# Patient Record
Sex: FEMALE | Race: WHITE | NOT HISPANIC OR LATINO | URBAN - METROPOLITAN AREA
[De-identification: names, ages, dates, MRNs, and addresses within clinical notes are randomized per-mention and may not be internally consistent; named-entity substitution may affect disease eponyms.]

---

## 2017-09-15 ENCOUNTER — ON CAMPUS - OUTPATIENT (OUTPATIENT)
Dept: URBAN - METROPOLITAN AREA HOSPITAL 77 | Facility: HOSPITAL | Age: 72
End: 2017-09-15

## 2017-09-15 DIAGNOSIS — K59.00 CONSTIPATION, UNSPECIFIED: ICD-10-CM

## 2017-09-15 DIAGNOSIS — R93.3 ABNORMAL FINDINGS ON DIAGNOSTIC IMAGING OF OTHER PARTS OF DI: ICD-10-CM

## 2017-09-15 DIAGNOSIS — R07.9 CHEST PAIN, UNSPECIFIED: ICD-10-CM

## 2017-09-15 DIAGNOSIS — R10.13 EPIGASTRIC PAIN: ICD-10-CM

## 2017-09-15 PROCEDURE — 99213 OFFICE O/P EST LOW 20 MIN: CPT | Performed by: INTERNAL MEDICINE

## 2017-09-16 PROCEDURE — 99214 OFFICE O/P EST MOD 30 MIN: CPT | Performed by: INTERNAL MEDICINE

## 2017-12-04 ENCOUNTER — HOSPITAL ENCOUNTER (OUTPATIENT)
Dept: OTHER | Facility: HOSPITAL | Age: 72
Setting detail: SPECIMEN
Discharge: HOME | End: 2017-12-04
Attending: INTERNAL MEDICINE | Admitting: INTERNAL MEDICINE

## 2018-09-07 ENCOUNTER — HOSPITAL ENCOUNTER (OUTPATIENT)
Dept: LAB | Facility: HOSPITAL | Age: 73
Discharge: HOME | End: 2018-09-07
Attending: FAMILY MEDICINE | Admitting: FAMILY MEDICINE

## 2018-09-07 LAB
ALBUMIN SERPL-MCNC: 4 G/DL (ref 3.5–4.8)
ALBUMIN/GLOB SERPL: 1.4 {RATIO} (ref 1–1.7)
ALP SERPL-CCNC: 69 IU/L (ref 32–91)
ALT SERPL-CCNC: 19 IU/L (ref 14–54)
ANION GAP SERPL CALC-SCNC: 14.6 MMOL/L (ref 10–20)
AST SERPL-CCNC: 26 IU/L (ref 15–41)
BILIRUB SERPL-MCNC: 0.9 MG/DL (ref 0.3–1.2)
BUN SERPL-MCNC: 13 MG/DL (ref 8–20)
BUN/CREAT SERPL: 16.3 (ref 5.4–26.2)
CALCIUM SERPL-MCNC: 9.2 MG/DL (ref 8.9–10.3)
CHLORIDE SERPL-SCNC: 102 MMOL/L (ref 101–111)
CHOLEST SERPL-MCNC: 156 MG/DL
CHOLEST/HDLC SERPL: 2.2 {RATIO}
CONV CO2: 27 MMOL/L (ref 22–32)
CONV LDL CHOLESTEROL DIRECT: 69 MG/DL (ref 0–100)
CONV TOTAL PROTEIN: 6.9 G/DL (ref 6.1–7.9)
CREAT UR-MCNC: 0.8 MG/DL (ref 0.4–1)
GLOBULIN UR ELPH-MCNC: 2.9 G/DL (ref 2.5–3.8)
GLUCOSE SERPL-MCNC: 93 MG/DL (ref 65–99)
HDLC SERPL-MCNC: 70 MG/DL
LDLC/HDLC SERPL: 1 {RATIO}
LIPID INTERPRETATION: NORMAL
POTASSIUM SERPL-SCNC: 4.6 MMOL/L (ref 3.6–5.1)
SODIUM SERPL-SCNC: 139 MMOL/L (ref 136–144)
TRIGL SERPL-MCNC: 34 MG/DL
VLDLC SERPL CALC-MCNC: 16.9 MG/DL

## 2018-10-04 ENCOUNTER — HOSPITAL ENCOUNTER (OUTPATIENT)
Dept: MAMMOGRAPHY | Facility: HOSPITAL | Age: 73
Discharge: HOME | End: 2018-10-04
Attending: FAMILY MEDICINE | Admitting: FAMILY MEDICINE

## 2019-04-26 ENCOUNTER — HOSPITAL ENCOUNTER (OUTPATIENT)
Dept: FAMILY MEDICINE CLINIC | Facility: CLINIC | Age: 74
Setting detail: SPECIMEN
Discharge: HOME | End: 2019-04-26
Attending: FAMILY MEDICINE | Admitting: FAMILY MEDICINE

## 2019-04-30 ENCOUNTER — HOSPITAL ENCOUNTER (OUTPATIENT)
Dept: OTHER | Facility: HOSPITAL | Age: 74
Discharge: HOME | End: 2019-04-30
Attending: FAMILY MEDICINE | Admitting: FAMILY MEDICINE

## 2019-04-30 LAB
ALBUMIN SERPL-MCNC: 3.9 G/DL (ref 3.5–4.8)
ALBUMIN/GLOB SERPL: 1.4 {RATIO} (ref 1–1.7)
ALP SERPL-CCNC: 65 IU/L (ref 32–91)
ALT SERPL-CCNC: 15 IU/L (ref 14–54)
ANION GAP SERPL CALC-SCNC: 12.9 MMOL/L (ref 10–20)
AST SERPL-CCNC: 24 IU/L (ref 15–41)
BASOPHILS # BLD AUTO: 0.1 10*3/UL (ref 0–0.2)
BASOPHILS NFR BLD AUTO: 1 % (ref 0–2)
BILIRUB SERPL-MCNC: 0.9 MG/DL (ref 0.3–1.2)
BUN SERPL-MCNC: 10 MG/DL (ref 8–20)
BUN/CREAT SERPL: 12.5 (ref 5.4–26.2)
CALCIUM SERPL-MCNC: 9.8 MG/DL (ref 8.9–10.3)
CHLORIDE SERPL-SCNC: 104 MMOL/L (ref 101–111)
CONV CO2: 27 MMOL/L (ref 22–32)
CONV TOTAL PROTEIN: 6.6 G/DL (ref 6.1–7.9)
CREAT UR-MCNC: 0.8 MG/DL (ref 0.4–1)
DIFFERENTIAL METHOD BLD: (no result)
EOSINOPHIL # BLD AUTO: 0.1 10*3/UL (ref 0–0.3)
EOSINOPHIL # BLD AUTO: 2 % (ref 0–3)
ERYTHROCYTE [DISTWIDTH] IN BLOOD BY AUTOMATED COUNT: 13.5 % (ref 11.5–14.5)
GLOBULIN UR ELPH-MCNC: 2.7 G/DL (ref 2.5–3.8)
GLUCOSE SERPL-MCNC: 87 MG/DL (ref 65–99)
HCT VFR BLD AUTO: 40.1 % (ref 35–49)
HGB BLD-MCNC: 13.2 G/DL (ref 12–15)
LYMPHOCYTES # BLD AUTO: 2 10*3/UL (ref 0.8–4.8)
LYMPHOCYTES NFR BLD AUTO: 38 % (ref 18–42)
MCH RBC QN AUTO: 28.5 PG (ref 26–32)
MCHC RBC AUTO-ENTMCNC: 33 G/DL (ref 32–36)
MCV RBC AUTO: 86.4 FL (ref 80–94)
MONOCYTES # BLD AUTO: 0.5 10*3/UL (ref 0.1–1.3)
MONOCYTES NFR BLD AUTO: 9 % (ref 2–11)
NEUTROPHILS # BLD AUTO: 2.6 10*3/UL (ref 2.3–8.6)
NEUTROPHILS NFR BLD AUTO: 50 % (ref 50–75)
NRBC BLD AUTO-RTO: 0 /100{WBCS}
NRBC/RBC NFR BLD MANUAL: 0 10*3/UL
PLATELET # BLD AUTO: 270 10*3/UL (ref 150–450)
PMV BLD AUTO: 6.9 FL (ref 7.4–10.4)
POTASSIUM SERPL-SCNC: 4.9 MMOL/L (ref 3.6–5.1)
RBC # BLD AUTO: 4.64 10*6/UL (ref 4–5.4)
SODIUM SERPL-SCNC: 139 MMOL/L (ref 136–144)
WBC # BLD AUTO: 5.3 10*3/UL (ref 4.5–11.5)

## 2019-07-05 RX ORDER — TEMAZEPAM 30 MG/1
1 CAPSULE ORAL NIGHTLY
COMMUNITY
Start: 2019-04-26 | End: 2019-09-03 | Stop reason: SDUPTHER

## 2019-07-05 RX ORDER — TEMAZEPAM 30 MG/1
CAPSULE ORAL
Qty: 30 CAPSULE | Refills: 0 | Status: SHIPPED | OUTPATIENT
Start: 2019-07-05 | End: 2019-07-15 | Stop reason: SDUPTHER

## 2019-07-05 NOTE — TELEPHONE ENCOUNTER
Last visit:  4/30/19  Next visit: none  Last labs: 4/30/19    Rx requested: Temazepam   [facility] [place]

## 2019-07-12 NOTE — TELEPHONE ENCOUNTER
zoloft    Last seen [facility] 4/30/19  Nothing scheduled  Last [facility] labs 4/30/19, last lipid 9/7/18

## 2019-08-06 ENCOUNTER — OFFICE VISIT (OUTPATIENT)
Dept: FAMILY MEDICINE CLINIC | Facility: CLINIC | Age: 74
End: 2019-08-06

## 2019-08-06 VITALS
TEMPERATURE: 97.8 F | HEIGHT: 60 IN | RESPIRATION RATE: 18 BRPM | WEIGHT: 128 LBS | HEART RATE: 69 BPM | OXYGEN SATURATION: 95 % | SYSTOLIC BLOOD PRESSURE: 147 MMHG | DIASTOLIC BLOOD PRESSURE: 82 MMHG | BODY MASS INDEX: 25.13 KG/M2
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
## 2019-08-06 DIAGNOSIS — M19.90 ARTHRITIS: Primary | ICD-10-CM

## 2019-08-06 DIAGNOSIS — F41.9 ANXIETY: ICD-10-CM

## 2019-08-06 DIAGNOSIS — E78.2 MIXED HYPERLIPIDEMIA: ICD-10-CM

## 2019-08-06 DIAGNOSIS — Z12.39 SCREENING FOR BREAST CANCER: ICD-10-CM

## 2019-08-06 PROBLEM — R29.890 LOSS OF HEIGHT: Status: ACTIVE | Noted: 2018-09-04

## 2019-08-06 PROBLEM — K21.9 GERD (GASTROESOPHAGEAL REFLUX DISEASE): Status: ACTIVE | Noted: 2018-09-04

## 2019-08-06 PROBLEM — R00.2 PALPITATIONS: Status: ACTIVE | Noted: 2019-04-26

## 2019-08-06 PROBLEM — H26.9 CATARACT OF BOTH EYES: Status: ACTIVE | Noted: 2018-09-04

## 2019-08-06 PROBLEM — E55.9 VITAMIN D DEFICIENCY: Status: ACTIVE | Noted: 2018-09-04

## 2019-08-06 PROBLEM — M85.80 OSTEOPENIA: Status: ACTIVE | Noted: 2018-10-09

## 2019-08-06 PROBLEM — E78.5 DYSLIPIDEMIA: Status: ACTIVE | Noted: 2018-09-04

## 2019-08-06 PROBLEM — G43.909 MIGRAINE HEADACHE: Status: ACTIVE | Noted: 2018-09-04

## 2019-08-06 PROBLEM — F41.1 GENERALIZED ANXIETY DISORDER: Status: ACTIVE | Noted: 2018-09-04

## 2019-08-06 PROCEDURE — 99213 OFFICE O/P EST LOW 20 MIN: CPT | Performed by: FAMILY MEDICINE

## 2019-08-06 RX ORDER — SERTRALINE HYDROCHLORIDE 100 MG/1
50 TABLET, FILM COATED ORAL DAILY
Qty: 90 TABLET | Refills: 0 | Status: SHIPPED | OUTPATIENT
Start: 2019-08-06 | End: 2019-10-17 | Stop reason: SDUPTHER

## 2019-08-06 RX ORDER — MULTIVIT-MIN/IRON/FOLIC ACID/K 18-600-40
2000 CAPSULE ORAL DAILY
Qty: 30 CAPSULE
Start: 2019-08-06 | End: 2020-10-22

## 2019-08-06 RX ORDER — DIAPER,BRIEF,ADULT, DISPOSABLE
EACH MISCELLANEOUS
Refills: 12 | COMMUNITY
Start: 2019-07-06 | End: 2019-08-06 | Stop reason: SDUPTHER

## 2019-08-06 RX ORDER — PHENOL 1.4 %
600 AEROSOL, SPRAY (ML) MUCOUS MEMBRANE 2 TIMES DAILY
COMMUNITY
Start: 2018-09-04 | End: 2020-10-22

## 2019-08-06 RX ORDER — DIAPER,BRIEF,ADULT, DISPOSABLE
1 EACH MISCELLANEOUS
Qty: 104 EACH | Refills: 12 | Status: SHIPPED | OUTPATIENT
Start: 2019-08-06 | End: 2020-09-25

## 2019-08-06 RX ORDER — INCONTINENCE PAD,LINER,DISP
EACH MISCELLANEOUS
COMMUNITY
Start: 2018-01-10 | End: 2019-08-06

## 2019-08-06 NOTE — PROGRESS NOTES
Subjective   Katie Reddy is a 74 y.o. female.     Chief Complaint   Patient presents with   • Shortness of Breath   • Numbness     head/lips & R leg   • Insect Bite     tick on face 1 mon ago         Current Outpatient Medications:   •  atorvastatin (LIPITOR) 40 MG tablet, ATORVASTATIN CALCIUM 40 MG TABS, Disp: , Rfl:   •  calcium carbonate (OS-SELENA) 600 MG tablet, 600 mg 2 (Two) Times a Day., Disp: , Rfl:   •  celecoxib (CeleBREX) 100 MG capsule, Take 1 capsule by mouth 2 (Two) Times a Day., Disp: , Rfl: 1  •  cyclobenzaprine (FLEXERIL) 10 MG tablet, Take 10 mg by mouth Daily As Needed., Disp: , Rfl: 1  •  meclizine (ANTIVERT) 12.5 MG tablet, TAKE 1 TABLET BY MOUTH EVERY 6 HOURS AS NEEDED FOR DIZZINESS, Disp: , Rfl: 0  •  ondansetron (ZOFRAN) 4 MG tablet, TAKE 1 TO 2 TABLETS BY MOUTH EVERY 6 HOURS AS NEEDED FOR NAUSEA, MAX OF 6 A DAY, Disp: , Rfl: 2  •  pantoprazole (PROTONIX) 40 MG EC tablet, Take 40 mg by mouth Daily., Disp: , Rfl: 1  •  raNITIdine (ZANTAC) 150 MG tablet, Take 1 tablet by mouth 2 (Two) Times a Day As Needed., Disp: , Rfl: 2  •  sertraline (ZOLOFT) 100 MG tablet, Take 0.5 tablets by mouth Daily., Disp: 90 tablet, Rfl: 0  •  temazepam (RESTORIL) 30 MG capsule, Take 1 capsule by mouth Every Night., Disp: , Rfl:   •  Cholecalciferol (VITAMIN D) 2000 units capsule, Take 1 capsule by mouth Daily., Disp: 30 capsule, Rfl:   •  Incontinence Supply Disposable (POISE PAD) pads, Apply 1 pad topically to the appropriate area as directed 5 (Five) Times a Day., Disp: 104 each, Rfl: 12    Past Medical History:   Diagnosis Date   • Anxiety    • Arthritis    • Cataracts, bilateral    • GERD (gastroesophageal reflux disease)    • Headache    • Hyperlipidemia    • Hypertension    • Osteopenia    • Vitamin D deficiency        Past Surgical History:   Procedure Laterality Date   • APPENDECTOMY     • BREAST LUMPECTOMY Left 1973    NEG   • CHOLECYSTECTOMY     • COLON SURGERY      hemorrhoid banding   • COLONOSCOPY   2017    Polyps   • HERNIA REPAIR      Umbilical removal   • HYSTERECTOMY  1970   • PARTIAL HYSTERECTOMY         Family History   Problem Relation Age of Onset   • Heart disease Mother    • Hypertension Mother    • Diabetes Father    • Heart disease Father    • Alcohol abuse Father    • Hypertension Father    • Diabetes Brother    • Heart disease Brother    • Cancer Brother 68        Prostate Cancer   • Hypertension Brother    • Diabetes Maternal Aunt    • Heart disease Maternal Grandmother    • Hypertension Maternal Grandmother    • Heart disease Maternal Grandfather    • Hypertension Maternal Grandfather    • Liver disease Paternal Grandmother    • Hypertension Paternal Grandmother    • Heart disease Paternal Grandfather    • Hypertension Paternal Grandfather    • Dementia Sister        Social History     Socioeconomic History   • Marital status:      Spouse name: Not on file   • Number of children: Not on file   • Years of education: Not on file   • Highest education level: Not on file   Tobacco Use   • Smoking status: Never Smoker   • Smokeless tobacco: Never Used   Substance and Sexual Activity   • Alcohol use: No     Frequency: Never   • Drug use: No   • Sexual activity: Defer       73 y/o C female here to disc. Poss Rt TKR @ a [facility] facility than [facility] (where her current  Does them)---having some probs w/ numbness in Rt LE and feels may be assoc w/ need for TKR    Pt had a tick bite on her face about a month ago and still itchy----no real rash or blister w this----no putting anything on it  Pt had some numbness around her lips but no swelling and now resolved----not sure what triggered it          The following portions of the patient's history were reviewed and updated as appropriate: allergies, current medications, past family history, past medical history, past social history, past surgical history and problem list.    Review of Systems   Respiratory: Negative for cough, chest tightness and wheezing.     Cardiovascular: Negative for chest pain and leg swelling.   Musculoskeletal: Positive for arthralgias and gait problem. Negative for joint swelling.       Vitals:    08/06/19 1035   BP: 147/82   Pulse: 69   Resp: 18   Temp: 97.8 °F (36.6 °C)   SpO2: 95%       Objective   Physical Exam   Constitutional: She is oriented to person, place, and time. She appears well-developed and well-nourished.   HENT:   Head: Normocephalic and atraumatic.   Cardiovascular: Normal rate, regular rhythm and normal heart sounds.   Pulmonary/Chest: Effort normal and breath sounds normal.   Musculoskeletal: She exhibits no edema.   Neurological: She is alert and oriented to person, place, and time. No cranial nerve deficit.   Skin: Skin is warm and dry. No ecchymosis and no rash noted. Rash is not nodular, not pustular, not vesicular and not urticarial. No erythema.   + dry 1/2 cm area w/ mild erythema on Rt side of face   Psychiatric: Her behavior is normal. Judgment and thought content normal. Her mood appears anxious. Her affect is not angry, not labile and not inappropriate.   Nursing note and vitals reviewed.        Assessment/Plan   Katie was seen today for shortness of breath, numbness and insect bite.    Diagnoses and all orders for this visit:    Arthritis    Anxiety    Insect bite, initial encounter    Mixed hyperlipidemia  -     Lipid Panel; Future  -     Comprehensive Metabolic Panel; Future    Screening for breast cancer  -     Mammo Screening Digital Tomosynthesis Bilateral With CAD; Future    Other orders  -     Incontinence Supply Disposable (POISE PAD) pads; Apply 1 pad topically to the appropriate area as directed 5 (Five) Times a Day.  -     Cholecalciferol (VITAMIN D) 2000 units capsule; Take 1 capsule by mouth Daily.  -     sertraline (ZOLOFT) 100 MG tablet; Take 0.5 tablets by mouth Daily.    DIsc'd w/ pt that she can see Ortho @ [facility] for poss TKR if she would like   Trial of otc cortisone cream to pruitic area of face  prn

## 2019-08-15 ENCOUNTER — HOSPITAL ENCOUNTER (OUTPATIENT)
Dept: MAMMOGRAPHY | Facility: HOSPITAL | Age: 74
Discharge: HOME | End: 2019-08-15
Admitting: FAMILY MEDICINE

## 2019-08-15 DIAGNOSIS — Z12.39 SCREENING FOR BREAST CANCER: ICD-10-CM

## 2019-08-15 PROCEDURE — 77067 SCR MAMMO BI INCL CAD: CPT

## 2019-08-15 PROCEDURE — 77063 BREAST TOMOSYNTHESIS BI: CPT

## 2019-08-16 ENCOUNTER — HOSPITAL ENCOUNTER (OUTPATIENT)
Dept: OTHER | Facility: HOSPITAL | Age: 74
Discharge: HOME | End: 2019-08-16

## 2019-08-16 DIAGNOSIS — Z00.6 EXAMINATION FOR NORMAL COMPARISON OR CONTROL IN CLINICAL RESEARCH: ICD-10-CM

## 2019-08-30 RX ORDER — PANTOPRAZOLE SODIUM 40 MG/1
TABLET, DELAYED RELEASE ORAL
Qty: 90 TABLET | Refills: 1 | Status: SHIPPED | OUTPATIENT
Start: 2019-08-30 | End: 2019-12-17 | Stop reason: SDUPTHER

## 2019-09-03 RX ORDER — MECLIZINE HCL 12.5 MG/1
12.5 TABLET ORAL 3 TIMES DAILY PRN
Qty: 60 TABLET | Refills: 0 | Status: SHIPPED | OUTPATIENT
Start: 2019-09-03 | End: 2020-03-09

## 2019-09-03 RX ORDER — MECLIZINE HCL 12.5 MG/1
TABLET ORAL
Qty: 60 TABLET | Refills: 0 | OUTPATIENT
Start: 2019-09-03

## 2019-09-03 RX ORDER — TEMAZEPAM 30 MG/1
CAPSULE ORAL
Qty: 30 CAPSULE | Refills: 0 | OUTPATIENT
Start: 2019-09-03

## 2019-09-03 RX ORDER — TEMAZEPAM 30 MG/1
30 CAPSULE ORAL NIGHTLY
Qty: 30 CAPSULE | Refills: 0 | Status: SHIPPED | OUTPATIENT
Start: 2019-09-03 | End: 2019-10-17 | Stop reason: SDUPTHER

## 2019-09-03 NOTE — TELEPHONE ENCOUNTER
Last visit:   Next visit: 2/7/2020  Last labs: 8/6/19    Rx requested: Meclizine & temazepam   [facility] [place]

## 2019-10-10 ENCOUNTER — LAB (OUTPATIENT)
Dept: LAB | Facility: HOSPITAL | Age: 74
End: 2019-10-10

## 2019-10-10 DIAGNOSIS — E78.2 MIXED HYPERLIPIDEMIA: ICD-10-CM

## 2019-10-10 LAB
ALBUMIN SERPL-MCNC: 4 G/DL (ref 3.5–4.8)
ALBUMIN/GLOB SERPL: 1.8 G/DL (ref 1–1.7)
ALP SERPL-CCNC: 60 U/L (ref 32–91)
ALT SERPL W P-5'-P-CCNC: 16 U/L (ref 14–54)
ANION GAP SERPL CALCULATED.3IONS-SCNC: 13.3 MMOL/L (ref 5–15)
ARTICHOKE IGE QN: 152 MG/DL (ref 0–100)
AST SERPL-CCNC: 22 U/L (ref 15–41)
BILIRUB SERPL-MCNC: 0.7 MG/DL (ref 0.3–1.2)
BUN BLD-MCNC: 11 MG/DL (ref 8–20)
BUN/CREAT SERPL: 13.8 (ref 5.4–26.2)
CALCIUM SPEC-SCNC: 9.4 MG/DL (ref 8.9–10.3)
CHLORIDE SERPL-SCNC: 101 MMOL/L (ref 101–111)
CHOLEST SERPL-MCNC: 229 MG/DL
CO2 SERPL-SCNC: 28 MMOL/L (ref 22–32)
CREAT BLD-MCNC: 0.8 MG/DL (ref 0.4–1)
GFR SERPL CREATININE-BSD FRML MDRD: 70 ML/MIN/1.73
GLOBULIN UR ELPH-MCNC: 2.2 GM/DL (ref 2.5–3.8)
GLUCOSE BLD-MCNC: 77 MG/DL (ref 65–99)
HDLC SERPL QL: 3.42
HDLC SERPL-MCNC: 67 MG/DL
LDLC/HDLC SERPL: 2.24 {RATIO}
POTASSIUM BLD-SCNC: 4.3 MMOL/L (ref 3.6–5.1)
PROT SERPL-MCNC: 6.2 G/DL (ref 6.1–7.9)
SODIUM BLD-SCNC: 138 MMOL/L (ref 136–144)
TRIGL SERPL-MCNC: 61 MG/DL
VLDLC SERPL-MCNC: 12.2 MG/DL

## 2019-10-10 PROCEDURE — 80061 LIPID PANEL: CPT

## 2019-10-10 PROCEDURE — 80053 COMPREHEN METABOLIC PANEL: CPT

## 2019-10-10 PROCEDURE — 36415 COLL VENOUS BLD VENIPUNCTURE: CPT

## 2019-10-17 DIAGNOSIS — E78.5 DYSLIPIDEMIA: Primary | ICD-10-CM

## 2019-10-17 RX ORDER — TEMAZEPAM 30 MG/1
CAPSULE ORAL
Qty: 30 CAPSULE | Refills: 0 | OUTPATIENT
Start: 2019-10-17

## 2019-10-17 RX ORDER — SERTRALINE HYDROCHLORIDE 100 MG/1
100 TABLET, FILM COATED ORAL DAILY
Qty: 90 TABLET | Refills: 1 | Status: SHIPPED | OUTPATIENT
Start: 2019-10-17 | End: 2020-01-07 | Stop reason: SDUPTHER

## 2019-10-17 RX ORDER — TEMAZEPAM 30 MG/1
30 CAPSULE ORAL NIGHTLY
Qty: 30 CAPSULE | Refills: 1 | Status: SHIPPED | OUTPATIENT
Start: 2019-10-17 | End: 2020-05-04

## 2019-10-17 RX ORDER — ATORVASTATIN CALCIUM 80 MG/1
80 TABLET, FILM COATED ORAL DAILY
Qty: 90 TABLET | Refills: 1 | Status: SHIPPED | OUTPATIENT
Start: 2019-10-17 | End: 2020-01-27

## 2019-10-17 NOTE — TELEPHONE ENCOUNTER
Pt called requesting refills of [facility].  Also, she needs refill of Zoloft, which you said you were going to increase to 100mg daily.

## 2019-11-04 RX ORDER — CYCLOBENZAPRINE HCL 10 MG
TABLET ORAL
Qty: 90 TABLET | Refills: 1 | Status: SHIPPED | OUTPATIENT
Start: 2019-11-04 | End: 2020-05-04

## 2019-11-12 PROBLEM — Z78.0 POSTMENOPAUSAL: Status: ACTIVE | Noted: 2018-09-04

## 2019-11-19 PROCEDURE — 88305 TISSUE EXAM BY PATHOLOGIST: CPT | Performed by: INTERNAL MEDICINE

## 2019-11-20 ENCOUNTER — LAB REQUISITION (OUTPATIENT)
Dept: LAB | Facility: HOSPITAL | Age: 74
End: 2019-11-20

## 2019-11-20 ENCOUNTER — HOSPITAL ENCOUNTER (EMERGENCY)
Facility: HOSPITAL | Age: 74
Discharge: HOME | End: 2019-11-20
Attending: EMERGENCY MEDICINE | Admitting: EMERGENCY MEDICINE

## 2019-11-20 ENCOUNTER — APPOINTMENT (OUTPATIENT)
Dept: CT IMAGING | Facility: HOSPITAL | Age: 74
End: 2019-11-20

## 2019-11-20 VITALS
DIASTOLIC BLOOD PRESSURE: 86 MMHG | RESPIRATION RATE: 16 BRPM | BODY MASS INDEX: 25.72 KG/M2 | SYSTOLIC BLOOD PRESSURE: 190 MMHG | OXYGEN SATURATION: 99 % | TEMPERATURE: 98.4 F | HEART RATE: 76 BPM | HEIGHT: 60 IN | WEIGHT: 131 LBS

## 2019-11-20 DIAGNOSIS — K92.2 GASTROINTESTINAL HEMORRHAGE, UNSPECIFIED: ICD-10-CM

## 2019-11-20 DIAGNOSIS — R10.9 ABDOMINAL PAIN, UNSPECIFIED ABDOMINAL LOCATION: Primary | ICD-10-CM

## 2019-11-20 DIAGNOSIS — R11.2 NAUSEA AND VOMITING, INTRACTABILITY OF VOMITING NOT SPECIFIED, UNSPECIFIED VOMITING TYPE: ICD-10-CM

## 2019-11-20 DIAGNOSIS — E86.0 DEHYDRATION: ICD-10-CM

## 2019-11-20 LAB
ALBUMIN SERPL-MCNC: 4.7 G/DL (ref 3.5–5.2)
ALBUMIN/GLOB SERPL: 1.4 G/DL
ALP SERPL-CCNC: 92 U/L (ref 39–117)
ALT SERPL W P-5'-P-CCNC: 13 U/L (ref 1–33)
ANION GAP SERPL CALCULATED.3IONS-SCNC: 14 MMOL/L (ref 5–15)
AST SERPL-CCNC: 24 U/L (ref 1–32)
BACTERIA UR QL AUTO: ABNORMAL /HPF
BASOPHILS # BLD AUTO: 0 10*3/MM3 (ref 0–0.2)
BASOPHILS NFR BLD AUTO: 0.5 % (ref 0–1.5)
BILIRUB SERPL-MCNC: 0.7 MG/DL (ref 0.2–1.2)
BILIRUB UR QL STRIP: NEGATIVE
BUN BLD-MCNC: 18 MG/DL (ref 8–23)
BUN/CREAT SERPL: 22.2 (ref 7–25)
CALCIUM SPEC-SCNC: 9.6 MG/DL (ref 8.6–10.5)
CHLORIDE SERPL-SCNC: 98 MMOL/L (ref 98–107)
CLARITY UR: CLEAR
CO2 SERPL-SCNC: 23 MMOL/L (ref 22–29)
COLOR UR: YELLOW
CREAT BLD-MCNC: 0.81 MG/DL (ref 0.57–1)
DEPRECATED RDW RBC AUTO: 38.9 FL (ref 37–54)
EOSINOPHIL # BLD AUTO: 0 10*3/MM3 (ref 0–0.4)
EOSINOPHIL NFR BLD AUTO: 0 % (ref 0.3–6.2)
ERYTHROCYTE [DISTWIDTH] IN BLOOD BY AUTOMATED COUNT: 13.2 % (ref 12.3–15.4)
GFR SERPL CREATININE-BSD FRML MDRD: 69 ML/MIN/1.73
GLOBULIN UR ELPH-MCNC: 3.3 GM/DL
GLUCOSE BLD-MCNC: 133 MG/DL (ref 65–99)
GLUCOSE UR STRIP-MCNC: NEGATIVE MG/DL
HCT VFR BLD AUTO: 39.4 % (ref 34–46.6)
HGB BLD-MCNC: 13.6 G/DL (ref 12–15.9)
HGB UR QL STRIP.AUTO: ABNORMAL
HYALINE CASTS UR QL AUTO: ABNORMAL /LPF
KETONES UR QL STRIP: ABNORMAL
LEUKOCYTE ESTERASE UR QL STRIP.AUTO: NEGATIVE
LIPASE SERPL-CCNC: 14 U/L (ref 13–60)
LYMPHOCYTES # BLD AUTO: 0.7 10*3/MM3 (ref 0.7–3.1)
LYMPHOCYTES NFR BLD AUTO: 7.3 % (ref 19.6–45.3)
MCH RBC QN AUTO: 28.7 PG (ref 26.6–33)
MCHC RBC AUTO-ENTMCNC: 34.5 G/DL (ref 31.5–35.7)
MCV RBC AUTO: 83.2 FL (ref 79–97)
MONOCYTES # BLD AUTO: 0.3 10*3/MM3 (ref 0.1–0.9)
MONOCYTES NFR BLD AUTO: 3 % (ref 5–12)
NEUTROPHILS # BLD AUTO: 9 10*3/MM3 (ref 1.7–7)
NEUTROPHILS NFR BLD AUTO: 89.2 % (ref 42.7–76)
NITRITE UR QL STRIP: NEGATIVE
NRBC BLD AUTO-RTO: 0 /100 WBC (ref 0–0.2)
PH UR STRIP.AUTO: 5.5 [PH] (ref 5–8)
PLATELET # BLD AUTO: 256 10*3/MM3 (ref 140–450)
PMV BLD AUTO: 7 FL (ref 6–12)
POTASSIUM BLD-SCNC: 4.2 MMOL/L (ref 3.5–5.2)
PROT SERPL-MCNC: 8 G/DL (ref 6–8.5)
PROT UR QL STRIP: ABNORMAL
RBC # BLD AUTO: 4.73 10*6/MM3 (ref 3.77–5.28)
RBC # UR: ABNORMAL /HPF
REF LAB TEST METHOD: ABNORMAL
SODIUM BLD-SCNC: 135 MMOL/L (ref 136–145)
SP GR UR STRIP: 1.02 (ref 1–1.03)
SQUAMOUS #/AREA URNS HPF: ABNORMAL /HPF
UROBILINOGEN UR QL STRIP: ABNORMAL
WBC NRBC COR # BLD: 10.1 10*3/MM3 (ref 3.4–10.8)
WBC UR QL AUTO: ABNORMAL /HPF

## 2019-11-20 PROCEDURE — 85025 COMPLETE CBC W/AUTO DIFF WBC: CPT | Performed by: EMERGENCY MEDICINE

## 2019-11-20 PROCEDURE — 80053 COMPREHEN METABOLIC PANEL: CPT | Performed by: EMERGENCY MEDICINE

## 2019-11-20 PROCEDURE — 25010000002 HYDROMORPHONE PER 4 MG: Performed by: EMERGENCY MEDICINE

## 2019-11-20 PROCEDURE — 96375 TX/PRO/DX INJ NEW DRUG ADDON: CPT

## 2019-11-20 PROCEDURE — 81001 URINALYSIS AUTO W/SCOPE: CPT | Performed by: EMERGENCY MEDICINE

## 2019-11-20 PROCEDURE — 74176 CT ABD & PELVIS W/O CONTRAST: CPT

## 2019-11-20 PROCEDURE — 99283 EMERGENCY DEPT VISIT LOW MDM: CPT

## 2019-11-20 PROCEDURE — 96374 THER/PROPH/DIAG INJ IV PUSH: CPT

## 2019-11-20 PROCEDURE — 83690 ASSAY OF LIPASE: CPT | Performed by: EMERGENCY MEDICINE

## 2019-11-20 PROCEDURE — 25010000002 ONDANSETRON PER 1 MG: Performed by: EMERGENCY MEDICINE

## 2019-11-20 RX ORDER — HYDROMORPHONE HCL 110MG/55ML
0.5 PATIENT CONTROLLED ANALGESIA SYRINGE INTRAVENOUS ONCE
Status: COMPLETED | OUTPATIENT
Start: 2019-11-20 | End: 2019-11-20

## 2019-11-20 RX ORDER — PROMETHAZINE HYDROCHLORIDE 25 MG/1
25 TABLET ORAL EVERY 6 HOURS PRN
Qty: 10 TABLET | Refills: 0 | Status: SHIPPED | OUTPATIENT
Start: 2019-11-20 | End: 2020-02-07

## 2019-11-20 RX ORDER — ONDANSETRON 2 MG/ML
4 INJECTION INTRAMUSCULAR; INTRAVENOUS ONCE
Status: COMPLETED | OUTPATIENT
Start: 2019-11-20 | End: 2019-11-20

## 2019-11-20 RX ORDER — SODIUM CHLORIDE 0.9 % (FLUSH) 0.9 %
10 SYRINGE (ML) INJECTION AS NEEDED
Status: DISCONTINUED | OUTPATIENT
Start: 2019-11-20 | End: 2019-11-20 | Stop reason: HOSPADM

## 2019-11-20 RX ORDER — PROMETHAZINE HYDROCHLORIDE 25 MG/ML
INJECTION, SOLUTION INTRAMUSCULAR; INTRAVENOUS
Status: DISCONTINUED
Start: 2019-11-20 | End: 2019-11-20 | Stop reason: HOSPADM

## 2019-11-20 RX ORDER — PROMETHAZINE HYDROCHLORIDE 25 MG/ML
25 INJECTION, SOLUTION INTRAMUSCULAR; INTRAVENOUS ONCE
Status: COMPLETED | OUTPATIENT
Start: 2019-11-20 | End: 2019-11-20

## 2019-11-20 RX ORDER — SODIUM CHLORIDE 9 MG/ML
INJECTION, SOLUTION INTRAVENOUS
Status: DISCONTINUED
Start: 2019-11-20 | End: 2019-11-20 | Stop reason: HOSPADM

## 2019-11-20 RX ORDER — PROMETHAZINE HYDROCHLORIDE 25 MG/ML
INJECTION, SOLUTION INTRAMUSCULAR; INTRAVENOUS
Status: COMPLETED
Start: 2019-11-20 | End: 2019-11-20

## 2019-11-20 RX ADMIN — ONDANSETRON 4 MG: 2 INJECTION INTRAMUSCULAR; INTRAVENOUS at 02:20

## 2019-11-20 RX ADMIN — HYDROMORPHONE HYDROCHLORIDE 0.5 MG: 2 INJECTION, SOLUTION INTRAMUSCULAR; INTRAVENOUS; SUBCUTANEOUS at 02:20

## 2019-11-20 RX ADMIN — SODIUM CHLORIDE 500 ML: 0.9 INJECTION, SOLUTION INTRAVENOUS at 03:25

## 2019-11-20 RX ADMIN — SODIUM CHLORIDE 500 ML: 0.9 INJECTION, SOLUTION INTRAVENOUS at 02:20

## 2019-11-20 NOTE — ED PROVIDER NOTES
"Subjective   History of Present Illness  Abdominal pain  74-year-old  female complains of moderate intensity abdominal pain for the last several hours.  She states she had a colonoscopy earlier in the day.  She states she has had some vomiting and dry heaves.  She reports no fevers chills or diarrhea.  She denies any unusual ingestions.  She is unsure of whether any biopsies were taken.  She reports no rectal bleeding.  Review of Systems   Constitutional: Negative.    HENT: Negative.    Eyes: Negative.    Respiratory: Negative.    Cardiovascular: Negative.    Gastrointestinal: Positive for abdominal pain, nausea and vomiting.   Genitourinary: Negative.    Musculoskeletal: Negative.    Skin: Negative.    Neurological: Negative.    Psychiatric/Behavioral: Negative.        Past Medical History:   Diagnosis Date   • Anxiety    • Arthritis    • Cataracts, bilateral    • GERD (gastroesophageal reflux disease)    • Headache    • Hyperlipidemia    • Hypertension    • Osteopenia    • Vitamin D deficiency        Allergies   Allergen Reactions   • Trazodone Irritability   • Nsaids Other (See Comments)     Other, abdominal pain, \"sick\"       Past Surgical History:   Procedure Laterality Date   • APPENDECTOMY     • BREAST LUMPECTOMY Left 1973    NEG   • CHOLECYSTECTOMY     • COLON SURGERY      hemorrhoid banding   • COLONOSCOPY  2017    Polyps   • HERNIA REPAIR      Umbilical removal   • HYSTERECTOMY  1970   • PARTIAL HYSTERECTOMY         Family History   Problem Relation Age of Onset   • Heart disease Mother    • Hypertension Mother    • Diabetes Father    • Heart disease Father    • Alcohol abuse Father    • Hypertension Father    • Diabetes Brother    • Heart disease Brother    • Cancer Brother 68        Prostate Cancer   • Hypertension Brother    • Diabetes Maternal Aunt    • Heart disease Maternal Grandmother    • Hypertension Maternal Grandmother    • Heart disease Maternal Grandfather    • Hypertension Maternal " "Grandfather    • Liver disease Paternal Grandmother    • Hypertension Paternal Grandmother    • Heart disease Paternal Grandfather    • Hypertension Paternal Grandfather    • Dementia Sister        Social History     Socioeconomic History   • Marital status:      Spouse name: Not on file   • Number of children: Not on file   • Years of education: Not on file   • Highest education level: Not on file   Tobacco Use   • Smoking status: Never Smoker   • Smokeless tobacco: Never Used   Substance and Sexual Activity   • Alcohol use: No     Frequency: Never   • Drug use: No   • Sexual activity: Defer           Objective   Physical Exam  BP (!) 190/86   Pulse 76   Temp 98.4 °F (36.9 °C) (Oral)   Resp 16   Ht 152.4 cm (60\")   Wt 59.4 kg (131 lb)   SpO2 99%   BMI 25.58 kg/m²   General: Well-developed well-appearing, no acute distress, alert and appropriate  Eyes: Pupils round and reactive, sclera nonicteric  HEENT: Mucous membranes moist, no mucosal swelling  Neck: Supple, no nuchal rigidity, no lymphadenopathy  Respirations: Respirations nonlabored, equal breath sounds bilaterally, clear lungs  Heart regular rate and rhythm, no murmurs rubs or gallops,   Abdomen soft, mildly tender palpation mid abdomen, no rebound or guarding, nondistended, no hepatosplenomegaly, no hernia, no mass, normal bowel sounds, no CVA tenderness  Extremities no clubbing cyanosis or edema, calves are symmetric and nontender  Neuro cranial nerves grossly intact, no focal limb deficits  Psych oriented, pleasant affect  Skin no rash, brisk cap refill  Procedures           ED Course           Results for orders placed or performed during the hospital encounter of 11/20/19   Comprehensive Metabolic Panel   Result Value Ref Range    Glucose 133 (H) 65 - 99 mg/dL    BUN 18 8 - 23 mg/dL    Creatinine 0.81 0.57 - 1.00 mg/dL    Sodium 135 (L) 136 - 145 mmol/L    Potassium 4.2 3.5 - 5.2 mmol/L    Chloride 98 98 - 107 mmol/L    CO2 23.0 22.0 - 29.0 " mmol/L    Calcium 9.6 8.6 - 10.5 mg/dL    Total Protein 8.0 6.0 - 8.5 g/dL    Albumin 4.70 3.50 - 5.20 g/dL    ALT (SGPT) 13 1 - 33 U/L    AST (SGOT) 24 1 - 32 U/L    Alkaline Phosphatase 92 39 - 117 U/L    Total Bilirubin 0.7 0.2 - 1.2 mg/dL    eGFR Non African Amer 69 >60 mL/min/1.73    Globulin 3.3 gm/dL    A/G Ratio 1.4 g/dL    BUN/Creatinine Ratio 22.2 7.0 - 25.0    Anion Gap 14.0 5.0 - 15.0 mmol/L   Lipase   Result Value Ref Range    Lipase 14 13 - 60 U/L   Urinalysis With Culture If Indicated - Urine, Clean Catch   Result Value Ref Range    Color, UA Yellow Yellow, Straw    Appearance, UA Clear Clear    pH, UA 5.5 5.0 - 8.0    Specific Gravity, UA 1.023 1.005 - 1.030    Glucose, UA Negative Negative    Ketones, UA 80 mg/dL (3+) (A) Negative    Bilirubin, UA Negative Negative    Blood, UA Large (3+) (A) Negative    Protein, UA 30 mg/dL (1+) (A) Negative    Leuk Esterase, UA Negative Negative    Nitrite, UA Negative Negative    Urobilinogen, UA 0.2 E.U./dL 0.2 - 1.0 E.U./dL   CBC Auto Differential   Result Value Ref Range    WBC 10.10 3.40 - 10.80 10*3/mm3    RBC 4.73 3.77 - 5.28 10*6/mm3    Hemoglobin 13.6 12.0 - 15.9 g/dL    Hematocrit 39.4 34.0 - 46.6 %    MCV 83.2 79.0 - 97.0 fL    MCH 28.7 26.6 - 33.0 pg    MCHC 34.5 31.5 - 35.7 g/dL    RDW 13.2 12.3 - 15.4 %    RDW-SD 38.9 37.0 - 54.0 fl    MPV 7.0 6.0 - 12.0 fL    Platelets 256 140 - 450 10*3/mm3    Neutrophil % 89.2 (H) 42.7 - 76.0 %    Lymphocyte % 7.3 (L) 19.6 - 45.3 %    Monocyte % 3.0 (L) 5.0 - 12.0 %    Eosinophil % 0.0 (L) 0.3 - 6.2 %    Basophil % 0.5 0.0 - 1.5 %    Neutrophils, Absolute 9.00 (H) 1.70 - 7.00 10*3/mm3    Lymphocytes, Absolute 0.70 0.70 - 3.10 10*3/mm3    Monocytes, Absolute 0.30 0.10 - 0.90 10*3/mm3    Eosinophils, Absolute 0.00 0.00 - 0.40 10*3/mm3    Basophils, Absolute 0.00 0.00 - 0.20 10*3/mm3    nRBC 0.0 0.0 - 0.2 /100 WBC   Urinalysis, Microscopic Only - Urine, Clean Catch   Result Value Ref Range    RBC, UA 6-12 (A) None  Seen /HPF    WBC, UA 3-5 (A) None Seen /HPF    Bacteria, UA None Seen None Seen /HPF    Squamous Epithelial Cells, UA 3-6 (A) None Seen, 0-2 /HPF    Hyaline Casts, UA 3-6 None Seen /LPF    Methodology Automated Microscopy      Ct Abdomen Pelvis Without Contrast    Result Date: 11/20/2019  1.  Mild gaseous distention of the colon, no free air. Thank you for the referral of this patient. This exam was interpreted by an American Board of Radiology certified radiologist with subspecialty fellowship training in body imaging. If there are any questions regarding this exam please feel free to contact a radiologist directly at [phone]. Electronically signed by:  Iqra Roque  11/20/2019 1:58 AM              MDM  Patient CT scan does reveal some gaseous distention of the colon likely related to yesterday's colonoscopy.  I suspect this is contributing to some of her symptoms.  There is no obstruction or inflammation noted.  She was given analgesia and antiemetics in the emergency room and was feeling much better.  Does have findings of some dehydration as well also likely related to the bowel prep and the vomiting today.  She was given IV fluids and feeling much better she was discharged in good condition was prescribed Phenergan.  She was given warning signs for return.  She has a benign abdominal examination with with no signs of peritonitis or acute abdomen.  Final diagnoses:   Abdominal pain, unspecified abdominal location   Nausea and vomiting, intractability of vomiting not specified, unspecified vomiting type   Dehydration              Cem Julien MD  11/20/19 8864

## 2019-11-21 LAB
LAB AP CASE REPORT: NORMAL
PATH REPORT.FINAL DX SPEC: NORMAL
PATH REPORT.GROSS SPEC: NORMAL

## 2019-12-17 RX ORDER — PANTOPRAZOLE SODIUM 40 MG/1
TABLET, DELAYED RELEASE ORAL
Qty: 90 TABLET | Refills: 0 | Status: SHIPPED | OUTPATIENT
Start: 2019-12-17 | End: 2020-02-07 | Stop reason: SDUPTHER

## 2020-01-05 ENCOUNTER — TELEPHONE (OUTPATIENT)
Dept: FAMILY MEDICINE CLINIC | Facility: CLINIC | Age: 75
End: 2020-01-05

## 2020-01-13 RX ORDER — SERTRALINE HYDROCHLORIDE 100 MG/1
100 TABLET, FILM COATED ORAL DAILY
Qty: 90 TABLET | Refills: 0 | Status: SHIPPED | OUTPATIENT
Start: 2020-01-13 | End: 2020-04-10

## 2020-01-22 ENCOUNTER — TELEPHONE (OUTPATIENT)
Dept: FAMILY MEDICINE CLINIC | Facility: CLINIC | Age: 75
End: 2020-01-22

## 2020-01-22 RX ORDER — MELOXICAM 15 MG/1
TABLET ORAL
Qty: 30 TABLET | Refills: 2 | Status: SHIPPED | OUTPATIENT
Start: 2020-01-22 | End: 2020-02-07

## 2020-01-22 NOTE — TELEPHONE ENCOUNTER
Pt states still too expensive with all her other meds. Willing to try whatever less expensive alternative you think is best [facility]

## 2020-01-22 NOTE — TELEPHONE ENCOUNTER
There are other OA meds but if celebrex worked best, she can use the [facility] and get 90 days for $32.18 @ [facility] [employer]  Otherwise I will pick something else and send it in

## 2020-01-22 NOTE — TELEPHONE ENCOUNTER
Patient said her prescription drug coverage is now through [employer] and they will not cover her Celebrex.  Patient is asking if there is any alternative medication that you can prescribe her instead.

## 2020-01-27 RX ORDER — ATORVASTATIN CALCIUM 80 MG/1
TABLET, FILM COATED ORAL
Qty: 90 TABLET | Refills: 0 | Status: SHIPPED | OUTPATIENT
Start: 2020-01-27 | End: 2020-07-08

## 2020-02-07 ENCOUNTER — OFFICE VISIT (OUTPATIENT)
Dept: FAMILY MEDICINE CLINIC | Facility: CLINIC | Age: 75
End: 2020-02-07

## 2020-02-07 VITALS
DIASTOLIC BLOOD PRESSURE: 82 MMHG | HEIGHT: 60 IN | RESPIRATION RATE: 18 BRPM | OXYGEN SATURATION: 98 % | TEMPERATURE: 97.6 F | WEIGHT: 130 LBS | SYSTOLIC BLOOD PRESSURE: 146 MMHG | HEART RATE: 74 BPM | BODY MASS INDEX: 25.52 KG/M2

## 2020-02-07 DIAGNOSIS — M21.612 BILATERAL BUNIONS: ICD-10-CM

## 2020-02-07 DIAGNOSIS — K21.00 GASTROESOPHAGEAL REFLUX DISEASE WITH ESOPHAGITIS: Primary | ICD-10-CM

## 2020-02-07 DIAGNOSIS — M25.561 CHRONIC PAIN OF RIGHT KNEE: ICD-10-CM

## 2020-02-07 DIAGNOSIS — J06.9 ACUTE URI: ICD-10-CM

## 2020-02-07 DIAGNOSIS — G89.29 CHRONIC PAIN OF RIGHT KNEE: ICD-10-CM

## 2020-02-07 DIAGNOSIS — F41.1 GENERALIZED ANXIETY DISORDER: ICD-10-CM

## 2020-02-07 DIAGNOSIS — E78.5 DYSLIPIDEMIA: ICD-10-CM

## 2020-02-07 DIAGNOSIS — M21.611 BILATERAL BUNIONS: ICD-10-CM

## 2020-02-07 PROCEDURE — 36415 COLL VENOUS BLD VENIPUNCTURE: CPT | Performed by: FAMILY MEDICINE

## 2020-02-07 PROCEDURE — 80061 LIPID PANEL: CPT | Performed by: FAMILY MEDICINE

## 2020-02-07 PROCEDURE — 90670 PCV13 VACCINE IM: CPT | Performed by: FAMILY MEDICINE

## 2020-02-07 PROCEDURE — G0009 ADMIN PNEUMOCOCCAL VACCINE: HCPCS | Performed by: FAMILY MEDICINE

## 2020-02-07 PROCEDURE — 80053 COMPREHEN METABOLIC PANEL: CPT | Performed by: FAMILY MEDICINE

## 2020-02-07 PROCEDURE — 99214 OFFICE O/P EST MOD 30 MIN: CPT | Performed by: FAMILY MEDICINE

## 2020-02-07 RX ORDER — MIRABEGRON 50 MG/1
50 TABLET, FILM COATED, EXTENDED RELEASE ORAL DAILY
COMMUNITY
Start: 2020-01-16 | End: 2021-04-16

## 2020-02-07 RX ORDER — RANITIDINE 150 MG/1
150 TABLET ORAL 2 TIMES DAILY PRN
Qty: 60 TABLET | Refills: 2 | Status: SHIPPED | OUTPATIENT
Start: 2020-02-07 | End: 2020-04-06

## 2020-02-07 RX ORDER — PANTOPRAZOLE SODIUM 40 MG/1
40 TABLET, DELAYED RELEASE ORAL DAILY
Qty: 90 TABLET | Refills: 1 | Status: SHIPPED | OUTPATIENT
Start: 2020-02-07 | End: 2020-08-03

## 2020-02-07 RX ORDER — CELECOXIB 200 MG/1
200 CAPSULE ORAL DAILY
Qty: 90 CAPSULE | Refills: 1 | Status: SHIPPED | OUTPATIENT
Start: 2020-02-07 | End: 2020-02-10 | Stop reason: SDUPTHER

## 2020-02-07 RX ORDER — CELECOXIB 100 MG/1
100 CAPSULE ORAL 2 TIMES DAILY PRN
COMMUNITY
End: 2020-02-07

## 2020-02-07 RX ORDER — CELECOXIB 200 MG/1
200 CAPSULE ORAL DAILY
Qty: 90 CAPSULE | Refills: 1 | Status: SHIPPED | OUTPATIENT
Start: 2020-02-07 | End: 2020-02-07

## 2020-02-07 NOTE — PROGRESS NOTES
Subjective   Katie Reddy is a 74 y.o. female.     Chief Complaint   Patient presents with   • Follow-up     6 mon   • Anxiety   • URI   • Med Refill     go back on Celebrex         Current Outpatient Medications:   •  atorvastatin (LIPITOR) 80 MG tablet, TAKE 1 TABLET BY MOUTH EVERY DAY, Disp: 90 tablet, Rfl: 0  •  calcium carbonate (OS-SELENA) 600 MG tablet, 600 mg 2 (Two) Times a Day., Disp: , Rfl:   •  Cholecalciferol (VITAMIN D) 2000 units capsule, Take 1 capsule by mouth Daily., Disp: 30 capsule, Rfl:   •  cyclobenzaprine (FLEXERIL) 10 MG tablet, TAKE 1 TABLET BY MOUTH EVERY DAY AS NEEDED, Disp: 90 tablet, Rfl: 1  •  Incontinence Supply Disposable (POISE PAD) pads, Apply 1 pad topically to the appropriate area as directed 5 (Five) Times a Day., Disp: 104 each, Rfl: 12  •  meclizine (ANTIVERT) 12.5 MG tablet, Take 1 tablet by mouth 3 (Three) Times a Day As Needed for dizziness., Disp: 60 tablet, Rfl: 0  •  MYRBETRIQ 50 MG tablet sustained-release 24 hour 24 hr tablet, Take 50 mg by mouth Daily., Disp: , Rfl:   •  ondansetron (ZOFRAN) 4 MG tablet, TAKE 1 TO 2 TABLETS BY MOUTH EVERY 6 HOURS AS NEEDED FOR NAUSEA, MAX OF 6 A DAY, Disp: , Rfl: 2  •  pantoprazole (PROTONIX) 40 MG EC tablet, Take 1 tablet by mouth Daily., Disp: 90 tablet, Rfl: 1  •  raNITIdine (ZANTAC) 150 MG tablet, Take 1 tablet by mouth 2 (Two) Times a Day As Needed for Heartburn or Indigestion., Disp: 60 tablet, Rfl: 2  •  sertraline (ZOLOFT) 100 MG tablet, Take 1 tablet by mouth Daily., Disp: 90 tablet, Rfl: 0  •  temazepam (RESTORIL) 30 MG capsule, Take 1 capsule by mouth Every Night., Disp: 30 capsule, Rfl: 1  •  celecoxib (CeleBREX) 200 MG capsule, Take 1 capsule by mouth Daily., Disp: 90 capsule, Rfl: 1    Past Medical History:   Diagnosis Date   • Arthritis    • Colon polyp    • GERD (gastroesophageal reflux disease)    • Headache    • Hyperlipidemia    • Hypertension    • Osteopenia    • Vitamin D deficiency        Past Surgical History:    Procedure Laterality Date   • APPENDECTOMY     • BREAST LUMPECTOMY Left 1973    NEG   • CHOLECYSTECTOMY     • COLON SURGERY      hemorrhoid banding   • COLONOSCOPY  2017 2017/ 2019 = LLOYD, jessica 2024   Dr. Mackey   • HERNIA REPAIR      Umbilical removal   • HYSTERECTOMY  1970   • SUBTOTAL HYSTERECTOMY         Family History   Problem Relation Age of Onset   • Heart disease Mother    • Hypertension Mother    • Diabetes Father    • Heart disease Father    • Alcohol abuse Father    • Hypertension Father    • Diabetes Brother    • Heart disease Brother    • Cancer Brother 68        Prostate Cancer   • Hypertension Brother    • Diabetes Maternal Aunt    • Heart disease Maternal Grandmother    • Hypertension Maternal Grandmother    • Heart disease Maternal Grandfather    • Hypertension Maternal Grandfather    • Liver disease Paternal Grandmother    • Hypertension Paternal Grandmother    • Heart disease Paternal Grandfather    • Hypertension Paternal Grandfather    • Dementia Sister        Social History     Socioeconomic History   • Marital status:      Spouse name: Not on file   • Number of children: Not on file   • Years of education: Not on file   • Highest education level: Not on file   Tobacco Use   • Smoking status: Never Smoker   • Smokeless tobacco: Never Used   Substance and Sexual Activity   • Alcohol use: No     Frequency: Never   • Drug use: No   • Sexual activity: Defer       75y/o C female here for 6mos f/u on GERD/ ANX/ OA and now has URI symptoms    Pt had a colonoscopy w/ Dr. Mackey and he found some polyps but f/u is 5yrs    Pt wanting to go back to celebrex since Mobic causing stomach aches/ HA's  Pt doing well on meds for GERD and Anxiety    Pt also states Urology put her back on the Myrbetriq  Pt also states she is needing Rt TKR but her Ortho will only do sx at [facility] and she will not have sx there----she is needing new Ortho Dr. Valdez    Pt also needing to see someone about her bunions which  have been hurting her a lot, lilian at nite       The following portions of the patient's history were reviewed and updated as appropriate: allergies, current medications, past family history, past medical history, past social history, past surgical history and problem list.    Review of Systems   Constitutional: Positive for appetite change. Negative for chills, fatigue and fever.   HENT: Positive for rhinorrhea and sore throat. Negative for congestion, ear discharge, ear pain, postnasal drip, sinus pressure, sneezing and swollen glands.    Eyes: Negative for pain, discharge, redness, itching and visual disturbance.   Respiratory: Negative for cough, shortness of breath, wheezing and stridor.    Gastrointestinal: Negative for abdominal pain, GERD and indigestion.   Genitourinary: Positive for urinary incontinence.   Musculoskeletal: Positive for arthralgias and joint swelling.   Skin: Negative for rash.   Allergic/Immunologic: Negative for environmental allergies.   Psychiatric/Behavioral: Negative for agitation, decreased concentration and sleep disturbance. The patient is not nervous/anxious.        Vitals:    02/07/20 0920   BP: 146/82   Pulse: 74   Resp: 18   Temp: 97.6 °F (36.4 °C)   SpO2: 98%       Objective   Physical Exam   Constitutional: She is oriented to person, place, and time. She appears well-developed and well-nourished.   HENT:   Head: Normocephalic and atraumatic.   Neck: Normal range of motion. Neck supple.   Cardiovascular: Normal rate, regular rhythm, normal heart sounds and intact distal pulses.   No murmur heard.  Pulmonary/Chest: Effort normal and breath sounds normal. No respiratory distress.   Musculoskeletal: She exhibits no edema.   Neurological: She is alert and oriented to person, place, and time. No cranial nerve deficit.   Skin: Skin is warm and dry. No rash noted.   Psychiatric: She has a normal mood and affect. Her behavior is normal. Judgment and thought content normal.   Nursing  note and vitals reviewed.        Assessment/Plan   Katie was seen today for follow-up, anxiety, uri and med refill.    Diagnoses and all orders for this visit:    Gastroesophageal reflux disease with esophagitis    Acute URI    Generalized anxiety disorder    Chronic pain of right knee  -     Ambulatory Referral to Orthopedic Surgery    Bilateral bunions  -     Ambulatory Referral to Podiatry    Dyslipidemia  -     Cancel: Lipid Panel  -     Cancel: Comprehensive Metabolic Panel  -     Lipid Panel  -     Comprehensive Metabolic Panel    Other orders  -     Discontinue: celecoxib (CeleBREX) 200 MG capsule; Take 1 capsule by mouth Daily.  -     celecoxib (CeleBREX) 200 MG capsule; Take 1 capsule by mouth Daily.  -     raNITIdine (ZANTAC) 150 MG tablet; Take 1 tablet by mouth 2 (Two) Times a Day As Needed for Heartburn or Indigestion.  -     pantoprazole (PROTONIX) 40 MG EC tablet; Take 1 tablet by mouth Daily.  -     Pneumococcal Conjugate Vaccine 13-Valent All

## 2020-02-08 LAB
ALBUMIN SERPL-MCNC: 4.3 G/DL (ref 3.5–5.2)
ALBUMIN/GLOB SERPL: 1.7 G/DL
ALP SERPL-CCNC: 83 U/L (ref 39–117)
ALT SERPL W P-5'-P-CCNC: 23 U/L (ref 1–33)
ANION GAP SERPL CALCULATED.3IONS-SCNC: 13.4 MMOL/L (ref 5–15)
AST SERPL-CCNC: 30 U/L (ref 1–32)
BILIRUB SERPL-MCNC: 0.5 MG/DL (ref 0.2–1.2)
BUN BLD-MCNC: 14 MG/DL (ref 8–23)
BUN/CREAT SERPL: 16.5 (ref 7–25)
CALCIUM SPEC-SCNC: 9.7 MG/DL (ref 8.6–10.5)
CHLORIDE SERPL-SCNC: 100 MMOL/L (ref 98–107)
CHOLEST SERPL-MCNC: 130 MG/DL (ref 0–200)
CO2 SERPL-SCNC: 28.6 MMOL/L (ref 22–29)
CREAT BLD-MCNC: 0.85 MG/DL (ref 0.57–1)
GFR SERPL CREATININE-BSD FRML MDRD: 66 ML/MIN/1.73
GLOBULIN UR ELPH-MCNC: 2.5 GM/DL
GLUCOSE BLD-MCNC: 94 MG/DL (ref 65–99)
HDLC SERPL-MCNC: 58 MG/DL (ref 40–60)
LDLC SERPL CALC-MCNC: 62 MG/DL (ref 0–100)
LDLC/HDLC SERPL: 1.06 {RATIO}
POTASSIUM BLD-SCNC: 3.9 MMOL/L (ref 3.5–5.2)
PROT SERPL-MCNC: 6.8 G/DL (ref 6–8.5)
SODIUM BLD-SCNC: 142 MMOL/L (ref 136–145)
TRIGL SERPL-MCNC: 52 MG/DL (ref 0–150)
VLDLC SERPL-MCNC: 10.4 MG/DL (ref 5–40)

## 2020-02-10 ENCOUNTER — TELEPHONE (OUTPATIENT)
Dept: FAMILY MEDICINE CLINIC | Facility: CLINIC | Age: 75
End: 2020-02-10

## 2020-02-10 RX ORDER — CELECOXIB 200 MG/1
200 CAPSULE ORAL DAILY
Qty: 90 CAPSULE | Refills: 1 | Status: SHIPPED | OUTPATIENT
Start: 2020-02-10 | End: 2020-02-10 | Stop reason: SDUPTHER

## 2020-02-10 RX ORDER — CELECOXIB 200 MG/1
200 CAPSULE ORAL DAILY
Qty: 90 CAPSULE | Refills: 1 | Status: SHIPPED | OUTPATIENT
Start: 2020-02-10 | End: 2020-04-16

## 2020-02-25 ENCOUNTER — OFFICE VISIT (OUTPATIENT)
Dept: ORTHOPEDIC SURGERY | Facility: CLINIC | Age: 75
End: 2020-02-25

## 2020-02-25 VITALS
SYSTOLIC BLOOD PRESSURE: 130 MMHG | DIASTOLIC BLOOD PRESSURE: 79 MMHG | HEIGHT: 60 IN | BODY MASS INDEX: 25.32 KG/M2 | WEIGHT: 129 LBS | HEART RATE: 73 BPM

## 2020-02-25 DIAGNOSIS — M25.561 ACUTE PAIN OF RIGHT KNEE: Primary | ICD-10-CM

## 2020-02-25 DIAGNOSIS — M17.12 PRIMARY OSTEOARTHRITIS OF LEFT KNEE: ICD-10-CM

## 2020-02-25 DIAGNOSIS — M17.11 PRIMARY OSTEOARTHRITIS OF RIGHT KNEE: ICD-10-CM

## 2020-02-25 PROCEDURE — 99203 OFFICE O/P NEW LOW 30 MIN: CPT | Performed by: ORTHOPAEDIC SURGERY

## 2020-02-25 NOTE — PROGRESS NOTES
NEW VISIT    Patient: Katie Reddy  ?  YOB: 1945    MRN: [id number]  ?  Chief Complaint   Patient presents with   • Right Knee - Consult      ?  HPI: Patient is here for second opinion.  She has chronic low back pain with radiation to both lower extremities.  The right side is more symptomatic than the left.  The patient has been seen by Dr. Richy oviedo at [facility].  The patient states that she has been having pain and discomfort in the knee for 5 years.  She has had intra-articular injections which really did help her symptoms to quite a degree.  Her recent injections have not been that helpful in managing the symptoms.  The patient states that she uses a brace and gets along just fine.  She feels that she was being coerced into a knee replacement surgery and she was not that symptomatic and therefore wanted a second opinion from us today in the office.  Occasionally the knee will buckle and give out from underneath her but overall her knee function is fairly stable and well preserved.    Pain Location: right knee(s)  Radiation: none  Quality: dull, aching  Intensity/Severity: moderate  Duration: 5 years  Onset quality: gradual   Timing: intermittent  Aggravating Factors: rising after sitting and squatting  Alleviating Factors: OTC analgesics, position changes and heat  Previous Episodes: yes  Associated Symptoms: pain, swelling, decreased ROM  ADL Affected: ambulating  Previous Treatment: Intra-articular steroid injections to the knee.    This patient is a new patient.  This problem is new to this examiner.      Allergies:   Allergies   Allergen Reactions   • Trazodone Irritability       Medications:   Home Medications:  Current Outpatient Medications on File Prior to Visit   Medication Sig   • atorvastatin (LIPITOR) 80 MG tablet TAKE 1 TABLET BY MOUTH EVERY DAY   • calcium carbonate (OS-SELENA) 600 MG tablet 600 mg 2 (Two) Times a Day.   • celecoxib (CeleBREX) 200 MG capsule Take 1  capsule by mouth Daily.   • Cholecalciferol (VITAMIN D) 2000 units capsule Take 1 capsule by mouth Daily.   • cyclobenzaprine (FLEXERIL) 10 MG tablet TAKE 1 TABLET BY MOUTH EVERY DAY AS NEEDED   • Incontinence Supply Disposable (POISE PAD) pads Apply 1 pad topically to the appropriate area as directed 5 (Five) Times a Day.   • meclizine (ANTIVERT) 12.5 MG tablet Take 1 tablet by mouth 3 (Three) Times a Day As Needed for dizziness.   • MYRBETRIQ 50 MG tablet sustained-release 24 hour 24 hr tablet Take 50 mg by mouth Daily.   • ondansetron (ZOFRAN) 4 MG tablet TAKE 1 TO 2 TABLETS BY MOUTH EVERY 6 HOURS AS NEEDED FOR NAUSEA, MAX OF 6 A DAY   • pantoprazole (PROTONIX) 40 MG EC tablet Take 1 tablet by mouth Daily.   • raNITIdine (ZANTAC) 150 MG tablet Take 1 tablet by mouth 2 (Two) Times a Day As Needed for Heartburn or Indigestion.   • sertraline (ZOLOFT) 100 MG tablet Take 1 tablet by mouth Daily.   • temazepam (RESTORIL) 30 MG capsule Take 1 capsule by mouth Every Night.     No current facility-administered medications on file prior to visit.      Current Medications:  Scheduled Meds:  PRN Meds:.    I have reviewed the patient's medical history in detail and updated the computerized patient record.  Review and summarization of old records include:    Past Medical History:   Diagnosis Date   • Arthritis    • Colon polyp    • GERD (gastroesophageal reflux disease)    • Headache    • Hyperlipidemia    • Hypertension    • Osteopenia    • Vitamin D deficiency      Past Surgical History:   Procedure Laterality Date   • APPENDECTOMY     • BREAST LUMPECTOMY Left 1973    NEG   • CHOLECYSTECTOMY     • COLON SURGERY      hemorrhoid banding   • COLONOSCOPY  2017 2017/ 2019 = TA, jessica 2024   Dr. Mackey   • HERNIA REPAIR      Umbilical removal   • HYSTERECTOMY  1970   • SUBTOTAL HYSTERECTOMY       Social History     Occupational History   • Not on file   Tobacco Use   • Smoking status: Never Smoker   • Smokeless tobacco: Never  "Used   Substance and Sexual Activity   • Alcohol use: No     Frequency: Never   • Drug use: No   • Sexual activity: Defer      Social History     Social History Narrative   • Not on file     Family History   Problem Relation Age of Onset   • Heart disease Mother    • Hypertension Mother    • Diabetes Father    • Heart disease Father    • Alcohol abuse Father    • Hypertension Father    • Diabetes Brother    • Heart disease Brother    • Cancer Brother 68        Prostate Cancer   • Hypertension Brother    • Diabetes Maternal Aunt    • Heart disease Maternal Grandmother    • Hypertension Maternal Grandmother    • Heart disease Maternal Grandfather    • Hypertension Maternal Grandfather    • Liver disease Paternal Grandmother    • Hypertension Paternal Grandmother    • Heart disease Paternal Grandfather    • Hypertension Paternal Grandfather    • Dementia Sister          Review of Systems  Constitutional: Negative for appetite change.   HENT: Negative.    Eyes: Negative.    Respiratory: Negative.    Cardiovascular: Negative.    Gastrointestinal: Negative.    Endocrine: Negative.    Genitourinary: Negative.    Musculoskeletal: See details of exam below.  Skin: Negative.    Allergic/Immunologic: Negative.    Hematological: Negative.    Psychiatric/Behavioral: Negative.         Wt Readings from Last 3 Encounters:   02/25/20 58.5 kg (129 lb)   02/07/20 59 kg (130 lb)   11/20/19 59.4 kg (131 lb)     Ht Readings from Last 3 Encounters:   02/25/20 152.4 cm (60\")   02/07/20 152.4 cm (60\")   11/20/19 152.4 cm (60\")     Body mass index is 25.19 kg/m².  Facility age limit for growth percentiles is 20 years.  Vitals:    02/25/20 1254   BP: 130/79   Pulse: 73         Physical Exam  Constitutional: Patient is oriented to person, place, and time. Appears well-developed and well-nourished.   HENT:   Head: Normocephalic and atraumatic.   Eyes: Conjunctivae and EOM are normal. Pupils are equal, round, and reactive to light. "   Cardiovascular: Normal rate, regular rhythm, normal heart sounds and intact distal pulses.   Pulmonary/Chest: Effort normal and breath sounds normal.   Musculoskeletal:   See detailed exam below   Neurological: Alert and oriented to person, place, and time. No sensory deficit. Coordination normal.   Skin: Skin is warm and dry. Capillary refill takes less than 2 seconds. No rash noted. No erythema.   Psychiatric: Patient has a normal mood and affect. Her behavior is normal. Judgment and thought content normal.   Nursing note and vitals reviewed.      Ortho Exam:   Right knee (varus). Patient has crepitus throughout range of motion. Positive patellar grind test. Mild effusion. Lachman is negative. Pivot shift is negative. Anterior and posterior drawer signs are negative. Significant joint line tenderness is noted on the medial aspect of the knee. Patient has a varus orientation of the knee. There is fullness and tenderness in the Popliteal fossa. Mild distention of a Popliteal cyst is noted in this location. Range of motion in flexion is from 0-120 degrees. Neurovascular status is intact.  Dorsalis pedis and posterior tibial artery pulses are palpable. Common peroneal nerve function is well preserved. Patient's gait is cautious and antalgic. Skin and soft tissues are mildly swollen, consistent with synovitis and effusion. The patient has a significant limp with the first few steps after starting the gait cycle. Getting out of a chair takes a lot of effort due to pain on knee flexion.         Diagnostics:  xrays obtained today   bilateral Knee X-Ray  Indication: Evaluation of pain and discomfort on the medial aspect of both knees.  AP, Lateral views  Findings: Slight narrowing of the medial joint space with osteopenic bone.  no bony lesion  Soft tissues within normal limits  decreased joint spaces  Hardware appropriately positioned not applicable      no prior studies available for comparison.    This patient's x-ray  report was graded according to the Kellgren and Brooks classification.  This took into account the joint space narrowing, osteophyte formation, sclerosis of the distal femur/proximal tibia along with deformity of those bones.  The findings were indicative of K L grade 2.    X-RAY was ordered and reviewed by Shawn Elaine MD    Assessment:  Katie was seen today for consult.    Diagnoses and all orders for this visit:    Acute pain of right knee  -     XR Knee 3 View Right          Procedures  ?    Plan    · Compression/brace to prevent the knee from buckling and giving out.  · At this point her knee symptoms are fairly tolerable and therefore she is not really a candidate for knee surgery either in the form of arthroscopy or in the form of total knee replacement.  · [employer] back school exercise program and stretching and strengthening exercises of the flexors and extensors of the spine.  · At this point I would defer any form of knee replacement surgery till her symptoms become more magnified and cause her to have more pain and discomfort.  · Calcium and vitamin D for bone health.  · Glucosamine, chondroitin and turmeric for cartilage health.  · Rest, ice, compression, and elevation (RICE) therapy  · Stretching and strengthening exercises of the quads and hamstrings.  · OTC Tylenol 500-1000mg by mouth every 6 hours as needed for pain   · Follow up in 6 month(s) for reevaluation and possible injections to help manage her symptoms.  · The patient is very relieved that she does not have to undergo knee replacement surgery because she was getting very stressed out about the recommendations of this fairly serious operation from the orthopedic surgeon in the [facility] system.    Date of encounter: 02/25/2020   Shawn Elaine MD

## 2020-02-26 ENCOUNTER — OFFICE VISIT (OUTPATIENT)
Dept: PODIATRY | Facility: CLINIC | Age: 75
End: 2020-02-26

## 2020-02-26 VITALS
WEIGHT: 130 LBS | DIASTOLIC BLOOD PRESSURE: 77 MMHG | HEART RATE: 70 BPM | SYSTOLIC BLOOD PRESSURE: 156 MMHG | HEIGHT: 60 IN | BODY MASS INDEX: 25.52 KG/M2

## 2020-02-26 DIAGNOSIS — M20.12 HALLUX VALGUS, ACQUIRED, BILATERAL: ICD-10-CM

## 2020-02-26 DIAGNOSIS — S92.314A CLOSED NONDISPLACED FRACTURE OF FIRST METATARSAL BONE OF RIGHT FOOT, INITIAL ENCOUNTER: Primary | ICD-10-CM

## 2020-02-26 DIAGNOSIS — M20.11 HALLUX VALGUS, ACQUIRED, BILATERAL: ICD-10-CM

## 2020-02-26 PROCEDURE — 97760 ORTHOTIC MGMT&TRAING 1ST ENC: CPT | Performed by: PODIATRIST

## 2020-02-26 PROCEDURE — 99203 OFFICE O/P NEW LOW 30 MIN: CPT | Performed by: PODIATRIST

## 2020-02-26 RX ORDER — METHYLPREDNISOLONE 4 MG/1
TABLET ORAL
Qty: 21 TABLET | Refills: 0 | Status: SHIPPED | OUTPATIENT
Start: 2020-02-26 | End: 2020-03-25

## 2020-02-27 NOTE — PROGRESS NOTES
02/26/2020  Foot and Ankle Surgery - New Patient   Provider: Dr. Austin Em DPM  Location: [facility]    Subjective:  Katie Reddy is a 74 y.o. female.     Chief Complaint   Patient presents with   • Left Foot - Pain   • Right Foot - Pain       HPI: Patient is a 74-year-old female that presents with bilateral foot pain, right greater than left.  She states that the pain in her right foot has been progressive over the last month.  Symptoms are worse with weightbearing activity and improved with rest.  She is the discomfort is an 8 out of 10.  She is unaware of any injury and denies any progressive deformity.  She states that she is always had bunion deformities with relatively mild discomfort.  She is concerned as the pain is causing limitation with daily activities.  No other issues today.    Allergies   Allergen Reactions   • Trazodone Irritability       Past Medical History:   Diagnosis Date   • Arthritis    • Colon polyp    • GERD (gastroesophageal reflux disease)    • Headache    • Hyperlipidemia    • Hypertension    • Osteopenia    • Vitamin D deficiency        Past Surgical History:   Procedure Laterality Date   • APPENDECTOMY     • BREAST LUMPECTOMY Left 1973    NEG   • CHOLECYSTECTOMY     • COLON SURGERY      hemorrhoid banding   • COLONOSCOPY  2017 2017/ 2019 = TA, rech 2024   Dr. Mackey   • HERNIA REPAIR      Umbilical removal   • HYSTERECTOMY  1970   • SUBTOTAL HYSTERECTOMY         Family History   Problem Relation Age of Onset   • Heart disease Mother    • Hypertension Mother    • Diabetes Father    • Heart disease Father    • Alcohol abuse Father    • Hypertension Father    • Diabetes Brother    • Heart disease Brother    • Cancer Brother 68        Prostate Cancer   • Hypertension Brother    • Diabetes Maternal Aunt    • Heart disease Maternal Grandmother    • Hypertension Maternal Grandmother    • Heart disease Maternal Grandfather    • Hypertension Maternal Grandfather    • Liver  disease Paternal Grandmother    • Hypertension Paternal Grandmother    • Heart disease Paternal Grandfather    • Hypertension Paternal Grandfather    • Dementia Sister        Social History     Socioeconomic History   • Marital status:      Spouse name: Not on file   • Number of children: Not on file   • Years of education: Not on file   • Highest education level: Not on file   Tobacco Use   • Smoking status: Never Smoker   • Smokeless tobacco: Never Used   Substance and Sexual Activity   • Alcohol use: No     Frequency: Never   • Drug use: No   • Sexual activity: Defer        Current Outpatient Medications on File Prior to Visit   Medication Sig Dispense Refill   • atorvastatin (LIPITOR) 80 MG tablet TAKE 1 TABLET BY MOUTH EVERY DAY 90 tablet 0   • calcium carbonate (OS-SELENA) 600 MG tablet 600 mg 2 (Two) Times a Day.     • celecoxib (CeleBREX) 200 MG capsule Take 1 capsule by mouth Daily. 90 capsule 1   • Cholecalciferol (VITAMIN D) 2000 units capsule Take 1 capsule by mouth Daily. 30 capsule    • cyclobenzaprine (FLEXERIL) 10 MG tablet TAKE 1 TABLET BY MOUTH EVERY DAY AS NEEDED 90 tablet 1   • Incontinence Supply Disposable (POISE PAD) pads Apply 1 pad topically to the appropriate area as directed 5 (Five) Times a Day. 104 each 12   • meclizine (ANTIVERT) 12.5 MG tablet Take 1 tablet by mouth 3 (Three) Times a Day As Needed for dizziness. 60 tablet 0   • MYRBETRIQ 50 MG tablet sustained-release 24 hour 24 hr tablet Take 50 mg by mouth Daily.     • ondansetron (ZOFRAN) 4 MG tablet TAKE 1 TO 2 TABLETS BY MOUTH EVERY 6 HOURS AS NEEDED FOR NAUSEA, MAX OF 6 A DAY  2   • pantoprazole (PROTONIX) 40 MG EC tablet Take 1 tablet by mouth Daily. 90 tablet 1   • raNITIdine (ZANTAC) 150 MG tablet Take 1 tablet by mouth 2 (Two) Times a Day As Needed for Heartburn or Indigestion. 60 tablet 2   • sertraline (ZOLOFT) 100 MG tablet Take 1 tablet by mouth Daily. 90 tablet 0   • temazepam (RESTORIL) 30 MG capsule Take 1 capsule  "by mouth Every Night. 30 capsule 1     No current facility-administered medications on file prior to visit.        Review of Systems:  General: Denies fever, chills, fatigue, and weakness.  Eyes: Denies vision loss, blurry vision, and excessive redness.  ENT: Denies hearing issues and difficulty swallowing.  Cardiovascular: Denies palpitations, chest pain, or syncopal episodes.  Respiratory: Denies shortness of breath, wheezing, and coughing.  GI: Denies abdominal pain, nausea, and vomiting.   : Denies frequency, hematuria, and urgency.  Musculoskeletal: + Bilateral foot pain  Derm: Denies rash, open wounds, or suspicious lesions.  Neuro: Denies headaches, numbness, loss of coordination, and tremors.  Psych: Denies anxiety and depression.  Endocrine: Denies temperature intolerance and changes in appetite.  Heme: Denies bleeding disorders or abnormal bruising.     Objective   /77   Pulse 70   Ht 152.4 cm (60\")   Wt 59 kg (130 lb)   BMI 25.39 kg/m²     Foot/Ankle Exam:       General:   Appearance: appears stated age and healthy    Orientation: AAOx3    Affect: appropriate    Gait: antalgic      VASCULAR      Right Foot Vascularity   Normal vascular exam    Dorsalis pedis:  2+  Posterior tibial:  2+  Skin Temperature: warm    Edema Grading:  None  CFT:  < 3 seconds  Pedal Hair Growth:  Present     Left Foot Vascularity   Normal vascular exam    Dorsalis pedis:  2+  Posterior tibial:  2+  Skin Temperature: warm    Edema Grading:  None  CFT:  < 3 seconds  Pedal Hair Growth:  Present      NEUROLOGIC     Right Foot Neurologic   Light touch sensation:  Normal  Hot/Cold sensation: normal    Achilles reflex:  2+     Left Foot Neurologic   Light touch sensation:  Normal  Hot/cold sensation: normal    Achilles reflex:  2+     MUSCULOSKELETAL      Right Foot Musculoskeletal   Ecchymosis:  None  Tenderness: great toe metatarsophalangeal joint    Arch:  Normal  Hallux valgus: Yes       Left Foot Musculoskeletal "   Ecchymosis:  None  Tenderness: none    Arch:  Normal  Hallux valgus: Yes       MUSCLE STRENGTH     Right Foot Muscle Strength   Normal strength    Foot dorsiflexion:  5  Foot plantar flexion:  5  Foot inversion:  5  Foot eversion:  5     Left Foot Muscle Strength   Normal strength    Foot dorsiflexion:  5  Foot plantar flexion:  5  Foot inversion:  5  Foot eversion:  5     RANGE OF MOTION      Right Foot Range of Motion   Foot and ankle ROM within normal limits       Left Foot Range of Motion   Foot and ankle ROM within normal limits       DERMATOLOGIC     Right Foot Dermatologic   Skin: skin intact       Left Foot Dermatologic   Skin: skin intact        Right Foot Additional Comments Moderate discomfort with palpation overlying the first metatarsal head region.  No obvious instability.  Moderate hallux valgus.      Left Foot Additional Comments: Moderate hallux valgus with minimal discomfort with palpation.      Assessment/Plan   Katie was seen today for pain and pain.    Diagnoses and all orders for this visit:    Closed nondisplaced fracture of first metatarsal bone of right foot, initial encounter    Hallux valgus, acquired, bilateral  -     Cancel: XR Foot 3+ View Bilateral  -     XR Foot 3+ View Bilateral  -     methylPREDNISolone (MEDROL, DELANO,) 4 MG tablet; Take as directed      Patient presents with bilateral foot pain, right greater than left.  She states that the right foot has been bothering her over the last few weeks.  Symptoms are worse with activity.  She states that she has always had a fairly decent sized bunion deformity with intermittent discomfort.  Pain is fairly persistent at this time.  Imaging was reviewed and there is a suspicious lucency to the first metatarsal head of the right foot with medial bone formation concerning for fracture.  Given her symptoms, I do feel that this is an appropriate diagnosis.  No dislocation or further concern is identified.  She does have findings consistent of  both feet with moderate hallux valgus deformity.  We did review the diagnosis and treatment options.  Given that her symptoms are mostly noted to the right foot, I have suggested that we proceed with Cam boot immobilization.  I did dispense the cam boot and greater than 15 minutes was spent reviewing the proper use and effects.  I have asked that she decrease her overall activity level.  I have also prescribed a Medrol Dosepak for symptom management.  We did discuss rice therapy.  I would like to see her in 4 weeks for reevaluation and imaging.    Orders Placed This Encounter   Procedures   • XR Foot 3+ View Bilateral     Scheduling Instructions:      rm 10      Bilateral foot xrays      Bilateral foot pain x 1 year, no images.  No real injury      10:57     Order Specific Question:   Reason for Exam:     Answer:   foot pain     Order Specific Question:   Does this patient have a diabetic monitoring/medication delivering device on?     Answer:   No        Note is dictated utilizing voice recognition software. Unfortunately this leads to occasional typographical errors. I apologize in advance if the situation occurs. If questions occur please do not hesitate to call our office.

## 2020-02-27 NOTE — PATIENT INSTRUCTIONS
Metatarsal Fracture  A metatarsal fracture is a break in one of the five bones that connect the toes to the rest of the foot. This may also be called a forefoot fracture. A metatarsal fracture may be:  · A crack in the surface of the bone (stress fracture). This often occurs in athletes.  · A break all the way through the bone (complete fracture).  The bone that connects to the little toe (fifth metatarsal) is most commonly fractured. Ballet dancers often fracture this bone.  What are the causes?  A metatarsal fracture may be caused by:  · Sudden twisting of the foot.  · Falling onto the foot.  · Something heavy falling onto the foot.  · Overuse or repetitive exercise.  What increases the risk?  This condition is more likely to develop in people who:  · Play contact sports.  · Do ballet.  · Have a condition that causes the bones to become thin and brittle (osteoporosis).  · Have a low calcium level.  What are the signs or symptoms?  Symptoms of this condition include:  · Pain that gets worse when walking or standing.  · Pain when pressing on the foot or moving the toes.  · Swelling.  · Bruising on the top or bottom of the foot.  How is this diagnosed?  This condition may be diagnosed based on:  · Your symptoms.  · Any recent foot injuries you have had.  · A physical exam.  · An X-ray of your foot. If you have a stress fracture, it may not show up on an X-ray, and you may need other imaging tests, such as:  ? A bone scan.  ? CT scan.  ? MRI.  How is this treated?  Treatment depends on how severe your fracture is and how the pieces of the broken bone line up with each other (alignment). Treatment may involve:  · Wearing a cast, splint, or supportive boot on your foot.  · Using crutches, and not putting any weight on your foot.  · Having surgery to align broken bones (open reduction and internal fixation, ORIF).  · Physical therapy.  · Follow-up visits and X-rays to make sure you are healing.  Follow these instructions  at home:  If you have a splint or a supportive boot:  · Wear the splint or boot as told by your health care provider. Remove it only as told by your health care provider.  · Loosen the splint or boot if your toes tingle, become numb, or turn cold and blue.  · Keep the splint or boot clean.  · If your splint or boot is not waterproof:  ? Do not let it get wet.  ? Cover it with a watertight covering when you take a bath or a shower.  If you have a cast:  · Do not stick anything inside the cast to scratch your skin. Doing that increases your risk for infection.  · Check the skin around the cast every day. Tell your health care provider about any concerns.  · You may put lotion on dry skin around the edges of the cast. Do not put lotion on the skin underneath the cast.  · Keep the cast clean.  · If the cast is not waterproof:  ? Do not let it get wet.  ? Cover it with a watertight covering when you take a bath or a shower.  Activity  · Do not use your affected leg to support your body weight until your health care provider says that you can. Use crutches as directed.  · Ask your health care provider what activities are safe for you during recovery, and ask what activities you need to avoid.  · Do physical therapy exercises as directed.  Driving  · Do not drive or use heavy machinery while taking pain medicine.  · Do not drive while wearing a cast, splint, or boot on a foot that you use for driving.  Managing pain, stiffness, and swelling    · If directed, put ice on painful areas:  ? Put ice in a plastic bag.  ? Place a towel between your skin and the bag.  § If you have a removable splint or boot, remove it as told by your health care provider.  § If you have a cast, place a towel between your cast and the bag.  ? Leave the ice on for 20 minutes, 2-3 times a day.  · Move your toes often to avoid stiffness and to lessen swelling.  · Raise (elevate) your lower leg above the level of your heart while you are sitting or  lying down.  General instructions  · Do not put pressure on any part of the cast or splint until it is fully hardened. This may take several hours.  · Take over-the-counter and prescription medicines only as told by your health care provider.  · Do not use any products that contain nicotine or tobacco, such as cigarettes and e-cigarettes. These can delay bone healing. If you need help quitting, ask your health care provider.  · Do not take baths, swim, or use a hot tub until your health care provider approves. Ask your health care provider if you may take showers.  · Keep all follow-up visits as told by your health care provider. This is important.  Contact a health care provider if you have:  · Pain that gets worse or does not get better with medicine.  · A fever.  · A bad smell coming from your cast or splint.  Get help right away if you have:  · Any of the following in your toes or your foot, even after loosening your splint (if applicable):  ? Numbness.  ? Tingling.  ? Coldness.  ? Blue skin.  · Redness or swelling that gets worse.  · Pain that suddenly becomes severe.  Summary  · A metatarsal fracture is a break in one of the five bones that connect the toes to the rest of the foot.  · Treatment depends on how severe your fracture is and how the pieces of the broken bone line up with each other (alignment). This may include wearing a cast, splint, or supportive boot, or using crutches. Sometimes surgery is needed to align the bones.  · Ice and elevate your foot to help lessen the pain and swelling.  · Make sure you know what symptoms should cause you to get help right away.  This information is not intended to replace advice given to you by your health care provider. Make sure you discuss any questions you have with your health care provider.  Document Released: 09/09/2003 Document Revised: 01/14/2019 Document Reviewed: 01/14/2019  Elsevier Interactive Patient Education © 2020 [employer]

## 2020-03-09 RX ORDER — MECLIZINE HCL 12.5 MG/1
12.5 TABLET ORAL 3 TIMES DAILY PRN
Qty: 60 TABLET | Refills: 0 | Status: SHIPPED | OUTPATIENT
Start: 2020-03-09 | End: 2020-05-04

## 2020-03-09 NOTE — TELEPHONE ENCOUNTER
Last visit:  2/7/20  Next visit:none  Last labs:2/7/20     Rx requested: : meclizine   Pharmacy:[facility] in [place]

## 2020-03-25 ENCOUNTER — OFFICE VISIT (OUTPATIENT)
Dept: PODIATRY | Facility: CLINIC | Age: 75
End: 2020-03-25

## 2020-03-25 VITALS
DIASTOLIC BLOOD PRESSURE: 82 MMHG | SYSTOLIC BLOOD PRESSURE: 147 MMHG | HEART RATE: 77 BPM | TEMPERATURE: 97.6 F | WEIGHT: 132 LBS | HEIGHT: 60 IN | BODY MASS INDEX: 25.91 KG/M2

## 2020-03-25 DIAGNOSIS — M20.12 HALLUX VALGUS, ACQUIRED, BILATERAL: ICD-10-CM

## 2020-03-25 DIAGNOSIS — M20.11 HALLUX VALGUS, ACQUIRED, BILATERAL: ICD-10-CM

## 2020-03-25 DIAGNOSIS — S92.314A CLOSED NONDISPLACED FRACTURE OF FIRST METATARSAL BONE OF RIGHT FOOT, INITIAL ENCOUNTER: Primary | ICD-10-CM

## 2020-03-25 PROCEDURE — 99213 OFFICE O/P EST LOW 20 MIN: CPT | Performed by: PODIATRIST

## 2020-03-25 RX ORDER — SULFAMETHOXAZOLE AND TRIMETHOPRIM 800; 160 MG/1; MG/1
1 TABLET ORAL EVERY 12 HOURS
COMMUNITY
Start: 2020-03-13 | End: 2020-04-16

## 2020-03-25 NOTE — PROGRESS NOTES
03/25/2020  Foot and Ankle Surgery - Established Patient/Follow-up  Provider: Dr. Austin Em DPM  Location: [facility]    Subjective:  Katie Reddy is a 74 y.o. female.     Chief Complaint   Patient presents with   • Left Foot - Pain, Follow-up   • Right Foot - Pain, Follow-up       HPI: Patient returns for follow-up regarding right foot pain.  She continues to have mild discomfort involving the left but states that the right pain continues.  She has been wearing the cam boot and noticed mild improvement but she continues to have pain involving the first MTPJ region.  She denies any progressive deformity or instability.    Allergies   Allergen Reactions   • Trazodone Irritability       Current Outpatient Medications on File Prior to Visit   Medication Sig Dispense Refill   • atorvastatin (LIPITOR) 80 MG tablet TAKE 1 TABLET BY MOUTH EVERY DAY 90 tablet 0   • calcium carbonate (OS-SELENA) 600 MG tablet 600 mg 2 (Two) Times a Day.     • celecoxib (CeleBREX) 200 MG capsule Take 1 capsule by mouth Daily. 90 capsule 1   • Cholecalciferol (VITAMIN D) 2000 units capsule Take 1 capsule by mouth Daily. 30 capsule    • cyclobenzaprine (FLEXERIL) 10 MG tablet TAKE 1 TABLET BY MOUTH EVERY DAY AS NEEDED 90 tablet 1   • Incontinence Supply Disposable (POISE PAD) pads Apply 1 pad topically to the appropriate area as directed 5 (Five) Times a Day. 104 each 12   • meclizine (ANTIVERT) 12.5 MG tablet TAKE 1 TABLET BY MOUTH 3 (THREE) TIMES A DAY AS NEEDED FOR DIZZINESS. 60 tablet 0   • MYRBETRIQ 50 MG tablet sustained-release 24 hour 24 hr tablet Take 50 mg by mouth Daily.     • ondansetron (ZOFRAN) 4 MG tablet TAKE 1 TO 2 TABLETS BY MOUTH EVERY 6 HOURS AS NEEDED FOR NAUSEA, MAX OF 6 A DAY  2   • pantoprazole (PROTONIX) 40 MG EC tablet Take 1 tablet by mouth Daily. 90 tablet 1   • raNITIdine (ZANTAC) 150 MG tablet Take 1 tablet by mouth 2 (Two) Times a Day As Needed for Heartburn or Indigestion. 60 tablet 2   •  "sertraline (ZOLOFT) 100 MG tablet Take 1 tablet by mouth Daily. 90 tablet 0   • sulfamethoxazole-trimethoprim (BACTRIM DS,SEPTRA DS) 800-160 MG per tablet Take 1 tablet by mouth Every 12 (Twelve) Hours.     • temazepam (RESTORIL) 30 MG capsule Take 1 capsule by mouth Every Night. 30 capsule 1   • [DISCONTINUED] methylPREDNISolone (MEDROL, DELANO,) 4 MG tablet Take as directed 21 tablet 0     No current facility-administered medications on file prior to visit.        Objective   /82   Pulse 77   Temp 97.6 °F (36.4 °C) (Oral)   Ht 152.4 cm (60\")   Wt 59.9 kg (132 lb)   BMI 25.78 kg/m²     General:   Appearance: appears stated age and healthy    Orientation: AAOx3    Affect: appropriate    Gait: antalgic       VASCULAR       Right Foot Vascularity   Normal vascular exam    Dorsalis pedis:  2+  Posterior tibial:  2+  Skin Temperature: warm    Edema Grading:  None  CFT:  < 3 seconds  Pedal Hair Growth:  Present      Left Foot Vascularity   Normal vascular exam    Dorsalis pedis:  2+  Posterior tibial:  2+  Skin Temperature: warm    Edema Grading:  None  CFT:  < 3 seconds  Pedal Hair Growth:  Present      NEUROLOGIC      Right Foot Neurologic   Light touch sensation:  Normal  Hot/Cold sensation: normal    Achilles reflex:  2+      Left Foot Neurologic   Light touch sensation:  Normal  Hot/cold sensation: normal    Achilles reflex:  2+      MUSCULOSKELETAL       Right Foot Musculoskeletal   Ecchymosis:  None  Tenderness: great toe metatarsophalangeal joint    Arch:  Normal  Hallux valgus: Yes        Left Foot Musculoskeletal   Ecchymosis:  None  Tenderness: none    Arch:  Normal  Hallux valgus: Yes        MUSCLE STRENGTH      Right Foot Muscle Strength   Normal strength    Foot dorsiflexion:  5  Foot plantar flexion:  5  Foot inversion:  5  Foot eversion:  5      Left Foot Muscle Strength   Normal strength    Foot dorsiflexion:  5  Foot plantar flexion:  5  Foot inversion:  5  Foot eversion:  5      RANGE OF MOTION "       Right Foot Range of Motion   Foot and ankle ROM within normal limits        Left Foot Range of Motion   Foot and ankle ROM within normal limits        DERMATOLOGIC      Right Foot Dermatologic   Skin: skin intact        Left Foot Dermatologic   Skin: skin intact        Right Foot Additional Comments Moderate discomfort with palpation overlying the first metatarsal head region.  No obvious instability.  Moderate hallux valgus.      Left Foot Additional Comments: Moderate hallux valgus with minimal discomfort with palpation.    Assessment/Plan   Katie was seen today for pain, follow-up, pain and follow-up.    Diagnoses and all orders for this visit:    Closed nondisplaced fracture of first metatarsal bone of right foot, initial encounter  -     XR Foot 3+ View Right    Hallux valgus, acquired, bilateral      Patient returns with physical exam relatively unchanged.  She continues to have discomfort involving the right first metatarsal phalangeal joint.  She has noticed some improvement with a cam boot.  Imaging was performed today showing relatively unchanged anatomy involving the right foot.  She continues to have suspicious lucency involving the first metatarsal head.  Given her level discomfort, I do continue to feel that this is consistent with a fracture.  I have recommended that we proceed with a CT for further evaluation.  I would like to see the patient in 3 weeks for reevaluation and discussion.  If imaging shows no signs of fracture, may need to consider operative intervention for bunion deformity to improve discomfort.  I have suggested that she remain in the cam boot with decreased activity.  We did discuss proper range of motion and manual therapy exercises.    Orders Placed This Encounter   Procedures   • XR Foot 3+ View Right     Scheduling Instructions:      rm 10      Rt foot xray WB      Ongoing right foot pain      10:19     Order Specific Question:   Reason for Exam:     Answer:   right foot  pain     Order Specific Question:   Does this patient have a diabetic monitoring/medication delivering device on?     Answer:   No          Note is dictated utilizing voice recognition software. Unfortunately this leads to occasional typographical errors. I apologize in advance if the situation occurs. If questions occur please do not hesitate to call our office.

## 2020-04-06 ENCOUNTER — TELEPHONE (OUTPATIENT)
Dept: FAMILY MEDICINE CLINIC | Facility: CLINIC | Age: 75
End: 2020-04-06

## 2020-04-06 RX ORDER — FAMOTIDINE 10 MG
20 TABLET ORAL 2 TIMES DAILY PRN
Qty: 60 TABLET | Refills: 0 | Status: SHIPPED | OUTPATIENT
Start: 2020-04-06 | End: 2020-04-29 | Stop reason: SDUPTHER

## 2020-04-06 NOTE — TELEPHONE ENCOUNTER
Received fax from pharmacy requesting a new RX for alternative to Ranitidine.  It has been pulled per FDA so no longer available.

## 2020-04-10 RX ORDER — SERTRALINE HYDROCHLORIDE 100 MG/1
TABLET, FILM COATED ORAL
Qty: 90 TABLET | Refills: 0 | Status: SHIPPED | OUTPATIENT
Start: 2020-04-10 | End: 2020-05-04

## 2020-04-16 ENCOUNTER — OFFICE VISIT (OUTPATIENT)
Dept: PODIATRY | Facility: CLINIC | Age: 75
End: 2020-04-16

## 2020-04-16 VITALS
WEIGHT: 132 LBS | DIASTOLIC BLOOD PRESSURE: 77 MMHG | SYSTOLIC BLOOD PRESSURE: 160 MMHG | HEIGHT: 60 IN | HEART RATE: 75 BPM | BODY MASS INDEX: 25.91 KG/M2

## 2020-04-16 DIAGNOSIS — M20.11 HALLUX VALGUS, ACQUIRED, BILATERAL: Primary | ICD-10-CM

## 2020-04-16 DIAGNOSIS — M20.12 HALLUX VALGUS, ACQUIRED, BILATERAL: Primary | ICD-10-CM

## 2020-04-16 PROCEDURE — 99213 OFFICE O/P EST LOW 20 MIN: CPT | Performed by: PODIATRIST

## 2020-04-16 RX ORDER — CELECOXIB 100 MG/1
100 CAPSULE ORAL DAILY
COMMUNITY
Start: 2020-04-08 | End: 2020-08-20 | Stop reason: SDUPTHER

## 2020-04-17 NOTE — PROGRESS NOTES
04/16/2020  Foot and Ankle Surgery - Established Patient/Follow-up  Provider: Dr. Austin Em DPM  Location: [facility]    Subjective:  Katie Reddy is a 75 y.o. female.     Chief Complaint   Patient presents with   • Right Foot - Follow-up     CT       HPI: Patient returns for follow-up regarding her right foot.  She did have the CT.  She states that the pain persists.  Symptoms are mostly noted with increased activity and typically improve with rest.  She denies any new issues.    Allergies   Allergen Reactions   • Trazodone Irritability       Current Outpatient Medications on File Prior to Visit   Medication Sig Dispense Refill   • atorvastatin (LIPITOR) 80 MG tablet TAKE 1 TABLET BY MOUTH EVERY DAY 90 tablet 0   • calcium carbonate (OS-SELENA) 600 MG tablet 600 mg 2 (Two) Times a Day.     • celecoxib (CeleBREX) 100 MG capsule Take 100 mg by mouth Daily.     • Cholecalciferol (VITAMIN D) 2000 units capsule Take 1 capsule by mouth Daily. 30 capsule    • cyclobenzaprine (FLEXERIL) 10 MG tablet TAKE 1 TABLET BY MOUTH EVERY DAY AS NEEDED 90 tablet 1   • famotidine (PEPCID) 10 MG tablet Take 2 tablets by mouth 2 (Two) Times a Day As Needed for Indigestion or Heartburn. 60 tablet 0   • Incontinence Supply Disposable (POISE PAD) pads Apply 1 pad topically to the appropriate area as directed 5 (Five) Times a Day. 104 each 12   • meclizine (ANTIVERT) 12.5 MG tablet TAKE 1 TABLET BY MOUTH 3 (THREE) TIMES A DAY AS NEEDED FOR DIZZINESS. 60 tablet 0   • MYRBETRIQ 50 MG tablet sustained-release 24 hour 24 hr tablet Take 50 mg by mouth Daily.     • ondansetron (ZOFRAN) 4 MG tablet TAKE 1 TO 2 TABLETS BY MOUTH EVERY 6 HOURS AS NEEDED FOR NAUSEA, MAX OF 6 A DAY  2   • pantoprazole (PROTONIX) 40 MG EC tablet Take 1 tablet by mouth Daily. 90 tablet 1   • sertraline (ZOLOFT) 100 MG tablet TAKE 1 TABLET BY MOUTH EVERY DAY 90 tablet 0   • temazepam (RESTORIL) 30 MG capsule Take 1 capsule by mouth Every Night. 30  "capsule 1     No current facility-administered medications on file prior to visit.        Objective   /77 (BP Location: Right arm, Patient Position: Sitting, Cuff Size: Large Adult)   Pulse 75   Ht 152.4 cm (60\")   Wt 59.9 kg (132 lb)   BMI 25.78 kg/m²     General:   Appearance: appears stated age and healthy    Orientation: AAOx3    Affect: appropriate    Gait: antalgic       VASCULAR       Right Foot Vascularity   Normal vascular exam    Dorsalis pedis:  2+  Posterior tibial:  2+  Skin Temperature: warm    Edema Grading:  None  CFT:  < 3 seconds  Pedal Hair Growth:  Present      Left Foot Vascularity   Normal vascular exam    Dorsalis pedis:  2+  Posterior tibial:  2+  Skin Temperature: warm    Edema Grading:  None  CFT:  < 3 seconds  Pedal Hair Growth:  Present      NEUROLOGIC      Right Foot Neurologic   Light touch sensation:  Normal  Hot/Cold sensation: normal    Achilles reflex:  2+      Left Foot Neurologic   Light touch sensation:  Normal  Hot/cold sensation: normal    Achilles reflex:  2+      MUSCULOSKELETAL       Right Foot Musculoskeletal   Ecchymosis:  None  Tenderness: great toe metatarsophalangeal joint    Arch:  Normal  Hallux valgus: Yes        Left Foot Musculoskeletal   Ecchymosis:  None  Tenderness: none    Arch:  Normal  Hallux valgus: Yes        MUSCLE STRENGTH      Right Foot Muscle Strength   Normal strength    Foot dorsiflexion:  5  Foot plantar flexion:  5  Foot inversion:  5  Foot eversion:  5      Left Foot Muscle Strength   Normal strength    Foot dorsiflexion:  5  Foot plantar flexion:  5  Foot inversion:  5  Foot eversion:  5      RANGE OF MOTION       Right Foot Range of Motion   Foot and ankle ROM within normal limits        Left Foot Range of Motion   Foot and ankle ROM within normal limits        DERMATOLOGIC      Right Foot Dermatologic   Skin: skin intact        Left Foot Dermatologic   Skin: skin intact        Right Foot Additional Comments Moderate discomfort with " palpation overlying the first metatarsal head region.  No obvious instability.  Moderate hallux valgus.      Left Foot Additional Comments: Moderate hallux valgus with minimal discomfort with palpation.    Assessment/Plan   Katie was seen today for follow-up.    Diagnoses and all orders for this visit:    Hallux valgus, acquired, bilateral  -     CBC (No Diff); Future  -     Basic Metabolic Panel; Future  -     ECG 12 Lead; Future  -     XR Chest 2 View; Future  -     Case Request; Standing  -     Case Request    Other orders  -     Follow Anesthesia Guidelines / Standing Orders; Future  -     Obtain Informed Consent; Future  -     Provide NPO Instructions to Patient; Future  -     Chlorhexidine Skin Prep; Future      Patient presents with physical exam relatively unchanged and stable.  She continues to have pain involving the right first metatarsophalangeal joint.  The CT was reviewed and discussed with her at length.  Findings do not show any obvious cortical defect involving the distal aspect of the first metatarsal head.  I explained that her symptoms must be consistent with chronic inflammation secondary to hallux valgus.  Given that she has failed multiple conservative treatments, I do feel the most appropriate option is to proceed with bunionectomy with distal metatarsal osteotomy.  We did review the procedure, risk, goals, and recovery at length.  She does understand that we will proceed with a minimally invasive approach with conversion to full open procedure if needed.  We will plan for the surgery in the near future.  She is to call with any additional issues or concerns.    Orders Placed This Encounter   Procedures   • XR Chest 2 View     Standing Status:   Future     Standing Expiration Date:   4/17/2021     Order Specific Question:   Reason for Exam:     Answer:   Preop   • CBC (No Diff)     Standing Status:   Future     Standing Expiration Date:   4/17/2021   • Basic Metabolic Panel     Standing  Status:   Future     Standing Expiration Date:   4/17/2021   • Follow Anesthesia Guidelines / Standing Orders     Standing Status:   Future   • Obtain Informed Consent     Standing Status:   Future     Order Specific Question:   Informed Consent Given For     Answer:   Bunionectomy with distal metatarsal osteotomy and internal fixation with possible Eric osteotomy to the right foot   • Provide NPO Instructions to Patient     Standing Status:   Future   • Chlorhexidine Skin Prep     Chlorhexidine Skin Prep and Instructions For All Patients Having A Procedure Requiring an Outward Incision if Not Allergic. If Allergic, Give Antibacterial Skin Wipes and Instructions. Do Not Use For Facial Cases or on Any Mucus Membranes.     Standing Status:   Future   • ECG 12 Lead     Standing Status:   Future     Standing Expiration Date:   4/17/2021     Order Specific Question:   Reason for Exam:     Answer:   Preop          Note is dictated utilizing voice recognition software. Unfortunately this leads to occasional typographical errors. I apologize in advance if the situation occurs. If questions occur please do not hesitate to call our office.

## 2020-04-29 RX ORDER — FAMOTIDINE 10 MG
20 TABLET ORAL 2 TIMES DAILY PRN
Qty: 180 TABLET | Refills: 0 | Status: SHIPPED | OUTPATIENT
Start: 2020-04-29 | End: 2020-05-13 | Stop reason: SDUPTHER

## 2020-05-02 DIAGNOSIS — F41.9 ANXIETY: Primary | ICD-10-CM

## 2020-05-04 RX ORDER — CYCLOBENZAPRINE HCL 10 MG
TABLET ORAL
Qty: 90 TABLET | Refills: 0 | Status: SHIPPED | OUTPATIENT
Start: 2020-05-04 | End: 2020-06-03

## 2020-05-04 RX ORDER — TEMAZEPAM 30 MG/1
CAPSULE ORAL
Qty: 30 CAPSULE | Refills: 0 | Status: SHIPPED | OUTPATIENT
Start: 2020-05-04 | End: 2020-06-03

## 2020-05-04 RX ORDER — SERTRALINE HYDROCHLORIDE 100 MG/1
TABLET, FILM COATED ORAL
Qty: 90 TABLET | Refills: 0 | Status: SHIPPED | OUTPATIENT
Start: 2020-05-04 | End: 2020-08-20 | Stop reason: SDUPTHER

## 2020-05-04 RX ORDER — MECLIZINE HCL 12.5 MG/1
12.5 TABLET ORAL 3 TIMES DAILY PRN
Qty: 60 TABLET | Refills: 0 | Status: SHIPPED | OUTPATIENT
Start: 2020-05-04 | End: 2020-06-08

## 2020-05-04 NOTE — TELEPHONE ENCOUNTER
Last visit:  2/7/20  Next visit:none  Last labs: 2/7/20    Rx requested:Sertraline  Pharmacy: [facility] [place]

## 2020-05-04 NOTE — TELEPHONE ENCOUNTER
Last visit:  2/7/20  Next visit:none  Last labs: 2/7/20    Rx requested: Meclizine,Temazepam,Cyclobenzaprine  Pharmacy: [facility] in [place]

## 2020-05-07 ENCOUNTER — TELEPHONE (OUTPATIENT)
Dept: FAMILY MEDICINE CLINIC | Facility: CLINIC | Age: 75
End: 2020-05-07

## 2020-05-07 RX ORDER — ONDANSETRON 4 MG/1
4 TABLET, FILM COATED ORAL EVERY 8 HOURS PRN
Qty: 30 TABLET | Refills: 2 | Status: SHIPPED | OUTPATIENT
Start: 2020-05-07 | End: 2021-01-05 | Stop reason: SDUPTHER

## 2020-05-07 NOTE — TELEPHONE ENCOUNTER
Last visit:  2/7/2020  Next visit:None  Last labs: 4/17/20    Rx requested: Ondansetron  Pharmacy: [facility] in [place]

## 2020-05-13 ENCOUNTER — TELEPHONE (OUTPATIENT)
Dept: FAMILY MEDICINE CLINIC | Facility: CLINIC | Age: 75
End: 2020-05-13

## 2020-05-13 RX ORDER — FAMOTIDINE 20 MG/1
20 TABLET, FILM COATED ORAL 2 TIMES DAILY PRN
Qty: 180 TABLET | Refills: 0 | Status: SHIPPED | OUTPATIENT
Start: 2020-05-13 | End: 2020-07-27

## 2020-05-13 NOTE — TELEPHONE ENCOUNTER
I dont know this pharmacy          Last visit:  2/7/20  Next visit:none  Last labs: 2/7/20    Rx requested:Heartburn relief 10mg 2 po bid  Pharmacy: [facility]

## 2020-05-14 ENCOUNTER — TELEPHONE (OUTPATIENT)
Dept: FAMILY MEDICINE CLINIC | Facility: CLINIC | Age: 75
End: 2020-05-14

## 2020-05-14 NOTE — TELEPHONE ENCOUNTER
W/ the [facility], one month of the pepcid bid is $10 at [facility] or [facility] and if it works, she can get 3mos for $18.24      She want to do this?

## 2020-05-14 NOTE — TELEPHONE ENCOUNTER
Patient had to come off the Ranitidine and was changed to Famotidine and it was $30.00 a month for it and she cannot afford it. Her insurance also wouldn't cover Dexilant.   Patient hasn't ever been on Nexium before.

## 2020-05-15 NOTE — TELEPHONE ENCOUNTER
Pt called back stating that [facility] got [facility] rx fixed and its only going to be $1.80. Can cancel rx to [facility], please.

## 2020-05-19 PROBLEM — M20.12 HALLUX VALGUS, ACQUIRED, BILATERAL: Status: ACTIVE | Noted: 2020-05-19

## 2020-05-19 PROBLEM — M20.11 HALLUX VALGUS, ACQUIRED, BILATERAL: Status: ACTIVE | Noted: 2020-05-19

## 2020-05-26 ENCOUNTER — OFFICE VISIT (OUTPATIENT)
Dept: ORTHOPEDIC SURGERY | Facility: CLINIC | Age: 75
End: 2020-05-26

## 2020-05-26 VITALS
HEART RATE: 66 BPM | SYSTOLIC BLOOD PRESSURE: 164 MMHG | HEIGHT: 60 IN | WEIGHT: 133.4 LBS | DIASTOLIC BLOOD PRESSURE: 83 MMHG | TEMPERATURE: 98.1 F | BODY MASS INDEX: 26.19 KG/M2

## 2020-05-26 DIAGNOSIS — M25.561 CHRONIC PAIN OF BOTH KNEES: Primary | ICD-10-CM

## 2020-05-26 DIAGNOSIS — G89.29 CHRONIC PAIN OF BOTH KNEES: Primary | ICD-10-CM

## 2020-05-26 DIAGNOSIS — M25.562 CHRONIC PAIN OF BOTH KNEES: Primary | ICD-10-CM

## 2020-05-26 DIAGNOSIS — M17.12 PRIMARY OSTEOARTHRITIS OF LEFT KNEE: ICD-10-CM

## 2020-05-26 DIAGNOSIS — M17.11 PRIMARY OSTEOARTHRITIS OF RIGHT KNEE: ICD-10-CM

## 2020-05-26 PROCEDURE — 20610 DRAIN/INJ JOINT/BURSA W/O US: CPT | Performed by: ORTHOPAEDIC SURGERY

## 2020-05-26 PROCEDURE — 99213 OFFICE O/P EST LOW 20 MIN: CPT | Performed by: ORTHOPAEDIC SURGERY

## 2020-05-26 RX ORDER — METHYLPREDNISOLONE ACETATE 80 MG/ML
160 INJECTION, SUSPENSION INTRA-ARTICULAR; INTRALESIONAL; INTRAMUSCULAR; SOFT TISSUE ONCE
Status: COMPLETED | OUTPATIENT
Start: 2020-05-26 | End: 2020-05-26

## 2020-05-26 RX ORDER — METHYLPREDNISOLONE ACETATE 80 MG/ML
160 INJECTION, SUSPENSION INTRA-ARTICULAR; INTRALESIONAL; INTRAMUSCULAR; SOFT TISSUE
Status: COMPLETED | OUTPATIENT
Start: 2020-05-26 | End: 2020-05-26

## 2020-05-26 RX ADMIN — METHYLPREDNISOLONE ACETATE 160 MG: 80 INJECTION, SUSPENSION INTRA-ARTICULAR; INTRALESIONAL; INTRAMUSCULAR; SOFT TISSUE at 10:44

## 2020-05-26 RX ADMIN — METHYLPREDNISOLONE ACETATE 160 MG: 80 INJECTION, SUSPENSION INTRA-ARTICULAR; INTRALESIONAL; INTRAMUSCULAR; SOFT TISSUE at 10:42

## 2020-05-26 NOTE — PROGRESS NOTES
FOLLOW UP VISIT    Patient: Katie Reddy  ?  YOB: 1945    MRN: [id number]  ?  Chief Complaint   Patient presents with   • Right Knee - Pain   • Left Knee - Pain      ?  HPI: The patient is continuing to complain of pain and discomfort in both knees.  The right is more symptomatic than the left.  She has difficulty going up and down the steps and difficulty with squatting on the ground.  She has been seen by Dr. Richy Tamayo and apparently was told that she needed a knee replacement surgery.  She states that she was very afraid of surgical intervention and does not want to consider any form of surgery at this point.  She did not think that her knees were bad enough to consider surgical intervention.  Pain Location: Bilateral knee pain medially.  Radiation: none  Quality: dull, aching  Intensity/Severity: moderate  Duration: Several months.  Onset quality: gradual   Timing: intermittent  Aggravating Factors: Deep flexion of the knee and going up and down the steps.  Alleviating Factors: Anti-inflammatory medication.  Previous Episodes: yes  Associated Symptoms: pain, swelling  Previous Treatment: Intra-articular injections and the use of her brace.    This patient is an established patient.  This problem is not new to this examiner.      Allergies:   Allergies   Allergen Reactions   • Trazodone Irritability       Medications:   Home Medications:  Current Outpatient Medications on File Prior to Visit   Medication Sig   • atorvastatin (LIPITOR) 80 MG tablet TAKE 1 TABLET BY MOUTH EVERY DAY (Patient taking differently: Take 80 mg by mouth Daily.)   • calcium carbonate (OS-SELENA) 600 MG tablet 600 mg 2 (Two) Times a Day. Do not take after 5/22   • celecoxib (CeleBREX) 100 MG capsule Take 100 mg by mouth Daily. Do not take after 5/22   • Cholecalciferol (VITAMIN D) 2000 units capsule Take 1 capsule by mouth Daily. (Patient taking differently: Take 2,000 Units by mouth Daily. Do not take after 5/22)   •  cyclobenzaprine (FLEXERIL) 10 MG tablet TAKE 1 TABLET BY MOUTH EVERY DAY AS NEEDED (Patient taking differently: Take 10 mg by mouth 2 (Two) Times a Day As Needed.)   • famotidine (PEPCID) 20 MG tablet Take 1 tablet by mouth 2 (Two) Times a Day As Needed for Indigestion or Heartburn. (Patient taking differently: Take 20 mg by mouth 2 (Two) Times a Day.)   • Incontinence Supply Disposable (POISE PAD) pads Apply 1 pad topically to the appropriate area as directed 5 (Five) Times a Day.   • meclizine (ANTIVERT) 12.5 MG tablet TAKE 1 TABLET BY MOUTH 3 (THREE) TIMES A DAY AS NEEDED FOR DIZZINESS.   • MYRBETRIQ 50 MG tablet sustained-release 24 hour 24 hr tablet Take 50 mg by mouth Daily.   • ondansetron (ZOFRAN) 4 MG tablet Take 1 tablet by mouth Every 8 (Eight) Hours As Needed for Nausea or Vomiting.   • pantoprazole (PROTONIX) 40 MG EC tablet Take 1 tablet by mouth Daily.   • sertraline (ZOLOFT) 100 MG tablet TAKE 1 TABLET BY MOUTH EVERY DAY (Patient taking differently: Take 100 mg by mouth Daily.)   • temazepam (RESTORIL) 30 MG capsule TAKE 1 CAPSULE BY MOUTH EVERY DAY AT NIGHT (Patient taking differently: Take 30 mg by mouth At Night As Needed.)     No current facility-administered medications on file prior to visit.      Current Medications:  Scheduled Meds:  PRN Meds:.    I have reviewed the patient's medical history in detail and updated the computerized patient record.  Review and summarization of old records include:    Past Medical History:   Diagnosis Date   • Arthritis    • Bladder incontinence    • Bowel incontinence    • Colon polyp    • GERD (gastroesophageal reflux disease)    • Headache    • Hyperlipidemia    • Hypertension    • Osteopenia    • PONV (postoperative nausea and vomiting)    • Vitamin D deficiency      Past Surgical History:   Procedure Laterality Date   • APPENDECTOMY     • BREAST LUMPECTOMY Left 1973    NEG   • CHOLECYSTECTOMY     • COLON SURGERY      hemorrhoid banding   • COLONOSCOPY  2017  "   2017/ 2019 = TA, jessica 2024   Dr. Mackey   • EYE SURGERY     • HERNIA REPAIR      Umbilical removal   • HYSTERECTOMY  1970   • OTHER SURGICAL HISTORY      bladder stimulator placement     pt cant have mri   • SUBTOTAL HYSTERECTOMY       Social History     Occupational History   • Not on file   Tobacco Use   • Smoking status: Never Smoker   • Smokeless tobacco: Never Used   Substance and Sexual Activity   • Alcohol use: No     Frequency: Never   • Drug use: No   • Sexual activity: Defer      Social History     Social History Narrative   • Not on file     Family History   Problem Relation Age of Onset   • Heart disease Mother    • Hypertension Mother    • Diabetes Father    • Heart disease Father    • Alcohol abuse Father    • Hypertension Father    • Diabetes Brother    • Heart disease Brother    • Cancer Brother 68        Prostate Cancer   • Hypertension Brother    • Diabetes Maternal Aunt    • Heart disease Maternal Grandmother    • Hypertension Maternal Grandmother    • Heart disease Maternal Grandfather    • Hypertension Maternal Grandfather    • Liver disease Paternal Grandmother    • Hypertension Paternal Grandmother    • Heart disease Paternal Grandfather    • Hypertension Paternal Grandfather    • Dementia Sister          Review of Systems   Constitutional: Negative.  Negative for fever.   HENT: Negative.    Eyes: Negative.    Respiratory: Negative.    Cardiovascular: Negative.    Endocrine: Negative.    Genitourinary: Negative.    Musculoskeletal: Positive for arthralgias, gait problem and joint swelling.   Skin: Negative.  Negative for rash and wound.   Allergic/Immunologic: Negative.    Neurological: Negative for numbness.   Hematological: Negative.    Psychiatric/Behavioral: Negative.           Wt Readings from Last 3 Encounters:   05/26/20 60.5 kg (133 lb 6.4 oz)   04/16/20 59.9 kg (132 lb)   03/25/20 59.9 kg (132 lb)     Ht Readings from Last 3 Encounters:   05/26/20 152.4 cm (60\")   04/16/20 152.4 cm " "(60\")   03/25/20 152.4 cm (60\")     Body mass index is 26.05 kg/m².  Facility age limit for growth percentiles is 20 years.  Vitals:    05/26/20 1010   BP: 164/83   Pulse: 66   Temp: 98.1 °F (36.7 °C)         Physical Exam   Constitutional: Patient is oriented to person, place, and time. Appears well-developed and well-nourished.   HENT:   Head: Normocephalic and atraumatic.   Eyes: Conjunctivae and EOM are normal. Pupils are equal, round, and reactive to light.   Cardiovascular: Normal rate, regular rhythm, normal heart sounds and intact distal pulses.   Pulmonary/Chest: Effort normal and breath sounds normal.   Musculoskeletal:   See detailed exam below   Neurological: Alert and oriented to person, place, and time. No sensory deficit. Coordination normal.   Skin: Skin is warm and dry. Capillary refill takes less than 2 seconds. No rash noted. No erythema.   Psychiatric: Patient has a normal mood and affect. Her behavior is normal. Judgment and thought content normal.   Nursing note and vitals reviewed.    Ortho Exam:   Bilateral knee (varus). Patient has crepitus throughout range of motion. Positive patellar grind test. Mild effusion. Lachman is negative. Pivot shift is negative. Anterior and posterior drawer signs are negative. Significant joint line tenderness is noted on the medial aspect of the knee. Patient has a varus orientation of the knee. There is fullness and tenderness in the Popliteal fossa. Mild distention of a Popliteal cyst is noted in this location. Range of motion in flexion is from 0-110 degrees. Neurovascular status is intact.  Dorsalis pedis and posterior tibial artery pulses are palpable. Common peroneal nerve function is well preserved. Patient's gait is cautious and antalgic. Skin and soft tissues are mildly swollen, consistent with synovitis and effusion. The patient has a significant limp with the first few steps after starting the gait cycle. Getting out of a chair takes a lot of effort " due to pain on knee flexion.         Diagnostics:       Assessment:  Katie was seen today for pain and pain.    Diagnoses and all orders for this visit:    Chronic pain of both knees  -     Large Joint Arthrocentesis: R knee  -     Large Joint Arthrocentesis: L knee  -     methylPREDNISolone acetate (DEPO-medrol) injection 160 mg  -     methylPREDNISolone acetate (DEPO-medrol) injection 160 mg    Primary osteoarthritis of left knee    Primary osteoarthritis of right knee          Large Joint Arthrocentesis: R knee  Date/Time: 5/26/2020 10:42 AM  Consent given by: patient  Site marked: site marked  Timeout: Immediately prior to procedure a time out was called to verify the correct patient, procedure, equipment, support staff and site/side marked as required   Supporting Documentation  Indications: pain   Procedure Details  Location: knee - R knee  Preparation: Patient was prepped and draped in the usual sterile fashion  Needle size: 25 G  Approach: anteromedial  Medications administered: 160 mg methylPREDNISolone acetate 80 MG/ML  Patient tolerance: patient tolerated the procedure well with no immediate complications    Large Joint Arthrocentesis: L knee  Date/Time: 5/26/2020 10:42 AM  Consent given by: patient  Site marked: site marked  Timeout: Immediately prior to procedure a time out was called to verify the correct patient, procedure, equipment, support staff and site/side marked as required   Supporting Documentation  Indications: pain   Procedure Details  Location: knee - L knee  Preparation: Patient was prepped and draped in the usual sterile fashion  Needle size: 25 G  Approach: anteromedial  Medications administered: 160 mg methylPREDNISolone acetate 80 MG/ML  Patient tolerance: patient tolerated the procedure well with no immediate complications        ?    Plan    · Compression/brace to the knee to prevent her from buckling giving her.  · Injected patient's right knee with a steroid from an anteromedial  approach.  · Injected patient's left knee with a steroid from an anteromedial approach.  · Calcium and vitamin D for bone health.  · Visco supplementation injections discussed with the patient.  · She states that she is not interested in any form of surgical replacement of the knee at this point and I certainly agree that we can wait up until the point that her symptoms have progressed to a point that surgical intervention is necessary.  · Rest, ice, compression, and elevation (RICE) therapy  · Stretching and strengthening exercises  · OTC Tylenol 500-1000mg by mouth every 6 hours as needed for pain   · Follow up in 3 month(s)    Shawn Elaine MD  05/26/2020

## 2020-05-27 ENCOUNTER — LAB (OUTPATIENT)
Dept: LAB | Facility: HOSPITAL | Age: 75
End: 2020-05-27

## 2020-05-27 ENCOUNTER — HOSPITAL ENCOUNTER (OUTPATIENT)
Dept: GENERAL RADIOLOGY | Facility: HOSPITAL | Age: 75
Discharge: HOME | End: 2020-05-27
Admitting: PODIATRIST

## 2020-05-27 ENCOUNTER — HOSPITAL ENCOUNTER (OUTPATIENT)
Dept: CARDIOLOGY | Facility: HOSPITAL | Age: 75
Discharge: HOME | End: 2020-05-27

## 2020-05-27 DIAGNOSIS — M20.12 HALLUX VALGUS, ACQUIRED, BILATERAL: ICD-10-CM

## 2020-05-27 DIAGNOSIS — M20.11 HALLUX VALGUS, ACQUIRED, BILATERAL: ICD-10-CM

## 2020-05-27 LAB
ANION GAP SERPL CALCULATED.3IONS-SCNC: 10 MMOL/L (ref 5–15)
BUN BLD-MCNC: 21 MG/DL (ref 8–23)
BUN/CREAT SERPL: 24.4 (ref 7–25)
CALCIUM SPEC-SCNC: 9.7 MG/DL (ref 8.6–10.5)
CHLORIDE SERPL-SCNC: 103 MMOL/L (ref 98–107)
CO2 SERPL-SCNC: 22 MMOL/L (ref 22–29)
CREAT BLD-MCNC: 0.86 MG/DL (ref 0.57–1)
DEPRECATED RDW RBC AUTO: 39.5 FL (ref 37–54)
ERYTHROCYTE [DISTWIDTH] IN BLOOD BY AUTOMATED COUNT: 12.8 % (ref 12.3–15.4)
GFR SERPL CREATININE-BSD FRML MDRD: 65 ML/MIN/1.73
GLUCOSE BLD-MCNC: 119 MG/DL (ref 65–99)
HCT VFR BLD AUTO: 35.9 % (ref 34–46.6)
HGB BLD-MCNC: 12 G/DL (ref 12–15.9)
MCH RBC QN AUTO: 28.4 PG (ref 26.6–33)
MCHC RBC AUTO-ENTMCNC: 33.4 G/DL (ref 31.5–35.7)
MCV RBC AUTO: 85.1 FL (ref 79–97)
MRSA DNA SPEC QL NAA+PROBE: NORMAL
PLATELET # BLD AUTO: 260 10*3/MM3 (ref 140–450)
PMV BLD AUTO: 9.4 FL (ref 6–12)
POTASSIUM BLD-SCNC: 4.1 MMOL/L (ref 3.5–5.2)
RBC # BLD AUTO: 4.22 10*6/MM3 (ref 3.77–5.28)
SODIUM BLD-SCNC: 135 MMOL/L (ref 136–145)
WBC NRBC COR # BLD: 9.91 10*3/MM3 (ref 3.4–10.8)

## 2020-05-27 PROCEDURE — 93010 ELECTROCARDIOGRAM REPORT: CPT | Performed by: INTERNAL MEDICINE

## 2020-05-27 PROCEDURE — C9803 HOPD COVID-19 SPEC COLLECT: HCPCS

## 2020-05-27 PROCEDURE — 87641 MR-STAPH DNA AMP PROBE: CPT

## 2020-05-27 PROCEDURE — 36415 COLL VENOUS BLD VENIPUNCTURE: CPT

## 2020-05-27 PROCEDURE — U0004 COV-19 TEST NON-CDC HGH THRU: HCPCS

## 2020-05-27 PROCEDURE — 93005 ELECTROCARDIOGRAM TRACING: CPT | Performed by: PODIATRIST

## 2020-05-27 PROCEDURE — 85027 COMPLETE CBC AUTOMATED: CPT

## 2020-05-27 PROCEDURE — 71046 X-RAY EXAM CHEST 2 VIEWS: CPT

## 2020-05-27 PROCEDURE — 80048 BASIC METABOLIC PNL TOTAL CA: CPT

## 2020-05-28 ENCOUNTER — ANESTHESIA EVENT (OUTPATIENT)
Dept: PERIOP | Facility: HOSPITAL | Age: 75
End: 2020-05-28

## 2020-05-28 LAB
REF LAB TEST METHOD: NORMAL
SARS-COV-2 RNA RESP QL NAA+PROBE: NOT DETECTED

## 2020-05-29 ENCOUNTER — HOSPITAL ENCOUNTER (OUTPATIENT)
Facility: HOSPITAL | Age: 75
Setting detail: HOSPITAL OUTPATIENT SURGERY
Discharge: HOME | End: 2020-05-29
Attending: PODIATRIST | Admitting: PODIATRIST

## 2020-05-29 ENCOUNTER — APPOINTMENT (OUTPATIENT)
Dept: GENERAL RADIOLOGY | Facility: HOSPITAL | Age: 75
End: 2020-05-29

## 2020-05-29 ENCOUNTER — ANESTHESIA (OUTPATIENT)
Dept: PERIOP | Facility: HOSPITAL | Age: 75
End: 2020-05-29

## 2020-05-29 VITALS
OXYGEN SATURATION: 96 % | DIASTOLIC BLOOD PRESSURE: 92 MMHG | HEART RATE: 72 BPM | HEIGHT: 60 IN | RESPIRATION RATE: 16 BRPM | SYSTOLIC BLOOD PRESSURE: 190 MMHG | BODY MASS INDEX: 25.93 KG/M2 | WEIGHT: 132.06 LBS | TEMPERATURE: 97 F

## 2020-05-29 DIAGNOSIS — M20.12 HALLUX VALGUS, ACQUIRED, BILATERAL: ICD-10-CM

## 2020-05-29 DIAGNOSIS — M20.11 HALLUX VALGUS, ACQUIRED, BILATERAL: ICD-10-CM

## 2020-05-29 PROCEDURE — C1713 ANCHOR/SCREW BN/BN,TIS/BN: HCPCS | Performed by: PODIATRIST

## 2020-05-29 PROCEDURE — 25010000003 LIDOCAINE 1 % SOLUTION: Performed by: PODIATRIST

## 2020-05-29 PROCEDURE — 25010000002 MORPHINE PER 10 MG: Performed by: NURSE ANESTHETIST, CERTIFIED REGISTERED

## 2020-05-29 PROCEDURE — 97161 PT EVAL LOW COMPLEX 20 MIN: CPT

## 2020-05-29 PROCEDURE — 25010000002 PROPOFOL 500 MG/50ML EMULSION: Performed by: NURSE ANESTHETIST, CERTIFIED REGISTERED

## 2020-05-29 PROCEDURE — 73620 X-RAY EXAM OF FOOT: CPT

## 2020-05-29 PROCEDURE — S0260 H&P FOR SURGERY: HCPCS | Performed by: PODIATRIST

## 2020-05-29 PROCEDURE — 25010000002 FENTANYL CITRATE (PF) 100 MCG/2ML SOLUTION: Performed by: NURSE ANESTHETIST, CERTIFIED REGISTERED

## 2020-05-29 PROCEDURE — 28296 COR HLX VLGS DSTL MTAR OSTEO: CPT | Performed by: PODIATRIST

## 2020-05-29 PROCEDURE — 25010000002 ONDANSETRON PER 1 MG: Performed by: NURSE ANESTHETIST, CERTIFIED REGISTERED

## 2020-05-29 PROCEDURE — 76000 FLUOROSCOPY <1 HR PHYS/QHP: CPT

## 2020-05-29 DEVICE — IMPLANTABLE DEVICE
Type: IMPLANTABLE DEVICE | Site: FOOT | Status: FUNCTIONAL
Brand: DART-FIRE

## 2020-05-29 RX ORDER — MEPERIDINE HYDROCHLORIDE 25 MG/ML
12.5 INJECTION INTRAMUSCULAR; INTRAVENOUS; SUBCUTANEOUS
Status: DISCONTINUED | OUTPATIENT
Start: 2020-05-29 | End: 2020-05-29 | Stop reason: HOSPADM

## 2020-05-29 RX ORDER — LIDOCAINE HYDROCHLORIDE 10 MG/ML
INJECTION, SOLUTION INFILTRATION; PERINEURAL AS NEEDED
Status: DISCONTINUED | OUTPATIENT
Start: 2020-05-29 | End: 2020-05-29 | Stop reason: HOSPADM

## 2020-05-29 RX ORDER — PROMETHAZINE HYDROCHLORIDE 25 MG/ML
6.25 INJECTION, SOLUTION INTRAMUSCULAR; INTRAVENOUS ONCE AS NEEDED
Status: DISCONTINUED | OUTPATIENT
Start: 2020-05-29 | End: 2020-05-29 | Stop reason: HOSPADM

## 2020-05-29 RX ORDER — LABETALOL HYDROCHLORIDE 5 MG/ML
5 INJECTION, SOLUTION INTRAVENOUS
Status: DISCONTINUED | OUTPATIENT
Start: 2020-05-29 | End: 2020-05-29 | Stop reason: HOSPADM

## 2020-05-29 RX ORDER — HYDROCODONE BITARTRATE AND ACETAMINOPHEN 7.5; 325 MG/1; MG/1
1 TABLET ORAL EVERY 6 HOURS PRN
Qty: 28 TABLET | Refills: 0 | Status: SHIPPED | OUTPATIENT
Start: 2020-05-29 | End: 2020-06-15

## 2020-05-29 RX ORDER — PROMETHAZINE HYDROCHLORIDE 25 MG/1
25 SUPPOSITORY RECTAL ONCE AS NEEDED
Status: DISCONTINUED | OUTPATIENT
Start: 2020-05-29 | End: 2020-05-29 | Stop reason: HOSPADM

## 2020-05-29 RX ORDER — IPRATROPIUM BROMIDE AND ALBUTEROL SULFATE 2.5; .5 MG/3ML; MG/3ML
3 SOLUTION RESPIRATORY (INHALATION) ONCE AS NEEDED
Status: DISCONTINUED | OUTPATIENT
Start: 2020-05-29 | End: 2020-05-29 | Stop reason: HOSPADM

## 2020-05-29 RX ORDER — EPHEDRINE SULFATE 50 MG/ML
5 INJECTION, SOLUTION INTRAVENOUS ONCE AS NEEDED
Status: DISCONTINUED | OUTPATIENT
Start: 2020-05-29 | End: 2020-05-29 | Stop reason: HOSPADM

## 2020-05-29 RX ORDER — ACETAMINOPHEN 325 MG/1
650 TABLET ORAL ONCE AS NEEDED
Status: DISCONTINUED | OUTPATIENT
Start: 2020-05-29 | End: 2020-05-29 | Stop reason: HOSPADM

## 2020-05-29 RX ORDER — ONDANSETRON 2 MG/ML
4 INJECTION INTRAMUSCULAR; INTRAVENOUS ONCE AS NEEDED
Status: COMPLETED | OUTPATIENT
Start: 2020-05-29 | End: 2020-05-29

## 2020-05-29 RX ORDER — SODIUM CHLORIDE, SODIUM LACTATE, POTASSIUM CHLORIDE, CALCIUM CHLORIDE 600; 310; 30; 20 MG/100ML; MG/100ML; MG/100ML; MG/100ML
9 INJECTION, SOLUTION INTRAVENOUS CONTINUOUS PRN
Status: DISCONTINUED | OUTPATIENT
Start: 2020-05-29 | End: 2020-05-29 | Stop reason: HOSPADM

## 2020-05-29 RX ORDER — ACETAMINOPHEN 650 MG/1
650 SUPPOSITORY RECTAL ONCE AS NEEDED
Status: DISCONTINUED | OUTPATIENT
Start: 2020-05-29 | End: 2020-05-29 | Stop reason: HOSPADM

## 2020-05-29 RX ORDER — FENTANYL CITRATE 50 UG/ML
INJECTION, SOLUTION INTRAMUSCULAR; INTRAVENOUS AS NEEDED
Status: DISCONTINUED | OUTPATIENT
Start: 2020-05-29 | End: 2020-05-29 | Stop reason: SURG

## 2020-05-29 RX ORDER — PHENYLEPHRINE HCL IN 0.9% NACL 0.5 MG/5ML
.5-3 SYRINGE (ML) INTRAVENOUS
Status: DISCONTINUED | OUTPATIENT
Start: 2020-05-29 | End: 2020-05-29 | Stop reason: HOSPADM

## 2020-05-29 RX ORDER — PROPOFOL 10 MG/ML
INJECTION, EMULSION INTRAVENOUS CONTINUOUS PRN
Status: DISCONTINUED | OUTPATIENT
Start: 2020-05-29 | End: 2020-05-29 | Stop reason: SURG

## 2020-05-29 RX ORDER — MORPHINE SULFATE 4 MG/ML
2 INJECTION, SOLUTION INTRAMUSCULAR; INTRAVENOUS
Status: DISCONTINUED | OUTPATIENT
Start: 2020-05-29 | End: 2020-05-29 | Stop reason: HOSPADM

## 2020-05-29 RX ORDER — SODIUM CHLORIDE 0.9 % (FLUSH) 0.9 %
10 SYRINGE (ML) INJECTION EVERY 12 HOURS SCHEDULED
Status: DISCONTINUED | OUTPATIENT
Start: 2020-05-29 | End: 2020-05-29 | Stop reason: HOSPADM

## 2020-05-29 RX ORDER — SODIUM CHLORIDE 0.9 % (FLUSH) 0.9 %
10 SYRINGE (ML) INJECTION AS NEEDED
Status: DISCONTINUED | OUTPATIENT
Start: 2020-05-29 | End: 2020-05-29 | Stop reason: HOSPADM

## 2020-05-29 RX ORDER — PROMETHAZINE HYDROCHLORIDE 25 MG/1
25 TABLET ORAL ONCE AS NEEDED
Status: DISCONTINUED | OUTPATIENT
Start: 2020-05-29 | End: 2020-05-29 | Stop reason: HOSPADM

## 2020-05-29 RX ORDER — HYDRALAZINE HYDROCHLORIDE 20 MG/ML
5 INJECTION INTRAMUSCULAR; INTRAVENOUS
Status: DISCONTINUED | OUTPATIENT
Start: 2020-05-29 | End: 2020-05-29 | Stop reason: HOSPADM

## 2020-05-29 RX ADMIN — ONDANSETRON 4 MG: 2 INJECTION INTRAMUSCULAR; INTRAVENOUS at 13:14

## 2020-05-29 RX ADMIN — PROPOFOL 50 MCG/KG/MIN: 10 INJECTION, EMULSION INTRAVENOUS at 10:26

## 2020-05-29 RX ADMIN — PROPOFOL: 10 INJECTION, EMULSION INTRAVENOUS at 11:29

## 2020-05-29 RX ADMIN — SODIUM CHLORIDE, SODIUM LACTATE, POTASSIUM CHLORIDE, AND CALCIUM CHLORIDE 9 ML/HR: 600; 310; 30; 20 INJECTION, SOLUTION INTRAVENOUS at 09:29

## 2020-05-29 RX ADMIN — MORPHINE SULFATE 2 MG: 4 INJECTION INTRAVENOUS at 12:27

## 2020-05-29 RX ADMIN — FENTANYL CITRATE 50 MCG: 50 INJECTION, SOLUTION INTRAMUSCULAR; INTRAVENOUS at 10:26

## 2020-05-29 RX ADMIN — CEFAZOLIN SODIUM 2 G: 1 INJECTION, POWDER, FOR SOLUTION INTRAMUSCULAR; INTRAVENOUS at 10:39

## 2020-05-29 NOTE — ANESTHESIA POSTPROCEDURE EVALUATION
Patient: Katie Reddy    Procedure Summary     Date:  05/29/20 Room / Location:  [facility] OR 08 / [facility] MAIN OR    Anesthesia Start:  1025 Anesthesia Stop:  1149    Procedure:  BUNIONECTOMY DEVONTE (Right Foot) Diagnosis:       Hallux valgus, acquired, bilateral      (Hallux valgus, acquired, bilateral [M20.11, M20.12])    Surgeon:  MARISSA Em DPM Provider:  Lars Campbell MD    Anesthesia Type:  MAC ASA Status:  2          Anesthesia Type: MAC    Vitals  Vitals Value Taken Time   /75 5/29/2020 12:41 PM   Temp 97.4 °F (36.3 °C) 5/29/2020 11:49 AM   Pulse 63 5/29/2020 12:42 PM   Resp 18 5/29/2020 12:34 PM   SpO2 97 % 5/29/2020 12:42 PM   Vitals shown include unvalidated device data.        Post Anesthesia Care and Evaluation    Pain management: adequate  Airway patency: patent  Anesthetic complications: No anesthetic complications  PONV Status: none  Cardiovascular status: acceptable  Respiratory status: acceptable  Hydration status: acceptable

## 2020-05-29 NOTE — ANESTHESIA PREPROCEDURE EVALUATION
Anesthesia Evaluation     Patient summary reviewed and Nursing notes reviewed   history of anesthetic complications: PONV  NPO Solid Status: > 8 hours  NPO Liquid Status: > 8 hours           Airway   Mallampati: II  TM distance: <3 FB  Neck ROM: full  No difficulty expected  Dental      Pulmonary    Cardiovascular     (+) hypertension, hyperlipidemia,       Neuro/Psych  (+) headaches, psychiatric history Anxiety,     GI/Hepatic/Renal/Endo    (+)  GERD,      Musculoskeletal     Abdominal    Substance History      OB/GYN          Other   arthritis,                      Anesthesia Plan    ASA 2     MAC     intravenous induction     Anesthetic plan, all risks, benefits, and alternatives have been provided, discussed and informed consent has been obtained with: patient.

## 2020-06-03 DIAGNOSIS — F41.9 ANXIETY: ICD-10-CM

## 2020-06-03 RX ORDER — TEMAZEPAM 30 MG/1
30 CAPSULE ORAL NIGHTLY PRN
Qty: 30 CAPSULE | Refills: 0 | Status: SHIPPED | OUTPATIENT
Start: 2020-06-03 | End: 2020-08-20 | Stop reason: SDUPTHER

## 2020-06-03 RX ORDER — CYCLOBENZAPRINE HCL 10 MG
TABLET ORAL
Qty: 30 TABLET | Refills: 0 | Status: SHIPPED | OUTPATIENT
Start: 2020-06-03 | End: 2020-08-20 | Stop reason: SDUPTHER

## 2020-06-08 RX ORDER — MECLIZINE HCL 12.5 MG/1
12.5 TABLET ORAL 3 TIMES DAILY PRN
Qty: 60 TABLET | Refills: 0 | Status: SHIPPED | OUTPATIENT
Start: 2020-06-08 | End: 2020-07-27

## 2020-06-09 ENCOUNTER — TELEPHONE (OUTPATIENT)
Dept: FAMILY MEDICINE CLINIC | Facility: CLINIC | Age: 75
End: 2020-06-09

## 2020-06-09 NOTE — TELEPHONE ENCOUNTER
PA for Temazepam was approved. PA approval was faxed to the pharmacy.   Temazepam was approved from 5/10/20-6/9/21

## 2020-06-10 DIAGNOSIS — F41.9 ANXIETY: ICD-10-CM

## 2020-06-11 ENCOUNTER — TELEPHONE (OUTPATIENT)
Dept: PODIATRY | Facility: CLINIC | Age: 75
End: 2020-06-11

## 2020-06-11 RX ORDER — TEMAZEPAM 30 MG/1
30 CAPSULE ORAL NIGHTLY PRN
Qty: 30 CAPSULE | Refills: 0 | OUTPATIENT
Start: 2020-06-11

## 2020-06-11 NOTE — TELEPHONE ENCOUNTER
Last visit: 2/7/20  Next visit: none  Last labs:5/27/20    Rx requested: Temazepam   Pharmacy: *[facility] in [place]

## 2020-06-11 NOTE — TELEPHONE ENCOUNTER
Pt has called and stated that she felt like the bandage was rubbing I advised her to take her ace bandage down and re apply that maybe her swelling was down and it might need to be tighter. She said she will try that and see us Monday.

## 2020-06-15 ENCOUNTER — OFFICE VISIT (OUTPATIENT)
Dept: PODIATRY | Facility: CLINIC | Age: 75
End: 2020-06-15

## 2020-06-15 VITALS
WEIGHT: 132 LBS | HEIGHT: 60 IN | HEART RATE: 69 BPM | BODY MASS INDEX: 25.91 KG/M2 | DIASTOLIC BLOOD PRESSURE: 90 MMHG | SYSTOLIC BLOOD PRESSURE: 160 MMHG

## 2020-06-15 DIAGNOSIS — M20.11 HALLUX VALGUS, ACQUIRED, BILATERAL: Primary | ICD-10-CM

## 2020-06-15 DIAGNOSIS — M20.12 HALLUX VALGUS, ACQUIRED, BILATERAL: Primary | ICD-10-CM

## 2020-06-15 PROCEDURE — 99024 POSTOP FOLLOW-UP VISIT: CPT | Performed by: PODIATRIST

## 2020-06-16 NOTE — PROGRESS NOTES
06/15/2020  Foot and Ankle Surgery - Established Patient/Follow-up  Provider: Dr. Austin Em DPM  Location: [facility]    Subjective:  Katie Reddy is a 75 y.o. female.     Chief Complaint   Patient presents with   • Right Foot - Follow-up, Post-op       HPI: Patient returns 2 weeks after minimally invasive bunionectomy to the right foot.  She states that she has remained off weightbearing and doing quite well.  She denies any pain since surgery.  No other issues today.    Allergies   Allergen Reactions   • Trazodone Irritability       Current Outpatient Medications on File Prior to Visit   Medication Sig Dispense Refill   • atorvastatin (LIPITOR) 80 MG tablet TAKE 1 TABLET BY MOUTH EVERY DAY (Patient taking differently: Take 80 mg by mouth Daily.) 90 tablet 0   • calcium carbonate (OS-SELENA) 600 MG tablet 600 mg 2 (Two) Times a Day. Do not take after 5/22     • celecoxib (CeleBREX) 100 MG capsule Take 100 mg by mouth Daily. Do not take after 5/22     • Cholecalciferol (VITAMIN D) 2000 units capsule Take 1 capsule by mouth Daily. (Patient taking differently: Take 2,000 Units by mouth Daily. Do not take after 5/22) 30 capsule    • cyclobenzaprine (FLEXERIL) 10 MG tablet TAKE 1 TABLET BY MOUTH EVERY DAY AS NEEDED 30 tablet 0   • famotidine (PEPCID) 20 MG tablet Take 1 tablet by mouth 2 (Two) Times a Day As Needed for Indigestion or Heartburn. (Patient taking differently: Take 20 mg by mouth 2 (Two) Times a Day.) 180 tablet 0   • Incontinence Supply Disposable (POISE PAD) pads Apply 1 pad topically to the appropriate area as directed 5 (Five) Times a Day. 104 each 12   • meclizine (ANTIVERT) 12.5 MG tablet TAKE 1 TABLET BY MOUTH 3 (THREE) TIMES A DAY AS NEEDED FOR DIZZINESS. 60 tablet 0   • MYRBETRIQ 50 MG tablet sustained-release 24 hour 24 hr tablet Take 50 mg by mouth Daily.     • ondansetron (ZOFRAN) 4 MG tablet Take 1 tablet by mouth Every 8 (Eight) Hours As Needed for Nausea or Vomiting. 30  "tablet 2   • pantoprazole (PROTONIX) 40 MG EC tablet Take 1 tablet by mouth Daily. 90 tablet 1   • sertraline (ZOLOFT) 100 MG tablet TAKE 1 TABLET BY MOUTH EVERY DAY (Patient taking differently: Take 100 mg by mouth Daily.) 90 tablet 0   • temazepam (RESTORIL) 30 MG capsule Take 1 capsule by mouth At Night As Needed for Sleep or Anxiety. 30 capsule 0     No current facility-administered medications on file prior to visit.        Objective   /90   Pulse 69   Ht 152.4 cm (60\")   Wt 59.9 kg (132 lb)   BMI 25.78 kg/m²     General:   Appearance: appears stated age and healthy    Orientation: AAOx3    Affect: appropriate    Gait: antalgic       VASCULAR       Right Foot Vascularity   Normal vascular exam    Dorsalis pedis:  2+  Posterior tibial:  2+  Skin Temperature: warm    Edema Grading:  None  CFT:  < 3 seconds  Pedal Hair Growth:  Present      Left Foot Vascularity   Normal vascular exam    Dorsalis pedis:  2+  Posterior tibial:  2+  Skin Temperature: warm    Edema Grading:  None  CFT:  < 3 seconds  Pedal Hair Growth:  Present      NEUROLOGIC      Right Foot Neurologic   Light touch sensation:  Normal  Hot/Cold sensation: normal    Achilles reflex:  2+      Left Foot Neurologic   Light touch sensation:  Normal  Hot/cold sensation: normal    Achilles reflex:  2+      MUSCULOSKELETAL       Right Foot Musculoskeletal   Ecchymosis:  None  Tenderness: Mild discomfort  Arch:  Normal       Left Foot Musculoskeletal   Ecchymosis:  None  Tenderness: none    Arch:  Normal  Hallux valgus: Yes        MUSCLE STRENGTH      Right Foot Muscle Strength   Normal strength    Foot dorsiflexion:  5  Foot plantar flexion:  5  Foot inversion:  5  Foot eversion:  5      Left Foot Muscle Strength   Normal strength    Foot dorsiflexion:  5  Foot plantar flexion:  5  Foot inversion:  5  Foot eversion:  5      RANGE OF MOTION       Right Foot Range of Motion   Foot and ankle ROM within normal limits        Left Foot Range of " Motion   Foot and ankle ROM within normal limits        DERMATOLOGIC      Right Foot Dermatologic   Skin: Incision sites are dry and stable with intact suture.  No evidence of dehiscence or infection      Left Foot Dermatologic   Skin: skin intact        Right Foot Additional Comments rectus alignment of the digit.      Left Foot Additional Comments: Moderate hallux valgus with minimal discomfort with palpation.    Assessment/Plan   Katie was seen today for follow-up and post-op.    Diagnoses and all orders for this visit:    Hallux valgus, acquired, bilateral      Patient returns for follow-up after bunionectomy.  She does appear to be doing quite well.  Sutures were removed today without complication.  I have asked that she start gentle range of motion exercises to the foot and first metatarsal phalangeal joint specifically.  She is to remain mostly off weightbearing with the knee scooter.  She is to monitor the foot and call with any issues or concerns.  I will see her in 2 weeks for reevaluation and imaging.    No orders of the defined types were placed in this encounter.         Note is dictated utilizing voice recognition software. Unfortunately this leads to occasional typographical errors. I apologize in advance if the situation occurs. If questions occur please do not hesitate to call our office.

## 2020-07-01 ENCOUNTER — OFFICE VISIT (OUTPATIENT)
Dept: PODIATRY | Facility: CLINIC | Age: 75
End: 2020-07-01

## 2020-07-01 VITALS
HEIGHT: 60 IN | WEIGHT: 132 LBS | SYSTOLIC BLOOD PRESSURE: 158 MMHG | HEART RATE: 64 BPM | DIASTOLIC BLOOD PRESSURE: 81 MMHG | BODY MASS INDEX: 25.91 KG/M2

## 2020-07-01 DIAGNOSIS — M20.11 HALLUX VALGUS, ACQUIRED, BILATERAL: Primary | ICD-10-CM

## 2020-07-01 DIAGNOSIS — M20.12 HALLUX VALGUS, ACQUIRED, BILATERAL: Primary | ICD-10-CM

## 2020-07-01 PROCEDURE — 99024 POSTOP FOLLOW-UP VISIT: CPT | Performed by: PODIATRIST

## 2020-07-01 NOTE — PROGRESS NOTES
07/01/2020  Foot and Ankle Surgery - Established Patient/Follow-up  Provider: Dr. Austin Em DPM  Location: [facility]    Subjective:  Katie Reddy is a 75 y.o. female.     Chief Complaint   Patient presents with   • Right Foot - Follow-up, Post-op       HPI: Returns 4 weeks after bunionectomy.  She continues to do quite well.  She has been ambulating in the cam boot.  She denies any pain and limitation and she is eager to return to baseline activity.    Allergies   Allergen Reactions   • Trazodone Irritability       Current Outpatient Medications on File Prior to Visit   Medication Sig Dispense Refill   • atorvastatin (LIPITOR) 80 MG tablet TAKE 1 TABLET BY MOUTH EVERY DAY (Patient taking differently: Take 80 mg by mouth Daily.) 90 tablet 0   • calcium carbonate (OS-SELENA) 600 MG tablet 600 mg 2 (Two) Times a Day. Do not take after 5/22     • celecoxib (CeleBREX) 100 MG capsule Take 100 mg by mouth Daily. Do not take after 5/22     • Cholecalciferol (VITAMIN D) 2000 units capsule Take 1 capsule by mouth Daily. (Patient taking differently: Take 2,000 Units by mouth Daily. Do not take after 5/22) 30 capsule    • cyclobenzaprine (FLEXERIL) 10 MG tablet TAKE 1 TABLET BY MOUTH EVERY DAY AS NEEDED 30 tablet 0   • famotidine (PEPCID) 20 MG tablet Take 1 tablet by mouth 2 (Two) Times a Day As Needed for Indigestion or Heartburn. (Patient taking differently: Take 20 mg by mouth 2 (Two) Times a Day.) 180 tablet 0   • hydrocortisone 2.5 % cream APPLY TO AFFECTED AREA BEHIND RIGHT EAR ONCE TO TWICE DAILY     • Incontinence Supply Disposable (POISE PAD) pads Apply 1 pad topically to the appropriate area as directed 5 (Five) Times a Day. 104 each 12   • meclizine (ANTIVERT) 12.5 MG tablet TAKE 1 TABLET BY MOUTH 3 (THREE) TIMES A DAY AS NEEDED FOR DIZZINESS. 60 tablet 0   • MYRBETRIQ 50 MG tablet sustained-release 24 hour 24 hr tablet Take 50 mg by mouth Daily.     • ondansetron (ZOFRAN) 4 MG tablet Take 1  "tablet by mouth Every 8 (Eight) Hours As Needed for Nausea or Vomiting. 30 tablet 2   • pantoprazole (PROTONIX) 40 MG EC tablet Take 1 tablet by mouth Daily. 90 tablet 1   • sertraline (ZOLOFT) 100 MG tablet TAKE 1 TABLET BY MOUTH EVERY DAY (Patient taking differently: Take 100 mg by mouth Daily.) 90 tablet 0   • temazepam (RESTORIL) 30 MG capsule Take 1 capsule by mouth At Night As Needed for Sleep or Anxiety. 30 capsule 0     No current facility-administered medications on file prior to visit.        Objective   /81   Pulse 64   Ht 152.4 cm (60\")   Wt 59.9 kg (132 lb)   BMI 25.78 kg/m²        General:   Appearance: appears stated age and healthy    Orientation: AAOx3    Affect: appropriate    Gait: antalgic       VASCULAR       Right Foot Vascularity   Normal vascular exam    Dorsalis pedis:  2+  Posterior tibial:  2+  Skin Temperature: warm    Edema Grading:  None  CFT:  < 3 seconds  Pedal Hair Growth:  Present      Left Foot Vascularity   Normal vascular exam    Dorsalis pedis:  2+  Posterior tibial:  2+  Skin Temperature: warm    Edema Grading:  None  CFT:  < 3 seconds  Pedal Hair Growth:  Present      NEUROLOGIC      Right Foot Neurologic   Light touch sensation:  Normal  Hot/Cold sensation: normal    Achilles reflex:  2+      Left Foot Neurologic   Light touch sensation:  Normal  Hot/cold sensation: normal    Achilles reflex:  2+      MUSCULOSKELETAL       Right Foot Musculoskeletal   Ecchymosis:  None  Tenderness: Mild discomfort  Arch:  Normal       Left Foot Musculoskeletal   Ecchymosis:  None  Tenderness: none    Arch:  Normal  Hallux valgus: Yes        MUSCLE STRENGTH      Right Foot Muscle Strength   Normal strength    Foot dorsiflexion:  5  Foot plantar flexion:  5  Foot inversion:  5  Foot eversion:  5      Left Foot Muscle Strength   Normal strength    Foot dorsiflexion:  5  Foot plantar flexion:  5  Foot inversion:  5  Foot eversion:  5      RANGE OF MOTION       Right Foot Range of " Motion   Foot and ankle ROM within normal limits        Left Foot Range of Motion   Foot and ankle ROM within normal limits        DERMATOLOGIC      Right Foot Dermatologic   Skin: Incision sites are well-healed      Left Foot Dermatologic   Skin: skin intact        Right Foot Additional Comments rectus alignment of the digit.      Left Foot Additional Comments: Moderate hallux valgus with minimal discomfort with palpation.    Assessment/Plan   Katie was seen today for follow-up and post-op.    Diagnoses and all orders for this visit:    Hallux valgus, acquired, bilateral  -     XR Foot 3+ View Right      Patient continues to do well.  Imaging was obtained today showing stable internal fixation and rectus alignment of the first ray.  Minimal interval signs of healing are present.  I have asked that she continue to use the cam boot over the next 2 weeks with weightbearing activity.  She may transition to a regular shoe after that time if tolerated.  She is to avoid high-impact and excessive activity.  I will see her in 4 weeks for reevaluation and imaging.    Orders Placed This Encounter   Procedures   • XR Foot 3+ View Right     Weight Bearing     Order Specific Question:   Reason for Exam:     Answer:   right foot post op F/U from about 4 weeks ago RM 13 WB          Note is dictated utilizing voice recognition software. Unfortunately this leads to occasional typographical errors. I apologize in advance if the situation occurs. If questions occur please do not hesitate to call our office.

## 2020-07-08 RX ORDER — ATORVASTATIN CALCIUM 80 MG/1
TABLET, FILM COATED ORAL
Qty: 90 TABLET | Refills: 1 | Status: SHIPPED | OUTPATIENT
Start: 2020-07-08 | End: 2021-01-04

## 2020-07-08 RX ORDER — ATORVASTATIN CALCIUM 80 MG/1
80 TABLET, FILM COATED ORAL DAILY
Qty: 90 TABLET | Refills: 0 | OUTPATIENT
Start: 2020-07-08

## 2020-07-27 RX ORDER — MECLIZINE HCL 12.5 MG/1
12.5 TABLET ORAL 3 TIMES DAILY PRN
Qty: 60 TABLET | Refills: 0 | Status: SHIPPED | OUTPATIENT
Start: 2020-07-27 | End: 2020-08-20 | Stop reason: SDUPTHER

## 2020-07-27 RX ORDER — FAMOTIDINE 20 MG/1
20 TABLET, FILM COATED ORAL 2 TIMES DAILY PRN
Qty: 180 TABLET | Refills: 0 | Status: SHIPPED | OUTPATIENT
Start: 2020-07-27 | End: 2020-08-20

## 2020-07-27 NOTE — TELEPHONE ENCOUNTER
Last visit:  2/7/20  Next visit: none  Last labs: 5/27/20    Rx requested:Famotidine,Meclizine   Pharmacy: [facility] in [place]

## 2020-07-28 DIAGNOSIS — Z12.31 ENCOUNTER FOR SCREENING MAMMOGRAM FOR MALIGNANT NEOPLASM OF BREAST: ICD-10-CM

## 2020-07-28 DIAGNOSIS — Z12.39 SCREENING FOR BREAST CANCER: Primary | ICD-10-CM

## 2020-07-29 ENCOUNTER — OFFICE VISIT (OUTPATIENT)
Dept: PODIATRY | Facility: CLINIC | Age: 75
End: 2020-07-29

## 2020-07-29 VITALS
SYSTOLIC BLOOD PRESSURE: 138 MMHG | HEART RATE: 63 BPM | HEIGHT: 60 IN | BODY MASS INDEX: 24.74 KG/M2 | WEIGHT: 126 LBS | DIASTOLIC BLOOD PRESSURE: 81 MMHG

## 2020-07-29 DIAGNOSIS — M20.11 ACQUIRED HALLUX VALGUS, RIGHT: Primary | ICD-10-CM

## 2020-07-29 PROCEDURE — 99024 POSTOP FOLLOW-UP VISIT: CPT | Performed by: PODIATRIST

## 2020-07-29 NOTE — PROGRESS NOTES
07/29/2020  Foot and Ankle Surgery - Established Patient/Follow-up  Provider: Dr. Austin Em DPM  Location: [facility]    Subjective:  Katie Reddy is a 75 y.o. female.     Chief Complaint   Patient presents with   • Right Foot - Follow-up, Post-op       HPI: Patient returns for follow-up regarding her right foot.  She is approximately 8 weeks after bunionectomy.  She states that she is doing very well.  She has been ambulating in the cam boot and is eager to return to her regular shoe and normal activity.  No other issues today.    Allergies   Allergen Reactions   • Trazodone Irritability       Current Outpatient Medications on File Prior to Visit   Medication Sig Dispense Refill   • atorvastatin (LIPITOR) 80 MG tablet TAKE 1 TABLET BY MOUTH EVERY DAY 90 tablet 1   • calcium carbonate (OS-SELENA) 600 MG tablet 600 mg 2 (Two) Times a Day. Do not take after 5/22     • celecoxib (CeleBREX) 100 MG capsule Take 100 mg by mouth Daily. Do not take after 5/22     • Cholecalciferol (VITAMIN D) 2000 units capsule Take 1 capsule by mouth Daily. (Patient taking differently: Take 2,000 Units by mouth Daily. Do not take after 5/22) 30 capsule    • cyclobenzaprine (FLEXERIL) 10 MG tablet TAKE 1 TABLET BY MOUTH EVERY DAY AS NEEDED 30 tablet 0   • famotidine (PEPCID) 20 MG tablet TAKE 1 TABLET BY MOUTH 2 (TWO) TIMES A DAY AS NEEDED FOR INDIGESTION OR HEARTBURN. 180 tablet 0   • hydrocortisone 2.5 % cream APPLY TO AFFECTED AREA BEHIND RIGHT EAR ONCE TO TWICE DAILY     • Incontinence Supply Disposable (POISE PAD) pads Apply 1 pad topically to the appropriate area as directed 5 (Five) Times a Day. 104 each 12   • meclizine (ANTIVERT) 12.5 MG tablet TAKE 1 TABLET BY MOUTH 3 (THREE) TIMES A DAY AS NEEDED FOR DIZZINESS. 60 tablet 0   • MYRBETRIQ 50 MG tablet sustained-release 24 hour 24 hr tablet Take 50 mg by mouth Daily.     • ondansetron (ZOFRAN) 4 MG tablet Take 1 tablet by mouth Every 8 (Eight) Hours As Needed for  "Nausea or Vomiting. 30 tablet 2   • pantoprazole (PROTONIX) 40 MG EC tablet Take 1 tablet by mouth Daily. 90 tablet 1   • sertraline (ZOLOFT) 100 MG tablet TAKE 1 TABLET BY MOUTH EVERY DAY (Patient taking differently: Take 100 mg by mouth Daily.) 90 tablet 0   • temazepam (RESTORIL) 30 MG capsule Take 1 capsule by mouth At Night As Needed for Sleep or Anxiety. 30 capsule 0     No current facility-administered medications on file prior to visit.        Objective   /81   Pulse 63   Ht 152.4 cm (60\")   Wt 57.2 kg (126 lb)   BMI 24.61 kg/m²     General:   Appearance: appears stated age and healthy    Orientation: AAOx3    Affect: appropriate    Gait: antalgic       VASCULAR       Right Foot Vascularity   Normal vascular exam    Dorsalis pedis:  2+  Posterior tibial:  2+  Skin Temperature: warm    Edema Grading:  None  CFT:  < 3 seconds  Pedal Hair Growth:  Present      Left Foot Vascularity   Normal vascular exam    Dorsalis pedis:  2+  Posterior tibial:  2+  Skin Temperature: warm    Edema Grading:  None  CFT:  < 3 seconds  Pedal Hair Growth:  Present      NEUROLOGIC      Right Foot Neurologic   Light touch sensation:  Normal  Hot/Cold sensation: normal    Achilles reflex:  2+      Left Foot Neurologic   Light touch sensation:  Normal  Hot/cold sensation: normal    Achilles reflex:  2+      MUSCULOSKELETAL       Right Foot Musculoskeletal   Ecchymosis:  None  Tenderness: Mild discomfort  Arch:  Normal       Left Foot Musculoskeletal   Ecchymosis:  None  Tenderness: none    Arch:  Normal  Hallux valgus: Yes        MUSCLE STRENGTH      Right Foot Muscle Strength   Normal strength    Foot dorsiflexion:  5  Foot plantar flexion:  5  Foot inversion:  5  Foot eversion:  5      Left Foot Muscle Strength   Normal strength    Foot dorsiflexion:  5  Foot plantar flexion:  5  Foot inversion:  5  Foot eversion:  5      RANGE OF MOTION       Right Foot Range of Motion   Foot and ankle ROM within normal limits        Left " Foot Range of Motion   Foot and ankle ROM within normal limits        DERMATOLOGIC      Right Foot Dermatologic   Skin: Incision sites are well-healed      Left Foot Dermatologic   Skin: skin intact        Right Foot Additional Comments rectus alignment of the digit.      Left Foot Additional Comments: Moderate hallux valgus with minimal discomfort with palpation.    Assessment/Plan   Katie was seen today for follow-up and post-op.    Diagnoses and all orders for this visit:    Acquired hallux valgus, right  -     XR Foot 3+ View Right      Patient is doing substantially well.  Range of motion to the first metatarsal phalangeal joint is full and nontender.  She has no discomfort with palpation or weightbearing.  Gym was reviewed showing interval healing across the osteotomy site with stable internal fixation.  I have recommended that she gradually discontinue the cam boot and return to baseline activity.  She does complain of intermittent discomfort involving her left foot but bunion but would like to wait for any type of surgery.  She has opted to see me in 6 months for reevaluation.  I would like her to call with any additional issues or concerns.    Orders Placed This Encounter   Procedures   • XR Foot 3+ View Right     Weight Bearing     Order Specific Question:   Reason for Exam:     Answer:   Right foot post op F/U about 8 weeks out RM 13 WB          Note is dictated utilizing voice recognition software. Unfortunately this leads to occasional typographical errors. I apologize in advance if the situation occurs. If questions occur please do not hesitate to call our office.

## 2020-08-03 RX ORDER — PANTOPRAZOLE SODIUM 40 MG/1
TABLET, DELAYED RELEASE ORAL
Qty: 90 TABLET | Refills: 0 | Status: SHIPPED | OUTPATIENT
Start: 2020-08-03 | End: 2020-08-20 | Stop reason: SDUPTHER

## 2020-08-03 NOTE — TELEPHONE ENCOUNTER
Last visit:  2/7/20  Next visit: 8/20/20  Last labs: 5/27/20    Rx requested: Pantoprazole   Ph[facility] [place]

## 2020-08-18 ENCOUNTER — HOSPITAL ENCOUNTER (OUTPATIENT)
Dept: MAMMOGRAPHY | Facility: HOSPITAL | Age: 75
Discharge: HOME | End: 2020-08-18
Admitting: FAMILY MEDICINE

## 2020-08-18 DIAGNOSIS — Z12.31 ENCOUNTER FOR SCREENING MAMMOGRAM FOR MALIGNANT NEOPLASM OF BREAST: ICD-10-CM

## 2020-08-18 DIAGNOSIS — Z12.39 SCREENING FOR BREAST CANCER: ICD-10-CM

## 2020-08-18 PROCEDURE — 77067 SCR MAMMO BI INCL CAD: CPT

## 2020-08-18 PROCEDURE — 77063 BREAST TOMOSYNTHESIS BI: CPT

## 2020-08-20 ENCOUNTER — OFFICE VISIT (OUTPATIENT)
Dept: FAMILY MEDICINE CLINIC | Facility: CLINIC | Age: 75
End: 2020-08-20

## 2020-08-20 VITALS
SYSTOLIC BLOOD PRESSURE: 148 MMHG | HEIGHT: 60 IN | BODY MASS INDEX: 24.74 KG/M2 | RESPIRATION RATE: 14 BRPM | OXYGEN SATURATION: 98 % | HEART RATE: 68 BPM | TEMPERATURE: 99.1 F | DIASTOLIC BLOOD PRESSURE: 85 MMHG | WEIGHT: 126 LBS

## 2020-08-20 DIAGNOSIS — M85.89 OSTEOPENIA OF MULTIPLE SITES: ICD-10-CM

## 2020-08-20 DIAGNOSIS — F41.1 GENERALIZED ANXIETY DISORDER: ICD-10-CM

## 2020-08-20 DIAGNOSIS — F51.01 PRIMARY INSOMNIA: ICD-10-CM

## 2020-08-20 DIAGNOSIS — E78.5 DYSLIPIDEMIA: ICD-10-CM

## 2020-08-20 DIAGNOSIS — K21.00 GASTROESOPHAGEAL REFLUX DISEASE WITH ESOPHAGITIS: ICD-10-CM

## 2020-08-20 DIAGNOSIS — R25.9 MIXED ACTION AND RESTING TREMOR: Primary | ICD-10-CM

## 2020-08-20 PROCEDURE — 99214 OFFICE O/P EST MOD 30 MIN: CPT | Performed by: FAMILY MEDICINE

## 2020-08-20 RX ORDER — SERTRALINE HYDROCHLORIDE 100 MG/1
100 TABLET, FILM COATED ORAL DAILY
Qty: 90 TABLET | Refills: 1 | Status: SHIPPED | OUTPATIENT
Start: 2020-08-20 | End: 2021-01-26 | Stop reason: SDUPTHER

## 2020-08-20 RX ORDER — MECLIZINE HCL 12.5 MG/1
12.5 TABLET ORAL 3 TIMES DAILY PRN
Qty: 60 TABLET | Refills: 0 | Status: SHIPPED | OUTPATIENT
Start: 2020-08-20 | End: 2020-09-23

## 2020-08-20 RX ORDER — CELECOXIB 100 MG/1
100 CAPSULE ORAL DAILY
Qty: 90 CAPSULE | Refills: 1 | Status: SHIPPED | OUTPATIENT
Start: 2020-08-20 | End: 2020-08-25 | Stop reason: SDUPTHER

## 2020-08-20 RX ORDER — CYCLOBENZAPRINE HCL 10 MG
10 TABLET ORAL DAILY PRN
Qty: 30 TABLET | Refills: 1 | Status: SHIPPED | OUTPATIENT
Start: 2020-08-20 | End: 2020-10-26 | Stop reason: HOSPADM

## 2020-08-20 RX ORDER — TEMAZEPAM 30 MG/1
30 CAPSULE ORAL NIGHTLY PRN
Qty: 30 CAPSULE | Refills: 0 | Status: SHIPPED | OUTPATIENT
Start: 2020-08-20 | End: 2020-09-23

## 2020-08-20 RX ORDER — PANTOPRAZOLE SODIUM 40 MG/1
40 TABLET, DELAYED RELEASE ORAL DAILY
Qty: 90 TABLET | Refills: 1 | Status: SHIPPED | OUTPATIENT
Start: 2020-08-20 | End: 2021-01-26 | Stop reason: SDUPTHER

## 2020-08-20 RX ORDER — FAMOTIDINE 20 MG/1
20 TABLET, FILM COATED ORAL 2 TIMES DAILY PRN
Qty: 180 TABLET | Refills: 1 | Status: SHIPPED | OUTPATIENT
Start: 2020-08-20 | End: 2021-01-26 | Stop reason: SDUPTHER

## 2020-08-20 NOTE — PROGRESS NOTES
Subjective   Katie Reddy is a 75 y.o. female.     Chief Complaint   Patient presents with   • Heartburn     Atorvastatin,Celebrex,Cyclobenzaprine,Famotidine,meclizine,pantoprazole,sertraline,temazepam to [facility] [place]    • Hyperlipidemia   • Depression   • Insomnia         Current Outpatient Medications:   •  atorvastatin (LIPITOR) 80 MG tablet, TAKE 1 TABLET BY MOUTH EVERY DAY, Disp: 90 tablet, Rfl: 1  •  calcium carbonate (OS-SELENA) 600 MG tablet, 600 mg 2 (Two) Times a Day. Do not take after 5/22, Disp: , Rfl:   •  celecoxib (CeleBREX) 100 MG capsule, Take 1 capsule by mouth Daily. Do not take after 5/22, Disp: 90 capsule, Rfl: 1  •  Cholecalciferol (VITAMIN D) 2000 units capsule, Take 1 capsule by mouth Daily. (Patient taking differently: Take 2,000 Units by mouth Daily. Do not take after 5/22), Disp: 30 capsule, Rfl:   •  cyclobenzaprine (FLEXERIL) 10 MG tablet, Take 1 tablet by mouth Daily As Needed for Muscle Spasms., Disp: 30 tablet, Rfl: 1  •  famotidine (PEPCID) 20 MG tablet, Take 1 tablet by mouth 2 (Two) Times a Day As Needed for Indigestion or Heartburn., Disp: 180 tablet, Rfl: 1  •  hydrocortisone 2.5 % cream, APPLY TO AFFECTED AREA BEHIND RIGHT EAR ONCE TO TWICE DAILY, Disp: , Rfl:   •  Incontinence Supply Disposable (POISE PAD) pads, Apply 1 pad topically to the appropriate area as directed 5 (Five) Times a Day., Disp: 104 each, Rfl: 12  •  meclizine (ANTIVERT) 12.5 MG tablet, Take 1 tablet by mouth 3 (Three) Times a Day As Needed for Dizziness., Disp: 60 tablet, Rfl: 0  •  MYRBETRIQ 50 MG tablet sustained-release 24 hour 24 hr tablet, Take 50 mg by mouth Daily., Disp: , Rfl:   •  ondansetron (ZOFRAN) 4 MG tablet, Take 1 tablet by mouth Every 8 (Eight) Hours As Needed for Nausea or Vomiting., Disp: 30 tablet, Rfl: 2  •  pantoprazole (PROTONIX) 40 MG EC tablet, Take 1 tablet by mouth Daily., Disp: 90 tablet, Rfl: 1  •  sertraline (ZOLOFT) 100 MG tablet, Take 1 tablet by mouth Daily.,  Disp: 90 tablet, Rfl: 1  •  temazepam (RESTORIL) 30 MG capsule, Take 1 capsule by mouth At Night As Needed for Sleep or Anxiety., Disp: 30 capsule, Rfl: 0    Past Medical History:   Diagnosis Date   • Arthritis    • Bladder incontinence    • Bowel incontinence    • Breast cyst    • Colon polyp    • GERD (gastroesophageal reflux disease)    • Headache    • Hyperlipidemia    • Hypertension    • Osteopenia    • Vitamin D deficiency        Past Surgical History:   Procedure Laterality Date   • APPENDECTOMY     • BREAST CYST EXCISION     • BREAST LUMPECTOMY Left 1973    NEG   • BUNIONECTOMY Right 5/29/2020    Procedure: BUNIONECTOMY DEVONTE;  Surgeon: MARISSA Em DPM;  Location: [facility];  Service: Podiatry;  Laterality: Right;   • CARPAL TUNNEL RELEASE Right 1980   • CHOLECYSTECTOMY     • COLON SURGERY      hemorrhoid banding   • COLONOSCOPY  2017 2017/ 2019 = jessica DESAI 2024   Dr. Mackey   • EYE SURGERY     • HERNIA REPAIR      Umbilical removal   • HYSTERECTOMY  1970   • OTHER SURGICAL HISTORY      bladder stimulator placement     pt cant have mri   • SUBTOTAL HYSTERECTOMY         Family History   Problem Relation Age of Onset   • Heart disease Mother    • Hypertension Mother    • Diabetes Father    • Heart disease Father    • Alcohol abuse Father    • Hypertension Father    • Diabetes Brother    • Heart disease Brother    • Cancer Brother 68        Prostate Cancer   • Hypertension Brother    • Diabetes Maternal Aunt    • Heart disease Maternal Grandmother    • Hypertension Maternal Grandmother    • Heart disease Maternal Grandfather    • Hypertension Maternal Grandfather    • Liver disease Paternal Grandmother    • Hypertension Paternal Grandmother    • Heart disease Paternal Grandfather    • Hypertension Paternal Grandfather    • Dementia Sister        Social History     Socioeconomic History   • Marital status:      Spouse name: Not on file   • Number of children: Not on file   • Years of  education: Not on file   • Highest education level: Not on file   Tobacco Use   • Smoking status: Never Smoker   • Smokeless tobacco: Never Used   Substance and Sexual Activity   • Alcohol use: No     Frequency: Never   • Drug use: No   • Sexual activity: Defer       74y/o C female here for 6mos f/u on HTN/ CHOL/ GERD/ Insomnia    Pt doing well on current meds/ doses    Pt is concerned about tremors she is having-------can be at rest or when trying to sign her name; no family hx/o tremor......       The following portions of the patient's history were reviewed and updated as appropriate: allergies, current medications, past family history, past medical history, past social history, past surgical history and problem list.    Review of Systems   Constitutional: Negative for activity change, appetite change, fatigue, unexpected weight gain and unexpected weight loss.   Respiratory: Negative for cough, chest tightness and shortness of breath.    Cardiovascular: Negative for chest pain, palpitations and leg swelling.   Gastrointestinal: Negative for GERD and indigestion.   Genitourinary: Negative for frequency, urgency and urinary incontinence.   Musculoskeletal: Negative for arthralgias and myalgias.   Neurological: Positive for tremors. Negative for dizziness, facial asymmetry, speech difficulty, weakness, light-headedness, headache, memory problem and confusion.   Psychiatric/Behavioral: Negative for sleep disturbance.       Vitals:    08/20/20 1320   BP: 148/85   Pulse: 68   Resp: 14   Temp: 99.1 °F (37.3 °C)   SpO2: 98%       Objective   Physical Exam   Constitutional: She is oriented to person, place, and time. She appears well-developed and well-nourished. No distress.   HENT:   Head: Normocephalic and atraumatic.   Neck: Normal range of motion. Neck supple.   Cardiovascular: Normal rate, regular rhythm, normal heart sounds and intact distal pulses.   No murmur heard.  Pulmonary/Chest: Effort normal and breath  sounds normal. No respiratory distress.   Musculoskeletal: She exhibits no edema.   Neurological: She is alert and oriented to person, place, and time. No cranial nerve deficit.   Neg cogwheeling b/l UE  Min intention tremor Rt hand    Skin: Skin is warm and dry. No rash noted.   Psychiatric: She has a normal mood and affect. Her behavior is normal. Judgment and thought content normal.   Nursing note and vitals reviewed.        Assessment/Plan   Katie was seen today for heartburn, hyperlipidemia, depression and insomnia.    Diagnoses and all orders for this visit:    Anxiety  -     temazepam (RESTORIL) 30 MG capsule; Take 1 capsule by mouth At Night As Needed for Sleep or Anxiety.    Gastroesophageal reflux disease with esophagitis    Dyslipidemia    Osteopenia of multiple sites  -     DEXA Bone Density Axial; Future    Mixed action and resting tremor  -     Ambulatory Referral to Neurology    Other orders  -     celecoxib (CeleBREX) 100 MG capsule; Take 1 capsule by mouth Daily. Do not take after 5/22  -     cyclobenzaprine (FLEXERIL) 10 MG tablet; Take 1 tablet by mouth Daily As Needed for Muscle Spasms.  -     famotidine (PEPCID) 20 MG tablet; Take 1 tablet by mouth 2 (Two) Times a Day As Needed for Indigestion or Heartburn.  -     pantoprazole (PROTONIX) 40 MG EC tablet; Take 1 tablet by mouth Daily.  -     meclizine (ANTIVERT) 12.5 MG tablet; Take 1 tablet by mouth 3 (Three) Times a Day As Needed for Dizziness.  -     sertraline (ZOLOFT) 100 MG tablet; Take 1 tablet by mouth Daily.    CONSULT NEURO

## 2020-08-25 RX ORDER — CELECOXIB 100 MG/1
100 CAPSULE ORAL DAILY
Qty: 90 CAPSULE | Refills: 1 | Status: SHIPPED | OUTPATIENT
Start: 2020-08-25 | End: 2021-01-26 | Stop reason: SDUPTHER

## 2020-09-01 ENCOUNTER — OFFICE VISIT (OUTPATIENT)
Dept: ORTHOPEDIC SURGERY | Facility: CLINIC | Age: 75
End: 2020-09-01

## 2020-09-01 VITALS — BODY MASS INDEX: 25.13 KG/M2 | HEIGHT: 60 IN | WEIGHT: 128 LBS

## 2020-09-01 DIAGNOSIS — M17.11 PRIMARY OSTEOARTHRITIS OF RIGHT KNEE: ICD-10-CM

## 2020-09-01 DIAGNOSIS — M17.12 PRIMARY OSTEOARTHRITIS OF LEFT KNEE: Primary | ICD-10-CM

## 2020-09-01 PROCEDURE — 20610 DRAIN/INJ JOINT/BURSA W/O US: CPT | Performed by: ORTHOPAEDIC SURGERY

## 2020-09-01 RX ORDER — METHYLPREDNISOLONE ACETATE 80 MG/ML
160 INJECTION, SUSPENSION INTRA-ARTICULAR; INTRALESIONAL; INTRAMUSCULAR; SOFT TISSUE
Status: COMPLETED | OUTPATIENT
Start: 2020-09-01 | End: 2020-09-01

## 2020-09-01 RX ORDER — LIDOCAINE HYDROCHLORIDE 10 MG/ML
2 INJECTION, SOLUTION INFILTRATION; PERINEURAL
Status: COMPLETED | OUTPATIENT
Start: 2020-09-01 | End: 2020-09-01

## 2020-09-01 RX ADMIN — LIDOCAINE HYDROCHLORIDE 2 ML: 10 INJECTION, SOLUTION INFILTRATION; PERINEURAL at 10:19

## 2020-09-01 RX ADMIN — METHYLPREDNISOLONE ACETATE 160 MG: 80 INJECTION, SUSPENSION INTRA-ARTICULAR; INTRALESIONAL; INTRAMUSCULAR; SOFT TISSUE at 10:19

## 2020-09-01 RX ADMIN — LIDOCAINE HYDROCHLORIDE 2 ML: 10 INJECTION, SOLUTION INFILTRATION; PERINEURAL at 10:20

## 2020-09-01 RX ADMIN — METHYLPREDNISOLONE ACETATE 160 MG: 80 INJECTION, SUSPENSION INTRA-ARTICULAR; INTRALESIONAL; INTRAMUSCULAR; SOFT TISSUE at 10:20

## 2020-09-01 NOTE — PROGRESS NOTES
INJECTION    Patient: Katie Reddy    YOB: 1945    MRN: [id number]    Chief Complaint   Patient presents with   • Left Knee - Follow-up   • Right Knee - Follow-up       History of Present Illness: Patient returns today for bilateral knee pain.  Her pain is located over the medial aspect of the joint.  The pain has been progressive in nature and remains intermittent .  Her pain is worsened by going up and down stairs, kneeling, rising after sitting. There has been improvement in the past with ice and injections.     This problem is not new to this examiner.     Allergies:   Allergies   Allergen Reactions   • Trazodone Irritability       Medications:   Home Medications:  Current Outpatient Medications on File Prior to Visit   Medication Sig   • atorvastatin (LIPITOR) 80 MG tablet TAKE 1 TABLET BY MOUTH EVERY DAY   • calcium carbonate (OS-SELENA) 600 MG tablet 600 mg 2 (Two) Times a Day. Do not take after 5/22   • celecoxib (CeleBREX) 100 MG capsule Take 1 capsule by mouth Daily.   • Cholecalciferol (VITAMIN D) 2000 units capsule Take 1 capsule by mouth Daily. (Patient taking differently: Take 2,000 Units by mouth Daily. Do not take after 5/22)   • cyclobenzaprine (FLEXERIL) 10 MG tablet Take 1 tablet by mouth Daily As Needed for Muscle Spasms.   • famotidine (PEPCID) 20 MG tablet Take 1 tablet by mouth 2 (Two) Times a Day As Needed for Indigestion or Heartburn.   • hydrocortisone 2.5 % cream APPLY TO AFFECTED AREA BEHIND RIGHT EAR ONCE TO TWICE DAILY   • Incontinence Supply Disposable (POISE PAD) pads Apply 1 pad topically to the appropriate area as directed 5 (Five) Times a Day.   • meclizine (ANTIVERT) 12.5 MG tablet Take 1 tablet by mouth 3 (Three) Times a Day As Needed for Dizziness.   • MYRBETRIQ 50 MG tablet sustained-release 24 hour 24 hr tablet Take 50 mg by mouth Daily.   • ondansetron (ZOFRAN) 4 MG tablet Take 1 tablet by mouth Every 8 (Eight) Hours As Needed for Nausea or Vomiting.   •  pantoprazole (PROTONIX) 40 MG EC tablet Take 1 tablet by mouth Daily.   • sertraline (ZOLOFT) 100 MG tablet Take 1 tablet by mouth Daily.   • temazepam (RESTORIL) 30 MG capsule Take 1 capsule by mouth At Night As Needed for Sleep or Anxiety.     No current facility-administered medications on file prior to visit.      Current Medications:  Scheduled Meds:  PRN Meds:.    I have reviewed the patient's medical history in detail and updated the computerized patient record.  Review and summarization of old records include:    Past Medical History:   Diagnosis Date   • Arthritis    • Bladder incontinence    • Bowel incontinence    • Breast cyst    • Colon polyp    • GERD (gastroesophageal reflux disease)    • Headache    • Hyperlipidemia    • Hypertension    • Osteopenia    • Vitamin D deficiency      Past Surgical History:   Procedure Laterality Date   • APPENDECTOMY     • BREAST CYST EXCISION     • BREAST LUMPECTOMY Left 1973    NEG   • BUNIONECTOMY Right 5/29/2020    Procedure: BUNIONECTOMY DEVONTE;  Surgeon: MARISSA Em DPM;  Location: [facility];  Service: Podiatry;  Laterality: Right;   • CARPAL TUNNEL RELEASE Right 1980   • CHOLECYSTECTOMY     • COLON SURGERY      hemorrhoid banding   • COLONOSCOPY  2017 2017/ 2019 = jessica DESAI 2024   Dr. Mackey   • EYE SURGERY     • HERNIA REPAIR      Umbilical removal   • HYSTERECTOMY  1970   • OTHER SURGICAL HISTORY      bladder stimulator placement     pt cant have mri   • SUBTOTAL HYSTERECTOMY       Social History     Occupational History   • Not on file   Tobacco Use   • Smoking status: Never Smoker   • Smokeless tobacco: Never Used   Substance and Sexual Activity   • Alcohol use: No     Frequency: Never   • Drug use: No   • Sexual activity: Defer      Social History     Social History Narrative   • Not on file     Family History   Problem Relation Age of Onset   • Heart disease Mother    • Hypertension Mother    • Diabetes Father    • Heart disease Father    •  "Alcohol abuse Father    • Hypertension Father    • Diabetes Brother    • Heart disease Brother    • Cancer Brother 68        Prostate Cancer   • Hypertension Brother    • Diabetes Maternal Aunt    • Heart disease Maternal Grandmother    • Hypertension Maternal Grandmother    • Heart disease Maternal Grandfather    • Hypertension Maternal Grandfather    • Liver disease Paternal Grandmother    • Hypertension Paternal Grandmother    • Heart disease Paternal Grandfather    • Hypertension Paternal Grandfather    • Dementia Sister        Review of Systems        Wt Readings from Last 3 Encounters:   09/01/20 58.1 kg (128 lb)   08/20/20 57.2 kg (126 lb)   07/29/20 57.2 kg (126 lb)     Ht Readings from Last 3 Encounters:   09/01/20 152.4 cm (60\")   08/20/20 152.4 cm (60\")   07/29/20 152.4 cm (60\")     Body mass index is 25 kg/m².  Facility age limit for growth percentiles is 20 years.  There were no vitals filed for this visit.    Physical Exam    Musculoskeletal:    Bilateral knee (varus). Patient has crepitus throughout range of motion. Positive patellar grind test. Mild effusion. Lachman is negative. Pivot shift is negative. Anterior and posterior drawer signs are negative. Significant joint line tenderness is noted on the medial aspect of the knee. Patient has a varus orientation of the knee. There is fullness and tenderness in the Popliteal fossa. Mild distention of a Popliteal cyst is noted in this location. Range of motion in flexion is from 0-120 degrees. Neurovascular status is intact.  Dorsalis pedis and posterior tibial artery pulses are palpable. Common peroneal nerve function is well preserved. Patient's gait is cautious and antalgic. Skin and soft tissues are mildly swollen, consistent with synovitis and effusion. The patient has a significant limp with the first few steps after starting the gait cycle. Getting out of a chair takes a lot of effort due to pain on knee flexion. "       Diagnostics:      Procedure:  Large Joint Arthrocentesis: R knee  Date/Time: 9/1/2020 10:19 AM  Consent given by: patient  Site marked: site marked  Timeout: Immediately prior to procedure a time out was called to verify the correct patient, procedure, equipment, support staff and site/side marked as required   Supporting Documentation  Indications: pain   Procedure Details  Location: knee - R knee  Preparation: Patient was prepped and draped in the usual sterile fashion  Needle size: 25 G  Approach: anteromedial  Medications administered: 2 mL lidocaine 1 %; 160 mg methylPREDNISolone acetate 80 MG/ML  Patient tolerance: patient tolerated the procedure well with no immediate complications    Large Joint Arthrocentesis: L knee  Date/Time: 9/1/2020 10:20 AM  Consent given by: patient  Site marked: site marked  Timeout: Immediately prior to procedure a time out was called to verify the correct patient, procedure, equipment, support staff and site/side marked as required   Supporting Documentation  Indications: pain   Procedure Details  Location: knee - L knee  Preparation: Patient was prepped and draped in the usual sterile fashion  Needle size: 25 G  Approach: anteromedial  Medications administered: 2 mL lidocaine 1 %; 160 mg methylPREDNISolone acetate 80 MG/ML  Patient tolerance: patient tolerated the procedure well with no immediate complications            Assessment:   Katie was seen today for follow-up and follow-up.    Diagnoses and all orders for this visit:    Primary osteoarthritis of left knee  -     Large Joint Arthrocentesis: R knee  -     Large Joint Arthrocentesis: L knee    Primary osteoarthritis of right knee  -     Large Joint Arthrocentesis: R knee  -     Large Joint Arthrocentesis: L knee          Plan:   · Injected patient's bilateral knee joint(s)with steroid from an anteromedial approach   · Compression/brace   · Rest, ice, compression, and elevation (RICE) therapy  · OTC Alternate Ibuprofen  and Tylenol as needed  · Follow up in 3 month(s)    Date of encounter: 09/01/2020   Shawn Elaine MD

## 2020-09-23 DIAGNOSIS — F51.01 PRIMARY INSOMNIA: ICD-10-CM

## 2020-09-23 RX ORDER — TEMAZEPAM 30 MG/1
30 CAPSULE ORAL NIGHTLY PRN
Qty: 30 CAPSULE | Refills: 0 | Status: SHIPPED | OUTPATIENT
Start: 2020-09-23 | End: 2020-10-26 | Stop reason: HOSPADM

## 2020-09-23 RX ORDER — MECLIZINE HCL 12.5 MG/1
12.5 TABLET ORAL 3 TIMES DAILY PRN
Qty: 60 TABLET | Refills: 0 | Status: SHIPPED | OUTPATIENT
Start: 2020-09-23 | End: 2020-10-26 | Stop reason: HOSPADM

## 2020-09-25 RX ORDER — INCONTINENCE PAD,LINER,DISP
EACH MISCELLANEOUS
Qty: 172 EACH | Refills: 9 | Status: SHIPPED | OUTPATIENT
Start: 2020-09-25 | End: 2020-10-22

## 2020-09-25 NOTE — TELEPHONE ENCOUNTER
Last visit:  8/20/20  Next visit: 2/22/21  Last labs: 5/27/20    Rx requested: Incontinence supplies   Ph[facility] in [place]

## 2020-10-06 ENCOUNTER — HOSPITAL ENCOUNTER (OUTPATIENT)
Dept: BONE DENSITY | Facility: HOSPITAL | Age: 75
Discharge: HOME | End: 2020-10-06
Admitting: FAMILY MEDICINE

## 2020-10-06 DIAGNOSIS — M85.89 OSTEOPENIA OF MULTIPLE SITES: ICD-10-CM

## 2020-10-06 PROCEDURE — 77080 DXA BONE DENSITY AXIAL: CPT

## 2020-10-07 ENCOUNTER — TELEPHONE (OUTPATIENT)
Dept: FAMILY MEDICINE CLINIC | Facility: CLINIC | Age: 75
End: 2020-10-07

## 2020-10-07 NOTE — TELEPHONE ENCOUNTER
----- Message from Brooklyn Contreras DO sent at 10/6/2020  8:47 PM EDT -----  DEXA shows the Spine is a little thinner than last time but hips are the same-----Osteopenia; work on lt wts for upper body and cont walking for hip strength

## 2020-10-07 NOTE — TELEPHONE ENCOUNTER
Left message to return call     DEXA shows the Spine is a little thinner than last time but hips are the same-----Osteopenia; work on lt wts for upper body and cont walking for hip strength

## 2020-10-19 ENCOUNTER — HOSPITAL ENCOUNTER (EMERGENCY)
Facility: HOSPITAL | Age: 75
Discharge: HOME | End: 2020-10-19
Attending: EMERGENCY MEDICINE | Admitting: EMERGENCY MEDICINE

## 2020-10-19 ENCOUNTER — APPOINTMENT (OUTPATIENT)
Dept: GENERAL RADIOLOGY | Facility: HOSPITAL | Age: 75
End: 2020-10-19

## 2020-10-19 VITALS
BODY MASS INDEX: 22.66 KG/M2 | OXYGEN SATURATION: 99 % | DIASTOLIC BLOOD PRESSURE: 72 MMHG | HEART RATE: 70 BPM | WEIGHT: 127.87 LBS | RESPIRATION RATE: 19 BRPM | SYSTOLIC BLOOD PRESSURE: 170 MMHG | HEIGHT: 63 IN | TEMPERATURE: 97.7 F

## 2020-10-19 DIAGNOSIS — R53.1 WEAK: ICD-10-CM

## 2020-10-19 DIAGNOSIS — M79.10 MYALGIA: ICD-10-CM

## 2020-10-19 DIAGNOSIS — I10 ESSENTIAL HYPERTENSION: ICD-10-CM

## 2020-10-19 DIAGNOSIS — J40 BRONCHITIS: Primary | ICD-10-CM

## 2020-10-19 LAB
ALBUMIN SERPL-MCNC: 4.1 G/DL (ref 3.5–5.2)
ALBUMIN/GLOB SERPL: 1.6 G/DL
ALP SERPL-CCNC: 85 U/L (ref 39–117)
ALT SERPL W P-5'-P-CCNC: 14 U/L (ref 1–33)
ANION GAP SERPL CALCULATED.3IONS-SCNC: 11 MMOL/L (ref 5–15)
AST SERPL-CCNC: 26 U/L (ref 1–32)
B PARAPERT DNA SPEC QL NAA+PROBE: NOT DETECTED
B PERT DNA SPEC QL NAA+PROBE: NOT DETECTED
BASOPHILS # BLD AUTO: 0.1 10*3/MM3 (ref 0–0.2)
BASOPHILS NFR BLD AUTO: 1.2 % (ref 0–1.5)
BILIRUB SERPL-MCNC: 0.5 MG/DL (ref 0–1.2)
BUN SERPL-MCNC: 13 MG/DL (ref 8–23)
BUN/CREAT SERPL: 15.7 (ref 7–25)
C PNEUM DNA NPH QL NAA+NON-PROBE: NOT DETECTED
CALCIUM SPEC-SCNC: 9.4 MG/DL (ref 8.6–10.5)
CHLORIDE SERPL-SCNC: 99 MMOL/L (ref 98–107)
CO2 SERPL-SCNC: 26 MMOL/L (ref 22–29)
CREAT SERPL-MCNC: 0.83 MG/DL (ref 0.57–1)
DEPRECATED RDW RBC AUTO: 42.4 FL (ref 37–54)
EOSINOPHIL # BLD AUTO: 0.1 10*3/MM3 (ref 0–0.4)
EOSINOPHIL NFR BLD AUTO: 0.9 % (ref 0.3–6.2)
ERYTHROCYTE [DISTWIDTH] IN BLOOD BY AUTOMATED COUNT: 14.8 % (ref 12.3–15.4)
FLUAV H1 2009 PAND RNA NPH QL NAA+PROBE: NOT DETECTED
FLUAV H1 HA GENE NPH QL NAA+PROBE: NOT DETECTED
FLUAV H3 RNA NPH QL NAA+PROBE: NOT DETECTED
FLUAV SUBTYP SPEC NAA+PROBE: NOT DETECTED
FLUBV RNA ISLT QL NAA+PROBE: NOT DETECTED
GFR SERPL CREATININE-BSD FRML MDRD: 67 ML/MIN/1.73
GLOBULIN UR ELPH-MCNC: 2.6 GM/DL
GLUCOSE SERPL-MCNC: 84 MG/DL (ref 65–99)
HADV DNA SPEC NAA+PROBE: NOT DETECTED
HCOV 229E RNA SPEC QL NAA+PROBE: NOT DETECTED
HCOV HKU1 RNA SPEC QL NAA+PROBE: NOT DETECTED
HCOV NL63 RNA SPEC QL NAA+PROBE: NOT DETECTED
HCOV OC43 RNA SPEC QL NAA+PROBE: NOT DETECTED
HCT VFR BLD AUTO: 40.6 % (ref 34–46.6)
HGB BLD-MCNC: 13.5 G/DL (ref 12–15.9)
HMPV RNA NPH QL NAA+NON-PROBE: NOT DETECTED
HPIV1 RNA SPEC QL NAA+PROBE: NOT DETECTED
HPIV2 RNA SPEC QL NAA+PROBE: NOT DETECTED
HPIV3 RNA NPH QL NAA+PROBE: NOT DETECTED
HPIV4 P GENE NPH QL NAA+PROBE: NOT DETECTED
LYMPHOCYTES # BLD AUTO: 1.9 10*3/MM3 (ref 0.7–3.1)
LYMPHOCYTES NFR BLD AUTO: 27.6 % (ref 19.6–45.3)
M PNEUMO IGG SER IA-ACNC: NOT DETECTED
MCH RBC QN AUTO: 27.8 PG (ref 26.6–33)
MCHC RBC AUTO-ENTMCNC: 33.4 G/DL (ref 31.5–35.7)
MCV RBC AUTO: 83.4 FL (ref 79–97)
MONOCYTES # BLD AUTO: 0.7 10*3/MM3 (ref 0.1–0.9)
MONOCYTES NFR BLD AUTO: 9.5 % (ref 5–12)
NEUTROPHILS NFR BLD AUTO: 4.3 10*3/MM3 (ref 1.7–7)
NEUTROPHILS NFR BLD AUTO: 60.8 % (ref 42.7–76)
NRBC BLD AUTO-RTO: 0 /100 WBC (ref 0–0.2)
NT-PROBNP SERPL-MCNC: 68.9 PG/ML (ref 0–900)
PLATELET # BLD AUTO: 233 10*3/MM3 (ref 140–450)
PMV BLD AUTO: 6.2 FL (ref 6–12)
POTASSIUM SERPL-SCNC: 6 MMOL/L (ref 3.5–5.2)
PROT SERPL-MCNC: 6.7 G/DL (ref 6–8.5)
RBC # BLD AUTO: 4.87 10*6/MM3 (ref 3.77–5.28)
RHINOVIRUS RNA SPEC NAA+PROBE: NOT DETECTED
RSV RNA NPH QL NAA+NON-PROBE: NOT DETECTED
SARS-COV-2 RNA NPH QL NAA+NON-PROBE: NOT DETECTED
SODIUM SERPL-SCNC: 136 MMOL/L (ref 136–145)
WBC # BLD AUTO: 7 10*3/MM3 (ref 3.4–10.8)
WHOLE BLOOD HOLD SPECIMEN: NORMAL

## 2020-10-19 PROCEDURE — 93005 ELECTROCARDIOGRAM TRACING: CPT | Performed by: EMERGENCY MEDICINE

## 2020-10-19 PROCEDURE — 99283 EMERGENCY DEPT VISIT LOW MDM: CPT

## 2020-10-19 PROCEDURE — 71045 X-RAY EXAM CHEST 1 VIEW: CPT

## 2020-10-19 PROCEDURE — 80053 COMPREHEN METABOLIC PANEL: CPT | Performed by: EMERGENCY MEDICINE

## 2020-10-19 PROCEDURE — 85025 COMPLETE CBC W/AUTO DIFF WBC: CPT | Performed by: EMERGENCY MEDICINE

## 2020-10-19 PROCEDURE — 0202U NFCT DS 22 TRGT SARS-COV-2: CPT | Performed by: EMERGENCY MEDICINE

## 2020-10-19 PROCEDURE — 83880 ASSAY OF NATRIURETIC PEPTIDE: CPT | Performed by: EMERGENCY MEDICINE

## 2020-10-19 RX ORDER — AMLODIPINE BESYLATE 2.5 MG/1
2.5 TABLET ORAL DAILY
Qty: 30 TABLET | Refills: 0 | Status: ON HOLD | OUTPATIENT
Start: 2020-10-19 | End: 2020-11-13 | Stop reason: SDUPTHER

## 2020-10-19 RX ORDER — ALBUTEROL SULFATE 1.25 MG/3ML
1 SOLUTION RESPIRATORY (INHALATION) EVERY 6 HOURS PRN
Qty: 30 VIAL | Refills: 0 | Status: SHIPPED | OUTPATIENT
Start: 2020-10-19 | End: 2021-01-26 | Stop reason: SDUPTHER

## 2020-10-19 RX ORDER — SODIUM CHLORIDE 0.9 % (FLUSH) 0.9 %
10 SYRINGE (ML) INJECTION AS NEEDED
Status: DISCONTINUED | OUTPATIENT
Start: 2020-10-19 | End: 2020-10-19 | Stop reason: HOSPADM

## 2020-10-19 RX ORDER — METHYLPREDNISOLONE 4 MG/1
TABLET ORAL
Qty: 21 TABLET | Refills: 0 | Status: SHIPPED | OUTPATIENT
Start: 2020-10-19 | End: 2020-10-26 | Stop reason: HOSPADM

## 2020-10-20 ENCOUNTER — EPISODE CHANGES (OUTPATIENT)
Dept: CASE MANAGEMENT | Facility: OTHER | Age: 75
End: 2020-10-20

## 2020-10-21 ENCOUNTER — PATIENT OUTREACH (OUTPATIENT)
Dept: CASE MANAGEMENT | Facility: OTHER | Age: 75
End: 2020-10-21

## 2020-10-21 PROCEDURE — 87186 SC STD MICRODIL/AGAR DIL: CPT | Performed by: FAMILY MEDICINE

## 2020-10-21 PROCEDURE — 87088 URINE BACTERIA CULTURE: CPT | Performed by: FAMILY MEDICINE

## 2020-10-21 PROCEDURE — 87086 URINE CULTURE/COLONY COUNT: CPT | Performed by: FAMILY MEDICINE

## 2020-10-21 RX ORDER — SULFAMETHOXAZOLE AND TRIMETHOPRIM 800; 160 MG/1; MG/1
1 TABLET ORAL 2 TIMES DAILY
Qty: 14 TABLET | Refills: 0 | Status: SHIPPED | OUTPATIENT
Start: 2020-10-21 | End: 2020-10-26 | Stop reason: HOSPADM

## 2020-10-21 NOTE — OUTREACH NOTE
"Care Coordination Assessment    Documented/Reviewed By: Nicole Li RN Date/time: 10/21/2020 10:51 AM   Assessment completed with: patient  Living arrangement: alone  Support system: neighbors  Type of residence: apartment  Equipment used at home: oxygen/respiratory treatment  Bed or wheelchair confined: No  Inadequate nutrition: No  Medication adherence problem: No  Experiencing side effects from current medications: No  History of fall(s) in last 6 months: Yes (Comment: two falls in the last six months, last was one week ago.  D/T incontinence)  Difficulty keeping appointments: No  Chronic pain: Yes  Location of chronic pain: cervical, radiates to right jaw & eye; sees pain management  Chronic pain timing: constant  Chronic pain severity: 8  Limitation of routine activities due to chronic pain: severe       Care Plan Note      Responses   Annual Wellness Visit:   Patient Will Schedule [AWV scheduled for Feb 2021]   Care Gaps Addressed  Flu Shot   Flu Shot Status  Up to Date   Flu Shot Completion at [facility] or Other  Other   Other Location  pt reports had flu shot in Sept at [facility] in [place]   Specific Disease Process Teaching  COPD, Hypertension   Other Patient Education/Resources   24/7 [facility] Nurse Call Line, [place] [pt declined covid hotline #]   24/7 Nurse Call Line Education Method  Verbal   MyChart Education Method  Verbal [declined]   Does patient have depression diagnosis?  No [HX anxiety]   Advanced Directives:  Send Materials   Ed Visits past 12 months:  2 or 3   Hospitalizations past 12 months  1   Goal Progress  Making Progress Toward Goal(s)        The main concerns and/or symptoms the patient would like to address are:  Dysuria: c/o burning w/ urination; Started ED bronchitis RXs this AM, \"feel like crap\", weak. Chronic dizziness conts & incontinence causes her to hurry, increasing fall risk.  Fell last week on her knee, no other injury, considering asking PCP for rolling walker with " seat during ED f/u appt.  She declined scheduling assistance, but states she will call PCP today to schedule f/u.    RN-ACM follow-up contact completed - see assessment & care plan flow sheets for additional details:    Talked with patient.  Discussed 10/19/20 ED visit regarding DX bronchitis.  Patient states to be compliant with ED recommendations & states symptoms stable - only just started methylpred pack, amlodipine & albuterol nebulizer today. Pt c/o subjective fever this AM, nausea no vomiting. Chronic HA/ear ringing/dizziness conts. States cough & congestion have improved a little. Denies diarrhea today, but does c/o burning urination since Monday.  Denies chest pain, palps, worsening SOA, bowel complaints. Feels a little dehydrated but is eating/drinking fair - consciously pushing good hydration.  Has zofran prn RX.  C/O chronic constant cervical/R jaw/R ear pain not well controlled with meds - pain scale 8/10.  Has pain management appt 11/3/20.  She hopes amlodipine may help ease HAs & ear ringing.    Pt states she lives alone in [place] [employer], largely a senior community, and has a close neighbor that helps when needed/drives her to appts. Denies insecurities re affording/obtaining food, medication, transportation.  Goes to grocery ~ 2x/mo - otherwise is sheltering in place and practicing precautions during pandemic.    Denies other immediate issues/concerns. Addressed questions re DC instructions/medications.  Pt states will schedule PCP ED f/up appt. Other f/up appts confirmed: 11/3/20 pain management (cervical injections), 12/1/20 ortho (knee injections), 2/22/21 AWV,    Education/instruction provided by Care Coordinator: Reviewed with patient (see assessment & care plan flow sheets for additional details): RN-ACM role & contact info given; F/up appts & discharge AVS; importance of PCP/specialty appts & Medicare AWV; ACP status, health maintenance gaps,  24-hr nurse triage #, [place].  Education given re medication use and precautions, safety/fall precautions - pt verb understanding.   Patient working well towards Care Plan goals and exhibits sense of health self-management and adequate support system.      Follow Up Outreach Due: as needed.    Nicole Li RN  Ambulatory     10/21/2020, 11:04 EDT

## 2020-10-22 ENCOUNTER — APPOINTMENT (OUTPATIENT)
Dept: CT IMAGING | Facility: HOSPITAL | Age: 75
End: 2020-10-22

## 2020-10-22 ENCOUNTER — HOSPITAL ENCOUNTER (INPATIENT)
Facility: HOSPITAL | Age: 75
LOS: 4 days | Discharge: NURSING, REHAB OR LONG-TERM CARE | End: 2020-10-26
Attending: INTERNAL MEDICINE | Admitting: INTERNAL MEDICINE

## 2020-10-22 DIAGNOSIS — I62.9 INTRACRANIAL HEMORRHAGE (HCC): Primary | ICD-10-CM

## 2020-10-22 DIAGNOSIS — N30.90 CYSTITIS: ICD-10-CM

## 2020-10-22 DIAGNOSIS — G44.319 ACUTE POST-TRAUMATIC HEADACHE, NOT INTRACTABLE: ICD-10-CM

## 2020-10-22 DIAGNOSIS — R51.9 ACUTE INTRACTABLE HEADACHE, UNSPECIFIED HEADACHE TYPE: ICD-10-CM

## 2020-10-22 DIAGNOSIS — R11.2 INTRACTABLE VOMITING WITH NAUSEA, UNSPECIFIED VOMITING TYPE: ICD-10-CM

## 2020-10-22 PROBLEM — N39.0 UTI (URINARY TRACT INFECTION): Status: ACTIVE | Noted: 2020-10-22
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
## 2020-10-22 PROBLEM — I10 HTN (HYPERTENSION): Status: ACTIVE | Noted: 2020-10-22

## 2020-10-22 LAB
ALBUMIN SERPL-MCNC: 4.4 G/DL (ref 3.5–5.2)
ALBUMIN/GLOB SERPL: 1.7 G/DL
ALP SERPL-CCNC: 90 U/L (ref 39–117)
ALT SERPL W P-5'-P-CCNC: 13 U/L (ref 1–33)
ANION GAP SERPL CALCULATED.3IONS-SCNC: 17 MMOL/L (ref 5–15)
AST SERPL-CCNC: 20 U/L (ref 1–32)
BACTERIA UR QL AUTO: ABNORMAL /HPF
BASOPHILS # BLD AUTO: 0.1 10*3/MM3 (ref 0–0.2)
BASOPHILS NFR BLD AUTO: 0.5 % (ref 0–1.5)
BILIRUB SERPL-MCNC: 0.7 MG/DL (ref 0–1.2)
BILIRUB UR QL STRIP: NEGATIVE
BUN SERPL-MCNC: 17 MG/DL (ref 8–23)
BUN/CREAT SERPL: 30.4 (ref 7–25)
CALCIUM SPEC-SCNC: 9.9 MG/DL (ref 8.6–10.5)
CHLORIDE SERPL-SCNC: 92 MMOL/L (ref 98–107)
CLARITY UR: ABNORMAL
CO2 SERPL-SCNC: 21 MMOL/L (ref 22–29)
COLOR UR: YELLOW
CREAT SERPL-MCNC: 0.56 MG/DL (ref 0.57–1)
DEPRECATED RDW RBC AUTO: 40.7 FL (ref 37–54)
EOSINOPHIL # BLD AUTO: 0 10*3/MM3 (ref 0–0.4)
EOSINOPHIL NFR BLD AUTO: 0 % (ref 0.3–6.2)
ERYTHROCYTE [DISTWIDTH] IN BLOOD BY AUTOMATED COUNT: 14 % (ref 12.3–15.4)
GFR SERPL CREATININE-BSD FRML MDRD: 106 ML/MIN/1.73
GLOBULIN UR ELPH-MCNC: 2.6 GM/DL
GLUCOSE SERPL-MCNC: 136 MG/DL (ref 65–99)
GLUCOSE UR STRIP-MCNC: NEGATIVE MG/DL
HCT VFR BLD AUTO: 38.9 % (ref 34–46.6)
HGB BLD-MCNC: 12.9 G/DL (ref 12–15.9)
HGB UR QL STRIP.AUTO: ABNORMAL
HOLD SPECIMEN: NORMAL
HOLD SPECIMEN: NORMAL
HYALINE CASTS UR QL AUTO: ABNORMAL /LPF
KETONES UR QL STRIP: ABNORMAL
LEUKOCYTE ESTERASE UR QL STRIP.AUTO: ABNORMAL
LIPASE SERPL-CCNC: 14 U/L (ref 13–60)
LYMPHOCYTES # BLD AUTO: 1 10*3/MM3 (ref 0.7–3.1)
LYMPHOCYTES NFR BLD AUTO: 8.8 % (ref 19.6–45.3)
MCH RBC QN AUTO: 27.5 PG (ref 26.6–33)
MCHC RBC AUTO-ENTMCNC: 33.2 G/DL (ref 31.5–35.7)
MCV RBC AUTO: 82.9 FL (ref 79–97)
MONOCYTES # BLD AUTO: 0.8 10*3/MM3 (ref 0.1–0.9)
MONOCYTES NFR BLD AUTO: 6.7 % (ref 5–12)
NEUTROPHILS NFR BLD AUTO: 84 % (ref 42.7–76)
NEUTROPHILS NFR BLD AUTO: 9.4 10*3/MM3 (ref 1.7–7)
NITRITE UR QL STRIP: NEGATIVE
NRBC BLD AUTO-RTO: 0.1 /100 WBC (ref 0–0.2)
PH UR STRIP.AUTO: 5.5 [PH] (ref 5–8)
PLATELET # BLD AUTO: 263 10*3/MM3 (ref 140–450)
PMV BLD AUTO: 6.4 FL (ref 6–12)
POTASSIUM SERPL-SCNC: 3.7 MMOL/L (ref 3.5–5.2)
PROT SERPL-MCNC: 7 G/DL (ref 6–8.5)
PROT UR QL STRIP: ABNORMAL
RBC # BLD AUTO: 4.69 10*6/MM3 (ref 3.77–5.28)
RBC # UR: ABNORMAL /HPF
REF LAB TEST METHOD: ABNORMAL
SARS-COV-2 RNA PNL SPEC NAA+PROBE: NOT DETECTED
SODIUM SERPL-SCNC: 130 MMOL/L (ref 136–145)
SP GR UR STRIP: 1.02 (ref 1–1.03)
SQUAMOUS #/AREA URNS HPF: ABNORMAL /HPF
UROBILINOGEN UR QL STRIP: ABNORMAL
WBC # BLD AUTO: 11.2 10*3/MM3 (ref 3.4–10.8)
WBC UR QL AUTO: ABNORMAL /HPF
WHOLE BLOOD HOLD SPECIMEN: NORMAL
WHOLE BLOOD HOLD SPECIMEN: NORMAL

## 2020-10-22 PROCEDURE — 25010000002 MORPHINE PER 10 MG: Performed by: NURSE PRACTITIONER

## 2020-10-22 PROCEDURE — 87086 URINE CULTURE/COLONY COUNT: CPT | Performed by: NURSE PRACTITIONER

## 2020-10-22 PROCEDURE — 25010000002 CEFTRIAXONE PER 250 MG: Performed by: NURSE PRACTITIONER

## 2020-10-22 PROCEDURE — 85025 COMPLETE CBC W/AUTO DIFF WBC: CPT | Performed by: NURSE PRACTITIONER

## 2020-10-22 PROCEDURE — 80053 COMPREHEN METABOLIC PANEL: CPT | Performed by: NURSE PRACTITIONER

## 2020-10-22 PROCEDURE — 93005 ELECTROCARDIOGRAM TRACING: CPT | Performed by: NURSE PRACTITIONER

## 2020-10-22 PROCEDURE — 25010000002 ONDANSETRON PER 1 MG: Performed by: NURSE PRACTITIONER

## 2020-10-22 PROCEDURE — 83690 ASSAY OF LIPASE: CPT | Performed by: NURSE PRACTITIONER

## 2020-10-22 PROCEDURE — 87077 CULTURE AEROBIC IDENTIFY: CPT | Performed by: NURSE PRACTITIONER

## 2020-10-22 PROCEDURE — 70450 CT HEAD/BRAIN W/O DYE: CPT

## 2020-10-22 PROCEDURE — 99284 EMERGENCY DEPT VISIT MOD MDM: CPT

## 2020-10-22 PROCEDURE — 81001 URINALYSIS AUTO W/SCOPE: CPT | Performed by: NURSE PRACTITIONER

## 2020-10-22 PROCEDURE — P9612 CATHETERIZE FOR URINE SPEC: HCPCS

## 2020-10-22 PROCEDURE — 87635 SARS-COV-2 COVID-19 AMP PRB: CPT | Performed by: NURSE PRACTITIONER

## 2020-10-22 PROCEDURE — 87186 SC STD MICRODIL/AGAR DIL: CPT | Performed by: NURSE PRACTITIONER

## 2020-10-22 PROCEDURE — 63710000001 ONDANSETRON ODT 4 MG TABLET DISPERSIBLE: Performed by: NURSE PRACTITIONER

## 2020-10-22 RX ORDER — ONDANSETRON 2 MG/ML
4 INJECTION INTRAMUSCULAR; INTRAVENOUS EVERY 6 HOURS PRN
Status: DISCONTINUED | OUTPATIENT
Start: 2020-10-22 | End: 2020-10-26 | Stop reason: HOSPADM

## 2020-10-22 RX ORDER — ACETAMINOPHEN 325 MG/1
650 TABLET ORAL EVERY 4 HOURS PRN
Status: DISCONTINUED | OUTPATIENT
Start: 2020-10-22 | End: 2020-10-26 | Stop reason: HOSPADM

## 2020-10-22 RX ORDER — ONDANSETRON 4 MG/1
4 TABLET, FILM COATED ORAL EVERY 6 HOURS PRN
Status: DISCONTINUED | OUTPATIENT
Start: 2020-10-22 | End: 2020-10-26 | Stop reason: HOSPADM

## 2020-10-22 RX ORDER — SODIUM CHLORIDE 0.9 % (FLUSH) 0.9 %
10 SYRINGE (ML) INJECTION AS NEEDED
Status: DISCONTINUED | OUTPATIENT
Start: 2020-10-22 | End: 2020-10-26 | Stop reason: HOSPADM

## 2020-10-22 RX ORDER — ACETAMINOPHEN 650 MG/1
650 SUPPOSITORY RECTAL EVERY 4 HOURS PRN
Status: DISCONTINUED | OUTPATIENT
Start: 2020-10-22 | End: 2020-10-26 | Stop reason: HOSPADM

## 2020-10-22 RX ORDER — SODIUM CHLORIDE 0.9 % (FLUSH) 0.9 %
10 SYRINGE (ML) INJECTION EVERY 12 HOURS SCHEDULED
Status: DISCONTINUED | OUTPATIENT
Start: 2020-10-22 | End: 2020-10-26 | Stop reason: HOSPADM

## 2020-10-22 RX ORDER — MORPHINE SULFATE 4 MG/ML
1 INJECTION, SOLUTION INTRAMUSCULAR; INTRAVENOUS ONCE
Status: COMPLETED | OUTPATIENT
Start: 2020-10-22 | End: 2020-10-22

## 2020-10-22 RX ORDER — MORPHINE SULFATE 4 MG/ML
4 INJECTION, SOLUTION INTRAMUSCULAR; INTRAVENOUS ONCE
Status: COMPLETED | OUTPATIENT
Start: 2020-10-22 | End: 2020-10-22

## 2020-10-22 RX ORDER — ONDANSETRON 2 MG/ML
4 INJECTION INTRAMUSCULAR; INTRAVENOUS ONCE
Status: COMPLETED | OUTPATIENT
Start: 2020-10-22 | End: 2020-10-22

## 2020-10-22 RX ORDER — PANTOPRAZOLE SODIUM 40 MG/10ML
40 INJECTION, POWDER, LYOPHILIZED, FOR SOLUTION INTRAVENOUS
Status: DISCONTINUED | OUTPATIENT
Start: 2020-10-23 | End: 2020-10-24

## 2020-10-22 RX ORDER — ONDANSETRON 2 MG/ML
4 INJECTION INTRAMUSCULAR; INTRAVENOUS EVERY 6 HOURS PRN
Status: DISCONTINUED | OUTPATIENT
Start: 2020-10-22 | End: 2020-10-22

## 2020-10-22 RX ORDER — ACETAMINOPHEN 160 MG/5ML
650 SOLUTION ORAL EVERY 4 HOURS PRN
Status: DISCONTINUED | OUTPATIENT
Start: 2020-10-22 | End: 2020-10-26 | Stop reason: HOSPADM

## 2020-10-22 RX ORDER — ONDANSETRON 4 MG/1
4 TABLET, ORALLY DISINTEGRATING ORAL ONCE
Status: COMPLETED | OUTPATIENT
Start: 2020-10-22 | End: 2020-10-22

## 2020-10-22 RX ADMIN — SODIUM CHLORIDE 5 MG/HR: 9 INJECTION, SOLUTION INTRAVENOUS at 20:41

## 2020-10-22 RX ADMIN — SODIUM CHLORIDE 1000 ML: 0.9 INJECTION, SOLUTION INTRAVENOUS at 15:14

## 2020-10-22 RX ADMIN — CEFTRIAXONE SODIUM 1 G: 10 INJECTION, POWDER, FOR SOLUTION INTRAVENOUS at 15:13

## 2020-10-22 RX ADMIN — MORPHINE SULFATE 4 MG: 4 INJECTION INTRAVENOUS at 15:13

## 2020-10-22 RX ADMIN — ONDANSETRON 4 MG: 4 TABLET, ORALLY DISINTEGRATING ORAL at 14:22

## 2020-10-22 RX ADMIN — ONDANSETRON HYDROCHLORIDE 4 MG: 2 SOLUTION INTRAMUSCULAR; INTRAVENOUS at 15:04

## 2020-10-22 RX ADMIN — MORPHINE SULFATE 1 MG: 4 INJECTION INTRAVENOUS at 17:56

## 2020-10-22 RX ADMIN — Medication 10 ML: at 20:41

## 2020-10-22 RX ADMIN — ACETAMINOPHEN 650 MG: 325 TABLET, FILM COATED ORAL at 21:44

## 2020-10-22 RX ADMIN — ONDANSETRON 4 MG: 2 INJECTION INTRAMUSCULAR; INTRAVENOUS at 20:50

## 2020-10-22 RX ADMIN — SODIUM CHLORIDE 5 MG/HR: 9 INJECTION, SOLUTION INTRAVENOUS at 15:12

## 2020-10-23 ENCOUNTER — APPOINTMENT (OUTPATIENT)
Dept: MRI IMAGING | Facility: HOSPITAL | Age: 75
End: 2020-10-23

## 2020-10-23 ENCOUNTER — APPOINTMENT (OUTPATIENT)
Dept: CT IMAGING | Facility: HOSPITAL | Age: 75
End: 2020-10-23

## 2020-10-23 LAB
ANION GAP SERPL CALCULATED.3IONS-SCNC: 14 MMOL/L (ref 5–15)
BASOPHILS # BLD AUTO: 0 10*3/MM3 (ref 0–0.2)
BASOPHILS NFR BLD AUTO: 0.3 % (ref 0–1.5)
BUN SERPL-MCNC: 21 MG/DL (ref 8–23)
BUN/CREAT SERPL: 30 (ref 7–25)
CALCIUM SPEC-SCNC: 9.2 MG/DL (ref 8.6–10.5)
CHLORIDE SERPL-SCNC: 98 MMOL/L (ref 98–107)
CO2 SERPL-SCNC: 24 MMOL/L (ref 22–29)
CREAT SERPL-MCNC: 0.7 MG/DL (ref 0.57–1)
DEPRECATED RDW RBC AUTO: 40.7 FL (ref 37–54)
EOSINOPHIL # BLD AUTO: 0 10*3/MM3 (ref 0–0.4)
EOSINOPHIL NFR BLD AUTO: 0.1 % (ref 0.3–6.2)
ERYTHROCYTE [DISTWIDTH] IN BLOOD BY AUTOMATED COUNT: 14.3 % (ref 12.3–15.4)
GFR SERPL CREATININE-BSD FRML MDRD: 82 ML/MIN/1.73
GLUCOSE BLDC GLUCOMTR-MCNC: 109 MG/DL (ref 70–105)
GLUCOSE BLDC GLUCOMTR-MCNC: 124 MG/DL (ref 70–105)
GLUCOSE SERPL-MCNC: 106 MG/DL (ref 65–99)
HCT VFR BLD AUTO: 36.4 % (ref 34–46.6)
HGB BLD-MCNC: 12.3 G/DL (ref 12–15.9)
LYMPHOCYTES # BLD AUTO: 1.2 10*3/MM3 (ref 0.7–3.1)
LYMPHOCYTES NFR BLD AUTO: 11.8 % (ref 19.6–45.3)
MAGNESIUM SERPL-MCNC: 1.9 MG/DL (ref 1.6–2.4)
MCH RBC QN AUTO: 27.8 PG (ref 26.6–33)
MCHC RBC AUTO-ENTMCNC: 33.7 G/DL (ref 31.5–35.7)
MCV RBC AUTO: 82.3 FL (ref 79–97)
MONOCYTES # BLD AUTO: 1.1 10*3/MM3 (ref 0.1–0.9)
MONOCYTES NFR BLD AUTO: 11.1 % (ref 5–12)
NEUTROPHILS NFR BLD AUTO: 7.8 10*3/MM3 (ref 1.7–7)
NEUTROPHILS NFR BLD AUTO: 76.7 % (ref 42.7–76)
NRBC BLD AUTO-RTO: 0 /100 WBC (ref 0–0.2)
PHOSPHATE SERPL-MCNC: 2.8 MG/DL (ref 2.5–4.5)
PLATELET # BLD AUTO: 215 10*3/MM3 (ref 140–450)
PMV BLD AUTO: 6.2 FL (ref 6–12)
POTASSIUM SERPL-SCNC: 3.6 MMOL/L (ref 3.5–5.2)
RBC # BLD AUTO: 4.42 10*6/MM3 (ref 3.77–5.28)
SODIUM SERPL-SCNC: 136 MMOL/L (ref 136–145)
WBC # BLD AUTO: 10.2 10*3/MM3 (ref 3.4–10.8)

## 2020-10-23 PROCEDURE — 97535 SELF CARE MNGMENT TRAINING: CPT

## 2020-10-23 PROCEDURE — 97166 OT EVAL MOD COMPLEX 45 MIN: CPT

## 2020-10-23 PROCEDURE — 80048 BASIC METABOLIC PNL TOTAL CA: CPT | Performed by: NURSE PRACTITIONER

## 2020-10-23 PROCEDURE — 25010000002 MORPHINE PER 10 MG: Performed by: NURSE PRACTITIONER

## 2020-10-23 PROCEDURE — 82962 GLUCOSE BLOOD TEST: CPT

## 2020-10-23 PROCEDURE — 0 IOPAMIDOL PER 1 ML: Performed by: INTERNAL MEDICINE

## 2020-10-23 PROCEDURE — 25010000002 CEFTRIAXONE PER 250 MG: Performed by: NURSE PRACTITIONER

## 2020-10-23 PROCEDURE — 85025 COMPLETE CBC W/AUTO DIFF WBC: CPT | Performed by: NURSE PRACTITIONER

## 2020-10-23 PROCEDURE — 25010000002 ONDANSETRON PER 1 MG: Performed by: NURSE PRACTITIONER

## 2020-10-23 PROCEDURE — 97163 PT EVAL HIGH COMPLEX 45 MIN: CPT

## 2020-10-23 PROCEDURE — 97116 GAIT TRAINING THERAPY: CPT

## 2020-10-23 PROCEDURE — 84100 ASSAY OF PHOSPHORUS: CPT | Performed by: NURSE PRACTITIONER

## 2020-10-23 PROCEDURE — 70470 CT HEAD/BRAIN W/O & W/DYE: CPT

## 2020-10-23 PROCEDURE — 83735 ASSAY OF MAGNESIUM: CPT | Performed by: NURSE PRACTITIONER

## 2020-10-23 RX ORDER — AMITRIPTYLINE HYDROCHLORIDE 25 MG/1
25 TABLET, FILM COATED ORAL NIGHTLY
Status: DISCONTINUED | OUTPATIENT
Start: 2020-10-23 | End: 2020-10-26 | Stop reason: HOSPADM

## 2020-10-23 RX ORDER — PREGABALIN 100 MG/1
100 CAPSULE ORAL EVERY 12 HOURS SCHEDULED
Status: DISCONTINUED | OUTPATIENT
Start: 2020-10-23 | End: 2020-10-26 | Stop reason: HOSPADM

## 2020-10-23 RX ORDER — MORPHINE SULFATE 4 MG/ML
1 INJECTION, SOLUTION INTRAMUSCULAR; INTRAVENOUS EVERY 4 HOURS PRN
Status: DISCONTINUED | OUTPATIENT
Start: 2020-10-23 | End: 2020-10-23

## 2020-10-23 RX ORDER — MORPHINE SULFATE 4 MG/ML
1 INJECTION, SOLUTION INTRAMUSCULAR; INTRAVENOUS
Status: DISCONTINUED | OUTPATIENT
Start: 2020-10-23 | End: 2020-10-26 | Stop reason: HOSPADM

## 2020-10-23 RX ADMIN — CEFTRIAXONE SODIUM 1 G: 1 INJECTION, POWDER, FOR SOLUTION INTRAMUSCULAR; INTRAVENOUS at 15:23

## 2020-10-23 RX ADMIN — Medication 10 ML: at 08:46

## 2020-10-23 RX ADMIN — MORPHINE SULFATE 1 MG: 4 INJECTION INTRAVENOUS at 22:26

## 2020-10-23 RX ADMIN — ONDANSETRON 4 MG: 2 INJECTION INTRAMUSCULAR; INTRAVENOUS at 05:17

## 2020-10-23 RX ADMIN — IOPAMIDOL 50 ML: 755 INJECTION, SOLUTION INTRAVENOUS at 11:45

## 2020-10-23 RX ADMIN — AMITRIPTYLINE HYDROCHLORIDE 25 MG: 50 TABLET, FILM COATED ORAL at 20:15

## 2020-10-23 RX ADMIN — PREGABALIN 100 MG: 100 CAPSULE ORAL at 08:45

## 2020-10-23 RX ADMIN — MORPHINE SULFATE 1 MG: 4 INJECTION INTRAVENOUS at 15:22

## 2020-10-23 RX ADMIN — Medication 10 ML: at 20:15

## 2020-10-23 RX ADMIN — MORPHINE SULFATE 1 MG: 4 INJECTION INTRAVENOUS at 03:41

## 2020-10-23 RX ADMIN — PREGABALIN 100 MG: 100 CAPSULE ORAL at 20:15

## 2020-10-23 RX ADMIN — PANTOPRAZOLE SODIUM 40 MG: 40 INJECTION, POWDER, FOR SOLUTION INTRAVENOUS at 05:10

## 2020-10-23 RX ADMIN — SODIUM CHLORIDE 5 MG/HR: 9 INJECTION, SOLUTION INTRAVENOUS at 08:44

## 2020-10-23 RX ADMIN — SODIUM CHLORIDE 7 MG/HR: 9 INJECTION, SOLUTION INTRAVENOUS at 03:21

## 2020-10-23 RX ADMIN — MORPHINE SULFATE 1 MG: 4 INJECTION INTRAVENOUS at 06:54

## 2020-10-23 RX ADMIN — MORPHINE SULFATE 1 MG: 4 INJECTION INTRAVENOUS at 00:57

## 2020-10-23 RX ADMIN — SODIUM CHLORIDE 7 MG/HR: 9 INJECTION, SOLUTION INTRAVENOUS at 00:24

## 2020-10-24 LAB
ANION GAP SERPL CALCULATED.3IONS-SCNC: 14 MMOL/L (ref 5–15)
BACTERIA SPEC AEROBE CULT: ABNORMAL
BASOPHILS # BLD AUTO: 0 10*3/MM3 (ref 0–0.2)
BASOPHILS NFR BLD AUTO: 0.6 % (ref 0–1.5)
BUN SERPL-MCNC: 22 MG/DL (ref 8–23)
BUN/CREAT SERPL: 34.4 (ref 7–25)
CALCIUM SPEC-SCNC: 9.3 MG/DL (ref 8.6–10.5)
CHLORIDE SERPL-SCNC: 100 MMOL/L (ref 98–107)
CO2 SERPL-SCNC: 19 MMOL/L (ref 22–29)
CREAT SERPL-MCNC: 0.64 MG/DL (ref 0.57–1)
DEPRECATED RDW RBC AUTO: 44.6 FL (ref 37–54)
EOSINOPHIL # BLD AUTO: 0 10*3/MM3 (ref 0–0.4)
EOSINOPHIL NFR BLD AUTO: 0.3 % (ref 0.3–6.2)
ERYTHROCYTE [DISTWIDTH] IN BLOOD BY AUTOMATED COUNT: 14.7 % (ref 12.3–15.4)
GFR SERPL CREATININE-BSD FRML MDRD: 91 ML/MIN/1.73
GLUCOSE BLDC GLUCOMTR-MCNC: 75 MG/DL (ref 70–105)
GLUCOSE SERPL-MCNC: 76 MG/DL (ref 65–99)
HCT VFR BLD AUTO: 40.1 % (ref 34–46.6)
HGB BLD-MCNC: 13 G/DL (ref 12–15.9)
LYMPHOCYTES # BLD AUTO: 1.9 10*3/MM3 (ref 0.7–3.1)
LYMPHOCYTES NFR BLD AUTO: 24.9 % (ref 19.6–45.3)
MAGNESIUM SERPL-MCNC: 2 MG/DL (ref 1.6–2.4)
MCH RBC QN AUTO: 27.9 PG (ref 26.6–33)
MCHC RBC AUTO-ENTMCNC: 32.3 G/DL (ref 31.5–35.7)
MCV RBC AUTO: 86.4 FL (ref 79–97)
MONOCYTES # BLD AUTO: 0.9 10*3/MM3 (ref 0.1–0.9)
MONOCYTES NFR BLD AUTO: 11.3 % (ref 5–12)
NEUTROPHILS NFR BLD AUTO: 4.9 10*3/MM3 (ref 1.7–7)
NEUTROPHILS NFR BLD AUTO: 62.9 % (ref 42.7–76)
NRBC BLD AUTO-RTO: 0.1 /100 WBC (ref 0–0.2)
PHOSPHATE SERPL-MCNC: 2.4 MG/DL (ref 2.5–4.5)
PLATELET # BLD AUTO: 210 10*3/MM3 (ref 140–450)
PMV BLD AUTO: 6.2 FL (ref 6–12)
POTASSIUM SERPL-SCNC: 3.7 MMOL/L (ref 3.5–5.2)
RBC # BLD AUTO: 4.65 10*6/MM3 (ref 3.77–5.28)
SODIUM SERPL-SCNC: 133 MMOL/L (ref 136–145)
WBC # BLD AUTO: 7.8 10*3/MM3 (ref 3.4–10.8)

## 2020-10-24 PROCEDURE — 80048 BASIC METABOLIC PNL TOTAL CA: CPT | Performed by: NURSE PRACTITIONER

## 2020-10-24 PROCEDURE — 97116 GAIT TRAINING THERAPY: CPT

## 2020-10-24 PROCEDURE — 97112 NEUROMUSCULAR REEDUCATION: CPT

## 2020-10-24 PROCEDURE — 25010000002 CEFTRIAXONE PER 250 MG: Performed by: NURSE PRACTITIONER

## 2020-10-24 PROCEDURE — 25010000002 MORPHINE PER 10 MG: Performed by: NURSE PRACTITIONER

## 2020-10-24 PROCEDURE — 84100 ASSAY OF PHOSPHORUS: CPT | Performed by: NURSE PRACTITIONER

## 2020-10-24 PROCEDURE — 99231 SBSQ HOSP IP/OBS SF/LOW 25: CPT | Performed by: NEUROLOGICAL SURGERY

## 2020-10-24 PROCEDURE — 83735 ASSAY OF MAGNESIUM: CPT | Performed by: NURSE PRACTITIONER

## 2020-10-24 PROCEDURE — 85025 COMPLETE CBC W/AUTO DIFF WBC: CPT | Performed by: NURSE PRACTITIONER

## 2020-10-24 PROCEDURE — 82962 GLUCOSE BLOOD TEST: CPT

## 2020-10-24 RX ADMIN — PREGABALIN 100 MG: 100 CAPSULE ORAL at 21:23

## 2020-10-24 RX ADMIN — Medication 10 ML: at 09:16

## 2020-10-24 RX ADMIN — PANTOPRAZOLE SODIUM 40 MG: 40 INJECTION, POWDER, FOR SOLUTION INTRAVENOUS at 05:04

## 2020-10-24 RX ADMIN — PREGABALIN 100 MG: 100 CAPSULE ORAL at 09:16

## 2020-10-24 RX ADMIN — MORPHINE SULFATE 1 MG: 4 INJECTION INTRAVENOUS at 12:57

## 2020-10-24 RX ADMIN — MORPHINE SULFATE 1 MG: 4 INJECTION INTRAVENOUS at 22:15

## 2020-10-24 RX ADMIN — CEFTRIAXONE SODIUM 1 G: 1 INJECTION, POWDER, FOR SOLUTION INTRAMUSCULAR; INTRAVENOUS at 16:14

## 2020-10-24 RX ADMIN — Medication 10 ML: at 21:20

## 2020-10-24 RX ADMIN — MORPHINE SULFATE 1 MG: 4 INJECTION INTRAVENOUS at 09:16

## 2020-10-24 RX ADMIN — AMITRIPTYLINE HYDROCHLORIDE 25 MG: 50 TABLET, FILM COATED ORAL at 21:22

## 2020-10-24 RX ADMIN — ACETAMINOPHEN 650 MG: 325 TABLET, FILM COATED ORAL at 09:16

## 2020-10-25 ENCOUNTER — APPOINTMENT (OUTPATIENT)
Dept: GENERAL RADIOLOGY | Facility: HOSPITAL | Age: 75
End: 2020-10-25

## 2020-10-25 LAB
ANION GAP SERPL CALCULATED.3IONS-SCNC: 9 MMOL/L (ref 5–15)
BASOPHILS # BLD AUTO: 0.1 10*3/MM3 (ref 0–0.2)
BASOPHILS NFR BLD AUTO: 1.2 % (ref 0–1.5)
BUN SERPL-MCNC: 20 MG/DL (ref 8–23)
BUN/CREAT SERPL: 23 (ref 7–25)
CALCIUM SPEC-SCNC: 9.1 MG/DL (ref 8.6–10.5)
CHLORIDE SERPL-SCNC: 102 MMOL/L (ref 98–107)
CO2 SERPL-SCNC: 27 MMOL/L (ref 22–29)
CREAT SERPL-MCNC: 0.87 MG/DL (ref 0.57–1)
DEPRECATED RDW RBC AUTO: 42.4 FL (ref 37–54)
EOSINOPHIL # BLD AUTO: 0.1 10*3/MM3 (ref 0–0.4)
EOSINOPHIL NFR BLD AUTO: 0.7 % (ref 0.3–6.2)
ERYTHROCYTE [DISTWIDTH] IN BLOOD BY AUTOMATED COUNT: 14.4 % (ref 12.3–15.4)
GFR SERPL CREATININE-BSD FRML MDRD: 64 ML/MIN/1.73
GLUCOSE SERPL-MCNC: 103 MG/DL (ref 65–99)
HCT VFR BLD AUTO: 34.4 % (ref 34–46.6)
HGB BLD-MCNC: 11.5 G/DL (ref 12–15.9)
LYMPHOCYTES # BLD AUTO: 2.3 10*3/MM3 (ref 0.7–3.1)
LYMPHOCYTES NFR BLD AUTO: 32.8 % (ref 19.6–45.3)
MAGNESIUM SERPL-MCNC: 1.9 MG/DL (ref 1.6–2.4)
MCH RBC QN AUTO: 27.8 PG (ref 26.6–33)
MCHC RBC AUTO-ENTMCNC: 33.4 G/DL (ref 31.5–35.7)
MCV RBC AUTO: 83.1 FL (ref 79–97)
MONOCYTES # BLD AUTO: 0.9 10*3/MM3 (ref 0.1–0.9)
MONOCYTES NFR BLD AUTO: 12.9 % (ref 5–12)
NEUTROPHILS NFR BLD AUTO: 3.7 10*3/MM3 (ref 1.7–7)
NEUTROPHILS NFR BLD AUTO: 52.4 % (ref 42.7–76)
NRBC BLD AUTO-RTO: 0.1 /100 WBC (ref 0–0.2)
PHOSPHATE SERPL-MCNC: 2.4 MG/DL (ref 2.5–4.5)
PLATELET # BLD AUTO: 216 10*3/MM3 (ref 140–450)
PMV BLD AUTO: 6.3 FL (ref 6–12)
POTASSIUM SERPL-SCNC: 4 MMOL/L (ref 3.5–5.2)
RBC # BLD AUTO: 4.13 10*6/MM3 (ref 3.77–5.28)
SODIUM SERPL-SCNC: 138 MMOL/L (ref 136–145)
WBC # BLD AUTO: 7 10*3/MM3 (ref 3.4–10.8)

## 2020-10-25 PROCEDURE — 25010000002 CEFTRIAXONE PER 250 MG: Performed by: INTERNAL MEDICINE

## 2020-10-25 PROCEDURE — 85025 COMPLETE CBC W/AUTO DIFF WBC: CPT | Performed by: NURSE PRACTITIONER

## 2020-10-25 PROCEDURE — 63710000001 ONDANSETRON PER 8 MG: Performed by: INTERNAL MEDICINE

## 2020-10-25 PROCEDURE — 84100 ASSAY OF PHOSPHORUS: CPT | Performed by: INTERNAL MEDICINE

## 2020-10-25 PROCEDURE — 25010000002 MORPHINE PER 10 MG: Performed by: INTERNAL MEDICINE

## 2020-10-25 PROCEDURE — 74018 RADEX ABDOMEN 1 VIEW: CPT

## 2020-10-25 PROCEDURE — 99222 1ST HOSP IP/OBS MODERATE 55: CPT | Performed by: INTERNAL MEDICINE

## 2020-10-25 PROCEDURE — 80048 BASIC METABOLIC PNL TOTAL CA: CPT | Performed by: INTERNAL MEDICINE

## 2020-10-25 PROCEDURE — 83735 ASSAY OF MAGNESIUM: CPT | Performed by: INTERNAL MEDICINE

## 2020-10-25 RX ORDER — ALBUTEROL SULFATE 1.25 MG/3ML
1 SOLUTION RESPIRATORY (INHALATION) EVERY 6 HOURS PRN
Status: DISCONTINUED | OUTPATIENT
Start: 2020-10-25 | End: 2020-10-26 | Stop reason: HOSPADM

## 2020-10-25 RX ORDER — ATORVASTATIN CALCIUM 40 MG/1
80 TABLET, FILM COATED ORAL DAILY
Status: DISCONTINUED | OUTPATIENT
Start: 2020-10-25 | End: 2020-10-26 | Stop reason: HOSPADM

## 2020-10-25 RX ORDER — AMLODIPINE BESYLATE 5 MG/1
2.5 TABLET ORAL DAILY
Status: DISCONTINUED | OUTPATIENT
Start: 2020-10-25 | End: 2020-10-26 | Stop reason: HOSPADM

## 2020-10-25 RX ORDER — OXYBUTYNIN CHLORIDE 5 MG/1
5 TABLET, EXTENDED RELEASE ORAL DAILY
Status: DISCONTINUED | OUTPATIENT
Start: 2020-10-25 | End: 2020-10-26 | Stop reason: HOSPADM

## 2020-10-25 RX ORDER — SERTRALINE HYDROCHLORIDE 100 MG/1
100 TABLET, FILM COATED ORAL DAILY
Status: DISCONTINUED | OUTPATIENT
Start: 2020-10-25 | End: 2020-10-26 | Stop reason: HOSPADM

## 2020-10-25 RX ORDER — PANTOPRAZOLE SODIUM 40 MG/1
40 TABLET, DELAYED RELEASE ORAL
Status: DISCONTINUED | OUTPATIENT
Start: 2020-10-25 | End: 2020-10-26 | Stop reason: HOSPADM

## 2020-10-25 RX ADMIN — Medication 10 ML: at 09:08

## 2020-10-25 RX ADMIN — AMITRIPTYLINE HYDROCHLORIDE 25 MG: 50 TABLET, FILM COATED ORAL at 20:51

## 2020-10-25 RX ADMIN — AMLODIPINE BESYLATE 2.5 MG: 5 TABLET ORAL at 09:06

## 2020-10-25 RX ADMIN — ONDANSETRON HYDROCHLORIDE 4 MG: 4 TABLET, FILM COATED ORAL at 20:51

## 2020-10-25 RX ADMIN — PREGABALIN 100 MG: 100 CAPSULE ORAL at 09:08

## 2020-10-25 RX ADMIN — PANTOPRAZOLE SODIUM 40 MG: 40 TABLET, DELAYED RELEASE ORAL at 09:08

## 2020-10-25 RX ADMIN — ATORVASTATIN CALCIUM 80 MG: 40 TABLET, FILM COATED ORAL at 09:08

## 2020-10-25 RX ADMIN — MORPHINE SULFATE 1 MG: 4 INJECTION INTRAVENOUS at 20:51

## 2020-10-25 RX ADMIN — Medication 10 ML: at 20:56

## 2020-10-25 RX ADMIN — OXYBUTYNIN CHLORIDE 5 MG: 5 TABLET, EXTENDED RELEASE ORAL at 09:06

## 2020-10-25 RX ADMIN — ACETAMINOPHEN 650 MG: 325 TABLET, FILM COATED ORAL at 09:21

## 2020-10-25 RX ADMIN — PREGABALIN 100 MG: 100 CAPSULE ORAL at 20:51

## 2020-10-25 RX ADMIN — SERTRALINE HYDROCHLORIDE 100 MG: 100 TABLET ORAL at 09:06

## 2020-10-25 RX ADMIN — CEFTRIAXONE SODIUM 1 G: 1 INJECTION, POWDER, FOR SOLUTION INTRAMUSCULAR; INTRAVENOUS at 16:45

## 2020-10-26 ENCOUNTER — READMISSION MANAGEMENT (OUTPATIENT)
Dept: CALL CENTER | Facility: HOSPITAL | Age: 75
End: 2020-10-26

## 2020-10-26 VITALS
HEIGHT: 65 IN | OXYGEN SATURATION: 97 % | WEIGHT: 129.19 LBS | BODY MASS INDEX: 21.52 KG/M2 | SYSTOLIC BLOOD PRESSURE: 136 MMHG | DIASTOLIC BLOOD PRESSURE: 77 MMHG | HEART RATE: 85 BPM | RESPIRATION RATE: 16 BRPM | TEMPERATURE: 98.7 F

## 2020-10-26 PROBLEM — I10 ESSENTIAL HYPERTENSION: Chronic | Status: ACTIVE | Noted: 2020-10-22

## 2020-10-26 PROBLEM — G44.309 POST-TRAUMATIC HEADACHE: Status: ACTIVE | Noted: 2020-10-22

## 2020-10-26 PROBLEM — R11.2 NAUSEA & VOMITING: Status: RESOLVED | Noted: 2020-10-22 | Resolved: 2020-10-26

## 2020-10-26 PROBLEM — Z79.899 POLYPHARMACY: Chronic | Status: ACTIVE | Noted: 2020-10-26

## 2020-10-26 LAB
ANION GAP SERPL CALCULATED.3IONS-SCNC: 8 MMOL/L (ref 5–15)
BASOPHILS # BLD AUTO: 0.1 10*3/MM3 (ref 0–0.2)
BASOPHILS NFR BLD AUTO: 0.9 % (ref 0–1.5)
BUN SERPL-MCNC: 22 MG/DL (ref 8–23)
BUN/CREAT SERPL: 34.9 (ref 7–25)
CALCIUM SPEC-SCNC: 9.4 MG/DL (ref 8.6–10.5)
CHLORIDE SERPL-SCNC: 104 MMOL/L (ref 98–107)
CO2 SERPL-SCNC: 26 MMOL/L (ref 22–29)
CREAT SERPL-MCNC: 0.63 MG/DL (ref 0.57–1)
DEPRECATED RDW RBC AUTO: 42.4 FL (ref 37–54)
EOSINOPHIL # BLD AUTO: 0.1 10*3/MM3 (ref 0–0.4)
EOSINOPHIL NFR BLD AUTO: 1.7 % (ref 0.3–6.2)
ERYTHROCYTE [DISTWIDTH] IN BLOOD BY AUTOMATED COUNT: 14.4 % (ref 12.3–15.4)
GFR SERPL CREATININE-BSD FRML MDRD: 92 ML/MIN/1.73
GLUCOSE SERPL-MCNC: 102 MG/DL (ref 65–99)
HCT VFR BLD AUTO: 33.1 % (ref 34–46.6)
HGB BLD-MCNC: 11 G/DL (ref 12–15.9)
LYMPHOCYTES # BLD AUTO: 2 10*3/MM3 (ref 0.7–3.1)
LYMPHOCYTES NFR BLD AUTO: 31 % (ref 19.6–45.3)
MAGNESIUM SERPL-MCNC: 1.9 MG/DL (ref 1.6–2.4)
MCH RBC QN AUTO: 27.8 PG (ref 26.6–33)
MCHC RBC AUTO-ENTMCNC: 33.2 G/DL (ref 31.5–35.7)
MCV RBC AUTO: 83.6 FL (ref 79–97)
MONOCYTES # BLD AUTO: 0.7 10*3/MM3 (ref 0.1–0.9)
MONOCYTES NFR BLD AUTO: 11.1 % (ref 5–12)
NEUTROPHILS NFR BLD AUTO: 3.5 10*3/MM3 (ref 1.7–7)
NEUTROPHILS NFR BLD AUTO: 55.3 % (ref 42.7–76)
NRBC BLD AUTO-RTO: 0 /100 WBC (ref 0–0.2)
PHOSPHATE SERPL-MCNC: 3.1 MG/DL (ref 2.5–4.5)
PLATELET # BLD AUTO: 210 10*3/MM3 (ref 140–450)
PMV BLD AUTO: 6.3 FL (ref 6–12)
POTASSIUM SERPL-SCNC: 4 MMOL/L (ref 3.5–5.2)
RBC # BLD AUTO: 3.96 10*6/MM3 (ref 3.77–5.28)
SODIUM SERPL-SCNC: 138 MMOL/L (ref 136–145)
WBC # BLD AUTO: 6.3 10*3/MM3 (ref 3.4–10.8)

## 2020-10-26 PROCEDURE — 84100 ASSAY OF PHOSPHORUS: CPT | Performed by: INTERNAL MEDICINE

## 2020-10-26 PROCEDURE — 92523 SPEECH SOUND LANG COMPREHEN: CPT

## 2020-10-26 PROCEDURE — 85025 COMPLETE CBC W/AUTO DIFF WBC: CPT | Performed by: INTERNAL MEDICINE

## 2020-10-26 PROCEDURE — 99239 HOSP IP/OBS DSCHRG MGMT >30: CPT | Performed by: INTERNAL MEDICINE

## 2020-10-26 PROCEDURE — 97116 GAIT TRAINING THERAPY: CPT

## 2020-10-26 PROCEDURE — 83735 ASSAY OF MAGNESIUM: CPT | Performed by: INTERNAL MEDICINE

## 2020-10-26 PROCEDURE — 97112 NEUROMUSCULAR REEDUCATION: CPT

## 2020-10-26 PROCEDURE — 80048 BASIC METABOLIC PNL TOTAL CA: CPT | Performed by: INTERNAL MEDICINE

## 2020-10-26 RX ORDER — PREGABALIN 100 MG/1
100 CAPSULE ORAL EVERY 12 HOURS SCHEDULED
Qty: 60 CAPSULE | Refills: 0 | Status: SHIPPED | OUTPATIENT
Start: 2020-10-26 | End: 2021-01-26

## 2020-10-26 RX ORDER — AMITRIPTYLINE HYDROCHLORIDE 25 MG/1
25 TABLET, FILM COATED ORAL NIGHTLY
Qty: 30 TABLET | Refills: 0 | Status: SHIPPED | OUTPATIENT
Start: 2020-10-26 | End: 2020-11-25

## 2020-10-26 RX ADMIN — ACETAMINOPHEN 650 MG: 325 TABLET, FILM COATED ORAL at 17:51

## 2020-10-26 RX ADMIN — OXYBUTYNIN CHLORIDE 5 MG: 5 TABLET, EXTENDED RELEASE ORAL at 08:26

## 2020-10-26 RX ADMIN — PANTOPRAZOLE SODIUM 40 MG: 40 TABLET, DELAYED RELEASE ORAL at 08:26

## 2020-10-26 RX ADMIN — SERTRALINE HYDROCHLORIDE 100 MG: 100 TABLET ORAL at 08:26

## 2020-10-26 RX ADMIN — Medication 10 ML: at 08:27

## 2020-10-26 RX ADMIN — AMLODIPINE BESYLATE 2.5 MG: 5 TABLET ORAL at 08:26

## 2020-10-26 RX ADMIN — ACETAMINOPHEN 650 MG: 325 TABLET, FILM COATED ORAL at 00:51

## 2020-10-26 RX ADMIN — PREGABALIN 100 MG: 100 CAPSULE ORAL at 08:26

## 2020-10-26 RX ADMIN — ATORVASTATIN CALCIUM 80 MG: 40 TABLET, FILM COATED ORAL at 08:26

## 2020-10-27 ENCOUNTER — TELEPHONE (OUTPATIENT)
Dept: NEUROSURGERY | Facility: CLINIC | Age: 75
End: 2020-10-27

## 2020-10-27 ENCOUNTER — EPISODE CHANGES (OUTPATIENT)
Dept: CASE MANAGEMENT | Facility: OTHER | Age: 75
End: 2020-10-27

## 2020-10-27 DIAGNOSIS — I62.9 INTRACRANIAL HEMORRHAGE (HCC): Primary | ICD-10-CM

## 2020-10-27 NOTE — OUTREACH NOTE
Prep Survey      Responses   [facility] facility patient discharged from?  [facility]   Is LACE score < 7 ?  No   Eligibility  Not Eligible   What are the reasons patient is not eligible?  [facility]   Does the patient have one of the following disease processes/diagnoses(primary or secondary)?  Other   Prep survey completed?  Yes          Vanessa Garza RN

## 2020-10-27 NOTE — TELEPHONE ENCOUNTER
----- Message from Renetta Farley sent at 10/27/2020  3:25 PM EDT -----    ----- Message -----  From: Paco Terrell MD  Sent: 10/24/2020   8:54 AM EDT  To: Renetta Farley    This patient is supposed to call the office at the time of discharge from [facility].  She will need to be seen in the office in [place] in about a month with a repeat CT scan of her head with and without contrast.  This can be done at [facility].

## 2020-10-29 ENCOUNTER — PATIENT OUTREACH (OUTPATIENT)
Dept: CASE MANAGEMENT | Facility: OTHER | Age: 75
End: 2020-10-29

## 2020-10-29 NOTE — OUTREACH NOTE
SNF Follow-up Note      Responses   Acute Facility Discharged From  [facility]   Acute Discharge Date  10/26/20   Name of the Skilled Nursing Facility?  [facility]   Purpose of SNF Admission  PT, OT, SN        RN-ACM outreach call made to [facility]. Tia reports pt is still admitted & transferred call to nursing unit, but nursing unavailable (N/A) for additional information. RN-ACM will continue to monitor for discharge.    Nicole Li RN  Ambulatory     10/29/2020, 09:46 EDT

## 2020-11-05 ENCOUNTER — PATIENT OUTREACH (OUTPATIENT)
Dept: CASE MANAGEMENT | Facility: OTHER | Age: 75
End: 2020-11-05

## 2020-11-05 NOTE — OUTREACH NOTE
SNF Follow-up Note      Responses   Acute Facility Discharged From  [facility]   Acute Discharge Date  10/26/20   Name of the Skilled Nursing Facility?  [facility]   Progression of Patient?  Discharge planned today   Skilled Nursing Discharge Date?  11/05/20   Where was the patient discharged to?  Home   Home Health Agency at discharge?  Yes   [facility] of [facility] Agency?  [facility]      Called [facility] spoke w/ nurse Dobson who confirms patient will DC to home w/ [facility] later today.   Nicole Li RN  Ambulatory     11/5/2020, 12:34 EST

## 2020-11-06 ENCOUNTER — EPISODE CHANGES (OUTPATIENT)
Dept: CASE MANAGEMENT | Facility: OTHER | Age: 75
End: 2020-11-06

## 2020-11-06 ENCOUNTER — TELEPHONE (OUTPATIENT)
Dept: NEUROSURGERY | Facility: CLINIC | Age: 75
End: 2020-11-06

## 2020-11-06 NOTE — TELEPHONE ENCOUNTER
PT CALLED - SHE WAS ORIGINALLY REFERRED TO DR CASTANEDA AFTER BEING SEEN IN [place] ED, BUT HER FOLLOW UP IS IN [place]. SHE HAS ISSUES WITH TRANSPORTATION TO [place] (SHE DOESN'T DRIVE RIGHT NOW AND FAMILY CAN'T DRIVE IN [place]). PT IS REQUESTING A NEUROSURGERY PROVIDER RECOMMENDATION THAT SHE CAN FOLLOW UP WITH IN [place]. PT PREFERS [facility].    EXPLAINED TO PT THAT WITH DR CASTANEDA LEAVING, THERE ARE LIKELY NO OPTIONS FOR FOLLOW UP WITHIN [facility] IN [place] & ADVISED PT FIND TRANSPORTATION TO [place] APPT IF SHE WISHES TO CONTINUE CARE AT [facility].    PLEASE CALL PT TO ADVISE IF ANOTHER [place] NEUROSURGERY FOLLOW UP OPTION MAY BE AVAILABLE TO HER. PT CAN BE REACHED [phone].    THANK YOU.

## 2020-11-08 ENCOUNTER — HOSPITAL ENCOUNTER (INPATIENT)
Facility: HOSPITAL | Age: 75
LOS: 2 days | Discharge: HOME | End: 2020-11-13
Attending: INTERNAL MEDICINE | Admitting: HOSPITALIST

## 2020-11-08 ENCOUNTER — APPOINTMENT (OUTPATIENT)
Dept: CT IMAGING | Facility: HOSPITAL | Age: 75
End: 2020-11-08

## 2020-11-08 ENCOUNTER — APPOINTMENT (OUTPATIENT)
Dept: GENERAL RADIOLOGY | Facility: HOSPITAL | Age: 75
End: 2020-11-08

## 2020-11-08 DIAGNOSIS — R53.1 WEAKNESS: Primary | ICD-10-CM

## 2020-11-08 DIAGNOSIS — N39.0 ACUTE UTI: ICD-10-CM

## 2020-11-08 DIAGNOSIS — R51.9 NONINTRACTABLE HEADACHE, UNSPECIFIED CHRONICITY PATTERN, UNSPECIFIED HEADACHE TYPE: ICD-10-CM

## 2020-11-08 LAB
ALBUMIN SERPL-MCNC: 3.8 G/DL (ref 3.5–5.2)
ALBUMIN/GLOB SERPL: 1.5 G/DL
ALP SERPL-CCNC: 93 U/L (ref 39–117)
ALT SERPL W P-5'-P-CCNC: 31 U/L (ref 1–33)
ANION GAP SERPL CALCULATED.3IONS-SCNC: 12 MMOL/L (ref 5–15)
AST SERPL-CCNC: 29 U/L (ref 1–32)
BACTERIA UR QL AUTO: ABNORMAL /HPF
BASOPHILS # BLD AUTO: 0.1 10*3/MM3 (ref 0–0.2)
BASOPHILS NFR BLD AUTO: 1 % (ref 0–1.5)
BILIRUB SERPL-MCNC: 0.3 MG/DL (ref 0–1.2)
BILIRUB UR QL STRIP: NEGATIVE
BUN SERPL-MCNC: 8 MG/DL (ref 8–23)
BUN/CREAT SERPL: 10.5 (ref 7–25)
CALCIUM SPEC-SCNC: 9.7 MG/DL (ref 8.6–10.5)
CHLORIDE SERPL-SCNC: 104 MMOL/L (ref 98–107)
CLARITY UR: ABNORMAL
CO2 SERPL-SCNC: 23 MMOL/L (ref 22–29)
COLOR UR: YELLOW
CREAT SERPL-MCNC: 0.76 MG/DL (ref 0.57–1)
DEPRECATED RDW RBC AUTO: 44.2 FL (ref 37–54)
EOSINOPHIL # BLD AUTO: 0.1 10*3/MM3 (ref 0–0.4)
EOSINOPHIL NFR BLD AUTO: 0.9 % (ref 0.3–6.2)
ERYTHROCYTE [DISTWIDTH] IN BLOOD BY AUTOMATED COUNT: 14.6 % (ref 12.3–15.4)
GFR SERPL CREATININE-BSD FRML MDRD: 74 ML/MIN/1.73
GLOBULIN UR ELPH-MCNC: 2.5 GM/DL
GLUCOSE BLDC GLUCOMTR-MCNC: 116 MG/DL (ref 70–105)
GLUCOSE SERPL-MCNC: 110 MG/DL (ref 65–99)
GLUCOSE UR STRIP-MCNC: NEGATIVE MG/DL
HCT VFR BLD AUTO: 38.7 % (ref 34–46.6)
HGB BLD-MCNC: 12.6 G/DL (ref 12–15.9)
HGB UR QL STRIP.AUTO: ABNORMAL
HYALINE CASTS UR QL AUTO: ABNORMAL /LPF
KETONES UR QL STRIP: NEGATIVE
LEUKOCYTE ESTERASE UR QL STRIP.AUTO: ABNORMAL
LYMPHOCYTES # BLD AUTO: 1.9 10*3/MM3 (ref 0.7–3.1)
LYMPHOCYTES NFR BLD AUTO: 26.1 % (ref 19.6–45.3)
MCH RBC QN AUTO: 28.2 PG (ref 26.6–33)
MCHC RBC AUTO-ENTMCNC: 32.6 G/DL (ref 31.5–35.7)
MCV RBC AUTO: 86.5 FL (ref 79–97)
MONOCYTES # BLD AUTO: 0.6 10*3/MM3 (ref 0.1–0.9)
MONOCYTES NFR BLD AUTO: 8.5 % (ref 5–12)
NEUTROPHILS NFR BLD AUTO: 4.7 10*3/MM3 (ref 1.7–7)
NEUTROPHILS NFR BLD AUTO: 63.5 % (ref 42.7–76)
NITRITE UR QL STRIP: NEGATIVE
NRBC BLD AUTO-RTO: 0.2 /100 WBC (ref 0–0.2)
PH UR STRIP.AUTO: 7.5 [PH] (ref 5–8)
PLATELET # BLD AUTO: 290 10*3/MM3 (ref 140–450)
PMV BLD AUTO: 6 FL (ref 6–12)
POTASSIUM SERPL-SCNC: 4.9 MMOL/L (ref 3.5–5.2)
PROT SERPL-MCNC: 6.3 G/DL (ref 6–8.5)
PROT UR QL STRIP: NEGATIVE
RBC # BLD AUTO: 4.47 10*6/MM3 (ref 3.77–5.28)
RBC # UR: ABNORMAL /HPF
REF LAB TEST METHOD: ABNORMAL
SARS-COV-2 RNA PNL SPEC NAA+PROBE: NOT DETECTED
SODIUM SERPL-SCNC: 139 MMOL/L (ref 136–145)
SP GR UR STRIP: 1.01 (ref 1–1.03)
SQUAMOUS #/AREA URNS HPF: ABNORMAL /HPF
UROBILINOGEN UR QL STRIP: ABNORMAL
WBC # BLD AUTO: 7.4 10*3/MM3 (ref 3.4–10.8)
WBC UR QL AUTO: ABNORMAL /HPF

## 2020-11-08 PROCEDURE — 87186 SC STD MICRODIL/AGAR DIL: CPT | Performed by: NURSE PRACTITIONER

## 2020-11-08 PROCEDURE — 87635 SARS-COV-2 COVID-19 AMP PRB: CPT | Performed by: NURSE PRACTITIONER

## 2020-11-08 PROCEDURE — 81001 URINALYSIS AUTO W/SCOPE: CPT | Performed by: NURSE PRACTITIONER

## 2020-11-08 PROCEDURE — 25010000002 ONDANSETRON PER 1 MG: Performed by: NURSE PRACTITIONER

## 2020-11-08 PROCEDURE — 87086 URINE CULTURE/COLONY COUNT: CPT | Performed by: NURSE PRACTITIONER

## 2020-11-08 PROCEDURE — 63710000001 ONDANSETRON ODT 4 MG TABLET DISPERSIBLE: Performed by: NURSE PRACTITIONER

## 2020-11-08 PROCEDURE — 80053 COMPREHEN METABOLIC PANEL: CPT | Performed by: NURSE PRACTITIONER

## 2020-11-08 PROCEDURE — 99285 EMERGENCY DEPT VISIT HI MDM: CPT

## 2020-11-08 PROCEDURE — P9612 CATHETERIZE FOR URINE SPEC: HCPCS

## 2020-11-08 PROCEDURE — G0378 HOSPITAL OBSERVATION PER HR: HCPCS

## 2020-11-08 PROCEDURE — 71045 X-RAY EXAM CHEST 1 VIEW: CPT

## 2020-11-08 PROCEDURE — 87077 CULTURE AEROBIC IDENTIFY: CPT | Performed by: NURSE PRACTITIONER

## 2020-11-08 PROCEDURE — 99222 1ST HOSP IP/OBS MODERATE 55: CPT | Performed by: NURSE PRACTITIONER

## 2020-11-08 PROCEDURE — 82962 GLUCOSE BLOOD TEST: CPT

## 2020-11-08 PROCEDURE — 85025 COMPLETE CBC W/AUTO DIFF WBC: CPT | Performed by: NURSE PRACTITIONER

## 2020-11-08 PROCEDURE — 70450 CT HEAD/BRAIN W/O DYE: CPT

## 2020-11-08 PROCEDURE — 25010000002 CEFTRIAXONE IN SWFI 1 GRAM/10ML IV PUSH SYRINGE (SIMPLE): Performed by: NURSE PRACTITIONER

## 2020-11-08 RX ORDER — PANTOPRAZOLE SODIUM 40 MG/1
40 TABLET, DELAYED RELEASE ORAL
Status: DISCONTINUED | OUTPATIENT
Start: 2020-11-09 | End: 2020-11-14 | Stop reason: HOSPADM

## 2020-11-08 RX ORDER — SERTRALINE HYDROCHLORIDE 100 MG/1
100 TABLET, FILM COATED ORAL DAILY
Status: DISCONTINUED | OUTPATIENT
Start: 2020-11-09 | End: 2020-11-14 | Stop reason: HOSPADM

## 2020-11-08 RX ORDER — AMITRIPTYLINE HYDROCHLORIDE 25 MG/1
25 TABLET, FILM COATED ORAL NIGHTLY
Status: DISCONTINUED | OUTPATIENT
Start: 2020-11-08 | End: 2020-11-14 | Stop reason: HOSPADM

## 2020-11-08 RX ORDER — ACETAMINOPHEN 325 MG/1
650 TABLET ORAL EVERY 4 HOURS PRN
Status: DISCONTINUED | OUTPATIENT
Start: 2020-11-08 | End: 2020-11-14 | Stop reason: HOSPADM

## 2020-11-08 RX ORDER — BUTALBITAL, ACETAMINOPHEN AND CAFFEINE 50; 325; 40 MG/1; MG/1; MG/1
1 TABLET ORAL ONCE
Status: COMPLETED | OUTPATIENT
Start: 2020-11-08 | End: 2020-11-08

## 2020-11-08 RX ORDER — SODIUM CHLORIDE 0.9 % (FLUSH) 0.9 %
10 SYRINGE (ML) INJECTION EVERY 12 HOURS SCHEDULED
Status: DISCONTINUED | OUTPATIENT
Start: 2020-11-08 | End: 2020-11-14 | Stop reason: HOSPADM

## 2020-11-08 RX ORDER — CELECOXIB 100 MG/1
100 CAPSULE ORAL DAILY
Status: DISCONTINUED | OUTPATIENT
Start: 2020-11-09 | End: 2020-11-14 | Stop reason: HOSPADM

## 2020-11-08 RX ORDER — ALBUTEROL SULFATE 1.25 MG/3ML
1 SOLUTION RESPIRATORY (INHALATION) EVERY 6 HOURS PRN
Status: DISCONTINUED | OUTPATIENT
Start: 2020-11-08 | End: 2020-11-14 | Stop reason: HOSPADM

## 2020-11-08 RX ORDER — ATORVASTATIN CALCIUM 40 MG/1
80 TABLET, FILM COATED ORAL DAILY
Status: DISCONTINUED | OUTPATIENT
Start: 2020-11-09 | End: 2020-11-14 | Stop reason: HOSPADM

## 2020-11-08 RX ORDER — ONDANSETRON 4 MG/1
4 TABLET, FILM COATED ORAL EVERY 6 HOURS PRN
Status: DISCONTINUED | OUTPATIENT
Start: 2020-11-08 | End: 2020-11-14 | Stop reason: HOSPADM

## 2020-11-08 RX ORDER — PREGABALIN 100 MG/1
100 CAPSULE ORAL EVERY 12 HOURS SCHEDULED
Status: DISCONTINUED | OUTPATIENT
Start: 2020-11-08 | End: 2020-11-14 | Stop reason: HOSPADM

## 2020-11-08 RX ORDER — ONDANSETRON 2 MG/ML
4 INJECTION INTRAMUSCULAR; INTRAVENOUS ONCE
Status: COMPLETED | OUTPATIENT
Start: 2020-11-08 | End: 2020-11-08

## 2020-11-08 RX ORDER — AMLODIPINE BESYLATE 5 MG/1
2.5 TABLET ORAL DAILY
Status: DISCONTINUED | OUTPATIENT
Start: 2020-11-09 | End: 2020-11-11

## 2020-11-08 RX ORDER — SODIUM CHLORIDE 0.9 % (FLUSH) 0.9 %
10 SYRINGE (ML) INJECTION AS NEEDED
Status: DISCONTINUED | OUTPATIENT
Start: 2020-11-08 | End: 2020-11-14 | Stop reason: HOSPADM

## 2020-11-08 RX ORDER — ONDANSETRON 4 MG/1
4 TABLET, ORALLY DISINTEGRATING ORAL ONCE
Status: COMPLETED | OUTPATIENT
Start: 2020-11-08 | End: 2020-11-08

## 2020-11-08 RX ORDER — ONDANSETRON 2 MG/ML
4 INJECTION INTRAMUSCULAR; INTRAVENOUS EVERY 6 HOURS PRN
Status: DISCONTINUED | OUTPATIENT
Start: 2020-11-08 | End: 2020-11-14 | Stop reason: HOSPADM

## 2020-11-08 RX ORDER — ACETAMINOPHEN 650 MG/1
650 SUPPOSITORY RECTAL EVERY 4 HOURS PRN
Status: DISCONTINUED | OUTPATIENT
Start: 2020-11-08 | End: 2020-11-14 | Stop reason: HOSPADM

## 2020-11-08 RX ORDER — OXYBUTYNIN CHLORIDE 10 MG/1
10 TABLET, EXTENDED RELEASE ORAL DAILY
Status: DISCONTINUED | OUTPATIENT
Start: 2020-11-09 | End: 2020-11-14 | Stop reason: HOSPADM

## 2020-11-08 RX ORDER — ACETAMINOPHEN 160 MG/5ML
650 SOLUTION ORAL EVERY 4 HOURS PRN
Status: DISCONTINUED | OUTPATIENT
Start: 2020-11-08 | End: 2020-11-14 | Stop reason: HOSPADM

## 2020-11-08 RX ORDER — HYDROCODONE BITARTRATE AND ACETAMINOPHEN 5; 325 MG/1; MG/1
1 TABLET ORAL ONCE AS NEEDED
Status: DISCONTINUED | OUTPATIENT
Start: 2020-11-08 | End: 2020-11-10

## 2020-11-08 RX ADMIN — ONDANSETRON HYDROCHLORIDE 4 MG: 2 SOLUTION INTRAMUSCULAR; INTRAVENOUS at 15:17

## 2020-11-08 RX ADMIN — HYDROCODONE BITARTRATE AND ACETAMINOPHEN 1 TABLET: 5; 325 TABLET ORAL at 17:28

## 2020-11-08 RX ADMIN — CEFTRIAXONE SODIUM 1 G: 1 INJECTION, POWDER, FOR SOLUTION INTRAMUSCULAR; INTRAVENOUS at 18:43

## 2020-11-08 RX ADMIN — ONDANSETRON 4 MG: 4 TABLET, ORALLY DISINTEGRATING ORAL at 17:27

## 2020-11-08 RX ADMIN — Medication 10 ML: at 20:37

## 2020-11-08 RX ADMIN — HYDROCODONE BITARTRATE AND ACETAMINOPHEN 1 TABLET: 5; 325 TABLET ORAL at 22:08

## 2020-11-08 RX ADMIN — BUTALBITAL, ACETAMINOPHEN AND CAFFEINE 1 TABLET: 50; 325; 40 TABLET ORAL at 21:09

## 2020-11-08 RX ADMIN — SODIUM CHLORIDE 500 ML: 9 INJECTION, SOLUTION INTRAVENOUS at 15:16

## 2020-11-08 NOTE — ED NOTES
"Attempted to call report to bed 246. Matty  states \"all the nurses are in a room.\" Will attempt to call again in ten mins.      Viridiana Farooq RN  11/08/20 5371    "

## 2020-11-08 NOTE — ED NOTES
Attempted to call report a second time, on hold for three mins. Charge nurse notified. Pt sent upstairs per hospital protocol.        Viridiana Farooq RN  11/08/20 3892

## 2020-11-08 NOTE — ED NOTES
Pt reports she had a stroke at the end of October and had a high blood pressure this am after [facility]. Pt also reports a headache and nausea. Pt denies any weakness or physical/sensation changes since her last stroke.         Viridiana Farooq RN  11/08/20 1526

## 2020-11-08 NOTE — ED PROVIDER NOTES
Subjective   Patient is a 75-year-old female that states on October 21 she had a brain bleed and was seen by Dr. Sánchez here in the emergency room.  She states that she was admitted and then sent to rehabilitation and on Thursday was discharged from rehabilitation.  She states that since Friday she has had nausea vomiting and unable to keep down food or water.  She states she has had weakness and lives at home alone has had difficulty taking care of herself at home.  He also reports a mild headache.  She reports the pain is a 2/10.  She states that it is a dull aching pain is nonradiating waxes and wanes in intensity.          Review of Systems   Constitutional: Negative for chills, fatigue and fever.   HENT: Negative for congestion, tinnitus and trouble swallowing.    Eyes: Negative for photophobia, discharge and redness.   Respiratory: Negative for cough and shortness of breath.    Cardiovascular: Negative for chest pain and palpitations.   Gastrointestinal: Positive for nausea and vomiting. Negative for abdominal pain and diarrhea.   Genitourinary: Negative for dysuria, frequency and urgency.   Musculoskeletal: Negative for back pain, joint swelling and myalgias.   Skin: Negative for rash.   Neurological: Positive for weakness and headaches. Negative for dizziness.   Psychiatric/Behavioral: Negative for confusion.   All other systems reviewed and are negative.      Past Medical History:   Diagnosis Date   • Arthritis    • Bladder incontinence    • Bowel incontinence    • Breast cyst    • Colon polyp    • DEXA     OSTEOPENIA = 2018 (-1.3/ -1.7); 2020 (-1.7/ -1.7)   • GERD (gastroesophageal reflux disease)    • Headache    • Hyperlipidemia    • Hypertension    • Osteopenia    • Vitamin D deficiency        Allergies   Allergen Reactions   • Trazodone Irritability       Past Surgical History:   Procedure Laterality Date   • APPENDECTOMY     • BREAST CYST EXCISION     • BREAST LUMPECTOMY Left 1973    NEG   •  BUNIONECTOMY Right 5/29/2020    Procedure: BUNIONECTOMY DEVONTE;  Surgeon: MARISSA Em DPM;  Location: [facility] MAIN OR;  Service: Podiatry;  Laterality: Right;   • CARPAL TUNNEL RELEASE Right 1980   • CHOLECYSTECTOMY     • COLON SURGERY      hemorrhoid banding   • COLONOSCOPY  2017 2017/ 2019 = [facility] 2024   Dr. Mackey   • EYE SURGERY     • HERNIA REPAIR      Umbilical removal   • HYSTERECTOMY  1970   • OTHER SURGICAL HISTORY      bladder stimulator placement     pt cant have mri   • SUBTOTAL HYSTERECTOMY         Family History   Problem Relation Age of Onset   • Heart disease Mother    • Hypertension Mother    • Diabetes Father    • Heart disease Father    • Alcohol abuse Father    • Hypertension Father    • Diabetes Brother    • Heart disease Brother    • Cancer Brother 68        Prostate Cancer   • Hypertension Brother    • Diabetes Maternal Aunt    • Heart disease Maternal Grandmother    • Hypertension Maternal Grandmother    • Heart disease Maternal Grandfather    • Hypertension Maternal Grandfather    • Liver disease Paternal Grandmother    • Hypertension Paternal Grandmother    • Heart disease Paternal Grandfather    • Hypertension Paternal Grandfather    • Dementia Sister        Social History     Socioeconomic History   • Marital status:      Spouse name: Not on file   • Number of children: Not on file   • Years of education: Not on file   • Highest education level: Not on file   Tobacco Use   • Smoking status: Never Smoker   • Smokeless tobacco: Never Used   Substance and Sexual Activity   • Alcohol use: No     Frequency: Never   • Drug use: No   • Sexual activity: Defer   Social History Narrative    Pt states she lives alone in [facility] apartments, largely a senior community, and has a close neighbor that helps when needed/drives her to [employer]. Denies insecurities re affording/obtaining food, medication, transportation.           Objective   Physical Exam  Vitals signs reviewed.    Constitutional:       Appearance: Normal appearance. She is well-developed. She is ill-appearing.   HENT:      Head: Normocephalic and atraumatic.      Right Ear: Tympanic membrane normal.      Left Ear: Tympanic membrane normal.      Nose: Nose normal.      Mouth/Throat:      Mouth: Mucous membranes are moist.      Pharynx: Oropharynx is clear.   Eyes:      Conjunctiva/sclera: Conjunctivae normal.      Pupils: Pupils are equal, round, and reactive to light.   Neck:      Musculoskeletal: Normal range of motion and neck supple.   Cardiovascular:      Rate and Rhythm: Normal rate and regular rhythm.      Heart sounds: Normal heart sounds.   Pulmonary:      Effort: Pulmonary effort is normal. No respiratory distress.      Breath sounds: Normal breath sounds. No wheezing.   Abdominal:      General: Abdomen is flat. Bowel sounds are normal. There is no distension.      Palpations: Abdomen is soft. There is no mass.      Tenderness: There is no abdominal tenderness. There is no guarding or rebound.   Musculoskeletal: Normal range of motion.         General: No deformity.   Skin:     General: Skin is warm and dry.      Capillary Refill: Capillary refill takes less than 2 seconds.   Neurological:      General: No focal deficit present.      Mental Status: She is alert and oriented to person, place, and time.      GCS: GCS eye subscore is 4. GCS verbal subscore is 5. GCS motor subscore is 6.      Cranial Nerves: No cranial nerve deficit.      Sensory: No sensory deficit.      Deep Tendon Reflexes: Reflexes are normal and symmetric. Reflexes normal.   Psychiatric:         Attention and Perception: Attention and perception normal.         Mood and Affect: Mood is anxious.         Speech: Speech normal.         Behavior: Behavior normal. Behavior is cooperative.         Thought Content: Thought content normal.         Cognition and Memory: Cognition and memory normal.         Judgment: Judgment normal.         Procedures      "      ED Course      /74   Pulse 80   Temp 98.1 °F (36.7 °C) (Oral)   Resp 16   Ht 151.1 cm (59.5\")   Wt 57 kg (125 lb 10.6 oz)   SpO2 100%   BMI 24.96 kg/m²   Labs Reviewed   COMPREHENSIVE METABOLIC PANEL - Abnormal; Notable for the following components:       Result Value    Glucose 110 (*)     All other components within normal limits    Narrative:     GFR Normal >60  Chronic Kidney Disease <60  Kidney Failure <15     URINALYSIS W/ CULTURE IF INDICATED - Abnormal; Notable for the following components:    Appearance, UA Cloudy (*)     Blood, UA Small (1+) (*)     Leuk Esterase, UA Trace (*)     All other components within normal limits   URINALYSIS, MICROSCOPIC ONLY - Abnormal; Notable for the following components:    RBC, UA 13-20 (*)     WBC, UA 13-20 (*)     All other components within normal limits   POCT GLUCOSE FINGERSTICK - Abnormal; Notable for the following components:    Glucose 116 (*)     All other components within normal limits   COVID-19,ABBOTT IN-HOUSE,NP SWAB (NO TRANSPORT MEDIA) 2 HR TAT - Normal    Narrative:     Fact sheet for providers: https://www.fda.gov/media/804415/download     Fact sheet for patients: https://www.fda.gov/media/816363/download   CBC WITH AUTO DIFFERENTIAL - Normal   URINE CULTURE   CBC AND DIFFERENTIAL    Narrative:     The following orders were created for panel order CBC & Differential.  Procedure                               Abnormality         Status                     ---------                               -----------         ------                     CBC Auto Differential[[id number]]        Normal              Final result                 Please view results for these tests on the individual orders.     Medications   sodium chloride 0.9 % flush 10 mL (has no administration in time range)   HYDROcodone-acetaminophen (NORCO) 5-325 MG per tablet 1 tablet (1 tablet Oral Given 11/8/20 1728)   cefTRIAXone (ROCEPHIN) in SWFI 1 gram/10ml IV PUSH syringe (has " no administration in time range)   sodium chloride 0.9 % bolus 500 mL (0 mL Intravenous Stopped 11/8/20 1615)   ondansetron (ZOFRAN) injection 4 mg (4 mg Intravenous Given 11/8/20 1517)   ondansetron ODT (ZOFRAN-ODT) disintegrating tablet 4 mg (4 mg Oral Given 11/8/20 1727)     Ct Head Without Contrast    Result Date: 11/8/2020  1.Parenchymal hematoma within left occipital lobe has decreased in size and bulk. Previously described hemorrhage within interventricular system and along left cerebellar tentorium has resolved. 2.No new acute intracranial process identified. 3.Minimal mucosal disease within right sphenoid sinus.  Electronically Signed By-DR. Todd Nunez MD On:11/8/2020 4:30 PM This report was finalized on [id number] by DR. Todd Nunez MD.    Xr Chest 1 View    Result Date: 11/8/2020  No acute cardiopulmonary process identified.  Electronically Signed By-DR. Todd Nunez MD On:11/8/2020 4:13 PM This report was finalized on [id number] by DR. Todd Nunez MD.                                         MDM  Number of Diagnoses or Management Options  Acute UTI:   Weakness:   Diagnosis management comments: Patient had IV established and blood work was obtained-patient found to have UTI and rocephin was given. Patient remained stable and will be admitted for IV antibiotics and possible nursing home placement for further rehabilitations. -Patient was given norco for headache in the ER and CT was found to have lessening ofhemmorage from Oct 21 and no acute findings.        Amount and/or Complexity of Data Reviewed  Clinical lab tests: reviewed  Tests in the radiology section of CPT®: reviewed    Patient Progress  Patient progress: stable      Final diagnoses:   Weakness   Acute UTI   Nonintractable headache, unspecified chronicity pattern, unspecified headache type            Estephanie Lundberg, APRN  11/09/20 1443       Estephanie Lundberg, APRN  11/18/20 1739       Estephanie Lundberg,  APRN  11/22/20 0244

## 2020-11-09 PROCEDURE — 25010000002 ONDANSETRON PER 1 MG: Performed by: NURSE PRACTITIONER

## 2020-11-09 PROCEDURE — 93005 ELECTROCARDIOGRAM TRACING: CPT | Performed by: HOSPITALIST

## 2020-11-09 PROCEDURE — 93010 ELECTROCARDIOGRAM REPORT: CPT | Performed by: INTERNAL MEDICINE

## 2020-11-09 PROCEDURE — 25010000002 VANCOMYCIN 10 G RECONSTITUTED SOLUTION: Performed by: HOSPITALIST

## 2020-11-09 PROCEDURE — 99233 SBSQ HOSP IP/OBS HIGH 50: CPT | Performed by: HOSPITALIST

## 2020-11-09 PROCEDURE — G0378 HOSPITAL OBSERVATION PER HR: HCPCS

## 2020-11-09 PROCEDURE — 63710000001 PROMETHAZINE PER 25 MG: Performed by: NURSE PRACTITIONER

## 2020-11-09 RX ORDER — DILTIAZEM HYDROCHLORIDE 5 MG/ML
10 INJECTION INTRAVENOUS ONCE
Status: COMPLETED | OUTPATIENT
Start: 2020-11-09 | End: 2020-11-09

## 2020-11-09 RX ORDER — METOPROLOL TARTRATE 5 MG/5ML
5 INJECTION INTRAVENOUS EVERY 6 HOURS PRN
Status: DISCONTINUED | OUTPATIENT
Start: 2020-11-09 | End: 2020-11-11

## 2020-11-09 RX ORDER — ONDANSETRON 2 MG/ML
4 INJECTION INTRAMUSCULAR; INTRAVENOUS EVERY 6 HOURS PRN
Status: DISCONTINUED | OUTPATIENT
Start: 2020-11-09 | End: 2020-11-09 | Stop reason: SDUPTHER

## 2020-11-09 RX ORDER — PROMETHAZINE HYDROCHLORIDE 25 MG/1
25 SUPPOSITORY RECTAL EVERY 6 HOURS PRN
Status: DISCONTINUED | OUTPATIENT
Start: 2020-11-09 | End: 2020-11-14 | Stop reason: HOSPADM

## 2020-11-09 RX ORDER — PROMETHAZINE HYDROCHLORIDE 6.25 MG/5ML
12.5 SYRUP ORAL EVERY 6 HOURS PRN
Status: DISCONTINUED | OUTPATIENT
Start: 2020-11-09 | End: 2020-11-14 | Stop reason: HOSPADM

## 2020-11-09 RX ORDER — PROMETHAZINE HYDROCHLORIDE 25 MG/1
25 TABLET ORAL EVERY 6 HOURS PRN
Status: DISCONTINUED | OUTPATIENT
Start: 2020-11-09 | End: 2020-11-14 | Stop reason: HOSPADM

## 2020-11-09 RX ORDER — SODIUM CHLORIDE 9 MG/ML
75 INJECTION, SOLUTION INTRAVENOUS CONTINUOUS
Status: DISCONTINUED | OUTPATIENT
Start: 2020-11-09 | End: 2020-11-14 | Stop reason: HOSPADM

## 2020-11-09 RX ADMIN — ONDANSETRON HYDROCHLORIDE 4 MG: 2 SOLUTION INTRAMUSCULAR; INTRAVENOUS at 00:25

## 2020-11-09 RX ADMIN — SODIUM CHLORIDE 1250 MG: 9 INJECTION, SOLUTION INTRAVENOUS at 17:41

## 2020-11-09 RX ADMIN — ONDANSETRON HYDROCHLORIDE 4 MG: 2 SOLUTION INTRAMUSCULAR; INTRAVENOUS at 17:36

## 2020-11-09 RX ADMIN — ACETAMINOPHEN 650 MG: 325 TABLET, FILM COATED ORAL at 21:55

## 2020-11-09 RX ADMIN — SODIUM CHLORIDE 75 ML/HR: 9 INJECTION, SOLUTION INTRAVENOUS at 15:36

## 2020-11-09 RX ADMIN — ONDANSETRON HYDROCHLORIDE 4 MG: 2 SOLUTION INTRAMUSCULAR; INTRAVENOUS at 10:40

## 2020-11-09 RX ADMIN — DILTIAZEM HYDROCHLORIDE 10 MG: 5 INJECTION INTRAVENOUS at 15:16

## 2020-11-09 RX ADMIN — PROMETHAZINE HYDROCHLORIDE 25 MG: 25 TABLET ORAL at 14:15

## 2020-11-09 RX ADMIN — Medication 10 ML: at 07:58

## 2020-11-09 RX ADMIN — AMLODIPINE BESYLATE 2.5 MG: 5 TABLET ORAL at 11:26

## 2020-11-09 RX ADMIN — PROMETHAZINE HYDROCHLORIDE 25 MG: 25 SUPPOSITORY RECTAL at 07:57

## 2020-11-09 NOTE — PROGRESS NOTES
"Pharmacy Antimicrobial Dosing Service    Subjective:  Katie Reddy is a 75 y.o.female admitted with headache, s/p recent SDH/SAH. Pharmacy has been consulted to dose Vancomycin for possible Enterococcus UTI.      Assessment/Plan    1. Day #1 Vancomycin: Goal -600 mcg*h/mL.   Will give load dose of 1250mg x1 (~21 mg/kg) followed by maintenance dosing of 1000mg Q24h (~17 mg/kg). Obtain trough 11/12 13:00, at steady state prior to 4th total dose (or sooner if clinically warranted). Obtain random level 11/11 18:00, for pharmacist to calculate patient-specific pharmacokinetic parameters and AUC.    Will continue to monitor drug levels, renal function, culture and sensitivities, and patient clinical status.       Objective:  Relevant clinical data and objective history reviewed:  151.1 cm (59.5\")   60.1 kg (132 lb 7.9 oz)   Ideal body weight: 49.6 kg (109 lb 5.6 oz)  Adjusted ideal body weight: 53.8 kg (118 lb 9.8 oz)  Body mass index is 26.31 kg/m².        Results from last 7 days   Lab Units 11/08/20  1518   CREATININE mg/dL 0.76     Estimated Creatinine Clearance: 52.4 mL/min (by C-G formula based on SCr of 0.76 mg/dL).  No intake/output data recorded.    Results from last 7 days   Lab Units 11/08/20  1518   WBC 10*3/mm3 7.40     Temperature    11/08/20 1945 11/09/20 0345 11/09/20 1123   Temp: 97.9 °F (36.6 °C) 98.3 °F (36.8 °C) 98 °F (36.7 °C)     Baseline culture/source/susceptibility:  Microbiology Results (last 10 days)       Procedure Component Value - Date/Time    Urine Culture - Urine, Urine, Catheter [[id number]]  (Abnormal) Collected: 11/08/20 1725    Lab Status: Preliminary result Specimen: Urine, Catheter Updated: 11/09/20 1053     Urine Culture >100,000 CFU/mL Enterococcus species    COVID-19, ABBOTT IN-HOUSE,NP Swab (NO TRANSPORT MEDIA) 2 HR TAT - Swab, Nasopharynx [[id number]]  (Normal) Collected: 11/08/20 1518    Lab Status: Final result Specimen: Swab from Nasopharynx Updated: 11/08/20 1558    "  COVID19 Not Detected    Narrative:      Fact sheet for providers: https://www.fda.gov/media/442264/download     Fact sheet for patients: https://www.fda.gov/media/591363/download             Anti-Infectives (From admission, onward)      Ordered     Dose/Rate Route Frequency Start Stop    11/09/20 1456  Pharmacy to dose vancomycin     Ordering Provider: Gemma Avila MD     Does not apply Continuous PRN 11/09/20 1456 11/16/20 1455    11/08/20 1751  cefTRIAXone (ROCEPHIN) in SWFI 1 gram/10ml IV PUSH syringe     Ordering Provider: Estephanie Lundberg, PRACHI    1 g  over 5 Minutes Intravenous Once 11/08/20 1753 11/08/20 1848            Karen Pedro PharmD, BCPS  11/09/20 14:59 EST

## 2020-11-09 NOTE — PROGRESS NOTES
Continued Stay Note  GINNY Martins     Patient Name: Katie Reddy  MRN: [id number]  Today's Date: 11/9/2020    Admit Date: 11/8/2020    Discharge Plan     Row Name 11/09/20 1117       Plan    Plan  Anticipate routine home.    Plan Comments  I spoke with pt ,in rm -wearing appropriate PPE and maintaining 6 ft distance, for less than 10 min. ,due to COVID restrictions- she states she lives alone and is I/ADLs , no DMEs other than occ. use of [employer] . She is currently unable to drive due to eye problems - she has a neighbor/Shanda that takes her where she needs to go.                  Ginger Springer RN

## 2020-11-09 NOTE — NURSING NOTE
IV leaked/painful when diltiazem being administered. Nursing staff unable to reinsert IV. PICC team consulted & also unsuccessful. Did return with ultrasound to insert cath. IVF & antibiotics initiated at this time

## 2020-11-09 NOTE — PLAN OF CARE
Goal Outcome Evaluation:  Plan of Care Reviewed With: patient  Progress: no change  Outcome Summary: Pt. with nausea/ vomiting most of shift. Meds given . MD made aware. Remains risk for falls & skin injury. No c/o pain

## 2020-11-09 NOTE — PLAN OF CARE
Problem: Adult Inpatient Plan of Care  Goal: Plan of Care Review  Outcome: Ongoing, Not Progressing  Flowsheets (Taken 11/9/2020 0419)  Progress: no change  Plan of Care Reviewed With: patient  Outcome Summary: Did well throughout the night. Complaint of headache but was fixed medication. Had some nausea in the early morning and was given antimemetic which seemed to help along with applying a cold compress to the back of her neck. Will continue to monitor while under my care.  Goal: Patient-Specific Goal (Individualized)  Outcome: Ongoing, Not Progressing  Goal: Absence of Hospital-Acquired Illness or Injury  Outcome: Ongoing, Not Progressing  Intervention: Identify and Manage Fall Risk  Recent Flowsheet Documentation  Taken 11/9/2020 0412 by Valerie Cordero RN  Safety Promotion/Fall Prevention: activity supervised  Taken 11/8/2020 2347 by Valerie Cordero RN  Safety Promotion/Fall Prevention: safety round/check completed  Taken 11/8/2020 1907 by Valerie Cordero RN  Safety Promotion/Fall Prevention:   safety round/check completed   fall prevention program maintained  Intervention: Prevent Skin Injury  Recent Flowsheet Documentation  Taken 11/9/2020 0412 by Valerie Cordero RN  Body Position: position changed independently  Taken 11/8/2020 2347 by Valerie Cordero RN  Body Position: position changed independently  Taken 11/8/2020 1907 by Valerie Cordero RN  Body Position: position changed independently  Intervention: Prevent Infection  Recent Flowsheet Documentation  Taken 11/8/2020 2347 by Valerie Cordero RN  Infection Prevention: single patient room provided  Taken 11/8/2020 1907 by Valerie Cordero RN  Infection Prevention: single patient room provided  Goal: Optimal Comfort and Wellbeing  Outcome: Ongoing, Not Progressing  Intervention: Provide Person-Centered Care  Recent Flowsheet Documentation  Taken 11/8/2020 1907 by Valerie Cordero RN  Trust Relationship/Rapport: care  explained  Goal: Readiness for Transition of Care  Outcome: Ongoing, Not Progressing  Intervention: Mutually Develop Transition Plan  Recent Flowsheet Documentation  Taken 11/8/2020 1915 by Valerie Cordero RN  Equipment Currently Used at Home: none  Transportation Anticipated: family or friend will provide  Patient/Family Anticipated Services at Transition: none  Patient/Family Anticipates Transition to: home with help/services     Problem: Fall Injury Risk  Goal: Absence of Fall and Fall-Related Injury  Outcome: Ongoing, Not Progressing  Intervention: Identify and Manage Contributors to Fall Injury Risk  Recent Flowsheet Documentation  Taken 11/8/2020 1907 by Valerie Cordero RN  Medication Review/Management: medications reviewed  Self-Care Promotion: independence encouraged  Intervention: Promote Injury-Free Environment  Recent Flowsheet Documentation  Taken 11/9/2020 0412 by Valerie Cordero RN  Safety Promotion/Fall Prevention: activity supervised  Taken 11/8/2020 2347 by Vaelrie Cordero RN  Safety Promotion/Fall Prevention: safety round/check completed  Taken 11/8/2020 1907 by Valerie Cordero RN  Safety Promotion/Fall Prevention:   safety round/check completed   fall prevention program maintained     Problem: Pain Acute  Goal: Optimal Pain Control  Outcome: Ongoing, Not Progressing  Intervention: Develop Pain Management Plan  Recent Flowsheet Documentation  Taken 11/8/2020 1907 by Valerie Cordero RN  Pain Management Interventions:   relaxation techniques promoted   position adjusted   pillow support provided  Intervention: Prevent or Manage Pain  Recent Flowsheet Documentation  Taken 11/8/2020 1907 by Valerie Cordero RN  Sleep/Rest Enhancement: regular sleep/rest pattern promoted  Intervention: Optimize Psychosocial Wellbeing  Recent Flowsheet Documentation  Taken 11/8/2020 1907 by Valerie Cordero RN  Supportive Measures: active listening utilized  Diversional Activities:   television    smartphone     Problem: Skin Injury Risk Increased  Goal: Skin Health and Integrity  Outcome: Ongoing, Not Progressing  Intervention: Optimize Skin Protection  Recent Flowsheet Documentation  Taken 11/8/2020 1907 by Valerie Cordero, RN  Pressure Reduction Techniques: frequent weight shift encouraged  Pressure Reduction Devices: pressure-redistributing mattress utilized  Skin Protection:   incontinence pads utilized   transparent dressing maintained   tubing/devices free from skin contact   Goal Outcome Evaluation:  Plan of Care Reviewed With: patient  Progress: no change  Outcome Summary: Did well throughout the night. Complaint of headache but was fixed medication. Had some nausea in the early morning and was given antimemetic which seemed to help along with applying a cold compress to the back of her neck. Will continue to monitor while under my care.

## 2020-11-09 NOTE — H&P
[facility]      Patient Name: Katie Reddy  : 1945  MRN: [id number]  Primary Care Physician: Brooklyn Contreras DO  Date of admission: 2020    Patient Care Team:  Brooklyn Contreras DO as PCP - General  Brooklyn Contreras DO as PCP - Family Medicine  Nicole Li RN as Ambulatory  ([facility])          Subjective   History Present Illness     Chief Complaint:   Chief Complaint   Patient presents with   • Headache       Ms. Reddy is a 75 y.o.  presents to [facility] complaining of headache and left eye lateral visual loss.         75-year-old female presents the ER with a chief complaint of headache which started after having recent subdural and subarachnoid hemorrhage with summary below.  The headaches were improving until Friday when the patient was awakened in the middle of the night with a headache worsening.  The patient also describes increasing nausea and vomiting and loss of lateral visual field on the right.  The patient reports that she is unable to perform her activities of daily living over the last 2 days secondary to the headache.  The patient was discharged from physical rehab on Thursday and normally lives independently in a duplex apartment.  She has not been able to drive since being discharged from the rehab hospital secondary to her visual disturbance and headache.  She has a neighbor who is able to help her at times but does not have family support.  She has been independent up until this recent health event of head bleed and it was unusual for her to have a headache.  She does not want to go back to physical rehab and wants to continue to live independently.     Review of records with summary: The patient was admitted on 10/22/2020 through 10/26/2020 with intracranial hemorrhage after prior presenting to the ER with headache, dizziness, nausea and vomiting x2 days.  She was admitted to the ICU after CT  showed a 4.2 x 2.9 cm left occipital/temporal lobe hematoma with surrounding edema along with subarachnoid hemorrhage seen in the posterior left lateral ventricle and subdural hemorrhage over the left tentorium cerebelli.  She was hypertensive and on a Cardene drip.  Patient had UA evidence of UTI with culture showing E. coli which was treated with Rocephin.  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  There was also documentation of probable trigeminal cephalgia possibly type II trigeminal neuralgia for which pregabapentin was recommended.  MRI was not performed as the patient has presence of a bladder stimulator in situ.  Recommendation was for discharge to physical rehab with repeat scans in about a month.  Noted residual right-sided lower extremity weakness.  Mention was made of the patient's polypharmacy including meclizine Flexeril Restoril and other medications which might contribute to the patient falling given her age.  The patient was discharged to [place] physical rehab.      Review of Systems   Constitution: Negative for chills and fever.   Gastrointestinal: Positive for nausea and vomiting. Negative for abdominal pain.   Genitourinary: Positive for urgency. Negative for dysuria and frequency.   Neurological: Positive for dizziness and headaches.   All other systems reviewed and are negative.          Personal History     Past Medical History:   Past Medical History:   Diagnosis Date   • Arthritis    • Bladder incontinence    • Bowel incontinence    • Breast cyst    • Colon polyp    • DEXA     OSTEOPENIA = 2018 (-1.3/ -1.7); 2020 (-1.7/ -1.7)   • GERD (gastroesophageal reflux disease)    • Headache    • Hyperlipidemia    • Hypertension    • Osteopenia    • Vitamin D deficiency        Surgical History:      Past Surgical History:   Procedure Laterality Date   • APPENDECTOMY     • BREAST CYST EXCISION     • BREAST LUMPECTOMY Left  1973    NEG   • BUNIONECTOMY Right 5/29/2020    Procedure: BUNIONECTOMY DEVONTE;  Surgeon: MARISSA Em DPM;  Location: [facility] MAIN OR;  Service: Podiatry;  Laterality: Right;   • CARPAL TUNNEL RELEASE Right 1980   • CHOLECYSTECTOMY     • COLON SURGERY      hemorrhoid banding   • COLONOSCOPY  2017 2017/ 2019 = jessica DESAI 2024   Dr. Mackey   • EYE SURGERY     • HERNIA REPAIR      Umbilical removal   • HYSTERECTOMY  1970   • OTHER SURGICAL HISTORY      bladder stimulator placement     pt cant have mri   • SUBTOTAL HYSTERECTOMY             Family History: family history includes Alcohol abuse in her father; Cancer (age of onset: 68) in her brother; Dementia in her sister; Diabetes in her brother, father, and maternal aunt; Heart disease in her brother, father, maternal grandfather, maternal grandmother, mother, and paternal grandfather; Hypertension in her brother, father, maternal grandfather, maternal grandmother, mother, paternal grandfather, and paternal grandmother; Liver disease in her paternal grandmother. Otherwise pertinent FHx was reviewed and unremarkable.     Social History:  reports that she has never smoked. She has never used smokeless tobacco. She reports that she does not drink alcohol or use drugs.      Medications:  Prior to Admission medications    Medication Sig Start Date End Date Taking? Authorizing Provider   amitriptyline (ELAVIL) 25 MG tablet Take 1 tablet by mouth Every Night for 30 days. 10/26/20 11/25/20 Yes Gordon Ocampo MD   amLODIPine (NORVASC) 2.5 MG tablet Take 1 tablet by mouth Daily for 30 doses. 10/19/20 11/18/20 Yes Bryn Moore MD   atorvastatin (LIPITOR) 80 MG tablet TAKE 1 TABLET BY MOUTH EVERY DAY 7/8/20  Yes Brooklyn Contreras,    celecoxib (CeleBREX) 100 MG capsule Take 1 capsule by mouth Daily. 8/25/20  Yes Brooklyn Contreras,    hydrocortisone 2.5 % cream Apply 1 application topically to the appropriate area as directed 2 (Two) Times a Day. 6/15/20  Yes Provider,  MD Maria Dolores   MYRBETRIQ 50 MG tablet sustained-release 24 hour 24 hr tablet Take 50 mg by mouth Daily. 1/16/20  Yes ProviderMaria Dolores MD   ondansetron (ZOFRAN) 4 MG tablet Take 1 tablet by mouth Every 8 (Eight) Hours As Needed for Nausea or Vomiting. 5/7/20  Yes Brooklyn Contreras DO   pantoprazole (PROTONIX) 40 MG EC tablet Take 1 tablet by mouth Daily. 8/20/20  Yes Brooklyn Contreras DO   pregabalin (LYRICA) 100 MG capsule Take 1 capsule by mouth Every 12 (Twelve) Hours for 30 days. 10/26/20 11/25/20 Yes Gordon Ocampo MD   sertraline (ZOLOFT) 100 MG tablet Take 1 tablet by mouth Daily. 8/20/20  Yes Brooklyn Contreras DO   albuterol (ACCUNEB) 1.25 MG/3ML nebulizer solution Take 3 mL by nebulization Every 6 (Six) Hours As Needed for Wheezing for up to 30 doses. 10/19/20   Bryn Moore MD   famotidine (PEPCID) 20 MG tablet Take 1 tablet by mouth 2 (Two) Times a Day As Needed for Indigestion or Heartburn. 8/20/20   Brooklyn Contreras DO       Allergies:    Allergies   Allergen Reactions   • Trazodone Irritability       Objective   Objective     Vital Signs  Temp:  [98.1 °F (36.7 °C)] 98.1 °F (36.7 °C)  Heart Rate:  [80-90] 80  Resp:  [16] 16  BP: (139-168)/(74-93) 139/74  SpO2:  [96 %-100 %] 100 %  on   ;   Device (Oxygen Therapy): room air  Body mass index is 24.96 kg/m².    Physical Exam  Vitals signs reviewed.   Constitutional:       General: She is not in acute distress.     Appearance: Normal appearance. She is normal weight. She is not ill-appearing, toxic-appearing or diaphoretic.   HENT:      Right Ear: External ear normal.      Left Ear: External ear normal.      Nose: Nose normal. No congestion.      Mouth/Throat:      Mouth: Mucous membranes are dry.   Eyes:      General: No scleral icterus.        Right eye: No discharge.         Left eye: No discharge.      Extraocular Movements: Extraocular movements intact.      Conjunctiva/sclera: Conjunctivae normal.      Pupils: Pupils are equal, round, and  reactive to light.   Neck:      Musculoskeletal: Normal range of motion and neck supple. No neck rigidity or muscular tenderness.   Cardiovascular:      Rate and Rhythm: Normal rate and regular rhythm.      Pulses: Normal pulses.      Heart sounds: Normal heart sounds. No murmur.   Pulmonary:      Effort: Pulmonary effort is normal. No respiratory distress.      Breath sounds: Normal breath sounds.   Abdominal:      General: Abdomen is flat. Bowel sounds are normal. There is no distension.      Palpations: Abdomen is soft.      Tenderness: There is no abdominal tenderness.   Musculoskeletal: Normal range of motion.         General: No swelling.   Skin:     General: Skin is warm and dry.   Neurological:      Mental Status: She is alert and oriented to person, place, and time.      Comments: Far Lateral loss of peripheral vision, upper quadrant    Psychiatric:         Mood and Affect: Mood normal.         Behavior: Behavior normal.         Thought Content: Thought content normal.         Judgment: Judgment normal.         Results Review:  I have personally reviewed most recent lab results and radiology images and interpretations and agree with findings.    Results from last 7 days   Lab Units 11/08/20  1518   WBC 10*3/mm3 7.40   HEMOGLOBIN g/dL 12.6   HEMATOCRIT % 38.7   PLATELETS 10*3/mm3 290     Results from last 7 days   Lab Units 11/08/20  1518   SODIUM mmol/L 139   POTASSIUM mmol/L 4.9   CHLORIDE mmol/L 104   CO2 mmol/L 23.0   BUN mg/dL 8   CREATININE mg/dL 0.76   GLUCOSE mg/dL 110*   CALCIUM mg/dL 9.7   ALT (SGPT) U/L 31   AST (SGOT) U/L 29     Estimated Creatinine Clearance: 55.5 mL/min (by C-G formula based on SCr of 0.76 mg/dL).  Brief Urine Lab Results  (Last result in the past 365 days)      Color   Clarity   Blood   Leuk Est   Nitrite   Protein   CREAT   Urine HCG        11/08/20 1725 Yellow Cloudy  Comment:  Result checked  Small (1+) Trace Negative Negative               Microbiology Results (last 10  days)     Procedure Component Value - Date/Time    COVID-19, ABBOTT IN-HOUSE,NP Swab (NO TRANSPORT MEDIA) 2 HR TAT - Swab, Nasopharynx [[id number]]  (Normal) Collected: 11/08/20 1518    Lab Status: Final result Specimen: Swab from Nasopharynx Updated: 11/08/20 1558     COVID19 Not Detected    Narrative:      Fact sheet for providers: https://www.fda.gov/media/452027/download     Fact sheet for patients: https://www.fda.gov/media/666277/download          ECG/EMG Results (most recent)     None                    Ct Head Without Contrast    Result Date: 11/8/2020  1.Parenchymal hematoma within left occipital lobe has decreased in size and bulk. Previously described hemorrhage within interventricular system and along left cerebellar tentorium has resolved. 2.No new acute intracranial process identified. 3.Minimal mucosal disease within right sphenoid sinus.  Electronically Signed By-DR. Todd Nunez MD On:11/8/2020 4:30 PM This report was finalized on [id number] by DR. Todd Nunez MD.    Xr Chest 1 View    Result Date: 11/8/2020  No acute cardiopulmonary process identified.  Electronically Signed By-DR. Todd Nunez MD On:11/8/2020 4:13 PM This report was finalized on [id number] by DR. Todd Nunez MD.    Chest x-ray reviewed with clear lung fields and normal cardiac silhouette.  Written report of CT of head without contrast reviewed.    Estimated Creatinine Clearance: 55.5 mL/min (by C-G formula based on SCr of 0.76 mg/dL).    Assessment/Plan   Assessment/Plan       Active Hospital Problems    Diagnosis  POA   • Weakness [R53.1]  Yes      Resolved Hospital Problems   No resolved problems to display.     Headache, acute on recently persistent, previously thought to possibly be trigeminal neuralgia: Continue amitriptyline; continue Lyrica; add Fioricet x1    --She was given hydrocodone in the ER without improvement of headache    Asthma, mild intermittent: Continue albuterol as needed    Leukourea  with recent diagnosis and treatment bacteria with culture showing E.  Coli, treated with Rocephin: Culture pending    --Patient received IV Rocephin x1 in ER    --Patient denies dysuria or increased frequency; she has chronic urgency with overactive bladder    Hypertension, chronic: Continue amlodipine    HLD, chronic: Continue atorvastatin    Chronic pain: Continue Celebrex    GERD, chronic: Continue Pepcid with formulary substitution; hold Protonix as formulary substitution for Pepcid is Protonix    Anxiety, chronic: Continue Zoloft; continue amitriptyline    Overactive bladder, chronic: Continue Myrbetriq      VTE Prophylaxis -   Mechanical Order History:      Ordered        11/08/20 2016  Place Sequential Compression Device  Once         11/08/20 2016  Maintain Sequential Compression Device  Continuous                 Pharmalogical Order History:     None          CODE STATUS:    Code Status and Medical Interventions:   Ordered at: 11/08/20 2016     Code Status:    CPR     Medical Interventions (Level of Support Prior to Arrest):    Full       This patient has been examined wearing appropriate Personal Protective Equipment and does not have current symptoms consistent with COVID-19 except headache which has been present since having subdural hematoma. 11/08/20      I discussed the patient's findings and my recommendations with patient.       Signature: Electronically signed by IBRAHIMA Gordon, 11/08/20, 8:59 PM EST.      [facility] Hospitalist Team

## 2020-11-10 ENCOUNTER — EPISODE CHANGES (OUTPATIENT)
Dept: CASE MANAGEMENT | Facility: OTHER | Age: 75
End: 2020-11-10

## 2020-11-10 ENCOUNTER — APPOINTMENT (OUTPATIENT)
Dept: CT IMAGING | Facility: HOSPITAL | Age: 75
End: 2020-11-10

## 2020-11-10 LAB — BACTERIA SPEC AEROBE CULT: ABNORMAL

## 2020-11-10 PROCEDURE — 93010 ELECTROCARDIOGRAM REPORT: CPT | Performed by: INTERNAL MEDICINE

## 2020-11-10 PROCEDURE — 25010000002 ONDANSETRON PER 1 MG: Performed by: NURSE PRACTITIONER

## 2020-11-10 PROCEDURE — 99232 SBSQ HOSP IP/OBS MODERATE 35: CPT | Performed by: HOSPITALIST

## 2020-11-10 PROCEDURE — 74176 CT ABD & PELVIS W/O CONTRAST: CPT

## 2020-11-10 PROCEDURE — 63710000001 PROMETHAZINE PER 25 MG: Performed by: NURSE PRACTITIONER

## 2020-11-10 PROCEDURE — 93005 ELECTROCARDIOGRAM TRACING: CPT | Performed by: HOSPITALIST

## 2020-11-10 PROCEDURE — G0378 HOSPITAL OBSERVATION PER HR: HCPCS

## 2020-11-10 RX ORDER — AMOXICILLIN 250 MG/1
500 CAPSULE ORAL EVERY 8 HOURS SCHEDULED
Status: DISCONTINUED | OUTPATIENT
Start: 2020-11-10 | End: 2020-11-14 | Stop reason: HOSPADM

## 2020-11-10 RX ADMIN — SERTRALINE HYDROCHLORIDE 100 MG: 100 TABLET ORAL at 08:38

## 2020-11-10 RX ADMIN — PREGABALIN 100 MG: 100 CAPSULE ORAL at 20:38

## 2020-11-10 RX ADMIN — ATORVASTATIN CALCIUM 80 MG: 40 TABLET, FILM COATED ORAL at 08:39

## 2020-11-10 RX ADMIN — AMOXICILLIN 500 MG: 250 CAPSULE ORAL at 20:38

## 2020-11-10 RX ADMIN — ONDANSETRON HYDROCHLORIDE 4 MG: 2 SOLUTION INTRAMUSCULAR; INTRAVENOUS at 15:12

## 2020-11-10 RX ADMIN — PREGABALIN 100 MG: 100 CAPSULE ORAL at 08:38

## 2020-11-10 RX ADMIN — AMLODIPINE BESYLATE 2.5 MG: 5 TABLET ORAL at 08:38

## 2020-11-10 RX ADMIN — AMOXICILLIN 500 MG: 250 CAPSULE ORAL at 14:16

## 2020-11-10 RX ADMIN — CELECOXIB 100 MG: 100 CAPSULE ORAL at 08:39

## 2020-11-10 RX ADMIN — AMITRIPTYLINE HYDROCHLORIDE 25 MG: 25 TABLET, FILM COATED ORAL at 20:38

## 2020-11-10 RX ADMIN — Medication 10 ML: at 08:36

## 2020-11-10 RX ADMIN — Medication 10 ML: at 20:39

## 2020-11-10 RX ADMIN — PROMETHAZINE HYDROCHLORIDE 25 MG: 25 TABLET ORAL at 18:03

## 2020-11-10 RX ADMIN — OXYBUTYNIN CHLORIDE 10 MG: 10 TABLET, EXTENDED RELEASE ORAL at 08:38

## 2020-11-10 RX ADMIN — ONDANSETRON HYDROCHLORIDE 4 MG: 2 SOLUTION INTRAMUSCULAR; INTRAVENOUS at 23:39

## 2020-11-10 RX ADMIN — HYDROCORTISONE 1 APPLICATION: 25 CREAM TOPICAL at 08:36

## 2020-11-10 NOTE — CONSULTS
Infectious Diseases Consult Note    Referring Provider: Gemma Avila MD    Reason for Consultation: UTI    Patient Care Team:  Brooklyn Contreras DO as PCP - General  Brooklyn Contreras DO as PCP - Family Medicine  Nicole Li RN as Ambulatory  ([facility] [place] [facility]    Chief complaint headache    Subjective     History of present illness:     This is 75-year-old white female who had a recent intracranial bleed.  Patient was admitted on November 8, 2020 with headache and weakness.  She was found to have UTI and urine culture grew Enterococcus faecalis.  The patient had history of recurrent UTI in the past.  This time she denied having any dysuria frequency or CVA tenderness.  The patient remained afebrile during his hospital stay.  The patient is currently on IV vancomycin.    Review of Systems   Review of Systems   Constitutional: Negative.    HENT: Negative.    Eyes: Negative.    Respiratory: Negative.    Cardiovascular: Negative.    Gastrointestinal: Negative.    Genitourinary: Negative.    Musculoskeletal: Negative.    Skin: Negative.    Neurological: Negative.    Hematological: Negative.    Psychiatric/Behavioral: Negative.        Medications  Medications Prior to Admission   Medication Sig Dispense Refill Last Dose   • amitriptyline (ELAVIL) 25 MG tablet Take 1 tablet by mouth Every Night for 30 days. 30 tablet 0 11/7/2020 at Unknown time   • amLODIPine (NORVASC) 2.5 MG tablet Take 1 tablet by mouth Daily for 30 doses. 30 tablet 0 11/8/2020 at Unknown time   • atorvastatin (LIPITOR) 80 MG tablet TAKE 1 TABLET BY MOUTH EVERY DAY 90 tablet 1 11/8/2020 at Unknown time   • celecoxib (CeleBREX) 100 MG capsule Take 1 capsule by mouth Daily. 90 capsule 1 11/8/2020 at Unknown time   • hydrocortisone 2.5 % cream Apply 1 application topically to the appropriate area as directed 2 (Two) Times a Day.   11/8/2020 at Unknown time   • MYRBETRIQ 50 MG tablet sustained-release 24 hour 24 hr tablet Take 50 mg by  mouth Daily.   11/8/2020 at Unknown time   • ondansetron (ZOFRAN) 4 MG tablet Take 1 tablet by mouth Every 8 (Eight) Hours As Needed for Nausea or Vomiting. 30 tablet 2 11/8/2020 at Unknown time   • pantoprazole (PROTONIX) 40 MG EC tablet Take 1 tablet by mouth Daily. 90 tablet 1 11/8/2020 at Unknown time   • pregabalin (LYRICA) 100 MG capsule Take 1 capsule by mouth Every 12 (Twelve) Hours for 30 days. 60 capsule 0 11/8/2020 at Unknown time   • sertraline (ZOLOFT) 100 MG tablet Take 1 tablet by mouth Daily. 90 tablet 1 11/8/2020 at Unknown time   • albuterol (ACCUNEB) 1.25 MG/3ML nebulizer solution Take 3 mL by nebulization Every 6 (Six) Hours As Needed for Wheezing for up to 30 doses. 30 vial 0    • famotidine (PEPCID) 20 MG tablet Take 1 tablet by mouth 2 (Two) Times a Day As Needed for Indigestion or Heartburn. 180 tablet 1        History  Past Medical History:   Diagnosis Date   • Arthritis    • Bladder incontinence    • Bowel incontinence    • Breast cyst    • Colon polyp    • DEXA     OSTEOPENIA = 2018 (-1.3/ -1.7); 2020 (-1.7/ -1.7)   • GERD (gastroesophageal reflux disease)    • Headache    • Hyperlipidemia    • Hypertension    • Osteopenia    • Vitamin D deficiency      Past Surgical History:   Procedure Laterality Date   • APPENDECTOMY     • BREAST CYST EXCISION     • BREAST LUMPECTOMY Left 1973    NEG   • BUNIONECTOMY Right 5/29/2020    Procedure: BUNIONECTOMY DEVONTE;  Surgeon: MARISSA Em DPM;  Location: [facility] OR;  Service: Podiatry;  Laterality: Right;   • CARPAL TUNNEL RELEASE Right 1980   • CHOLECYSTECTOMY     • COLON SURGERY      hemorrhoid banding   • COLONOSCOPY  2017    2017/ 2019 = [facility] 2024   Dr. Mackey   • EYE SURGERY     • HERNIA REPAIR      Umbilical removal   • HYSTERECTOMY  1970   • OTHER SURGICAL HISTORY      bladder stimulator placement     pt cant have mri   • SUBTOTAL HYSTERECTOMY         Family History  Family History   Problem Relation Age of Onset   • Heart disease  Mother    • Hypertension Mother    • Diabetes Father    • Heart disease Father    • Alcohol abuse Father    • Hypertension Father    • Diabetes Brother    • Heart disease Brother    • Cancer Brother 68        Prostate Cancer   • Hypertension Brother    • Diabetes Maternal Aunt    • Heart disease Maternal Grandmother    • Hypertension Maternal Grandmother    • Heart disease Maternal Grandfather    • Hypertension Maternal Grandfather    • Liver disease Paternal Grandmother    • Hypertension Paternal Grandmother    • Heart disease Paternal Grandfather    • Hypertension Paternal Grandfather    • Dementia Sister        Social History   reports that she has never smoked. She has never used smokeless tobacco. She reports that she does not drink alcohol or use drugs.    Allergies  Trazodone    Objective     Vital Signs   Vital Signs (last 24 hours)       11/09 0700  -  11/10 0659 11/10 0700  -  11/10 1344   Most Recent    Temp (°F) 97.5 -  98.2      97.4     97.4 (36.3)    Heart Rate 73 -  85    72 -  77     77    Resp 16 -  18      16     16    /83 -  184/89    126/80 -  144/78     126/80    SpO2 (%) 97 -  99      99     99          Physical Exam:  Physical Exam  Vitals signs and nursing note reviewed.   Constitutional:       Appearance: She is well-developed.   HENT:      Head: Normocephalic and atraumatic.   Eyes:      Pupils: Pupils are equal, round, and reactive to light.   Neck:      Musculoskeletal: Normal range of motion and neck supple.   Cardiovascular:      Rate and Rhythm: Normal rate and regular rhythm.      Heart sounds: Normal heart sounds.   Pulmonary:      Effort: Pulmonary effort is normal. No respiratory distress.      Breath sounds: Normal breath sounds. No wheezing or rales.   Abdominal:      General: Bowel sounds are normal. There is no distension.      Palpations: Abdomen is soft. There is no mass.      Tenderness: There is no abdominal tenderness. There is no guarding or rebound.    Musculoskeletal: Normal range of motion.         General: No deformity.   Skin:     General: Skin is warm.      Findings: No erythema or rash.   Neurological:      Mental Status: She is alert and oriented to person, place, and time.      Cranial Nerves: No cranial nerve deficit.         Microbiology  Microbiology Results (last 10 days)     Procedure Component Value - Date/Time    Urine Culture - Urine, Urine, Catheter [[id number]]  (Abnormal)  (Susceptibility) Collected: 11/08/20 1725    Lab Status: Final result Specimen: Urine, Catheter Updated: 11/10/20 0958     Urine Culture >100,000 CFU/mL Enterococcus faecalis    Susceptibility      Enterococcus faecalis     CABRERA     Ampicillin Susceptible     Levofloxacin Susceptible     Nitrofurantoin Susceptible     Tetracycline Susceptible     Vancomycin Susceptible                    COVID-19, [facility] IN-HOUSE,NP Swab (NO TRANSPORT MEDIA) 2 HR TAT - Swab, Nasopharynx [[id number]]  (Normal) Collected: 11/08/20 1518    Lab Status: Final result Specimen: Swab from Nasopharynx Updated: 11/08/20 1558     COVID19 Not Detected    Narrative:      Fact sheet for providers: https://www.fda.gov/media/683413/download     Fact sheet for patients: https://www.fda.gov/media/910290/download          Laboratory  Results from last 7 days   Lab Units 11/08/20  1518   WBC 10*3/mm3 7.40   HEMOGLOBIN g/dL 12.6   HEMATOCRIT % 38.7   PLATELETS 10*3/mm3 290     Results from last 7 days   Lab Units 11/08/20  1518   SODIUM mmol/L 139   POTASSIUM mmol/L 4.9   CHLORIDE mmol/L 104   CO2 mmol/L 23.0   BUN mg/dL 8   CREATININE mg/dL 0.76   GLUCOSE mg/dL 110*   CALCIUM mg/dL 9.7     Results from last 7 days   Lab Units 11/08/20  1518   SODIUM mmol/L 139   POTASSIUM mmol/L 4.9   CHLORIDE mmol/L 104   CO2 mmol/L 23.0   BUN mg/dL 8   CREATININE mg/dL 0.76   GLUCOSE mg/dL 110*   CALCIUM mg/dL 9.7                   Radiology  Imaging Results (Last 72 Hours)     Procedure Component Value Units Date/Time    CT  Abdomen Pelvis Without Contrast [[id number]] Collected: 11/10/20 1258     Updated: 11/10/20 1309    Narrative:      CT ABDOMEN PELVIS WO CONTRAST-     Date of Exam: 11/10/2020 12:38 PM     Indication: Lower abdominal pain. HISTORY of urinary tract infection.  Fever.     Comparison: [facility] 10/25/2020. CT abdomen and pelvis 11/20/2019     Technique: Contiguous axial CT images were obtained from the lung bases  to the pubic symphysis without contrast. Sagittal and coronal  reconstructions were performed.  Automated exposure control and  iterative reconstruction methods were used.     FINDINGS:     Large colonic stool burden is present, greatest within the transverse  and descending segments. No evidence of active bowel inflammation or  bowel obstruction. The appendix is not discretely visualized. No  pericecal inflammation is seen.     Heart size is normal. Minimal linear subsegmental atelectasis in left  lower lobe. No acute basilar consolidations. Surgical changes are  present at the esophagogastric junction.     Low-density lesion in the right hepatic lobe favored to represent a  cyst, measuring 1.4 cm, unchanged. Cholecystectomy. No abnormal biliary  dilation. The spleen, pancreas, adrenals, and right kidney are normal.  10 mm low-density lesion at the left lower renal pole compatible with a  cyst. No urinary tract stone or hydronephrosis.     Urinary bladder and rectum are normal. Presumed hysterectomy. Sacral  stimulator device is seen with leads projecting to the left of midline  in the posterior pelvis.     No acute osseous abnormalities. Sclerotic focus in the L1 vertebral body  is unchanged, favored to represent a benign bone island.          Impression:         1. No acute findings in the abdomen or pelvis.  2. Large colonic stool burden. Correlate for constipation.           Electronically Signed By-Maddi Bhagat MD On:11/10/2020 1:07 PM  This report was finalized on [id number] by  Maddi Bhagat MD.    CT  Head Without Contrast [[id number]] Collected: 11/08/20 1619     Updated: 11/08/20 1632    Narrative:      CT HEAD WO CONTRAST-     Date of Exam: 11/8/2020 4:02 PM     Indication: headache / recent brain bleed 10/21/20.     Comparison: 10/23/2020     Technique: CT scan of the head without IV contrast.  Automated exposure  control and iterative reconstruction methods were used.     FINDINGS  An evolving parenchymal hematoma within the left occipital lobe has  decreased in size and bulk measuring 3.2 x 1.8 cm, previously 4.1 x 2.7  cm. There remains some surrounding vasogenic edema. The previously  described hemorrhage within the intraventricular system and along the  left cerebellar tentorium has resolved. No new intracranial hemorrhage  is identified.      The ventricles are stable in caliber, with no midline shift. The basal  cisterns appear patent. The gray-white differentiation appears  preserved.      The calvarium appears intact. There is minimal mucosal disease within  the right sphenoid sinus. The mastoid air cells are well-aerated.       Impression:      1.Parenchymal hematoma within left occipital lobe has decreased in size  and bulk. Previously described hemorrhage within interventricular system  and along left cerebellar tentorium has resolved.  2.No new acute intracranial process identified.  3.Minimal mucosal disease within right sphenoid sinus.     Electronically Signed By-DR. Todd Nunez MD On:11/8/2020 4:30 PM  This report was finalized on [id number] by DR. Todd Nunez MD.    XR Chest 1 View [[id number]] Collected: 11/08/20 1612     Updated: 11/08/20 1615    Narrative:      XR CHEST 1 VW-     Date of Exam: 11/8/2020 3:35 PM     Indication: nv/ d weakness.     Comparison Exams: 10/19/2020     Technique: Single AP chest radiograph     FINDINGS:  The lungs are clear. The heart and mediastinal contours appear normal.  The pulmonary vasculature appears normal. The osseous structures  appear  intact. There are surgical clips in the epigastric region.       Impression:      No acute cardiopulmonary process identified.     Electronically Signed By-DR. Todd Nunez MD On:11/8/2020 4:13 PM  This report was finalized on [id number] by DR. Todd Nunez MD.          Cardiology      Results Review:  I have reviewed all clinical data, test, lab, and imaging results.       Schedule Meds  [START ON 11/11/2020] !Vancomycin Level Draw Needed, , Does not apply, Once  [START ON 11/12/2020] !Vancomycin Level Draw Needed, , Does not apply, Once  amitriptyline, 25 mg, Oral, Nightly  amLODIPine, 2.5 mg, Oral, Daily  atorvastatin, 80 mg, Oral, Daily  celecoxib, 100 mg, Oral, Daily  hydrocortisone, 1 application, Topical, BID  oxybutynin XL, 10 mg, Oral, Daily  pantoprazole, 40 mg, Oral, Q AM  pregabalin, 100 mg, Oral, Q12H  sertraline, 100 mg, Oral, Daily  sodium chloride, 10 mL, Intravenous, Q12H  vancomycin, 1,000 mg, Intravenous, Q24H        Infusion Meds  Pharmacy to dose vancomycin,   sodium chloride, 75 mL/hr, Last Rate: Stopped (11/09/20 3039)        PRN Meds  acetaminophen **OR** acetaminophen **OR** acetaminophen  •  albuterol  •  metoprolol tartrate  •  ondansetron **OR** ondansetron  •  Pharmacy to dose vancomycin  •  promethazine **OR** promethazine **OR** promethazine  •  [COMPLETED] Insert peripheral IV **AND** sodium chloride  •  sodium chloride      Assessment/Plan       Assessment    UTI with Enterococcus faecalis.  The organism is susceptible to ampicillin  CT scan of the abdomen pelvis was negative    Patient had history of recurrent UTI    Recent history of intracranial bleed    Plan    Discontinue IV vancomycin  Amoxicillin 500 mg p.o. 3 times daily for 7 days  Okay to discharge from infectious disease point    Martha Tan MD  11/10/20  13:44 EST      Note is dictated utilizing voice recognition software/Dragon

## 2020-11-10 NOTE — PLAN OF CARE
Goal Outcome Evaluation:  Plan of Care Reviewed With: patient  Progress: improving  Outcome Summary: no further N&V. remains risk for skin injury. Not at risk for falls now. ABT changed to PO. May d/c home soon.

## 2020-11-10 NOTE — PROGRESS NOTES
[facility] Daily Progress Note      Hospitalist Team  LOS 0 days      Patient Care Team:  Brooklyn Contreras DO as PCP - General  Brooklyn Contreras DO as PCP - Family Medicine  Nicole Li RN as Ambulatory  ([facility])    Patient Location: 246/1      Subjective   Subjective     Chief Complaint / Subjective  Chief Complaint   Patient presents with   • Headache       Present on Admission:  • Weakness      Brief Synopsis of Hospital Course/HPI  75-year-old female presents the ER with a chief complaint of headache which started after having recent subdural and subarachnoid hemorrhage with summary below.  The headaches were improving until Friday when the patient was awakened in the middle of the night with a headache worsening.  The patient also describes increasing nausea and vomiting and loss of lateral visual field on the right.  The patient reports that she is unable to perform her activities of daily living over the last 2 days secondary to the headache.  The patient was discharged from physical rehab on Thursday and normally lives independently in a duplex apartment.  She has not been able to drive since being discharged from the rehab hospital secondary to her visual disturbance and headache.  She has a neighbor who is able to help her at times but does not have family support.  She has been independent up until this recent health event of head bleed and it was unusual for her to have a headache.  She does not want to go back to physical rehab and wants to continue to live independently.      Review of records with summary: The patient was admitted on 10/22/2020 through 10/26/2020 with intracranial hemorrhage after prior presenting to the ER with headache, dizziness, nausea and vomiting x2 days.  She was admitted to the ICU after CT showed a 4.2 x 2.9 cm left occipital/temporal lobe hematoma with surrounding edema along with subarachnoid hemorrhage  seen in the posterior left lateral ventricle and subdural hemorrhage over the left tentorium cerebelli.  She was hypertensive and on a Cardene drip.  Patient had UA evidence of UTI with culture showing E. coli which was treated with Rocephin.  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  There was also documentation of probable trigeminal cephalgia possibly type II trigeminal neuralgia for which pregabapentin was recommended.  MRI was not performed as the patient has presence of a bladder stimulator in situ.  Recommendation was for discharge to physical rehab with repeat scans in about a month.  Noted residual right-sided lower extremity weakness.  Mention was made of the patient's polypharmacy including meclizine Flexeril Restoril and other medications which might contribute to the patient falling given her age.  The patient was discharged to [facility] rehab.    Date::    11/9/2020  Tachycardia, noted to be in afib on monitor, resolved w/ IV push diltiazem, continues to be significantly nauseated, blood pressure on soft side     11/10/2020  Reports n/v significantly improved, tachycardia improved. Does report having issues with constipation and back pain since having inter-stim device placed, states to me that she wants to have the device taken out.     ROS  Constitution: Negative for chills and fever.   Gastrointestinal: Positive for nausea and vomiting. Negative for abdominal pain.   Genitourinary: Positive for urgency. Negative for dysuria and frequency.   Neurological: Positive for dizziness and headaches.   All other systems reviewed and are negative.    Objective   Objective      Vital Signs  Temp:  [97.4 °F (36.3 °C)-98.2 °F (36.8 °C)] 97.4 °F (36.3 °C)  Heart Rate:  [72-82] 77  Resp:  [16-17] 16  BP: (126-175)/(74-83) 126/80  Oxygen Therapy  SpO2: 99 %  Pulse Oximetry Type: Intermittent  Device (Oxygen Therapy): room  "air  Flowsheet Rows      First Filed Value   Admission Height  151.1 cm (59.5\") Documented at 11/08/2020 1444   Admission Weight  57 kg (125 lb 10.6 oz) Documented at 11/08/2020 1444        Intake & Output (last 3 days)       11/07 0701 - 11/08 0700 11/08 0701 - 11/09 0700 11/09 0701 - 11/10 0700 11/10 0701 - 11/11 0700    P.O.   120 720    Total Intake(mL/kg)   120 (2) 720 (11.9)    Net   +120 +720            Urine Unmeasured Occurrence  1 x      Stool Unmeasured Occurrence    1 x        Lines, Drains & Airways    Active LDAs     Name:   Placement date:   Placement time:   Site:   Days:    Peripheral IV 11/09/20 1735 Left Antecubital   11/09/20 1735    Antecubital   less than 1                  Physical Exam:    Physical Exam  Physical Exam  Vitals signs reviewed.   Constitutional:       General: She is not in acute distress.     Appearance: Normal appearance. She is normal weight. She is not ill-appearing, toxic-appearing or diaphoretic.   HENT:      Right Ear: External ear normal.      Left Ear: External ear normal.      Nose: Nose normal. No congestion.      Mouth/Throat:      Mouth: Mucous membranes are dry.   Eyes:      General: No scleral icterus.        Right eye: No discharge.         Left eye: No discharge.      Extraocular Movements: Extraocular movements intact.      Conjunctiva/sclera: Conjunctivae normal.      Pupils: Pupils are equal, round, and reactive to light.   Neck:      Musculoskeletal: Normal range of motion and neck supple. No neck rigidity or muscular tenderness.   Cardiovascular:      Rate and Rhythm: Normal rate and regular rhythm.      Pulses: Normal pulses.      Heart sounds: Normal heart sounds. No murmur.   Pulmonary:      Effort: Pulmonary effort is normal. No respiratory distress.      Breath sounds: Normal breath sounds.   Abdominal:      General: Abdomen is flat. Bowel sounds are normal. There is no distension.      Palpations: Abdomen is soft.      Tenderness: There is no " abdominal tenderness.   Musculoskeletal: Normal range of motion.         General: No swelling.   Skin:     General: Skin is warm and dry.   Neurological:      Mental Status: She is alert and oriented to person, place, and time.      Comments: Far Lateral loss of peripheral vision, upper quadrant    Psychiatric:         Mood and Affect: Mood normal.         Behavior: Behavior normal.         Thought Content: Thought content normal.         Judgment: Judgment normal.     Procedures:              Results Review:     I reviewed the patient's new clinical results.      Lab Results (last 24 hours)     Procedure Component Value Units Date/Time    Urine Culture - Urine, Urine, Catheter [[id number]]  (Abnormal)  (Susceptibility) Collected: 11/08/20 1725    Specimen: Urine, Catheter Updated: 11/10/20 0958     Urine Culture >100,000 CFU/mL Enterococcus faecalis    Susceptibility      Enterococcus faecalis     CABRERA     Ampicillin Susceptible     Levofloxacin Susceptible     Nitrofurantoin Susceptible     Tetracycline Susceptible     Vancomycin Susceptible                        No results found for: HGBA1C                Microbiology Results (last 10 days)     Procedure Component Value - Date/Time    Urine Culture - Urine, Urine, Catheter [[id number]]  (Abnormal)  (Susceptibility) Collected: 11/08/20 1725    Lab Status: Final result Specimen: Urine, Catheter Updated: 11/10/20 0958     Urine Culture >100,000 CFU/mL Enterococcus faecalis    Susceptibility      Enterococcus faecalis     CABRERA     Ampicillin Susceptible     Levofloxacin Susceptible     Nitrofurantoin Susceptible     Tetracycline Susceptible     Vancomycin Susceptible                    COVID-19, [facility] IN-HOUSE,NP Swab (NO TRANSPORT MEDIA) 2 HR TAT - Swab, Nasopharynx [[id number]]  (Normal) Collected: 11/08/20 1518    Lab Status: Final result Specimen: Swab from Nasopharynx Updated: 11/08/20 1558     COVID19 Not Detected    Narrative:      Fact sheet for providers:  https://www.fda.gov/media/311886/download     Fact sheet for patients: https://www.fda.gov/media/853451/download          ECG/EMG Results (most recent)     Procedure Component Value Units Date/Time    ECG 12 Lead [[id number]] Collected: 11/09/20 1507     Updated: 11/09/20 1510     QT Interval 334 ms     Narrative:      HEART RATE= 153  bpm  RR Interval= 395  ms  MO Interval=   ms  P Horizontal Axis=   deg  P Front Axis=   deg  QRSD Interval= 134  ms  QT Interval= 334  ms  QRS Axis= 69  deg  T Wave Axis= -84  deg  - ABNORMAL ECG -  Wide-QRS tachycardia  When compared with ECG of 22-Oct-2020 15:50:48,  Significant rate increase  Electronically Signed By:   Date and Time of Study: 2020-11-09 15:07:34    ECG 12 Lead [[id number]] Collected: 11/10/20 1546     Updated: 11/10/20 1548     QT Interval 369 ms     Narrative:      HEART RATE= 76  bpm  RR Interval= 756  ms  MO Interval= 168  ms  P Horizontal Axis= -5  deg  P Front Axis= 69  deg  QRSD Interval= 88  ms  QT Interval= 369  ms  QRS Axis= 66  deg  T Wave Axis= 36  deg  - ABNORMAL ECG -  Sinus rhythm  Multiple ventricular premature complexes  ST elevation, consider inferior injury  Electronically Signed By:   Date and Time of Study: 2020-11-10 15:46:15                    Ct Abdomen Pelvis Without Contrast    Result Date: 11/10/2020   1. No acute findings in the abdomen or pelvis. 2. Large colonic stool burden. Correlate for constipation.    Electronically Signed By-Maddi Bhagat MD On:11/10/2020 1:07 PM This report was finalized on [id number] by  Maddi Bhagat MD.    Ct Head Without Contrast    Result Date: 11/8/2020  1.Parenchymal hematoma within left occipital lobe has decreased in size and bulk. Previously described hemorrhage within interventricular system and along left cerebellar tentorium has resolved. 2.No new acute intracranial process identified. 3.Minimal mucosal disease within right sphenoid sinus.  Electronically Signed By-DR. Todd Nunez MD  On:11/8/2020 4:30 PM This report was finalized on [id number] by DR. Todd Nunez MD.    Xr Chest 1 View    Result Date: 11/8/2020  No acute cardiopulmonary process identified.  Electronically Signed By-DR. Todd Nunez MD On:11/8/2020 4:13 PM This report was finalized on [id number] by DR. Todd Nunez MD.      Xrays, labs reviewed personally by physician.    Medication Review:   I have reviewed the patient's current medication list      Scheduled Meds  amitriptyline, 25 mg, Oral, Nightly  amLODIPine, 2.5 mg, Oral, Daily  amoxicillin, 500 mg, Oral, Q8H  atorvastatin, 80 mg, Oral, Daily  celecoxib, 100 mg, Oral, Daily  hydrocortisone, 1 application, Topical, BID  oxybutynin XL, 10 mg, Oral, Daily  pantoprazole, 40 mg, Oral, Q AM  pregabalin, 100 mg, Oral, Q12H  sertraline, 100 mg, Oral, Daily  sodium chloride, 10 mL, Intravenous, Q12H        Meds Infusions  sodium chloride, 75 mL/hr, Last Rate: Stopped (11/09/20 2155)        Meds PRN  •  acetaminophen **OR** acetaminophen **OR** acetaminophen  •  albuterol  •  metoprolol tartrate  •  ondansetron **OR** ondansetron  •  promethazine **OR** promethazine **OR** promethazine  •  [COMPLETED] Insert peripheral IV **AND** sodium chloride  •  sodium chloride    Assessment/Plan   Assessment/Plan     Active Hospital Problems    Diagnosis  POA   • Weakness [R53.1]  Yes      Resolved Hospital Problems   No resolved problems to display.     Sepsis 2/2 to UTI   -Enterococcus growing in urine   -D/w pharmacy, patient started on vanc   -Nausea/vomiting likely secondary to above   -Consider abdominal imaging   -ID consulted, appreciate recs     Headache, acute on recently persistent, previously thought to possibly be trigeminal neuralgia: Continue amitriptyline; continue Lyrica; add Fioricet x1  --CT head without acute changes, showing improvement from previous   --Avoid opioids, caused significant nausea/vomiting and short-term memory loss     A. Fib   -Stat EKG  unclear for dx of a.fib, will repeat EKG today   -Diltiazem IV push as needed   Likely secondary to underlying sepsis   -Repeat EKG pending      Asthma, mild intermittent: Continue albuterol as needed     Leukourea with recent diagnosis and treatment bacteria with culture showing E.  Coli, treated with Rocephin: Culture pending    --Patient received IV Rocephin x1 in ER    --Patient denies dysuria or increased frequency; she has chronic urgency with overactive bladder     Hypertension, chronic: Continue amlodipine   HLD, chronic: Continue atorvastatin     Chronic pain: Continue Celebrex     GERD, chronic: Continue Pepcid with formulary substitution; hold Protonix as formulary substitution for Pepcid is Protonix     Anxiety, chronic: Continue Zoloft; continue amitriptyline     Overactive bladder, chronic: Continue Myrbetriq    VTE Prophylaxis - SCDs.      Code Status -   Code Status and Medical Interventions:   Ordered at: 11/08/20 2016     Code Status:    CPR     Medical Interventions (Level of Support Prior to Arrest):    Full       Discharge Planning    Continued Care and Services - Admitted Since 11/8/2020    Coordination has not been started for this encounter.           Electronically signed by Gemma Avila MD, 11/10/20, 17:24 EST.  [facility] Hospitalist Team

## 2020-11-10 NOTE — PROGRESS NOTES
[facility] Medicine Services Daily Progress Note      Hospitalist Team  LOS 0 days      Patient Care Team:  Brooklyn Contreras DO as PCP - General  Brooklyn Contreras DO as PCP - Family Medicine  Nicole Li RN as Ambulatory  ([facility])    Patient Location: 246/1      Subjective   Subjective     Chief Complaint / Subjective  Chief Complaint   Patient presents with   • Headache       Present on Admission:  • Weakness      Brief Synopsis of Hospital Course/HPI  75-year-old female presents the ER with a chief complaint of headache which started after having recent subdural and subarachnoid hemorrhage with summary below.  The headaches were improving until Friday when the patient was awakened in the middle of the night with a headache worsening.  The patient also describes increasing nausea and vomiting and loss of lateral visual field on the right.  The patient reports that she is unable to perform her activities of daily living over the last 2 days secondary to the headache.  The patient was discharged from physical rehab on Thursday and normally lives independently in a duplex apartment.  She has not been able to drive since being discharged from the rehab hospital secondary to her visual disturbance and headache.  She has a neighbor who is able to help her at times but does not have family support.  She has been independent up until this recent health event of head bleed and it was unusual for her to have a headache.  She does not want to go back to physical rehab and wants to continue to live independently.      Review of records with summary: The patient was admitted on 10/22/2020 through 10/26/2020 with intracranial hemorrhage after prior presenting to the ER with headache, dizziness, nausea and vomiting x2 days.  She was admitted to the ICU after CT showed a 4.2 x 2.9 cm left occipital/temporal lobe hematoma with surrounding edema along with subarachnoid hemorrhage  "seen in the posterior left lateral ventricle and subdural hemorrhage over the left tentorium cerebelli.  She was hypertensive and on a Cardene drip.  Patient had UA evidence of UTI with culture showing E. coli which was treated with Rocephin.  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  There was also documentation of probable trigeminal cephalgia possibly type II trigeminal neuralgia for which pregabapentin was recommended.  MRI was not performed as the patient has presence of a bladder stimulator in situ.  Recommendation was for discharge to physical rehab with repeat scans in about a month.  Noted residual right-sided lower extremity weakness.  Mention was made of the patient's polypharmacy including meclizine Flexeril Restoril and other medications which might contribute to the patient falling given her age.  The patient was discharged to [facility].    Date::    11/9/2020  Tachycardia, noted to be in afib on monitor, resolved w/ IV push diltiazem, continues to be significantly nauseated, blood pressure on soft side       ROS  Constitution: Negative for chills and fever.   Gastrointestinal: Positive for nausea and vomiting. Negative for abdominal pain.   Genitourinary: Positive for urgency. Negative for dysuria and frequency.   Neurological: Positive for dizziness and headaches.   All other systems reviewed and are negative.    Objective   Objective      Vital Signs  Temp:  [97.5 °F (36.4 °C)-98.2 °F (36.8 °C)] 97.5 °F (36.4 °C)  Heart Rate:  [73-85] 73  Resp:  [16-18] 16  BP: (161-184)/(74-89) 161/83  Oxygen Therapy  SpO2: 99 %  Pulse Oximetry Type: Intermittent  Device (Oxygen Therapy): room air  Flowsheet Rows      First Filed Value   Admission Height  151.1 cm (59.5\") Documented at 11/08/2020 1444   Admission Weight  57 kg (125 lb 10.6 oz) Documented at 11/08/2020 1444        Intake & Output (last 3 days)       11/07 0701 " - 11/08 0700 11/08 0701 - 11/09 0700 11/09 0701 - 11/10 0700 11/10 0701 - 11/11 0700    P.O.   120     Total Intake(mL/kg)   120 (2)     Net   +120             Urine Unmeasured Occurrence  1 x          Lines, Drains & Airways    Active LDAs     Name:   Placement date:   Placement time:   Site:   Days:    Peripheral IV 11/09/20 1735 Left Antecubital   11/09/20 1735    Antecubital   less than 1                  Physical Exam:    Physical Exam  Physical Exam  Vitals signs reviewed.   Constitutional:       General: She is not in acute distress.     Appearance: Normal appearance. She is normal weight. She is not ill-appearing, toxic-appearing or diaphoretic.   HENT:      Right Ear: External ear normal.      Left Ear: External ear normal.      Nose: Nose normal. No congestion.      Mouth/Throat:      Mouth: Mucous membranes are dry.   Eyes:      General: No scleral icterus.        Right eye: No discharge.         Left eye: No discharge.      Extraocular Movements: Extraocular movements intact.      Conjunctiva/sclera: Conjunctivae normal.      Pupils: Pupils are equal, round, and reactive to light.   Neck:      Musculoskeletal: Normal range of motion and neck supple. No neck rigidity or muscular tenderness.   Cardiovascular:      Rate and Rhythm: Normal rate and regular rhythm.      Pulses: Normal pulses.      Heart sounds: Normal heart sounds. No murmur.   Pulmonary:      Effort: Pulmonary effort is normal. No respiratory distress.      Breath sounds: Normal breath sounds.   Abdominal:      General: Abdomen is flat. Bowel sounds are normal. There is no distension.      Palpations: Abdomen is soft.      Tenderness: There is no abdominal tenderness.   Musculoskeletal: Normal range of motion.         General: No swelling.   Skin:     General: Skin is warm and dry.   Neurological:      Mental Status: She is alert and oriented to person, place, and time.      Comments: Far Lateral loss of peripheral vision, upper quadrant     Psychiatric:         Mood and Affect: Mood normal.         Behavior: Behavior normal.         Thought Content: Thought content normal.         Judgment: Judgment normal.     Procedures:              Results Review:     I reviewed the patient's new clinical results.      Lab Results (last 24 hours)     Procedure Component Value Units Date/Time    Urine Culture - Urine, Urine, Catheter [[id number]]  (Abnormal) Collected: 11/08/20 1725    Specimen: Urine, Catheter Updated: 11/09/20 1053     Urine Culture >100,000 CFU/mL Enterococcus species        No results found for: HGBA1C                Microbiology Results (last 10 days)     Procedure Component Value - Date/Time    Urine Culture - Urine, Urine, Catheter [[id number]]  (Abnormal) Collected: 11/08/20 1725    Lab Status: Preliminary result Specimen: Urine, Catheter Updated: 11/09/20 1053     Urine Culture >100,000 CFU/mL Enterococcus species    COVID-19, ABBOTT IN-HOUSE,NP Swab (NO TRANSPORT MEDIA) 2 HR TAT - Swab, Nasopharynx [[id number]]  (Normal) Collected: 11/08/20 1518    Lab Status: Final result Specimen: Swab from Nasopharynx Updated: 11/08/20 1558     COVID19 Not Detected    Narrative:      Fact sheet for providers: https://www.fda.gov/media/298301/download     Fact sheet for patients: https://www.fda.gov/media/962188/download          ECG/EMG Results (most recent)     Procedure Component Value Units Date/Time    ECG 12 Lead [[id number]] Collected: 11/09/20 1507     Updated: 11/09/20 1510     QT Interval 334 ms     Narrative:      HEART RATE= 153  bpm  RR Interval= 395  ms  GA Interval=   ms  P Horizontal Axis=   deg  P Front Axis=   deg  QRSD Interval= 134  ms  QT Interval= 334  ms  QRS Axis= 69  deg  T Wave Axis= -84  deg  - ABNORMAL ECG -  Wide-QRS tachycardia  When compared with ECG of 22-Oct-2020 15:50:48,  Significant rate increase  Electronically Signed By:   Date and Time of Study: 2020-11-09 15:07:34                    Ct Head Without Contrast    Result  Date: 11/8/2020  1.Parenchymal hematoma within left occipital lobe has decreased in size and bulk. Previously described hemorrhage within interventricular system and along left cerebellar tentorium has resolved. 2.No new acute intracranial process identified. 3.Minimal mucosal disease within right sphenoid sinus.  Electronically Signed By-DR. Todd Nunez MD On:11/8/2020 4:30 PM This report was finalized on [id number] by DR. Todd Nunez MD.    Xr Chest 1 View    Result Date: 11/8/2020  No acute cardiopulmonary process identified.  Electronically Signed By-DR. Todd Nunez MD On:11/8/2020 4:13 PM This report was finalized on [id number] by DR. Todd Nunez MD.      Xrays, labs reviewed personally by physician.    Medication Review:   I have reviewed the patient's current medication list      Scheduled Meds  [START ON 11/11/2020] !Vancomycin Level Draw Needed, , Does not apply, Once  [START ON 11/12/2020] !Vancomycin Level Draw Needed, , Does not apply, Once  amitriptyline, 25 mg, Oral, Nightly  amLODIPine, 2.5 mg, Oral, Daily  atorvastatin, 80 mg, Oral, Daily  celecoxib, 100 mg, Oral, Daily  hydrocortisone, 1 application, Topical, BID  oxybutynin XL, 10 mg, Oral, Daily  pantoprazole, 40 mg, Oral, Q AM  pregabalin, 100 mg, Oral, Q12H  sertraline, 100 mg, Oral, Daily  sodium chloride, 10 mL, Intravenous, Q12H  vancomycin, 1,000 mg, Intravenous, Q24H        Meds Infusions  Pharmacy to dose vancomycin,   sodium chloride, 75 mL/hr, Last Rate: Stopped (11/09/20 8921)        Meds PRN  acetaminophen **OR** acetaminophen **OR** acetaminophen  •  albuterol  •  metoprolol tartrate  •  ondansetron **OR** ondansetron  •  Pharmacy to dose vancomycin  •  promethazine **OR** promethazine **OR** promethazine  •  [COMPLETED] Insert peripheral IV **AND** sodium chloride  •  sodium chloride    Assessment/Plan   Assessment/Plan     Active Hospital Problems    Diagnosis  POA   • Weakness [R53.1]  Yes      Resolved  Hospital Problems   No resolved problems to display.     Headache, acute on recently persistent, previously thought to possibly be trigeminal neuralgia: Continue amitriptyline; continue Lyrica; add Fioricet x1    --She was given hydrocodone in the ER without improvement of headache  --CT head without acute changes, showing improvement from previous     Sepsis 2/2 to UTI   -Enterococcus growing in urine   -D/w pharmacy, patient started on vanc   -Nausea/vomiting likely secondary to above   -Consider abdominal imaging     A. Fib   -New onset?  -Stat EKG  -Diltiazem IV push   Likely secondary to underlying sepsis      Asthma, mild intermittent: Continue albuterol as needed     Leukourea with recent diagnosis and treatment bacteria with culture showing E.  Coli, treated with Rocephin: Culture pending    --Patient received IV Rocephin x1 in ER    --Patient denies dysuria or increased frequency; she has chronic urgency with overactive bladder     Hypertension, chronic: Continue amlodipine     HLD, chronic: Continue atorvastatin     Chronic pain: Continue Celebrex     GERD, chronic: Continue Pepcid with formulary substitution; hold Protonix as formulary substitution for Pepcid is Protonix     Anxiety, chronic: Continue Zoloft; continue amitriptyline     Overactive bladder, chronic: Continue Myrbetriq    VTE Prophylaxis - SCDs.      Code Status -   Code Status and Medical Interventions:   Ordered at: 11/08/20 2016     Code Status:    CPR     Medical Interventions (Level of Support Prior to Arrest):    Full       Discharge Planning    Continued Care and Services - Admitted Since 11/8/2020    Coordination has not been started for this encounter.           Electronically signed by Gemma Avila MD, 11/10/20, 08:36 EST.  [facility] Hospitalist Team

## 2020-11-10 NOTE — PLAN OF CARE
Problem: Adult Inpatient Plan of Care  Goal: Absence of Hospital-Acquired Illness or Injury  Intervention: Identify and Manage Fall Risk  Recent Flowsheet Documentation  Taken 11/10/2020 0024 by Valerie Cordero RN  Safety Promotion/Fall Prevention: safety round/check completed  Taken 11/9/2020 2105 by Valerie Cordero RN  Safety Promotion/Fall Prevention: safety round/check completed  Intervention: Prevent Skin Injury  Recent Flowsheet Documentation  Taken 11/10/2020 0024 by Valerie Cordero RN  Body Position: position changed independently  Taken 11/9/2020 2105 by Valerie Cordero RN  Body Position: position changed independently  Intervention: Prevent Infection  Recent Flowsheet Documentation  Taken 11/10/2020 0024 by Valerie Cordero RN  Infection Prevention: personal protective equipment utilized  Taken 11/9/2020 2105 by Valerie Cordero RN  Infection Prevention: personal protective equipment utilized  Goal: Optimal Comfort and Wellbeing  Intervention: Provide Person-Centered Care  Recent Flowsheet Documentation  Taken 11/9/2020 2105 by Valerie Cordero RN  Trust Relationship/Rapport: care explained     Problem: Fall Injury Risk  Goal: Absence of Fall and Fall-Related Injury  Intervention: Identify and Manage Contributors to Fall Injury Risk  Recent Flowsheet Documentation  Taken 11/9/2020 2105 by Valerie Cordero RN  Medication Review/Management: medications reviewed  Self-Care Promotion: independence encouraged  Intervention: Promote Injury-Free Environment  Recent Flowsheet Documentation  Taken 11/10/2020 0024 by Valerie Cordero RN  Safety Promotion/Fall Prevention: safety round/check completed  Taken 11/9/2020 2105 by Valerie Cordero RN  Safety Promotion/Fall Prevention: safety round/check completed     Problem: Pain Acute  Goal: Optimal Pain Control  Intervention: Develop Pain Management Plan  Recent Flowsheet Documentation  Taken 11/9/2020 2105 by Valerie Cordero RN  Pain  Management Interventions:   relaxation techniques promoted   heat applied   see MAR  Intervention: Optimize Psychosocial Wellbeing  Recent Flowsheet Documentation  Taken 11/9/2020 2105 by Valerie Cordero, RN  Supportive Measures: active listening utilized  Diversional Activities:   smartphone   television     Problem: Skin Injury Risk Increased  Goal: Skin Health and Integrity  Intervention: Optimize Skin Protection  Recent Flowsheet Documentation  Taken 11/10/2020 0024 by Valerie Cordero, RN  Head of Bed (HOB): HOB elevated  Taken 11/9/2020 2105 by Valerie Cordero, RN  Pressure Reduction Techniques: frequent weight shift encouraged  Head of Bed (HOB): HOB elevated  Pressure Reduction Devices:   pressure-redistributing mattress utilized   positioning supports utilized  Skin Protection:   incontinence pads utilized   transparent dressing maintained   Goal Outcome Evaluation:  Plan of Care Reviewed With: patient  Progress: no change  Outcome Summary: Did well throughout the night. Complaint of headache but was fixed medication. Had some nausea in the early morning and was given antimemetic which seemed to help along with applying a cold compress to the back of her neck. Will continue to monitor while under my care.

## 2020-11-10 NOTE — PROGRESS NOTES
Discharge Planning Assessment   [facility]     Patient Name: Katie Reddy  MRN: [id number]  Today's Date: 11/10/2020    Admit Date: 11/8/2020    Discharge Needs Assessment     Row Name 11/10/20 1316       Living Environment    Lives With  alone    Current Living Arrangements  home/apartment/condo    Primary Care Provided by  self    Provides Primary Care For  no one, unable/limited ability to care for self    Family Caregiver if Needed  friend(s)    Family Caregiver Names  neighbor    Able to Return to Prior Arrangements  yes       Resource/Environmental Concerns    Resource/Environmental Concerns  none    Transportation Concerns  car, none       Transition Planning    Patient/Family Anticipates Transition to  home with help/services    Patient/Family Anticipated Services at Transition  home health care    Transportation Anticipated  family or friend will provide       Discharge Needs Assessment    Readmission Within the Last 30 Days  other (see comments) will be a readmit if changes to inpt status    Current Outpatient/Agency/Support Group  homecare agency    Concerns to be Addressed  discharge planning    Anticipated Changes Related to Illness  none    Equipment Needed After Discharge  none    Provided Post Acute Provider List?  N/A    N/A Provider List Comment  current with [facility]    Current Discharge Risk  lives alone        Discharge Plan     Row Name 11/10/20 8347       Plan    Plan  Anticipate routine home with [facility], EDYTA order placed.    Plan Comments  Per Carmen liaision at [facility], patient recently discharged from their facility with [facility]. EDYTA order placed and notified Omar LYNN barriers: IVF, IV abx, ID consult        Continued Care and Services - Admitted Since 11/8/2020     Home Medical Care     Service Provider Request Status Selected Services Address Phone Fax    [facility]  [facility] - Request Sent N/A [address] [address] [phone]               Expected Discharge Date and Time     Expected Discharge Date Expected Discharge Time    Nov 12, 2020         Demographic Summary     Row Name 11/10/20 1316       General Information    Admission Type  observation    Arrived From  emergency department    Required Notices Provided  Observation Status Notice    Referral Source  admission list    Reason for Consult  discharge planning    Preferred Language  English        Functional Status     Row Name 11/10/20 1316       Functional Status    Usual Activity Tolerance  moderate    Current Activity Tolerance  moderate          Patient Forms     Row Name 11/10/20 1318       Patient Forms    Important Message from Medicare ([facility])  -- Shah 11/8            Madonna Navarro RN

## 2020-11-10 NOTE — PROGRESS NOTES
Continued Stay Note  [facility]     Patient Name: Katie Reddy  MRN: [id number]  Today's Date: 11/10/2020    Admit Date: 11/8/2020    Discharge Plan     Row Name 11/10/20 5459       Plan    Plan Comments  SW spoke with pt by phone to discuss [facility] services.Referral made for services.    Row Name 11/10/20 0392     Expected Discharge Date and Time     Expected Discharge Date Expected Discharge Time    Nov 12, 2020         Geovanna RODRÍGUEZ   Cell: [phone]  Office: [phone]  Fax: [phone]      Geovanna Mccoy

## 2020-11-10 NOTE — DISCHARGE PLACEMENT REQUEST
"Felton Reddy (75 y.o. Female)     Date of Birth Social Security Number Address Home Phone MRN    1945  [id number]  [address]  [place] [address] [phone] [id number]    Sabianism Marital Status          Sabianist        Admission Date Admission Type Admitting Provider Attending Provider Department, Room/Bed    11/8/20 Emergency Gemma Avila MD Nallapuram, Divya, MD [facility] 2C MEDICAL INPATIENT, 246/1    Discharge Date Discharge Disposition Discharge Destination                       Attending Provider: Gemma Avila MD    Allergies: Trazodone    Isolation: None   Infection: None   Code Status: CPR    Ht: 151.1 cm (59.5\")   Wt: 60.5 kg (133 lb 6.1 oz)    Admission Cmt: None   Principal Problem: None                Active Insurance as of 11/8/2020     Primary Coverage     Payor Plan Insurance Group Employer/Plan Group    MEDICARE MEDICARE A & B      Payor Plan Address Payor Plan Phone Number Payor Plan Fax Number Effective Dates    [address] [phone]  3/1/2011 - None Entered    [facility] [id number]       Subscriber Name Subscriber Birth Date Member ID       FELTON REDDY 1945 [id number]           Secondary Coverage     Payor Plan Insurance Group Employer/Plan Group    [place]      Payor Plan Address Payor Plan Phone Number Payor Plan Fax Number Effective Dates    [address]   7/25/2019 - None Entered    [place] [id number]       Subscriber Name Subscriber Birth Date Member ID       FELTON REDDY P 1945 [id number]                 Emergency Contacts      (Rel.) Home Phone Work Phone Mobile Phone    ELIAS MCGILL (Daughter) [phone] -- --    [employer] (Friend) -- -- --            "

## 2020-11-11 PROBLEM — R11.2 INTRACTABLE NAUSEA AND VOMITING: Status: ACTIVE | Noted: 2020-11-11

## 2020-11-11 LAB — QT INTERVAL: 334 MS

## 2020-11-11 PROCEDURE — 25010000002 PROCHLORPERAZINE 10 MG/2ML SOLUTION: Performed by: HOSPITALIST

## 2020-11-11 PROCEDURE — 25010000002 ONDANSETRON PER 1 MG: Performed by: NURSE PRACTITIONER

## 2020-11-11 PROCEDURE — 99232 SBSQ HOSP IP/OBS MODERATE 35: CPT | Performed by: HOSPITALIST

## 2020-11-11 RX ORDER — AMOXICILLIN 250 MG
2 CAPSULE ORAL NIGHTLY
Status: DISCONTINUED | OUTPATIENT
Start: 2020-11-11 | End: 2020-11-14 | Stop reason: HOSPADM

## 2020-11-11 RX ORDER — LABETALOL HYDROCHLORIDE 5 MG/ML
10 INJECTION, SOLUTION INTRAVENOUS EVERY 6 HOURS PRN
Status: DISCONTINUED | OUTPATIENT
Start: 2020-11-11 | End: 2020-11-14 | Stop reason: HOSPADM

## 2020-11-11 RX ORDER — LACTULOSE 10 G/15ML
10 SOLUTION ORAL DAILY PRN
Status: DISCONTINUED | OUTPATIENT
Start: 2020-11-11 | End: 2020-11-14 | Stop reason: HOSPADM

## 2020-11-11 RX ORDER — AMLODIPINE BESYLATE 5 MG/1
10 TABLET ORAL DAILY
Status: DISCONTINUED | OUTPATIENT
Start: 2020-11-12 | End: 2020-11-14 | Stop reason: HOSPADM

## 2020-11-11 RX ORDER — POLYETHYLENE GLYCOL 3350 17 G/17G
17 POWDER, FOR SOLUTION ORAL DAILY
Status: DISCONTINUED | OUTPATIENT
Start: 2020-11-11 | End: 2020-11-14 | Stop reason: HOSPADM

## 2020-11-11 RX ORDER — PROCHLORPERAZINE EDISYLATE 5 MG/ML
5 INJECTION INTRAMUSCULAR; INTRAVENOUS EVERY 6 HOURS PRN
Status: DISCONTINUED | OUTPATIENT
Start: 2020-11-11 | End: 2020-11-14 | Stop reason: HOSPADM

## 2020-11-11 RX ADMIN — ACETAMINOPHEN 650 MG: 325 TABLET, FILM COATED ORAL at 16:04

## 2020-11-11 RX ADMIN — LABETALOL 20 MG/4 ML (5 MG/ML) INTRAVENOUS SYRINGE 10 MG: at 11:22

## 2020-11-11 RX ADMIN — SERTRALINE HYDROCHLORIDE 100 MG: 100 TABLET ORAL at 16:05

## 2020-11-11 RX ADMIN — PREGABALIN 100 MG: 100 CAPSULE ORAL at 16:05

## 2020-11-11 RX ADMIN — PROCHLORPERAZINE EDISYLATE 5 MG: 5 INJECTION INTRAMUSCULAR; INTRAVENOUS at 11:57

## 2020-11-11 RX ADMIN — PREGABALIN 100 MG: 100 CAPSULE ORAL at 21:20

## 2020-11-11 RX ADMIN — Medication 10 ML: at 21:21

## 2020-11-11 RX ADMIN — AMITRIPTYLINE HYDROCHLORIDE 25 MG: 25 TABLET, FILM COATED ORAL at 21:20

## 2020-11-11 RX ADMIN — OXYBUTYNIN CHLORIDE 10 MG: 10 TABLET, EXTENDED RELEASE ORAL at 16:06

## 2020-11-11 RX ADMIN — CELECOXIB 100 MG: 100 CAPSULE ORAL at 16:06

## 2020-11-11 RX ADMIN — HYDROCORTISONE 1 APPLICATION: 25 CREAM TOPICAL at 16:07

## 2020-11-11 RX ADMIN — AMOXICILLIN 500 MG: 250 CAPSULE ORAL at 16:05

## 2020-11-11 RX ADMIN — POLYETHYLENE GLYCOL 3350 17 G: 17 POWDER, FOR SOLUTION ORAL at 16:04

## 2020-11-11 RX ADMIN — Medication 10 ML: at 16:06

## 2020-11-11 RX ADMIN — AMOXICILLIN 500 MG: 250 CAPSULE ORAL at 21:20

## 2020-11-11 RX ADMIN — HYDROCORTISONE 1 APPLICATION: 25 CREAM TOPICAL at 21:21

## 2020-11-11 RX ADMIN — ATORVASTATIN CALCIUM 80 MG: 40 TABLET, FILM COATED ORAL at 16:05

## 2020-11-11 RX ADMIN — AMLODIPINE BESYLATE 2.5 MG: 5 TABLET ORAL at 09:57

## 2020-11-11 RX ADMIN — DOCUSATE SODIUM 50 MG AND SENNOSIDES 8.6 MG 2 TABLET: 8.6; 5 TABLET, FILM COATED ORAL at 21:20

## 2020-11-11 RX ADMIN — METOPROLOL TARTRATE 5 MG: 5 INJECTION INTRAVENOUS at 05:10

## 2020-11-11 RX ADMIN — ONDANSETRON HYDROCHLORIDE 4 MG: 2 SOLUTION INTRAMUSCULAR; INTRAVENOUS at 09:37

## 2020-11-11 RX ADMIN — PROCHLORPERAZINE EDISYLATE 5 MG: 5 INJECTION INTRAMUSCULAR; INTRAVENOUS at 22:50

## 2020-11-11 NOTE — PROGRESS NOTES
Infectious Diseases Progress Note      LOS: 0 days   Patient Care Team:  Brooklyn Contreras DO as PCP - General  Brooklyn Contreras DO as PCP - Family Medicine  Nicole Li, RN as Ambulatory  ([facility])    Chief Complaint: Headache, nausea    Subjective       The patient has been afebrile for the last 24 hours.  The patient is on room air, hemodynamically stable, and is tolerating antimicrobial therapy.      Review of Systems:   Review of Systems   Constitutional: Negative.    HENT: Negative.    Eyes: Negative.    Respiratory: Negative.    Cardiovascular: Negative.    Gastrointestinal: Positive for nausea.   Endocrine: Negative.    Genitourinary: Negative.    Musculoskeletal: Negative.    Skin: Negative.    Neurological: Negative.    Psychiatric/Behavioral: Negative.    All other systems reviewed and are negative.       Objective     Vital Signs  Temp:  [97.8 °F (36.6 °C)-99 °F (37.2 °C)] 97.8 °F (36.6 °C)  Heart Rate:  [74-97] 74  Resp:  [16-18] 18  BP: (152-209)/(80-99) 152/90    Physical Exam:  Physical Exam  Vitals signs and nursing note reviewed.   Constitutional:       General: She is not in acute distress.     Appearance: Normal appearance. She is well-developed and normal weight. She is not diaphoretic.   HENT:      Head: Normocephalic and atraumatic.   Eyes:      General: No scleral icterus.     Extraocular Movements: Extraocular movements intact.      Conjunctiva/sclera: Conjunctivae normal.      Pupils: Pupils are equal, round, and reactive to light.   Neck:      Musculoskeletal: Neck supple.   Cardiovascular:      Rate and Rhythm: Normal rate and regular rhythm.      Heart sounds: Normal heart sounds, S1 normal and S2 normal. No murmur.   Pulmonary:      Effort: Pulmonary effort is normal. No respiratory distress.      Breath sounds: Normal breath sounds. No stridor. No wheezing or rales.   Chest:      Chest wall: No tenderness.   Abdominal:      General: Bowel sounds are normal. There  is no distension.      Palpations: Abdomen is soft. There is no mass.      Tenderness: There is no abdominal tenderness. There is no guarding.   Musculoskeletal: Normal range of motion.         General: No swelling, tenderness or deformity.   Skin:     General: Skin is warm and dry.      Coloration: Skin is not pale.      Findings: No bruising, erythema or rash.   Neurological:      General: No focal deficit present.      Mental Status: She is alert and oriented to person, place, and time. Mental status is at baseline.      Cranial Nerves: No cranial nerve deficit.   Psychiatric:         Mood and Affect: Mood normal.          Results Review:    I have reviewed all clinical data, test, lab, and imaging results.     Radiology  No Radiology Exams Resulted Within Past 24 Hours    Cardiology    Laboratory    Results from last 7 days   Lab Units 11/08/20  1518   WBC 10*3/mm3 7.40   HEMOGLOBIN g/dL 12.6   HEMATOCRIT % 38.7   PLATELETS 10*3/mm3 290     Results from last 7 days   Lab Units 11/08/20  1518   SODIUM mmol/L 139   POTASSIUM mmol/L 4.9   CHLORIDE mmol/L 104   CO2 mmol/L 23.0   BUN mg/dL 8   CREATININE mg/dL 0.76   GLUCOSE mg/dL 110*   ALBUMIN g/dL 3.80   BILIRUBIN mg/dL 0.3   ALK PHOS U/L 93   AST (SGOT) U/L 29   ALT (SGPT) U/L 31   CALCIUM mg/dL 9.7                 Microbiology   Microbiology Results (last 10 days)     Procedure Component Value - Date/Time    Urine Culture - Urine, Urine, Catheter [[id number]]  (Abnormal)  (Susceptibility) Collected: 11/08/20 1725    Lab Status: Final result Specimen: Urine, Catheter Updated: 11/10/20 0958     Urine Culture >100,000 CFU/mL Enterococcus faecalis    Susceptibility      Enterococcus faecalis     CABRERA     Ampicillin Susceptible     Levofloxacin Susceptible     Nitrofurantoin Susceptible     Tetracycline Susceptible     Vancomycin Susceptible                    COVID-19, [facility] IN-HOUSE,NP Swab (NO TRANSPORT MEDIA) 2 HR TAT - Swab, Nasopharynx [[id number]]  (Normal)  Collected: 11/08/20 1518    Lab Status: Final result Specimen: Swab from Nasopharynx Updated: 11/08/20 1558     COVID19 Not Detected    Narrative:      Fact sheet for providers: https://www.fda.gov/media/788815/download     Fact sheet for patients: https://www.fda.gov/media/136174/download          Medication Review:       Schedule Meds  amitriptyline, 25 mg, Oral, Nightly  amLODIPine, 2.5 mg, Oral, Daily  amoxicillin, 500 mg, Oral, Q8H  atorvastatin, 80 mg, Oral, Daily  celecoxib, 100 mg, Oral, Daily  hydrocortisone, 1 application, Topical, BID  oxybutynin XL, 10 mg, Oral, Daily  pantoprazole, 40 mg, Oral, Q AM  pregabalin, 100 mg, Oral, Q12H  sertraline, 100 mg, Oral, Daily  sodium chloride, 10 mL, Intravenous, Q12H        Infusion Meds  sodium chloride, 75 mL/hr, Last Rate: 75 mL/hr (11/11/20 0937)        PRN Meds  •  acetaminophen **OR** acetaminophen **OR** acetaminophen  •  albuterol  •  labetalol  •  ondansetron **OR** ondansetron  •  prochlorperazine  •  promethazine **OR** promethazine **OR** promethazine  •  [COMPLETED] Insert peripheral IV **AND** sodium chloride  •  sodium chloride        Assessment/Plan       Antimicrobial Therapy   1.  P.o. amoxicillin      day  2.      Day  3.      Day  4.      Day  5.      Day      Assessment     UTI with Enterococcus faecalis.  The organism is susceptible to ampicillin  CT scan of the abdomen pelvis was negative     Patient had history of recurrent UTI     Recent history of intracranial bleed     Plan     Continue amoxicillin 500 mg p.o. 3 times daily for 7 days  Continue supportive care  Okay to discharge from infectious disease point      Elena Winslow, PRACHI  11/11/20  13:40 EST

## 2020-11-11 NOTE — CONSULTS
Urology Consult Note    Patient:Katie Reddy :1945  Room:[facility]/1  Admit Date2020  Age:75 y.o.     SEX:female     DOS:2020     MR:[id number]     Visit:[id number]       Attending: Gemma Avila MD  Referring Provider: Dr Hopkins  Reason for Consultation: Problems with InterStim    Patient Care Team:  Brooklyn Contreras DO as PCP - General  Brooklyn Contreras DO as PCP - Family Medicine  Nicole Li RN as Ambulatory  ([facility])    Chief complaint problems with InterStim    Subjective .     History of present illness: Patient is a 75-year-old white female who had an InterStim placed with Dr. Gill.  She has Had continuous problems with device.  She wants it removed.  She has an appointment to see Dr. Gill here in the near future.  She has a lot of urge incontinence which has not been resolved by the InterStim device and she feels the InterStim device has caused her constipation    Review of Systems  12 point review of systems were reviewed and are negative except for what is in HPI.    History  Past Medical History:   Diagnosis Date   • Arthritis    • Bladder incontinence    • Bowel incontinence    • Breast cyst    • Colon polyp    • DEXA     OSTEOPENIA =  (-1.3/ -1.7);  (-1.7/ -1.7)   • GERD (gastroesophageal reflux disease)    • Headache    • Hyperlipidemia    • Hypertension    • Osteopenia    • Vitamin D deficiency      Past Surgical History:   Procedure Laterality Date   • APPENDECTOMY     • BREAST CYST EXCISION     • BREAST LUMPECTOMY Left     NEG   • BUNIONECTOMY Right 2020    Procedure: BUNIONECTOMY DEVONTE;  Surgeon: MARISSA Em DPM;  Location: [facility] MAIN OR;  Service: Podiatry;  Laterality: Right;   • CARPAL TUNNEL RELEASE Right    • CHOLECYSTECTOMY     • COLON SURGERY      hemorrhoid banding   • COLONOSCOPY  2017 = jessica DESAI    Dr. Mackey   • EYE SURGERY     • HERNIA REPAIR      Umbilical removal   • HYSTERECTOMY   1970   • OTHER SURGICAL HISTORY      bladder stimulator placement     pt cant have mri   • SUBTOTAL HYSTERECTOMY       Social History     Socioeconomic History   • Marital status:      Spouse name: Not on file   • Number of children: Not on file   • Years of education: Not on file   • Highest education level: Not on file   Tobacco Use   • Smoking status: Never Smoker   • Smokeless tobacco: Never Used   Substance and Sexual Activity   • Alcohol use: No     Frequency: Never   • Drug use: No   • Sexual activity: Defer   Social History Narrative    Pt states she lives alone in [facility] apartments, largely a senior community, and has a close neighbor that helps when needed/drives her to [employer]. Denies insecurities re affording/obtaining food, medication, transportation.     Family History   Problem Relation Age of Onset   • Heart disease Mother    • Hypertension Mother    • Diabetes Father    • Heart disease Father    • Alcohol abuse Father    • Hypertension Father    • Diabetes Brother    • Heart disease Brother    • Cancer Brother 68        Prostate Cancer   • Hypertension Brother    • Diabetes Maternal Aunt    • Heart disease Maternal Grandmother    • Hypertension Maternal Grandmother    • Heart disease Maternal Grandfather    • Hypertension Maternal Grandfather    • Liver disease Paternal Grandmother    • Hypertension Paternal Grandmother    • Heart disease Paternal Grandfather    • Hypertension Paternal Grandfather    • Dementia Sister      Allergies   Allergen Reactions   • Trazodone Irritability     Prior to Admission medications    Medication Sig Start Date End Date Taking? Authorizing Provider   amitriptyline (ELAVIL) 25 MG tablet Take 1 tablet by mouth Every Night for 30 days. 10/26/20 11/25/20 Yes Gordon Ocampo MD   amLODIPine (NORVASC) 2.5 MG tablet Take 1 tablet by mouth Daily for 30 doses. 10/19/20 11/18/20 Yes Bryn Moore MD   atorvastatin (LIPITOR) 80 MG tablet TAKE 1 TABLET BY MOUTH  EVERY DAY 20  Yes Brooklyn Contreras DO   celecoxib (CeleBREX) 100 MG capsule Take 1 capsule by mouth Daily. 20  Yes Brooklyn Contreras DO   hydrocortisone 2.5 % cream Apply 1 application topically to the appropriate area as directed 2 (Two) Times a Day. 6/15/20  Yes Maria Dolores De Guzman MD   MYRBETRIQ 50 MG tablet sustained-release 24 hour 24 hr tablet Take 50 mg by mouth Daily. 20  Yes Maria Dolores De Guzman MD   ondansetron (ZOFRAN) 4 MG tablet Take 1 tablet by mouth Every 8 (Eight) Hours As Needed for Nausea or Vomiting. 20  Yes Brooklyn Contreras DO   pantoprazole (PROTONIX) 40 MG EC tablet Take 1 tablet by mouth Daily. 20  Yes Brooklyn Contreras DO   pregabalin (LYRICA) 100 MG capsule Take 1 capsule by mouth Every 12 (Twelve) Hours for 30 days. 10/26/20 11/25/20 Yes Gordon Ocampo MD   sertraline (ZOLOFT) 100 MG tablet Take 1 tablet by mouth Daily. 20  Yes Brooklyn Contreras DO   albuterol (ACCUNEB) 1.25 MG/3ML nebulizer solution Take 3 mL by nebulization Every 6 (Six) Hours As Needed for Wheezing for up to 30 doses. 10/19/20   Bryn Moore MD   famotidine (PEPCID) 20 MG tablet Take 1 tablet by mouth 2 (Two) Times a Day As Needed for Indigestion or Heartburn. 20   Brooklyn Contreras DO         Objective     tMax 24 hours:  Temp (24hrs), Av.4 °F (36.9 °C), Min:97.4 °F (36.3 °C), Max:99 °F (37.2 °C)    Vital Sign Ranges:  Temp:  [97.4 °F (36.3 °C)-99 °F (37.2 °C)] 98.8 °F (37.1 °C)  Heart Rate:  [72-97] 90  Resp:  [16-17] 16  BP: (126-209)/(78-89) 209/89  Intake and Output Last 3 Shifts:  I/O last 3 completed shifts:  In: 1200 [P.O.:1200]  Out: -       Physical Exam:     General Appearance:    Alert, cooperative, in no acute distress   Head:    Normocephalic, without obvious abnormality, atraumatic   Eyes:            Lids and lashes normal, conjunctivae and sclerae normal, no   icterus, no pallor, corneas clear, PERRLA   Ears:    Ears appear intact with no abnormalities noted    Throat:   No oral lesions, no thrush, oral mucosa moist   Neck:   No adenopathy, supple, trachea midline, no thyromegaly, no     carotid bruit, no JVD   Back:     No kyphosis present, no scoliosis present, no skin lesions,       erythema or scars, no tenderness to percussion or                   palpation,   range of motion normal   Lungs:     Clear to auscultation,respirations regular, even and                   unlabored    Heart:    Regular rhythm and normal rate, normal S1 and S2, no            murmur, no gallop, no rub, no click   Breast Exam:    Deferred   Abdomen:     Normal bowel sounds, no masses, no organomegaly, soft        non-tender, non-distended, no guarding, no rebound                 tenderness   Genitalia:    Deferred   Extremities:   Moves all extremities well, no edema, no cyanosis, no              redness   Pulses:   Pulses palpable and equal bilaterally   Skin:   No bleeding, bruising or rash   Lymph nodes:   No palpable adenopathy   Neurologic:   Cranial nerves 2 - 12 grossly intact, sensation intact, DTR        present and equal bilaterally       Results Review:     Lab Results (last 24 hours)     Procedure Component Value Units Date/Time    Urine Culture - Urine, Urine, Catheter [[id number]]  (Abnormal)  (Susceptibility) Collected: 11/08/20 1725    Specimen: Urine, Catheter Updated: 11/10/20 0958     Urine Culture >100,000 CFU/mL Enterococcus faecalis    Susceptibility      Enterococcus faecalis     CABRERA     Ampicillin Susceptible     Levofloxacin Susceptible     Nitrofurantoin Susceptible     Tetracycline Susceptible     Vancomycin Susceptible                           Urine Culture   Date Value Ref Range Status   11/08/2020 >100,000 CFU/mL Enterococcus faecalis (A)  Final        Imaging Results (Last 7 Days)     Procedure Component Value Units Date/Time    CT Abdomen Pelvis Without Contrast [[id number]] Collected: 11/10/20 1258     Updated: 11/10/20 1309    Narrative:      CT ABDOMEN PELVIS  WO CONTRAST-     Date of Exam: 11/10/2020 12:38 PM     Indication: Lower abdominal pain. HISTORY of urinary tract infection.  Fever.     Comparison: KUB 10/25/2020. CT abdomen and pelvis 11/20/2019     Technique: Contiguous axial CT images were obtained from the lung bases  to the pubic symphysis without contrast. Sagittal and coronal  reconstructions were performed.  Automated exposure control and  iterative reconstruction methods were used.     FINDINGS:     Large colonic stool burden is present, greatest within the transverse  and descending segments. No evidence of active bowel inflammation or  bowel obstruction. The appendix is not discretely visualized. No  pericecal inflammation is seen.     Heart size is normal. Minimal linear subsegmental atelectasis in left  lower lobe. No acute basilar consolidations. Surgical changes are  present at the esophagogastric junction.     Low-density lesion in the right hepatic lobe favored to represent a  cyst, measuring 1.4 cm, unchanged. Cholecystectomy. No abnormal biliary  dilation. The spleen, pancreas, adrenals, and right kidney are normal.  10 mm low-density lesion at the left lower renal pole compatible with a  cyst. No urinary tract stone or hydronephrosis.     Urinary bladder and rectum are normal. Presumed hysterectomy. Sacral  stimulator device is seen with leads projecting to the left of midline  in the posterior pelvis.     No acute osseous abnormalities. Sclerotic focus in the L1 vertebral body  is unchanged, favored to represent a benign bone island.          Impression:         1. No acute findings in the abdomen or pelvis.  2. Large colonic stool burden. Correlate for constipation.           Electronically Signed By-Maddi Bhagat MD On:11/10/2020 1:07 PM  This report was finalized on [id number] by  Maddi Bhagat MD.    CT Head Without Contrast [[id number]] Collected: 11/08/20 1619     Updated: 11/08/20 1632    Narrative:      CT HEAD WO CONTRAST-      Date of Exam: 11/8/2020 4:02 PM     Indication: headache / recent brain bleed 10/21/20.     Comparison: 10/23/2020     Technique: CT scan of the head without IV contrast.  Automated exposure  control and iterative reconstruction methods were used.     FINDINGS  An evolving parenchymal hematoma within the left occipital lobe has  decreased in size and bulk measuring 3.2 x 1.8 cm, previously 4.1 x 2.7  cm. There remains some surrounding vasogenic edema. The previously  described hemorrhage within the intraventricular system and along the  left cerebellar tentorium has resolved. No new intracranial hemorrhage  is identified.      The ventricles are stable in caliber, with no midline shift. The basal  cisterns appear patent. The gray-white differentiation appears  preserved.      The calvarium appears intact. There is minimal mucosal disease within  the right sphenoid sinus. The mastoid air cells are well-aerated.       Impression:      1.Parenchymal hematoma within left occipital lobe has decreased in size  and bulk. Previously described hemorrhage within interventricular system  and along left cerebellar tentorium has resolved.  2.No new acute intracranial process identified.  3.Minimal mucosal disease within right sphenoid sinus.     Electronically Signed By-DR. Todd Nunez MD On:11/8/2020 4:30 PM  This report was finalized on [id number] by DR. Todd Nunez MD.    XR Chest 1 View [[id number]] Collected: 11/08/20 1612     Updated: 11/08/20 1615    Narrative:      XR CHEST 1 VW-     Date of Exam: 11/8/2020 3:35 PM     Indication: nv/ d weakness.     Comparison Exams: 10/19/2020     Technique: Single AP chest radiograph     FINDINGS:  The lungs are clear. The heart and mediastinal contours appear normal.  The pulmonary vasculature appears normal. The osseous structures appear  intact. There are surgical clips in the epigastric region.       Impression:      No acute cardiopulmonary process identified.      Electronically Signed By-DR. Todd Nunez MD On:11/8/2020 4:13 PM  This report was finalized on [id number] by DR. Todd Nunez MD.          Inpatient Meds:   Scheduled Meds:amitriptyline, 25 mg, Oral, Nightly  amLODIPine, 2.5 mg, Oral, Daily  amoxicillin, 500 mg, Oral, Q8H  atorvastatin, 80 mg, Oral, Daily  celecoxib, 100 mg, Oral, Daily  hydrocortisone, 1 application, Topical, BID  oxybutynin XL, 10 mg, Oral, Daily  pantoprazole, 40 mg, Oral, Q AM  pregabalin, 100 mg, Oral, Q12H  sertraline, 100 mg, Oral, Daily  sodium chloride, 10 mL, Intravenous, Q12H       Continuous Infusions:sodium chloride, 75 mL/hr, Last Rate: Stopped (11/09/20 2155)       PRN Meds:.•  acetaminophen **OR** acetaminophen **OR** acetaminophen  •  albuterol  •  metoprolol tartrate  •  ondansetron **OR** ondansetron  •  promethazine **OR** promethazine **OR** promethazine  •  [COMPLETED] Insert peripheral IV **AND** sodium chloride  •  sodium chloride      Assessment/Plan     Urinary urgency    Constipation    Status post InterStim placement with Dr. Gill    Patient wants InterStim device removed once she sees Dr. Gill in the office.  Not much to add from a urology standpoint    I discussed the patients findings and my recommendations with patient.    Aamir Hooper MD  11/11/20  07:44 EST

## 2020-11-11 NOTE — PLAN OF CARE
Goal Outcome Evaluation:  Plan of Care Reviewed With: patient  Progress: improving  Patient alert and oriented. On room air. Patient complained of vomiting and dry heaving throughout the night and morning. IV medication given. On IVF. No other complaints or concerns noted. Will continue to monitor.

## 2020-11-11 NOTE — PLAN OF CARE
Problem: Adult Inpatient Plan of Care  Goal: Plan of Care Review  Outcome: Ongoing, Progressing  Flowsheets  Taken 11/11/2020 0221  Outcome Summary: No longer falls risk. Some NV during the evening. Educated about watching what she eats tomorrow. Taking PO medication without issue. Will continue to monitor.  Taken 11/9/2020 0419  Plan of Care Reviewed With: patient  Goal: Patient-Specific Goal (Individualized)  Outcome: Ongoing, Progressing  Goal: Absence of Hospital-Acquired Illness or Injury  Outcome: Ongoing, Progressing  Intervention: Identify and Manage Fall Risk  Recent Flowsheet Documentation  Taken 11/11/2020 0009 by Valerie Cordero RN  Safety Promotion/Fall Prevention: safety round/check completed  Taken 11/10/2020 2037 by Valerie Cordero RN  Safety Promotion/Fall Prevention: safety round/check completed  Intervention: Prevent Skin Injury  Recent Flowsheet Documentation  Taken 11/11/2020 0009 by Valerie Cordero RN  Body Position: position changed independently  Taken 11/10/2020 2037 by Valerie Cordero RN  Body Position: position changed independently  Intervention: Prevent Infection  Recent Flowsheet Documentation  Taken 11/10/2020 2037 by Valerie Cordero RN  Infection Prevention: personal protective equipment utilized  Goal: Optimal Comfort and Wellbeing  Outcome: Ongoing, Progressing  Intervention: Provide Person-Centered Care  Recent Flowsheet Documentation  Taken 11/10/2020 2037 by Valerie Cordero RN  Trust Relationship/Rapport: care explained  Goal: Readiness for Transition of Care  Outcome: Ongoing, Progressing     Problem: Pain Acute  Goal: Optimal Pain Control  Outcome: Ongoing, Progressing  Intervention: Optimize Psychosocial Wellbeing  Recent Flowsheet Documentation  Taken 11/10/2020 2037 by Valerie Cordero RN  Supportive Measures: active listening utilized  Diversional Activities:   smartphone   television     Problem: Skin Injury Risk Increased  Goal: Skin Health and  Integrity  Outcome: Ongoing, Progressing  Intervention: Optimize Skin Protection  Recent Flowsheet Documentation  Taken 11/11/2020 0009 by Valerie Cordero, RN  Head of Bed (HOB): HOB elevated  Taken 11/10/2020 2037 by Valerie Cordero, RN  Pressure Reduction Techniques: frequent weight shift encouraged  Head of Bed (HOB): [facility] elevated  Pressure Reduction Devices: pressure-redistributing mattress utilized  Skin Protection: incontinence pads utilized   Goal Outcome Evaluation:  Plan of Care Reviewed With: patient  Progress: improving  Outcome Summary: No longer falls risk. Some NV during the evening. Educated about watching what she eats tomorrow. Taking PO medication without issue. Will continue to monitor.

## 2020-11-11 NOTE — PROGRESS NOTES
Continued Stay Note  GINNY Martins     Patient Name: Katie Reddy  MRN: [id number]  Today's Date: 11/11/2020    Admit Date: 11/8/2020    Discharge Plan     Row Name 11/11/20 1311       Plan    Plan  PT eval ordered. From home alone with [facility], EDYTA order placed. SW made [facility] referral.    Plan Comments  Did not enter patients room. Per RN report patient has not been up out of bed today and felt patient would benefit from PT eval. DC barriers: vomiting, IVF        Expected Discharge Date and Time     Expected Discharge Date Expected Discharge Time    Nov 12, 2020             Madonna Navarro RN

## 2020-11-11 NOTE — PROGRESS NOTES
Case Management Readmission Assessment Note       Case Management Readmission Assessment (all recorded)      Readmission Interview     [facility] Name 11/11/20 1613             Readmission Indications    Is this hospitalization related to the prior hospital diagnosis?  No      What was the reason you were admitted?  weakness, nausea/vomiting      [facility] Name 11/11/20 1613             Recommendation for rehospitalization    Did you speak with your physician prior to coming to the hospital  No      Who recommended you return to the hospital?  Other (comment) self      Did you seek care elsewhere prior to coming to the hospital?  No      [facility] Name 11/11/20 1613             Follow up appointment    Do you have a PCP?  Yes      Did you have an appointment with PCP/specialist after hospitalization within 7 days?  No      [facility] Name 11/11/20 1613             Medications    Did you have newly prescribed medications at discharge?  Yes      Did you understand the reasons for your medications at discharge and how to take them?  Yes      Are you taking all of you prescribed medications?  Yes      [facility] Name 11/11/20 1613             Discharge Instructions    Did you understand your discharge instructions?  Yes      Did your family/caregiver hear your instructions?  No      Were you told to eat a special diet?  No      Were you given a number of someone to call if you had questions or concerns?  Yes      [facility] Name 11/11/20 1613             Index discharge location/services    Where did you go upon discharge?  Acute Rehabilitation      Do you have supportive family or friends in the home?  No      [facility] 11/11/20 1613             Discharge Readiness    On a scale of 1-5 (5 being well prepared), how ready were you for discharge  4      Recommendation based on interview  Education on diagnosis/self management

## 2020-11-12 LAB — QT INTERVAL: 369 MS

## 2020-11-12 PROCEDURE — 97530 THERAPEUTIC ACTIVITIES: CPT

## 2020-11-12 PROCEDURE — 97162 PT EVAL MOD COMPLEX 30 MIN: CPT

## 2020-11-12 PROCEDURE — 99231 SBSQ HOSP IP/OBS SF/LOW 25: CPT | Performed by: HOSPITALIST

## 2020-11-12 RX ADMIN — AMOXICILLIN 500 MG: 250 CAPSULE ORAL at 05:04

## 2020-11-12 RX ADMIN — CELECOXIB 100 MG: 100 CAPSULE ORAL at 08:46

## 2020-11-12 RX ADMIN — AMITRIPTYLINE HYDROCHLORIDE 25 MG: 25 TABLET, FILM COATED ORAL at 20:30

## 2020-11-12 RX ADMIN — PREGABALIN 100 MG: 100 CAPSULE ORAL at 08:46

## 2020-11-12 RX ADMIN — SERTRALINE HYDROCHLORIDE 100 MG: 100 TABLET ORAL at 08:46

## 2020-11-12 RX ADMIN — AMOXICILLIN 500 MG: 250 CAPSULE ORAL at 14:30

## 2020-11-12 RX ADMIN — ATORVASTATIN CALCIUM 80 MG: 40 TABLET, FILM COATED ORAL at 08:46

## 2020-11-12 RX ADMIN — Medication 10 ML: at 20:30

## 2020-11-12 RX ADMIN — PANTOPRAZOLE SODIUM 40 MG: 40 TABLET, DELAYED RELEASE ORAL at 05:04

## 2020-11-12 RX ADMIN — Medication 10 ML: at 08:46

## 2020-11-12 RX ADMIN — HYDROCORTISONE 1 APPLICATION: 25 CREAM TOPICAL at 20:31

## 2020-11-12 RX ADMIN — AMLODIPINE BESYLATE 10 MG: 5 TABLET ORAL at 08:46

## 2020-11-12 RX ADMIN — HYDROCORTISONE 1 APPLICATION: 25 CREAM TOPICAL at 08:48

## 2020-11-12 RX ADMIN — AMOXICILLIN 500 MG: 250 CAPSULE ORAL at 21:17

## 2020-11-12 RX ADMIN — DOCUSATE SODIUM 50 MG AND SENNOSIDES 8.6 MG 2 TABLET: 8.6; 5 TABLET, FILM COATED ORAL at 20:29

## 2020-11-12 RX ADMIN — OXYBUTYNIN CHLORIDE 10 MG: 10 TABLET, EXTENDED RELEASE ORAL at 08:46

## 2020-11-12 RX ADMIN — PREGABALIN 100 MG: 100 CAPSULE ORAL at 20:30

## 2020-11-12 NOTE — PLAN OF CARE
Goal Outcome Evaluation:  Plan of Care Reviewed With: patient  Progress: improving  Outcome Summary: pt been up in chair most of day. pt been up to bathroom a couple times. pt has had no complaints today. pending d/c orders later this afternoon. will continue to monitor

## 2020-11-12 NOTE — PLAN OF CARE
Goal Outcome Evaluation:  Plan of Care Reviewed With: patient  Progress: improving    Patient is alert and oriented X 4 with bathroom privileges. She has no complaints of pain just intermittent nausea noted. She was able to eat some apple sauce and keep it down well. Will continue to monitor.

## 2020-11-12 NOTE — PROGRESS NOTES
[facility] Medicine Services Daily Progress Note      Hospitalist Team  LOS 1 days      Patient Care Team:  Brooklyn Contreras DO as PCP - General  Brooklyn Contreras DO as PCP - Family Medicine  Nicole Li RN as Ambulatory  ([facility] [place][note 2])    Patient Location: 246/1      Subjective   Subjective     Chief Complaint / Subjective  Chief Complaint   Patient presents with   • Headache       Present on Admission:  • Weakness  • Intractable nausea and vomiting      Brief Synopsis of Hospital Course/HPI  75-year-old female presents the ER with a chief complaint of headache which started after having recent subdural and subarachnoid hemorrhage with summary below.  The headaches were improving until Friday when the patient was awakened in the middle of the night with a headache worsening.  The patient also describes increasing nausea and vomiting and loss of lateral visual field on the right.  The patient reports that she is unable to perform her activities of daily living over the last 2 days secondary to the headache.  The patient was discharged from physical rehab on Thursday and normally lives independently in a duplex apartment.  She has not been able to drive since being discharged from the rehab hospital secondary to her visual disturbance and headache.  She has a neighbor who is able to help her at times but does not have family support.  She has been independent up until this recent health event of head bleed and it was unusual for her to have a headache.  She does not want to go back to physical rehab and wants to continue to live independently.      Review of records with summary: The patient was admitted on 10/22/2020 through 10/26/2020 with intracranial hemorrhage after prior presenting to the ER with headache, dizziness, nausea and vomiting x2 days.  She was admitted to the ICU after CT showed a 4.2 x 2.9 cm left occipital/temporal lobe hematoma with surrounding edema  along with subarachnoid hemorrhage seen in the posterior left lateral ventricle and subdural hemorrhage over the left tentorium cerebelli.  She was hypertensive and on a Cardene drip.  Patient had UA evidence of UTI with culture showing E. coli which was treated with Rocephin.  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  There was also documentation of probable trigeminal cephalgia possibly type II trigeminal neuralgia for which pregabapentin was recommended.  MRI was not performed as the patient has presence of a bladder stimulator in situ.  Recommendation was for discharge to physical rehab with repeat scans in about a month.  Noted residual right-sided lower extremity weakness.  Mention was made of the patient's polypharmacy including meclizine Flexeril Restoril and other medications which might contribute to the patient falling given her age.  The patient was discharged to [place] physical rehab.    Date::    11/9/2020  Tachycardia, noted to be in afib on monitor, resolved w/ IV push diltiazem, continues to be significantly nauseated, blood pressure on soft side     11/10/2020  Reports n/v significantly improved, tachycardia improved. Does report having issues with constipation and back pain since having inter-stim device placed, states to me that she wants to have the device taken out.     11/11/2020  Reports significant nausea/vomiting overnight, blood pressure elevated today    ROS  Constitution: Negative for chills and fever.   Gastrointestinal: Positive for nausea and vomiting. Negative for abdominal pain.   Genitourinary: Positive for urgency. Negative for dysuria and frequency.   Neurological: Positive for dizziness and headaches.   All other systems reviewed and are negative.    Objective   Objective      Vital Signs  Temp:  [98 °F (36.7 °C)-99 °F (37.2 °C)] 98 °F (36.7 °C)  Heart Rate:  [64-80] 64  Resp:  [17-18] 17  BP:  "()/(50-78) 106/62  Oxygen Therapy  SpO2: 95 %  Pulse Oximetry Type: Intermittent  Device (Oxygen Therapy): room air  Flowsheet Rows      First Filed Value   Admission Height  151.1 cm (59.5\") Documented at 11/08/2020 1444   Admission Weight  57 kg (125 lb 10.6 oz) Documented at 11/08/2020 1444        Intake & Output (last 3 days)       11/09 0701 - 11/10 0700 11/10 0701 - 11/11 0700 11/11 0701 - 11/12 0700 11/12 0701 - 11/13 0700    P.O. 120 1200 150     Total Intake(mL/kg) 120 (2) 1200 (19.9) 150 (2.5)     Net +120 +1200 +150             Urine Unmeasured Occurrence  3 x      Stool Unmeasured Occurrence  1 x          Lines, Drains & Airways    Active LDAs     Name:   Placement date:   Placement time:   Site:   Days:    Peripheral IV 11/09/20 1735 Left Antecubital   11/09/20 1735    Antecubital   less than 1                  Physical Exam:    Physical Exam  Physical Exam  Vitals signs reviewed.   Constitutional:       General: She is not in acute distress.     Appearance: Normal appearance. She is normal weight. She is not ill-appearing, toxic-appearing or diaphoretic.   HENT:      Right Ear: External ear normal.      Left Ear: External ear normal.      Nose: Nose normal. No congestion.      Mouth/Throat:      Mouth: Mucous membranes are dry.   Eyes:      General: No scleral icterus.        Right eye: No discharge.         Left eye: No discharge.      Extraocular Movements: Extraocular movements intact.      Conjunctiva/sclera: Conjunctivae normal.      Pupils: Pupils are equal, round, and reactive to light.   Neck:      Musculoskeletal: Normal range of motion and neck supple. No neck rigidity or muscular tenderness.   Cardiovascular:      Rate and Rhythm: Normal rate and regular rhythm.      Pulses: Normal pulses.      Heart sounds: Normal heart sounds. No murmur.   Pulmonary:      Effort: Pulmonary effort is normal. No respiratory distress.      Breath sounds: Normal breath sounds.   Abdominal:      " General: Abdomen is flat. Bowel sounds are normal. There is no distension.      Palpations: Abdomen is soft.      Tenderness: There is no abdominal tenderness.   Musculoskeletal: Normal range of motion.         General: No swelling.   Skin:     General: Skin is warm and dry.   Neurological:      Mental Status: She is alert and oriented to person, place, and time.      Comments: Far Lateral loss of peripheral vision, upper quadrant    Psychiatric:         Mood and Affect: Mood normal.         Behavior: Behavior normal.         Thought Content: Thought content normal.         Judgment: Judgment normal.     Procedures:              Results Review:     I reviewed the patient's new clinical results.      Lab Results (last 24 hours)     ** No results found for the last 24 hours. **        No results found for: HGBA1C                Microbiology Results (last 10 days)     Procedure Component Value - Date/Time    Urine Culture - Urine, Urine, Catheter [[id number]]  (Abnormal)  (Susceptibility) Collected: 11/08/20 1725    Lab Status: Final result Specimen: Urine, Catheter Updated: 11/10/20 0958     Urine Culture >100,000 CFU/mL Enterococcus faecalis    Susceptibility      Enterococcus faecalis     CABRERA     Ampicillin Susceptible     Levofloxacin Susceptible     Nitrofurantoin Susceptible     Tetracycline Susceptible     Vancomycin Susceptible                    COVID-19, [facility] IN-HOUSE,NP Swab (NO TRANSPORT MEDIA) 2 HR TAT - Swab, Nasopharynx [[id number]]  (Normal) Collected: 11/08/20 1518    Lab Status: Final result Specimen: Swab from Nasopharynx Updated: 11/08/20 1558     COVID19 Not Detected    Narrative:      Fact sheet for providers: https://www.fda.gov/media/516106/download     Fact sheet for patients: https://www.fda.gov/media/681644/download          ECG/EMG Results (most recent)     Procedure Component Value Units Date/Time    ECG 12 Lead [[id number]] Collected: 11/09/20 1507     Updated: 11/11/20 1752     QT Interval  334 ms     Narrative:      HEART RATE= 153  bpm  RR Interval= 395  ms  LA Interval=   ms  P Horizontal Axis=   deg  P Front Axis=   deg  QRSD Interval= 134  ms  QT Interval= 334  ms  QRS Axis= 69  deg  T Wave Axis= -84  deg  - ABNORMAL ECG -  Wide-QRS tachycardia  When compared with ECG of 22-Oct-2020 15:50:48,  Significant rate increase  Wide-complex tachycardia is new compared to prior EKG that was done on October 22, 2020  Ventricular tachycardia versus SVT with aberrancy  Electronically Signed By: Dorothy Carmona ([facility]) 11-Nov-2020 17:46:06  Date and Time of Study: 2020-11-09 15:07:34    ECG 12 Lead [[id number]] Collected: 11/10/20 1546     Updated: 11/12/20 0822     QT Interval 369 ms     Narrative:      HEART RATE= 76  bpm  RR Interval= 756  ms  LA Interval= 168  ms  P Horizontal Axis= -5  deg  P Front Axis= 69  deg  QRSD Interval= 88  ms  QT Interval= 369  ms  QRS Axis= 66  deg  T Wave Axis= 36  deg  - ABNORMAL ECG -  Sinus rhythm  Multiple ventricular premature complexes  ST elevation, consider inferior injury  When compared with ECG of 09-Nov-2020 15:07:34,  Significant rate decrease  Significant repolarization change  Electronically Signed By: Evangelist Patel ([facility]) 12-Nov-2020 08:21:18  Date and Time of Study: 2020-11-10 15:46:15                    Ct Abdomen Pelvis Without Contrast    Result Date: 11/10/2020   1. No acute findings in the abdomen or pelvis. 2. Large colonic stool burden. Correlate for constipation.    Electronically Signed By-Maddi Bhagat MD On:11/10/2020 1:07 PM This report was finalized on [id number] by  Maddi Bhagat MD.    Ct Head Without Contrast    Result Date: 11/8/2020  1.Parenchymal hematoma within left occipital lobe has decreased in size and bulk. Previously described hemorrhage within interventricular system and along left cerebellar tentorium has resolved. 2.No new acute intracranial process identified. 3.Minimal mucosal disease within right sphenoid sinus.   Electronically Signed By-DR. Todd Nunez MD On:11/8/2020 4:30 PM This report was finalized on [id number] by DR. Todd Nunez MD.    Xr Chest 1 View    Result Date: 11/8/2020  No acute cardiopulmonary process identified.  Electronically Signed By-DR. Todd Nunez MD On:11/8/2020 4:13 PM This report was finalized on [id number] by DR. Todd Nunez MD.      Xrays, labs reviewed personally by physician.    Medication Review:   I have reviewed the patient's current medication list      Scheduled Meds  amitriptyline, 25 mg, Oral, Nightly  amLODIPine, 10 mg, Oral, Daily  amoxicillin, 500 mg, Oral, Q8H  atorvastatin, 80 mg, Oral, Daily  celecoxib, 100 mg, Oral, Daily  hydrocortisone, 1 application, Topical, BID  oxybutynin XL, 10 mg, Oral, Daily  pantoprazole, 40 mg, Oral, Q AM  polyethylene glycol, 17 g, Oral, Daily  pregabalin, 100 mg, Oral, Q12H  senna-docusate sodium, 2 tablet, Oral, Nightly  sertraline, 100 mg, Oral, Daily  sodium chloride, 10 mL, Intravenous, Q12H        Meds Infusions  sodium chloride, 75 mL/hr, Last Rate: 75 mL/hr (11/11/20 0937)        Meds PRN  •  acetaminophen **OR** acetaminophen **OR** acetaminophen  •  albuterol  •  labetalol  •  lactulose  •  ondansetron **OR** ondansetron  •  prochlorperazine  •  promethazine **OR** promethazine **OR** promethazine  •  [COMPLETED] Insert peripheral IV **AND** sodium chloride  •  sodium chloride    Assessment/Plan   Assessment/Plan     Active Hospital Problems    Diagnosis  POA   • Intractable nausea and vomiting [R11.2]  Yes   • Weakness [R53.1]  Yes      Resolved Hospital Problems   No resolved problems to display.     Sepsis 2/2 to UTI   -Enterococcus growing in urine   -D/w pharmacy, patient started on vanc   -Nausea/vomiting likely secondary to above   -Consider abdominal imaging   -ID consulted, appreciate recs     Headache, acute on recently persistent, previously thought to possibly be trigeminal neuralgia: Continue  amitriptyline; continue Lyrica; add Fioricet x1  --CT head without acute changes, showing improvement from previous   --Avoid opioids, caused significant nausea/vomiting and short-term memory loss     A. Fib   -Stat EKG unclear for dx of a.fib, will repeat EKG today   -Diltiazem IV push as needed   Likely secondary to underlying sepsis   -Repeat EKG pending      Asthma, mild intermittent: Continue albuterol as needed     Leukourea with recent diagnosis and treatment bacteria with culture showing E.  Coli, treated with Rocephin: Culture pending    --Patient received IV Rocephin x1 in ER    --Patient denies dysuria or increased frequency; she has chronic urgency with overactive bladder     Hypertension, chronic: Continue amlodipine   HLD, chronic: Continue atorvastatin     Chronic pain: Continue Celebrex     GERD, chronic: Continue Pepcid with formulary substitution; hold Protonix as formulary substitution for Pepcid is Protonix     Anxiety, chronic: Continue Zoloft; continue amitriptyline     Overactive bladder, chronic: Continue Myrbetriq    VTE Prophylaxis - SCDs.      Code Status -   Code Status and Medical Interventions:   Ordered at: 11/08/20 2016     Code Status:    CPR     Medical Interventions (Level of Support Prior to Arrest):    Full       Discharge Planning  Having nausea/vomiting, pending clinical improvement    Continued Care and Services - Admitted Since 11/8/2020    Coordination has not been started for this encounter.           Electronically signed by Gemma Avila MD, 11/12/20, 12:00 EST.  [facility] Hospitalist Team

## 2020-11-12 NOTE — PLAN OF CARE
Problem: Adult Inpatient Plan of Care  Goal: Plan of Care Review  Recent Flowsheet Documentation  Taken 11/12/2020 1212 by Penny Funes, EVANS  Plan of Care Reviewed With: patient   Pt is a 76 yo female ADM with weakness, acute UTI, headache hx fall 10/22 and recent intracranial bleed. Prior pt was independent living alone in apartment. Pt was able to walk without AD, dress self and bathe self. Pt reports slight right sided weakness. Today pt was alert and oriented x 4. Pt had no c/o pain. Pt requested assist for a shower. Pt completed shower with set-up. Pt was successful at standing on one leg left and right without LOB. Pt AMB 40 feet without AD with supervision. Pt is at baseline. No PT indicated.  Recommendation is D/C home. PPE: Mask, eyeshield, gloves.

## 2020-11-12 NOTE — PROGRESS NOTES
Infectious Diseases Progress Note      LOS: 1 day   Patient Care Team:  Brooklyn Contreras DO as PCP - General  Brooklyn Contreras DO as PCP - Family Medicine  Nicole Li, RN as Ambulatory  ([facility])    Chief Complaint: Headache, nausea    Subjective       The patient has been afebrile for the last 24 hours.  The patient is on room air, hemodynamically stable, and is tolerating antimicrobial therapy.      Review of Systems:   Review of Systems   Constitutional: Negative.    HENT: Negative.    Eyes: Negative.    Respiratory: Negative.    Cardiovascular: Negative.    Gastrointestinal: Positive for nausea.   Endocrine: Negative.    Genitourinary: Negative.    Musculoskeletal: Negative.    Skin: Negative.    Neurological: Negative.    Psychiatric/Behavioral: Negative.    All other systems reviewed and are negative.       Objective     Vital Signs  Temp:  [98 °F (36.7 °C)-99 °F (37.2 °C)] 98.1 °F (36.7 °C)  Heart Rate:  [64-80] 80  Resp:  [17-18] 18  BP: ()/(50-78) 94/60    Physical Exam:  Physical Exam  Vitals signs and nursing note reviewed.   Constitutional:       General: She is not in acute distress.     Appearance: Normal appearance. She is well-developed and normal weight. She is not diaphoretic.   HENT:      Head: Normocephalic and atraumatic.   Eyes:      General: No scleral icterus.     Extraocular Movements: Extraocular movements intact.      Conjunctiva/sclera: Conjunctivae normal.      Pupils: Pupils are equal, round, and reactive to light.   Neck:      Musculoskeletal: Neck supple.   Cardiovascular:      Rate and Rhythm: Normal rate and regular rhythm.      Heart sounds: Normal heart sounds, S1 normal and S2 normal. No murmur.   Pulmonary:      Effort: Pulmonary effort is normal. No respiratory distress.      Breath sounds: Normal breath sounds. No stridor. No wheezing or rales.   Chest:      Chest wall: No tenderness.   Abdominal:      General: Bowel sounds are normal. There is no  distension.      Palpations: Abdomen is soft. There is no mass.      Tenderness: There is no abdominal tenderness. There is no guarding.   Musculoskeletal: Normal range of motion.         General: No swelling, tenderness or deformity.   Skin:     General: Skin is warm and dry.      Coloration: Skin is not pale.      Findings: No bruising, erythema or rash.   Neurological:      General: No focal deficit present.      Mental Status: She is alert and oriented to person, place, and time. Mental status is at baseline.      Cranial Nerves: No cranial nerve deficit.   Psychiatric:         Mood and Affect: Mood normal.          Results Review:    I have reviewed all clinical data, test, lab, and imaging results.     Radiology  No Radiology Exams Resulted Within Past 24 Hours    Cardiology    Laboratory    Results from last 7 days   Lab Units 11/08/20  1518   WBC 10*3/mm3 7.40   HEMOGLOBIN g/dL 12.6   HEMATOCRIT % 38.7   PLATELETS 10*3/mm3 290     Results from last 7 days   Lab Units 11/08/20  1518   SODIUM mmol/L 139   POTASSIUM mmol/L 4.9   CHLORIDE mmol/L 104   CO2 mmol/L 23.0   BUN mg/dL 8   CREATININE mg/dL 0.76   GLUCOSE mg/dL 110*   ALBUMIN g/dL 3.80   BILIRUBIN mg/dL 0.3   ALK PHOS U/L 93   AST (SGOT) U/L 29   ALT (SGPT) U/L 31   CALCIUM mg/dL 9.7                 Microbiology   Microbiology Results (last 10 days)     Procedure Component Value - Date/Time    Urine Culture - Urine, Urine, Catheter [[id number]]  (Abnormal)  (Susceptibility) Collected: 11/08/20 1725    Lab Status: Final result Specimen: Urine, Catheter Updated: 11/10/20 0958     Urine Culture >100,000 CFU/mL Enterococcus faecalis    Susceptibility      Enterococcus faecalis     CABRERA     Ampicillin Susceptible     Levofloxacin Susceptible     Nitrofurantoin Susceptible     Tetracycline Susceptible     Vancomycin Susceptible                    COVID-19, [facility] IN-HOUSE,NP Swab (NO TRANSPORT MEDIA) 2 HR TAT - Swab, Nasopharynx [[id number]]  (Normal) Collected:  11/08/20 1518    Lab Status: Final result Specimen: Swab from Nasopharynx Updated: 11/08/20 1558     COVID19 Not Detected    Narrative:      Fact sheet for providers: https://www.fda.gov/media/025634/download     Fact sheet for patients: https://www.fda.gov/media/094888/download          Medication Review:       Schedule Meds  amitriptyline, 25 mg, Oral, Nightly  amLODIPine, 10 mg, Oral, Daily  amoxicillin, 500 mg, Oral, Q8H  atorvastatin, 80 mg, Oral, Daily  celecoxib, 100 mg, Oral, Daily  hydrocortisone, 1 application, Topical, BID  oxybutynin XL, 10 mg, Oral, Daily  pantoprazole, 40 mg, Oral, Q AM  polyethylene glycol, 17 g, Oral, Daily  pregabalin, 100 mg, Oral, Q12H  senna-docusate sodium, 2 tablet, Oral, Nightly  sertraline, 100 mg, Oral, Daily  sodium chloride, 10 mL, Intravenous, Q12H        Infusion Meds  sodium chloride, 75 mL/hr, Last Rate: 75 mL/hr (11/11/20 0937)        PRN Meds  •  acetaminophen **OR** acetaminophen **OR** acetaminophen  •  albuterol  •  labetalol  •  lactulose  •  ondansetron **OR** ondansetron  •  prochlorperazine  •  promethazine **OR** promethazine **OR** promethazine  •  [COMPLETED] Insert peripheral IV **AND** sodium chloride  •  sodium chloride        Assessment/Plan       Antimicrobial Therapy   1.  P.o. amoxicillin      day  2.      Day  3.      Day  4.      Day  5.      Day      Assessment     UTI with Enterococcus faecalis.  The organism is susceptible to ampicillin  CT scan of the abdomen pelvis was negative     Patient had history of recurrent UTI     Recent history of intracranial bleed     Plan     Continue amoxicillin 500 mg p.o. 3 times daily for 7 days  Continue supportive care  Okay to discharge from infectious disease point      Martha Tan MD  11/12/20  14:19 EST     Note is dictated utilizing voice recognition software/Dragon

## 2020-11-12 NOTE — THERAPY EVALUATION
Patient Name: Katie Reddy  : 1945    MRN: [id number]                              Today's Date: 2020       Admit Date: 2020    Visit Dx:     ICD-10-CM ICD-9-CM   1. Weakness  R53.1 780.79   2. Acute UTI  N39.0 599.0   3. Nonintractable headache, unspecified chronicity pattern, unspecified headache type  R51.9 784.0     Patient Active Problem List   Diagnosis   • Arthritis   • Cataract of both eyes   • Dyslipidemia   • Generalized anxiety disorder   • GERD (gastroesophageal reflux disease)   • Loss of height   • Migraine headache   • Osteopenia   • Palpitations   • Vitamin D deficiency   • Postmenopausal   • Primary osteoarthritis of right knee   • Acute pain of right knee   • Primary osteoarthritis of left knee   • Hallux valgus, acquired, bilateral   • Intracranial hemorrhage (CMS/HCC)   • Essential hypertension   • Fall   • UTI (urinary tract infection)   • Post-traumatic headache   • Polypharmacy   • Weakness   • Intractable nausea and vomiting     Past Medical History:   Diagnosis Date   • Arthritis    • Bladder incontinence    • Bowel incontinence    • Breast cyst    • Colon polyp    • DEXA     OSTEOPENIA =  (-1.3/ -1.7);  (-1.7/ -1.7)   • GERD (gastroesophageal reflux disease)    • Headache    • Hyperlipidemia    • Hypertension    • Osteopenia    • Vitamin D deficiency      Past Surgical History:   Procedure Laterality Date   • APPENDECTOMY     • BREAST CYST EXCISION     • BREAST LUMPECTOMY Left     NEG   • BUNIONECTOMY Right 2020    Procedure: BUNIONECTOMY DEOVNTE;  Surgeon: MARISSA Em DPM;  Location: [facility];  Service: Podiatry;  Laterality: Right;   • CARPAL TUNNEL RELEASE Right    • CHOLECYSTECTOMY     • COLON SURGERY      hemorrhoid banding   • COLONOSCOPY  2017 = jessica DESAI    Dr. Mackey   • EYE SURGERY     • HERNIA REPAIR      Umbilical removal   • HYSTERECTOMY     • OTHER SURGICAL HISTORY      bladder stimulator placement      pt cant have mri   • SUBTOTAL HYSTERECTOMY       General Information     Row Name 11/12/20 1210          Physical Therapy Time and Intention    Document Type  evaluation  -     Mode of Treatment  physical therapy  -     Row Name 11/12/20 1210          General Information    Patient Profile Reviewed  yes  -     Prior Level of Function  independent:;grooming;dressing;bathing;transfer;bed mobility  -     Row Name 11/12/20 1210          Living Environment    Lives With  alone  -     Row Name 11/12/20 1210          Home Main Entrance    Number of Stairs, Main Entrance  none  -     Row Name 11/12/20 1210          Stairs Within Home, Primary    Number of Stairs, Within Home, Primary  none  -     Row Name 11/12/20 1210          Cognition    Orientation Status (Cognition)  oriented x 4  -       User Key  (r) = Recorded By, (t) = Taken By, (c) = Cosigned By    Initials Name Provider Type    Penny Garcia PT Physical Therapist        Mobility     Row Name 11/12/20 1211          Bed Mobility    Bed Mobility  rolling left;rolling right;supine-sit  -WC     Rolling Left Robertson (Bed Mobility)  modified independence  -WC     Rolling Right Robertson (Bed Mobility)  modified independence  -     Supine-Sit Robertson (Bed Mobility)  modified independence  -     Assistive Device (Bed Mobility)  bed rails  -     Row Name 11/12/20 1211          Sit-Stand Transfer    Sit-Stand Robertson (Transfers)  independent  -     Row Name 11/12/20 1211          Gait/Stairs (Locomotion)    Robertson Level (Gait)  supervision  -     Distance in Feet (Gait)  45  -       User Key  (r) = Recorded By, (t) = Taken By, (c) = Cosigned By    Initials Name Provider Type    Penny Garcia PT Physical Therapist        Obj/Interventions     Row Name 11/12/20 1212          Range of Motion Comprehensive    General Range of Motion  bilateral lower extremity ROM WFL  -     Row Name 11/12/20 1212          Strength  Comprehensive (MMT)    Comment, General Manual Muscle Testing (MMT) Assessment  4+/5 grossly left, 4/5 right  -[facility] Name 11/12/20 1212          Balance    Balance Assessment  sitting static balance;sitting dynamic balance;standing static balance;standing dynamic balance  -     Static Sitting Balance  WFL  -     Dynamic Sitting Balance  WFL  -     Static Standing Balance  WFL  -     Dynamic Standing Balance  mild impairment  -     Balance Interventions  sitting;standing;sit to stand;supported;static;dynamic;minimal challenge;moderate challenge  -       User Key  (r) = Recorded By, (t) = Taken By, (c) = Cosigned By    Initials Name Provider Type    Penny Garcia PT Physical Therapist        Goals/Plan    No documentation.       Clinical Impression     [facility] Name 11/12/20 1212          Pain    Additional Documentation  Pain Scale: FACES Pre/Post-Treatment (Group)  -WC     Row Name 11/12/20 1212          Pain Scale: FACES Pre/Post-Treatment    Pain: FACES Scale, Pretreatment  0-->no hurt  -     Posttreatment Pain Rating  0-->no hurt  -WC     Row Name 11/12/20 1212          Plan of Care Review    Plan of Care Reviewed With  patient  -WC     Row Name 11/12/20 1212          Therapy Assessment/Plan (PT)    Criteria for Skilled Interventions Met (PT)  no;no problems identified which require skilled intervention  -[facility] Name 11/12/20 1212          Vital Signs    Pre Patient Position  Supine  -     Intra Patient Position  Standing  -     Post Patient Position  Sitting  -WC     Row Name 11/12/20 1212          Positioning and Restraints    Pre-Treatment Position  in bed  -     Post Treatment Position  chair  -       User Key  (r) = Recorded By, (t) = Taken By, (c) = Cosigned By    Initials Name Provider Type    Penny Garcia PT Physical Therapist        Outcome Measures     Row Name 11/12/20 1213          How much help from another person do you currently need...    Turning from your back  to your side while in flat bed without using bedrails?  4  -WC     Moving from lying on back to sitting on the side of a flat bed without bedrails?  4  -WC     Moving to and from a bed to a chair (including a wheelchair)?  4  -WC     Standing up from a chair using your arms (e.g., wheelchair, bedside chair)?  4  -WC     Climbing 3-5 steps with a railing?  4  -WC     To walk in hospital room?  4  -WC     AM-PAC 6 Clicks Score (PT)  24  -     Row Name 11/12/20 1213          Modified Samantha Scale    Modified [place] Scale  2 - Slight disability.  Unable to carry out all previous activities but able to look after own affairs without assistance.  -     Row Name 11/12/20 1213          Functional Assessment    Outcome Measure Options  AM-PAC 6 Clicks Basic Mobility (PT);Modified [place]  -       User Key  (r) = Recorded By, (t) = Taken By, (c) = Cosigned By    Initials Name Provider Type     Penny Funes PT Physical Therapist        Physical Therapy Education                 Title: PT OT SLP Therapies (In Progress)     Topic: Physical Therapy (In Progress)     Point: Mobility training (Done)     Learning Progress Summary           Patient Acceptance, E,TB, VU by  at 11/12/2020 1214                   Point: Home exercise program (Not Started)     Learner Progress:  Not documented in this visit.          Point: Body mechanics (Done)     Learning Progress Summary           Patient Acceptance, E,TB, VU by  at 11/12/2020 1214                   Point: Precautions (Done)     Learning Progress Summary           Patient Acceptance, E,TB, VU by  at 11/12/2020 1214                               User Key     Initials Effective Dates Name Provider Type Discipline     01/07/20 -  Penny Funes PT Physical Therapist PT              PT Recommendation and Plan  Pt is a 74 yo female ADM with weakness, acute UTI, headache hx fall 10/22 and recent intracranial bleed. Prior pt was independent living alone in apartment. Pt was  able to walk without AD, dress self and bathe self. Pt reports slight right sided weakness. Today pt was alert and oriented x 4. Pt had no c/o pain. Pt requested assist for a shower. Pt completed shower with set-up. Pt was successful at standing on one leg left and right without LOB. Pt AMB 40 feet without AD with supervision. Pt is at baseline. No PT indicated.     Recommendation is D/C home. PPE: Mask, eyeshield, gloves.      Plan of Care Reviewed With: patient     Time Calculation:   PT Charges     Row Name 11/12/20 1228             Time Calculation    Start Time  1043  -WC      Stop Time  1118  -WC      Time Calculation (min)  35 min  -WC      PT Received On  11/12/20  -WC         Time Calculation- PT    TCU Minutes- PT  15 min  -WC        User Key  (r) = Recorded By, (t) = Taken By, (c) = Cosigned By    Initials Name Provider Type     Penny Funes, PT Physical Therapist        Therapy Charges for Today     Code Description Service Date Service Provider Modifiers Qty    [id number]  PT EVAL MOD COMPLEXITY 3 11/12/2020 Penny Funes, PT GP 1    [id number]  PT THERAPEUTIC ACT EA 15 MIN 11/12/2020 Penny Funes, PT GP 1          PT G-Codes  Outcome Measure Options: AM-PAC 6 Clicks Basic Mobility (PT), Modified Samantha  AM-PAC 6 Clicks Score (PT): 24  Modified Samantha Scale: 2 - Slight disability.  Unable to carry out all previous activities but able to look after own affairs without assistance.    Penny Funes PT  11/12/2020

## 2020-11-12 NOTE — PROGRESS NOTES
Continued Stay Note  GINNY Martins     Patient Name: Katie Reddy  MRN: [id number]  Today's Date: 11/12/2020    Admit Date: 11/8/2020    Discharge Plan     Row Name 11/12/20 1221       Plan    Plan  Anticipate routine home with [facility], [facility] order placed. SW made [facility] referral.    Plan Comments  Discharge anticipated today, awaiting MD rounds.            Madonna Navarro RN

## 2020-11-13 ENCOUNTER — READMISSION MANAGEMENT (OUTPATIENT)
Dept: CALL CENTER | Facility: HOSPITAL | Age: 75
End: 2020-11-13

## 2020-11-13 VITALS
WEIGHT: 130.29 LBS | TEMPERATURE: 98 F | OXYGEN SATURATION: 99 % | BODY MASS INDEX: 25.58 KG/M2 | SYSTOLIC BLOOD PRESSURE: 136 MMHG | RESPIRATION RATE: 19 BRPM | HEART RATE: 76 BPM | HEIGHT: 60 IN | DIASTOLIC BLOOD PRESSURE: 70 MMHG

## 2020-11-13 PROCEDURE — 99238 HOSP IP/OBS DSCHRG MGMT 30/<: CPT | Performed by: HOSPITALIST

## 2020-11-13 RX ORDER — AMOXICILLIN 500 MG/1
500 CAPSULE ORAL EVERY 8 HOURS SCHEDULED
Qty: 12 CAPSULE | Refills: 0 | Status: SHIPPED | OUTPATIENT
Start: 2020-11-13 | End: 2020-11-17

## 2020-11-13 RX ORDER — AMOXICILLIN 250 MG
2 CAPSULE ORAL NIGHTLY
Qty: 60 TABLET | Refills: 2 | Status: SHIPPED | OUTPATIENT
Start: 2020-11-13 | End: 2021-04-16

## 2020-11-13 RX ORDER — AMLODIPINE BESYLATE 10 MG/1
10 TABLET ORAL DAILY
Qty: 30 TABLET | Refills: 2 | Status: SHIPPED | OUTPATIENT
Start: 2020-11-13 | End: 2021-02-17 | Stop reason: SDUPTHER

## 2020-11-13 RX ORDER — POLYETHYLENE GLYCOL 3350 17 G/17G
17 POWDER, FOR SOLUTION ORAL DAILY
Qty: 30 EACH | Refills: 2 | Status: SHIPPED | OUTPATIENT
Start: 2020-11-14 | End: 2021-01-26

## 2020-11-13 RX ORDER — MAGNESIUM CARB/ALUMINUM HYDROX 105-160MG
296 TABLET,CHEWABLE ORAL ONCE
Status: COMPLETED | OUTPATIENT
Start: 2020-11-13 | End: 2020-11-13

## 2020-11-13 RX ORDER — AMLODIPINE BESYLATE 10 MG/1
10 TABLET ORAL DAILY
Qty: 30 TABLET | Refills: 2 | Status: SHIPPED | OUTPATIENT
Start: 2020-11-14 | End: 2021-02-12

## 2020-11-13 RX ADMIN — PANTOPRAZOLE SODIUM 40 MG: 40 TABLET, DELAYED RELEASE ORAL at 05:04

## 2020-11-13 RX ADMIN — Medication 10 ML: at 08:05

## 2020-11-13 RX ADMIN — AMOXICILLIN 500 MG: 250 CAPSULE ORAL at 14:30

## 2020-11-13 RX ADMIN — PREGABALIN 100 MG: 100 CAPSULE ORAL at 08:05

## 2020-11-13 RX ADMIN — HYDROCORTISONE 1 APPLICATION: 25 CREAM TOPICAL at 08:07

## 2020-11-13 RX ADMIN — AMLODIPINE BESYLATE 10 MG: 5 TABLET ORAL at 08:05

## 2020-11-13 RX ADMIN — OXYBUTYNIN CHLORIDE 10 MG: 10 TABLET, EXTENDED RELEASE ORAL at 08:05

## 2020-11-13 RX ADMIN — CELECOXIB 100 MG: 100 CAPSULE ORAL at 08:05

## 2020-11-13 RX ADMIN — Medication 296 ML: at 17:01

## 2020-11-13 RX ADMIN — SERTRALINE HYDROCHLORIDE 100 MG: 100 TABLET ORAL at 08:05

## 2020-11-13 RX ADMIN — AMOXICILLIN 500 MG: 250 CAPSULE ORAL at 05:04

## 2020-11-13 RX ADMIN — ATORVASTATIN CALCIUM 80 MG: 40 TABLET, FILM COATED ORAL at 08:05

## 2020-11-13 NOTE — PROGRESS NOTES
Case Management Readmission Assessment Note       Case Management Readmission Assessment (all recorded)      Readmission Interview     Row Name 11/13/20 1309 11/11/20 1613          Readmission Indications    Is this hospitalization related to the prior hospital diagnosis?  Yes  No     What was the reason you were admitted?  On 10/19 patient came to ER with weakness, vomiting and bronchitis. She was dc home with steroids and a nebulizer. . On 10/22 patient is admitted with intercranial bleeding. Per notes patient repsots falling two days befor she came to the hospital with headshce, weaknes and vomiting. Patient dc to [place] [facility] at that admission. On 11/8 patient admitted with weakness, vomiting, uti and headache.  weakness, nausea/vomiting     Row Name 11/13/20 1309 11/11/20 1613          Recommendation for rehospitalization    Did you speak with your physician prior to coming to the hospital  No  No     Who recommended you return to the hospital?  Other (comment) returned per self  Other (comment) self     Did you seek care elsewhere prior to coming to the hospital?  --  No     Row Name 11/13/20 1309 11/11/20 1613          Follow up appointment    Do you have a PCP?  Yes  Yes     Did you have an appointment with PCP/specialist after hospitalization within 7 days?  No Patient was at [place] and seen by MD there  No     Row Name 11/13/20 1309 11/11/20 1613          Medications    Did you have newly prescribed medications at discharge?  Yes  Yes     Did you understand the reasons for your medications at discharge and how to take them?  Yes  Yes     Did you understand the side effects of your medications?  Yes  --     Are you taking all of you prescribed medications?  Yes  Yes     Row Name 11/13/20 1309 11/11/20 1613          Discharge Instructions    Did you understand your discharge instructions?  Yes  Yes     Did your family/caregiver hear your instructions?  No  No     Were you told to eat a special diet?  No  No      Were you given a number of someone to call if you had questions or concerns?  Yes  Yes     Row Name 11/13/20 1309 11/11/20 1613          Index discharge location/services    Where did you go upon discharge?  [facility] [place]  [facility]     Do you have supportive family or friends in the home?  No  No     Row Name 11/13/20 1309 11/11/20 1613          Discharge Readiness    On a scale of 1-5 (5 being well prepared), how ready were you for discharge  4  4     Recommendation based on interview  Education on diagnosis/self management  Education on diagnosis/self management           Mera Hutton RN  Complex Case Manager  [facility] Care Coordination  [phone]-cell  [phone]-office  [phone]-fax  [email or web address]    Phone communication only - no physical contact with patient or family.

## 2020-11-13 NOTE — PROGRESS NOTES
Infectious Diseases Progress Note      LOS: 2 days   Patient Care Team:  Brooklyn Contreras DO as PCP - General  Brooklyn Contreras DO as PCP - Family Medicine  Nicole Li RN as Ambulatory  ([facility])    Chief Complaint: Headache, nausea    Subjective       The patient has been afebrile for the last 24 hours.  The patient is on room air, hemodynamically stable, and is tolerating antimicrobial therapy.  She is currently in the chair and has no new complaints other than some constipation      Review of Systems:   Review of Systems   Constitutional: Negative.    HENT: Negative.    Eyes: Negative.    Respiratory: Negative.    Cardiovascular: Negative.    Gastrointestinal: Positive for constipation.   Endocrine: Negative.    Genitourinary: Negative.    Musculoskeletal: Negative.    Skin: Negative.    Neurological: Negative.    Psychiatric/Behavioral: Negative.    All other systems reviewed and are negative.       Objective     Vital Signs  Temp:  [97.8 °F (36.6 °C)-98 °F (36.7 °C)] 98 °F (36.7 °C)  Heart Rate:  [70-76] 76  Resp:  [19-22] 19  BP: (105-138)/(44-77) 136/70    Physical Exam:  Physical Exam  Vitals signs and nursing note reviewed.   Constitutional:       General: She is not in acute distress.     Appearance: Normal appearance. She is well-developed and normal weight. She is not diaphoretic.   HENT:      Head: Normocephalic and atraumatic.   Eyes:      General: No scleral icterus.     Extraocular Movements: Extraocular movements intact.      Conjunctiva/sclera: Conjunctivae normal.      Pupils: Pupils are equal, round, and reactive to light.   Neck:      Musculoskeletal: Neck supple.   Cardiovascular:      Rate and Rhythm: Normal rate and regular rhythm.      Heart sounds: Normal heart sounds, S1 normal and S2 normal. No murmur.   Pulmonary:      Effort: Pulmonary effort is normal. No respiratory distress.      Breath sounds: Normal breath sounds. No stridor. No wheezing or rales.   Chest:       Chest wall: No tenderness.   Abdominal:      General: Bowel sounds are normal. There is no distension.      Palpations: Abdomen is soft. There is no mass.      Tenderness: There is no abdominal tenderness. There is no guarding.   Musculoskeletal: Normal range of motion.         General: No swelling, tenderness or deformity.   Skin:     General: Skin is warm and dry.      Coloration: Skin is not pale.      Findings: No bruising, erythema or rash.   Neurological:      General: No focal deficit present.      Mental Status: She is alert and oriented to person, place, and time. Mental status is at baseline.      Cranial Nerves: No cranial nerve deficit.   Psychiatric:         Mood and Affect: Mood normal.          Results Review:    I have reviewed all clinical data, test, lab, and imaging results.     Radiology  No Radiology Exams Resulted Within Past 24 Hours    Cardiology    Laboratory    Results from last 7 days   Lab Units 11/08/20  1518   WBC 10*3/mm3 7.40   HEMOGLOBIN g/dL 12.6   HEMATOCRIT % 38.7   PLATELETS 10*3/mm3 290     Results from last 7 days   Lab Units 11/08/20  1518   SODIUM mmol/L 139   POTASSIUM mmol/L 4.9   CHLORIDE mmol/L 104   CO2 mmol/L 23.0   BUN mg/dL 8   CREATININE mg/dL 0.76   GLUCOSE mg/dL 110*   ALBUMIN g/dL 3.80   BILIRUBIN mg/dL 0.3   ALK PHOS U/L 93   AST (SGOT) U/L 29   ALT (SGPT) U/L 31   CALCIUM mg/dL 9.7                 Microbiology   Microbiology Results (last 10 days)     Procedure Component Value - Date/Time    Urine Culture - Urine, Urine, Catheter [[id number]]  (Abnormal)  (Susceptibility) Collected: 11/08/20 1725    Lab Status: Final result Specimen: Urine, Catheter Updated: 11/10/20 0958     Urine Culture >100,000 CFU/mL Enterococcus faecalis    Susceptibility      Enterococcus faecalis     CABRERA     Ampicillin Susceptible     Levofloxacin Susceptible     Nitrofurantoin Susceptible     Tetracycline Susceptible     Vancomycin Susceptible                    COVID-19, [facility]  IN-HOUSE,NP Swab (NO TRANSPORT MEDIA) 2 HR TAT - Swab, Nasopharynx [[id number]]  (Normal) Collected: 11/08/20 1518    Lab Status: Final result Specimen: Swab from Nasopharynx Updated: 11/08/20 1558     COVID19 Not Detected    Narrative:      Fact sheet for providers: https://www.fda.gov/media/300059/download     Fact sheet for patients: https://www.fda.gov/media/661570/download          Medication Review:       Schedule Meds  amitriptyline, 25 mg, Oral, Nightly  amLODIPine, 10 mg, Oral, Daily  amoxicillin, 500 mg, Oral, Q8H  atorvastatin, 80 mg, Oral, Daily  celecoxib, 100 mg, Oral, Daily  hydrocortisone, 1 application, Topical, BID  oxybutynin XL, 10 mg, Oral, Daily  pantoprazole, 40 mg, Oral, Q AM  polyethylene glycol, 17 g, Oral, Daily  pregabalin, 100 mg, Oral, Q12H  senna-docusate sodium, 2 tablet, Oral, Nightly  sertraline, 100 mg, Oral, Daily  sodium chloride, 10 mL, Intravenous, Q12H        Infusion Meds  sodium chloride, 75 mL/hr, Last Rate: 75 mL/hr (11/11/20 0937)        PRN Meds  •  acetaminophen **OR** acetaminophen **OR** acetaminophen  •  albuterol  •  labetalol  •  lactulose  •  ondansetron **OR** ondansetron  •  prochlorperazine  •  promethazine **OR** promethazine **OR** promethazine  •  [COMPLETED] Insert peripheral IV **AND** sodium chloride  •  sodium chloride        Assessment/Plan       Antimicrobial Therapy   1.  P.o. amoxicillin      day  2.      Day  3.      Day  4.      Day  5.      Day      Assessment     UTI with Enterococcus faecalis.  The organism is susceptible to ampicillin  CT scan of the abdomen pelvis was negative     Patient had history of recurrent UTI     Recent history of intracranial bleed     Plan     Continue amoxicillin 500 mg p.o. 3 times daily for 7 days  Continue supportive care  Not much more to add from infectious disease standpoint-we will sign off at this time-please call with any questions.      Elena Winslow, APRN  11/13/20  13:24 EST     Note is dictated  utilizing voice recognition software/Dragon

## 2020-11-13 NOTE — PLAN OF CARE
Goal Outcome Evaluation:  Plan of Care Reviewed With: patient  Progress: improving      Patient is alert and oriented ambulates to the bathroom with a stand by assist. Has been able to tolerate and keep down food and liquid throughout the night. Blood pressure has been stable this far. She is anxious and worried about a ride coming to get her from the hospital tomorrow if she is able to discharge. Reassured patient, will continue to monitor.

## 2020-11-13 NOTE — PROGRESS NOTES
Continued Stay Note  GINNY Martins     Patient Name: Katie Reddy  MRN: [id number]  Today's Date: 11/13/2020    Admit Date: 11/8/2020    Discharge Plan     Row Name 11/13/20 1204       Plan    Plan  Anticipate routine home with [facility], ED[facility] order placed.    Plan Comments  Per RN report during rounds, patient should d/c home today, awaiting MD rounds. Stated did not d/c yesterday due to blood pressure. Notified Evelyn BRITT liaison patient likely should d/c today.        Expected Discharge Date and Time     Expected Discharge Date Expected Discharge Time    Nov 13, 2020             Madonna Navarro RN

## 2020-11-14 ENCOUNTER — TRANSITIONAL CARE MANAGEMENT TELEPHONE ENCOUNTER (OUTPATIENT)
Dept: CALL CENTER | Facility: HOSPITAL | Age: 75
End: 2020-11-14

## 2020-11-14 NOTE — OUTREACH NOTE
Call Center TCM Note      Responses   [facility] patient discharged from?  [facility]   Does the patient have one of the following disease processes/diagnoses(primary or secondary)?  Sepsis   TCM attempt successful?  No   Unsuccessful attempts  Attempt 2          Zander Avelar RN    11/14/2020, 14:41 EST

## 2020-11-14 NOTE — OUTREACH NOTE
Prep Survey      Responses   [facility] patient discharged from?  [facility]   Is LACE score < 7 ?  No   Eligibility  [facility]   Hospital  [facility]   Date of Admission  11/08/20   Date of Discharge  11/13/20   Discharge Disposition  Home or Self Care   Discharge diagnosis  sepsis d/t UTI, acute headache   Does the patient have one of the following disease processes/diagnoses(primary or secondary)?  Sepsis   Does the patient have Home health ordered?  Yes   What is the Home health agency?   [facility]   Is there a DME ordered?  No   Prep survey completed?  Yes          Gabriela Cunha RN

## 2020-11-14 NOTE — OUTREACH NOTE
Call Center TCM Note      Responses   [facility] patient discharged from?  [facility]   Does the patient have one of the following disease processes/diagnoses(primary or secondary)?  Sepsis   TCM attempt successful?  No   Unsuccessful attempts  Attempt 1          Zander Avelar RN    11/14/2020, 14:39 EST

## 2020-11-15 ENCOUNTER — TRANSITIONAL CARE MANAGEMENT TELEPHONE ENCOUNTER (OUTPATIENT)
Dept: CALL CENTER | Facility: HOSPITAL | Age: 75
End: 2020-11-15

## 2020-11-15 NOTE — OUTREACH NOTE
Call Center TCM Note      Responses   [facility] patient discharged from?  [facility]   Does the patient have one of the following disease processes/diagnoses(primary or secondary)?  Sepsis   TCM attempt successful?  Yes   Call start time  1120   Call end time  1123   Discharge diagnosis  sepsis d/t UTI, acute headache   Meds reviewed with patient/caregiver?  Yes   Is the patient having any side effects they believe may be caused by any medication additions or changes?  No   Does the patient have all medications related to this admission filled (includes all antibiotics, inhalers, nebulizers,steroids,etc.)  Yes   Is the patient taking all medications as directed (includes completed medication regime)?  Yes   Does the patient have a primary care provider?   Yes   Does the patient have an appointment with their PCP within 7 days of discharge?  No   What is preventing the patient from scheduling follow up appointments within 7 days of discharge?  -- [Pt doesn't drive. RN offered a video visit however patient declined. She will call and schedule hospital d/c f/u appt at a later time. ]   Nursing Interventions  Advised patient to make appointment [Routed to PCP office]   Has the patient kept scheduled appointments due by today?  N/A   What is the Home health agency?   [facility] referral   Psychosocial issues?  No   Did the patient receive a copy of their discharge instructions?  Yes   Nursing interventions  Reviewed instructions with patient   What is the patient's perception of their health status since discharge?  Same   Nursing interventions  Nurse provided patient education   Is the patient/caregiver able to teach back Sepsis?  S - Shivering,fever or very cold, S - Sleepy, difficult to arouse,confused, S - Short of breath   Nursing interventions  Nurse provided patient education   Is patient/caregiver able to teach back steps to recovery at home?  Set small, achievable goals for return to baseline health,  Rest and regain strength   Is the patient/caregiver able to teach back signs and symptoms of worsening condition:  Fever, Hyperthermia, Altered mental status(confusion/coma), Shortness of breath/rapid respiratory rate   If the patient is a current smoker, are they able to teach back resources for cessation?  Not a smoker   Is the patient/caregiver able to teach back the hierarchy of who to call/visit for symptoms/problems? PCP, Specialist, Home health nurse, Urgent Care, ED, 911  Yes   TCM call completed?  Yes          Rosemarie Diallo RN    11/15/2020, 11:25 EST

## 2020-11-16 ENCOUNTER — EPISODE CHANGES (OUTPATIENT)
Dept: CASE MANAGEMENT | Facility: OTHER | Age: 75
End: 2020-11-16

## 2020-11-16 NOTE — PROGRESS NOTES
Case Management Discharge Note      Final Note: [facility]    Provided Post Acute Provider List?: N/A  N/A Provider List Comment: current with [facility]         Home Medical Care Coordination complete    Service Provider Selected Services Address Phone Fax    [facility]  Home Health Services [address] [place], [place] [id number] [phone]                       Final Discharge Disposition Code: 06 - home with home health care

## 2020-11-17 ENCOUNTER — EPISODE CHANGES (OUTPATIENT)
Dept: CASE MANAGEMENT | Facility: OTHER | Age: 75
End: 2020-11-17

## 2020-11-18 NOTE — PROGRESS NOTES
[facility] Medicine Services Daily Progress Note      Hospitalist Team  LOS 2 days      Patient Care Team:  Brooklyn Contreras DO as PCP - General  Brooklyn Contreras DO as PCP - Family Medicine  Nicole Li RN as Ambulatory  ([facility])    Patient Location: 246/1      Subjective   Subjective     Chief Complaint / Subjective  Chief Complaint   Patient presents with   • Headache       Present on Admission:  • Weakness  • Intractable nausea and vomiting      Brief Synopsis of Hospital Course/HPI  75-year-old female presents the ER with a chief complaint of headache which started after having recent subdural and subarachnoid hemorrhage with summary below.  The headaches were improving until Friday when the patient was awakened in the middle of the night with a headache worsening.  The patient also describes increasing nausea and vomiting and loss of lateral visual field on the right.  The patient reports that she is unable to perform her activities of daily living over the last 2 days secondary to the headache.  The patient was discharged from physical rehab on Thursday and normally lives independently in a duplex apartment.  She has not been able to drive since being discharged from the rehab hospital secondary to her visual disturbance and headache.  She has a neighbor who is able to help her at times but does not have family support.  She has been independent up until this recent health event of head bleed and it was unusual for her to have a headache.  She does not want to go back to physical rehab and wants to continue to live independently.      Review of records with summary: The patient was admitted on 10/22/2020 through 10/26/2020 with intracranial hemorrhage after prior presenting to the ER with headache, dizziness, nausea and vomiting x2 days.  She was admitted to the ICU after CT showed a 4.2 x 2.9 cm left occipital/temporal lobe hematoma with surrounding edema  along with subarachnoid hemorrhage seen in the posterior left lateral ventricle and subdural hemorrhage over the left tentorium cerebelli.  She was hypertensive and on a Cardene drip.  Patient had UA evidence of UTI with culture showing E. coli which was treated with Rocephin.  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  There was also documentation of probable trigeminal cephalgia possibly type II trigeminal neuralgia for which pregabapentin was recommended.  MRI was not performed as the patient has presence of a bladder stimulator in situ.  Recommendation was for discharge to physical rehab with repeat scans in about a month.  Noted residual right-sided lower extremity weakness.  Mention was made of the patient's polypharmacy including meclizine Flexeril Restoril and other medications which might contribute to the patient falling given her age.  The patient was discharged to [facility].    Date::    11/9/2020  Tachycardia, noted to be in afib on monitor, resolved w/ IV push diltiazem, continues to be significantly nauseated, blood pressure on soft side     11/10/2020  Reports n/v significantly improved, tachycardia improved. Does report having issues with constipation and back pain since having inter-stim device placed, states to me that she wants to have the device taken out.     11/11/2020  Reports significant nausea/vomiting overnight, blood pressure elevated today    11/12/2020  Continuing to have some nausea/vomiting although improving     ROS  Constitution: Negative for chills and fever.   Gastrointestinal: Positive for nausea and vomiting. Negative for abdominal pain.   Genitourinary: Positive for urgency. Negative for dysuria and frequency.   Neurological: Positive for dizziness and headaches.   All other systems reviewed and are negative.    Objective   Objective      Vital Signs     Oxygen Therapy  SpO2: 99 %  Pulse  "Oximetry Type: Intermittent  Device (Oxygen Therapy): room air  Flowsheet Rows      First Filed Value   Admission Height  151.1 cm (59.5\") Documented at 11/08/2020 1444   Admission Weight  57 kg (125 lb 10.6 oz) Documented at 11/08/2020 1444        Intake & Output (last 3 days)       11/15 0701 - 11/16 0700 11/16 0701 - 11/17 0700 11/17 0701 - 11/18 0700 11/18 0701 - 11/19 0700    P.O.        Total Intake(mL/kg)        Net                    Lines, Drains & Airways    Active LDAs     Name:   Placement date:   Placement time:   Site:   Days:    Peripheral IV 11/09/20 1735 Left Antecubital   11/09/20 1735    Antecubital   less than 1                  Physical Exam:    Physical Exam  Physical Exam  Vitals signs reviewed.   Constitutional:       General: She is not in acute distress.     Appearance: Normal appearance. She is normal weight. She is not ill-appearing, toxic-appearing or diaphoretic.   HENT:      Right Ear: External ear normal.      Left Ear: External ear normal.      Nose: Nose normal. No congestion.      Mouth/Throat:      Mouth: Mucous membranes are dry.   Eyes:      General: No scleral icterus.        Right eye: No discharge.         Left eye: No discharge.      Extraocular Movements: Extraocular movements intact.      Conjunctiva/sclera: Conjunctivae normal.      Pupils: Pupils are equal, round, and reactive to light.   Neck:      Musculoskeletal: Normal range of motion and neck supple. No neck rigidity or muscular tenderness.   Cardiovascular:      Rate and Rhythm: Normal rate and regular rhythm.      Pulses: Normal pulses.      Heart sounds: Normal heart sounds. No murmur.   Pulmonary:      Effort: Pulmonary effort is normal. No respiratory distress.      Breath sounds: Normal breath sounds.   Abdominal:      General: Abdomen is flat. Bowel sounds are normal. There is no distension.      Palpations: Abdomen is soft.      Tenderness: There is no abdominal tenderness.   Musculoskeletal: Normal range " of motion.         General: No swelling.   Skin:     General: Skin is warm and dry.   Neurological:      Mental Status: She is alert and oriented to person, place, and time.      Comments: Far Lateral loss of peripheral vision, upper quadrant    Psychiatric:         Mood and Affect: Mood normal.         Behavior: Behavior normal.         Thought Content: Thought content normal.         Judgment: Judgment normal.     Procedures:              Results Review:     I reviewed the patient's new clinical results.      Lab Results (last 24 hours)     ** No results found for the last 24 hours. **        No results found for: HGBA1C                Microbiology Results (last 10 days)     Procedure Component Value - Date/Time    Urine Culture - Urine, Urine, Catheter [[id number]]  (Abnormal)  (Susceptibility) Collected: 11/08/20 1725    Lab Status: Final result Specimen: Urine, Catheter Updated: 11/10/20 0958     Urine Culture >100,000 CFU/mL Enterococcus faecalis    Susceptibility      Enterococcus faecalis     CABRERA     Ampicillin Susceptible     Levofloxacin Susceptible     Nitrofurantoin Susceptible     Tetracycline Susceptible     Vancomycin Susceptible                    COVID-19, [facility] IN-HOUSE,NP Swab (NO TRANSPORT MEDIA) 2 HR TAT - Swab, Nasopharynx [[id number]]  (Normal) Collected: 11/08/20 1518    Lab Status: Final result Specimen: Swab from Nasopharynx Updated: 11/08/20 1558     COVID19 Not Detected    Narrative:      Fact sheet for providers: https://www.fda.gov/media/273851/download     Fact sheet for patients: https://www.fda.gov/media/717452/download          ECG/EMG Results (most recent)     Procedure Component Value Units Date/Time    ECG 12 Lead [[id number]] Collected: 11/09/20 1507     Updated: 11/11/20 1752     QT Interval 334 ms     Narrative:      HEART RATE= 153  bpm  RR Interval= 395  ms  NY Interval=   ms  P Horizontal Axis=   deg  P Front Axis=   deg  QRSD Interval= 134  ms  QT Interval= 334  ms  QRS  Axis= 69  deg  T Wave Axis= -84  deg  - ABNORMAL ECG -  Wide-QRS tachycardia  When compared with ECG of 22-Oct-2020 15:50:48,  Significant rate increase  Wide-complex tachycardia is new compared to prior EKG that was done on October 22, 2020  Ventricular tachycardia versus SVT with aberrancy  Electronically Signed By: Dorothy Carmona ([facility]) 11-Nov-2020 17:46:06  Date and Time of Study: 2020-11-09 15:07:34    ECG 12 Lead [[id number]] Collected: 11/10/20 1546     Updated: 11/12/20 0822     QT Interval 369 ms     Narrative:      HEART RATE= 76  bpm  RR Interval= 756  ms  GA Interval= 168  ms  P Horizontal Axis= -5  deg  P Front Axis= 69  deg  QRSD Interval= 88  ms  QT Interval= 369  ms  QRS Axis= 66  deg  T Wave Axis= 36  deg  - ABNORMAL ECG -  Sinus rhythm  Multiple ventricular premature complexes  ST elevation, consider inferior injury  When compared with ECG of 09-Nov-2020 15:07:34,  Significant rate decrease  Significant repolarization change  Electronically Signed By: Evangelist Patel ([facility]) 12-Nov-2020 08:21:18  Date and Time of Study: 2020-11-10 15:46:15                    Ct Abdomen Pelvis Without Contrast    Result Date: 11/10/2020   1. No acute findings in the abdomen or pelvis. 2. Large colonic stool burden. Correlate for constipation.    Electronically Signed By-Maddi Bhagat MD On:11/10/2020 1:07 PM This report was finalized on [id number] by  Maddi Bhagat MD.      Xrays, labs reviewed personally by physician.    Medication Review:   I have reviewed the patient's current medication list      Scheduled Meds      Meds Infusions  No current facility-administered medications for this encounter.       Meds PRN      Assessment/Plan   Assessment/Plan     Active Hospital Problems    Diagnosis  POA   • Intractable nausea and vomiting [R11.2]  Yes   • Weakness [R53.1]  Yes      Resolved Hospital Problems   No resolved problems to display.     Sepsis 2/2 to UTI   -Enterococcus growing in urine   -D/w pharmacy,  patient started on vanc   -Nausea/vomiting likely secondary to above   -Consider abdominal imaging   -ID consulted, appreciate recs     Headache, acute on recently persistent, previously thought to possibly be trigeminal neuralgia: Continue amitriptyline; continue Lyrica; add Fioricet x1  --CT head without acute changes, showing improvement from previous   --Avoid opioids, caused significant nausea/vomiting and short-term memory loss     A. Fib   -Stat EKG unclear for dx of a.fib, will repeat EKG today   -Diltiazem IV push as needed   Likely secondary to underlying sepsis   -Repeat EKG pending      Asthma, mild intermittent: Continue albuterol as needed     Leukourea with recent diagnosis and treatment bacteria with culture showing E.  Coli, treated with Rocephin: Culture pending    --Patient received IV Rocephin x1 in ER    --Patient denies dysuria or increased frequency; she has chronic urgency with overactive bladder     Hypertension, chronic: Continue amlodipine   HLD, chronic: Continue atorvastatin     Chronic pain: Continue Celebrex     GERD, chronic: Continue Pepcid with formulary substitution; hold Protonix as formulary substitution for Pepcid is Protonix     Anxiety, chronic: Continue Zoloft; continue amitriptyline     Overactive bladder, chronic: Continue Myrbetriq    VTE Prophylaxis - SCDs.      Code Status -   Code Status and Medical Interventions:   Ordered at: 11/08/20 2016     Code Status:    CPR     Medical Interventions (Level of Support Prior to Arrest):    Full       Discharge Planning  Having nausea/vomiting, pending clinical improvement    Continued Care and Services - Admitted Since 11/8/2020    Coordination has not been started for this encounter.           Electronically signed by Gemma Avila MD, 11/18/20, 10:42 EST.  [facility] Hospitalist Team

## 2020-11-23 ENCOUNTER — TELEPHONE (OUTPATIENT)
Dept: FAMILY MEDICINE CLINIC | Facility: CLINIC | Age: 75
End: 2020-11-23

## 2020-11-23 NOTE — TELEPHONE ENCOUNTER
Patient called and left a VM stating that she was in the hospital 10/2020 with a stroke and they put her with  who is now not with [facility] and they are wanting her to have a CT scan done in [place] and she does not have anyone to drive her there and she wants to know if you can [place]

## 2020-11-23 NOTE — TELEPHONE ENCOUNTER
Why cant she just get her f/u CT head w/o at [place]??? When is it due?   I can send you to Dr. Abreu

## 2020-11-24 ENCOUNTER — READMISSION MANAGEMENT (OUTPATIENT)
Dept: CALL CENTER | Facility: HOSPITAL | Age: 75
End: 2020-11-24

## 2020-11-24 NOTE — OUTREACH NOTE
Sepsis Week 2 Survey      Responses   [facility] [place] patient discharged from?  [facility]   Does the patient have one of the following disease processes/diagnoses(primary or secondary)?  Sepsis   Week 2 attempt successful?  No   Unsuccessful attempts  Attempt 1          Todd Kumari RN

## 2020-11-24 NOTE — TELEPHONE ENCOUNTER
They wanted her to have scan done, then walk over to the NP's office immediately after for appt in [place]. Pt was sched ramesh for CT, but it was cancelled due to transportation issue. Pt is ok to change to Dr Abreu and have CT done @ [facility], if poss. Will you order CT? or does she need to wait for Dr Abreu appt to have ordered?

## 2020-11-25 ENCOUNTER — APPOINTMENT (OUTPATIENT)
Dept: CT IMAGING | Facility: HOSPITAL | Age: 75
End: 2020-11-25

## 2020-12-01 ENCOUNTER — OFFICE VISIT (OUTPATIENT)
Dept: ORTHOPEDIC SURGERY | Facility: CLINIC | Age: 75
End: 2020-12-01

## 2020-12-01 VITALS — BODY MASS INDEX: 24.97 KG/M2 | WEIGHT: 127.2 LBS | HEIGHT: 60 IN

## 2020-12-01 DIAGNOSIS — M17.11 PRIMARY OSTEOARTHRITIS OF RIGHT KNEE: Primary | ICD-10-CM

## 2020-12-01 DIAGNOSIS — M17.12 PRIMARY OSTEOARTHRITIS OF LEFT KNEE: ICD-10-CM

## 2020-12-01 PROCEDURE — 20610 DRAIN/INJ JOINT/BURSA W/O US: CPT | Performed by: ORTHOPAEDIC SURGERY

## 2020-12-01 RX ORDER — DOCUSATE SODIUM 100 MG/1
100 CAPSULE, LIQUID FILLED ORAL 2 TIMES DAILY
COMMUNITY
Start: 2020-11-04 | End: 2021-09-09

## 2020-12-01 RX ORDER — LIDOCAINE HYDROCHLORIDE 10 MG/ML
2 INJECTION, SOLUTION INFILTRATION; PERINEURAL
Status: COMPLETED | OUTPATIENT
Start: 2020-12-01 | End: 2020-12-01

## 2020-12-01 RX ORDER — METHYLPREDNISOLONE ACETATE 80 MG/ML
160 INJECTION, SUSPENSION INTRA-ARTICULAR; INTRALESIONAL; INTRAMUSCULAR; SOFT TISSUE
Status: COMPLETED | OUTPATIENT
Start: 2020-12-01 | End: 2020-12-01

## 2020-12-01 RX ORDER — POLYETHYLENE GLYCOL 3350 17 G/17G
POWDER, FOR SOLUTION ORAL
COMMUNITY
Start: 2020-11-04 | End: 2021-01-26

## 2020-12-01 RX ADMIN — METHYLPREDNISOLONE ACETATE 160 MG: 80 INJECTION, SUSPENSION INTRA-ARTICULAR; INTRALESIONAL; INTRAMUSCULAR; SOFT TISSUE at 09:08

## 2020-12-01 RX ADMIN — LIDOCAINE HYDROCHLORIDE 2 ML: 10 INJECTION, SOLUTION INFILTRATION; PERINEURAL at 09:08

## 2020-12-01 NOTE — PROGRESS NOTES
INJECTION    Patient: Katie Reddy    YOB: 1945    MRN: [id number]    Chief Complaint   Patient presents with   • Right Knee - Follow-up   • Left Knee - Follow-up       History of Present Illness: Patient returns today for bilateral knee pain.  Her pain is located over the medial and lateral aspect of the joint.  The pain has been progressive in nature and remains intermittent .  Her pain is worsened by going up and down stairs, kneeling, rising after sitting. There has been improvement in the past with injections.     This problem is not new to this examiner.     Allergies:   Allergies   Allergen Reactions   • Trazodone Irritability       Medications:   Home Medications:  Current Outpatient Medications on File Prior to Visit   Medication Sig   • albuterol (ACCUNEB) 1.25 MG/3ML nebulizer solution Take 3 mL by nebulization Every 6 (Six) Hours As Needed for Wheezing for up to 30 doses.   • amLODIPine (NORVASC) 10 MG tablet Take 1 tablet by mouth Daily for 30 days.   • amLODIPine (NORVASC) 10 MG tablet Take 1 tablet by mouth Daily for 90 doses.   • atorvastatin (LIPITOR) 80 MG tablet TAKE 1 TABLET BY MOUTH EVERY DAY   • celecoxib (CeleBREX) 100 MG capsule Take 1 capsule by mouth Daily.   • docusate sodium (COLACE) 100 MG capsule Take 100 mg by mouth 2 (Two) Times a Day.   • famotidine (PEPCID) 20 MG tablet Take 1 tablet by mouth 2 (Two) Times a Day As Needed for Indigestion or Heartburn.   • hydrocortisone 2.5 % cream Apply 1 application topically to the appropriate area as directed 2 (Two) Times a Day.   • MYRBETRIQ 50 MG tablet sustained-release 24 hour 24 hr tablet Take 50 mg by mouth Daily.   • ondansetron (ZOFRAN) 4 MG tablet Take 1 tablet by mouth Every 8 (Eight) Hours As Needed for Nausea or Vomiting.   • pantoprazole (PROTONIX) 40 MG EC tablet Take 1 tablet by mouth Daily.   • polyethylene glycol (MIRALAX) 17 g packet Take 17 g by mouth Daily.   • polyethylene glycol (MIRALAX) 17 GM/SCOOP  powder TAKE 17 GRAMS (ONE CAP FILLED TO LINE) DAILY   • pregabalin (LYRICA) 100 MG capsule Take 1 capsule by mouth Every 12 (Twelve) Hours for 30 days.   • sennosides-docusate (PERICOLACE) 8.6-50 MG per tablet Take 2 tablets by mouth Every Night.   • sertraline (ZOLOFT) 100 MG tablet Take 1 tablet by mouth Daily.     No current facility-administered medications on file prior to visit.      Current Medications:  Scheduled Meds:  PRN Meds:.    I have reviewed the patient's medical history in detail and updated the computerized patient record.  Review and summarization of old records include:    Past Medical History:   Diagnosis Date   • Arthritis    • Bladder incontinence    • Bowel incontinence    • Breast cyst    • Colon polyp    • DEXA     OSTEOPENIA = 2018 (-1.3/ -1.7); 2020 (-1.7/ -1.7)   • GERD (gastroesophageal reflux disease)    • Headache    • Hyperlipidemia    • Hypertension    • Osteopenia    • Vitamin D deficiency      Past Surgical History:   Procedure Laterality Date   • APPENDECTOMY     • BREAST CYST EXCISION     • BREAST LUMPECTOMY Left 1973    NEG   • BUNIONECTOMY Right 5/29/2020    Procedure: BUNIONECTOMY DEVONTE;  Surgeon: MARISSA Em DPM;  Location: [facility] OR;  Service: Podiatry;  Laterality: Right;   • CARPAL TUNNEL RELEASE Right 1980   • CHOLECYSTECTOMY     • COLON SURGERY      hemorrhoid banding   • COLONOSCOPY  2017 2017/ 2019 = jessica DESAI 2024   Dr. Mackey   • EYE SURGERY     • HERNIA REPAIR      Umbilical removal   • HYSTERECTOMY  1970   • OTHER SURGICAL HISTORY      bladder stimulator placement     pt cant have mri   • SUBTOTAL HYSTERECTOMY       Social History     Occupational History   • Not on file   Tobacco Use   • Smoking status: Never Smoker   • Smokeless tobacco: Never Used   Substance and Sexual Activity   • Alcohol use: No     Frequency: Never   • Drug use: No   • Sexual activity: Defer      Social History     Social History Narrative    [place] [employer] [place], and has a close neighbor that helps when needed/drives her to appts. Denies insecurities re affording/obtaining food, medication, transportation.     Family History   Problem Relation Age of Onset   • Heart disease Mother    • Hypertension Mother    • Diabetes Father    • Heart disease Father    • Alcohol abuse Father    • Hypertension Father    • Diabetes Brother    • Heart disease Brother    • Cancer Brother 68        Prostate Cancer   • Hypertension Brother    • Diabetes Maternal Aunt    • Heart disease Maternal Grandmother    • Hypertension Maternal Grandmother    • Heart disease Maternal Grandfather    • Hypertension Maternal Grandfather    • Liver disease Paternal Grandmother    • Hypertension Paternal Grandmother    • Heart disease Paternal Grandfather    • Hypertension Paternal Grandfather    • Dementia Sister        Review of Systems        Wt Readings from Last 3 Encounters:   12/01/20 57.7 kg (127 lb 3.2 oz)   11/12/20 59.1 kg (130 lb 4.7 oz)   10/26/20 58.6 kg (129 lb 3 oz)     Ht Readings from Last 3 Encounters:   12/01/20 151.1 cm (59.5\")   11/08/20 151.1 cm (59.5\")   10/24/20 165.1 cm (65\")     Body mass index is 25.26 kg/m².  Facility age limit for growth percentiles is 20 years.  There were no vitals filed for this visit.    Physical Exam    Musculoskeletal:    Bilateral knee (varus). Patient has crepitus throughout range of motion. Positive patellar grind test. Mild effusion. Lachman is negative. Pivot shift is negative. Anterior and posterior drawer signs are negative. Significant joint line tenderness is noted on the medial aspect of the knee. Patient has a varus orientation of the knee. There is fullness and tenderness in the Popliteal fossa. Mild distention of a Popliteal cyst is noted in this location. Range of motion in flexion is from 0-110 degrees. Neurovascular status is intact.  Dorsalis pedis and posterior tibial artery pulses are palpable. " Common peroneal nerve function is well preserved. Patient's gait is cautious and antalgic. Skin and soft tissues are mildly swollen, consistent with synovitis and effusion. The patient has a significant limp with the first few steps after starting the gait cycle. Getting out of a chair takes a lot of effort due to pain on knee flexion.       Diagnostics:      Procedure:  Large Joint Arthrocentesis: R knee  Date/Time: 12/1/2020 9:08 AM  Consent given by: patient  Site marked: site marked  Timeout: Immediately prior to procedure a time out was called to verify the correct patient, procedure, equipment, support staff and site/side marked as required   Supporting Documentation  Indications: pain   Procedure Details  Location: knee - R knee  Preparation: Patient was prepped and draped in the usual sterile fashion  Needle size: 25 G  Approach: anteromedial  Medications administered: 2 mL lidocaine 1 %; 160 mg methylPREDNISolone acetate 80 MG/ML  Patient tolerance: patient tolerated the procedure well with no immediate complications    Large Joint Arthrocentesis: L knee  Date/Time: 12/1/2020 9:08 AM  Consent given by: patient  Site marked: site marked  Timeout: Immediately prior to procedure a time out was called to verify the correct patient, procedure, equipment, support staff and site/side marked as required   Supporting Documentation  Indications: pain   Procedure Details  Location: knee - L knee  Preparation: Patient was prepped and draped in the usual sterile fashion  Needle size: 25 G  Approach: anteromedial  Medications administered: 2 mL lidocaine 1 %; 160 mg methylPREDNISolone acetate 80 MG/ML  Patient tolerance: patient tolerated the procedure well with no immediate complications            Assessment:   Diagnoses and all orders for this visit:    1. Primary osteoarthritis of right knee (Primary)  -     Large Joint Arthrocentesis: R knee  -     Large Joint Arthrocentesis: L knee    2. Primary osteoarthritis of  left knee  -     Large Joint Arthrocentesis: R knee  -     Large Joint Arthrocentesis: L knee          Plan:   · Injected patient's bilateral knee joint(s)with steroid from an anteromedial approach   · Compression/brace   · Rest, ice, compression, and elevation (RICE) therapy  · OTC Alternate Ibuprofen and Tylenol as needed  · Follow up in 3 month(s)    Date of encounter: 12/01/2020   Shawn Elaine MD

## 2020-12-02 ENCOUNTER — READMISSION MANAGEMENT (OUTPATIENT)
Dept: CALL CENTER | Facility: HOSPITAL | Age: 75
End: 2020-12-02

## 2020-12-02 NOTE — OUTREACH NOTE
"Sepsis Week 3 Survey      Responses   [facility] patient discharged from?  [facility]   Does the patient have one of the following disease processes/diagnoses(primary or secondary)?  Sepsis   Week 3 attempt successful?  Yes   Call start time  0938   Call end time  0948   Discharge diagnosis  sepsis d/t UTI, acute headache   Meds reviewed with patient/caregiver?  Yes   Is the patient taking all medications as directed (includes completed medication regime)?  Yes   Does the patient have an appointment with their PCP within 7 days of discharge?  No   What is preventing the patient from scheduling follow up appointments within 7 days of discharge?  Waiting on return call, Transportation   Nursing Interventions  Advised patient to make appointment   Has the patient kept scheduled appointments due by today?  N/A   Comments  Saw Dr Graham for injections in knees 12-1-2020 per pt.    What is the Home health agency?   [facility]   Has home health visited the patient within 72 hours of discharge?  Yes   Home health comments  nursing and PT. Working on balance, strength and stamina   Psychosocial issues?  No   Comments  Pt reports feeling stronger. Monitoring BP at home, she reports it is \"good\". Pt denies fever. Denies N/V or HA.    What is the patient's perception of their health status since discharge?  Improving   Is the patient/caregiver able to teach back signs and symptoms of worsening condition:  Fever, Shortness of breath/rapid respiratory rate, Rapid heart rate (>90)   Is the patient/caregiver able to teach back the hierarchy of who to call/visit for symptoms/problems? PCP, Specialist, Home health nurse, Urgent Care, ED, 911  Yes   Week 3 call completed?  Yes          Josefina Dawson RN  "

## 2020-12-07 ENCOUNTER — TELEPHONE (OUTPATIENT)
Dept: FAMILY MEDICINE CLINIC | Facility: CLINIC | Age: 75
End: 2020-12-07

## 2020-12-07 DIAGNOSIS — I62.9 INTRACRANIAL HEMORRHAGE (HCC): Primary | ICD-10-CM

## 2020-12-08 DIAGNOSIS — I62.9 INTRACRANIAL HEMORRHAGE (HCC): Primary | ICD-10-CM

## 2020-12-10 ENCOUNTER — HOSPITAL ENCOUNTER (OUTPATIENT)
Dept: CT IMAGING | Facility: HOSPITAL | Age: 75
Discharge: HOME | End: 2020-12-10
Admitting: FAMILY MEDICINE

## 2020-12-10 DIAGNOSIS — I62.9 INTRACRANIAL HEMORRHAGE (HCC): ICD-10-CM

## 2020-12-10 PROCEDURE — 70450 CT HEAD/BRAIN W/O DYE: CPT

## 2020-12-14 ENCOUNTER — READMISSION MANAGEMENT (OUTPATIENT)
Dept: CALL CENTER | Facility: HOSPITAL | Age: 75
End: 2020-12-14

## 2020-12-14 NOTE — OUTREACH NOTE
Sepsis Week 4 Survey      Responses   [facility] patient discharged from?  [facility]   Does the patient have one of the following disease processes/diagnoses(primary or secondary)?  Sepsis   Week 4 attempt successful?  Yes   Call start time  0843   Call end time  0847   Meds reviewed with patient/caregiver?  Yes   Is the patient taking all medications as directed (includes completed medication regime)?  Yes   Has the patient kept scheduled appointments due by today?  N/A   Home health comments  Pt has been released from PT and OT last week,   Week 4 call completed?  Yes   Would the patient like one additional call?  No   Graduated  Yes   Is the patient interested in additional calls from an ambulatory ?  NOTE:  applies to high risk patients requiring additional follow-up.  No   Did the patient feel the follow up calls were helpful during their recovery period?  Yes   Was the number of calls appropriate?  Yes   Wrap up additional comments  Pt reports doing much better. PT and OT have released her.          Lu Bone RN

## 2020-12-16 ENCOUNTER — TELEPHONE (OUTPATIENT)
Dept: FAMILY MEDICINE CLINIC | Facility: CLINIC | Age: 75
End: 2020-12-16

## 2020-12-16 NOTE — TELEPHONE ENCOUNTER
Dr. Abreu's office LVM stating that they received referral for patient.  They said before they can schedule this patient they will need an MRI/MRA ordered by our office and results sent to them.  Once they receive the results they will contact patient to schedule.

## 2020-12-17 NOTE — TELEPHONE ENCOUNTER
Received another VM from Niya at Dr. Abreu's office today that Dr. Abreu will not see this patient unless you order an MRI/MRA first and then he will see the patient after he has the result of that test.  They said they are unable to order the test and it has to be ordered by her PCP.

## 2021-01-04 RX ORDER — ATORVASTATIN CALCIUM 80 MG/1
TABLET, FILM COATED ORAL
Qty: 90 TABLET | Refills: 0 | Status: SHIPPED | OUTPATIENT
Start: 2021-01-04 | End: 2021-04-02

## 2021-01-04 NOTE — TELEPHONE ENCOUNTER
Last visit:  8/20/20  Next visit: 2/22/21  Last labs: 11/8/20    Rx requested: Atorvastatin   Pharmacy: [facility] in [place]

## 2021-01-05 DIAGNOSIS — F51.01 PRIMARY INSOMNIA: ICD-10-CM

## 2021-01-05 RX ORDER — MECLIZINE HCL 12.5 MG/1
TABLET ORAL
Qty: 60 TABLET | Refills: 0 | OUTPATIENT
Start: 2021-01-05

## 2021-01-05 RX ORDER — ONDANSETRON 4 MG/1
4 TABLET, FILM COATED ORAL EVERY 8 HOURS PRN
Qty: 30 TABLET | Refills: 2 | OUTPATIENT
Start: 2021-01-05

## 2021-01-05 RX ORDER — ONDANSETRON 4 MG/1
4 TABLET, FILM COATED ORAL EVERY 8 HOURS PRN
Qty: 30 TABLET | Refills: 0 | Status: SHIPPED | OUTPATIENT
Start: 2021-01-05 | End: 2021-04-16

## 2021-01-05 RX ORDER — TEMAZEPAM 30 MG/1
30 CAPSULE ORAL NIGHTLY PRN
Qty: 30 CAPSULE | Refills: 0 | OUTPATIENT
Start: 2021-01-05

## 2021-01-13 RX ORDER — AMITRIPTYLINE HYDROCHLORIDE 25 MG/1
25 TABLET, FILM COATED ORAL
Qty: 90 TABLET | Refills: 0 | Status: SHIPPED | OUTPATIENT
Start: 2021-01-13 | End: 2021-04-13

## 2021-01-13 RX ORDER — MECLIZINE HYDROCHLORIDE 25 MG/1
25 TABLET ORAL 3 TIMES DAILY PRN
COMMUNITY
End: 2021-01-30

## 2021-01-13 RX ORDER — AMITRIPTYLINE HYDROCHLORIDE 25 MG/1
25 TABLET, FILM COATED ORAL
COMMUNITY
Start: 2020-11-30 | End: 2021-01-13 | Stop reason: SDUPTHER

## 2021-01-13 NOTE — TELEPHONE ENCOUNTER
PATIENT CALLED THIS MORNING TO CANCELL HER APPOINTMENT AT 8:00 DUE TO HER HEADACHES, VOMITING AND DIARRHEA.  SHE STATES THE HEADACHES COMES AND GOES, VOMITING AND DIARRHEA STARTED ON Sunday. SHE HAS MEDICINE FOR THE SYMPTOMS AND HAS BEEN TAKING THEM. SHE STATES HER DAUGHTER AND GRANDDAUGHTER HAVE BEEN AT HER HOUSE ON Saturday AND Sunday BECAUSE SHE DOESN'T GO OUT.      TRIED TO MAKE A VIDEO VISIT BUT NOTHING AVAILABLE UNTIL 1/20/2021    [facility] [address] [address] - [phone] [facility] [phone]   [phone]    SHE STATES SHE NEEDS A MED REFILL OF   AMITRIPTYLINE 25 MG SHE RECEIVED THIS MEDICATION FROM [facility] IN [place].    PATIENT CALL BACK NUMBER [phone]

## 2021-01-13 NOTE — TELEPHONE ENCOUNTER
ANDIE pt and she really just needs the refill of Elavil 25mg sent [facility] [place].     She states her symptoms have much improved since yest and already feel better as far as nausea/diarrhea. The HA's are her usual ones that she takes meds for. She is staying hydrated and is not wobbly. She takes meclizine and Zofran PRN. She does not think it's COVID at all, as she's been tested 3x already.    lmtcb

## 2021-01-26 ENCOUNTER — OFFICE VISIT (OUTPATIENT)
Dept: FAMILY MEDICINE CLINIC | Facility: CLINIC | Age: 76
End: 2021-01-26

## 2021-01-26 VITALS
BODY MASS INDEX: 26.31 KG/M2 | DIASTOLIC BLOOD PRESSURE: 84 MMHG | WEIGHT: 134 LBS | TEMPERATURE: 97.3 F | HEIGHT: 60 IN | HEART RATE: 77 BPM | SYSTOLIC BLOOD PRESSURE: 146 MMHG | OXYGEN SATURATION: 98 % | RESPIRATION RATE: 16 BRPM

## 2021-01-26 DIAGNOSIS — M85.89 OSTEOPENIA OF MULTIPLE SITES: ICD-10-CM

## 2021-01-26 DIAGNOSIS — E78.2 MIXED HYPERLIPIDEMIA: ICD-10-CM

## 2021-01-26 DIAGNOSIS — S39.012A LOW BACK STRAIN, INITIAL ENCOUNTER: ICD-10-CM

## 2021-01-26 DIAGNOSIS — K64.9 HEMORRHOIDS, UNSPECIFIED HEMORRHOID TYPE: ICD-10-CM

## 2021-01-26 DIAGNOSIS — K21.9 GASTROESOPHAGEAL REFLUX DISEASE WITHOUT ESOPHAGITIS: ICD-10-CM

## 2021-01-26 DIAGNOSIS — F51.01 PRIMARY INSOMNIA: Primary | ICD-10-CM

## 2021-01-26 PROCEDURE — 99214 OFFICE O/P EST MOD 30 MIN: CPT | Performed by: FAMILY MEDICINE

## 2021-01-26 RX ORDER — TEMAZEPAM 30 MG/1
30 CAPSULE ORAL NIGHTLY PRN
Qty: 30 CAPSULE | Refills: 0
Start: 2021-01-26 | End: 2021-04-06

## 2021-01-26 RX ORDER — IBANDRONATE SODIUM 150 MG/1
150 TABLET, FILM COATED ORAL
Qty: 3 TABLET | Refills: 1 | Status: SHIPPED | OUTPATIENT
Start: 2021-01-26 | End: 2021-07-21

## 2021-01-26 RX ORDER — MELATONIN
1000 DAILY
COMMUNITY
End: 2021-04-11 | Stop reason: HOSPADM

## 2021-01-26 RX ORDER — FAMOTIDINE 20 MG/1
20 TABLET, FILM COATED ORAL 2 TIMES DAILY PRN
Qty: 180 TABLET | Refills: 1 | Status: SHIPPED | OUTPATIENT
Start: 2021-01-26 | End: 2021-04-11 | Stop reason: HOSPADM

## 2021-01-26 RX ORDER — ACETAMINOPHEN 500 MG
1 TABLET ORAL DAILY
COMMUNITY

## 2021-01-26 RX ORDER — SERTRALINE HYDROCHLORIDE 100 MG/1
100 TABLET, FILM COATED ORAL DAILY
Qty: 90 TABLET | Refills: 1 | Status: SHIPPED | OUTPATIENT
Start: 2021-01-26 | End: 2021-04-20

## 2021-01-26 RX ORDER — PANTOPRAZOLE SODIUM 40 MG/1
40 TABLET, DELAYED RELEASE ORAL DAILY
Qty: 90 TABLET | Refills: 1 | Status: SHIPPED | OUTPATIENT
Start: 2021-01-26 | End: 2021-06-29

## 2021-01-26 RX ORDER — ALBUTEROL SULFATE 1.25 MG/3ML
1 SOLUTION RESPIRATORY (INHALATION) EVERY 6 HOURS PRN
Qty: 120 EACH | Refills: 2 | Status: SHIPPED | OUTPATIENT
Start: 2021-01-26 | End: 2021-01-27 | Stop reason: SDUPTHER

## 2021-01-26 RX ORDER — CELECOXIB 100 MG/1
100 CAPSULE ORAL DAILY
Qty: 90 CAPSULE | Refills: 1 | Status: SHIPPED | OUTPATIENT
Start: 2021-01-26 | End: 2021-05-18

## 2021-01-26 NOTE — PROGRESS NOTES
Subjective   Katie Reddy is a 75 y.o. female.     Chief Complaint   Patient presents with   • Hospital Follow Up Visit     albuterol neb solution.   • Back Pain   • Hemorrhoids         Current Outpatient Medications:   •  amitriptyline (ELAVIL) 25 MG tablet, Take 1 tablet by mouth every night at bedtime., Disp: 90 tablet, Rfl: 0  •  amLODIPine (NORVASC) 10 MG tablet, Take 1 tablet by mouth Daily for 90 doses., Disp: 30 tablet, Rfl: 2  •  atorvastatin (LIPITOR) 80 MG tablet, TAKE 1 TABLET BY MOUTH EVERY DAY, Disp: 90 tablet, Rfl: 0  •  Calcium Carbonate-Vit D-Min (CALCIUM 1200 PO), Take 1 tablet by mouth Daily., Disp: , Rfl:   •  celecoxib (CeleBREX) 100 MG capsule, Take 1 capsule by mouth Daily., Disp: 90 capsule, Rfl: 1  •  Cholecalciferol (VITAMIN D3 PO), Take 1 tablet by mouth Daily., Disp: , Rfl:   •  docusate sodium (COLACE) 100 MG capsule, Take 100 mg by mouth 2 (Two) Times a Day., Disp: , Rfl:   •  famotidine (PEPCID) 20 MG tablet, Take 1 tablet by mouth 2 (Two) Times a Day As Needed for Indigestion or Heartburn., Disp: 180 tablet, Rfl: 1  •  hydrocortisone 2.5 % cream, Apply 1 application topically to the appropriate area as directed 2 (Two) Times a Day., Disp: , Rfl:   •  MYRBETRIQ 50 MG tablet sustained-release 24 hour 24 hr tablet, Take 50 mg by mouth Daily., Disp: , Rfl:   •  ondansetron (ZOFRAN) 4 MG tablet, Take 1 tablet by mouth Every 8 (Eight) Hours As Needed for Nausea or Vomiting., Disp: 30 tablet, Rfl: 0  •  pantoprazole (PROTONIX) 40 MG EC tablet, Take 1 tablet by mouth Daily., Disp: 90 tablet, Rfl: 1  •  sennosides-docusate (PERICOLACE) 8.6-50 MG per tablet, Take 2 tablets by mouth Every Night., Disp: 60 tablet, Rfl: 2  •  sertraline (ZOLOFT) 100 MG tablet, Take 1 tablet by mouth Daily., Disp: 90 tablet, Rfl: 1  •  albuterol (ACCUNEB) 1.25 MG/3ML nebulizer solution, Take 3 mL by nebulization Every 6 (Six) Hours As Needed for Wheezing or Shortness of Air for up to 30 doses., Disp: 120 each,  Rfl: 2  •  ibandronate (Boniva) 150 MG tablet, Take 1 tablet by mouth Every 30 (Thirty) Days., Disp: 3 tablet, Rfl: 1  •  temazepam (RESTORIL) 30 MG capsule, Take 1 capsule by mouth At Night As Needed for Sleep or Anxiety., Disp: 30 capsule, Rfl: 0    Past Medical History:   Diagnosis Date   • Arthritis    • Bladder incontinence    • Colon polyp    • DEXA     OSTEOPENIA = 2018 (-1.3/ -1.7); 2020 (-1.7/ -1.7)   • GERD    • Headache    • Intracerebral hemorrhage/ CVA    • MAMMO     NEG =2020   • Osteopenia    • Vitamin D deficiency        Past Surgical History:   Procedure Laterality Date   • APPENDECTOMY     • BREAST CYST EXCISION     • BREAST LUMPECTOMY Left 1973    NEG   • BUNIONECTOMY Right 5/29/2020    Procedure: BUNIONECTOMY DEVONTE;  Surgeon: MARISSA Em DPM;  Location: [facility] MAIN OR;  Service: Podiatry;  Laterality: Right;   • CARPAL TUNNEL RELEASE Right 1980   • CHOLECYSTECTOMY     • COLON SURGERY      hemorrhoid banding   • COLONOSCOPY  2017 2017/ 2019 = TA, rech 2024   Dr. Mackey   • EYE SURGERY     • HERNIA REPAIR      Umbilical removal   • HYSTERECTOMY  1970   • OTHER SURGICAL HISTORY      bladder stimulator placement     pt cant have mri   • SUBTOTAL HYSTERECTOMY         Family History   Problem Relation Age of Onset   • Heart disease Mother    • Hypertension Mother    • Diabetes Father    • Heart disease Father    • Alcohol abuse Father    • Hypertension Father    • Diabetes Brother    • Heart disease Brother    • Cancer Brother 68        Prostate Cancer   • Hypertension Brother    • Diabetes Maternal Aunt    • Heart disease Maternal Grandmother    • Hypertension Maternal Grandmother    • Heart disease Maternal Grandfather    • Hypertension Maternal Grandfather    • Liver disease Paternal Grandmother    • Hypertension Paternal Grandmother    • Heart disease Paternal Grandfather    • Hypertension Paternal Grandfather    • Dementia Sister        Social History     Socioeconomic History   • Marital  status:      Spouse name: Not on file   • Number of children: Not on file   • Years of education: Not on file   • Highest education level: Not on file   Tobacco Use   • Smoking status: Never Smoker   • Smokeless tobacco: Never Used   Substance and Sexual Activity   • Alcohol use: No     Frequency: Never   • Drug use: No   • Sexual activity: Defer   Social History Narrative    Pt states she lives alone in [facility] apartments, largely a senior community, and has a close neighbor that helps when needed/drives her to appts. Denies insecurities re affording/obtaining food, medication, transportation.       76y/o C female here for f/u from hosp stays in Oct/ Nov ------here w/ new issues too    Pt states she hosp w/ acute hemorrhagic CVA due to HTN in Oct and had lost about 1 day in time before went to ER; states she left our office after leaving a UA and went to store and brought food to neighbor and then doesn't remember anything until phone rang the next day w/ my office calling about her UA results------pt told us she was severely ill and EMS was called to take her to hosp-----She had a     Pt then went back in hosp in Nov due to weakness/ UTI; Pt saw urology and told them she wanted the bladder stim removed since not helping and felt it contributed to her constipation----Pt has bad hemorrhoids and wanting to see someone about getting them removed    Pt also states not sleeping well at all on current meds/ doses-----didn't sleep well in rehab either         The following portions of the patient's history were reviewed and updated as appropriate: allergies, current medications, past family history, past medical history, past social history, past surgical history and problem list.    Review of Systems   Constitutional: Negative for activity change, appetite change, fatigue, unexpected weight gain and unexpected weight loss.   Gastrointestinal: Positive for constipation and rectal pain. Negative for diarrhea,  nausea and vomiting.   Genitourinary: Positive for urinary incontinence. Negative for dysuria.   Musculoskeletal: Positive for back pain and myalgias.   Skin: Negative for rash and bruise.   Neurological: Negative for weakness and headache.   Psychiatric/Behavioral: Positive for sleep disturbance. Negative for decreased concentration and depressed mood. The patient is not nervous/anxious.        Vitals:    01/26/21 1543   BP: 146/84   Pulse: 77   Resp: 16   Temp: 97.3 °F (36.3 °C)   SpO2: 98%       Objective   Physical Exam  Vitals signs and nursing note reviewed.   Constitutional:       General: She is not in acute distress.     Appearance: Normal appearance. She is well-developed. She is not ill-appearing or toxic-appearing.   Neck:      Musculoskeletal: Normal range of motion and neck supple.   Cardiovascular:      Rate and Rhythm: Normal rate and regular rhythm.      Heart sounds: Normal heart sounds. No murmur.   Pulmonary:      Effort: Pulmonary effort is normal. No respiratory distress.      Breath sounds: Normal breath sounds. No stridor. No wheezing, rhonchi or rales.   Musculoskeletal:      Lumbar back: She exhibits tenderness. She exhibits no bony tenderness, no swelling, no deformity and no laceration.        Back:    Skin:     General: Skin is warm and dry.      Findings: No rash.   Neurological:      Mental Status: She is alert and oriented to person, place, and time.      Gait: Gait normal.   Psychiatric:         Mood and Affect: Mood normal.         Behavior: Behavior normal.         Thought Content: Thought content normal.         Judgment: Judgment normal.           Assessment/Plan   Diagnoses and all orders for this visit:    1. Primary insomnia (Primary)  -     temazepam (RESTORIL) 30 MG capsule; Take 1 capsule by mouth At Night As Needed for Sleep or Anxiety.  Dispense: 30 capsule; Refill: 0    2. Hemorrhoids, unspecified hemorrhoid type  -     Ambulatory Referral to General Surgery    3. Mixed  hyperlipidemia  -     Lipid Panel; Future    4. Osteopenia of multiple sites  -     Vitamin D 25 Hydroxy; Future    5. Gastroesophageal reflux disease without esophagitis    6. Low back strain, initial encounter    Other orders  -     ibandronate (Boniva) 150 MG tablet; Take 1 tablet by mouth Every 30 (Thirty) Days.  Dispense: 3 tablet; Refill: 1  -     sertraline (ZOLOFT) 100 MG tablet; Take 1 tablet by mouth Daily.  Dispense: 90 tablet; Refill: 1  -     pantoprazole (PROTONIX) 40 MG EC tablet; Take 1 tablet by mouth Daily.  Dispense: 90 tablet; Refill: 1  -     famotidine (PEPCID) 20 MG tablet; Take 1 tablet by mouth 2 (Two) Times a Day As Needed for Indigestion or Heartburn.  Dispense: 180 tablet; Refill: 1  -     celecoxib (CeleBREX) 100 MG capsule; Take 1 capsule by mouth Daily.  Dispense: 90 capsule; Refill: 1  -     Discontinue: albuterol (ACCUNEB) 1.25 MG/3ML nebulizer solution; Take 3 mL by nebulization Every 6 (Six) Hours As Needed for Wheezing or Shortness of Air for up to 30 doses.  Dispense: 120 each; Refill: 2    stretches disc'd w/ pt for low back  Pt to disc stim removal w/ urology  Ref to gen sx for hemorrhoid eval/ tx

## 2021-01-27 ENCOUNTER — TELEPHONE (OUTPATIENT)
Dept: FAMILY MEDICINE CLINIC | Facility: CLINIC | Age: 76
End: 2021-01-27

## 2021-01-27 PROBLEM — J45.20 ASTHMA IN ADULT, MILD INTERMITTENT, UNCOMPLICATED: Status: ACTIVE | Noted: 2021-01-27

## 2021-01-27 RX ORDER — ALBUTEROL SULFATE 1.25 MG/3ML
1 SOLUTION RESPIRATORY (INHALATION) EVERY 6 HOURS PRN
Qty: 120 EACH | Refills: 2 | Status: SHIPPED | OUTPATIENT
Start: 2021-01-27

## 2021-01-27 NOTE — TELEPHONE ENCOUNTER
Patient has Asthma, mild intermittent: Continue albuterol as needed  Per her hospital note from 11/8/20.    Please resend her Albuterol nebulizer solution to [facility] in [place].     I added the diagnosis to her problem list.

## 2021-01-28 ENCOUNTER — TELEPHONE (OUTPATIENT)
Dept: FAMILY MEDICINE CLINIC | Facility: CLINIC | Age: 76
End: 2021-01-28

## 2021-01-30 PROBLEM — E55.9 VITAMIN D DEFICIENCY: Chronic | Status: ACTIVE | Noted: 2018-09-04

## 2021-01-30 PROBLEM — E78.5 DYSLIPIDEMIA: Chronic | Status: ACTIVE | Noted: 2018-09-04

## 2021-01-30 PROBLEM — K21.9 GERD (GASTROESOPHAGEAL REFLUX DISEASE): Chronic | Status: ACTIVE | Noted: 2018-09-04

## 2021-02-17 ENCOUNTER — TELEPHONE (OUTPATIENT)
Dept: FAMILY MEDICINE CLINIC | Facility: CLINIC | Age: 76
End: 2021-02-17

## 2021-02-17 RX ORDER — AMLODIPINE BESYLATE 10 MG/1
10 TABLET ORAL DAILY
Qty: 90 TABLET | Refills: 0 | Status: SHIPPED | OUTPATIENT
Start: 2021-02-17 | End: 2021-03-19

## 2021-02-17 RX ORDER — AMLODIPINE BESYLATE 10 MG/1
10 TABLET ORAL DAILY
Qty: 90 TABLET | Refills: 0 | Status: SHIPPED | OUTPATIENT
Start: 2021-02-17 | End: 2021-02-17 | Stop reason: SDUPTHER

## 2021-02-17 NOTE — TELEPHONE ENCOUNTER
PATIENT REQUEST REFILL FOR AMLODIPRINE BESILATE 10MG. PATIENT STATES SHE RECEIVED THIS AT THE HOSPITAL AFTER HER STROKE. HUB UNABLE TO FIND MEDICATION IN LIST OF MEDICATIONS.        [id number] - [place], [place] [address] - [phone]  - [phone]   [phone]    PLEASE ADVISE: [phone]

## 2021-03-02 ENCOUNTER — TRANSCRIBE ORDERS (OUTPATIENT)
Dept: ADMINISTRATIVE | Facility: HOSPITAL | Age: 76
End: 2021-03-02

## 2021-03-02 ENCOUNTER — OFFICE VISIT (OUTPATIENT)
Dept: ORTHOPEDIC SURGERY | Facility: CLINIC | Age: 76
End: 2021-03-02

## 2021-03-02 ENCOUNTER — LAB (OUTPATIENT)
Dept: LAB | Facility: HOSPITAL | Age: 76
End: 2021-03-02

## 2021-03-02 ENCOUNTER — HOSPITAL ENCOUNTER (OUTPATIENT)
Dept: CARDIOLOGY | Facility: HOSPITAL | Age: 76
Discharge: HOME | End: 2021-03-02

## 2021-03-02 VITALS
HEART RATE: 85 BPM | HEIGHT: 60 IN | DIASTOLIC BLOOD PRESSURE: 96 MMHG | WEIGHT: 137 LBS | BODY MASS INDEX: 26.9 KG/M2 | SYSTOLIC BLOOD PRESSURE: 159 MMHG

## 2021-03-02 DIAGNOSIS — Z01.818 PRE-OP TESTING: Primary | ICD-10-CM

## 2021-03-02 DIAGNOSIS — N39.46 MIXED INCONTINENCE: ICD-10-CM

## 2021-03-02 DIAGNOSIS — Z01.818 PRE-OP TESTING: ICD-10-CM

## 2021-03-02 DIAGNOSIS — M17.11 PRIMARY OSTEOARTHRITIS OF RIGHT KNEE: Primary | ICD-10-CM

## 2021-03-02 DIAGNOSIS — M17.12 PRIMARY OSTEOARTHRITIS OF LEFT KNEE: ICD-10-CM

## 2021-03-02 LAB
ANION GAP SERPL CALCULATED.3IONS-SCNC: 12 MMOL/L (ref 5–15)
BASOPHILS # BLD AUTO: 0.08 10*3/MM3 (ref 0–0.2)
BASOPHILS NFR BLD AUTO: 1.3 % (ref 0–1.5)
BUN SERPL-MCNC: 22 MG/DL (ref 8–23)
BUN/CREAT SERPL: 21.4 (ref 7–25)
CALCIUM SPEC-SCNC: 9.8 MG/DL (ref 8.6–10.5)
CHLORIDE SERPL-SCNC: 101 MMOL/L (ref 98–107)
CO2 SERPL-SCNC: 26 MMOL/L (ref 22–29)
CREAT SERPL-MCNC: 1.03 MG/DL (ref 0.57–1)
DEPRECATED RDW RBC AUTO: 42.7 FL (ref 37–54)
EOSINOPHIL # BLD AUTO: 0.09 10*3/MM3 (ref 0–0.4)
EOSINOPHIL NFR BLD AUTO: 1.4 % (ref 0.3–6.2)
ERYTHROCYTE [DISTWIDTH] IN BLOOD BY AUTOMATED COUNT: 13.4 % (ref 12.3–15.4)
GFR SERPL CREATININE-BSD FRML MDRD: 52 ML/MIN/1.73
GLUCOSE SERPL-MCNC: 77 MG/DL (ref 65–99)
HCT VFR BLD AUTO: 41.4 % (ref 34–46.6)
HGB BLD-MCNC: 13.5 G/DL (ref 12–15.9)
IMM GRANULOCYTES # BLD AUTO: 0.04 10*3/MM3 (ref 0–0.05)
IMM GRANULOCYTES NFR BLD AUTO: 0.6 % (ref 0–0.5)
LYMPHOCYTES # BLD AUTO: 1.6 10*3/MM3 (ref 0.7–3.1)
LYMPHOCYTES NFR BLD AUTO: 25 % (ref 19.6–45.3)
MCH RBC QN AUTO: 28.4 PG (ref 26.6–33)
MCHC RBC AUTO-ENTMCNC: 32.6 G/DL (ref 31.5–35.7)
MCV RBC AUTO: 87 FL (ref 79–97)
MONOCYTES # BLD AUTO: 0.68 10*3/MM3 (ref 0.1–0.9)
MONOCYTES NFR BLD AUTO: 10.6 % (ref 5–12)
NEUTROPHILS NFR BLD AUTO: 3.9 10*3/MM3 (ref 1.7–7)
NEUTROPHILS NFR BLD AUTO: 61.1 % (ref 42.7–76)
NRBC BLD AUTO-RTO: 0 /100 WBC (ref 0–0.2)
PLATELET # BLD AUTO: 263 10*3/MM3 (ref 140–450)
PMV BLD AUTO: 8.4 FL (ref 6–12)
POTASSIUM SERPL-SCNC: 4.5 MMOL/L (ref 3.5–5.2)
RBC # BLD AUTO: 4.76 10*6/MM3 (ref 3.77–5.28)
SARS-COV-2 ORF1AB RESP QL NAA+PROBE: NOT DETECTED
SODIUM SERPL-SCNC: 139 MMOL/L (ref 136–145)
WBC # BLD AUTO: 6.39 10*3/MM3 (ref 3.4–10.8)

## 2021-03-02 PROCEDURE — 20610 DRAIN/INJ JOINT/BURSA W/O US: CPT | Performed by: ORTHOPAEDIC SURGERY

## 2021-03-02 PROCEDURE — U0005 INFEC AGEN DETEC AMPLI PROBE: HCPCS

## 2021-03-02 PROCEDURE — 80048 BASIC METABOLIC PNL TOTAL CA: CPT

## 2021-03-02 PROCEDURE — 93005 ELECTROCARDIOGRAM TRACING: CPT | Performed by: UROLOGY

## 2021-03-02 PROCEDURE — C9803 HOPD COVID-19 SPEC COLLECT: HCPCS

## 2021-03-02 PROCEDURE — 36415 COLL VENOUS BLD VENIPUNCTURE: CPT

## 2021-03-02 PROCEDURE — 85025 COMPLETE CBC W/AUTO DIFF WBC: CPT

## 2021-03-02 PROCEDURE — U0004 COV-19 TEST NON-CDC HGH THRU: HCPCS

## 2021-03-02 PROCEDURE — 93010 ELECTROCARDIOGRAM REPORT: CPT | Performed by: INTERNAL MEDICINE

## 2021-03-02 RX ORDER — LIDOCAINE HYDROCHLORIDE 10 MG/ML
2 INJECTION, SOLUTION INFILTRATION; PERINEURAL
Status: COMPLETED | OUTPATIENT
Start: 2021-03-02 | End: 2021-03-02

## 2021-03-02 RX ORDER — METHYLPREDNISOLONE ACETATE 80 MG/ML
160 INJECTION, SUSPENSION INTRA-ARTICULAR; INTRALESIONAL; INTRAMUSCULAR; SOFT TISSUE
Status: COMPLETED | OUTPATIENT
Start: 2021-03-02 | End: 2021-03-02

## 2021-03-02 RX ADMIN — METHYLPREDNISOLONE ACETATE 160 MG: 80 INJECTION, SUSPENSION INTRA-ARTICULAR; INTRALESIONAL; INTRAMUSCULAR; SOFT TISSUE at 13:30

## 2021-03-02 RX ADMIN — LIDOCAINE HYDROCHLORIDE 2 ML: 10 INJECTION, SOLUTION INFILTRATION; PERINEURAL at 13:30

## 2021-03-02 NOTE — PROGRESS NOTES
INJECTION    Patient: Katie Reddy    YOB: 1945    MRN: [id number]    Chief Complaints: Bilateral knee pain and discomfort    History of Present Illness: Patient returns today for bilateral knee pain.  Her pain is located over the medial and lateral aspect of the joint.  The pain has been progressive in nature and remains intermittent .  Her pain is worsened by kneeling, rising after sitting, squatting. There has been improvement in the past with heat, NSAIDS and injections.     This problem is not new to this examiner.     Allergies:   Allergies   Allergen Reactions   • Trazodone Irritability       Medications:   Home Medications:  Current Outpatient Medications on File Prior to Visit   Medication Sig   • albuterol (ACCUNEB) 1.25 MG/3ML nebulizer solution Take 3 mL by nebulization Every 6 (Six) Hours As Needed for Wheezing or Shortness of Air for up to 30 doses.   • amitriptyline (ELAVIL) 25 MG tablet Take 1 tablet by mouth every night at bedtime.   • amLODIPine (NORVASC) 10 MG tablet Take 1 tablet by mouth Daily for 30 days.   • atorvastatin (LIPITOR) 80 MG tablet TAKE 1 TABLET BY MOUTH EVERY DAY   • Calcium Carbonate-Vit D-Min (CALCIUM 1200 PO) Take 1 tablet by mouth Daily.   • celecoxib (CeleBREX) 100 MG capsule Take 1 capsule by mouth Daily.   • Cholecalciferol (VITAMIN D3 PO) Take 1 tablet by mouth Daily.   • cyclobenzaprine (FLEXERIL) 10 MG tablet Take 10 mg by mouth Daily As Needed.   • docusate sodium (COLACE) 100 MG capsule Take 100 mg by mouth 2 (Two) Times a Day.   • famotidine (PEPCID) 20 MG tablet Take 1 tablet by mouth 2 (Two) Times a Day As Needed for Indigestion or Heartburn.   • hydrocortisone 2.5 % cream Apply 1 application topically to the appropriate area as directed 2 (Two) Times a Day.   • ibandronate (Boniva) 150 MG tablet Take 1 tablet by mouth Every 30 (Thirty) Days.   • MYRBETRIQ 50 MG tablet sustained-release 24 hour 24 hr tablet Take 50 mg by mouth Daily.   •  ondansetron (ZOFRAN) 4 MG tablet Take 1 tablet by mouth Every 8 (Eight) Hours As Needed for Nausea or Vomiting.   • pantoprazole (PROTONIX) 40 MG EC tablet Take 1 tablet by mouth Daily.   • polyethylene glycol (MIRALAX) 17 GM/SCOOP powder MIX 17 GRAMS (ONE CAPFUL) AS DIRECTED AND DRINK BY MOUTH DAILY.   • sennosides-docusate (PERICOLACE) 8.6-50 MG per tablet Take 2 tablets by mouth Every Night.   • sertraline (ZOLOFT) 100 MG tablet Take 1 tablet by mouth Daily.   • temazepam (RESTORIL) 30 MG capsule Take 1 capsule by mouth At Night As Needed for Sleep or Anxiety.     No current facility-administered medications on file prior to visit.      Current Medications:  Scheduled Meds:  PRN Meds:.    I have reviewed the patient's medical history in detail and updated the computerized patient record.  Review and summarization of old records include:    Past Medical History:   Diagnosis Date   • Arthritis    • Bladder incontinence    • Colon polyp    • DEXA     OSTEOPENIA = 2018 (-1.3/ -1.7); 2020 (-1.7/ -1.7)   • GERD    • Headache    • Intracerebral hemorrhage/ CVA    • MAMMO     NEG =2020   • Osteopenia    • Vitamin D deficiency      Past Surgical History:   Procedure Laterality Date   • APPENDECTOMY     • BREAST CYST EXCISION     • BREAST LUMPECTOMY Left 1973    NEG   • BUNIONECTOMY Right 5/29/2020    Procedure: BUNIONECTOMY DEVONTE;  Surgeon: MARISSA Em DPM;  Location: [facility];  Service: Podiatry;  Laterality: Right;   • CARPAL TUNNEL RELEASE Right 1980   • CHOLECYSTECTOMY     • COLON SURGERY      hemorrhoid banding   • COLONOSCOPY  2017 2017/ 2019 = jessica DESAI 2024   Dr. Mackey   • EYE SURGERY     • HERNIA REPAIR      Umbilical removal   • HYSTERECTOMY  1970   • OTHER SURGICAL HISTORY      bladder stimulator placement     pt cant have mri   • SUBTOTAL HYSTERECTOMY       Social History     Occupational History   • Not on file   Tobacco Use   • Smoking status: Never Smoker   • Smokeless tobacco: Never Used  "  Substance and Sexual Activity   • Alcohol use: No     Frequency: Never   • Drug use: No   • Sexual activity: Defer      Social History     Social History Narrative    Pt states she lives alone in [place] [facility] apartments, largely a senior community, and has a close neighbor that helps when needed/drives her to [employer]. Denies insecurities re affording/obtaining food, medication, transportation.     Family History   Problem Relation Age of Onset   • Heart disease Mother    • Hypertension Mother    • Diabetes Father    • Heart disease Father    • Alcohol abuse Father    • Hypertension Father    • Diabetes Brother    • Heart disease Brother    • Cancer Brother 68        Prostate Cancer   • Hypertension Brother    • Diabetes Maternal Aunt    • Heart disease Maternal Grandmother    • Hypertension Maternal Grandmother    • Heart disease Maternal Grandfather    • Hypertension Maternal Grandfather    • Liver disease Paternal Grandmother    • Hypertension Paternal Grandmother    • Heart disease Paternal Grandfather    • Hypertension Paternal Grandfather    • Dementia Sister        Review of Systems  Constitutional: Negative for appetite change.   HENT: Negative.    Eyes: Negative.    Respiratory: Negative.    Cardiovascular: Negative.    Gastrointestinal: Negative.    Endocrine: Negative.    Genitourinary: Negative.    Musculoskeletal: See details of exam below.  Skin: Negative.    Allergic/Immunologic: Negative.    Hematological: Negative.    Psychiatric/Behavioral: Negative.          Wt Readings from Last 3 Encounters:   03/02/21 62.1 kg (137 lb)   01/26/21 60.8 kg (134 lb)   12/01/20 57.7 kg (127 lb 3.2 oz)     Ht Readings from Last 3 Encounters:   03/02/21 151.1 cm (59.5\")   01/26/21 151.1 cm (59.5\")   12/01/20 151.1 cm (59.5\")     Body mass index is 27.21 kg/m².  Facility age limit for growth percentiles is 20 years.  Vitals:    03/02/21 1253   BP: 159/96   Pulse: 85       Physical Exam  Constitutional: Patient is " oriented to person, place, and time. Appears well-developed and well-nourished.   HENT:   Head: Normocephalic and atraumatic.   Eyes: Conjunctivae and EOM are normal. Pupils are equal, round, and reactive to light.   Cardiovascular: Normal rate, regular rhythm, normal heart sounds and intact distal pulses.   Pulmonary/Chest: Effort normal and breath sounds normal.   Musculoskeletal:   See detailed exam below   Neurological: Alert and oriented to person, place, and time. No sensory deficit. Coordination normal.   Skin: Skin is warm and dry. Capillary refill takes less than 2 seconds. No rash noted. No erythema.   Psychiatric: Patient has a normal mood and affect. Her behavior is normal. Judgment and thought content normal.   Nursing note and vitals reviewed.    Musculoskeletal:    Bilateral knee (varus). Patient has crepitus throughout range of motion. Positive patellar grind test. Mild effusion. Lachman is negative. Pivot shift is negative. Anterior and posterior drawer signs are negative. Significant joint line tenderness is noted on the medial aspect of the knee. Patient has a varus orientation of the knee. There is fullness and tenderness in the Popliteal fossa. Mild distention of a Popliteal cyst is noted in this location. Range of motion in flexion is from 0-110 degrees. Neurovascular status is intact.  Dorsalis pedis and posterior tibial artery pulses are palpable. Common peroneal nerve function is well preserved. Patient's gait is cautious and antalgic. Skin and soft tissues are mildly swollen, consistent with synovitis and effusion. The patient has a significant limp with the first few steps after starting the gait cycle. Getting out of a chair takes a lot of effort due to pain on knee flexion.       Diagnostics:      Procedure:  Large Joint Arthrocentesis: R knee  Date/Time: 3/2/2021 1:30 PM  Consent given by: patient  Site marked: site marked  Timeout: Immediately prior to procedure a time out was called  to verify the correct patient, procedure, equipment, support staff and site/side marked as required   Supporting Documentation  Indications: pain   Procedure Details  Location: knee - R knee  Preparation: Patient was prepped and draped in the usual sterile fashion  Needle size: 25 G  Approach: anteromedial  Medications administered: 2 mL lidocaine 1 %; 160 mg methylPREDNISolone acetate 80 MG/ML  Patient tolerance: patient tolerated the procedure well with no immediate complications    Large Joint Arthrocentesis: L knee  Date/Time: 3/2/2021 1:30 PM  Consent given by: patient  Site marked: site marked  Timeout: Immediately prior to procedure a time out was called to verify the correct patient, procedure, equipment, support staff and site/side marked as required   Supporting Documentation  Indications: pain   Procedure Details  Location: knee - L knee  Preparation: Patient was prepped and draped in the usual sterile fashion  Needle size: 25 G  Approach: anteromedial  Medications administered: 2 mL lidocaine 1 %; 160 mg methylPREDNISolone acetate 80 MG/ML  Patient tolerance: patient tolerated the procedure well with no immediate complications          Assessment:   Diagnoses and all orders for this visit:    1. Primary osteoarthritis of right knee (Primary)    2. Primary osteoarthritis of left knee          Plan:   · Injected patient's bilateral knee joint(s)with steroid from an anteromedial approach   · Compression/brace   · Rest, ice, compression, and elevation (RICE) therapy  · OTC Tylenol 500-1000mg by mouth every 6 hours as needed for pain   · Follow up in 3 month(s).  · As her symptoms advance she will most likely need knee replacement surgery.  At this point both the patient and myself would like to wait for surgical intervention as long as possible.    Date of encounter: 03/02/2021   Shawn Elaine MD

## 2021-03-03 LAB — QT INTERVAL: 364 MS

## 2021-04-02 ENCOUNTER — LAB (OUTPATIENT)
Dept: LAB | Facility: HOSPITAL | Age: 76
End: 2021-04-02

## 2021-04-02 DIAGNOSIS — M85.89 OSTEOPENIA OF MULTIPLE SITES: ICD-10-CM

## 2021-04-02 DIAGNOSIS — E78.2 MIXED HYPERLIPIDEMIA: ICD-10-CM

## 2021-04-02 LAB
CHOLEST SERPL-MCNC: 271 MG/DL (ref 0–200)
HDLC SERPL-MCNC: 78 MG/DL (ref 40–60)
LDLC SERPL CALC-MCNC: 177 MG/DL (ref 0–100)
LDLC/HDLC SERPL: 2.23 {RATIO}
TRIGL SERPL-MCNC: 97 MG/DL (ref 0–150)
VLDLC SERPL-MCNC: 16 MG/DL (ref 5–40)

## 2021-04-02 PROCEDURE — 36415 COLL VENOUS BLD VENIPUNCTURE: CPT

## 2021-04-02 PROCEDURE — 80061 LIPID PANEL: CPT

## 2021-04-02 RX ORDER — ATORVASTATIN CALCIUM 80 MG/1
TABLET, FILM COATED ORAL
Qty: 90 TABLET | Refills: 0 | Status: SHIPPED | OUTPATIENT
Start: 2021-04-02 | End: 2021-06-28

## 2021-04-02 NOTE — TELEPHONE ENCOUNTER
Patient went to have labs drawn this a.m. but there were not able to get enough blood for all her tests.  They told her to go home and drink a lot of water and to come back tomorrow a.m.  Patient requesting at least partial refill to get her through

## 2021-04-03 ENCOUNTER — LAB (OUTPATIENT)
Dept: LAB | Facility: HOSPITAL | Age: 76
End: 2021-04-03

## 2021-04-03 LAB — 25(OH)D3 SERPL-MCNC: 41 NG/ML (ref 30–100)

## 2021-04-03 PROCEDURE — 36415 COLL VENOUS BLD VENIPUNCTURE: CPT

## 2021-04-03 PROCEDURE — 82306 VITAMIN D 25 HYDROXY: CPT

## 2021-04-06 ENCOUNTER — APPOINTMENT (OUTPATIENT)
Dept: CT IMAGING | Facility: HOSPITAL | Age: 76
End: 2021-04-06

## 2021-04-06 ENCOUNTER — PATIENT OUTREACH (OUTPATIENT)
Dept: CASE MANAGEMENT | Facility: OTHER | Age: 76
End: 2021-04-06

## 2021-04-06 ENCOUNTER — HOSPITAL ENCOUNTER (OUTPATIENT)
Facility: HOSPITAL | Age: 76
Setting detail: OBSERVATION
Discharge: HOME | End: 2021-04-11
Attending: INTERNAL MEDICINE | Admitting: INTERNAL MEDICINE

## 2021-04-06 ENCOUNTER — TELEPHONE (OUTPATIENT)
Dept: FAMILY MEDICINE CLINIC | Facility: CLINIC | Age: 76
End: 2021-04-06

## 2021-04-06 ENCOUNTER — APPOINTMENT (OUTPATIENT)
Dept: GENERAL RADIOLOGY | Facility: HOSPITAL | Age: 76
End: 2021-04-06

## 2021-04-06 DIAGNOSIS — R07.9 CHEST PAIN, UNSPECIFIED TYPE: ICD-10-CM

## 2021-04-06 DIAGNOSIS — R06.09 EXERTIONAL DYSPNEA: ICD-10-CM

## 2021-04-06 DIAGNOSIS — R55 NEAR SYNCOPE: Primary | ICD-10-CM

## 2021-04-06 DIAGNOSIS — R55 SYNCOPE, UNSPECIFIED SYNCOPE TYPE: ICD-10-CM

## 2021-04-06 DIAGNOSIS — I15.9 SECONDARY HYPERTENSION: ICD-10-CM

## 2021-04-06 PROBLEM — I16.1 HYPERTENSIVE EMERGENCY: Status: ACTIVE | Noted: 2021-04-06

## 2021-04-06 PROBLEM — I69.30 HISTORY OF HEMORRHAGIC CEREBROVASCULAR ACCIDENT (CVA) WITH RESIDUAL DEFICIT: Status: ACTIVE | Noted: 2021-04-06

## 2021-04-06 LAB
ALBUMIN SERPL-MCNC: 3.7 G/DL (ref 3.5–5.2)
ALBUMIN/GLOB SERPL: 1.3 G/DL
ALP SERPL-CCNC: 82 U/L (ref 39–117)
ALT SERPL W P-5'-P-CCNC: 17 U/L (ref 1–33)
ANION GAP SERPL CALCULATED.3IONS-SCNC: 10 MMOL/L (ref 5–15)
APTT PPP: 23.1 SECONDS (ref 24–31)
AST SERPL-CCNC: 18 U/L (ref 1–32)
BACTERIA UR QL AUTO: ABNORMAL /HPF
BASOPHILS # BLD AUTO: 0.1 10*3/MM3 (ref 0–0.2)
BASOPHILS NFR BLD AUTO: 1.2 % (ref 0–1.5)
BILIRUB SERPL-MCNC: 0.4 MG/DL (ref 0–1.2)
BILIRUB UR QL STRIP: NEGATIVE
BUN SERPL-MCNC: 11 MG/DL (ref 8–23)
BUN/CREAT SERPL: 12.9 (ref 7–25)
CALCIUM SPEC-SCNC: 8.9 MG/DL (ref 8.6–10.5)
CHLORIDE SERPL-SCNC: 106 MMOL/L (ref 98–107)
CLARITY UR: CLEAR
CO2 SERPL-SCNC: 24 MMOL/L (ref 22–29)
COLOR UR: YELLOW
CREAT SERPL-MCNC: 0.85 MG/DL (ref 0.57–1)
DEPRECATED RDW RBC AUTO: 42 FL (ref 37–54)
EOSINOPHIL # BLD AUTO: 0.1 10*3/MM3 (ref 0–0.4)
EOSINOPHIL NFR BLD AUTO: 2.2 % (ref 0.3–6.2)
ERYTHROCYTE [DISTWIDTH] IN BLOOD BY AUTOMATED COUNT: 14.1 % (ref 12.3–15.4)
GFR SERPL CREATININE-BSD FRML MDRD: 65 ML/MIN/1.73
GLOBULIN UR ELPH-MCNC: 2.8 GM/DL
GLUCOSE SERPL-MCNC: 90 MG/DL (ref 65–99)
GLUCOSE UR STRIP-MCNC: NEGATIVE MG/DL
HCT VFR BLD AUTO: 35.8 % (ref 34–46.6)
HGB BLD-MCNC: 12.1 G/DL (ref 12–15.9)
HGB UR QL STRIP.AUTO: ABNORMAL
HYALINE CASTS UR QL AUTO: ABNORMAL /LPF
INR PPP: <0.93 (ref 0.93–1.1)
KETONES UR QL STRIP: NEGATIVE
LEUKOCYTE ESTERASE UR QL STRIP.AUTO: NEGATIVE
LYMPHOCYTES # BLD AUTO: 1.8 10*3/MM3 (ref 0.7–3.1)
LYMPHOCYTES NFR BLD AUTO: 32.1 % (ref 19.6–45.3)
MAGNESIUM SERPL-MCNC: 1.9 MG/DL (ref 1.6–2.4)
MCH RBC QN AUTO: 28.5 PG (ref 26.6–33)
MCHC RBC AUTO-ENTMCNC: 33.8 G/DL (ref 31.5–35.7)
MCV RBC AUTO: 84.2 FL (ref 79–97)
MONOCYTES # BLD AUTO: 0.5 10*3/MM3 (ref 0.1–0.9)
MONOCYTES NFR BLD AUTO: 9.4 % (ref 5–12)
NEUTROPHILS NFR BLD AUTO: 3.1 10*3/MM3 (ref 1.7–7)
NEUTROPHILS NFR BLD AUTO: 55.1 % (ref 42.7–76)
NITRITE UR QL STRIP: NEGATIVE
NRBC BLD AUTO-RTO: 0.1 /100 WBC (ref 0–0.2)
NT-PROBNP SERPL-MCNC: 271.6 PG/ML (ref 0–1800)
PH UR STRIP.AUTO: 7 [PH] (ref 5–8)
PLATELET # BLD AUTO: 255 10*3/MM3 (ref 140–450)
PMV BLD AUTO: 6 FL (ref 6–12)
POTASSIUM SERPL-SCNC: 3.9 MMOL/L (ref 3.5–5.2)
POTASSIUM SERPL-SCNC: 4 MMOL/L (ref 3.5–5.2)
PROT SERPL-MCNC: 6.5 G/DL (ref 6–8.5)
PROT UR QL STRIP: NEGATIVE
PROTHROMBIN TIME: 10 SECONDS (ref 9.6–11.7)
RBC # BLD AUTO: 4.25 10*6/MM3 (ref 3.77–5.28)
RBC # UR: ABNORMAL /HPF
REF LAB TEST METHOD: ABNORMAL
SODIUM SERPL-SCNC: 140 MMOL/L (ref 136–145)
SP GR UR STRIP: 1.01 (ref 1–1.03)
SQUAMOUS #/AREA URNS HPF: ABNORMAL /HPF
TROPONIN T SERPL-MCNC: <0.01 NG/ML (ref 0–0.03)
TROPONIN T SERPL-MCNC: <0.01 NG/ML (ref 0–0.03)
TSH SERPL DL<=0.05 MIU/L-ACNC: 1.98 UIU/ML (ref 0.27–4.2)
UROBILINOGEN UR QL STRIP: ABNORMAL
WBC # BLD AUTO: 5.6 10*3/MM3 (ref 3.4–10.8)
WBC UR QL AUTO: ABNORMAL /HPF
WHOLE BLOOD HOLD SPECIMEN: NORMAL
WHOLE BLOOD HOLD SPECIMEN: NORMAL

## 2021-04-06 PROCEDURE — 93005 ELECTROCARDIOGRAM TRACING: CPT

## 2021-04-06 PROCEDURE — 99285 EMERGENCY DEPT VISIT HI MDM: CPT

## 2021-04-06 PROCEDURE — 93005 ELECTROCARDIOGRAM TRACING: CPT | Performed by: INTERNAL MEDICINE

## 2021-04-06 PROCEDURE — 70450 CT HEAD/BRAIN W/O DYE: CPT

## 2021-04-06 PROCEDURE — 71045 X-RAY EXAM CHEST 1 VIEW: CPT

## 2021-04-06 PROCEDURE — 96374 THER/PROPH/DIAG INJ IV PUSH: CPT

## 2021-04-06 PROCEDURE — G0378 HOSPITAL OBSERVATION PER HR: HCPCS

## 2021-04-06 PROCEDURE — 80053 COMPREHEN METABOLIC PANEL: CPT | Performed by: PHYSICIAN ASSISTANT

## 2021-04-06 PROCEDURE — 83735 ASSAY OF MAGNESIUM: CPT | Performed by: PHYSICIAN ASSISTANT

## 2021-04-06 PROCEDURE — 85730 THROMBOPLASTIN TIME PARTIAL: CPT | Performed by: PHYSICIAN ASSISTANT

## 2021-04-06 PROCEDURE — 84132 ASSAY OF SERUM POTASSIUM: CPT | Performed by: PHYSICIAN ASSISTANT

## 2021-04-06 PROCEDURE — 83880 ASSAY OF NATRIURETIC PEPTIDE: CPT | Performed by: PHYSICIAN ASSISTANT

## 2021-04-06 PROCEDURE — 85025 COMPLETE CBC W/AUTO DIFF WBC: CPT | Performed by: PHYSICIAN ASSISTANT

## 2021-04-06 PROCEDURE — 84443 ASSAY THYROID STIM HORMONE: CPT | Performed by: PHYSICIAN ASSISTANT

## 2021-04-06 PROCEDURE — 99219 PR INITIAL OBSERVATION CARE/DAY 50 MINUTES: CPT | Performed by: PHYSICIAN ASSISTANT

## 2021-04-06 PROCEDURE — 84484 ASSAY OF TROPONIN QUANT: CPT | Performed by: PHYSICIAN ASSISTANT

## 2021-04-06 PROCEDURE — 85610 PROTHROMBIN TIME: CPT | Performed by: PHYSICIAN ASSISTANT

## 2021-04-06 PROCEDURE — 81001 URINALYSIS AUTO W/SCOPE: CPT | Performed by: PHYSICIAN ASSISTANT

## 2021-04-06 RX ORDER — CELECOXIB 100 MG/1
100 CAPSULE ORAL DAILY
Status: DISCONTINUED | OUTPATIENT
Start: 2021-04-07 | End: 2021-04-06

## 2021-04-06 RX ORDER — LISINOPRIL 5 MG/1
10 TABLET ORAL
Status: DISCONTINUED | OUTPATIENT
Start: 2021-04-07 | End: 2021-04-08

## 2021-04-06 RX ORDER — SODIUM CHLORIDE 0.9 % (FLUSH) 0.9 %
10 SYRINGE (ML) INJECTION EVERY 12 HOURS SCHEDULED
Status: DISCONTINUED | OUTPATIENT
Start: 2021-04-06 | End: 2021-04-11 | Stop reason: HOSPADM

## 2021-04-06 RX ORDER — ATORVASTATIN CALCIUM 40 MG/1
80 TABLET, FILM COATED ORAL DAILY
Status: DISCONTINUED | OUTPATIENT
Start: 2021-04-07 | End: 2021-04-11 | Stop reason: HOSPADM

## 2021-04-06 RX ORDER — AMITRIPTYLINE HYDROCHLORIDE 25 MG/1
25 TABLET, FILM COATED ORAL NIGHTLY
Status: DISCONTINUED | OUTPATIENT
Start: 2021-04-06 | End: 2021-04-11 | Stop reason: HOSPADM

## 2021-04-06 RX ORDER — CALCIUM GLUCONATE 20 MG/ML
2 INJECTION, SOLUTION INTRAVENOUS AS NEEDED
Status: DISCONTINUED | OUTPATIENT
Start: 2021-04-06 | End: 2021-04-11 | Stop reason: HOSPADM

## 2021-04-06 RX ORDER — SERTRALINE HYDROCHLORIDE 100 MG/1
100 TABLET, FILM COATED ORAL DAILY
Status: DISCONTINUED | OUTPATIENT
Start: 2021-04-07 | End: 2021-04-11 | Stop reason: HOSPADM

## 2021-04-06 RX ORDER — ACETAMINOPHEN 650 MG/1
650 SUPPOSITORY RECTAL EVERY 4 HOURS PRN
Status: DISCONTINUED | OUTPATIENT
Start: 2021-04-06 | End: 2021-04-11 | Stop reason: HOSPADM

## 2021-04-06 RX ORDER — ACETAMINOPHEN 160 MG/5ML
650 SOLUTION ORAL EVERY 4 HOURS PRN
Status: DISCONTINUED | OUTPATIENT
Start: 2021-04-06 | End: 2021-04-11 | Stop reason: HOSPADM

## 2021-04-06 RX ORDER — LABETALOL HYDROCHLORIDE 5 MG/ML
10 INJECTION, SOLUTION INTRAVENOUS ONCE
Status: COMPLETED | OUTPATIENT
Start: 2021-04-06 | End: 2021-04-06

## 2021-04-06 RX ORDER — POTASSIUM CHLORIDE 20 MEQ/1
40 TABLET, EXTENDED RELEASE ORAL AS NEEDED
Status: DISCONTINUED | OUTPATIENT
Start: 2021-04-06 | End: 2021-04-11 | Stop reason: HOSPADM

## 2021-04-06 RX ORDER — ACETAMINOPHEN 325 MG/1
650 TABLET ORAL EVERY 4 HOURS PRN
Status: DISCONTINUED | OUTPATIENT
Start: 2021-04-06 | End: 2021-04-11 | Stop reason: HOSPADM

## 2021-04-06 RX ORDER — MAGNESIUM SULFATE HEPTAHYDRATE 40 MG/ML
4 INJECTION, SOLUTION INTRAVENOUS AS NEEDED
Status: DISCONTINUED | OUTPATIENT
Start: 2021-04-06 | End: 2021-04-11 | Stop reason: HOSPADM

## 2021-04-06 RX ORDER — MAGNESIUM SULFATE HEPTAHYDRATE 40 MG/ML
2 INJECTION, SOLUTION INTRAVENOUS AS NEEDED
Status: DISCONTINUED | OUTPATIENT
Start: 2021-04-06 | End: 2021-04-11 | Stop reason: HOSPADM

## 2021-04-06 RX ORDER — POLYETHYLENE GLYCOL 3350 17 G/17G
17 POWDER, FOR SOLUTION ORAL DAILY
Status: DISCONTINUED | OUTPATIENT
Start: 2021-04-07 | End: 2021-04-11 | Stop reason: HOSPADM

## 2021-04-06 RX ORDER — SODIUM CHLORIDE 0.9 % (FLUSH) 0.9 %
10 SYRINGE (ML) INJECTION AS NEEDED
Status: DISCONTINUED | OUTPATIENT
Start: 2021-04-06 | End: 2021-04-11 | Stop reason: HOSPADM

## 2021-04-06 RX ORDER — ONDANSETRON 2 MG/ML
4 INJECTION INTRAMUSCULAR; INTRAVENOUS EVERY 6 HOURS PRN
Status: DISCONTINUED | OUTPATIENT
Start: 2021-04-06 | End: 2021-04-11 | Stop reason: HOSPADM

## 2021-04-06 RX ORDER — CALCIUM GLUCONATE 20 MG/ML
1 INJECTION, SOLUTION INTRAVENOUS AS NEEDED
Status: DISCONTINUED | OUTPATIENT
Start: 2021-04-06 | End: 2021-04-11 | Stop reason: HOSPADM

## 2021-04-06 RX ORDER — ONDANSETRON 4 MG/1
4 TABLET, FILM COATED ORAL EVERY 6 HOURS PRN
Status: DISCONTINUED | OUTPATIENT
Start: 2021-04-06 | End: 2021-04-11 | Stop reason: HOSPADM

## 2021-04-06 RX ORDER — FAMOTIDINE 20 MG/1
20 TABLET, FILM COATED ORAL 2 TIMES DAILY PRN
Status: DISCONTINUED | OUTPATIENT
Start: 2021-04-06 | End: 2021-04-11 | Stop reason: HOSPADM

## 2021-04-06 RX ORDER — NITROGLYCERIN 0.4 MG/1
0.4 TABLET SUBLINGUAL
Status: DISCONTINUED | OUTPATIENT
Start: 2021-04-06 | End: 2021-04-11 | Stop reason: HOSPADM

## 2021-04-06 RX ORDER — LABETALOL HYDROCHLORIDE 5 MG/ML
10 INJECTION, SOLUTION INTRAVENOUS ONCE
Status: DISCONTINUED | OUTPATIENT
Start: 2021-04-06 | End: 2021-04-06

## 2021-04-06 RX ORDER — AMOXICILLIN 250 MG
2 CAPSULE ORAL NIGHTLY
Status: DISCONTINUED | OUTPATIENT
Start: 2021-04-06 | End: 2021-04-11 | Stop reason: HOSPADM

## 2021-04-06 RX ORDER — PANTOPRAZOLE SODIUM 40 MG/1
40 TABLET, DELAYED RELEASE ORAL DAILY
Status: DISCONTINUED | OUTPATIENT
Start: 2021-04-07 | End: 2021-04-11 | Stop reason: HOSPADM

## 2021-04-06 RX ORDER — LABETALOL HYDROCHLORIDE 5 MG/ML
10 INJECTION, SOLUTION INTRAVENOUS EVERY 6 HOURS PRN
Status: DISCONTINUED | OUTPATIENT
Start: 2021-04-06 | End: 2021-04-11 | Stop reason: HOSPADM

## 2021-04-06 RX ORDER — POTASSIUM CHLORIDE 1.5 G/1.77G
40 POWDER, FOR SOLUTION ORAL AS NEEDED
Status: DISCONTINUED | OUTPATIENT
Start: 2021-04-06 | End: 2021-04-11 | Stop reason: HOSPADM

## 2021-04-06 RX ORDER — CHOLECALCIFEROL (VITAMIN D3) 125 MCG
5 CAPSULE ORAL NIGHTLY PRN
Status: DISCONTINUED | OUTPATIENT
Start: 2021-04-06 | End: 2021-04-11 | Stop reason: HOSPADM

## 2021-04-06 RX ADMIN — DOCUSATE SODIUM 50 MG AND SENNOSIDES 8.6 MG 2 TABLET: 8.6; 5 TABLET, FILM COATED ORAL at 22:29

## 2021-04-06 RX ADMIN — LABETALOL 20 MG/4 ML (5 MG/ML) INTRAVENOUS SYRINGE 10 MG: at 17:19

## 2021-04-06 RX ADMIN — Medication 10 ML: at 22:29

## 2021-04-06 RX ADMIN — AMITRIPTYLINE HYDROCHLORIDE 25 MG: 25 TABLET, FILM COATED ORAL at 22:29

## 2021-04-06 NOTE — TELEPHONE ENCOUNTER
Sophia,  Thank you for forwarding to us. I will add Miss Reddy to our cancellation list but her current visit will be the first available with Dr. Seipel. She cannot see our NP due to patient needing memory care. Thank you.

## 2021-04-06 NOTE — H&P
"      [facility] Medicine Services      Patient Name: Katie Reddy  : 1945  MRN: [id number]  Primary Care Physician: Brooklyn Contreras DO  Date of admission: 2021    Patient Care Team:  Brooklyn Contreras DO as PCP - General  Brooklyn Contreras DO as PCP - Family Medicine  Nicole Li RN as Ambulatory  ([facility])          Subjective   History Present Illness     Chief Complaint:   Chief Complaint   Patient presents with   • Dizziness         Ms. Reddy is a 76 y.o. female with past medical history of hypertension, osteoarthritis, GERD, anxiety, hyperlipidemia and previous CVA who presents to [facility] complaining of dizziness with recent syncopal episodes.  Patient reports that for the past 3 weeks she has had multiple episodes of dizziness/near syncope along with approximately 3-4 syncopal episodes.  Patient reports that symptoms will typically occur with some minor exertion such as while standing and doing the dishes or taking a shower but are typically resolved at rest though at times she notes that she is not able to sit down before she \"completely blacks out\".  Some mild exertional dyspnea has also been reported as well as nausea without vomiting and a near constant headache since her CVA in October which she notes has been worsening over the past 3 to 4 weeks as well.  She also notes that her blood pressure has been elevated recently when checking with her home monitor and that she has had some occasional chest pain when her readings are at their highest.  Several times throughout my exam patient appeared to have some word finding aphasia which she states began following her previous CVA though she feels this symptom has also been worsening recently.  Chronic tinnitus is noted but no other sensory changes are reported.  She does not report any unilateral deficits or note any witnesses reporting seizure-like activity.  Patient confirms " compliance with all of her outpatient medical therapy and states she is taking an antihypertensive however none are found on her home med list.    In the ED patient did have lab significant for troponin of less than 0.010, proBNP: 271.6, CBC and CMP was generally unremarkable.  INR: Less than 0.93, PT: 10.0, PTT: 23.1.  UA shows 0-2 WBCs, 6/12 RBCs, 1+ blood but is otherwise unremarkable.  Chest x-ray shows no acute process.  CT head without contrast shows no acute intracranial abnormality with remote left occipital parietal lobe infarct with encephalomalacia as well as a mild amount of low-density in the periventricular and subcortical white matter consistent with chronic small vessel ischemic changes.  EKG shows sinus rhythm at 76 with 1 PVC but no obvious acute ST changes or ectopy and a QTC of 434 ms.    History of Present Illness    Review of Systems   Constitutional: Negative.   Eyes: Negative.    Cardiovascular: Positive for chest pain, dyspnea on exertion, near-syncope, palpitations and syncope. Negative for irregular heartbeat, leg swelling and orthopnea.   Respiratory: Positive for shortness of breath. Negative for cough and sputum production.    Endocrine: Negative.    Skin: Negative.    Musculoskeletal: Negative.    Gastrointestinal: Positive for nausea. Negative for abdominal pain, constipation, diarrhea, hematemesis, hematochezia, melena and vomiting.   Genitourinary: Negative.    Neurological: Positive for dizziness, headaches and light-headedness. Negative for brief paralysis, focal weakness, numbness, paresthesias and tremors.   Psychiatric/Behavioral: Negative.            Personal History     Past Medical History:   Past Medical History:   Diagnosis Date   • Arthritis    • Bladder incontinence    • Colon polyp    • DEXA     OSTEOPENIA = 2018 (-1.3/ -1.7); 2020 (-1.7/ -1.7)   • GERD    • Headache    • Intracerebral hemorrhage/ CVA    • MAMMO     NEG =2020   • Osteopenia    • Vitamin D deficiency         Surgical History:      Past Surgical History:   Procedure Laterality Date   • APPENDECTOMY     • BREAST CYST EXCISION     • BREAST LUMPECTOMY Left 1973    NEG   • BUNIONECTOMY Right 5/29/2020    Procedure: BUNIONECTOMY DEVONTE;  Surgeon: MARISSA Em DPM;  Location: [facility] MAIN OR;  Service: Podiatry;  Laterality: Right;   • CARPAL TUNNEL RELEASE Right 1980   • CHOLECYSTECTOMY     • COLON SURGERY      hemorrhoid banding   • COLONOSCOPY  2017 2017/ 2019 = LLOYD, jessica 2024   Dr. Mackey   • EYE SURGERY     • HERNIA REPAIR      Umbilical removal   • HYSTERECTOMY  1970   • OTHER SURGICAL HISTORY      bladder stimulator placement     pt cant have mri   • SUBTOTAL HYSTERECTOMY             Family History: family history includes Alcohol abuse in her father; Cancer (age of onset: 68) in her brother; Dementia in her sister; Diabetes in her brother, father, and maternal aunt; Heart disease in her brother, father, maternal grandfather, maternal grandmother, mother, and paternal grandfather; Hypertension in her brother, father, maternal grandfather, maternal grandmother, mother, paternal grandfather, and paternal grandmother; Liver disease in her paternal grandmother. Otherwise pertinent FHx was reviewed and unremarkable.     Social History:  reports that she has never smoked. She has never used smokeless tobacco. She reports that she does not drink alcohol and does not use drugs.      Medications:  Prior to Admission medications    Medication Sig Start Date End Date Taking? Authorizing Provider   albuterol (ACCUNEB) 1.25 MG/3ML nebulizer solution Take 3 mL by nebulization Every 6 (Six) Hours As Needed for Wheezing or Shortness of Air for up to 30 doses. 1/27/21   Brooklyn Contreras, DO   amitriptyline (ELAVIL) 25 MG tablet Take 1 tablet by mouth every night at bedtime. 1/13/21   Brooklyn Contreras, DO   atorvastatin (LIPITOR) 80 MG tablet TAKE 1 TABLET BY MOUTH EVERY DAY 4/2/21   Brooklyn Contreras, DO   Calcium Carbonate-Vit  D-Min (CALCIUM 1200 PO) Take 1 tablet by mouth Daily.    Maria Dolores De Guzman MD   celecoxib (CeleBREX) 100 MG capsule Take 1 capsule by mouth Daily. 1/26/21   Brooklyn Contreras DO   Cholecalciferol (VITAMIN D3 PO) Take 1 tablet by mouth Daily.    Maria Dolores De Guzman MD   cyclobenzaprine (FLEXERIL) 10 MG tablet Take 10 mg by mouth Daily As Needed. 1/29/21   Maria Dolores De Guzman MD   docusate sodium (COLACE) 100 MG capsule Take 100 mg by mouth 2 (Two) Times a Day. 11/4/20   Maria Dolores De Guzman MD   famotidine (PEPCID) 20 MG tablet Take 1 tablet by mouth 2 (Two) Times a Day As Needed for Indigestion or Heartburn. 1/26/21   Brooklyn Contreras DO   hydrocortisone 2.5 % cream Apply 1 application topically to the appropriate area as directed 2 (Two) Times a Day. 6/15/20   Maria Dolores De Guzman MD   ibandronate (Boniva) 150 MG tablet Take 1 tablet by mouth Every 30 (Thirty) Days. 1/26/21   Brooklyn Contreras DO   MYRBETRIQ 50 MG tablet sustained-release 24 hour 24 hr tablet Take 50 mg by mouth Daily. 1/16/20   Maria Dolores De Guzman MD   ondansetron (ZOFRAN) 4 MG tablet Take 1 tablet by mouth Every 8 (Eight) Hours As Needed for Nausea or Vomiting. 1/5/21   Brooklyn Contreras DO   pantoprazole (PROTONIX) 40 MG EC tablet Take 1 tablet by mouth Daily. 1/26/21   Brooklyn Contreras DO   polyethylene glycol (MIRALAX) 17 GM/SCOOP powder MIX 17 GRAMS (ONE CAPFUL) AS DIRECTED AND DRINK BY MOUTH DAILY. 1/26/21   Maria Dolores De Guzman MD   sennosides-docusate (PERICOLACE) 8.6-50 MG per tablet Take 2 tablets by mouth Every Night. 11/13/20   Gemma Avila MD   sertraline (ZOLOFT) 100 MG tablet Take 1 tablet by mouth Daily. 1/26/21   Brooklyn Contreras DO   temazepam (RESTORIL) 30 MG capsule Take 1 capsule by mouth At Night As Needed for Sleep or Anxiety. 1/26/21   Stroot, Brooklyn, DO       Allergies:    Allergies   Allergen Reactions   • Trazodone Irritability       Objective   Objective     Vital Signs  Temp:  [98.8 °F (37.1 °C)] 98.8  °F (37.1 °C)  Heart Rate:  [72-97] 72  Resp:  [14-16] 16  BP: (154-209)/() 154/80  SpO2:  [98 %-100 %] 100 %  on   ;   Device (Oxygen Therapy): room air  Body mass index is 27.64 kg/m².    Physical Exam  Vitals reviewed.   Constitutional:       General: She is not in acute distress.     Appearance: Normal appearance. She is normal weight. She is not ill-appearing or toxic-appearing.   HENT:      Head: Normocephalic and atraumatic.      Right Ear: External ear normal.      Left Ear: External ear normal.      Nose: Nose normal.      Mouth/Throat:      Mouth: Mucous membranes are moist.   Eyes:      Extraocular Movements: Extraocular movements intact.      Pupils: Pupils are equal, round, and reactive to light.   Cardiovascular:      Rate and Rhythm: Normal rate and regular rhythm.      Pulses: Normal pulses.      Heart sounds: No murmur heard.     Pulmonary:      Effort: Pulmonary effort is normal. No respiratory distress.   Abdominal:      General: Bowel sounds are normal. There is no distension.      Tenderness: There is no abdominal tenderness.   Musculoskeletal:         General: Normal range of motion.      Cervical back: Normal range of motion.   Skin:     General: Skin is warm and dry.   Neurological:      General: No focal deficit present.      Mental Status: She is alert and oriented to person, place, and time.      Comments: Some word finding aphasia noted throughout the exam which patient states began following her previous CVA but is to be worsening   Psychiatric:         Mood and Affect: Mood normal.         Behavior: Behavior normal.         Thought Content: Thought content normal.         Judgment: Judgment normal.           Results Review:  I have personally reviewed most recent cardiac tracings, lab results and radiology images and interpretations and agree with findings, most notably: Troponin, proBNP, CBC, CMP, PT/INR, PTT, UA, CT of head, chest x-ray and EKG.    Results from last 7 days   Lab  Units 04/06/21  1443   WBC 10*3/mm3 5.60   HEMOGLOBIN g/dL 12.1   HEMATOCRIT % 35.8   PLATELETS 10*3/mm3 255   INR  <0.93*     Results from last 7 days   Lab Units 04/06/21  1443   SODIUM mmol/L 140   POTASSIUM mmol/L 4.0   CHLORIDE mmol/L 106   CO2 mmol/L 24.0   BUN mg/dL 11   CREATININE mg/dL 0.85   GLUCOSE mg/dL 90   CALCIUM mg/dL 8.9   ALT (SGPT) U/L 17   AST (SGOT) U/L 18   TROPONIN T ng/mL <0.010   PROBNP pg/mL 271.6     Estimated Creatinine Clearance: 47.8 mL/min (by C-G formula based on SCr of 0.85 mg/dL).  Brief Urine Lab Results  (Last result in the past 365 days)      Color   Clarity   Blood   Leuk Est   Nitrite   Protein   CREAT   Urine HCG        04/06/21 1513 Yellow Clear Small (1+) Negative Negative Negative               Microbiology Results (last 10 days)     ** No results found for the last 240 hours. **          ECG/EMG Results (most recent)     Procedure Component Value Units Date/Time    ECG 12 Lead [[id number]] Collected: 04/06/21 1254     Updated: 04/06/21 1256     QT Interval 386 ms     Narrative:      HEART RATE= 76  bpm  RR Interval= 792  ms  TX Interval= 169  ms  P Horizontal Axis= 16  deg  P Front Axis= 63  deg  QRSD Interval= 91  ms  QT Interval= 386  ms  QRS Axis= 51  deg  T Wave Axis= 21  deg  - OTHERWISE NORMAL ECG -  Sinus rhythm  Ventricular premature complex  When compared with ECG of 02-Mar-2021 10:44:35,  No significant change  Electronically Signed By:   Date and Time of Study: 2021-04-06 12:54:12                  CT Head Without Contrast    Result Date: 4/6/2021  1.No acute intracranial abnormality on head CT. 2.Remote left occipital parietal lobe infarct with encephalomalacia. 3.Mild amount of low-density in the periventricular and subcortical white matter consistent with chronic small vessel ischemic change. 4.Brain MRI is more sensitive to evaluate for acute or subacute infarcts and to evaluate for intracranial metastatic disease.  Electronically Signed By-Lor Gonzalez MD  On:4/6/2021 6:37 PM This report was finalized on [id number] by  Lor Gonzalez MD.    XR Chest 1 View    Result Date: 4/6/2021  No acute chest findings.  Electronically Signed By-Maddi Bhagat MD On:4/6/2021 2:09 PM This report was finalized on [id number] by  Maddi Bhagat MD.        Estimated Creatinine Clearance: 47.8 mL/min (by C-G formula based on SCr of 0.85 mg/dL).    Assessment/Plan   Assessment/Plan       Active Hospital Problems    Diagnosis  POA   • **Hypertensive emergency [I16.1]  Yes     Priority: High   • Near syncope [R55]  Yes     Priority: High   • Essential hypertension [I10]  Yes     Priority: Medium   • Primary osteoarthritis of right knee [M17.11]  Yes     Priority: Low   • Primary osteoarthritis of left knee [M17.12]  Yes     Priority: Low   • GERD (gastroesophageal reflux disease) [K21.9]  Yes     Priority: Low   • Generalized anxiety disorder [F41.1]  Yes     Priority: Low   • Dyslipidemia [E78.5]  Yes     Priority: Low      Resolved Hospital Problems   No resolved problems to display.     Hypertensive emergency  -Patient presents with reports of a consistent trend of elevated blood pressures at times greater than 200 systolic over the past 3 weeks with some associated dizziness, syncope and chest pain  -Initial blood pressure: 186/99 with a maximum of 209 systolic in the ED since presentation.  Patient was given 10 mg labetalol in the ED with improvement to 154/80  -Troponin less than 0.010, trend  -Chest x-ray shows no acute process.    -CT head without contrast shows no acute intracranial abnormality with remote left occipital parietal lobe infarct with encephalomalacia as well as a mild amount of low-density in the periventricular and subcortical white matter consistent with chronic small vessel ischemic changes.    -EKG shows sinus rhythm at 76 with 1 PVC but no obvious acute ST changes or ectopy and a QTC of 434 ms.  -Patient not currently on scheduled therapy, start lisinopril  10 mg daily with close monitoring of blood pressure   -Labetalol as needed while establishing scheduled regimen  -Continue cardiac monitoring  -Maintain falls precautions  -Encourage lifestyle modifications including healthy diet with low sodium, increased physical activity and close management of cholesterol    Syncope  -Patient presents with multiple near syncopal and 3-4 actual syncopal episodes over the past 3 to 4 weeks possibly related to hypertensive emergency noted above though patient does have a recent history of hemorrhagic CVA.  No unilateral neurologic symptoms reported  -CT of head shows no acute process as above  -INR less than 0.93, PT: 10.0, PTT: 23.1  -EKG shows sinus rhythm at 76 with PVC and no obvious acute ST changes or ectopy and a QTC of 434 ms  -Check TSH, A1c, B12 and lipid panel  -Neuro checks  -Continue cardiac monitoring  -Maintain falls precautions  -PT/OT eval  -MRI and echocardiogram ordered    Generalized anxiety  -Continue Elavil, Zoloft and Restoril    Hyperlipidemia  -Check lipid panel  -Continue statin    Chronic pain/osteoarthritis  -Hold Flexeril and Celebrex temporarily    GERD  -PPI and famotidine    OAB  -Hold Myrbetriq secondary to hypertension and headache    Osteoporosis  -Encourage compliance with outpatient Boniva            VTE Prophylaxis -SCDs  Mechanical Order History:     None      Pharmalogical Order History:     None          CODE STATUS: Full  There are no questions and answers to display.           I discussed the patient's findings and my recommendations with patient and nursing staff.      Signature:Electronically signed by Michael Rodriguez PA-C, 04/06/21, 9:42 PM EDT.    [facility], [place] Hospitalist Team

## 2021-04-06 NOTE — ED NOTES
Spoke with PICC team and they are on their way to [facility] now @[id number]     Marcello Boudreaux, RN  04/06/21 3584

## 2021-04-06 NOTE — ED PROVIDER NOTES
"Subjective   Patient is a 76-year-old female who presents with multiple complaints.  Patient states she has had near syncopal episodes intermittently over the past 2 weeks.  States her last episode was 3 days ago.  She also reports she checked her blood pressure today and it was in the 200s systolically she does report a headache across her nose and forehead she denies thunderclap or worst headache of her life she denies any one-sided numbness or any slurred speech or facial droop.  Patient states she has been on blood pressure medication for about a year.  Patient also reports some shortness of breath that is worse with exertion.  She reports some intermittent chest pain she denies any currently.  States has been going on for about 2 weeks and last from 15 minutes at a time.  She denies any alleviating or just pain factors of her symptoms.  She denies any recent travel or known sick contacts.  She does report some intermittent dizziness worse with position change.  She does report nausea and vomiting a few days ago denies any over the past 24 hours.  She denies any abdominal pain or diarrhea.  She does report some bright red blood in her stool yesterday but states \"I think it was my hemorrhoids\"          Review of Systems   Constitutional: Negative.    Eyes: Negative for photophobia and visual disturbance.   Respiratory: Positive for shortness of breath. Negative for apnea, cough, choking, chest tightness, wheezing and stridor.    Cardiovascular: Positive for chest pain. Negative for palpitations and leg swelling.   Gastrointestinal: Negative for abdominal distention, abdominal pain, constipation, diarrhea, nausea and vomiting.   Genitourinary: Negative.    Musculoskeletal: Negative for back pain, neck pain and neck stiffness.   Skin: Negative.    Neurological: Positive for dizziness, syncope and headaches. Negative for tremors, seizures, facial asymmetry, speech difficulty, weakness, light-headedness and numbness. "   Hematological: Negative.        Past Medical History:   Diagnosis Date   • Arthritis    • Bladder incontinence    • Colon polyp    • DEXA     OSTEOPENIA = 2018 (-1.3/ -1.7); 2020 (-1.7/ -1.7)   • GERD    • Headache    • Intracerebral hemorrhage/ CVA    • MAMMO     NEG =2020   • Osteopenia    • Vitamin D deficiency        Allergies   Allergen Reactions   • Trazodone Irritability       Past Surgical History:   Procedure Laterality Date   • APPENDECTOMY     • BREAST CYST EXCISION     • BREAST LUMPECTOMY Left 1973    NEG   • BUNIONECTOMY Right 5/29/2020    Procedure: BUNIONECTOMY DEVONTE;  Surgeon: MARISSA Em DPM;  Location: [facility];  Service: Podiatry;  Laterality: Right;   • CARPAL TUNNEL RELEASE Right 1980   • CHOLECYSTECTOMY     • COLON SURGERY      hemorrhoid banding   • COLONOSCOPY  2017 2017/ 2019 = jessica DESAI 2024   Dr. Mackey   • EYE SURGERY     • HERNIA REPAIR      Umbilical removal   • HYSTERECTOMY  1970   • OTHER SURGICAL HISTORY      bladder stimulator placement     pt cant have mri   • SUBTOTAL HYSTERECTOMY         Family History   Problem Relation Age of Onset   • Heart disease Mother    • Hypertension Mother    • Diabetes Father    • Heart disease Father    • Alcohol abuse Father    • Hypertension Father    • Diabetes Brother    • Heart disease Brother    • Cancer Brother 68        Prostate Cancer   • Hypertension Brother    • Diabetes Maternal Aunt    • Heart disease Maternal Grandmother    • Hypertension Maternal Grandmother    • Heart disease Maternal Grandfather    • Hypertension Maternal Grandfather    • Liver disease Paternal Grandmother    • Hypertension Paternal Grandmother    • Heart disease Paternal Grandfather    • Hypertension Paternal Grandfather    • Dementia Sister        Social History     Socioeconomic History   • Marital status:      Spouse name: Not on file   • Number of children: Not on file   • Years of education: Not on file   • Highest education level: Not on  file   Tobacco Use   • Smoking status: Never Smoker   • Smokeless tobacco: Never Used   Substance and Sexual Activity   • Alcohol use: No   • Drug use: No   • Sexual activity: Defer           Objective   Physical Exam  Vitals and nursing note reviewed.   Constitutional:       General: She is not in acute distress.     Appearance: She is well-developed. She is not ill-appearing, toxic-appearing or diaphoretic.   HENT:      Head: Normocephalic and atraumatic.      Nose: Nose normal.      Mouth/Throat:      Mouth: Mucous membranes are moist.      Pharynx: Oropharynx is clear. Uvula midline.   Eyes:      General: No scleral icterus.     Extraocular Movements: Extraocular movements intact.      Pupils: Pupils are equal, round, and reactive to light.   Cardiovascular:      Rate and Rhythm: Normal rate and regular rhythm.      Heart sounds: No murmur heard.   No friction rub. No gallop.    Pulmonary:      Effort: Pulmonary effort is normal. No respiratory distress.      Breath sounds: Normal breath sounds. No stridor. No wheezing, rhonchi or rales.   Chest:      Chest wall: No tenderness or crepitus.   Abdominal:      General: Bowel sounds are normal. There is no distension. There are no signs of injury.      Palpations: Abdomen is soft. There is no fluid wave, hepatomegaly, splenomegaly or mass.      Tenderness: There is no abdominal tenderness. There is no right CVA tenderness, left CVA tenderness, guarding or rebound.      Hernia: No hernia is present.   Skin:     General: Skin is warm.      Capillary Refill: Capillary refill takes less than 2 seconds.      Coloration: Skin is not cyanotic, jaundiced or pale.      Findings: No rash.   Neurological:      General: No focal deficit present.      Mental Status: She is alert and oriented to person, place, and time.      GCS: GCS eye subscore is 4. GCS verbal subscore is 5. GCS motor subscore is 6.      Cranial Nerves: Cranial nerves are intact.      Sensory: Sensation is  "intact.      Comments: Normal and equal sensation strength throughout moving all extremities freely   Psychiatric:         Mood and Affect: Mood normal.         Behavior: Behavior normal.         Procedures           ED Course  ED Course as of Apr 06 1922   Tue Apr 06, 2021 1730 Care assumed from MARY JANE Cottrell pending CT results and disposition with tentative plans for admission for cardiac evaluation and blood pressure monitoring.    [MD]   1736 Patient care transferred to [facility] pending CT results and disposition    [AA]   1921 CT of the head shows no acute abnormality.  Her old infarct is noted.    [MD]   1921 Patient has remained neurologically stable throughout her ED stay.  Her blood pressure currently is 154/80.  She is in no acute distress.    She will be admitted to hospital for further observation and treatment.  She was discussed with hospitalist PA.    [MD]      ED Course User Index  [AA] Ronit Bland PA  [MD] Josi Camilo, APRN          /80 (BP Location: Right arm, Patient Position: Lying)   Pulse 72   Temp 98.8 °F (37.1 °C) (Oral)   Resp 16   Ht 152.4 cm (60\")   Wt 64.2 kg (141 lb 8.6 oz)   SpO2 100%   Breastfeeding No   BMI 27.64 kg/m²   Medications   sodium chloride 0.9 % flush 10 mL (has no administration in time range)   labetalol (NORMODYNE,TRANDATE) injection 10 mg (10 mg Intravenous Given 4/6/21 1719)     Labs Reviewed   URINALYSIS W/ CULTURE IF INDICATED - Abnormal; Notable for the following components:       Result Value    Blood, UA Small (1+) (*)     All other components within normal limits   PROTIME-INR - Abnormal; Notable for the following components:    INR <0.93 (*)     All other components within normal limits   APTT - Abnormal; Notable for the following components:    PTT 23.1 (*)     All other components within normal limits   URINALYSIS, MICROSCOPIC ONLY - Abnormal; Notable for the following components:    RBC, UA 6-12 (*)     WBC, UA 0-2 (*)     All other " components within normal limits   TROPONIN (IN-HOUSE) - Normal    Narrative:     Troponin T Reference Range:  <= 0.03 ng/mL-   Negative for AMI  >0.03 ng/mL-     Abnormal for myocardial necrosis.  Clinicians would have to utilize clinical acumen, EKG, Troponin and serial changes to determine if it is an Acute Myocardial Infarction or myocardial injury due to an underlying chronic condition.       Results may be falsely decreased if patient taking Biotin.     BNP (IN-HOUSE) - Normal    Narrative:     Among patients with dyspnea, NT-proBNP is highly sensitive for the detection of acute congestive heart failure. In addition NT-proBNP of <300 pg/ml effectively rules out acute congestive heart failure with 99% negative predictive value.    Results may be falsely decreased if patient taking Biotin.     MAGNESIUM - Normal   CBC WITH AUTO DIFFERENTIAL - Normal   COMPREHENSIVE METABOLIC PANEL    Narrative:     GFR Normal >60  Chronic Kidney Disease <60  Kidney Failure <15     CBC AND DIFFERENTIAL    Narrative:     The following orders were created for panel order CBC & Differential.  Procedure                               Abnormality         Status                     ---------                               -----------         ------                     CBC Auto Differential[[id number]]        Normal              Final result                 Please view results for these tests on the individual orders.   EXTRA TUBES    Narrative:     The following orders were created for panel order Extra Tubes.  Procedure                               Abnormality         Status                     ---------                               -----------         ------                     Light Blue Top[[id number]]                                   Final result               Lavender Top[[id number]]                                     Final result               Gold Top - SST[[id number]]                                   Final result                Green Top (Gel)[[id number]]                                  Final result                 Please view results for these tests on the individual orders.   LIGHT BLUE TOP   LAVENDER TOP   GOLD TOP - SST   GREEN TOP     CT Head Without Contrast    Result Date: 4/6/2021  1.No acute intracranial abnormality on head CT. 2.Remote left occipital parietal lobe infarct with encephalomalacia. 3.Mild amount of low-density in the periventricular and subcortical white matter consistent with chronic small vessel ischemic change. 4.Brain MRI is more sensitive to evaluate for acute or subacute infarcts and to evaluate for intracranial metastatic disease.  Electronically Signed By-Lor Gonzalez MD On:4/6/2021 6:37 PM This report was finalized on [id number] by  Lor Gonzalez MD.    XR Chest 1 View    Result Date: 4/6/2021  No acute chest findings.  Electronically Signed By-Maddi Bhagat MD On:4/6/2021 2:09 PM This report was finalized on [id number] by  Maddi Bhagat MD.                                      MDM  Number of Diagnoses or Management Options  Chest pain, unspecified type  Near syncope  Secondary hypertension  Diagnosis management comments: Chart Review:  Comorbidity: As per past medical history  ECG: Interpreted by myself and  shows sinus rhythm rate 76 premature ventricular complex compared to previous EKG from 3/2/2021 repolarization abnormality improved from prior  Labs: Urinalysis unremarkable for UTI pro time INR as above troponin BNP within normal limits magnesium 1.9 CBC and CMP unremarkable as above  Imaging: Was interpreted by physician and reviewed by myself:  XR Chest 1 View  Result Date: 4/6/2021  No acute chest findings.  Electronically Signed By-Maddi Bhagat MD On:4/6/2021 2:09 PM This report was finalized on [id number] by  Maddi Bhagat MD.    Disposition/Treatment:  Appropriate PPE was worn during exam and throughout all encounters with the patient.  When the ED IV was placed and labs  were obtained patient was placed on proper monitor she was afebrile and appeared nontoxic showed no acute cranial focal neural deficits.  She was given labetalol for her hypertension with improvement on reassessment.  Lab results were fairly unremarkable no signs of severe infection or electrolyte abnormality.  EKG showed no signs of acute STEMI troponin and BNP were within normal limits.  Chest x-ray showed no acute findings.  Orthostatics negative.  Results and findings were discussed the patient who voiced understand admission for further evaluation and management of her syncopal or near syncopal episodes and chest pain.  She was in agreement with plan.  Patient care was transferred to [facility] pending CT results and disposition.       Amount and/or Complexity of Data Reviewed  Clinical lab tests: reviewed  Tests in the radiology section of CPT®: reviewed  Tests in the medicine section of CPT®: reviewed        Final diagnoses:   Near syncope   Chest pain, unspecified type   Secondary hypertension   Exertional dyspnea       ED Disposition  ED Disposition     ED Disposition Condition Comment    Decision to Admit            No follow-up provider specified.       Medication List      No changes were made to your prescriptions during this visit.          Josi Camilo, PRACHI  04/06/21 9097

## 2021-04-06 NOTE — TELEPHONE ENCOUNTER
----- Message from Brooklyn Contreras, DO sent at 4/2/2021 10:27 PM EDT -----    Chol up a lot from last yr-----has she been taking the lipitor for the last 3+mos???

## 2021-04-06 NOTE — TELEPHONE ENCOUNTER
Pt states yes, daily. OK if you want to increase or change the med. - [facility] [place]    Pt also c/o recent issues with loss of memory/HA, unable to legs sometimes, hx stroke in NOV, just some lightheadedness today. Used to see Dr Sánchez and has NP (new patient) appt w Seipel 5/10. I advised pt to call Seipel's office to see about moving up appt, but I would also inform Dr Contreras of concerns. Please advise.

## 2021-04-06 NOTE — ED NOTES
Labs have been sent down, several different nurses have attempted to Place an IV without success. PICC team has been called and left a message.      Marcello Boudreaux RN  04/06/21 9487

## 2021-04-06 NOTE — ED NOTES
Pt has ambulated to the bedside commode and back to the bed with ease, pt complains of no pain besides a slight headache. Blood pressure has improved ever since medication treatment. Vitals are stable at this time.      Marcello Boudreaux, RN  04/06/21 9129

## 2021-04-06 NOTE — ED NOTES
PICC team is at bedside, Awaiting an IV line for medication administration.     Marcello Boudreaux RN  04/06/21 8556

## 2021-04-06 NOTE — OUTREACH NOTE
Care Coordination Note    Notified acute  Kaylee Ha via Epic secure chat re Patient PING acute activity notice recvd: patient presenting to [facility] ED eligible for HRCM services. RN-ACM available for assistance as needed and for follow-up post discharge.    Nicole Li RN  Ambulatory     4/6/2021, 12:35 EDT

## 2021-04-06 NOTE — ED NOTES
Pt states she has been feeling very dizzy and has some numbness down the arms and fingers, pt says she has had some headaches recently.      Marcello Boudreaux RN  04/06/21 6025

## 2021-04-07 ENCOUNTER — APPOINTMENT (OUTPATIENT)
Dept: CARDIOLOGY | Facility: HOSPITAL | Age: 76
End: 2021-04-07

## 2021-04-07 ENCOUNTER — APPOINTMENT (OUTPATIENT)
Dept: MRI IMAGING | Facility: HOSPITAL | Age: 76
End: 2021-04-07

## 2021-04-07 LAB
ANION GAP SERPL CALCULATED.3IONS-SCNC: 8 MMOL/L (ref 5–15)
BASOPHILS # BLD AUTO: 0.1 10*3/MM3 (ref 0–0.2)
BASOPHILS NFR BLD AUTO: 1.3 % (ref 0–1.5)
BH CV ECHO MEAS - % IVS THICK: 69.2 %
BH CV ECHO MEAS - % LVPW THICK: 62.1 %
BH CV ECHO MEAS - ACS: 1.9 CM
BH CV ECHO MEAS - AI DEC SLOPE: 238.2 CM/SEC^2
BH CV ECHO MEAS - AI DEC TIME: 1.6 SEC
BH CV ECHO MEAS - AI MAX PG: 56.9 MMHG
BH CV ECHO MEAS - AI MAX VEL: 377.1 CM/SEC
BH CV ECHO MEAS - AI P1/2T: 463.6 MSEC
BH CV ECHO MEAS - AO MAX PG (FULL): 2.4 MMHG
BH CV ECHO MEAS - AO MAX PG: 7 MMHG
BH CV ECHO MEAS - AO MEAN PG (FULL): 1.8 MMHG
BH CV ECHO MEAS - AO MEAN PG: 4.4 MMHG
BH CV ECHO MEAS - AO ROOT AREA (BSA CORRECTED): 1.8
BH CV ECHO MEAS - AO ROOT AREA: 6.6 CM^2
BH CV ECHO MEAS - AO ROOT DIAM: 2.9 CM
BH CV ECHO MEAS - AO V2 MAX: 132.3 CM/SEC
BH CV ECHO MEAS - AO V2 MEAN: 100.7 CM/SEC
BH CV ECHO MEAS - AO V2 VTI: 31.3 CM
BH CV ECHO MEAS - AVA(I,A): 1.9 CM^2
BH CV ECHO MEAS - AVA(I,D): 1.9 CM^2
BH CV ECHO MEAS - AVA(V,A): 1.8 CM^2
BH CV ECHO MEAS - AVA(V,D): 1.8 CM^2
BH CV ECHO MEAS - BSA(HAYCOCK): 1.6 M^2
BH CV ECHO MEAS - BSA: 1.6 M^2
BH CV ECHO MEAS - BZI_BMI: 26.8 KILOGRAMS/M^2
BH CV ECHO MEAS - BZI_METRIC_HEIGHT: 152.4 CM
BH CV ECHO MEAS - BZI_METRIC_WEIGHT: 62.1 KG
BH CV ECHO MEAS - EDV(CUBED): 67.6 ML
BH CV ECHO MEAS - EDV(MOD-SP4): 44.3 ML
BH CV ECHO MEAS - EDV(TEICH): 73.1 ML
BH CV ECHO MEAS - EF(CUBED): 77.8 %
BH CV ECHO MEAS - EF(MOD-BP): 62 %
BH CV ECHO MEAS - EF(MOD-SP4): 61.9 %
BH CV ECHO MEAS - EF(TEICH): 70.5 %
BH CV ECHO MEAS - ESV(CUBED): 15 ML
BH CV ECHO MEAS - ESV(MOD-SP4): 16.9 ML
BH CV ECHO MEAS - ESV(TEICH): 21.6 ML
BH CV ECHO MEAS - FS: 39.5 %
BH CV ECHO MEAS - IVS/LVPW: 0.85
BH CV ECHO MEAS - IVSD: 0.74 CM
BH CV ECHO MEAS - IVSS: 1.3 CM
BH CV ECHO MEAS - LA DIMENSION(2D): 3.8 CM
BH CV ECHO MEAS - LV DIASTOLIC VOL/BSA (35-75): 27.8 ML/M^2
BH CV ECHO MEAS - LV MASS(C)D: 97.9 GRAMS
BH CV ECHO MEAS - LV MASS(C)DI: 61.6 GRAMS/M^2
BH CV ECHO MEAS - LV MASS(C)S: 101.4 GRAMS
BH CV ECHO MEAS - LV MASS(C)SI: 63.8 GRAMS/M^2
BH CV ECHO MEAS - LV MAX PG: 4.6 MMHG
BH CV ECHO MEAS - LV MEAN PG: 2.5 MMHG
BH CV ECHO MEAS - LV SYSTOLIC VOL/BSA (12-30): 10.6 ML/M^2
BH CV ECHO MEAS - LV V1 MAX: 107.2 CM/SEC
BH CV ECHO MEAS - LV V1 MEAN: 76.9 CM/SEC
BH CV ECHO MEAS - LV V1 VTI: 26.6 CM
BH CV ECHO MEAS - LVIDD: 4.1 CM
BH CV ECHO MEAS - LVIDS: 2.5 CM
BH CV ECHO MEAS - LVOT AREA: 2.3 CM^2
BH CV ECHO MEAS - LVOT DIAM: 1.7 CM
BH CV ECHO MEAS - LVPWD: 0.88 CM
BH CV ECHO MEAS - LVPWS: 1.4 CM
BH CV ECHO MEAS - MR MAX PG: 139.4 MMHG
BH CV ECHO MEAS - MR MAX VEL: 590.3 CM/SEC
BH CV ECHO MEAS - MV A MAX VEL: 100.4 CM/SEC
BH CV ECHO MEAS - MV DEC SLOPE: 445.9 CM/SEC^2
BH CV ECHO MEAS - MV DEC TIME: 0.2 SEC
BH CV ECHO MEAS - MV E MAX VEL: 89.6 CM/SEC
BH CV ECHO MEAS - MV E/A: 0.89
BH CV ECHO MEAS - MV MAX PG: 5.5 MMHG
BH CV ECHO MEAS - MV MEAN PG: 2.3 MMHG
BH CV ECHO MEAS - MV V2 MAX: 116.9 CM/SEC
BH CV ECHO MEAS - MV V2 MEAN: 71 CM/SEC
BH CV ECHO MEAS - MV V2 VTI: 28.8 CM
BH CV ECHO MEAS - MVA(VTI): 2.1 CM^2
BH CV ECHO MEAS - PA ACC TIME: 0.1 SEC
BH CV ECHO MEAS - PA MAX PG (FULL): 0.46 MMHG
BH CV ECHO MEAS - PA MAX PG: 2.3 MMHG
BH CV ECHO MEAS - PA MEAN PG (FULL): 0.28 MMHG
BH CV ECHO MEAS - PA MEAN PG: 1.3 MMHG
BH CV ECHO MEAS - PA PR(ACCEL): 33.8 MMHG
BH CV ECHO MEAS - PA V2 MAX: 75.8 CM/SEC
BH CV ECHO MEAS - PA V2 MEAN: 55.3 CM/SEC
BH CV ECHO MEAS - PA V2 VTI: 15.1 CM
BH CV ECHO MEAS - RAP SYSTOLE: 3 MMHG
BH CV ECHO MEAS - RV MAX PG: 1.8 MMHG
BH CV ECHO MEAS - RV MEAN PG: 1.1 MMHG
BH CV ECHO MEAS - RV V1 MAX: 67.8 CM/SEC
BH CV ECHO MEAS - RV V1 MEAN: 50 CM/SEC
BH CV ECHO MEAS - RV V1 VTI: 15.6 CM
BH CV ECHO MEAS - RVDD: 2.1 CM
BH CV ECHO MEAS - RVSP: 26.1 MMHG
BH CV ECHO MEAS - SI(AO): 130.4 ML/M^2
BH CV ECHO MEAS - SI(CUBED): 33.1 ML/M^2
BH CV ECHO MEAS - SI(LVOT): 37.8 ML/M^2
BH CV ECHO MEAS - SI(MOD-SP4): 17.2 ML/M^2
BH CV ECHO MEAS - SI(TEICH): 32.4 ML/M^2
BH CV ECHO MEAS - SV(AO): 207.2 ML
BH CV ECHO MEAS - SV(CUBED): 52.6 ML
BH CV ECHO MEAS - SV(LVOT): 60.1 ML
BH CV ECHO MEAS - SV(MOD-SP4): 27.4 ML
BH CV ECHO MEAS - SV(TEICH): 51.5 ML
BH CV ECHO MEAS - TR MAX VEL: 240.5 CM/SEC
BUN SERPL-MCNC: 18 MG/DL (ref 8–23)
BUN/CREAT SERPL: 25 (ref 7–25)
CALCIUM SPEC-SCNC: 9 MG/DL (ref 8.6–10.5)
CHLORIDE SERPL-SCNC: 105 MMOL/L (ref 98–107)
CHOLEST SERPL-MCNC: 197 MG/DL (ref 0–200)
CO2 SERPL-SCNC: 27 MMOL/L (ref 22–29)
CREAT SERPL-MCNC: 0.72 MG/DL (ref 0.57–1)
DEPRECATED RDW RBC AUTO: 43.3 FL (ref 37–54)
EOSINOPHIL # BLD AUTO: 0.1 10*3/MM3 (ref 0–0.4)
EOSINOPHIL NFR BLD AUTO: 2.5 % (ref 0.3–6.2)
ERYTHROCYTE [DISTWIDTH] IN BLOOD BY AUTOMATED COUNT: 14.4 % (ref 12.3–15.4)
GFR SERPL CREATININE-BSD FRML MDRD: 79 ML/MIN/1.73
GLUCOSE SERPL-MCNC: 78 MG/DL (ref 65–99)
HBA1C MFR BLD: 4.9 % (ref 3.5–5.6)
HCT VFR BLD AUTO: 33.2 % (ref 34–46.6)
HDLC SERPL-MCNC: 57 MG/DL (ref 40–60)
HGB BLD-MCNC: 11.3 G/DL (ref 12–15.9)
LDLC SERPL CALC-MCNC: 118 MG/DL (ref 0–100)
LDLC/HDLC SERPL: 2.01 {RATIO}
LYMPHOCYTES # BLD AUTO: 2 10*3/MM3 (ref 0.7–3.1)
LYMPHOCYTES NFR BLD AUTO: 36.6 % (ref 19.6–45.3)
MAGNESIUM SERPL-MCNC: 1.9 MG/DL (ref 1.6–2.4)
MAXIMAL PREDICTED HEART RATE: 145 BPM
MCH RBC QN AUTO: 28.7 PG (ref 26.6–33)
MCHC RBC AUTO-ENTMCNC: 34.1 G/DL (ref 31.5–35.7)
MCV RBC AUTO: 84.4 FL (ref 79–97)
MONOCYTES # BLD AUTO: 0.4 10*3/MM3 (ref 0.1–0.9)
MONOCYTES NFR BLD AUTO: 8.1 % (ref 5–12)
NEUTROPHILS NFR BLD AUTO: 2.8 10*3/MM3 (ref 1.7–7)
NEUTROPHILS NFR BLD AUTO: 51.5 % (ref 42.7–76)
NRBC BLD AUTO-RTO: 0.2 /100 WBC (ref 0–0.2)
PLATELET # BLD AUTO: 232 10*3/MM3 (ref 140–450)
PMV BLD AUTO: 6.4 FL (ref 6–12)
POTASSIUM SERPL-SCNC: 4.2 MMOL/L (ref 3.5–5.2)
RBC # BLD AUTO: 3.93 10*6/MM3 (ref 3.77–5.28)
SARS-COV-2 RNA PNL SPEC NAA+PROBE: NOT DETECTED
SODIUM SERPL-SCNC: 140 MMOL/L (ref 136–145)
STRESS TARGET HR: 123 BPM
TRIGL SERPL-MCNC: 127 MG/DL (ref 0–150)
VIT B12 BLD-MCNC: 349 PG/ML (ref 211–946)
VLDLC SERPL-MCNC: 22 MG/DL (ref 5–40)
WBC # BLD AUTO: 5.5 10*3/MM3 (ref 3.4–10.8)

## 2021-04-07 PROCEDURE — 85025 COMPLETE CBC W/AUTO DIFF WBC: CPT | Performed by: PHYSICIAN ASSISTANT

## 2021-04-07 PROCEDURE — 80048 BASIC METABOLIC PNL TOTAL CA: CPT | Performed by: PHYSICIAN ASSISTANT

## 2021-04-07 PROCEDURE — 82607 VITAMIN B-12: CPT | Performed by: PHYSICIAN ASSISTANT

## 2021-04-07 PROCEDURE — G0378 HOSPITAL OBSERVATION PER HR: HCPCS

## 2021-04-07 PROCEDURE — 93306 TTE W/DOPPLER COMPLETE: CPT

## 2021-04-07 PROCEDURE — 83036 HEMOGLOBIN GLYCOSYLATED A1C: CPT | Performed by: PHYSICIAN ASSISTANT

## 2021-04-07 PROCEDURE — 83735 ASSAY OF MAGNESIUM: CPT | Performed by: PHYSICIAN ASSISTANT

## 2021-04-07 PROCEDURE — U0005 INFEC AGEN DETEC AMPLI PROBE: HCPCS | Performed by: INTERNAL MEDICINE

## 2021-04-07 PROCEDURE — 97161 PT EVAL LOW COMPLEX 20 MIN: CPT

## 2021-04-07 PROCEDURE — 80061 LIPID PANEL: CPT | Performed by: PHYSICIAN ASSISTANT

## 2021-04-07 PROCEDURE — 99225 PR SBSQ OBSERVATION CARE/DAY 25 MINUTES: CPT | Performed by: INTERNAL MEDICINE

## 2021-04-07 PROCEDURE — 93306 TTE W/DOPPLER COMPLETE: CPT | Performed by: INTERNAL MEDICINE

## 2021-04-07 PROCEDURE — 70551 MRI BRAIN STEM W/O DYE: CPT

## 2021-04-07 PROCEDURE — 97165 OT EVAL LOW COMPLEX 30 MIN: CPT

## 2021-04-07 PROCEDURE — U0003 INFECTIOUS AGENT DETECTION BY NUCLEIC ACID (DNA OR RNA); SEVERE ACUTE RESPIRATORY SYNDROME CORONAVIRUS 2 (SARS-COV-2) (CORONAVIRUS DISEASE [COVID-19]), AMPLIFIED PROBE TECHNIQUE, MAKING USE OF HIGH THROUGHPUT TECHNOLOGIES AS DESCRIBED BY CMS-2020-01-R: HCPCS | Performed by: INTERNAL MEDICINE

## 2021-04-07 RX ORDER — HYDROCODONE BITARTRATE AND ACETAMINOPHEN 5; 325 MG/1; MG/1
1 TABLET ORAL EVERY 6 HOURS PRN
Status: DISCONTINUED | OUTPATIENT
Start: 2021-04-07 | End: 2021-04-11 | Stop reason: HOSPADM

## 2021-04-07 RX ADMIN — ACETAMINOPHEN 650 MG: 325 TABLET, FILM COATED ORAL at 11:28

## 2021-04-07 RX ADMIN — SERTRALINE HYDROCHLORIDE 100 MG: 100 TABLET ORAL at 08:21

## 2021-04-07 RX ADMIN — ACETAMINOPHEN 650 MG: 325 TABLET, FILM COATED ORAL at 21:12

## 2021-04-07 RX ADMIN — LISINOPRIL 10 MG: 5 TABLET ORAL at 08:20

## 2021-04-07 RX ADMIN — ATORVASTATIN CALCIUM 80 MG: 40 TABLET, FILM COATED ORAL at 08:20

## 2021-04-07 RX ADMIN — AMITRIPTYLINE HYDROCHLORIDE 25 MG: 25 TABLET, FILM COATED ORAL at 21:12

## 2021-04-07 RX ADMIN — PANTOPRAZOLE SODIUM 40 MG: 40 TABLET, DELAYED RELEASE ORAL at 08:20

## 2021-04-07 RX ADMIN — Medication 10 ML: at 08:46

## 2021-04-07 RX ADMIN — Medication 10 ML: at 21:12

## 2021-04-07 NOTE — PLAN OF CARE
Problem: Adult Inpatient Plan of Care  Goal: Plan of Care Review  Outcome: Ongoing, Progressing  Flowsheets (Taken 4/7/2021 1453)  Progress: improving  Plan of Care Reviewed With: patient  Outcome Summary: NO COMPLAINTS OF DIZZINESS THIS SHIFT VS WNL  Goal: Patient-Specific Goal (Individualized)  Outcome: Ongoing, Progressing  Goal: Absence of Hospital-Acquired Illness or Injury  Outcome: Ongoing, Progressing  Intervention: Identify and Manage Fall Risk  Recent Flowsheet Documentation  Taken 4/7/2021 1316 by Sugar Gonzalez RN  Safety Promotion/Fall Prevention:   safety round/check completed   room organization consistent   nonskid shoes/slippers when out of bed   lighting adjusted   fall prevention program maintained  Taken 4/7/2021 0900 by Sugar Gonzalez RN  Safety Promotion/Fall Prevention:   safety round/check completed   room organization consistent   nonskid shoes/slippers when out of bed   mobility aid in reach   lighting adjusted   assistive device/personal items within reach   clutter free environment maintained   fall prevention program maintained  Taken 4/7/2021 0744 by Sugar Gonzalez RN  Safety Promotion/Fall Prevention:   room organization consistent   safety round/check completed   lighting adjusted   nonskid shoes/slippers when out of bed   assistive device/personal items within reach  Intervention: Prevent Skin Injury  Recent Flowsheet Documentation  Taken 4/7/2021 0744 by Sugar Gonzalez RN  Body Position: position changed independently  Intervention: Prevent Infection  Recent Flowsheet Documentation  Taken 4/7/2021 1316 by Sugar Gonzalez RN  Infection Prevention:   visitors restricted/screened   rest/sleep promoted   hand hygiene promoted  Taken 4/7/2021 1100 by Sugar Gonzalez RN  Infection Prevention:   visitors restricted/screened   hand hygiene promoted  Goal: Optimal Comfort and Wellbeing  Outcome: Ongoing, Progressing  Intervention: Provide Person-Centered Care  Recent Flowsheet  Documentation  Taken 4/7/2021 1316 by Sugar Gonzalez RN  Trust Relationship/Rapport:   care explained   choices provided   questions answered   questions encouraged  Taken 4/7/2021 1100 by Sugar Gonzalez RN  Trust Relationship/Rapport:   questions answered   questions encouraged   choices provided  Taken 4/7/2021 0744 by Sugar Gonzalez RN  Trust Relationship/Rapport:   care explained   choices provided   questions answered  Goal: Readiness for Transition of Care  Outcome: Ongoing, Progressing     Problem: Syncope  Goal: Absence of Syncopal Symptoms  Outcome: Ongoing, Progressing  Intervention: Manage Effect of Syncopal Symptoms  Recent Flowsheet Documentation  Taken 4/7/2021 1100 by Sugar Gonzalez RN  Syncope Management: position changed slowly     Problem: Hypertension Acute  Goal: Blood Pressure Within Desired Range  Outcome: Ongoing, Progressing  Intervention: Normalize Blood Pressure  Recent Flowsheet Documentation  Taken 4/7/2021 1316 by Sugar Gonzalez RN  Medication Review/Management: medications reviewed  Taken 4/7/2021 0900 by Sugar Gonzalez RN  Medication Review/Management: medications reviewed  Taken 4/7/2021 0744 by Sugar Gonzalez RN  Medication Review/Management: medications reviewed     Problem: Fall Injury Risk  Goal: Absence of Fall and Fall-Related Injury  Outcome: Ongoing, Progressing  Intervention: Identify and Manage Contributors to Fall Injury Risk  Recent Flowsheet Documentation  Taken 4/7/2021 1316 by Sugar Gonzalez RN  Medication Review/Management: medications reviewed  Taken 4/7/2021 0900 by Sugar Gonzalez RN  Medication Review/Management: medications reviewed  Taken 4/7/2021 0744 by Sugar Gonzalez RN  Medication Review/Management: medications reviewed  Intervention: Promote Injury-Free Environment  Recent Flowsheet Documentation  Taken 4/7/2021 1316 by Sugar Gonzalez RN  Safety Promotion/Fall Prevention:   safety round/check completed   room organization consistent   nonskid shoes/slippers  when out of bed   lighting adjusted   fall prevention program maintained  Taken 4/7/2021 0900 by Sugar Gonzalez RN  Safety Promotion/Fall Prevention:   safety round/check completed   room organization consistent   nonskid shoes/slippers when out of bed   mobility aid in reach   lighting adjusted   assistive device/personal items within reach   clutter free environment maintained   fall prevention program maintained  Taken 4/7/2021 0744 by Sugar Gonzalez, VENU  Safety Promotion/Fall Prevention:   room organization consistent   safety round/check completed   lighting adjusted   nonskid shoes/slippers when out of bed   assistive device/personal items within reach   Goal Outcome Evaluation:  Plan of Care Reviewed With: patient  Progress: improving  Outcome Summary: NO COMPLAINTS OF DIZZINESS THIS SHIFT VS WNL

## 2021-04-07 NOTE — PROGRESS NOTES
Continued Stay Note  GINNY Martins     Patient Name: Katie Reddy  MRN: [id number]  Today's Date: 4/7/2021    Admit Date: 4/6/2021    Discharge Plan     Row Name 04/07/21 1542       Plan    Plan  DC Plan: Anticipate routine home, declined HH at this time.    Plan Comments  Per CM, pt is current with [facility]. SW faxed facesheet to [facility] for purposes of continuity of care.     Phone communication or documentation only - no physical contact with patient or family.    Allison Baum JD [facility], [facility]    Office: [phone]  Cell: [phone]  Fax: [phone]  E-mail: [email or web address]

## 2021-04-07 NOTE — PROGRESS NOTES
"      [facility] Room Medicine Services Daily Progress Note      Hospitalist Team  LOS 0 days      Patient Care Team:  Brooklyn Contreras DO as PCP - General  Brooklyn Contreras DO as PCP - Family Medicine    Patient Location: 103/1      Subjective   Subjective     Chief Complaint / Subjective  Chief Complaint   Patient presents with   • Dizziness         Brief Synopsis of Hospital Course/HPI  The patient is a 76-year-old female who has history of hypertension has had uncontrolled hypertension for the past 3 to 4 weeks.  She has had some episodes of syncope which are likely related to this issue.  The patient still intermittently has a headache.  The patient otherwise is feeling well and has ruled out for myocardial injury.      Date::          ROS      Objective   Objective      Vital Signs  Temp:  [98.1 °F (36.7 °C)-98.3 °F (36.8 °C)] 98.1 °F (36.7 °C)  Heart Rate:  [69-72] 70  Resp:  [16-18] 17  BP: (113-168)/(71-92) 113/71  Oxygen Therapy  SpO2: 95 %  Pulse Oximetry Type: Intermittent  Device (Oxygen Therapy): room air  Flowsheet Rows      First Filed Value   Admission Height  152.4 cm (60\") Documented at 04/06/2021 1233   Admission Weight  64.2 kg (141 lb 8.6 oz) Documented at 04/06/2021 1233        Intake & Output (last 3 days)       04/04 0701 - 04/05 0700 04/05 0701 - 04/06 0700 04/06 0701 - 04/07 0700 04/07 0701 - 04/08 0700    P.O.   240 240    Total Intake(mL/kg)   240 (3.8) 240 (3.9)    Net   +240 +240                Lines, Drains & Airways    Active LDAs     Name:   Placement date:   Placement time:   Site:   Days:    Peripheral IV 04/06/21 1700 Anterior;Distal;Right;Upper Arm   04/06/21    1700    Arm   1            Physical Exam:  Physical Exam  Vitals and nursing note reviewed.   Constitutional:       General: She is not in acute distress.     Appearance: Normal appearance. She is well-developed. She is not ill-appearing, toxic-appearing or diaphoretic.   HENT:      Head: Normocephalic and " atraumatic.      Right Ear: Ear canal and external ear normal.      Left Ear: Ear canal and external ear normal.      Nose: Nose normal. No congestion or rhinorrhea.      Mouth/Throat:      Mouth: Mucous membranes are moist.      Pharynx: No oropharyngeal exudate.   Eyes:      General: No scleral icterus.        Right eye: No discharge.         Left eye: No discharge.      Extraocular Movements: Extraocular movements intact.      Conjunctiva/sclera: Conjunctivae normal.      Pupils: Pupils are equal, round, and reactive to light.   Neck:      Thyroid: No thyromegaly.      Vascular: No carotid bruit or JVD.      Trachea: No tracheal deviation.   Cardiovascular:      Rate and Rhythm: Normal rate and regular rhythm.      Pulses: Normal pulses.      Heart sounds: Normal heart sounds. No murmur heard.   No friction rub. No gallop.    Pulmonary:      Effort: Pulmonary effort is normal. No respiratory distress.      Breath sounds: Normal breath sounds. No stridor. No wheezing, rhonchi or rales.   Chest:      Chest wall: No tenderness.   Abdominal:      General: Bowel sounds are normal. There is no distension.      Palpations: Abdomen is soft. There is no mass.      Tenderness: There is no abdominal tenderness. There is no guarding or rebound.      Hernia: No hernia is present.   Musculoskeletal:         General: No swelling, tenderness, deformity or signs of injury. Normal range of motion.      Cervical back: Normal range of motion and neck supple. No rigidity. No muscular tenderness.      Right lower leg: No edema.      Left lower leg: No edema.   Lymphadenopathy:      Cervical: No cervical adenopathy.   Skin:     General: Skin is warm and dry.      Coloration: Skin is not jaundiced or pale.      Findings: No bruising, erythema or rash.   Neurological:      General: No focal deficit present.      Mental Status: She is alert and oriented to person, place, and time. Mental status is at baseline.      Cranial Nerves: No  cranial nerve deficit.      Sensory: No sensory deficit.      Motor: No weakness or abnormal muscle tone.      Coordination: Coordination normal.   Psychiatric:         Mood and Affect: Mood normal.         Behavior: Behavior normal.         Thought Content: Thought content normal.         Judgment: Judgment normal.       Procedures:    Results Review:     I reviewed the patient's new clinical results.      Lab Results (last 24 hours)     Procedure Component Value Units Date/Time    Vitamin B12 [[id number]]  (Normal) Collected: 04/07/21 0910    Specimen: Blood Updated: 04/07/21 1609     Vitamin B-12 349 pg/mL     Narrative:      Results may be falsely increased if patient taking Biotin.      Lipid Panel [[id number]]  (Abnormal) Collected: 04/07/21 0910    Specimen: Blood Updated: 04/07/21 1222     Total Cholesterol 197 mg/dL      Triglycerides 127 mg/dL      HDL Cholesterol 57 mg/dL      LDL Cholesterol  118 mg/dL      VLDL Cholesterol 22 mg/dL      LDL/HDL Ratio 2.01    Narrative:      Cholesterol Reference Ranges  (U.S. Department of Health and Human Services ATP III Classifications)    Desirable          <200 mg/dL  Borderline High    200-239 mg/dL  High Risk          >240 mg/dL      Triglyceride Reference Ranges  (U.S. Department of Health and Human Services ATP III Classifications)    Normal           <150 mg/dL  Borderline High  150-199 mg/dL  High             200-499 mg/dL  Very High        >500 mg/dL    HDL Reference Ranges  (U.S. Department of Health and Human Services ATP III Classifcations)    Low     <40 mg/dl (major risk factor for CHD)  High    >60 mg/dl ('negative' risk factor for CHD)        LDL Reference Ranges  (U.S. Department of Health and Human Services ATP III Classifcations)    Optimal          <100 mg/dL  Near Optimal     100-129 mg/dL  Borderline High  130-159 mg/dL  High             160-189 mg/dL  Very High        >189 mg/dL    Hemoglobin A1c [[id number]]  (Normal) Collected: 04/07/21 0910     Specimen: Blood Updated: 04/07/21 1049     Hemoglobin A1C 4.9 %     Narrative:      Hemoglobin A1C Reference Range:    <5.7 %        Normal  5.7-6.4 %     Increased risk for diabetes  > 6.4 %        Diabetes       These guidelines have been recommended by the American Diabetic Association for Hgb A1c.      The following 2010 guidelines have been recommended by the American Diabetes Association for Hemoglobin A1c.    HBA1c 5.7-6.4% Increased risk for future diabetes (pre-diabetes)  HBA1c     >6.4% Diabetes      Basic Metabolic Panel [[id number]]  (Normal) Collected: 04/07/21 0910    Specimen: Blood Updated: 04/07/21 1006     Glucose 78 mg/dL      BUN 18 mg/dL      Creatinine 0.72 mg/dL      Sodium 140 mmol/L      Potassium 4.2 mmol/L      Chloride 105 mmol/L      CO2 27.0 mmol/L      Calcium 9.0 mg/dL      eGFR Non African Amer 79 mL/min/1.73      BUN/Creatinine Ratio 25.0     Anion Gap 8.0 mmol/L     Narrative:      GFR Normal >60  Chronic Kidney Disease <60  Kidney Failure <15      Magnesium [[id number]]  (Normal) Collected: 04/07/21 0910    Specimen: Blood Updated: 04/07/21 1000     Magnesium 1.9 mg/dL     CBC & Differential [[id number]]  (Abnormal) Collected: 04/07/21 0909    Specimen: Blood Updated: 04/07/21 0938    Narrative:      The following orders were created for panel order CBC & Differential.  Procedure                               Abnormality         Status                     ---------                               -----------         ------                     CBC Auto Differential[[id number]]        Abnormal            Final result                 Please view results for these tests on the individual orders.    CBC Auto Differential [[id number]]  (Abnormal) Collected: 04/07/21 0909    Specimen: Blood Updated: 04/07/21 0938     WBC 5.50 10*3/mm3      RBC 3.93 10*6/mm3      Hemoglobin 11.3 g/dL      Hematocrit 33.2 %      MCV 84.4 fL      MCH 28.7 pg      MCHC 34.1 g/dL      RDW 14.4 %      RDW-SD 43.3 fl       MPV 6.4 fL      Platelets 232 10*3/mm3      Neutrophil % 51.5 %      Lymphocyte % 36.6 %      Monocyte % 8.1 %      Eosinophil % 2.5 %      Basophil % 1.3 %      Neutrophils, Absolute 2.80 10*3/mm3      Lymphocytes, Absolute 2.00 10*3/mm3      Monocytes, Absolute 0.40 10*3/mm3      Eosinophils, Absolute 0.10 10*3/mm3      Basophils, Absolute 0.10 10*3/mm3      nRBC 0.2 /100 WBC     COVID PRE-OP / PRE-PROCEDURE SCREENING ORDER (NO ISOLATION) - Swab, Nasopharynx [[id number]]  (Normal) Collected: 04/07/21 0012    Specimen: Swab from Nasopharynx Updated: 04/07/21 0100    Narrative:      The following orders were created for panel order COVID PRE-OP / PRE-PROCEDURE SCREENING ORDER (NO ISOLATION) - Swab, Nasopharynx.  Procedure                               Abnormality         Status                     ---------                               -----------         ------                     COVID-19,CEPHEID,COR/NINA...[[id number]]  Normal              Final result                 Please view results for these tests on the individual orders.    COVID-19,CEPHEID,COR/NINA/PAD IN-HOUSE(OR EMERGENT/ADD-ON),NP SWAB IN TRANSPORT MEDIA 3-4 HR TAT, RT-PCR - Swab, Nasopharynx [[id number]]  (Normal) Collected: 04/07/21 0012    Specimen: Swab from Nasopharynx Updated: 04/07/21 0100     COVID19 Not Detected    Narrative:      Fact sheet for providers: https://www.fda.gov/media/142063/download     Fact sheet for patients: https://www.fda.gov/media/298980/download    Troponin [[id number]]  (Normal) Collected: 04/06/21 2150    Specimen: Blood Updated: 04/06/21 2257     Troponin T <0.010 ng/mL     Narrative:      Troponin T Reference Range:  <= 0.03 ng/mL-   Negative for AMI  >0.03 ng/mL-     Abnormal for myocardial necrosis.  Clinicians would have to utilize clinical acumen, EKG, Troponin and serial changes to determine if it is an Acute Myocardial Infarction or myocardial injury due to an underlying chronic condition.       Results may be  falsely decreased if patient taking Biotin.      Potassium [[id number]]  (Normal) Collected: 04/06/21 2150    Specimen: Blood Updated: 04/06/21 2252     Potassium 3.9 mmol/L     TSH [[id number]]  (Normal) Collected: 04/06/21 1443    Specimen: Blood Updated: 04/06/21 2209     TSH 1.980 uIU/mL         Hemoglobin A1C   Date Value Ref Range Status   04/07/2021 4.9 3.5 - 5.6 % Final     Results from last 7 days   Lab Units 04/06/21  1443   INR  <0.93*           Lab Results   Component Value Date    LIPASE 14 10/22/2020     Lab Results   Component Value Date    CHOL 197 04/07/2021    TRIG 127 04/07/2021    HDL 57 04/07/2021     (H) 04/07/2021       Lab Results   Lab Value Date/Time    FINALDX  11/19/2019 1505     Polyp, sigmoid, polypectomy:    Tubular adenoma     PEDRO/sms          Microbiology Results (last 10 days)     Procedure Component Value - Date/Time    COVID PRE-OP / PRE-PROCEDURE SCREENING ORDER (NO ISOLATION) - Swab, Nasopharynx [[id number]]  (Normal) Collected: 04/07/21 0012    Lab Status: Final result Specimen: Swab from Nasopharynx Updated: 04/07/21 0100    Narrative:      The following orders were created for panel order COVID PRE-OP / PRE-PROCEDURE SCREENING ORDER (NO ISOLATION) - Swab, Nasopharynx.  Procedure                               Abnormality         Status                     ---------                               -----------         ------                     COVID-19,CEPHEID,COR/NINA...[[id number]]  Normal              Final result                 Please view results for these tests on the individual orders.    COVID-19,CEPHEID,COR/NINA/PAD IN-HOUSE(OR EMERGENT/ADD-ON),NP SWAB IN TRANSPORT MEDIA 3-4 HR TAT, RT-PCR - Swab, Nasopharynx [[id number]]  (Normal) Collected: 04/07/21 0012    Lab Status: Final result Specimen: Swab from Nasopharynx Updated: 04/07/21 0100     COVID19 Not Detected    Narrative:      Fact sheet for providers: https://www.fda.gov/media/185972/download     Fact sheet for  patients: https://www.fda.gov/media/134061/download          ECG/EMG Results (most recent)     Procedure Component Value Units Date/Time    ECG 12 Lead [[id number]] Collected: 04/06/21 1254     Updated: 04/06/21 1256     QT Interval 386 ms     Narrative:      HEART RATE= 76  bpm  RR Interval= 792  ms  DE Interval= 169  ms  P Horizontal Axis= 16  deg  P Front Axis= 63  deg  QRSD Interval= 91  ms  QT Interval= 386  ms  QRS Axis= 51  deg  T Wave Axis= 21  deg  - OTHERWISE NORMAL ECG -  Sinus rhythm  Ventricular premature complex  When compared with ECG of 02-Mar-2021 10:44:35,  No significant change  Electronically Signed By:   Date and Time of Study: 2021-04-06 12:54:12    Adult Transthoracic Echo Complete W/ Cont if Necessary Per Protocol [[id number]] Collected: 04/07/21 0755     Updated: 04/07/21 1729     BSA 1.6 m^2      RVIDd 2.1 cm      IVSd 0.74 cm      IVSs 1.3 cm      LVIDd 4.1 cm      LVIDs 2.5 cm      LVPWd 0.88 cm      BH CV ECHO HIRO - LVPWS 1.4 cm      IVS/LVPW 0.85     FS 39.5 %      EDV(Teich) 73.1 ml      ESV(Teich) 21.6 ml      EF(Teich) 70.5 %      EDV(cubed) 67.6 ml      ESV(cubed) 15.0 ml      EF(cubed) 77.8 %      % IVS thick 69.2 %      % LVPW thick 62.1 %      LV mass(C)d 97.9 grams      LV mass(C)dI 61.6 grams/m^2      LV mass(C)s 101.4 grams      LV mass(C)sI 63.8 grams/m^2      SV(Teich) 51.5 ml      SI(Teich) 32.4 ml/m^2      SV(cubed) 52.6 ml      SI(cubed) 33.1 ml/m^2      Ao root diam 2.9 cm      Ao root area 6.6 cm^2      ACS 1.9 cm      LVOT diam 1.7 cm      LVOT area 2.3 cm^2      EDV(MOD-sp4) 44.3 ml      ESV(MOD-sp4) 16.9 ml      EF(MOD-sp4) 61.9 %      SV(MOD-sp4) 27.4 ml      SI(MOD-sp4) 17.2 ml/m^2      Ao root area (BSA corrected) 1.8     LV [place] Vol (BSA corrected) 27.8 ml/m^2      LV Sys Vol (BSA corrected) 10.6 ml/m^2      MV E max subha 89.6 cm/sec      MV A max subha 100.4 cm/sec      MV E/A 0.89     MV V2 max 116.9 cm/sec      MV max PG 5.5 mmHg      MV V2 mean 71.0 cm/sec       MV mean PG 2.3 mmHg      MV V2 VTI 28.8 cm      MVA(VTI) 2.1 cm^2      MV dec slope 445.9 cm/sec^2      MV dec time 0.2 sec      Ao pk subha 132.3 cm/sec      Ao max PG 7.0 mmHg      Ao max PG (full) 2.4 mmHg      Ao V2 mean 100.7 cm/sec      Ao mean PG 4.4 mmHg      Ao mean PG (full) 1.8 mmHg      Ao V2 VTI 31.3 cm      CARLIE(I,A) 1.9 cm^2      CARLIE(I,D) 1.9 cm^2      CARLIE(V,A) 1.8 cm^2      CARLIE(V,D) 1.8 cm^2      AI max subha 377.1 cm/sec      AI max PG 56.9 mmHg      AI dec slope 238.2 cm/sec^2      AI dec time 1.6 sec      AI P1/2t 463.6 msec      LV V1 max PG 4.6 mmHg      LV V1 mean PG 2.5 mmHg      LV V1 max 107.2 cm/sec      LV V1 mean 76.9 cm/sec      LV V1 VTI 26.6 cm      MR max subha 590.3 cm/sec      MR max .4 mmHg      SV(Ao) 207.2 ml      SI(Ao) 130.4 ml/m^2      SV(LVOT) 60.1 ml      SI(LVOT) 37.8 ml/m^2      PA V2 max 75.8 cm/sec      PA max PG 2.3 mmHg      PA max PG (full) 0.46 mmHg      PA V2 mean 55.3 cm/sec      PA mean PG 1.3 mmHg      PA mean PG (full) 0.28 mmHg      PA V2 VTI 15.1 cm      PA acc time 0.1 sec      RV V1 max PG 1.8 mmHg      RV V1 mean PG 1.1 mmHg      RV V1 max 67.8 cm/sec      RV V1 mean 50.0 cm/sec      RV V1 VTI 15.6 cm      TR max subha 240.5 cm/sec      RVSP(TR) 26.1 mmHg      RAP systole 3.0 mmHg      PA pr(Accel) 33.8 mmHg       CV ECHO HIRO - BZI_BMI 26.8 kilograms/m^2       CV ECHO HIRO - BSA([facility]) 1.6 m^2       CV ECHO HIRO - BZI_METRIC_WEIGHT 62.1 kg       CV ECHO HIRO - BZI_METRIC_HEIGHT 152.4 cm      Target HR (85%) 123 bpm      Max. Pred. HR (100%) 145 bpm      EF(MOD-bp) 62.0 %      LA dimension(2D) 3.8 cm     Narrative:      Normal LV size and contractility EF of 60 to 65%  Normal RV size  Normal atrial size  Aortic valve appears thickened, leaflets are not well visualized.  Mild   aortic regurgitation seen.  Mitral valve posterior leaflet appears calcified.  Mild mitral   regurgitation seen..    Tricuspid valve appears structurally normal, no significant  regurgitation   seen.  No pericardial effusion seen.  Proximal aorta appears normal in size.              Results for orders placed during the hospital encounter of 04/06/21    Adult Transthoracic Echo Complete W/ Cont if Necessary Per Protocol    Interpretation Summary  Normal LV size and contractility EF of 60 to 65%  Normal RV size  Normal atrial size  Aortic valve appears thickened, leaflets are not well visualized.  Mild aortic regurgitation seen.  Mitral valve posterior leaflet appears calcified.  Mild mitral regurgitation seen..  Tricuspid valve appears structurally normal, no significant regurgitation seen.  No pericardial effusion seen.  Proximal aorta appears normal in size.      CT Head Without Contrast    Result Date: 4/6/2021  1.No acute intracranial abnormality on head CT. 2.Remote left occipital parietal lobe infarct with encephalomalacia. 3.Mild amount of low-density in the periventricular and subcortical white matter consistent with chronic small vessel ischemic change. 4.Brain MRI is more sensitive to evaluate for acute or subacute infarcts and to evaluate for intracranial metastatic disease.  Electronically Signed By-Lor Gonzalez MD On:4/6/2021 6:37 PM This report was finalized on [id number] by  Lor Gonzalez MD.    MRI Brain Without Contrast    Result Date: 4/7/2021   1. No acute intracranial abnormality. No acute infarct. No intracranial mass/mass effect. 2. Sequela of old left occipital hemorrhagic infarct. 3. Mild scattered periventricular and subcortical white matter high T2/FLAIR signal foci, statistically represent chronic small vessel ischemic changes.  Electronically Signed By-Derrick Orozco MD On:4/7/2021 8:18 AM This report was finalized on [id number] by  Derrick Orozco MD.    XR Chest 1 View    Result Date: 4/6/2021  No acute chest findings.  Electronically Signed By-Maddi Bhagat MD On:4/6/2021 2:09 PM This report was finalized on [id number] by  Maddi Bhagat  MD.          Xrays, labs reviewed personally by physician.    Medication Review:   I have reviewed the patient's current medication list      Scheduled Meds  amitriptyline, 25 mg, Oral, Nightly  atorvastatin, 80 mg, Oral, Daily  lisinopril, 10 mg, Oral, Q24H  pantoprazole, 40 mg, Oral, Daily  polyethylene glycol, 17 g, Oral, Daily  sennosides-docusate, 2 tablet, Oral, Nightly  sertraline, 100 mg, Oral, Daily  sodium chloride, 10 mL, Intravenous, Q12H        Meds Infusions       Meds PRN  •  acetaminophen **OR** acetaminophen **OR** acetaminophen  •  Calcium Gluconate-NaCl **AND** calcium gluconate **AND** Calcium, Ionized  •  famotidine  •  labetalol  •  magnesium sulfate **OR** magnesium sulfate **OR** magnesium sulfate  •  melatonin  •  nitroglycerin  •  ondansetron **OR** ondansetron  •  potassium & sodium phosphates **OR** potassium & sodium phosphates  •  potassium chloride  •  potassium chloride  •  [COMPLETED] Insert peripheral IV **AND** sodium chloride  •  sodium chloride        Assessment/Plan   Assessment/Plan     Active Hospital Problems    Diagnosis  POA   • **Hypertensive emergency [I16.1]  Yes   • Syncope [R55]  Yes   • History of hemorrhagic cerebrovascular accident (CVA) with residual deficit [I69.30]  Not Applicable   • Essential hypertension [I10]  Yes   • Primary osteoarthritis of right knee [M17.11]  Yes   • Primary osteoarthritis of left knee [M17.12]  Yes   • GERD (gastroesophageal reflux disease) [K21.9]  Yes   • Generalized anxiety disorder [F41.1]  Yes   • Dyslipidemia [E78.5]  Yes      Resolved Hospital Problems   No resolved problems to display.       MEDICAL DECISION MAKING COMPLEXITY BY PROBLEM:   1.  Hypertensive emergency  -The patient has been experiencing hypertension systolics greater than 200 for the past 2 to 3 weeks.  The patient had had some associated dizziness, syncope as well as chest pain.  -The patient is CT of the head which was negative for any acute change  -The patient  had no ST or T wave changes associated with the hypertension  -Labetalol was utilized and did well for the patient initially intravenously  -She was also started on lisinopril    2.  Syncope  -The patient reports 3-4 actual syncopal episodes over the preceding month possibly related to the issue she was having with her hypertension.       -Patient does have a history of hemorrhagic CVA in the past but had a negative CT scan this admission    3.  Hyperlipidemia  -Continue statin therapy    4.  Chronic pain syndrome  -Restart Flexeril and Celebrex when patient is more stable    5.  Gastroesophageal reflux disease  -Continue Protonix    6.  Osteoporosis  -Patient apparently is on outpatient Boniva which is to continue.  VTE Prophylaxis -   Mechanical Order History:      Ordered        04/06/21 2141  Place Sequential Compression Device  Once         04/06/21 2141  Maintain Sequential Compression Device  Continuous                 Pharmalogical Order History:     None            Code Status -   Code Status and Medical Interventions:   Ordered at: 04/06/21 1941     Code Status:    CPR     Medical Interventions (Level of Support Prior to Arrest):    Full   This patient has been examined wearing appropriate Personal Protective Equipment and discussed with hospital infection control department. 04/07/21    Discharge Planning  Likely home  Electronically signed by Peace Graham MD, 04/07/21, 17:37 EDT.  [facility] Hospitalist Team

## 2021-04-07 NOTE — THERAPY EVALUATION
Patient Name: Katie Reddy  : 1945    MRN: [id number]                              Today's Date: 2021       Admit Date: 2021    Visit Dx:     ICD-10-CM ICD-9-CM   1. Near syncope  R55 780.2   2. Chest pain, unspecified type  R07.9 786.50   3. Secondary hypertension  I15.9 405.99   4. Exertional dyspnea  R06.00 786.09     Patient Active Problem List   Diagnosis   • Arthritis   • Cataract of both eyes   • Dyslipidemia   • Generalized anxiety disorder   • GERD (gastroesophageal reflux disease)   • Loss of height   • Migraine headache   • Osteopenia   • Palpitations   • Vitamin D deficiency   • Postmenopausal   • Primary osteoarthritis of right knee   • Acute pain of right knee   • Primary osteoarthritis of left knee   • Hallux valgus, acquired, bilateral   • Intracranial hemorrhage (CMS/HCC)   • Essential hypertension   • Fall   • UTI (urinary tract infection)   • Post-traumatic headache   • Polypharmacy   • Weakness   • Intractable nausea and vomiting   • Asthma in adult, mild intermittent, uncomplicated   • Hypertensive emergency   • Syncope   • History of hemorrhagic cerebrovascular accident (CVA) with residual deficit     Past Medical History:   Diagnosis Date   • Arthritis    • Bladder incontinence    • Colon polyp    • DEXA     OSTEOPENIA =  (-1.3/ -1.7);  (-1.7/ -1.7)   • GERD    • Headache    • Hypertension    • Intracerebral hemorrhage/ CVA    • MAMMO     NEG =   • Osteopenia    • Vitamin D deficiency      Past Surgical History:   Procedure Laterality Date   • APPENDECTOMY     • BREAST CYST EXCISION     • BREAST LUMPECTOMY Left     NEG   • BUNIONECTOMY Right 2020    Procedure: BUNIONECTOMY DEVONTE;  Surgeon: MARISSA Em DPM;  Location: [facility] OR;  Service: Podiatry;  Laterality: Right;   • CARPAL TUNNEL RELEASE Right    • CHOLECYSTECTOMY     • COLON SURGERY      hemorrhoid banding   • COLONOSCOPY  2017 = TA, rech    Dr. Mackey   • EYE  SURGERY     • HERNIA REPAIR      Umbilical removal   • HYSTERECTOMY  1970   • OTHER SURGICAL HISTORY      bladder stimulator placement     pt cant have mri   • SUBTOTAL HYSTERECTOMY       General Information     Row Name 04/07/21 1135          OT Time and Intention    Document Type  evaluation  -     Mode of Treatment  occupational therapy  -     Row Name 04/07/21 1135          General Information    Patient Profile Reviewed  yes  -     Prior Level of Function  independent:;ADL's;all household mobility  -     Existing Precautions/Restrictions  fall  -     Barriers to Rehab  medically complex  -DR Doran Name 04/07/21 1135          Living Environment    Lives With  alone  -DR Doran Name 04/07/21 1135          Home Main Entrance    Number of Stairs, Main Entrance  none  -     Row Name 04/07/21 1135          Stairs Within Home, Primary    Number of Stairs, Within Home, Primary  none  -     Row Name 04/07/21 1135          Cognition    Orientation Status (Cognition)  oriented x 4  -       User Key  (r) = Recorded By, (t) = Taken By, (c) = Cosigned By    Initials Name Provider Type    Diane Zhang, OT Occupational Therapist          Mobility/ADL's     Row Name 04/07/21 1136          Bed Mobility    Bed Mobility  supine-sit-supine  -     Supine-Sit-Supine [place] (Bed Mobility)  modified independence  -     Row Name 04/07/21 1136          Transfers    Transfers  bed-chair transfer;sit-stand transfer;toilet transfer  -     Bed-Chair [place] (Transfers)  standby assist;modified independence  -     Assistive Device (Bed-Chair Transfers)  walker, front-wheeled  -     Sit-Stand [place] (Transfers)  independent  -     [place] Level (Toilet Transfer)  standby assist;modified independence  -     Assistive Device (Toilet Transfer)  commode, 3-in-1  -DR Doran Name 04/07/21 1136          Toilet Transfer    Type (Toilet Transfer)  -- amb level xfer using rwx  -DR Doran  Name 04/07/21 1136          Activities of Daily Living    BADL Assessment/Intervention  lower body dressing;toileting  -     Row Name 04/07/21 1136          Lower Body Dressing Assessment/Training    [place] Level (Lower Body Dressing)  doff;don;socks;set up  -DR     Position (Lower Body Dressing)  edge of bed sitting  -     Row Name 04/07/21 1136          Toileting Assessment/Training    [place] Level (Toileting)  toileting skills;adjust/manage clothing;perform perineal hygiene;standby assist  -DR     Position (Toileting)  supported sitting  -DR       User Key  (r) = Recorded By, (t) = Taken By, (c) = Cosigned By    Initials Name Provider Type    Diane Zhang, OT Occupational Therapist        Obj/Interventions     Row Name 04/07/21 1137          Sensory Assessment (Somatosensory)    Sensory Assessment (Somatosensory)  UE sensation intact  -     Sensory Subjective Reports  tingling  -     Row Name 04/07/21 1137          Vision Assessment/Intervention    Visual Impairment/Limitations  other (see comments) Pt reports changes in vision since CVA in Oct 2020. Unable to provide detailed info in vision this date  -     Row Name 04/07/21 1137          Range of Motion Comprehensive    General Range of Motion  bilateral upper extremity ROM WFL  -     Row Name 04/07/21 1137          Strength Comprehensive (MMT)    General Manual Muscle Testing (MMT) Assessment  no strength deficits identified  -     Comment, General Manual Muscle Testing (MMT) Assessment  BUE 4-/5  -     Row Name 04/07/21 1137          Balance    Static Sitting Balance  WFL;sitting, edge of bed  -     Dynamic Sitting Balance  WFL;sitting, edge of bed  -DR     Static Standing Balance  WFL;standing;supported  -DR     Dynamic Standing Balance  WFL;supported;standing  -DR     Balance Interventions  occupation based/functional task  -DR       User Key  (r) = Recorded By, (t) = Taken By, (c) = Cosigned By    Initials Name  Provider Type    Diane Zhang, OT Occupational Therapist        Goals/Plan    No documentation.       Clinical Impression     Row Name 04/07/21 1138          Pain Assessment    Additional Documentation  Pain Scale: Numbers Pre/Post-Treatment (Group)  -DR Doran Name 04/07/21 1138          Pain Scale: Numbers Pre/Post-Treatment    Pretreatment Pain Rating  9/10  -     Posttreatment Pain Rating  9/10  -     Pain Location  head  -DR Doran Name 04/07/21 1138          Plan of Care Review    Plan of Care Reviewed With  patient  -     Outcome Summary  75 y/o female came to hospital on 4/6 due to intermittent dizziness, worse headache and report of elevated BP. PMH includes previous cva in Oct 2020,arthritis, osteopenia. Pt reports RLE weakness , mild aphasia and headache residuals from cva. Pt lives alone with her cat.  Pt is I for bed mobility.  Pt is SBA/mod I for functional transfers using rwx.  Pt is awan for socks and SBA for toileting.  No c/o dizziness during tasks although reports headache.  OT recommending home with assist prn and HH services to complete fall/home assessment and monitor BP/nurisng needs.  Pt also continues to be fall risk indicating need for HH.  Evaluation only with no additional services needed while at [facility]. PPE worn gloves, mask, goggles.  -DR Doran Name 04/07/21 1138          Therapy Assessment/Plan (OT)    Therapy Frequency (OT)  evaluation only  -DR Doran Name 04/07/21 1138          Therapy Plan Review/Discharge Plan (OT)    Anticipated Discharge Disposition (OT)  home with home health  -DR Doran Name 04/07/21 1138          Positioning and Restraints    Pre-Treatment Position  in bed  -     Post Treatment Position  bed  -DR     In Bed  supine;call light within reach;encouraged to call for assist;exit alarm on  -       User Key  (r) = Recorded By, (t) = Taken By, (c) = Cosigned By    Initials Name Provider Type    Diane Zhang, OT Occupational Therapist         Outcome Measures     Row Name 04/07/21 1141          How much help from another is currently needed...    Putting on and taking off regular lower body clothing?  3  -DR     Bathing (including washing, rinsing, and drying)  3  -DR     Toileting (which includes using toilet bed pan or urinal)  3  -DR     Putting on and taking off regular upper body clothing  3  -DR     Taking care of personal grooming (such as brushing teeth)  3  -DR     Eating meals  4  -DR     AM-PAC 6 Clicks Score (OT)  19  -DR     Row Name 04/07/21 1141          Functional Assessment    Outcome Measure Options  AM-PAC 6 Clicks Daily Activity (OT)  -DR       User Key  (r) = Recorded By, (t) = Taken By, (c) = Cosigned By    Initials Name Provider Type    Diane Zhang, OT Occupational Therapist        Occupational Therapy Education                 Title: PT OT SLP Therapies (In Progress)     Topic: Occupational Therapy (In Progress)     Point: ADL training (Done)     Description:   Instruct learner(s) on proper safety adaptation and remediation techniques during self care or transfers.   Instruct in proper use of assistive devices.              Learning Progress Summary           Patient Acceptance, E,TB, VU by  at 4/7/2021 1142                   Point: Home exercise program (Done)     Description:   Instruct learner(s) on appropriate technique for monitoring, assisting and/or progressing therapeutic exercises/activities.              Learning Progress Summary           Patient Acceptance, E,TB, VU by  at 4/7/2021 1142                   Point: Precautions (Not Started)     Description:   Instruct learner(s) on prescribed precautions during self-care and functional transfers.              Learner Progress:  Not documented in this visit.          Point: Body mechanics (Done)     Description:   Instruct learner(s) on proper positioning and spine alignment during self-care, functional mobility activities and/or exercises.               Learning Progress Summary           Patient Acceptance, E,TB, VU by  at 4/7/2021 1142                               User Key     Initials Effective Dates Name Provider Type Discipline     08/24/20 -  Diane Bruner OT Occupational Therapist OT              OT Recommendation and Plan  Therapy Frequency (OT): evaluation only  Plan of Care Review  Plan of Care Reviewed With: patient  Outcome Summary: 77 y/o female came to hospital on 4/6 due to intermittent dizziness, worse headache and report of elevated BP. PMH includes previous cva in Oct 2020,arthritis, osteopenia. Pt reports RLE weakness , mild aphasia and headache residuals from cva. Pt lives alone with her cat.  Pt is I for bed mobility.  Pt is SBA/mod I for functional transfers using rwx.  Pt is awan for socks and SBA for toileting.  No c/o dizziness during tasks although reports headache.  OT recommending home with assist prn and HH services to complete fall/home assessment and monitor BP/nurisng needs.  Pt also continues to be fall risk indicating need for HH.  Evaluation only with no additional services needed while at [facility]. PPE worn gloves, mask, goggles.     Time Calculation:   Time Calculation- OT     Row Name 04/07/21 1142             Time Calculation- OT    OT Start Time  1059  -      OT Stop Time  1123  -      OT Time Calculation (min)  24 min  -      OT Received On  04/07/21  -        User Key  (r) = Recorded By, (t) = Taken By, (c) = Cosigned By    Initials Name Provider Type    Diane Zhagn OT Occupational Therapist        Therapy Charges for Today     Code Description Service Date Service Provider Modifiers Qty    [id number]  OT EVAL LOW COMPLEXITY 4 4/7/2021 Diane Bruner OT GO 1               Diane Bruner OT  4/7/2021

## 2021-04-07 NOTE — THERAPY EVALUATION
Patient Name: Katie Reddy  : 1945    MRN: [id number]                              Today's Date: 2021       Admit Date: 2021    Visit Dx:     ICD-10-CM ICD-9-CM   1. Near syncope  R55 780.2   2. Chest pain, unspecified type  R07.9 786.50   3. Secondary hypertension  I15.9 405.99   4. Exertional dyspnea  R06.00 786.09     Patient Active Problem List   Diagnosis   • Arthritis   • Cataract of both eyes   • Dyslipidemia   • Generalized anxiety disorder   • GERD (gastroesophageal reflux disease)   • Loss of height   • Migraine headache   • Osteopenia   • Palpitations   • Vitamin D deficiency   • Postmenopausal   • Primary osteoarthritis of right knee   • Acute pain of right knee   • Primary osteoarthritis of left knee   • Hallux valgus, acquired, bilateral   • Intracranial hemorrhage (CMS/HCC)   • Essential hypertension   • Fall   • UTI (urinary tract infection)   • Post-traumatic headache   • Polypharmacy   • Weakness   • Intractable nausea and vomiting   • Asthma in adult, mild intermittent, uncomplicated   • Hypertensive emergency   • Syncope   • History of hemorrhagic cerebrovascular accident (CVA) with residual deficit     Past Medical History:   Diagnosis Date   • Arthritis    • Bladder incontinence    • Colon polyp    • DEXA     OSTEOPENIA =  (-1.3/ -1.7);  (-1.7/ -1.7)   • GERD    • Headache    • Hypertension    • Intracerebral hemorrhage/ CVA    • MAMMO     NEG =   • Osteopenia    • Vitamin D deficiency      Past Surgical History:   Procedure Laterality Date   • APPENDECTOMY     • BREAST CYST EXCISION     • BREAST LUMPECTOMY Left     NEG   • BUNIONECTOMY Right 2020    Procedure: BUNIONECTOMY DEVONTE;  Surgeon: MARISSA Em DPM;  Location: [facility] OR;  Service: Podiatry;  Laterality: Right;   • CARPAL TUNNEL RELEASE Right    • CHOLECYSTECTOMY     • COLON SURGERY      hemorrhoid banding   • COLONOSCOPY  2017 = TA, rech    Dr. Mackey   • EYE  SURGERY     • HERNIA REPAIR      Umbilical removal   • HYSTERECTOMY  1970   • OTHER SURGICAL HISTORY      bladder stimulator placement     pt cant have mri   • SUBTOTAL HYSTERECTOMY       General Information     Row Name 04/07/21 0946          Physical Therapy Time and Intention    Document Type  evaluation  -CR     Mode of Treatment  physical therapy  -CR     Row Name 04/07/21 0946          General Information    Patient Profile Reviewed  yes  -CR     Prior Level of Function  independent:;all household mobility;community mobility;ADL's;home management  -CR     Barriers to Rehab  medically complex  -CR     Row Name 04/07/21 0946          Living Environment    Lives With  alone  -CR     Row Name 04/07/21 0946          Home Main Entrance    Number of Stairs, Main Entrance  none  -CR     Row Name 04/07/21 0946          Stairs Within Home, Primary    Number of Stairs, Within Home, Primary  none  -CR     Row Name 04/07/21 0946          Cognition    Orientation Status (Cognition)  oriented x 3  -CR       User Key  (r) = Recorded By, (t) = Taken By, (c) = Cosigned By    Initials Name Provider Type    CR Reyes, Carmela, PT Physical Therapist        Mobility     Row Name 04/07/21 0946          Bed Mobility    Bed Mobility  supine-sit-supine  -CR     Supine-Sit-Supine [place] (Bed Mobility)  modified independence  -CR     Row Name 04/07/21 0946          Bed-Chair Transfer    Bed-Chair [place] (Transfers)  modified independence  -CR     Assistive Device (Bed-Chair Transfers)  walker, front-wheeled  -CR     Row Name 04/07/21 0946          Sit-Stand Transfer    Sit-Stand [place] (Transfers)  independent  -CR     Row Name 04/07/21 0946          Gait/Stairs (Locomotion)    [place] Level (Gait)  standby assist  -CR     Assistive Device (Gait)  walker, front-wheeled  -CR     Distance in Feet (Gait)  300  -CR       User Key  (r) = Recorded By, (t) = Taken By, (c) = Cosigned By    Initials Name Provider Type    CR  Reyes, Carmela, EVANS Physical Therapist        Obj/Interventions     Row Name 04/07/21 0947          Range of Motion Comprehensive    General Range of Motion  no range of motion deficits identified  -CR     Row Name 04/07/21 0947          Strength Comprehensive (MMT)    General Manual Muscle Testing (MMT) Assessment  no strength deficits identified  -CR     Row Name 04/07/21 0947          Balance    Balance Assessment  sitting static balance;sitting dynamic balance;standing static balance;standing dynamic balance  -CR     Static Sitting Balance  WNL;sitting, edge of bed  -CR     Dynamic Sitting Balance  WNL;sitting, edge of bed  -CR     Static Standing Balance  WFL  -CR     Dynamic Standing Balance  WFL;supported  -CR     Row Name 04/07/21 0947          Sensory Assessment (Somatosensory)    Sensory Assessment (Somatosensory)  sensation intact  -CR       User Key  (r) = Recorded By, (t) = Taken By, (c) = Cosigned By    Initials Name Provider Type    CR Reyes, Carmela, PT Physical Therapist        Goals/Plan    No documentation.       Clinical Impression     Row Name 04/07/21 0947          Pain    Additional Documentation  Pain Scale: Numbers Pre/Post-Treatment (Group)  -CR     [facility] Name 04/07/21 0947          Pain Scale: Numbers Pre/Post-Treatment    Pretreatment Pain Rating  2/10  -CR     Posttreatment Pain Rating  2/10  -CR     Pre/Posttreatment Pain Comment  chronic headache  -CR     Row Name 04/07/21 0947          Plan of Care Review    Plan of Care Reviewed With  patient  -CR     Outcome Summary  75 y/o female came to hospital on 4/6 due to intermittent dizziness, worse headache and report of elevated BP. PMH includes previous cva in Oct2020,arthritis, osteopenia. Pt reports RLE weakness , mild aphasia and headache residuals from cva. Pt able to independently transer in/out of bed and tolerated ambulation for 200 ft using rw without loss of balance,veering. Pt did report of RLE fatigue and sensation of giving out  by end. Slight dizziness reported at end of gait as well but improved upon sitting. No worsening headache and BP stable. Pt appears to be near her baseline level of function and should be safe to dc home. No further skilled Physical Therapy services indicated. PPE gloves, mask with shield.  -CR     Row Name 04/07/21 0947          Therapy Assessment/Plan (PT)    Patient/Family Therapy Goals Statement (PT)  home  -CR     Row Name 04/07/21 0947          Vital Signs    Post Systolic BP Rehab  133  -CR     Post Treatment Diastolic BP  84  -CR     Row Name 04/07/21 0947          Positioning and Restraints    Pre-Treatment Position  in bed  -CR     Post Treatment Position  bed  -CR     In Bed  notified nsg;supine;call light within reach  -CR       User Key  (r) = Recorded By, (t) = Taken By, (c) = Cosigned By    Initials Name Provider Type    CR Reyes, Carmela, EVANS Physical Therapist        Outcome Measures     Row Name 04/07/21 0952          How much help from another person do you currently need...    Turning from your back to your side while in flat bed without using bedrails?  4  -CR     Moving from lying on back to sitting on the side of a flat bed without bedrails?  4  -CR     Moving to and from a bed to a chair (including a wheelchair)?  4  -CR     Standing up from a chair using your arms (e.g., wheelchair, bedside chair)?  4  -CR     Climbing 3-5 steps with a railing?  3  -CR     To walk in hospital room?  4  -CR     AM-PAC 6 Clicks Score (PT)  23  -CR     Row Name 04/07/21 0952          Modified [place] Scale    Modified [place] Scale  1 - No significant disability despite symptoms.  Able to carry out all usual duties and activities.  -CR     Row Name 04/07/21 0952          Functional Assessment    Outcome Measure Options  AM-PAC 6 Clicks Basic Mobility (PT);Modified [place]  -CR       User Key  (r) = Recorded By, (t) = Taken By, (c) = Cosigned By    Initials Name Provider Type    CR Reyes, Carmela, PT Physical  Therapist        Physical Therapy Education                 Title: PT OT SLP Therapies (Resolved)     Topic: Physical Therapy (Resolved)     Point: Mobility training (Resolved)     Learning Progress Summary           Patient Acceptance, E, VU by CR at 4/7/2021 0952                               User Key     Initials Effective Dates Name Provider Type Discipline    CR 03/01/19 -  Reyes, Carmela, PT Physical Therapist PT              PT Recommendation and Plan     Plan of Care Reviewed With: patient  Outcome Summary: 77 y/o female came to hospital on 4/6 due to intermittent dizziness, worse headache and report of elevated BP. PMH includes previous cva in Oct2020,arthritis, osteopenia. Pt reports RLE weakness , mild aphasia and headache residuals from cva. Pt able to independently transer in/out of bed and tolerated ambulation for 200 ft using rw without loss of balance,veering. Pt did report of RLE fatigue and sensation of giving out by end. Slight dizziness reported at end of gait as well but improved upon sitting. No worsening headache and BP stable. Pt appears to be near her baseline level of function and should be safe to dc home. No further skilled Physical Therapy services indicated. PPE gloves, mask with shield.     Time Calculation:   PT Charges     Row Name 04/07/21 0953             Time Calculation    Start Time  0928  -CR      Stop Time  0947  -CR      Time Calculation (min)  19 min  -CR      PT Received On  04/07/21  -CR        User Key  (r) = Recorded By, (t) = Taken By, (c) = Cosigned By    Initials Name Provider Type    CR Reyes, Carmela, PT Physical Therapist        Therapy Charges for Today     Code Description Service Date Service Provider Modifiers Qty    65096509194 HC PT EVAL LOW COMPLEXITY 3 4/7/2021 Reyes, Carmela, PT GP 1          PT G-Codes  Outcome Measure Options: AM-PAC 6 Clicks Basic Mobility (PT), Modified [place]  AM-PAC 6 Clicks Score (PT): 23  Modified [place] Scale: 1 - No significant  disability despite symptoms.  Able to carry out all usual duties and activities.    Carmela Reyes, PT  4/7/2021

## 2021-04-07 NOTE — PLAN OF CARE
Goal Outcome Evaluation:  Plan of Care Reviewed With: patient  Progress: improving  Outcome Summary: New admission from ED with syncope, hypertension and chest pain for several days. Patient has denied chest pain or shortness of breath throughout shift. Denies headache. Patient to have MRI brain and 2D Echo this am. Will monitor.

## 2021-04-07 NOTE — PLAN OF CARE
Goal Outcome Evaluation:  Plan of Care Reviewed With: patient     Outcome Summary: 77 y/o female came to hospital on 4/6 due to intermittent dizziness, worse headache and report of elevated BP. PMH includes previous cva in Oct 2020,arthritis, osteopenia. Pt reports RLE weakness , mild aphasia and headache residuals from cva. Pt lives alone with her cat.  Pt is I for bed mobility.  Pt is SBA/mod I for functional transfers using rwx.  Pt is awan for socks and SBA for toileting.  No c/o dizziness during tasks although reports headache.  OT recommending home with assist prn and HH services to complete fall/home assessment and monitor BP/nurisng needs.  Pt also continues to be fall risk indicating need for HH.  Evaluation only with no additional services needed while at [facility]. PPE worn gloves, mask, goggles.

## 2021-04-07 NOTE — NURSING NOTE
Spoke with Emiliano Rodriguez PA- patient has bladder stimulator so will be unable to do MRI this am. MRI cancelled per verbal order.

## 2021-04-07 NOTE — DISCHARGE PLACEMENT REQUEST
"Felton Salazar (76 y.o. Female)     Date of Birth Social Security Number Address Home Phone MRN    1945  [id number]  [address]  [place] [address] [phone] [id number]    Yazidi Marital Status          Quaker        Admission Date Admission Type Admitting Provider Attending Provider Department, Room/Bed    4/6/21 Emergency Peace Graham MD Lackey, Diana, MD [facility] OBSERVATION, 103/1    Discharge Date Discharge Disposition Discharge Destination                       Attending Provider: Peace Graham MD    Allergies: Trazodone    Isolation: None   Infection: None   Code Status: CPR    Ht: 152.4 cm (60\")   Wt: 62.1 kg (137 lb)    Admission Cmt: None   Principal Problem: Hypertensive emergency [I16.1]                 Active Insurance as of 4/6/2021     Primary Coverage     Payor Plan Insurance Group Employer/Plan Group    MEDICARE MEDICARE A & B      Payor Plan Address Payor Plan Phone Number Payor Plan Fax Number Effective Dates    [address] [phone]  3/1/2011 - None Entered    [facility] [id number]       Subscriber Name Subscriber Birth Date Member ID       FELTON SALAZAR 1945 [id number]           Secondary Coverage     Payor Plan Insurance Group Employer/Plan Group    [place]      Payor Plan Address Payor Plan Phone Number Payor Plan Fax Number Effective Dates    [address]   7/25/2019 - None Entered    [place] [address]       Subscriber Name Subscriber Birth Date Member ID       FELTON SALAZAR 1945 [id number]                 Emergency Contacts      (Rel.) Home Phone Work Phone Mobile Phone    [employer] (Friend) [phone] -- [phone]    ARTEMELIAS (Daughter) [phone] -- --            Emergency Contact Information     Name Relation Home Work Mobile    [employer] Friend [phone]    ARTEMELIAS Daughter [phone]            Insurance Information            "     MEDICARE/MEDICARE A & B Phone: [phone]    Subscriber: Katie Reddy Subscriber#: [id number]    Group#:  Precert#:         [place] Phone:     Subscriber: Katie Reddy Subscriber#: [id number]    Group#:  Precert#:

## 2021-04-07 NOTE — PROGRESS NOTES
Discharge Planning Assessment   Eliezer     Patient Name: Katie Reddy  MRN: [id number]  Today's Date: 4/7/2021    Admit Date: 4/6/2021    Discharge Needs Assessment     Row Name 04/07/21 1355       Living Environment    Lives With  alone    Current Living Arrangements  home/apartment/condo    Primary Care Provided by  self    Provides Primary Care For  no one    Family Caregiver if Needed  friend(s);child(mindy), adult    Family Caregiver Names  Daughter- Laura    Quality of Family Relationships  unable to assess    Able to Return to Prior Arrangements  yes       Resource/Environmental Concerns    Resource/Environmental Concerns  none    Transportation Concerns  car, none       Transition Planning    Patient/Family Anticipates Transition to  home    Patient/Family Anticipated Services at Transition  none    Transportation Anticipated  family or friend will provide Patient reports that her friend who brought her to the hospital would also be able to provide her transportation at dc.       Discharge Needs Assessment    Readmission Within the Last 30 Days  no previous admission in last 30 days    Equipment Currently Used at Home  walker, rolling Patient reports she has a walk-in shower that has a seat that folds in the shower.    Concerns to be Addressed  no discharge needs identified;denies needs/concerns at this time    Anticipated Changes Related to Illness  none    Equipment Needed After Discharge  none    Provided Post Acute Provider List?  Refused        Discharge Plan     Row Name 04/07/21 9806       Plan    Plan  DC Plan: Anticipate routine home, declined HH at this time.    Patient/Family in Agreement with Plan  yes    Plan Comments  CM met with patient at bedside to discuss dc planning. PCP confirmed (Brooklyn Contreras), preferred pharmacy confirmed ([facility] in [facility]). Reported no trouble affording medications at this time. CM discussed OT recommendations of HH. Patient reported she had HH previously  after a stroke in October 2020. Per chart review, patient went to [facility] and then had [facility]. Reported she did not feel like she needed any HH services at this time. Reported she has a  through [facility] but was unable to remember his name. Patient declined needs for any DME at this time.       Demographic Summary     Row Name 04/07/21 0620       General Information    Admission Type  observation    Reason for Consult  discharge planning    Preferred Language  English     Used During This Interaction  no       Contact Information    Permission Granted to Share Info With          Functional Status     Row Name 04/07/21 0775       Functional Status    Usual Activity Tolerance  moderate    Current Activity Tolerance  moderate       Functional Status, IADL    Medications  assistive equipment    Meal Preparation  assistive equipment    Housekeeping  assistive equipment    Laundry  assistive equipment    Shopping  assistive equipment        Met with patient in room wearing PPE: mask/goggles.      Maintained distance greater than six feet and spent less than 15 minutes in the room.      Ernestina Rios RN     Office Phone: [phone]  Office Cell: [phone]

## 2021-04-08 ENCOUNTER — APPOINTMENT (OUTPATIENT)
Dept: CARDIOLOGY | Facility: HOSPITAL | Age: 76
End: 2021-04-08

## 2021-04-08 LAB
ANION GAP SERPL CALCULATED.3IONS-SCNC: 8 MMOL/L (ref 5–15)
BASOPHILS # BLD AUTO: 0 10*3/MM3 (ref 0–0.2)
BASOPHILS NFR BLD AUTO: 0.8 % (ref 0–1.5)
BH CV XLRA MEAS LEFT BULB PSV: -80.1 CM/SEC
BH CV XLRA MEAS LEFT CCA RATIO VEL: -103 CM/SEC
BH CV XLRA MEAS LEFT DIST CCA EDV: 16.8 CM/SEC
BH CV XLRA MEAS LEFT DIST CCA PSV: 82 CM/SEC
BH CV XLRA MEAS LEFT DIST ICA EDV: -35.3 CM/SEC
BH CV XLRA MEAS LEFT DIST ICA PSV: -114 CM/SEC
BH CV XLRA MEAS LEFT ICA RATIO VEL: -114 CM/SEC
BH CV XLRA MEAS LEFT ICA/CCA RATIO: 1.1
BH CV XLRA MEAS LEFT PROX CCA EDV: -25.2 CM/SEC
BH CV XLRA MEAS LEFT PROX CCA PSV: -103 CM/SEC
BH CV XLRA MEAS LEFT PROX ECA PSV: 98.8 CM/SEC
BH CV XLRA MEAS LEFT PROX ICA EDV: -29.8 CM/SEC
BH CV XLRA MEAS LEFT PROX ICA PSV: -98.8 CM/SEC
BH CV XLRA MEAS LEFT PROX SCLA PSV: 103 CM/SEC
BH CV XLRA MEAS LEFT VERTEBRAL A PSV: -68.3 CM/SEC
BH CV XLRA MEAS RIGHT BULB PSV: -42.6 CM/SEC
BH CV XLRA MEAS RIGHT CCA RATIO VEL: 92.6 CM/SEC
BH CV XLRA MEAS RIGHT DIST CCA EDV: 17.4 CM/SEC
BH CV XLRA MEAS RIGHT DIST CCA PSV: 86.4 CM/SEC
BH CV XLRA MEAS RIGHT DIST ICA EDV: -31.7 CM/SEC
BH CV XLRA MEAS RIGHT DIST ICA PSV: -107 CM/SEC
BH CV XLRA MEAS RIGHT ICA RATIO VEL: -107 CM/SEC
BH CV XLRA MEAS RIGHT ICA/CCA RATIO: -1.2
BH CV XLRA MEAS RIGHT PROX CCA EDV: 18 CM/SEC
BH CV XLRA MEAS RIGHT PROX CCA PSV: 92.6 CM/SEC
BH CV XLRA MEAS RIGHT PROX ECA PSV: -88.2 CM/SEC
BH CV XLRA MEAS RIGHT PROX ICA EDV: -23 CM/SEC
BH CV XLRA MEAS RIGHT PROX ICA PSV: -104 CM/SEC
BH CV XLRA MEAS RIGHT PROX SCLA PSV: 109 CM/SEC
BH CV XLRA MEAS RIGHT VERTEBRAL A PSV: 65.9 CM/SEC
BUN SERPL-MCNC: 21 MG/DL (ref 8–23)
BUN/CREAT SERPL: 20 (ref 7–25)
CALCIUM SPEC-SCNC: 9.4 MG/DL (ref 8.6–10.5)
CHLORIDE SERPL-SCNC: 102 MMOL/L (ref 98–107)
CO2 SERPL-SCNC: 27 MMOL/L (ref 22–29)
CREAT SERPL-MCNC: 1.05 MG/DL (ref 0.57–1)
DEPRECATED RDW RBC AUTO: 42 FL (ref 37–54)
EOSINOPHIL # BLD AUTO: 0.2 10*3/MM3 (ref 0–0.4)
EOSINOPHIL NFR BLD AUTO: 2.7 % (ref 0.3–6.2)
ERYTHROCYTE [DISTWIDTH] IN BLOOD BY AUTOMATED COUNT: 14.1 % (ref 12.3–15.4)
GFR SERPL CREATININE-BSD FRML MDRD: 51 ML/MIN/1.73
GLUCOSE SERPL-MCNC: 98 MG/DL (ref 65–99)
HCT VFR BLD AUTO: 36 % (ref 34–46.6)
HGB BLD-MCNC: 12.3 G/DL (ref 12–15.9)
LEFT ARM BP: NORMAL MMHG
LYMPHOCYTES # BLD AUTO: 2.2 10*3/MM3 (ref 0.7–3.1)
LYMPHOCYTES NFR BLD AUTO: 36.8 % (ref 19.6–45.3)
MAGNESIUM SERPL-MCNC: 2 MG/DL (ref 1.6–2.4)
MCH RBC QN AUTO: 28.9 PG (ref 26.6–33)
MCHC RBC AUTO-ENTMCNC: 34.1 G/DL (ref 31.5–35.7)
MCV RBC AUTO: 84.7 FL (ref 79–97)
MONOCYTES # BLD AUTO: 0.6 10*3/MM3 (ref 0.1–0.9)
MONOCYTES NFR BLD AUTO: 9.6 % (ref 5–12)
NEUTROPHILS NFR BLD AUTO: 3 10*3/MM3 (ref 1.7–7)
NEUTROPHILS NFR BLD AUTO: 50.1 % (ref 42.7–76)
NRBC BLD AUTO-RTO: 0.1 /100 WBC (ref 0–0.2)
PLATELET # BLD AUTO: 249 10*3/MM3 (ref 140–450)
PMV BLD AUTO: 6.2 FL (ref 6–12)
POTASSIUM SERPL-SCNC: 4.5 MMOL/L (ref 3.5–5.2)
QT INTERVAL: 386 MS
RBC # BLD AUTO: 4.24 10*6/MM3 (ref 3.77–5.28)
SODIUM SERPL-SCNC: 137 MMOL/L (ref 136–145)
WBC # BLD AUTO: 6 10*3/MM3 (ref 3.4–10.8)

## 2021-04-08 PROCEDURE — G0378 HOSPITAL OBSERVATION PER HR: HCPCS

## 2021-04-08 PROCEDURE — 99225 PR SBSQ OBSERVATION CARE/DAY 25 MINUTES: CPT | Performed by: INTERNAL MEDICINE

## 2021-04-08 PROCEDURE — 85025 COMPLETE CBC W/AUTO DIFF WBC: CPT | Performed by: PHYSICIAN ASSISTANT

## 2021-04-08 PROCEDURE — 80048 BASIC METABOLIC PNL TOTAL CA: CPT | Performed by: PHYSICIAN ASSISTANT

## 2021-04-08 PROCEDURE — 83735 ASSAY OF MAGNESIUM: CPT | Performed by: PHYSICIAN ASSISTANT

## 2021-04-08 PROCEDURE — 93880 EXTRACRANIAL BILAT STUDY: CPT

## 2021-04-08 PROCEDURE — 99215 OFFICE O/P EST HI 40 MIN: CPT | Performed by: INTERNAL MEDICINE

## 2021-04-08 RX ORDER — SODIUM CHLORIDE 9 MG/ML
70 INJECTION, SOLUTION INTRAVENOUS CONTINUOUS
Status: DISCONTINUED | OUTPATIENT
Start: 2021-04-08 | End: 2021-04-10

## 2021-04-08 RX ORDER — CYCLOBENZAPRINE HCL 10 MG
10 TABLET ORAL DAILY PRN
OUTPATIENT
Start: 2021-04-08

## 2021-04-08 RX ORDER — LISINOPRIL 5 MG/1
5 TABLET ORAL
Status: DISCONTINUED | OUTPATIENT
Start: 2021-04-09 | End: 2021-04-11 | Stop reason: HOSPADM

## 2021-04-08 RX ADMIN — SODIUM CHLORIDE 70 ML/HR: 9 INJECTION, SOLUTION INTRAVENOUS at 14:20

## 2021-04-08 RX ADMIN — ATORVASTATIN CALCIUM 80 MG: 40 TABLET, FILM COATED ORAL at 08:42

## 2021-04-08 RX ADMIN — SERTRALINE HYDROCHLORIDE 100 MG: 100 TABLET ORAL at 08:43

## 2021-04-08 RX ADMIN — LISINOPRIL 10 MG: 5 TABLET ORAL at 08:42

## 2021-04-08 RX ADMIN — AMITRIPTYLINE HYDROCHLORIDE 25 MG: 25 TABLET, FILM COATED ORAL at 20:12

## 2021-04-08 RX ADMIN — ACETAMINOPHEN 650 MG: 325 TABLET, FILM COATED ORAL at 20:11

## 2021-04-08 RX ADMIN — Medication 10 ML: at 08:43

## 2021-04-08 RX ADMIN — PANTOPRAZOLE SODIUM 40 MG: 40 TABLET, DELAYED RELEASE ORAL at 08:42

## 2021-04-08 RX ADMIN — Medication 10 ML: at 20:10

## 2021-04-08 NOTE — PLAN OF CARE
Problem: Adult Inpatient Plan of Care  Goal: Plan of Care Review  Outcome: Ongoing, Progressing  Flowsheets (Taken 4/8/2021 1507)  Progress: improving  Outcome Summary: Pt. had set of positive orthostatic blood pressures today. BP at rest is stable. Fall precautions in place and maintained. Will monitor closely.  Goal: Patient-Specific Goal (Individualized)  Outcome: Ongoing, Progressing  Goal: Absence of Hospital-Acquired Illness or Injury  Outcome: Ongoing, Progressing  Intervention: Identify and Manage Fall Risk  Recent Flowsheet Documentation  Taken 4/8/2021 1500 by Jennifer Meade RN  Safety Promotion/Fall Prevention:   assistive device/personal items within reach   clutter free environment maintained   safety round/check completed   nonskid shoes/slippers when out of bed   fall prevention program maintained   activity supervised  Taken 4/8/2021 1315 by Jennifer Meade RN  Safety Promotion/Fall Prevention:   assistive device/personal items within reach   clutter free environment maintained   safety round/check completed   nonskid shoes/slippers when out of bed   fall prevention program maintained  Taken 4/8/2021 1100 by Jennifer Meade RN  Safety Promotion/Fall Prevention:   assistive device/personal items within reach   clutter free environment maintained   safety round/check completed   nonskid shoes/slippers when out of bed  Taken 4/8/2021 0900 by Jennifer Meade RN  Safety Promotion/Fall Prevention:   assistive device/personal items within reach   clutter free environment maintained   safety round/check completed   fall prevention program maintained  Taken 4/8/2021 0715 by Jennifer Meade RN  Safety Promotion/Fall Prevention:   assistive device/personal items within reach   clutter free environment maintained   safety round/check completed   nonskid shoes/slippers when out of bed  Intervention: Prevent Skin Injury  Recent Flowsheet Documentation  Taken 4/8/2021 1500 by Jennifer Meade  [facility]  Body Position:   position maintained   position changed independently   supine  Taken 4/8/2021 1100 by Jennifer Meade RN  Body Position: supine  Taken 4/8/2021 0715 by Jennifer Meade RN  Body Position: supine  Goal: Optimal Comfort and Wellbeing  Outcome: Ongoing, Progressing  Goal: Readiness for Transition of Care  Outcome: Ongoing, Progressing     Problem: Syncope  Goal: Absence of Syncopal Symptoms  Outcome: Ongoing, Progressing     Problem: Hypertension Acute  Goal: Blood Pressure Within Desired Range  Outcome: Ongoing, Progressing     Problem: Fall Injury Risk  Goal: Absence of Fall and Fall-Related Injury  Outcome: Ongoing, Progressing  Intervention: Promote Injury-Free Environment  Recent Flowsheet Documentation  Taken 4/8/2021 1500 by Jennifer Meade RN  Safety Promotion/Fall Prevention:   assistive device/personal items within reach   clutter free environment maintained   safety round/check completed   nonskid shoes/slippers when out of bed   fall prevention program maintained   activity supervised  Taken 4/8/2021 1315 by Jennifer Meade RN  Safety Promotion/Fall Prevention:   assistive device/personal items within reach   clutter free environment maintained   safety round/check completed   nonskid shoes/slippers when out of bed   fall prevention program maintained  Taken 4/8/2021 1100 by Jennifer Meade RN  Safety Promotion/Fall Prevention:   assistive device/personal items within reach   clutter free environment maintained   safety round/check completed   nonskid shoes/slippers when out of bed  Taken 4/8/2021 0900 by Jennifer Meade RN  Safety Promotion/Fall Prevention:   assistive device/personal items within reach   clutter free environment maintained   safety round/check completed   fall prevention program maintained  Taken 4/8/2021 0715 by Jennifer Meade RN  Safety Promotion/Fall Prevention:   assistive device/personal items within reach   clutter free environment  maintained   safety round/check completed   nonskid shoes/slippers when out of bed   Goal Outcome Evaluation:     Progress: improving  Outcome Summary: Pt. had set of positive orthostatic blood pressures today. BP at rest is stable. Fall precautions in place and maintained. Will monitor closely.

## 2021-04-08 NOTE — PROGRESS NOTES
Continued Stay Note  GINNY Martins     Patient Name: Katie Reddy  MRN: [id number]  Today's Date: 4/8/2021    Admit Date: 4/6/2021    Discharge Plan     Row Name 04/08/21 1431       Plan    Plan Comments  DC Barriers: Cardiology consulted, IV fluids, carotid duplex ordered. Anticipate dc home once cleared by cardiology.        Phone communication or documentation only - no physical contact with patient or family.    Ernestina Rios RN     Office Phone: [phone]  Office Cell: [phone]

## 2021-04-08 NOTE — PROGRESS NOTES
[facility] Medicine Services Daily Progress Note      Hospitalist Team  LOS 0 days      Patient Care Team:  Brooklyn Contreras DO as PCP - General  Brooklyn Contreras DO as PCP - Family Medicine    Patient Location: 103/1      Subjective   Subjective     Chief Complaint / Subjective  Chief Complaint   Patient presents with   • Dizziness         Brief Synopsis of Hospital Course/HPI  The patient is a 76-year-old female who has history of hypertension has had uncontrolled hypertension for the past 3 to 4 weeks.  She has had some episodes of syncope which are likely related to this issue.  The patient still intermittently has a headache.  The patient otherwise is feeling well and has ruled out for myocardial injury.    Date:   4/8/2021  The patient is feeling better today.  She still has a headache but she says that she has had a headache since she had her stroke several months ago.  She generally uses an ice pack which helps it to resolve.  She did not want to take the Norco last evening for fear that would make her confused.  She reports that this is not unusual for her.      Review of Systems   Constitutional: Negative.   HENT: Negative.    Eyes: Negative.    Cardiovascular: Negative.         The patient's chest pain has resolved and she has not felt like she was going to pass out since admission.   Respiratory: Negative.    Endocrine: Negative.    Hematologic/Lymphatic: Negative.    Skin: Negative.    Musculoskeletal: Negative.    Gastrointestinal: Negative.    Genitourinary: Negative.    Neurological: Negative.         The patient's previous dizziness and feeling of unwell has abated.   Psychiatric/Behavioral: Negative.    Allergic/Immunologic: Negative.    All other systems reviewed and are negative.        Objective   Objective      Vital Signs  Temp:  [98 °F (36.7 °C)-98.2 °F (36.8 °C)] 98 °F (36.7 °C)  Heart Rate:  [] 78  Resp:  [16-17] 17  BP: ()/(53-78) 121/74  Oxygen  "Therapy  SpO2: 96 %  Pulse Oximetry Type: Intermittent  Device (Oxygen Therapy): room air  Flowsheet Rows      First Filed Value   Admission Height  152.4 cm (60\") Documented at 04/06/2021 1233   Admission Weight  64.2 kg (141 lb 8.6 oz) Documented at 04/06/2021 1233        Intake & Output (last 3 days)       04/05 0701 - 04/06 0700 04/06 0701 - 04/07 0700 04/07 0701 - 04/08 0700 04/08 0701 - 04/09 0700    P.O.  240 480 240    Total Intake(mL/kg)  240 (3.8) 480 (7.6) 240 (3.8)    Urine (mL/kg/hr)   100 (0.1)     Total Output   100     Net  +240 +380 +240            Urine Unmeasured Occurrence   3 x 1 x    Stool Unmeasured Occurrence    1 x        Lines, Drains & Airways    Active LDAs     Name:   Placement date:   Placement time:   Site:   Days:    Peripheral IV 04/06/21 1700 Anterior;Distal;Right;Upper Arm   04/06/21    1700    Arm   1            Physical Exam:  Physical Exam  Vitals and nursing note reviewed.   Constitutional:       General: She is not in acute distress.     Appearance: Normal appearance. She is well-developed. She is not ill-appearing, toxic-appearing or diaphoretic.      Comments: The patient is much more conversational today and was very appropriate.  He seemed much more awake and aware.   HENT:      Head: Normocephalic and atraumatic.      Right Ear: Ear canal and external ear normal.      Left Ear: Ear canal and external ear normal.      Nose: Nose normal. No congestion or rhinorrhea.      Mouth/Throat:      Mouth: Mucous membranes are moist.      Pharynx: No oropharyngeal exudate.   Eyes:      General: No scleral icterus.        Right eye: No discharge.         Left eye: No discharge.      Extraocular Movements: Extraocular movements intact.      Conjunctiva/sclera: Conjunctivae normal.      Pupils: Pupils are equal, round, and reactive to light.   Neck:      Thyroid: No thyromegaly.      Vascular: No carotid bruit or JVD.      Trachea: No tracheal deviation.   Cardiovascular:      Rate and " Rhythm: Normal rate and regular rhythm.      Pulses: Normal pulses.      Heart sounds: Normal heart sounds. No murmur heard.   No friction rub. No gallop.    Pulmonary:      Effort: Pulmonary effort is normal. No respiratory distress.      Breath sounds: Normal breath sounds. No stridor. No wheezing, rhonchi or rales.      Comments: Slight decreased breath sounds in the bases bilaterally without wheezing or rhonchi.  Chest:      Chest wall: No tenderness.   Abdominal:      General: Bowel sounds are normal. There is no distension.      Palpations: Abdomen is soft. There is no mass.      Tenderness: There is no abdominal tenderness. There is no guarding or rebound.      Hernia: No hernia is present.   Musculoskeletal:         General: No swelling, tenderness, deformity or signs of injury. Normal range of motion.      Cervical back: Normal range of motion and neck supple. No rigidity. No muscular tenderness.      Right lower leg: No edema.      Left lower leg: No edema.   Lymphadenopathy:      Cervical: No cervical adenopathy.   Skin:     General: Skin is warm and dry.      Coloration: Skin is not jaundiced or pale.      Findings: No bruising, erythema or rash.   Neurological:      General: No focal deficit present.      Mental Status: She is alert and oriented to person, place, and time. Mental status is at baseline.      Cranial Nerves: No cranial nerve deficit.      Sensory: No sensory deficit.      Motor: No weakness or abnormal muscle tone.      Coordination: Coordination normal.   Psychiatric:         Mood and Affect: Mood normal.         Behavior: Behavior normal.         Thought Content: Thought content normal.         Judgment: Judgment normal.       Procedures:    Results Review:     I reviewed the patient's new clinical results.      Lab Results (last 24 hours)     Procedure Component Value Units Date/Time    Basic Metabolic Panel [[id number]]  (Abnormal) Collected: 04/08/21 0438    Specimen: Blood Updated:  04/08/21 0547     Glucose 98 mg/dL      BUN 21 mg/dL      Creatinine 1.05 mg/dL      Sodium 137 mmol/L      Potassium 4.5 mmol/L      Chloride 102 mmol/L      CO2 27.0 mmol/L      Calcium 9.4 mg/dL      eGFR Non African Amer 51 mL/min/1.73      BUN/Creatinine Ratio 20.0     Anion Gap 8.0 mmol/L     Narrative:      GFR Normal >60  Chronic Kidney Disease <60  Kidney Failure <15      Magnesium [[id number]]  (Normal) Collected: 04/08/21 0438    Specimen: Blood Updated: 04/08/21 0547     Magnesium 2.0 mg/dL     CBC & Differential [[id number]]  (Normal) Collected: 04/08/21 0438    Specimen: Blood Updated: 04/08/21 0510    Narrative:      The following orders were created for panel order CBC & Differential.  Procedure                               Abnormality         Status                     ---------                               -----------         ------                     CBC Auto Differential[[id number]]        Normal              Final result                 Please view results for these tests on the individual orders.    CBC Auto Differential [[id number]]  (Normal) Collected: 04/08/21 0438    Specimen: Blood Updated: 04/08/21 0510     WBC 6.00 10*3/mm3      RBC 4.24 10*6/mm3      Hemoglobin 12.3 g/dL      Hematocrit 36.0 %      MCV 84.7 fL      MCH 28.9 pg      MCHC 34.1 g/dL      RDW 14.1 %      RDW-SD 42.0 fl      MPV 6.2 fL      Platelets 249 10*3/mm3      Neutrophil % 50.1 %      Lymphocyte % 36.8 %      Monocyte % 9.6 %      Eosinophil % 2.7 %      Basophil % 0.8 %      Neutrophils, Absolute 3.00 10*3/mm3      Lymphocytes, Absolute 2.20 10*3/mm3      Monocytes, Absolute 0.60 10*3/mm3      Eosinophils, Absolute 0.20 10*3/mm3      Basophils, Absolute 0.00 10*3/mm3      nRBC 0.1 /100 WBC         Hemoglobin A1C   Date Value Ref Range Status   04/07/2021 4.9 3.5 - 5.6 % Final     Results from last 7 days   Lab Units 04/06/21  1443   INR  <0.93*           Lab Results   Component Value Date    LIPASE 14 10/22/2020      Lab Results   Component Value Date    CHOL 197 04/07/2021    TRIG 127 04/07/2021    HDL 57 04/07/2021     (H) 04/07/2021       Lab Results   Lab Value Date/Time    FINALDX  11/19/2019 1505     Polyp, sigmoid, polypectomy:    Tubular adenoma     [facility]          Microbiology Results (last 10 days)     Procedure Component Value - Date/Time    COVID PRE-OP / PRE-PROCEDURE SCREENING ORDER (NO ISOLATION) - Swab, Nasopharynx [[id number]]  (Normal) Collected: 04/07/21 0012    Lab Status: Final result Specimen: Swab from Nasopharynx Updated: 04/07/21 0100    Narrative:      The following orders were created for panel order COVID PRE-OP / PRE-PROCEDURE SCREENING ORDER (NO ISOLATION) - Swab, Nasopharynx.  Procedure                               Abnormality         Status                     ---------                               -----------         ------                     COVID-19,CEPHEID,COR/NINA...[[id number]]  Normal              Final result                 Please view results for these tests on the individual orders.    COVID-19,CEPHEID,COR/NINA/PAD IN-HOUSE(OR EMERGENT/ADD-ON),NP SWAB IN TRANSPORT MEDIA 3-4 HR TAT, RT-PCR - Swab, Nasopharynx [[id number]]  (Normal) Collected: 04/07/21 0012    Lab Status: Final result Specimen: Swab from Nasopharynx Updated: 04/07/21 0100     COVID19 Not Detected    Narrative:      Fact sheet for providers: https://www.fda.gov/media/839237/download     Fact sheet for patients: https://www.fda.gov/media/143772/download          ECG/EMG Results (most recent)     Procedure Component Value Units Date/Time    Adult Transthoracic Echo Complete W/ Cont if Necessary Per Protocol [[id number]] Collected: 04/07/21 0755     Updated: 04/07/21 1729     BSA 1.6 m^2      RVIDd 2.1 cm      IVSd 0.74 cm      IVSs 1.3 cm      LVIDd 4.1 cm      LVIDs 2.5 cm      LVPWd 0.88 cm      BH CV ECHO HIRO - LVPWS 1.4 cm      IVS/LVPW 0.85     FS 39.5 %      EDV(Teich) 73.1 ml      ESV(Teich) 21.6 ml       EF(Teich) 70.5 %      EDV(cubed) 67.6 ml      ESV(cubed) 15.0 ml      EF(cubed) 77.8 %      % IVS thick 69.2 %      % LVPW thick 62.1 %      LV mass(C)d 97.9 grams      LV mass(C)dI 61.6 grams/m^2      LV mass(C)s 101.4 grams      LV mass(C)sI 63.8 grams/m^2      SV(Teich) 51.5 ml      SI(Teich) 32.4 ml/m^2      SV(cubed) 52.6 ml      SI(cubed) 33.1 ml/m^2      Ao root diam 2.9 cm      Ao root area 6.6 cm^2      ACS 1.9 cm      LVOT diam 1.7 cm      LVOT area 2.3 cm^2      EDV(MOD-sp4) 44.3 ml      ESV(MOD-sp4) 16.9 ml      EF(MOD-sp4) 61.9 %      SV(MOD-sp4) 27.4 ml      SI(MOD-sp4) 17.2 ml/m^2      Ao root area (BSA corrected) 1.8     LV [place] Vol (BSA corrected) 27.8 ml/m^2      LV Sys Vol (BSA corrected) 10.6 ml/m^2      MV E max subha 89.6 cm/sec      MV A max subha 100.4 cm/sec      MV E/A 0.89     MV V2 max 116.9 cm/sec      MV max PG 5.5 mmHg      MV V2 mean 71.0 cm/sec      MV mean PG 2.3 mmHg      MV V2 VTI 28.8 cm      MVA(VTI) 2.1 cm^2      MV dec slope 445.9 cm/sec^2      MV dec time 0.2 sec      Ao pk subha 132.3 cm/sec      Ao max PG 7.0 mmHg      Ao max PG (full) 2.4 mmHg      Ao V2 mean 100.7 cm/sec      Ao mean PG 4.4 mmHg      Ao mean PG (full) 1.8 mmHg      Ao V2 VTI 31.3 cm      CARLIE(I,A) 1.9 cm^2      CARLIE(I,D) 1.9 cm^2      CARLIE(V,A) 1.8 cm^2      CARLIE(V,D) 1.8 cm^2      AI max subha 377.1 cm/sec      AI max PG 56.9 mmHg      AI dec slope 238.2 cm/sec^2      AI dec time 1.6 sec      AI P1/2t 463.6 msec      LV V1 max PG 4.6 mmHg      LV V1 mean PG 2.5 mmHg      LV V1 max 107.2 cm/sec      LV V1 mean 76.9 cm/sec      LV V1 VTI 26.6 cm      MR max subha 590.3 cm/sec      MR max .4 mmHg      SV(Ao) 207.2 ml      SI(Ao) 130.4 ml/m^2      SV(LVOT) 60.1 ml      SI(LVOT) 37.8 ml/m^2      PA V2 max 75.8 cm/sec      PA max PG 2.3 mmHg      PA max PG (full) 0.46 mmHg      PA V2 mean 55.3 cm/sec      PA mean PG 1.3 mmHg      PA mean PG (full) 0.28 mmHg      PA V2 VTI 15.1 cm      PA acc time 0.1 sec      RV V1  max PG 1.8 mmHg      RV V1 mean PG 1.1 mmHg      RV V1 max 67.8 cm/sec      RV V1 mean 50.0 cm/sec      RV V1 VTI 15.6 cm      TR max subha 240.5 cm/sec      RVSP(TR) 26.1 mmHg      RAP systole 3.0 mmHg      PA pr(Accel) 33.8 mmHg       CV ECHO HIRO - BZI_BMI 26.8 kilograms/m^2       CV ECHO HIRO - BSA(HAYCOCK) 1.6 m^2       CV ECHO HIRO - BZI_METRIC_WEIGHT 62.1 kg       CV ECHO HIRO - BZI_METRIC_HEIGHT 152.4 cm      Target HR (85%) 123 bpm      Max. Pred. HR (100%) 145 bpm      EF(MOD-bp) 62.0 %      LA dimension(2D) 3.8 cm     Narrative:      Normal LV size and contractility EF of 60 to 65%  Normal RV size  Normal atrial size  Aortic valve appears thickened, leaflets are not well visualized.  Mild   aortic regurgitation seen.  Mitral valve posterior leaflet appears calcified.  Mild mitral   regurgitation seen..    Tricuspid valve appears structurally normal, no significant regurgitation   seen.  No pericardial effusion seen.  Proximal aorta appears normal in size.    ECG 12 Lead [[id number]] Collected: 04/06/21 1254     Updated: 04/08/21 0721     QT Interval 386 ms     Narrative:      HEART RATE= 76  bpm  RR Interval= 792  ms  NM Interval= 169  ms  P Horizontal Axis= 16  deg  P Front Axis= 63  deg  QRSD Interval= 91  ms  QT Interval= 386  ms  QRS Axis= 51  deg  T Wave Axis= 21  deg  - OTHERWISE NORMAL ECG -  Sinus rhythm  Ventricular premature complex  When compared with ECG of 02-Mar-2021 10:44:35,  No significant change  Electronically Signed By: Paco Smith ([facility]) 08-Apr-2021 07:20:16  Date and Time of Study: 2021-04-06 12:54:12          Results for orders placed during the hospital encounter of 04/06/21    Duplex Carotid Ultrasound CAR    Interpretation Summary  · Right internal carotid artery mild stenosis.  · Left internal carotid artery mild stenosis.      Results for orders placed during the hospital encounter of 04/06/21    Adult Transthoracic Echo Complete W/ Cont if Necessary Per  Protocol    Interpretation Summary  Normal LV size and contractility EF of 60 to 65%  Normal RV size  Normal atrial size  Aortic valve appears thickened, leaflets are not well visualized.  Mild aortic regurgitation seen.  Mitral valve posterior leaflet appears calcified.  Mild mitral regurgitation seen..  Tricuspid valve appears structurally normal, no significant regurgitation seen.  No pericardial effusion seen.  Proximal aorta appears normal in size.      CT Head Without Contrast    Result Date: 4/6/2021  1.No acute intracranial abnormality on head CT. 2.Remote left occipital parietal lobe infarct with encephalomalacia. 3.Mild amount of low-density in the periventricular and subcortical white matter consistent with chronic small vessel ischemic change. 4.Brain MRI is more sensitive to evaluate for acute or subacute infarcts and to evaluate for intracranial metastatic disease.  Electronically Signed By-Lor Gonzalez MD On:4/6/2021 6:37 PM This report was finalized on [id number] by  Lor Gonzalez MD.    MRI Brain Without Contrast    Result Date: 4/7/2021   1. No acute intracranial abnormality. No acute infarct. No intracranial mass/mass effect. 2. Sequela of old left occipital hemorrhagic infarct. 3. Mild scattered periventricular and subcortical white matter high T2/FLAIR signal foci, statistically represent chronic small vessel ischemic changes.  Electronically Signed By-Derrick Orozco MD On:4/7/2021 8:18 AM This report was finalized on [id number] by  Derrick Orozco MD.    XR Chest 1 View    Result Date: 4/6/2021  No acute chest findings.  Electronically Signed By-Maddi Bhagat MD On:4/6/2021 2:09 PM This report was finalized on [id number] by  Maddi Bhagat MD.          Xrays, labs reviewed personally by physician.    Medication Review:   I have reviewed the patient's current medication list      Scheduled Meds  amitriptyline, 25 mg, Oral, Nightly  atorvastatin, 80 mg, Oral, Daily  lisinopril, 10 mg, Oral,  Q24H  pantoprazole, 40 mg, Oral, Daily  polyethylene glycol, 17 g, Oral, Daily  sennosides-docusate, 2 tablet, Oral, Nightly  sertraline, 100 mg, Oral, Daily  sodium chloride, 10 mL, Intravenous, Q12H        Meds Infusions  sodium chloride, 70 mL/hr, Last Rate: 70 mL/hr (04/08/21 1420)        Meds PRN  •  acetaminophen **OR** acetaminophen **OR** acetaminophen  •  Calcium Gluconate-NaCl **AND** calcium gluconate **AND** Calcium, Ionized  •  famotidine  •  HYDROcodone-acetaminophen  •  labetalol  •  magnesium sulfate **OR** magnesium sulfate **OR** magnesium sulfate  •  melatonin  •  nitroglycerin  •  ondansetron **OR** ondansetron  •  potassium & sodium phosphates **OR** potassium & sodium phosphates  •  potassium chloride  •  potassium chloride  •  [COMPLETED] Insert peripheral IV **AND** sodium chloride  •  sodium chloride        Assessment/Plan   Assessment/Plan     Active Hospital Problems    Diagnosis  POA   • **Hypertensive emergency [I16.1]  Yes   • Syncope [R55]  Yes   • History of hemorrhagic cerebrovascular accident (CVA) with residual deficit [I69.30]  Not Applicable   • Essential hypertension [I10]  Yes   • Primary osteoarthritis of right knee [M17.11]  Yes   • Primary osteoarthritis of left knee [M17.12]  Yes   • GERD (gastroesophageal reflux disease) [K21.9]  Yes   • Generalized anxiety disorder [F41.1]  Yes   • Dyslipidemia [E78.5]  Yes      Resolved Hospital Problems   No resolved problems to display.       MEDICAL DECISION MAKING COMPLEXITY BY PROBLEM:   1.  Hypertensive emergency  -The patient has been experiencing hypertension systolics greater than 200 for the past 2 to 3 weeks.  The patient had had some associated dizziness, syncope as well as chest pain.  -The patient is CT of the head which was negative for any acute change  -The patient had no ST or T wave changes associated with the hypertension  -Labetalol was utilized and did well for the patient initially intravenously  -She was also  started on lisinopril  -Patient's blood pressure is much improved overall.  She did have a slight bout of hypotension with tachycardia which resolved in under an hour.       -Patient's lisinopril was reduced to 5 mg daily            -The patient's creatinine will be reviewed in the a.m.  It did go up a bit and the patient is receiving some fluids.    2.  Syncope  -The patient reports 3-4 actual syncopal episodes over the preceding month possibly related to the issue she was having with her hypertension.       -Patient does have a history of hemorrhagic CVA in the past but had a negative CT scan this admission  -Carotid ultrasound showed mild stenosis bilaterally  -Ask cardiology to see if the patient would benefit from a Holter or a loop recorder    3.  Hyperlipidemia  -Continue statin therapy    4.  Chronic pain syndrome  -Restart Flexeril and Celebrex when patient is more stable    5.  Gastroesophageal reflux disease  -Continue Protonix    6.  Osteoporosis  -Patient apparently is on outpatient Boniva which is to continue.    VTE Prophylaxis -   Mechanical Order History:      Ordered        04/06/21 2141  Place Sequential Compression Device  Once         04/06/21 2141  Maintain Sequential Compression Device  Continuous                 Pharmalogical Order History:     None            Code Status -   Code Status and Medical Interventions:   Ordered at: 04/06/21 1941     Code Status:    CPR     Medical Interventions (Level of Support Prior to Arrest):    Full   This patient has been examined wearing appropriate Personal Protective Equipment and discussed with hospital infection control department. 04/08/21    Discharge Planning  Likely home  Electronically signed by Peace Graham MD, 04/08/21, 16:20 EDT.  [facility] Hospitalist Team

## 2021-04-08 NOTE — CONSULTS
Cardiology Consult Note    Patient Identification:  Name: Katie Reddy  Age: 76 y.o.  Sex: female  :  1945  MRN: [id number]             Requesting Physician : Dr. Graham    Reason for Consultation / Chief Complaint : Dizziness, syncope    History of Present Illness:      This is a 76-year-old with PMH of    Uncontrolled hypertension  Dyslipidemia  History of hemorrhagic occipital CVA  GERD, osteoporosis, chronic pain  Cholecystectomy, hernia repair    Presented with complaint of recurrent dizziness and near syncope and syncope episodes.  Patient denies any bowel bladder incontinence.  Patient has been noticing very high blood pressures.  Work-up here revealed negative troponin x2.  proBNP is normal at 271.  CMP for 621 was normal.  Hemoglobin A1c for 721 was 4.9.  Lipid profile revealed cholesterol 197 HDL 57  triglycerides 127.  MRI with contrast 2021 reveals no acute intracranial abnormality no acute infarct no intracranial mass or mass-effect.  Old left occipital hemorrhagic infarct  Chronic small vessel ischemic changes    Chest x-ray 2021 reveals no acute chest findings.  EKG done 2021 reviewed by me shows sinus rhythm with a rate of 76 bpm with PVCs.  Echocardiogram 2021 reviewed by me shows EF of 60 to 65% with mild AR and mild MR  Carotid Dopplers 2021 reveal mild bilateral carotid disease      Assessment:      -Recurrent syncope  -Orthostatic hypotension  -Hypertensive emergency  -History of hemorrhagic left occipital CVA  -Mild AR, mild MR      Recommendations / Plan:        Telemetry is revealing sinus rhythm.  Echocardiogram is revealing mild AR and MR normal LV systolic function.  Checked orthostatics and patient is orthostatic.  We will start her on saline for hydration.  Will check a.m. cortisol.  Control blood pressure with lisinopril as tolerated for now.  Give as needed intravenous labetalol.  Reviewed CT head, MRI head and chest x-ray results and  documented in HPI.  Patient's EKG is unremarkable except for PVCs.  Patient says she is been passing out multiple times, but has been here many days and has not passed out yet.  Will monitor rhythm.  We will schedule for implantable loop recorder in a.m.  We will schedule a Lexiscan Cardiolite in a.m.  Thank you very much for letting me participate in the care of this lady  We will follow and consider further evaluation and treatment.               Diagnosis Plan   1. Near syncope     2. Chest pain, unspecified type     3. Secondary hypertension     4. Exertional dyspnea                Past Medical History:  Past Medical History:   Diagnosis Date   • Arthritis    • Bladder incontinence    • Colon polyp    • DEXA     OSTEOPENIA = 2018 (-1.3/ -1.7); 2020 (-1.7/ -1.7)   • GERD    • Headache    • Hypertension    • Intracerebral hemorrhage/ CVA    • MAMMO     NEG =2020   • Osteopenia    • Vitamin D deficiency      Past Surgical History:  Past Surgical History:   Procedure Laterality Date   • APPENDECTOMY     • BREAST CYST EXCISION     • BREAST LUMPECTOMY Left 1973    NEG   • BUNIONECTOMY Right 5/29/2020    Procedure: BUNIONECTOMY DEVONTE;  Surgeon: MARISSA Em DPM;  Location: [facility] OR;  Service: Podiatry;  Laterality: Right;   • CARPAL TUNNEL RELEASE Right 1980   • CHOLECYSTECTOMY     • COLON SURGERY      hemorrhoid banding   • COLONOSCOPY  2017 2017/ 2019 = jessica DESAI 2024   Dr. Mackey   • EYE SURGERY     • HERNIA REPAIR      Umbilical removal   • HYSTERECTOMY  1970   • OTHER SURGICAL HISTORY      bladder stimulator placement     pt cant have mri   • SUBTOTAL HYSTERECTOMY        Allergies:  Allergies   Allergen Reactions   • Trazodone Irritability     Home Meds:  Medications Prior to Admission   Medication Sig Dispense Refill Last Dose   • albuterol (ACCUNEB) 1.25 MG/3ML nebulizer solution Take 3 mL by nebulization Every 6 (Six) Hours As Needed for Wheezing or Shortness of Air for up to 30 doses. 120 each 2     • amitriptyline (ELAVIL) 25 MG tablet Take 1 tablet by mouth every night at bedtime. 90 tablet 0    • atorvastatin (LIPITOR) 80 MG tablet TAKE 1 TABLET BY MOUTH EVERY DAY 90 tablet 0    • Calcium Carbonate-Vit D-Min (Calcium 1200) 0479-8918 MG-UNIT chewable tablet Chew 1 tablet Daily.      • celecoxib (CeleBREX) 100 MG capsule Take 1 capsule by mouth Daily. 90 capsule 1    • cholecalciferol (VITAMIN D3) 25 MCG (1000 UT) tablet Take 1,000 mcg by mouth Daily.      • cyclobenzaprine (FLEXERIL) 10 MG tablet Take 10 mg by mouth Daily As Needed.      • docusate sodium (COLACE) 100 MG capsule Take 100 mg by mouth 2 (Two) Times a Day.      • famotidine (PEPCID) 20 MG tablet Take 1 tablet by mouth 2 (Two) Times a Day As Needed for Indigestion or Heartburn. 180 tablet 1    • hydrocortisone 2.5 % cream Apply 1 application topically to the appropriate area as directed 2 (Two) Times a Day.      • ibandronate (Boniva) 150 MG tablet Take 1 tablet by mouth Every 30 (Thirty) Days. 3 tablet 1    • MYRBETRIQ 50 MG tablet sustained-release 24 hour 24 hr tablet Take 50 mg by mouth Daily.      • ondansetron (ZOFRAN) 4 MG tablet Take 1 tablet by mouth Every 8 (Eight) Hours As Needed for Nausea or Vomiting. 30 tablet 0    • pantoprazole (PROTONIX) 40 MG EC tablet Take 1 tablet by mouth Daily. 90 tablet 1    • polyethylene glycol (MIRALAX) 17 GM/SCOOP powder MIX 17 GRAMS (ONE CAPFUL) AS DIRECTED AND DRINK BY MOUTH DAILY.      • sennosides-docusate (PERICOLACE) 8.6-50 MG per tablet Take 2 tablets by mouth Every Night. 60 tablet 2    • sertraline (ZOLOFT) 100 MG tablet Take 1 tablet by mouth Daily. 90 tablet 1      Current Meds:     Current Facility-Administered Medications:   •  acetaminophen (TYLENOL) tablet 650 mg, 650 mg, Oral, Q4H PRN, 650 mg at 04/07/21 2112 **OR** acetaminophen (TYLENOL) 160 MG/5ML solution 650 mg, 650 mg, Oral, Q4H PRN **OR** acetaminophen (TYLENOL) suppository 650 mg, 650 mg, Rectal, Q4H PRN, Michael Rodriguez,  RACHID  •  amitriptyline (ELAVIL) tablet 25 mg, 25 mg, Oral, Nightly, Michael Rodriguez PA-C, 25 mg at 04/07/21 2112  •  atorvastatin (LIPITOR) tablet 80 mg, 80 mg, Oral, Daily, Michael Rodriguez PA-C, 80 mg at 04/08/21 0842  •  calcium gluconate 1g/50ml 0.675% NaCl IV SOLN, 1 g, Intravenous, PRN **AND** calcium gluconate 2-0.675 GM/100ML NACL IVPB, 2 g, Intravenous, PRN **AND** Calcium, Ionized, , , PRN, Michael Rodriguez PA-C  •  famotidine (PEPCID) tablet 20 mg, 20 mg, Oral, BID PRN, Michael Rodriguez PA-C  •  HYDROcodone-acetaminophen (NORCO) 5-325 MG per tablet 1 tablet, 1 tablet, Oral, Q6H PRN, Peace Graham MD  •  labetalol (NORMODYNE,TRANDATE) injection 10 mg, 10 mg, Intravenous, Q6H PRN, Michael Rodirguez PA-C  •  [START ON 4/9/2021] lisinopril (PRINIVIL,ZESTRIL) tablet 5 mg, 5 mg, Oral, Q24H, Peace Graham MD  •  Magnesium Sulfate 2 gram Bolus, followed by 8 gram infusion (total Mg dose 10 grams)- Mg less than or equal to 1mg/dL, 2 g, Intravenous, PRN **OR** Magnesium Sulfate 2 gram / 50mL Infusion (GIVE X 3 BAGS TO EQUAL 6GM TOTAL DOSE) - Mg 1.1 - 1.5 mg/dl, 2 g, Intravenous, PRN **OR** Magnesium Sulfate 4 gram infusion- Mg 1.6-1.9 mg/dL, 4 g, Intravenous, PRN, Michael Rodriguez PA-C  •  melatonin tablet 5 mg, 5 mg, Oral, Nightly PRN, Michael Rodriguez PA-C  •  nitroglycerin (NITROSTAT) SL tablet 0.4 mg, 0.4 mg, Sublingual, Q5 Min PRN, Michael Rodriguez PA-C  •  ondansetron (ZOFRAN) tablet 4 mg, 4 mg, Oral, Q6H PRN **OR** ondansetron (ZOFRAN) injection 4 mg, 4 mg, Intravenous, Q6H PRN, Michael Rodriguez PA-C  •  pantoprazole (PROTONIX) EC tablet 40 mg, 40 mg, Oral, Daily, Michael Rodriguez PA-C, 40 mg at 04/08/21 0842  •  polyethylene glycol (MIRALAX) packet 17 g, 17 g, Oral, Daily, Michael Rodriguez PA-C  •  potassium & sodium phosphates (PHOS-NAK) 280-160-250 MG packet - for Phosphorus less than 1.25 mg/dL, 2 packet, Oral, Q6H PRN **OR** potassium & sodium phosphates  (PHOS-NAK) 280-160-250 MG packet - for Phosphorus 1.25 - 2.5 mg/dL, 2 packet, Oral, Q6H PRN, Michael Rodriguez PA-C  •  potassium chloride (K-DUR,KLOR-CON) CR tablet 40 mEq, 40 mEq, Oral, PRN, Michael Rodriguez PA-C  •  potassium chloride (KLOR-CON) packet 40 mEq, 40 mEq, Oral, PRN, Michael Rodriguez PA-C  •  sennosides-docusate (PERICOLACE) 8.6-50 MG per tablet 2 tablet, 2 tablet, Oral, Nightly, Michael Rodriguez PA-C, 2 tablet at 04/06/21 2229  •  sertraline (ZOLOFT) tablet 100 mg, 100 mg, Oral, Daily, Michael Rodriguez PA-C, 100 mg at 04/08/21 0843  •  [COMPLETED] Insert peripheral IV, , , Once **AND** sodium chloride 0.9 % flush 10 mL, 10 mL, Intravenous, PRN, Michael Rodriguez PA-C  •  sodium chloride 0.9 % flush 10 mL, 10 mL, Intravenous, Q12H, Michael Rodriguez PA-C, 10 mL at 04/08/21 0843  •  sodium chloride 0.9 % flush 10 mL, 10 mL, Intravenous, PRN, Michael Rodriguez PA-C  •  sodium chloride 0.9 % infusion, 70 mL/hr, Intravenous, Continuous, Master Richard MD, Last Rate: 70 mL/hr at 04/08/21 1834, 70 mL/hr at 04/08/21 1834  Social History:   Social History     Tobacco Use   • Smoking status: Never Smoker   • Smokeless tobacco: Never Used   Substance Use Topics   • Alcohol use: No      Family History:  Family History   Problem Relation Age of Onset   • Heart disease Mother    • Hypertension Mother    • Diabetes Father    • Heart disease Father    • Alcohol abuse Father    • Hypertension Father    • Diabetes Brother    • Heart disease Brother    • Cancer Brother 68        Prostate Cancer   • Hypertension Brother    • Diabetes Maternal Aunt    • Heart disease Maternal Grandmother    • Hypertension Maternal Grandmother    • Heart disease Maternal Grandfather    • Hypertension Maternal Grandfather    • Liver disease Paternal Grandmother    • Hypertension Paternal Grandmother    • Heart disease Paternal Grandfather    • Hypertension Paternal Grandfather    • Dementia Sister      "    Review of Systems : Review of Systems   Cardiovascular: Positive for syncope.   Neurological: Positive for dizziness.   All other systems reviewed and are negative.           Constitutional:  Temp:  [98 °F (36.7 °C)-98.2 °F (36.8 °C)] 98.1 °F (36.7 °C)  Heart Rate:  [] 92  Resp:  [16-17] 16  BP: ()/(53-78) 130/76    Physical Exam   /76 (BP Location: Left arm, Patient Position: Lying)   Pulse 92   Temp 98.1 °F (36.7 °C) (Oral)   Resp 16   Ht 152.4 cm (60\")   Wt 63 kg (138 lb 14.2 oz)   SpO2 95%   Breastfeeding No   BMI 27.13 kg/m²   Physical Exam  General:  Appears in no acute distress  Eyes: Sclera is anicteric,  conjunctiva is clear   HEENT:  No JVD. Thyroid not visibly enlarged. No mucosal pallor or cyanosis  Respiratory: Respirations regular and unlabored at rest.  Bilaterally good breath sounds, with good air entry in all fields. No crackles, rubs or wheezes auscultated  Cardiovascular: S1,S2 Regular rate and rhythm. No murmur, rub or gallop auscultated. No pretibial pitting edema  Gastrointestinal: Abdomen soft, flat, non tender. Bowel sounds present.   Musculoskeletal:  No abnormal movements  Extremities: No digital clubbing or cyanosis  Skin: Color pink. Skin warm and dry to touch. No rashes  No xanthoma  Neuro: Alert and awake, no lateralizing deficits appreciated    Cardiographics  ECG: EKG tracing was  personally reviewed by me  ECG 12 Lead   Final Result   HEART RATE= 76  bpm   RR Interval= 792  ms   CT Interval= 169  ms   P Horizontal Axis= 16  deg   P Front Axis= 63  deg   QRSD Interval= 91  ms   QT Interval= 386  ms   QRS Axis= 51  deg   T Wave Axis= 21  deg   - OTHERWISE NORMAL ECG -   Sinus rhythm   Ventricular premature complex   When compared with ECG of 02-Mar-2021 10:44:35,   No significant change   Electronically Signed By: Paco Smith ([facility]) 08-Apr-2021 07:20:16   Date and Time of Study: 2021-04-06 12:54:12          Telemetry: Sinus rhythm    Echocardiogram: "   Results for orders placed during the hospital encounter of 04/06/21    Adult Transthoracic Echo Complete W/ Cont if Necessary Per Protocol    Interpretation Summary  Normal LV size and contractility EF of 60 to 65%  Normal RV size  Normal atrial size  Aortic valve appears thickened, leaflets are not well visualized.  Mild aortic regurgitation seen.  Mitral valve posterior leaflet appears calcified.  Mild mitral regurgitation seen..  Tricuspid valve appears structurally normal, no significant regurgitation seen.  No pericardial effusion seen.  Proximal aorta appears normal in size.      Imaging  Chest X-ray:   Imaging Results (Last 24 Hours)     ** No results found for the last 24 hours. **          Lab Review: I have reviewed the labs  Results from last 7 days   Lab Units 04/06/21  2150 04/06/21  1443   TROPONIN T ng/mL <0.010 <0.010     Results from last 7 days   Lab Units 04/08/21  0438   MAGNESIUM mg/dL 2.0     Results from last 7 days   Lab Units 04/08/21  0438   SODIUM mmol/L 137   POTASSIUM mmol/L 4.5   BUN mg/dL 21   CREATININE mg/dL 1.05*   CALCIUM mg/dL 9.4     @[id number]@  Results from last 7 days   Lab Units 04/08/21  0438 04/07/21  0909 04/06/21  1443   WBC 10*3/mm3 6.00 5.50 5.60   HEMOGLOBIN g/dL 12.3 11.3* 12.1   HEMATOCRIT % 36.0 33.2* 35.8   PLATELETS 10*3/mm3 249 232 255     Results from last 7 days   Lab Units 04/06/21  1443   INR  <0.93*   APTT seconds 23.1*             Master Richard MD  4/8/2021, 19:16 EDT      EMR Dragon/Transcription:   Dictated utilizing Dragon dictation

## 2021-04-08 NOTE — PLAN OF CARE
Problem: Adult Inpatient Plan of Care  Goal: Plan of Care Review  Outcome: Ongoing, Progressing  Flowsheets (Taken 4/8/2021 0425)  Progress: improving  Plan of Care Reviewed With: patient  Outcome Summary: No complaints of dizziness during this shift, VSS, c/o headache made better with Tylenol, SR on the monitor, falls risk maintained, will monitor  Goal: Patient-Specific Goal (Individualized)  Outcome: Ongoing, Progressing  Goal: Absence of Hospital-Acquired Illness or Injury  Outcome: Ongoing, Progressing  Intervention: Identify and Manage Fall Risk  Recent Flowsheet Documentation  Taken 4/8/2021 0316 by Lucinda Driscoll, RN  Safety Promotion/Fall Prevention:   safety round/check completed   activity supervised   assistive device/personal items within reach   fall prevention program maintained   nonskid shoes/slippers when out of bed   room organization consistent  Taken 4/8/2021 0058 by Lucinda Driscoll, RN  Safety Promotion/Fall Prevention: safety round/check completed  Taken 4/7/2021 2252 by Lucinda Driscoll, RN  Safety Promotion/Fall Prevention:   safety round/check completed   activity supervised   assistive device/personal items within reach   clutter free environment maintained   fall prevention program maintained   lighting adjusted   nonskid shoes/slippers when out of bed   room organization consistent  Taken 4/7/2021 2112 by Lucinda Driscoll, RN  Safety Promotion/Fall Prevention: safety round/check completed  Taken 4/7/2021 1927 by Lucinda Driscoll, RN  Safety Promotion/Fall Prevention:   safety round/check completed   activity supervised   assistive device/personal items within reach   clutter free environment maintained   fall prevention program maintained   lighting adjusted   nonskid shoes/slippers when out of bed   room organization consistent  Intervention: Prevent Skin Injury  Recent Flowsheet Documentation  Taken 4/7/2021 1927 by Lucinda Driscoll, RN  Body Position:   supine   sitting up in bed    position changed independently  Goal: Optimal Comfort and Wellbeing  Outcome: Ongoing, Progressing  Intervention: Provide Person-Centered Care  Recent Flowsheet Documentation  Taken 4/7/2021 1927 by Lucinda Driscoll, RN  Trust Relationship/Rapport:   care explained   choices provided   emotional support provided   empathic listening provided   questions answered   questions encouraged   reassurance provided   thoughts/feelings acknowledged  Goal: Readiness for Transition of Care  Outcome: Ongoing, Progressing     Problem: Syncope  Goal: Absence of Syncopal Symptoms  Outcome: Ongoing, Progressing  Intervention: Manage Effect of Syncopal Symptoms  Recent Flowsheet Documentation  Taken 4/7/2021 1927 by Lucinda Driscoll, RN  Supportive Measures: active listening utilized     Problem: Hypertension Acute  Goal: Blood Pressure Within Desired Range  Outcome: Ongoing, Progressing  Intervention: Normalize Blood Pressure  Recent Flowsheet Documentation  Taken 4/7/2021 1927 by Lucinda Driscoll RN  Sensory Stimulation Regulation:   care clustered   auditory stimulation minimized  Medication Review/Management: medications reviewed     Problem: Fall Injury Risk  Goal: Absence of Fall and Fall-Related Injury  Outcome: Ongoing, Progressing  Intervention: Identify and Manage Contributors to Fall Injury Risk  Recent Flowsheet Documentation  Taken 4/7/2021 1927 by Lucinda Driscoll RN  Medication Review/Management: medications reviewed  Intervention: Promote Injury-Free Environment  Recent Flowsheet Documentation  Taken 4/8/2021 0316 by Lucinda Driscoll, RN  Safety Promotion/Fall Prevention:   safety round/check completed   activity supervised   assistive device/personal items within reach   fall prevention program maintained   nonskid shoes/slippers when out of bed   room organization consistent  Taken 4/8/2021 0058 by Lucinda Driscoll, RN  Safety Promotion/Fall Prevention: safety round/check completed  Taken 4/7/2021 2252 by Lucinda Driscoll  ANN, RN  Safety Promotion/Fall Prevention:   safety round/check completed   activity supervised   assistive device/personal items within reach   clutter free environment maintained   fall prevention program maintained   lighting adjusted   nonskid shoes/slippers when out of bed   room organization consistent  Taken 4/7/2021 2112 by Lucinda Driscoll, RN  Safety Promotion/Fall Prevention: safety round/check completed  Taken 4/7/2021 1927 by Lucinda Driscoll, RN  Safety Promotion/Fall Prevention:   safety round/check completed   activity supervised   assistive device/personal items within reach   clutter free environment maintained   fall prevention program maintained   lighting adjusted   nonskid shoes/slippers when out of bed   room organization consistent   Goal Outcome Evaluation:  Plan of Care Reviewed With: patient  Progress: improving  Outcome Summary: No complaints of dizziness during this shift, VSS, c/o headache made better with Tylenol, SR on the monitor, falls risk maintained, will monitor

## 2021-04-08 NOTE — TELEPHONE ENCOUNTER
Caller: Maureen Katie TERRIE    Relationship: Self    Best call back number:[phone]    Medication needed:   Requested Prescriptions     Pending Prescriptions Disp Refills   • cyclobenzaprine (FLEXERIL) 10 MG tablet       Sig: Take 1 tablet by mouth Daily As Needed.       When do you need the refill by: 4/8/21    What additional details did the patient provide when requesting the medication: PT STATES PHARMACY SUBMITTED REQUEST WITH NO RESPONSE.    Does the patient have less than a 3 day supply:  [x] Yes  [] No    What is the patient's preferred pharmacy:      [facility] [address] [address] - [phone] [facility] [phone]   [phone]

## 2021-04-09 ENCOUNTER — APPOINTMENT (OUTPATIENT)
Dept: NUCLEAR MEDICINE | Facility: HOSPITAL | Age: 76
End: 2021-04-09

## 2021-04-09 ENCOUNTER — APPOINTMENT (OUTPATIENT)
Dept: CARDIOLOGY | Facility: HOSPITAL | Age: 76
End: 2021-04-09

## 2021-04-09 LAB
ANION GAP SERPL CALCULATED.3IONS-SCNC: 6 MMOL/L (ref 5–15)
BASOPHILS # BLD AUTO: 0.1 10*3/MM3 (ref 0–0.2)
BASOPHILS NFR BLD AUTO: 0.8 % (ref 0–1.5)
BH CV NUCLEAR PRIOR STUDY: 3
BH CV REST NUCLEAR ISOTOPE DOSE: 7.6 MCI
BH CV STRESS BP STAGE 1: NORMAL
BH CV STRESS BP STAGE 3: NORMAL
BH CV STRESS COMMENTS STAGE 1: NORMAL
BH CV STRESS COMMENTS STAGE 2: NORMAL
BH CV STRESS DOSE REGADENOSON STAGE 1: 0.4
BH CV STRESS DURATION MIN STAGE 1: 0
BH CV STRESS DURATION MIN STAGE 2: 4
BH CV STRESS DURATION SEC STAGE 1: 10
BH CV STRESS DURATION SEC STAGE 2: 0
BH CV STRESS HR STAGE 1: 89
BH CV STRESS HR STAGE 2: 105
BH CV STRESS HR STAGE 3: 105
BH CV STRESS NUCLEAR ISOTOPE DOSE: 20.7 MCI
BH CV STRESS PROTOCOL 1: NORMAL
BH CV STRESS RECOVERY BP: NORMAL MMHG
BH CV STRESS RECOVERY HR: 93 BPM
BH CV STRESS STAGE 1: 1
BH CV STRESS STAGE 2: 2
BH CV STRESS STAGE 3: 3
BUN SERPL-MCNC: 25 MG/DL (ref 8–23)
BUN/CREAT SERPL: 28.1 (ref 7–25)
CALCIUM SPEC-SCNC: 8.8 MG/DL (ref 8.6–10.5)
CHLORIDE SERPL-SCNC: 102 MMOL/L (ref 98–107)
CO2 SERPL-SCNC: 26 MMOL/L (ref 22–29)
CORTIS SERPL-MCNC: 1.26 MCG/DL
CREAT SERPL-MCNC: 0.89 MG/DL (ref 0.57–1)
DEPRECATED RDW RBC AUTO: 41.6 FL (ref 37–54)
EOSINOPHIL # BLD AUTO: 0.2 10*3/MM3 (ref 0–0.4)
EOSINOPHIL NFR BLD AUTO: 2.4 % (ref 0.3–6.2)
ERYTHROCYTE [DISTWIDTH] IN BLOOD BY AUTOMATED COUNT: 14 % (ref 12.3–15.4)
GFR SERPL CREATININE-BSD FRML MDRD: 62 ML/MIN/1.73
GLUCOSE SERPL-MCNC: 88 MG/DL (ref 65–99)
HCT VFR BLD AUTO: 34 % (ref 34–46.6)
HGB BLD-MCNC: 11.3 G/DL (ref 12–15.9)
LYMPHOCYTES # BLD AUTO: 2.4 10*3/MM3 (ref 0.7–3.1)
LYMPHOCYTES NFR BLD AUTO: 37.6 % (ref 19.6–45.3)
MAGNESIUM SERPL-MCNC: 1.9 MG/DL (ref 1.6–2.4)
MAXIMAL PREDICTED HEART RATE: 145 BPM
MCH RBC QN AUTO: 28.6 PG (ref 26.6–33)
MCHC RBC AUTO-ENTMCNC: 33.3 G/DL (ref 31.5–35.7)
MCV RBC AUTO: 85.8 FL (ref 79–97)
MONOCYTES # BLD AUTO: 0.6 10*3/MM3 (ref 0.1–0.9)
MONOCYTES NFR BLD AUTO: 9.9 % (ref 5–12)
NEUTROPHILS NFR BLD AUTO: 3.2 10*3/MM3 (ref 1.7–7)
NEUTROPHILS NFR BLD AUTO: 49.3 % (ref 42.7–76)
NRBC BLD AUTO-RTO: 0.1 /100 WBC (ref 0–0.2)
PERCENT MAX PREDICTED HR: 72.41 %
PLATELET # BLD AUTO: 229 10*3/MM3 (ref 140–450)
PMV BLD AUTO: 6.4 FL (ref 6–12)
POTASSIUM SERPL-SCNC: 4.7 MMOL/L (ref 3.5–5.2)
RBC # BLD AUTO: 3.96 10*6/MM3 (ref 3.77–5.28)
SODIUM SERPL-SCNC: 134 MMOL/L (ref 136–145)
STRESS BASELINE BP: NORMAL MMHG
STRESS BASELINE HR: 74 BPM
STRESS PERCENT HR: 85 %
STRESS POST PEAK BP: NORMAL MMHG
STRESS POST PEAK HR: 105 BPM
STRESS TARGET HR: 123 BPM
TROPONIN T SERPL-MCNC: <0.01 NG/ML (ref 0–0.03)
WBC # BLD AUTO: 6.5 10*3/MM3 (ref 3.4–10.8)

## 2021-04-09 PROCEDURE — G0378 HOSPITAL OBSERVATION PER HR: HCPCS

## 2021-04-09 PROCEDURE — 33285 INSJ SUBQ CAR RHYTHM MNTR: CPT | Performed by: INTERNAL MEDICINE

## 2021-04-09 PROCEDURE — 25010000002 HEPARIN (PORCINE) PER 1000 UNITS: Performed by: INTERNAL MEDICINE

## 2021-04-09 PROCEDURE — 84484 ASSAY OF TROPONIN QUANT: CPT | Performed by: INTERNAL MEDICINE

## 2021-04-09 PROCEDURE — 96372 THER/PROPH/DIAG INJ SC/IM: CPT

## 2021-04-09 PROCEDURE — 99225 PR SBSQ OBSERVATION CARE/DAY 25 MINUTES: CPT | Performed by: INTERNAL MEDICINE

## 2021-04-09 PROCEDURE — 82533 TOTAL CORTISOL: CPT | Performed by: INTERNAL MEDICINE

## 2021-04-09 PROCEDURE — 0 TECHNETIUM SESTAMIBI: Performed by: INTERNAL MEDICINE

## 2021-04-09 PROCEDURE — 85025 COMPLETE CBC W/AUTO DIFF WBC: CPT | Performed by: PHYSICIAN ASSISTANT

## 2021-04-09 PROCEDURE — 33285 INSJ SUBQ CAR RHYTHM MNTR: CPT

## 2021-04-09 PROCEDURE — C1764 EVENT RECORDER, CARDIAC: HCPCS

## 2021-04-09 PROCEDURE — 78452 HT MUSCLE IMAGE SPECT MULT: CPT | Performed by: INTERNAL MEDICINE

## 2021-04-09 PROCEDURE — A9500 TC99M SESTAMIBI: HCPCS | Performed by: INTERNAL MEDICINE

## 2021-04-09 PROCEDURE — 93017 CV STRESS TEST TRACING ONLY: CPT

## 2021-04-09 PROCEDURE — 99214 OFFICE O/P EST MOD 30 MIN: CPT | Performed by: INTERNAL MEDICINE

## 2021-04-09 PROCEDURE — 93018 CV STRESS TEST I&R ONLY: CPT | Performed by: INTERNAL MEDICINE

## 2021-04-09 PROCEDURE — 83735 ASSAY OF MAGNESIUM: CPT | Performed by: PHYSICIAN ASSISTANT

## 2021-04-09 PROCEDURE — 78452 HT MUSCLE IMAGE SPECT MULT: CPT

## 2021-04-09 PROCEDURE — 25010000002 REGADENOSON 0.4 MG/5ML SOLUTION: Performed by: INTERNAL MEDICINE

## 2021-04-09 PROCEDURE — 80048 BASIC METABOLIC PNL TOTAL CA: CPT | Performed by: PHYSICIAN ASSISTANT

## 2021-04-09 RX ORDER — SODIUM CHLORIDE 0.9 % (FLUSH) 0.9 %
10 SYRINGE (ML) INJECTION AS NEEDED
Status: DISCONTINUED | OUTPATIENT
Start: 2021-04-09 | End: 2021-04-11 | Stop reason: HOSPADM

## 2021-04-09 RX ORDER — SODIUM CHLORIDE 0.9 % (FLUSH) 0.9 %
10 SYRINGE (ML) INJECTION EVERY 12 HOURS SCHEDULED
Status: DISCONTINUED | OUTPATIENT
Start: 2021-04-09 | End: 2021-04-11 | Stop reason: HOSPADM

## 2021-04-09 RX ORDER — LIDOCAINE HYDROCHLORIDE AND EPINEPHRINE BITARTRATE 20; .01 MG/ML; MG/ML
INJECTION, SOLUTION SUBCUTANEOUS
Status: COMPLETED | OUTPATIENT
Start: 2021-04-09 | End: 2021-04-09

## 2021-04-09 RX ORDER — LISINOPRIL 5 MG/1
5 TABLET ORAL
Qty: 30 TABLET | Refills: 0 | Status: CANCELLED | OUTPATIENT
Start: 2021-04-10 | End: 2021-05-10

## 2021-04-09 RX ORDER — HEPARIN SODIUM 5000 [USP'U]/ML
5000 INJECTION, SOLUTION INTRAVENOUS; SUBCUTANEOUS EVERY 12 HOURS SCHEDULED
Status: DISCONTINUED | OUTPATIENT
Start: 2021-04-09 | End: 2021-04-11 | Stop reason: HOSPADM

## 2021-04-09 RX ADMIN — Medication 10 ML: at 09:33

## 2021-04-09 RX ADMIN — LISINOPRIL 5 MG: 5 TABLET ORAL at 09:33

## 2021-04-09 RX ADMIN — SERTRALINE HYDROCHLORIDE 100 MG: 100 TABLET ORAL at 09:33

## 2021-04-09 RX ADMIN — TECHNETIUM TC 99M SESTAMIBI 1 DOSE: 1 INJECTION INTRAVENOUS at 06:50

## 2021-04-09 RX ADMIN — TECHNETIUM TC 99M SESTAMIBI 1 DOSE: 1 INJECTION INTRAVENOUS at 09:00

## 2021-04-09 RX ADMIN — ACETAMINOPHEN 650 MG: 325 TABLET, FILM COATED ORAL at 20:07

## 2021-04-09 RX ADMIN — Medication 10 ML: at 20:07

## 2021-04-09 RX ADMIN — Medication 5 MG: at 20:07

## 2021-04-09 RX ADMIN — ATORVASTATIN CALCIUM 80 MG: 40 TABLET, FILM COATED ORAL at 09:32

## 2021-04-09 RX ADMIN — LIDOCAINE HYDROCHLORIDE,EPINEPHRINE BITARTRATE 20 ML: 20; .01 INJECTION, SOLUTION INFILTRATION; PERINEURAL at 15:41

## 2021-04-09 RX ADMIN — SODIUM CHLORIDE 70 ML/HR: 9 INJECTION, SOLUTION INTRAVENOUS at 09:32

## 2021-04-09 RX ADMIN — PANTOPRAZOLE SODIUM 40 MG: 40 TABLET, DELAYED RELEASE ORAL at 09:32

## 2021-04-09 RX ADMIN — AMITRIPTYLINE HYDROCHLORIDE 25 MG: 25 TABLET, FILM COATED ORAL at 20:06

## 2021-04-09 RX ADMIN — HEPARIN SODIUM 5000 UNITS: 5000 INJECTION INTRAVENOUS; SUBCUTANEOUS at 20:07

## 2021-04-09 RX ADMIN — HYDROCODONE BITARTRATE AND ACETAMINOPHEN 1 TABLET: 5; 325 TABLET ORAL at 23:30

## 2021-04-09 RX ADMIN — REGADENOSON 0.4 MG: 0.08 INJECTION, SOLUTION INTRAVENOUS at 09:00

## 2021-04-09 NOTE — PROGRESS NOTES
Continued Stay Note  GINNY Eliezer     Patient Name: Katie Reddy  MRN: [id number]  Today's Date: 4/9/2021    Admit Date: 4/6/2021    Discharge Plan     Row Name 04/09/21 1548       Plan    Plan  DC Plan: Anticipate routine home, declined HH.    Provided Post Acute Provider List?  Refused    Patient/Family in Agreement with Plan  yes    Plan Comments  CM met with patient at bedside to discuss dc planning. CM inquired if any DME or HH services were needed and patient declined. She advised that she may need transportation at d/c if she were to d/c today since her friend had to go on a retreat. MSW notified, see note for details regarding Medicaid transportation resources. RN notified. DC Barriers: Had stress test performed today. Scheduled to have loop recorder placed today by cardiology. Endocrinology consulted d/t low cortisol level and orthostatic hypotension. Anticipate dc once cleared by cardiology and endocrinology.     Met with patient in room wearing PPE: mask/goggles.      Maintained distance greater than six feet and spent less than 15 minutes in the room.      Ernestina Rios RN     Office Phone: [phone]  Office Cell: [phone]

## 2021-04-09 NOTE — PLAN OF CARE
Problem: Adult Inpatient Plan of Care  Goal: Plan of Care Review  Outcome: Ongoing, Progressing  Flowsheets  Taken 4/9/2021 0124 by Paco Mckinnon, RN  Progress: no change  Plan of Care Reviewed With: patient  Taken 4/8/2021 1507 by Jennifer Meade, RN  Outcome Summary: Pt. had set of positive orthostatic blood pressures today. BP at rest is stable. Fall precautions in place and maintained. Dr iRchard ordered a Myoview and loop recorder insertion. Will monitor closely.   Goal Outcome Evaluation:  Plan of Care Reviewed With: patient  Progress: no change

## 2021-04-09 NOTE — PLAN OF CARE
Goal Outcome Evaluation:  Plan of Care Reviewed With: patient  Progress: improving  Outcome Summary: patient  had a loop recorder placed on left upper chest  to see Endrocrinology in am

## 2021-04-09 NOTE — PROGRESS NOTES
Cardiology Progress Note    Patient Identification:  Name: Katie Reddy  Age: 76 y.o.  Sex: female  :  1945  MRN: [id number]                 Follow Up / Chief Complaint: Dizziness, recurrent syncope, orthostatic hypotension, history of CVA.  Chief Complaint   Patient presents with   • Dizziness       Interval History: Patient ruled out for MI.  Underwent Lexiscan Cardiolite for 921 which is negative for ischemia.       Subjective: Patient seen and examined.  Labs reviewed.  Discussed with RN taking care of patient.      Objective: Hemoglobin is 11.3, BUN/creatinine are 25/0.89         History of Present Illness:       This is a 76-year-old with PMH of     Uncontrolled hypertension  Dyslipidemia  History of hemorrhagic occipital CVA  GERD, osteoporosis, chronic pain  Cholecystectomy, hernia repair     Presented with complaint of recurrent dizziness and near syncope and syncope episodes.  Patient denies any bowel bladder incontinence.  Patient has been noticing very high blood pressures.  Work-up here revealed negative troponin x2.  proBNP is normal at 271.  CMP for 621 was normal.  Hemoglobin A1c for 721 was 4.9.  Lipid profile revealed cholesterol 197 HDL 57  triglycerides 127.  MRI with contrast 2021 reveals no acute intracranial abnormality no acute infarct no intracranial mass or mass-effect.  Old left occipital hemorrhagic infarct  Chronic small vessel ischemic changes     Chest x-ray 2021 reveals no acute chest findings.  EKG done 2021 reviewed by me shows sinus rhythm with a rate of 76 bpm with PVCs.  Echocardiogram 2021 reviewed by me shows EF of 60 to 65% with mild AR and mild MR  Carotid Dopplers 2021 reveal mild bilateral carotid disease        Assessment:       -Recurrent syncope  -Orthostatic hypotension  -Hypertensive emergency  -History of hemorrhagic left occipital CVA  -Mild AR, mild MR        Recommendations / Plan:         Telemetry is revealing sinus  rhythm.  Echocardiogram is revealing mild AR and MR normal LV systolic function.  Checked orthostatics and patient is orthostatic.  Patient's orthostasis improved with IV fluids.  A.m. cortisol was 1.26, is low  Discussed with hospitalist Dr. Graham will consult endocrinology.  Control blood pressure with lisinopril as tolerated for now.  Give as needed intravenous labetalol.  Reviewed CT head, MRI head and chest x-ray results and documented in HPI.  Patient's EKG is unremarkable except for PVCs.  Patient says she is been passing out multiple times, but has been here many days and has not passed out yet.  Patient had Lexiscan Cardiolite today which was negative for ischemia.   Will monitor rhythm.  We will schedule for implantable loop recorder .  Discussed with hospitalist.  Discussed with RN taking care of patient to coordinate care.  We will follow and consider further evaluation and treatment.             Past Medical History:  Past Medical History:   Diagnosis Date   • Arthritis    • Bladder incontinence    • Colon polyp    • DEXA     OSTEOPENIA = 2018 (-1.3/ -1.7); 2020 (-1.7/ -1.7)   • GERD    • Headache    • Hypertension    • Intracerebral hemorrhage/ CVA    • MAMMO     NEG =2020   • Osteopenia    • Vitamin D deficiency      Past Surgical History:  Past Surgical History:   Procedure Laterality Date   • APPENDECTOMY     • BREAST CYST EXCISION     • BREAST LUMPECTOMY Left 1973    NEG   • BUNIONECTOMY Right 5/29/2020    Procedure: BUNIONECTOMY DEVONTE;  Surgeon: MARISSA Em DPM;  Location: [facility] OR;  Service: Podiatry;  Laterality: Right;   • CARPAL TUNNEL RELEASE Right 1980   • CHOLECYSTECTOMY     • COLON SURGERY      hemorrhoid banding   • COLONOSCOPY  2017 2017/ 2019 = jessica DESAI 2024   Dr. Mackey   • EYE SURGERY     • HERNIA REPAIR      Umbilical removal   • HYSTERECTOMY  1970   • OTHER SURGICAL HISTORY      bladder stimulator placement     pt cant have mri   • SUBTOTAL HYSTERECTOMY       "    Social History:   Social History     Tobacco Use   • Smoking status: Never Smoker   • Smokeless tobacco: Never Used   Substance Use Topics   • Alcohol use: No      Family History:  Family History   Problem Relation Age of Onset   • Heart disease Mother    • Hypertension Mother    • Diabetes Father    • Heart disease Father    • Alcohol abuse Father    • Hypertension Father    • Diabetes Brother    • Heart disease Brother    • Cancer Brother 68        Prostate Cancer   • Hypertension Brother    • Diabetes Maternal Aunt    • Heart disease Maternal Grandmother    • Hypertension Maternal Grandmother    • Heart disease Maternal Grandfather    • Hypertension Maternal Grandfather    • Liver disease Paternal Grandmother    • Hypertension Paternal Grandmother    • Heart disease Paternal Grandfather    • Hypertension Paternal Grandfather    • Dementia Sister           Allergies:  Allergies   Allergen Reactions   • Trazodone Irritability     Scheduled Meds:  amitriptyline, 25 mg, Nightly  atorvastatin, 80 mg, Daily  lisinopril, 5 mg, Q24H  pantoprazole, 40 mg, Daily  polyethylene glycol, 17 g, Daily  sennosides-docusate, 2 tablet, Nightly  sertraline, 100 mg, Daily  sodium chloride, 10 mL, Q12H          Review of Systems:   ROS  Review of Systems   Constitution: Negative for chills and fever.   Cardiovascular: Negative for chest pain and palpitations.   Respiratory: Negative for cough and hemoptysis.    Gastrointestinal: Negative for nausea.        Constitutional:  Temp:  [97.5 °F (36.4 °C)-98.2 °F (36.8 °C)] 97.6 °F (36.4 °C)  Heart Rate:  [] 71  Resp:  [16-17] 16  BP: ()/(53-78) 133/78    Physical Exam   /78 (BP Location: Right arm, Patient Position: Sitting)   Pulse 71   Temp 97.6 °F (36.4 °C) (Oral)   Resp 16   Ht 152.4 cm (60\")   Wt 63.2 kg (139 lb 4.6 oz)   SpO2 97%   Breastfeeding No   BMI 27.20 kg/m²   General:  Appears in no acute distress  Eyes: Sclera is anicteric,  conjunctiva is clear "   HEENT:  No JVD. Thyroid not visibly enlarged. No mucosal pallor or cyanosis  Respiratory: Respirations regular and unlabored at rest.  Bilaterally good breath sounds, with good air entry in all fields. No crackles, rubs or wheezes auscultated  Cardiovascular: S1,S2 Regular rate and rhythm. No murmur, rub or gallop auscultated.  . No pretibial pitting edema  Gastrointestinal: Abdomen nondistended, soft  Musculoskeletal:  No abnormal movements  Extremities: No digital clubbing or cyanosis  Skin: Color pink. Skin warm and dry to touch. No rashes  No xanthoma  Neuro: Alert and awake, no lateralizing deficits appreciated    INTAKE AND OUTPUT:    Intake/Output Summary (Last 24 hours) at 4/9/2021 0835  Last data filed at 4/8/2021 2155  Gross per 24 hour   Intake 540 ml   Output 300 ml   Net 240 ml       Cardiographics  Telemetry: Sinus rhythm    ECG:   ECG 12 Lead   Final Result   HEART RATE= 76  bpm   RR Interval= 792  ms   DE Interval= 169  ms   P Horizontal Axis= 16  deg   P Front Axis= 63  deg   QRSD Interval= 91  ms   QT Interval= 386  ms   QRS Axis= 51  deg   T Wave Axis= 21  deg   - OTHERWISE NORMAL ECG -   Sinus rhythm   Ventricular premature complex   When compared with ECG of 02-Mar-2021 10:44:35,   No significant change   Electronically Signed By: Paco Smith ([facility]) 08-Apr-2021 07:20:16   Date and Time of Study: 2021-04-06 12:54:12        I have personally reviewed EKG    Echocardiogram: Results for orders placed during the hospital encounter of 04/06/21    Adult Transthoracic Echo Complete W/ Cont if Necessary Per Protocol    Interpretation Summary  Normal LV size and contractility EF of 60 to 65%  Normal RV size  Normal atrial size  Aortic valve appears thickened, leaflets are not well visualized.  Mild aortic regurgitation seen.  Mitral valve posterior leaflet appears calcified.  Mild mitral regurgitation seen..  Tricuspid valve appears structurally normal, no significant regurgitation seen.  No  "pericardial effusion seen.  Proximal aorta appears normal in size.      Lab Review   I have reviewed the labs  Results from last 7 days   Lab Units 04/09/21  0252 04/06/21  2150 04/06/21  1443   TROPONIN T ng/mL <0.010 <0.010 <0.010     Results from last 7 days   Lab Units 04/09/21  0252   MAGNESIUM mg/dL 1.9     Results from last 7 days   Lab Units 04/09/21  0252   SODIUM mmol/L 134*   POTASSIUM mmol/L 4.7   BUN mg/dL 25*   CREATININE mg/dL 0.89   CALCIUM mg/dL 8.8         Results from last 7 days   Lab Units 04/09/21  0252 04/08/21  0438 04/07/21  0909   WBC 10*3/mm3 6.50 6.00 5.50   HEMOGLOBIN g/dL 11.3* 12.3 11.3*   HEMATOCRIT % 34.0 36.0 33.2*   PLATELETS 10*3/mm3 229 249 232     Results from last 7 days   Lab Units 04/06/21  1443   INR  <0.93*   APTT seconds 23.1*       RADIOLOGY:  Imaging Results (Last 24 Hours)     ** No results found for the last 24 hours. **                )4/9/2021  Master Richard MD      EMR Dragon/Transcription:   \"Dictated utilizing Dragon dictation\".   "

## 2021-04-09 NOTE — PROGRESS NOTES
[facility] Medicine Services Daily Progress Note      Hospitalist Team  LOS 0 days      Patient Care Team:  Brooklyn Contreras DO as PCP - General  Brooklyn Contreras DO as PCP - Family Medicine    Patient Location: 103/1      Subjective   Subjective     Chief Complaint / Subjective  Chief Complaint   Patient presents with   • Dizziness         Brief Synopsis of Hospital Course/HPI  The patient is a 76-year-old female who has history of hypertension has had uncontrolled hypertension for the past 3 to 4 weeks.  She has had some episodes of syncope which are likely related to this issue.  The patient still intermittently has a headache.  The patient otherwise is feeling well and has ruled out for myocardial injury.    Date:   4/8/2021  The patient is feeling better today.  She still has a headache but she says that she has had a headache since she had her stroke several months ago.  She generally uses an ice pack which helps it to resolve.  She did not want to take the Norco last evening for fear that would make her confused.  She reports that this is not unusual for her.    4/9/2021  The patient is doing well overall.  The patient not had any additional episodes of syncope.  The patient is going for a loop recorder today.  The patient did have a low serum cortisol and endocrinology has been consulted.    Review of Systems   Constitutional: Negative.   HENT: Negative.    Eyes: Negative.    Cardiovascular: Negative.         The patient's chest pain has resolved and she has not felt like she was going to pass out since admission.   Respiratory: Negative.    Endocrine: Negative.    Hematologic/Lymphatic: Negative.    Skin: Negative.    Musculoskeletal: Negative.    Gastrointestinal: Negative.    Genitourinary: Negative.    Neurological: Negative.         The patient's previous dizziness and feeling of unwell has abated.  The patient does not feel like she is going to pass out and has not for the past  "24 hours.   Psychiatric/Behavioral: Negative.    Allergic/Immunologic: Negative.    All other systems reviewed and are negative.        Objective   Objective      Vital Signs  Temp:  [97.5 °F (36.4 °C)-98.2 °F (36.8 °C)] 98.1 °F (36.7 °C)  Heart Rate:  [71-99] 99  Resp:  [14-18] 14  BP: (116-159)/(71-89) 148/77  Oxygen Therapy  SpO2: 99 %  Pulse Oximetry Type: Intermittent  Device (Oxygen Therapy): room air  Flowsheet Rows      First Filed Value   Admission Height  152.4 cm (60\") Documented at 04/06/2021 1233   Admission Weight  64.2 kg (141 lb 8.6 oz) Documented at 04/06/2021 1233        Intake & Output (last 3 days)       04/06 0701 - 04/07 0700 04/07 0701 - 04/08 0700 04/08 0701 - 04/09 0700 04/09 0701 - 04/10 0700    P.O. 240 480 540     Total Intake(mL/kg) 240 (3.8) 480 (7.6) 540 (8.5)     Urine (mL/kg/hr)  100 (0.1) 300 (0.2) 500 (0.8)    Total Output  100 300 500    Net +240 +380 +240 -500            Urine Unmeasured Occurrence  3 x 1 x     Stool Unmeasured Occurrence   1 x         Lines, Drains & Airways    Active LDAs     Name:   Placement date:   Placement time:   Site:   Days:    Peripheral IV 04/06/21 1700 Anterior;Distal;Right;Upper Arm   04/06/21    1700    Arm   1            Physical Exam:  Physical Exam  Vitals and nursing note reviewed.   Constitutional:       General: She is not in acute distress.     Appearance: Normal appearance. She is well-developed. She is not ill-appearing, toxic-appearing or diaphoretic.      Comments: The patient is much more conversational today and was very appropriate.  He seemed much more awake and aware.   HENT:      Head: Normocephalic and atraumatic.      Right Ear: Ear canal and external ear normal.      Left Ear: Ear canal and external ear normal.      Nose: Nose normal. No congestion or rhinorrhea.      Mouth/Throat:      Mouth: Mucous membranes are moist.      Pharynx: No oropharyngeal exudate.   Eyes:      General: No scleral icterus.        Right eye: No " discharge.         Left eye: No discharge.      Extraocular Movements: Extraocular movements intact.      Conjunctiva/sclera: Conjunctivae normal.      Pupils: Pupils are equal, round, and reactive to light.   Neck:      Thyroid: No thyromegaly.      Vascular: No carotid bruit or JVD.      Trachea: No tracheal deviation.   Cardiovascular:      Rate and Rhythm: Normal rate and regular rhythm.      Pulses: Normal pulses.      Heart sounds: Normal heart sounds. No murmur heard.   No friction rub. No gallop.       Comments: No murmur, S3 or S4.  Pulmonary:      Effort: Pulmonary effort is normal. No respiratory distress.      Breath sounds: Normal breath sounds. No stridor. No wheezing, rhonchi or rales.      Comments: Slight decreased breath sounds in the bases bilaterally without wheezing or rhonchi.  Chest:      Chest wall: No tenderness.   Abdominal:      General: Bowel sounds are normal. There is no distension.      Palpations: Abdomen is soft. There is no mass.      Tenderness: There is no abdominal tenderness. There is no guarding or rebound.      Hernia: No hernia is present.   Musculoskeletal:         General: No swelling, tenderness, deformity or signs of injury. Normal range of motion.      Cervical back: Normal range of motion and neck supple. No rigidity. No muscular tenderness.      Right lower leg: No edema.      Left lower leg: No edema.   Lymphadenopathy:      Cervical: No cervical adenopathy.   Skin:     General: Skin is warm and dry.      Coloration: Skin is not jaundiced or pale.      Findings: No bruising, erythema or rash.   Neurological:      General: No focal deficit present.      Mental Status: She is alert and oriented to person, place, and time. Mental status is at baseline.      Cranial Nerves: No cranial nerve deficit.      Sensory: No sensory deficit.      Motor: No weakness or abnormal muscle tone.      Coordination: Coordination normal.   Psychiatric:         Mood and Affect: Mood normal.          Behavior: Behavior normal.         Thought Content: Thought content normal.         Judgment: Judgment normal.          Wounds (last 24 hours)      LDA Wound     Row Name 04/09/21 1620             Wound 04/09/21 1545 Left upper sternal Incision    Wound - Properties Group Placement Date: 04/09/21  -CL Placement Time: 1545 -CL Present on Hospital Admission: N  -CL Side: Left  -CL Orientation: upper  -CL Location: sternal  -CL Primary Wound Type: Incision  -CL Number of Staples Placed: 3  -CL    Dressing Appearance  dry;intact;no drainage  -CL      Closure  Staples;Adhesive bandage  -CL      Base  blanchable  -CL      Periwound  intact  -CL      Periwound Temperature  warm  -CL      Periwound Skin Turgor  soft  -CL      Drainage Amount  none  -CL      Dressing Care  dressing applied;gauze;border dressing  -CL      Retired Wound - Properties Group Date first assessed: 04/09/21  -CL Time first assessed: 1545  -CL Present on Hospital Admission: N  -CL Side: Left  -CL Location: sternal  -CL Primary Wound Type: Incision  -CL Number of Staples Placed: 3  -CL      User Key  (r) = Recorded By, (t) = Taken By, (c) = Cosigned By    Initials Name Provider Type    CL Jimena Richard RN Registered Nurse      Procedures:    Results Review:     I reviewed the patient's new clinical results.      Lab Results (last 24 hours)     Procedure Component Value Units Date/Time    Cortisol [[id number]] Collected: 04/09/21 0252    Specimen: Blood Updated: 04/09/21 0504     Cortisol 1.26 mcg/dL     Narrative:      Cortisol Reference Ranges:    Cortisol 6AM - 10AM Range: 6.02-18.40 mcg/dl  Cortisol 4PM - 8PM Range: 2.68-10.50 mcg/dl      Results may be falsely increased if patient taking Biotin.      Basic Metabolic Panel [[id number]]  (Abnormal) Collected: 04/09/21 0252    Specimen: Blood Updated: 04/09/21 0408     Glucose 88 mg/dL      BUN 25 mg/dL      Creatinine 0.89 mg/dL      Sodium 134 mmol/L      Potassium 4.7 mmol/L      Chloride  102 mmol/L      CO2 26.0 mmol/L      Calcium 8.8 mg/dL      eGFR Non African Amer 62 mL/min/1.73      BUN/Creatinine Ratio 28.1     Anion Gap 6.0 mmol/L     Narrative:      GFR Normal >60  Chronic Kidney Disease <60  Kidney Failure <15      Magnesium [[id number]]  (Normal) Collected: 04/09/21 0252    Specimen: Blood Updated: 04/09/21 0408     Magnesium 1.9 mg/dL     Troponin [[id number]]  (Normal) Collected: 04/09/21 0252    Specimen: Blood Updated: 04/09/21 0408     Troponin T <0.010 ng/mL     Narrative:      Troponin T Reference Range:  <= 0.03 ng/mL-   Negative for AMI  >0.03 ng/mL-     Abnormal for myocardial necrosis.  Clinicians would have to utilize clinical acumen, EKG, Troponin and serial changes to determine if it is an Acute Myocardial Infarction or myocardial injury due to an underlying chronic condition.       Results may be falsely decreased if patient taking Biotin.      CBC & Differential [[id number]]  (Abnormal) Collected: 04/09/21 0252    Specimen: Blood Updated: 04/09/21 0329    Narrative:      The following orders were created for panel order CBC & Differential.  Procedure                               Abnormality         Status                     ---------                               -----------         ------                     CBC Auto Differential[[id number]]        Abnormal            Final result                 Please view results for these tests on the individual orders.    CBC Auto Differential [[id number]]  (Abnormal) Collected: 04/09/21 0252    Specimen: Blood Updated: 04/09/21 0329     WBC 6.50 10*3/mm3      RBC 3.96 10*6/mm3      Hemoglobin 11.3 g/dL      Hematocrit 34.0 %      MCV 85.8 fL      MCH 28.6 pg      MCHC 33.3 g/dL      RDW 14.0 %      RDW-SD 41.6 fl      MPV 6.4 fL      Platelets 229 10*3/mm3      Neutrophil % 49.3 %      Lymphocyte % 37.6 %      Monocyte % 9.9 %      Eosinophil % 2.4 %      Basophil % 0.8 %      Neutrophils, Absolute 3.20 10*3/mm3      Lymphocytes,  Absolute 2.40 10*3/mm3      Monocytes, Absolute 0.60 10*3/mm3      Eosinophils, Absolute 0.20 10*3/mm3      Basophils, Absolute 0.10 10*3/mm3      nRBC 0.1 /100 WBC         Hemoglobin A1C   Date Value Ref Range Status   04/07/2021 4.9 3.5 - 5.6 % Final     Results from last 7 days   Lab Units 04/06/21  1443   INR  <0.93*           Lab Results   Component Value Date    LIPASE 14 10/22/2020     Lab Results   Component Value Date    CHOL 197 04/07/2021    TRIG 127 04/07/2021    HDL 57 04/07/2021     (H) 04/07/2021       Lab Results   Lab Value Date/Time    FINALDX  11/19/2019 1505     Polyp, sigmoid, polypectomy:    Tubular adenoma     PEDRO/sms          Microbiology Results (last 10 days)     Procedure Component Value - Date/Time    COVID PRE-OP / PRE-PROCEDURE SCREENING ORDER (NO ISOLATION) - Swab, Nasopharynx [[id number]]  (Normal) Collected: 04/07/21 0012    Lab Status: Final result Specimen: Swab from Nasopharynx Updated: 04/07/21 0100    Narrative:      The following orders were created for panel order COVID PRE-OP / PRE-PROCEDURE SCREENING ORDER (NO ISOLATION) - Swab, Nasopharynx.  Procedure                               Abnormality         Status                     ---------                               -----------         ------                     COVID-19,CEPHEID,COR/NINA...[[id number]]  Normal              Final result                 Please view results for these tests on the individual orders.    COVID-19,CEPHEID,COR/NINA/PAD IN-HOUSE(OR EMERGENT/ADD-ON),NP SWAB IN TRANSPORT MEDIA 3-4 HR TAT, RT-PCR - Swab, Nasopharynx [[id number]]  (Normal) Collected: 04/07/21 0012    Lab Status: Final result Specimen: Swab from Nasopharynx Updated: 04/07/21 0100     COVID19 Not Detected    Narrative:      Fact sheet for providers: https://www.fda.gov/media/042470/download     Fact sheet for patients: https://www.fda.gov/media/437055/download          ECG/EMG Results (most recent)     Procedure Component Value Units  Date/Time    Adult Transthoracic Echo Complete W/ Cont if Necessary Per Protocol [[id number]] Collected: 04/07/21 0755     Updated: 04/07/21 1729     BSA 1.6 m^2      RVIDd 2.1 cm      IVSd 0.74 cm      IVSs 1.3 cm      LVIDd 4.1 cm      LVIDs 2.5 cm      LVPWd 0.88 cm      BH CV ECHO HIRO - LVPWS 1.4 cm      IVS/LVPW 0.85     FS 39.5 %      EDV(Teich) 73.1 ml      ESV(Teich) 21.6 ml      EF(Teich) 70.5 %      EDV(cubed) 67.6 ml      ESV(cubed) 15.0 ml      EF(cubed) 77.8 %      % IVS thick 69.2 %      % LVPW thick 62.1 %      LV mass(C)d 97.9 grams      LV mass(C)dI 61.6 grams/m^2      LV mass(C)s 101.4 grams      LV mass(C)sI 63.8 grams/m^2      SV(Teich) 51.5 ml      SI(Teich) 32.4 ml/m^2      SV(cubed) 52.6 ml      SI(cubed) 33.1 ml/m^2      Ao root diam 2.9 cm      Ao root area 6.6 cm^2      ACS 1.9 cm      LVOT diam 1.7 cm      LVOT area 2.3 cm^2      EDV(MOD-sp4) 44.3 ml      ESV(MOD-sp4) 16.9 ml      EF(MOD-sp4) 61.9 %      SV(MOD-sp4) 27.4 ml      SI(MOD-sp4) 17.2 ml/m^2      Ao root area (BSA corrected) 1.8     LV [place] Vol (BSA corrected) 27.8 ml/m^2      LV Sys Vol (BSA corrected) 10.6 ml/m^2      MV E max subha 89.6 cm/sec      MV A max subha 100.4 cm/sec      MV E/A 0.89     MV V2 max 116.9 cm/sec      MV max PG 5.5 mmHg      MV V2 mean 71.0 cm/sec      MV mean PG 2.3 mmHg      MV V2 VTI 28.8 cm      MVA(VTI) 2.1 cm^2      MV dec slope 445.9 cm/sec^2      MV dec time 0.2 sec      Ao pk subha 132.3 cm/sec      Ao max PG 7.0 mmHg      Ao max PG (full) 2.4 mmHg      Ao V2 mean 100.7 cm/sec      Ao mean PG 4.4 mmHg      Ao mean PG (full) 1.8 mmHg      Ao V2 VTI 31.3 cm      CARLIE(I,A) 1.9 cm^2      CARLIE(I,D) 1.9 cm^2      CARLIE(V,A) 1.8 cm^2      CARLIE(V,D) 1.8 cm^2      AI max subha 377.1 cm/sec      AI max PG 56.9 mmHg      AI dec slope 238.2 cm/sec^2      AI dec time 1.6 sec      AI P1/2t 463.6 msec      LV V1 max PG 4.6 mmHg      LV V1 mean PG 2.5 mmHg      LV V1 max 107.2 cm/sec      LV V1 mean 76.9 cm/sec      LV V1  VTI 26.6 cm      MR max subha 590.3 cm/sec      MR max .4 mmHg      SV(Ao) 207.2 ml      SI(Ao) 130.4 ml/m^2      SV(LVOT) 60.1 ml      SI(LVOT) 37.8 ml/m^2      PA V2 max 75.8 cm/sec      PA max PG 2.3 mmHg      PA max PG (full) 0.46 mmHg      PA V2 mean 55.3 cm/sec      PA mean PG 1.3 mmHg      PA mean PG (full) 0.28 mmHg      PA V2 VTI 15.1 cm      PA acc time 0.1 sec      RV V1 max PG 1.8 mmHg      RV V1 mean PG 1.1 mmHg      RV V1 max 67.8 cm/sec      RV V1 mean 50.0 cm/sec      RV V1 VTI 15.6 cm      TR max subha 240.5 cm/sec      RVSP(TR) 26.1 mmHg      RAP systole 3.0 mmHg      PA pr(Accel) 33.8 mmHg       CV ECHO HIRO - BZI_BMI 26.8 kilograms/m^2       CV ECHO HIRO - BSA(HAYCOCK) 1.6 m^2       CV ECHO HIRO - BZI_METRIC_WEIGHT 62.1 kg       CV ECHO HIRO - BZI_METRIC_HEIGHT 152.4 cm      Target HR (85%) 123 bpm      Max. Pred. HR (100%) 145 bpm      EF(MOD-bp) 62.0 %      LA dimension(2D) 3.8 cm     Narrative:      Normal LV size and contractility EF of 60 to 65%  Normal RV size  Normal atrial size  Aortic valve appears thickened, leaflets are not well visualized.  Mild   aortic regurgitation seen.  Mitral valve posterior leaflet appears calcified.  Mild mitral   regurgitation seen..    Tricuspid valve appears structurally normal, no significant regurgitation   seen.  No pericardial effusion seen.  Proximal aorta appears normal in size.    ECG 12 Lead [[id number]] Collected: 04/06/21 1254     Updated: 04/08/21 0721     QT Interval 386 ms     Narrative:      HEART RATE= 76  bpm  RR Interval= 792  ms  NJ Interval= 169  ms  P Horizontal Axis= 16  deg  P Front Axis= 63  deg  QRSD Interval= 91  ms  QT Interval= 386  ms  QRS Axis= 51  deg  T Wave Axis= 21  deg  - OTHERWISE NORMAL ECG -  Sinus rhythm  Ventricular premature complex  When compared with ECG of 02-Mar-2021 10:44:35,  No significant change  Electronically Signed By: Paco Smith ([facility]) 08-Apr-2021 07:20:16  Date and Time of Study: 2021-04-06  12:54:12          Results for orders placed during the hospital encounter of 04/06/21    Duplex Carotid Ultrasound CAR    Interpretation Summary  · Right internal carotid artery mild stenosis.  · Left internal carotid artery mild stenosis.      Results for orders placed during the hospital encounter of 04/06/21    Adult Transthoracic Echo Complete W/ Cont if Necessary Per Protocol    Interpretation Summary  Normal LV size and contractility EF of 60 to 65%  Normal RV size  Normal atrial size  Aortic valve appears thickened, leaflets are not well visualized.  Mild aortic regurgitation seen.  Mitral valve posterior leaflet appears calcified.  Mild mitral regurgitation seen..  Tricuspid valve appears structurally normal, no significant regurgitation seen.  No pericardial effusion seen.  Proximal aorta appears normal in size.      CT Head Without Contrast    Result Date: 4/6/2021  1.No acute intracranial abnormality on head CT. 2.Remote left occipital parietal lobe infarct with encephalomalacia. 3.Mild amount of low-density in the periventricular and subcortical white matter consistent with chronic small vessel ischemic change. 4.Brain MRI is more sensitive to evaluate for acute or subacute infarcts and to evaluate for intracranial metastatic disease.  Electronically Signed By-Lor Gonzalez MD On:4/6/2021 6:37 PM This report was finalized on [id number] by  Lor Gonzalez MD.    MRI Brain Without Contrast    Result Date: 4/7/2021   1. No acute intracranial abnormality. No acute infarct. No intracranial mass/mass effect. 2. Sequela of old left occipital hemorrhagic infarct. 3. Mild scattered periventricular and subcortical white matter high T2/FLAIR signal foci, statistically represent chronic small vessel ischemic changes.  Electronically Signed By-Derrick Orozco MD On:4/7/2021 8:18 AM This report was finalized on [id number] by  Derrick Orozco MD.    XR Chest 1 View    Result Date: 4/6/2021  No acute chest findings.   Electronically Signed By-Maddi Bhagat MD On:4/6/2021 2:09 PM This report was finalized on [id number] by  Maddi Bhagat MD.          Xrays, labs reviewed personally by physician.    Medication Review:   I have reviewed the patient's current medication list      Scheduled Meds  amitriptyline, 25 mg, Oral, Nightly  atorvastatin, 80 mg, Oral, Daily  lisinopril, 5 mg, Oral, Q24H  pantoprazole, 40 mg, Oral, Daily  polyethylene glycol, 17 g, Oral, Daily  sennosides-docusate, 2 tablet, Oral, Nightly  sertraline, 100 mg, Oral, Daily  sodium chloride, 10 mL, Intravenous, Q12H        Meds Infusions  sodium chloride, 70 mL/hr, Last Rate: Stopped (04/09/21 1530)        Meds PRN  •  acetaminophen **OR** acetaminophen **OR** acetaminophen  •  Calcium Gluconate-NaCl **AND** calcium gluconate **AND** Calcium, Ionized  •  famotidine  •  HYDROcodone-acetaminophen  •  labetalol  •  magnesium sulfate **OR** magnesium sulfate **OR** magnesium sulfate  •  melatonin  •  nitroglycerin  •  ondansetron **OR** ondansetron  •  potassium & sodium phosphates **OR** potassium & sodium phosphates  •  potassium chloride  •  potassium chloride  •  [COMPLETED] Insert peripheral IV **AND** sodium chloride  •  sodium chloride        Assessment/Plan   Assessment/Plan     Active Hospital Problems    Diagnosis  POA   • **Hypertensive emergency [I16.1]  Yes   • Syncope [R55]  Yes     Priority: High   • Essential hypertension [I10]  Yes     Priority: High   • History of hemorrhagic cerebrovascular accident (CVA) with residual deficit [I69.30]  Not Applicable     Priority: Medium   • GERD (gastroesophageal reflux disease) [K21.9]  Yes     Priority: Medium   • Generalized anxiety disorder [F41.1]  Yes     Priority: Medium   • Dyslipidemia [E78.5]  Yes     Priority: Medium   • Primary osteoarthritis of right knee [M17.11]  Yes   • Primary osteoarthritis of left knee [M17.12]  Yes      Resolved Hospital Problems   No resolved problems to display.        MEDICAL DECISION MAKING COMPLEXITY BY PROBLEM:   1.  Hypertensive emergency  -The patient has been experiencing hypertension systolics greater than 200 for the past 2 to 3 weeks.  The patient had had some associated dizziness, syncope as well as chest pain.  -The patient is CT of the head which was negative for any acute change  -The patient had no ST or T wave changes associated with the hypertension  -Labetalol was utilized and did well for the patient initially intravenously  -She was also started on lisinopril  -Patient's blood pressure is much improved overall.  She did have a slight bout of hypotension with tachycardia which resolved in under an hour.       -Patient's lisinopril was reduced to 5 mg daily            -The patient's creatinine will be reviewed in the a.m.  It did go up a bit and the patient is receiving some fluids.  -The patient has blood pressure which is much improved.    2.  Syncope  -The patient reports 3-4 actual syncopal episodes over the preceding month possibly related to the issue she was having with her hypertension.       -Patient does have a history of hemorrhagic CVA in the past but had a negative CT scan this admission  -Carotid ultrasound showed mild stenosis bilaterally  -Ask cardiology to see if the patient would benefit from a Holter or a loop recorder  -The patient does have a low cortisol level and endocrinology has been requested to see the patient for additional work-up and treatment.    3.  Hyperlipidemia  -Continue statin therapy    4.  Chronic pain syndrome  -Restart Flexeril and Celebrex when patient is more stable    5.  Gastroesophageal reflux disease  -Continue Protonix    6.  Osteoporosis  -Patient apparently is on outpatient Boniva which is to continue.    7.  Low serum cortisol  -The patient serum cortisol was checked randomly and was low.       -We will consult endocrine to do a proper cortisol stim test and for recommendations on additional work-up and  treatment.    Possible discharge after seen by endocrinology tomorrow.  VTE Prophylaxis -   Mechanical Order History:      Ordered        04/06/21 2141  Place Sequential Compression Device  Once         04/06/21 2141  Maintain Sequential Compression Device  Continuous                 Pharmalogical Order History:     None            Code Status -   Code Status and Medical Interventions:   Ordered at: 04/06/21 1941     Code Status:    CPR     Medical Interventions (Level of Support Prior to Arrest):    Full   This patient has been examined wearing appropriate Personal Protective Equipment and discussed with hospital infection control department. 04/09/21    Discharge Planning  Likely home  Electronically signed by Peace Graham MD, 04/09/21, 16:50 EDT.  [facility] Hospitalist Team

## 2021-04-09 NOTE — PROGRESS NOTES
Continued Stay Note  [facility]     Patient Name: Katie Reddy  MRN: [id number]  Today's Date: 4/9/2021    Admit Date: 4/6/2021    Discharge Plan     Row Name 04/09/21 1540       Plan    Plan Comments  ANDIE received notification from  that pt may have transportation assistance needs at d/c. ANDIE attempted to call [facility] to create a transporation request to be placed on willcal. However, per [facility], they cannot place d/c transportation on willcall. If pt's friend or daughter cannot provide transportation at d/c, and pt is ready for d/c between 4:30 PM on Friday, 4/9 and 8:00 AM on Saturday, 4/10, [facility] staff can request non-emergent Medicaid discharge transportation via [employer] by calling [phone]. [facility] call center is open 24/7, and attempts will be made to dispatch a Medicaid transportation provider within 3 hours of the transport request. If [facility] cannot locate a transportation provider within the 3 hour time window, and all other transportation options have been exhausted, please contact [facility] to request a cab voucher or [employer] transport. (Door to door distance from [facility] to pt's home is 18.1 miles). ANDIE provided this information to pt's RN via secure chat as well.        Kacy Callahan, MATTIEW, LSW  PRN   Phone: [phone]

## 2021-04-10 LAB
ANION GAP SERPL CALCULATED.3IONS-SCNC: 8 MMOL/L (ref 5–15)
BASOPHILS # BLD AUTO: 0.1 10*3/MM3 (ref 0–0.2)
BASOPHILS NFR BLD AUTO: 0.9 % (ref 0–1.5)
BUN SERPL-MCNC: 21 MG/DL (ref 8–23)
BUN/CREAT SERPL: 30.9 (ref 7–25)
CALCIUM SPEC-SCNC: 9 MG/DL (ref 8.6–10.5)
CHLORIDE SERPL-SCNC: 106 MMOL/L (ref 98–107)
CO2 SERPL-SCNC: 22 MMOL/L (ref 22–29)
CREAT SERPL-MCNC: 0.68 MG/DL (ref 0.57–1)
DEPRECATED RDW RBC AUTO: 41.6 FL (ref 37–54)
EOSINOPHIL # BLD AUTO: 0.1 10*3/MM3 (ref 0–0.4)
EOSINOPHIL NFR BLD AUTO: 1.3 % (ref 0.3–6.2)
ERYTHROCYTE [DISTWIDTH] IN BLOOD BY AUTOMATED COUNT: 13.9 % (ref 12.3–15.4)
GFR SERPL CREATININE-BSD FRML MDRD: 84 ML/MIN/1.73
GLUCOSE SERPL-MCNC: 97 MG/DL (ref 65–99)
HCT VFR BLD AUTO: 31.2 % (ref 34–46.6)
HGB BLD-MCNC: 10.7 G/DL (ref 12–15.9)
LYMPHOCYTES # BLD AUTO: 1.4 10*3/MM3 (ref 0.7–3.1)
LYMPHOCYTES NFR BLD AUTO: 19.6 % (ref 19.6–45.3)
MAGNESIUM SERPL-MCNC: 1.8 MG/DL (ref 1.6–2.4)
MCH RBC QN AUTO: 29.1 PG (ref 26.6–33)
MCHC RBC AUTO-ENTMCNC: 34.3 G/DL (ref 31.5–35.7)
MCV RBC AUTO: 84.8 FL (ref 79–97)
MONOCYTES # BLD AUTO: 0.6 10*3/MM3 (ref 0.1–0.9)
MONOCYTES NFR BLD AUTO: 7.7 % (ref 5–12)
NEUTROPHILS NFR BLD AUTO: 5.2 10*3/MM3 (ref 1.7–7)
NEUTROPHILS NFR BLD AUTO: 70.5 % (ref 42.7–76)
NRBC BLD AUTO-RTO: 0.1 /100 WBC (ref 0–0.2)
PLATELET # BLD AUTO: 230 10*3/MM3 (ref 140–450)
PMV BLD AUTO: 6.2 FL (ref 6–12)
POTASSIUM SERPL-SCNC: 4.1 MMOL/L (ref 3.5–5.2)
RBC # BLD AUTO: 3.68 10*6/MM3 (ref 3.77–5.28)
SODIUM SERPL-SCNC: 136 MMOL/L (ref 136–145)
WBC # BLD AUTO: 7.3 10*3/MM3 (ref 3.4–10.8)

## 2021-04-10 PROCEDURE — G0378 HOSPITAL OBSERVATION PER HR: HCPCS

## 2021-04-10 PROCEDURE — 80048 BASIC METABOLIC PNL TOTAL CA: CPT | Performed by: PHYSICIAN ASSISTANT

## 2021-04-10 PROCEDURE — 25010000002 HEPARIN (PORCINE) PER 1000 UNITS: Performed by: INTERNAL MEDICINE

## 2021-04-10 PROCEDURE — 96372 THER/PROPH/DIAG INJ SC/IM: CPT

## 2021-04-10 PROCEDURE — 83735 ASSAY OF MAGNESIUM: CPT | Performed by: PHYSICIAN ASSISTANT

## 2021-04-10 PROCEDURE — 99204 OFFICE O/P NEW MOD 45 MIN: CPT | Performed by: INTERNAL MEDICINE

## 2021-04-10 PROCEDURE — 85025 COMPLETE CBC W/AUTO DIFF WBC: CPT | Performed by: PHYSICIAN ASSISTANT

## 2021-04-10 PROCEDURE — 99214 OFFICE O/P EST MOD 30 MIN: CPT | Performed by: INTERNAL MEDICINE

## 2021-04-10 PROCEDURE — 99225 PR SBSQ OBSERVATION CARE/DAY 25 MINUTES: CPT | Performed by: INTERNAL MEDICINE

## 2021-04-10 RX ORDER — COSYNTROPIN 0.25 MG/ML
0.25 INJECTION, POWDER, FOR SOLUTION INTRAMUSCULAR; INTRAVENOUS ONCE
Status: COMPLETED | OUTPATIENT
Start: 2021-04-11 | End: 2021-04-11

## 2021-04-10 RX ADMIN — SERTRALINE HYDROCHLORIDE 100 MG: 100 TABLET ORAL at 08:29

## 2021-04-10 RX ADMIN — Medication 10 ML: at 20:34

## 2021-04-10 RX ADMIN — ATORVASTATIN CALCIUM 80 MG: 40 TABLET, FILM COATED ORAL at 08:29

## 2021-04-10 RX ADMIN — AMITRIPTYLINE HYDROCHLORIDE 25 MG: 25 TABLET, FILM COATED ORAL at 20:35

## 2021-04-10 RX ADMIN — HEPARIN SODIUM 5000 UNITS: 5000 INJECTION INTRAVENOUS; SUBCUTANEOUS at 08:29

## 2021-04-10 RX ADMIN — ACETAMINOPHEN 650 MG: 325 TABLET, FILM COATED ORAL at 08:29

## 2021-04-10 RX ADMIN — LISINOPRIL 5 MG: 5 TABLET ORAL at 08:29

## 2021-04-10 RX ADMIN — SODIUM CHLORIDE 70 ML/HR: 9 INJECTION, SOLUTION INTRAVENOUS at 09:39

## 2021-04-10 RX ADMIN — HEPARIN SODIUM 5000 UNITS: 5000 INJECTION INTRAVENOUS; SUBCUTANEOUS at 20:35

## 2021-04-10 RX ADMIN — PANTOPRAZOLE SODIUM 40 MG: 40 TABLET, DELAYED RELEASE ORAL at 08:29

## 2021-04-10 RX ADMIN — Medication 10 ML: at 08:29

## 2021-04-10 NOTE — PROGRESS NOTES
Cardiology Progress Note    Patient Identification:  Name: Katie Reddy  Age: 76 y.o.  Sex: female  :  1945  MRN: [id number]                 Follow Up / Chief Complaint: Dizziness, recurrent syncope, orthostatic hypotension, history of CVA.  Chief Complaint   Patient presents with   • Dizziness       Interval History: Patient ruled out for MI.  Underwent Lexiscan Cardiolite 21 which is negative for ischemia.       Subjective: Patient seen and examined.  Labs reviewed.  Discussed with RN taking care of patient.      Objective: Hemoglobin is 11.3, BUN/creatinine are 25/0.89         History of Present Illness:       This is a 76-year-old with PMH of     Uncontrolled hypertension  Dyslipidemia  History of hemorrhagic occipital CVA  GERD, osteoporosis, chronic pain  Cholecystectomy, hernia repair     Presented with complaint of recurrent dizziness and near syncope and syncope episodes.  Patient denies any bowel bladder incontinence.  Patient has been noticing very high blood pressures.  Work-up here revealed negative troponin x2.  proBNP is normal at 271.  CMP for 621 was normal.  Hemoglobin A1c for 721 was 4.9.  Lipid profile revealed cholesterol 197 HDL 57  triglycerides 127.  MRI with contrast 2021 reveals no acute intracranial abnormality no acute infarct no intracranial mass or mass-effect.  Old left occipital hemorrhagic infarct  Chronic small vessel ischemic changes     Chest x-ray 2021 reveals no acute chest findings.  EKG done 2021 reviewed by me shows sinus rhythm with a rate of 76 bpm with PVCs.  Echocardiogram 2021 reviewed by me shows EF of 60 to 65% with mild AR and mild MR  Carotid Dopplers 2021 reveal mild bilateral carotid disease        Assessment:       -Recurrent syncope  -Orthostatic hypotension  -Low a.m. cortisol  -Hypertensive emergency  -History of hemorrhagic left occipital CVA  -Mild AR, mild MR        Recommendations / Plan:         Telemetry is  revealing sinus rhythm.  Echocardiogram is revealing mild AR and MR normal LV systolic function.  Checked orthostatics and patient is orthostatic.  Patient's orthostasis improved with IV fluids.  A.m. cortisol was 1.26, is low  Discussed with hospitalist Dr. Graham will consult endocrinology.  Control blood pressure with lisinopril as tolerated for now.  Give as needed intravenous labetalol.  Reviewed CT head, MRI head and chest x-ray results and documented in HPI.  Patient's EKG is unremarkable except for PVCs.  Patient says she is been passing out multiple times, but has been here many days and has not passed out yet.  Patient had Lexiscan Cardiolite today which was negative for ischemia.   Will monitor rhythm.  We will schedule for implantable loop recorder .  Patient underwent [employer] implantable loop recorder 4/9/21  We will follow-up in 1 week in the office for staple removal.  Discussed about wound care, leave Band-Aid on for 1 week.  Patient had IV infiltration and right arm has some ecchymosis we will continue to monitor.  Discussed with hospitalist.  Discussed with RN taking care of patient to coordinate care.  We will follow and consider further evaluation and treatment.     Addendum :   Rechecked orthostatics today blood pressure /heart rate supine was 128/73-64  Standing 128/76 and a heart rate of 67.  Patient is not orthostatic anymore.       Past Medical History:  Past Medical History:   Diagnosis Date   • Arthritis    • Bladder incontinence    • Colon polyp    • DEXA     OSTEOPENIA = 2018 (-1.3/ -1.7); 2020 (-1.7/ -1.7)   • GERD    • Headache    • Hypertension    • Intracerebral hemorrhage/ CVA    • MAMMO     NEG =2020   • Osteopenia    • Vitamin D deficiency      Past Surgical History:  Past Surgical History:   Procedure Laterality Date   • APPENDECTOMY     • BREAST CYST EXCISION     • BREAST LUMPECTOMY Left 1973    NEG   • BUNIONECTOMY Right 5/29/2020    Procedure: AP WHITNEY;  Surgeon:  MARISSA Em DPM;  Location: [facility] OR;  Service: Podiatry;  Laterality: Right;   • CARPAL TUNNEL RELEASE Right 1980   • CHOLECYSTECTOMY     • COLON SURGERY      hemorrhoid banding   • COLONOSCOPY  2017 2017/ 2019 = jessica DESAI 2024   Dr. Mackey   • EYE SURGERY     • HERNIA REPAIR      Umbilical removal   • HYSTERECTOMY  1970   • OTHER SURGICAL HISTORY      bladder stimulator placement     pt cant have mri   • SUBTOTAL HYSTERECTOMY          Social History:   Social History     Tobacco Use   • Smoking status: Never Smoker   • Smokeless tobacco: Never Used   Substance Use Topics   • Alcohol use: No      Family History:  Family History   Problem Relation Age of Onset   • Heart disease Mother    • Hypertension Mother    • Diabetes Father    • Heart disease Father    • Alcohol abuse Father    • Hypertension Father    • Diabetes Brother    • Heart disease Brother    • Cancer Brother 68        Prostate Cancer   • Hypertension Brother    • Diabetes Maternal Aunt    • Heart disease Maternal Grandmother    • Hypertension Maternal Grandmother    • Heart disease Maternal Grandfather    • Hypertension Maternal Grandfather    • Liver disease Paternal Grandmother    • Hypertension Paternal Grandmother    • Heart disease Paternal Grandfather    • Hypertension Paternal Grandfather    • Dementia Sister           Allergies:  Allergies   Allergen Reactions   • Trazodone Irritability     Scheduled Meds:  amitriptyline, 25 mg, Nightly  atorvastatin, 80 mg, Daily  heparin (porcine), 5,000 Units, Q12H  lisinopril, 5 mg, Q24H  pantoprazole, 40 mg, Daily  polyethylene glycol, 17 g, Daily  sennosides-docusate, 2 tablet, Nightly  sertraline, 100 mg, Daily  sodium chloride, 10 mL, Q12H  sodium chloride, 10 mL, Q12H          Review of Systems:   ROS    Constitution: Negative for chills and fever.   Cardiovascular: Negative for chest pain and palpitations.   Respiratory: Negative for cough and hemoptysis.    Gastrointestinal: Negative for  "nausea.        Constitutional:  Temp:  [97.6 °F (36.4 °C)-98.2 °F (36.8 °C)] 97.6 °F (36.4 °C)  Heart Rate:  [67-99] 67  Resp:  [14-18] 16  BP: (115-159)/(62-89) 115/70    Physical Exam   /70 (BP Location: Left arm, Patient Position: Lying)   Pulse 67   Temp 97.6 °F (36.4 °C) (Oral)   Resp 16   Ht 152.4 cm (60\")   Wt 62.7 kg (138 lb 3.2 oz)   SpO2 97%   Breastfeeding No   BMI 26.99 kg/m²   General:  Appears in no acute distress  Eyes: Sclera is anicteric,  conjunctiva is clear   HEENT:  No JVD. Thyroid not visibly enlarged. No mucosal pallor or cyanosis  Respiratory: Respirations regular and unlabored at rest.  Bilaterally good breath sounds, with good air entry in all fields. No crackles, rubs or wheezes auscultated  Cardiovascular: S1,S2 Regular rate and rhythm. No murmur, rub or gallop auscultated.  . No pretibial pitting edema  Gastrointestinal: Abdomen nondistended, soft  Musculoskeletal:  No abnormal movements  Extremities: No digital clubbing or cyanosis  Skin: Ecchymosis in right arm from IV infiltration  Neuro: Alert and awake, no lateralizing deficits appreciated    INTAKE AND OUTPUT:    Intake/Output Summary (Last 24 hours) at 4/10/2021 0782  Last data filed at 4/9/2021 1920  Gross per 24 hour   Intake 200 ml   Output 500 ml   Net -300 ml       Cardiographics  Telemetry: Sinus rhythm    ECG:   ECG 12 Lead   Final Result   HEART RATE= 76  bpm   RR Interval= 792  ms   WI Interval= 169  ms   P Horizontal Axis= 16  deg   P Front Axis= 63  deg   QRSD Interval= 91  ms   QT Interval= 386  ms   QRS Axis= 51  deg   T Wave Axis= 21  deg   - OTHERWISE NORMAL ECG -   Sinus rhythm   Ventricular premature complex   When compared with ECG of 02-Mar-2021 10:44:35,   No significant change   Electronically Signed By: Paco Smith (NINA) 08-Apr-2021 07:20:16   Date and Time of Study: 2021-04-06 12:54:12        I have personally reviewed EKG    Echocardiogram: Results for orders placed during the hospital " "encounter of 04/06/21    Adult Transthoracic Echo Complete W/ Cont if Necessary Per Protocol    Interpretation Summary  Normal LV size and contractility EF of 60 to 65%  Normal RV size  Normal atrial size  Aortic valve appears thickened, leaflets are not well visualized.  Mild aortic regurgitation seen.  Mitral valve posterior leaflet appears calcified.  Mild mitral regurgitation seen..  Tricuspid valve appears structurally normal, no significant regurgitation seen.  No pericardial effusion seen.  Proximal aorta appears normal in size.      Lab Review   I have reviewed the labs  Results from last 7 days   Lab Units 04/09/21  0252 04/06/21  2150 04/06/21  1443   TROPONIN T ng/mL <0.010 <0.010 <0.010     Results from last 7 days   Lab Units 04/10/21  0238   MAGNESIUM mg/dL 1.8     Results from last 7 days   Lab Units 04/10/21  0238   SODIUM mmol/L 136   POTASSIUM mmol/L 4.1   BUN mg/dL 21   CREATININE mg/dL 0.68   CALCIUM mg/dL 9.0         Results from last 7 days   Lab Units 04/10/21  0238 04/09/21  0252 04/08/21  0438   WBC 10*3/mm3 7.30 6.50 6.00   HEMOGLOBIN g/dL 10.7* 11.3* 12.3   HEMATOCRIT % 31.2* 34.0 36.0   PLATELETS 10*3/mm3 230 229 249     Results from last 7 days   Lab Units 04/06/21  1443   INR  <0.93*   APTT seconds 23.1*       RADIOLOGY:  Imaging Results (Last 24 Hours)     ** No results found for the last 24 hours. **                )4/10/2021  MD ISAAC Marc Dragon/Transcription:   \"Dictated utilizing Dragon dictation\".   "

## 2021-04-10 NOTE — PLAN OF CARE
Problem: Adult Inpatient Plan of Care  Goal: Plan of Care Review  Outcome: Ongoing, Progressing  Flowsheets  Taken 4/10/2021 0401 by Paco Mckinnon, RN  Progress: no change  Plan of Care Reviewed With: patient  Taken 4/9/2021 1723 by Sugar Gonzalez, RN  Outcome Summary: patient  had a loop recorder placed on left upper chest  to see Endrocrinology in am   Goal Outcome Evaluation:  Plan of Care Reviewed With: patient  Progress: no change

## 2021-04-10 NOTE — PLAN OF CARE
Problem: Adult Inpatient Plan of Care  Goal: Plan of Care Review  Outcome: Ongoing, Progressing  Flowsheets (Taken 4/10/2021 1744)  Progress: no change  Plan of Care Reviewed With: patient  Outcome Summary: Patient had an endocrinology consult today and Dr. Gerard came and saw the patient and would like to keep the patient another night and do an ACTH and cortisol test in the AM. VSS, ortho VSS, SR on the monitor, falls risk maintained, will monitor  Goal: Patient-Specific Goal (Individualized)  Outcome: Ongoing, Progressing  Goal: Absence of Hospital-Acquired Illness or Injury  Outcome: Ongoing, Progressing  Intervention: Identify and Manage Fall Risk  Recent Flowsheet Documentation  Taken 4/10/2021 1500 by Lucinda Driscoll, RN  Safety Promotion/Fall Prevention:   safety round/check completed   activity supervised   assistive device/personal items within reach   clutter free environment maintained   fall prevention program maintained   lighting adjusted   gait belt   nonskid shoes/slippers when out of bed   room organization consistent  Taken 4/10/2021 1300 by Lucinda Driscoll, RN  Safety Promotion/Fall Prevention: safety round/check completed  Taken 4/10/2021 1058 by Lucinda Driscoll, RN  Safety Promotion/Fall Prevention: safety round/check completed  Taken 4/10/2021 0906 by Lucinda Driscoll, RN  Safety Promotion/Fall Prevention: safety round/check completed  Taken 4/10/2021 0714 by Lucinda Driscoll, RN  Safety Promotion/Fall Prevention:   safety round/check completed   activity supervised   clutter free environment maintained   assistive device/personal items within reach   fall prevention program maintained   lighting adjusted   nonskid shoes/slippers when out of bed   room organization consistent  Intervention: Prevent Skin Injury  Recent Flowsheet Documentation  Taken 4/10/2021 0714 by Lucinda Driscoll, RN  Body Position:   supine   sitting up in bed   position changed independently  Goal: Optimal Comfort and  Wellbeing  Outcome: Ongoing, Progressing  Intervention: Provide Person-Centered Care  Recent Flowsheet Documentation  Taken 4/10/2021 0714 by Lucinda Driscoll RN  Trust Relationship/Rapport:   care explained   choices provided   emotional support provided   empathic listening provided   questions answered   questions encouraged   reassurance provided   thoughts/feelings acknowledged  Goal: Readiness for Transition of Care  Outcome: Ongoing, Progressing     Problem: Syncope  Goal: Absence of Syncopal Symptoms  Outcome: Ongoing, Progressing  Intervention: Manage Effect of Syncopal Symptoms  Recent Flowsheet Documentation  Taken 4/10/2021 0714 by Lucinda Driscoll RN  Syncope Management: position changed slowly  Supportive Measures: active listening utilized     Problem: Hypertension Acute  Goal: Blood Pressure Within Desired Range  Outcome: Ongoing, Progressing  Intervention: Normalize Blood Pressure  Recent Flowsheet Documentation  Taken 4/10/2021 0714 by Lucinda Driscoll RN  Sensory Stimulation Regulation:   care clustered   auditory stimulation minimized  Medication Review/Management: medications reviewed     Problem: Fall Injury Risk  Goal: Absence of Fall and Fall-Related Injury  Outcome: Ongoing, Progressing  Intervention: Identify and Manage Contributors to Fall Injury Risk  Recent Flowsheet Documentation  Taken 4/10/2021 0714 by Lucinda Driscoll RN  Medication Review/Management: medications reviewed  Intervention: Promote Injury-Free Environment  Recent Flowsheet Documentation  Taken 4/10/2021 1500 by Lucinda Driscoll RN  Safety Promotion/Fall Prevention:   safety round/check completed   activity supervised   assistive device/personal items within reach   clutter free environment maintained   fall prevention program maintained   lighting adjusted   gait belt   nonskid shoes/slippers when out of bed   room organization consistent  Taken 4/10/2021 1300 by Lucinda Driscoll, RN  Safety Promotion/Fall Prevention:  safety round/check completed  Taken 4/10/2021 1058 by Lucinda Driscoll, RN  Safety Promotion/Fall Prevention: safety round/check completed  Taken 4/10/2021 0906 by Lucinda Driscoll, RN  Safety Promotion/Fall Prevention: safety round/check completed  Taken 4/10/2021 0714 by Lucinda Driscoll, RN  Safety Promotion/Fall Prevention:   safety round/check completed   activity supervised   clutter free environment maintained   assistive device/personal items within reach   fall prevention program maintained   lighting adjusted   nonskid shoes/slippers when out of bed   room organization consistent   Goal Outcome Evaluation:  Plan of Care Reviewed With: patient  Progress: no change  Outcome Summary: Patient had an endocrinology consult today and Dr. Gerard came and saw the patient and would like to keep the patient another night and do an ACTH and cortisol test in the AM. VSS, ortho VSS, SR on the monitor, falls risk maintained, will monitor

## 2021-04-10 NOTE — PROGRESS NOTES
[facility] Medicine Services Daily Progress Note      Hospitalist Team  LOS 0 days      Patient Care Team:  Brooklyn Contreras DO as PCP - General  Brooklyn Contreras DO as PCP - Family Medicine    Patient Location: 103/1      Subjective   Subjective     Chief Complaint / Subjective  Chief Complaint   Patient presents with   • Dizziness         Brief Synopsis of Hospital Course/HPI  The patient is a 76-year-old female who has history of hypertension has had uncontrolled hypertension for the past 3 to 4 weeks.  She has had some episodes of syncope which are likely related to this issue.  The patient still intermittently has a headache.  The patient otherwise is feeling well and has ruled out for myocardial injury.    Date:   4/8/2021  The patient is feeling better today.  She still has a headache but she says that she has had a headache since she had her stroke several months ago.  She generally uses an ice pack which helps it to resolve.  She did not want to take the Norco last evening for fear that would make her confused.  She reports that this is not unusual for her.    4/9/2021  The patient is doing well overall.  The patient not had any additional episodes of syncope.  The patient is going for a loop recorder today.  The patient did have a low serum cortisol and endocrinology has been consulted.    4/10/2021  The patient has not had any additional episodes of orthostatic hypotension. The patient did have a low cortisol and endocrine has been asked to see the patient and will likely need additional studies.    Review of Systems   Constitutional: Negative.   HENT: Negative.    Eyes: Negative.    Cardiovascular: Negative.         The patient's chest pain has resolved and she has not had additional near syncope or syncopal events.   Respiratory: Negative.    Endocrine: Negative.    Hematologic/Lymphatic: Negative.    Skin: Negative.    Musculoskeletal: Negative.    Gastrointestinal: Negative.   "  Genitourinary: Negative.    Neurological: Negative.         The patient has resolved her near syncopal sensations. They have not recurred in the past 48 hours.   Psychiatric/Behavioral: Negative.    Allergic/Immunologic: Negative.    All other systems reviewed and are negative.        Objective   Objective      Vital Signs  Temp:  [97.4 °F (36.3 °C)-98.2 °F (36.8 °C)] 98.2 °F (36.8 °C)  Heart Rate:  [63-82] 80  Resp:  [12-18] 12  BP: (110-128)/(62-77) 115/68  Oxygen Therapy  SpO2: 96 %  Pulse Oximetry Type: Intermittent  Device (Oxygen Therapy): room air  Flowsheet Rows      First Filed Value   Admission Height  152.4 cm (60\") Documented at 04/06/2021 1233   Admission Weight  64.2 kg (141 lb 8.6 oz) Documented at 04/06/2021 1233        Intake & Output (last 3 days)       04/07 0701 - 04/08 0700 04/08 0701 - 04/09 0700 04/09 0701 - 04/10 0700 04/10 0701 - 04/11 0700    P.O. 480 540 200 360    I.V. (mL/kg)    1525 (24.3)    Total Intake(mL/kg) 480 (7.6) 540 (8.5) 200 (3.2) 1885 (30.1)    Urine (mL/kg/hr) 100 (0.1) 300 (0.2) 500 (0.3) 100 (0.1)    Total Output 100 300 500 100    Net +380 +240 -300 +1785            Urine Unmeasured Occurrence 3 x 1 x      Stool Unmeasured Occurrence  1 x 1 x         Lines, Drains & Airways    Active LDAs     Name:   Placement date:   Placement time:   Site:   Days:    Peripheral IV 04/06/21 1700 Anterior;Distal;Right;Upper Arm   04/06/21    1700    Arm   1            Physical Exam:  Physical Exam  Vitals and nursing note reviewed.   Constitutional:       General: She is not in acute distress.     Appearance: Normal appearance. She is well-developed. She is not ill-appearing, toxic-appearing or diaphoretic.      Comments: The patient is much more conversational today and was very appropriate. She is alert oriented and appropriate   HENT:      Head: Normocephalic and atraumatic.      Right Ear: Ear canal and external ear normal.      Left Ear: Ear canal and external ear normal.      " Nose: Nose normal. No congestion or rhinorrhea.      Mouth/Throat:      Mouth: Mucous membranes are moist.      Pharynx: No oropharyngeal exudate.   Eyes:      General: No scleral icterus.        Right eye: No discharge.         Left eye: No discharge.      Extraocular Movements: Extraocular movements intact.      Conjunctiva/sclera: Conjunctivae normal.      Pupils: Pupils are equal, round, and reactive to light.   Neck:      Thyroid: No thyromegaly.      Vascular: No carotid bruit or JVD.      Trachea: No tracheal deviation.   Cardiovascular:      Rate and Rhythm: Normal rate and regular rhythm.      Pulses: Normal pulses.      Heart sounds: Normal heart sounds. No murmur heard.   No friction rub. No gallop.       Comments: No murmur, S3 or S4.  Pulmonary:      Effort: Pulmonary effort is normal. No respiratory distress.      Breath sounds: Normal breath sounds. No stridor. No wheezing, rhonchi or rales.      Comments: Slight decreased breath sounds in the bases bilaterally without wheezing or rhonchi.  Chest:      Chest wall: No tenderness.   Abdominal:      General: Bowel sounds are normal. There is no distension.      Palpations: Abdomen is soft. There is no mass.      Tenderness: There is no abdominal tenderness. There is no guarding or rebound.      Hernia: No hernia is present.   Musculoskeletal:         General: No swelling, tenderness, deformity or signs of injury. Normal range of motion.      Cervical back: Normal range of motion and neck supple. No rigidity. No muscular tenderness.      Right lower leg: No edema.      Left lower leg: No edema.   Lymphadenopathy:      Cervical: No cervical adenopathy.   Skin:     General: Skin is warm and dry.      Coloration: Skin is not jaundiced or pale.      Findings: No bruising, erythema or rash.   Neurological:      General: No focal deficit present.      Mental Status: She is alert and oriented to person, place, and time. Mental status is at baseline.      Cranial  Nerves: No cranial nerve deficit.      Sensory: No sensory deficit.      Motor: No weakness or abnormal muscle tone.      Coordination: Coordination normal.   Psychiatric:         Mood and Affect: Mood normal.         Behavior: Behavior normal.         Thought Content: Thought content normal.         Judgment: Judgment normal.          Wounds (last 24 hours)      LDA Wound     Row Name 04/10/21 0714 04/09/21 1910          Wound 04/09/21 1545 Left upper sternal Incision    Wound - Properties Group Placement Date: 04/09/21  -CL Placement Time: 1545  -CL Present on Hospital Admission: N  -CL Side: Left  -CL Orientation: upper  -CL Location: sternal  -CL Primary Wound Type: Incision  -CL Number of Staples Placed: 3  -CL    Dressing Appearance  dry;intact;no drainage  -MJ  dry;intact;no drainage  -SC     Closure  MILAN  -MJ  MILAN  -SC     Drainage Amount  none  -MJ  none  -SC     Retired Wound - Properties Group Date first assessed: 04/09/21  -CL Time first assessed: 1545  -CL Present on Hospital Admission: N  -CL Side: Left  -CL Location: sternal  -CL Primary Wound Type: Incision  -CL Number of Staples Placed: 3  -CL      User Key  (r) = Recorded By, (t) = Taken By, (c) = Cosigned By    Initials Name Provider Type    CL Jimena Richard, RN Registered Nurse    Paco Montalvo RN Registered Nurse    Lucinda Perez, RN Registered Nurse      Procedures:    Results Review:     I reviewed the patient's new clinical results.      Lab Results (last 24 hours)     Procedure Component Value Units Date/Time    Basic Metabolic Panel [[id number]]  (Abnormal) Collected: 04/10/21 0238    Specimen: Blood Updated: 04/10/21 0400     Glucose 97 mg/dL      BUN 21 mg/dL      Creatinine 0.68 mg/dL      Sodium 136 mmol/L      Potassium 4.1 mmol/L      Comment: Slight hemolysis detected by analyzer. Results may be affected.        Chloride 106 mmol/L      CO2 22.0 mmol/L      Calcium 9.0 mg/dL      eGFR Non African Amer 84 mL/min/1.73       BUN/Creatinine Ratio 30.9     Anion Gap 8.0 mmol/L     Narrative:      GFR Normal >60  Chronic Kidney Disease <60  Kidney Failure <15      Magnesium [[id number]]  (Normal) Collected: 04/10/21 0238    Specimen: Blood Updated: 04/10/21 0400     Magnesium 1.8 mg/dL     CBC & Differential [[id number]]  (Abnormal) Collected: 04/10/21 0238    Specimen: Blood Updated: 04/10/21 0327    Narrative:      The following orders were created for panel order CBC & Differential.  Procedure                               Abnormality         Status                     ---------                               -----------         ------                     CBC Auto Differential[[id number]]        Abnormal            Final result                 Please view results for these tests on the individual orders.    CBC Auto Differential [[id number]]  (Abnormal) Collected: 04/10/21 0238    Specimen: Blood Updated: 04/10/21 0327     WBC 7.30 10*3/mm3      RBC 3.68 10*6/mm3      Hemoglobin 10.7 g/dL      Hematocrit 31.2 %      MCV 84.8 fL      MCH 29.1 pg      MCHC 34.3 g/dL      RDW 13.9 %      RDW-SD 41.6 fl      MPV 6.2 fL      Platelets 230 10*3/mm3      Neutrophil % 70.5 %      Lymphocyte % 19.6 %      Monocyte % 7.7 %      Eosinophil % 1.3 %      Basophil % 0.9 %      Neutrophils, Absolute 5.20 10*3/mm3      Lymphocytes, Absolute 1.40 10*3/mm3      Monocytes, Absolute 0.60 10*3/mm3      Eosinophils, Absolute 0.10 10*3/mm3      Basophils, Absolute 0.10 10*3/mm3      nRBC 0.1 /100 WBC         No results found for: HGBA1C  Results from last 7 days   Lab Units 04/06/21  1443   INR  <0.93*           Lab Results   Component Value Date    LIPASE 14 10/22/2020     Lab Results   Component Value Date    CHOL 197 04/07/2021    TRIG 127 04/07/2021    HDL 57 04/07/2021     (H) 04/07/2021       Lab Results   Lab Value Date/Time    FINALDX  11/19/2019 1505     Polyp, sigmoid, polypectomy:    Tubular adenoma     PEDRO/sms          Microbiology Results  (last 10 days)     Procedure Component Value - Date/Time    COVID PRE-OP / PRE-PROCEDURE SCREENING ORDER (NO ISOLATION) - Swab, Nasopharynx [[id number]]  (Normal) Collected: 04/07/21 0012    Lab Status: Final result Specimen: Swab from Nasopharynx Updated: 04/07/21 0100    Narrative:      The following orders were created for panel order COVID PRE-OP / PRE-PROCEDURE SCREENING ORDER (NO ISOLATION) - Swab, Nasopharynx.  Procedure                               Abnormality         Status                     ---------                               -----------         ------                     COVID-19,CEPHEID,COR/NINA...[[id number]]  Normal              Final result                 Please view results for these tests on the individual orders.    COVID-19,CEPHEID,COR/NINA/PAD IN-HOUSE(OR EMERGENT/ADD-ON),NP SWAB IN TRANSPORT MEDIA 3-4 HR TAT, RT-PCR - Swab, Nasopharynx [[id number]]  (Normal) Collected: 04/07/21 0012    Lab Status: Final result Specimen: Swab from Nasopharynx Updated: 04/07/21 0100     COVID19 Not Detected    Narrative:      Fact sheet for providers: https://www.fda.gov/media/042668/download     Fact sheet for patients: https://www.fda.gov/media/922707/download          ECG/EMG Results (most recent)     Procedure Component Value Units Date/Time    Adult Transthoracic Echo Complete W/ Cont if Necessary Per Protocol [[id number]] Collected: 04/07/21 0755     Updated: 04/07/21 1729     BSA 1.6 m^2      RVIDd 2.1 cm      IVSd 0.74 cm      IVSs 1.3 cm      LVIDd 4.1 cm      LVIDs 2.5 cm      LVPWd 0.88 cm      BH CV ECHO HIRO - LVPWS 1.4 cm      IVS/LVPW 0.85     FS 39.5 %      EDV(Teich) 73.1 ml      ESV(Teich) 21.6 ml      EF(Teich) 70.5 %      EDV(cubed) 67.6 ml      ESV(cubed) 15.0 ml      EF(cubed) 77.8 %      % IVS thick 69.2 %      % LVPW thick 62.1 %      LV mass(C)d 97.9 grams      LV mass(C)dI 61.6 grams/m^2      LV mass(C)s 101.4 grams      LV mass(C)sI 63.8 grams/m^2      SV(Teich) 51.5 ml      SI(Teich)  32.4 ml/m^2      SV(cubed) 52.6 ml      SI(cubed) 33.1 ml/m^2      Ao root diam 2.9 cm      Ao root area 6.6 cm^2      ACS 1.9 cm      LVOT diam 1.7 cm      LVOT area 2.3 cm^2      EDV(MOD-sp4) 44.3 ml      ESV(MOD-sp4) 16.9 ml      EF(MOD-sp4) 61.9 %      SV(MOD-sp4) 27.4 ml      SI(MOD-sp4) 17.2 ml/m^2      Ao root area (BSA corrected) 1.8     LV [place] Vol (BSA corrected) 27.8 ml/m^2      LV Sys Vol (BSA corrected) 10.6 ml/m^2      MV E max subha 89.6 cm/sec      MV A max subha 100.4 cm/sec      MV E/A 0.89     MV V2 max 116.9 cm/sec      MV max PG 5.5 mmHg      MV V2 mean 71.0 cm/sec      MV mean PG 2.3 mmHg      MV V2 VTI 28.8 cm      MVA(VTI) 2.1 cm^2      MV dec slope 445.9 cm/sec^2      MV dec time 0.2 sec      Ao pk subha 132.3 cm/sec      Ao max PG 7.0 mmHg      Ao max PG (full) 2.4 mmHg      Ao V2 mean 100.7 cm/sec      Ao mean PG 4.4 mmHg      Ao mean PG (full) 1.8 mmHg      Ao V2 VTI 31.3 cm      CARLIE(I,A) 1.9 cm^2      CARLIE(I,D) 1.9 cm^2      CARLIE(V,A) 1.8 cm^2      CARLIE(V,D) 1.8 cm^2      AI max subha 377.1 cm/sec      AI max PG 56.9 mmHg      AI dec slope 238.2 cm/sec^2      AI dec time 1.6 sec      AI P1/2t 463.6 msec      LV V1 max PG 4.6 mmHg      LV V1 mean PG 2.5 mmHg      LV V1 max 107.2 cm/sec      LV V1 mean 76.9 cm/sec      LV V1 VTI 26.6 cm      MR max subha 590.3 cm/sec      MR max .4 mmHg      SV(Ao) 207.2 ml      SI(Ao) 130.4 ml/m^2      SV(LVOT) 60.1 ml      SI(LVOT) 37.8 ml/m^2      PA V2 max 75.8 cm/sec      PA max PG 2.3 mmHg      PA max PG (full) 0.46 mmHg      PA V2 mean 55.3 cm/sec      PA mean PG 1.3 mmHg      PA mean PG (full) 0.28 mmHg      PA V2 VTI 15.1 cm      PA acc time 0.1 sec      RV V1 max PG 1.8 mmHg      RV V1 mean PG 1.1 mmHg      RV V1 max 67.8 cm/sec      RV V1 mean 50.0 cm/sec      RV V1 VTI 15.6 cm      TR max subha 240.5 cm/sec      RVSP(TR) 26.1 mmHg      RAP systole 3.0 mmHg      PA pr(Accel) 33.8 mmHg       CV ECHO HIRO - BZI_BMI 26.8 kilograms/m^2       CV ECHO HIRO  - BSA(HAYCOCK) 1.6 m^2       CV ECHO HIRO - BZI_METRIC_WEIGHT 62.1 kg       CV ECHO HIRO - BZI_METRIC_HEIGHT 152.4 cm      Target HR (85%) 123 bpm      Max. Pred. HR (100%) 145 bpm      EF(MOD-bp) 62.0 %      LA dimension(2D) 3.8 cm     Narrative:      Normal LV size and contractility EF of 60 to 65%  Normal RV size  Normal atrial size  Aortic valve appears thickened, leaflets are not well visualized.  Mild   aortic regurgitation seen.  Mitral valve posterior leaflet appears calcified.  Mild mitral   regurgitation seen..    Tricuspid valve appears structurally normal, no significant regurgitation   seen.  No pericardial effusion seen.  Proximal aorta appears normal in size.    ECG 12 Lead [[id number]] Collected: 04/06/21 1254     Updated: 04/08/21 0721     QT Interval 386 ms     Narrative:      HEART RATE= 76  bpm  RR Interval= 792  ms  SC Interval= 169  ms  P Horizontal Axis= 16  deg  P Front Axis= 63  deg  QRSD Interval= 91  ms  QT Interval= 386  ms  QRS Axis= 51  deg  T Wave Axis= 21  deg  - OTHERWISE NORMAL ECG -  Sinus rhythm  Ventricular premature complex  When compared with ECG of 02-Mar-2021 10:44:35,  No significant change  Electronically Signed By: Paco Smith ([facility]) 08-Apr-2021 07:20:16  Date and Time of Study: 2021-04-06 12:54:12          Results for orders placed during the hospital encounter of 04/06/21    Duplex Carotid Ultrasound CAR    Interpretation Summary  · Right internal carotid artery mild stenosis.  · Left internal carotid artery mild stenosis.      Results for orders placed during the hospital encounter of 04/06/21    Adult Transthoracic Echo Complete W/ Cont if Necessary Per Protocol    Interpretation Summary  Normal LV size and contractility EF of 60 to 65%  Normal RV size  Normal atrial size  Aortic valve appears thickened, leaflets are not well visualized.  Mild aortic regurgitation seen.  Mitral valve posterior leaflet appears calcified.  Mild mitral regurgitation seen..  Tricuspid  valve appears structurally normal, no significant regurgitation seen.  No pericardial effusion seen.  Proximal aorta appears normal in size.      CT Head Without Contrast    Result Date: 4/6/2021  1.No acute intracranial abnormality on head CT. 2.Remote left occipital parietal lobe infarct with encephalomalacia. 3.Mild amount of low-density in the periventricular and subcortical white matter consistent with chronic small vessel ischemic change. 4.Brain MRI is more sensitive to evaluate for acute or subacute infarcts and to evaluate for intracranial metastatic disease.  Electronically Signed By-Lor Gonzalez MD On:4/6/2021 6:37 PM This report was finalized on [id number] by  Lor Gonzalez MD.    MRI Brain Without Contrast    Result Date: 4/7/2021   1. No acute intracranial abnormality. No acute infarct. No intracranial mass/mass effect. 2. Sequela of old left occipital hemorrhagic infarct. 3. Mild scattered periventricular and subcortical white matter high T2/FLAIR signal foci, statistically represent chronic small vessel ischemic changes.  Electronically Signed By-Derrick Orozco MD On:4/7/2021 8:18 AM This report was finalized on [id number] by  Derrick Orozco MD.    XR Chest 1 View    Result Date: 4/6/2021  No acute chest findings.  Electronically Signed By-Maddi Bhagat MD On:4/6/2021 2:09 PM This report was finalized on [id number] by  Maddi Bhagat MD.          Xrays, labs reviewed personally by physician.    Medication Review:   I have reviewed the patient's current medication list      Scheduled Meds  amitriptyline, 25 mg, Oral, Nightly  atorvastatin, 80 mg, Oral, Daily  [START ON 4/11/2021] cosyntropin, 0.25 mg, Intravenous, Once  heparin (porcine), 5,000 Units, Subcutaneous, Q12H  lisinopril, 5 mg, Oral, Q24H  pantoprazole, 40 mg, Oral, Daily  polyethylene glycol, 17 g, Oral, Daily  sennosides-docusate, 2 tablet, Oral, Nightly  sertraline, 100 mg, Oral, Daily  sodium chloride, 10 mL, Intravenous,  Q12H  sodium chloride, 10 mL, Intravenous, Q12H        Meds Infusions       Meds PRN  •  acetaminophen **OR** acetaminophen **OR** acetaminophen  •  Calcium Gluconate-NaCl **AND** calcium gluconate **AND** Calcium, Ionized  •  famotidine  •  HYDROcodone-acetaminophen  •  labetalol  •  magnesium sulfate **OR** magnesium sulfate **OR** magnesium sulfate  •  melatonin  •  nitroglycerin  •  ondansetron **OR** ondansetron  •  potassium & sodium phosphates **OR** potassium & sodium phosphates  •  potassium chloride  •  potassium chloride  •  [COMPLETED] Insert peripheral IV **AND** sodium chloride  •  sodium chloride  •  sodium chloride        Assessment/Plan   Assessment/Plan     Active Hospital Problems    Diagnosis  POA   • **Hypertensive emergency [I16.1]  Yes   • Syncope [R55]  Yes     Priority: High   • Essential hypertension [I10]  Yes     Priority: High   • History of hemorrhagic cerebrovascular accident (CVA) with residual deficit [I69.30]  Not Applicable     Priority: Medium   • GERD (gastroesophageal reflux disease) [K21.9]  Yes     Priority: Medium   • Generalized anxiety disorder [F41.1]  Yes     Priority: Medium   • Dyslipidemia [E78.5]  Yes     Priority: Medium   • Primary osteoarthritis of right knee [M17.11]  Yes   • Primary osteoarthritis of left knee [M17.12]  Yes      Resolved Hospital Problems   No resolved problems to display.       MEDICAL DECISION MAKING COMPLEXITY BY PROBLEM:   1.  Hypertensive emergency  -The patient has been experiencing hypertension systolics greater than 200 for the past 2 to 3 weeks.  The patient had had some associated dizziness, syncope as well as chest pain.  -The patient is CT of the head which was negative for any acute change  -The patient had no ST or T wave changes associated with the hypertension  -Labetalol was utilized and did well for the patient initially intravenously  -She was also started on lisinopril  -Patient's blood pressure is much improved overall.  She  did have a slight bout of hypotension with tachycardia which resolved in under an hour.       -Patient's lisinopril was reduced to 5 mg daily            -The patient's creatinine will be reviewed in the a.m.  It did go up a bit and the patient is receiving some fluids.  -The patient has blood pressure which is much improved.    2.  Syncope  -The patient reports 3-4 actual syncopal episodes over the preceding month possibly related to the issue she was having with her hypertension.       -Patient does have a history of hemorrhagic CVA in the past but had a negative CT scan this admission  -Carotid ultrasound showed mild stenosis bilaterally  -Ask cardiology to see if the patient would benefit from a Holter or a loop recorder  -The patient does have a low cortisol level and endocrinology has been requested to see the patient for additional work-up and treatment.    3.  Hyperlipidemia  -Continue statin therapy    4.  Chronic pain syndrome  -Restart Flexeril and Celebrex when patient is more stable    5.  Gastroesophageal reflux disease  -Continue Protonix    6.  Osteoporosis  -Patient apparently is on outpatient Boniva which is to continue.    7.  Low serum cortisol  -The patient serum cortisol was checked randomly and was low.       -We will consult endocrine to do a proper cortisol stim test and for recommendations on additional work-up and treatment.  -Endocrine consult and studies remain pending    Possible discharge after seen by endocrinology tomorrow.  VTE Prophylaxis -   Mechanical Order History:      Ordered        04/06/21 2141  Place Sequential Compression Device  Once         04/06/21 2141  Maintain Sequential Compression Device  Continuous                 Pharmalogical Order History:     None            Code Status -   Code Status and Medical Interventions:   Ordered at: 04/06/21 1941     Code Status:    CPR     Medical Interventions (Level of Support Prior to Arrest):    Full   This patient has been  examined wearing appropriate Personal Protective Equipment and discussed with hospital infection control department. 04/10/21    Discharge Planning  Likely home  Electronically signed by Peace Graham MD, 04/10/21, 18:50 EDT.  [facility] Hospitalist Team

## 2021-04-10 NOTE — CONSULTS
Inpatient Endocrine Consult  Consultation requested by Dr. Richard for low cortisol level  Patient Care Team:  Brooklyn Conrteras DO as PCP - General  Brooklyn Contreras DO as PCP - Family Medicine    Chief Complaint: Low cortisol level    HPI: 76-year-old female with past medical history of hypertension, arthritis, CVA has been having issues with shortness of breath and some dizziness and syncopal episodes fatigue and tiredness going on for the last several months but got some worse and admitted with episodes of syncope and dizziness this time.  She has undergone cardiac work-up which was negative was found to have a low cortisol level and is asked by the endocrine service to evaluation.  Patient tells me that she has been receiving steroid injections in both knees and the last injection was done on March 2, 2021 by Dr. Elaine.  She tells me she at least had 5 injections in each knee so far.  She is been getting injections every 3 months. Patient denies any chest pains, weight loss, Her appetite is okay and she has not noticed any change in her appetite.  She tells me that she in fact has gained her weight.  She does have some nausea and vomiting.    Past Medical History:   Diagnosis Date   • Arthritis    • Bladder incontinence    • Colon polyp    • DEXA     OSTEOPENIA = 2018 (-1.3/ -1.7); 2020 (-1.7/ -1.7)   • GERD    • Headache    • Hypertension    • Intracerebral hemorrhage/ CVA    • MAMMO     NEG =2020   • Osteopenia    • Vitamin D deficiency        Social History     Socioeconomic History   • Marital status:      Spouse name: Not on file   • Number of children: Not on file   • Years of education: Not on file   • Highest education level: Not on file   Tobacco Use   • Smoking status: Never Smoker   • Smokeless tobacco: Never Used   Substance and Sexual Activity   • Alcohol use: No   • Drug use: No   • Sexual activity: Defer       Family History   Problem Relation Age of Onset   • Heart disease Mother     • Hypertension Mother    • Diabetes Father    • Heart disease Father    • Alcohol abuse Father    • Hypertension Father    • Diabetes Brother    • Heart disease Brother    • Cancer Brother 68        Prostate Cancer   • Hypertension Brother    • Diabetes Maternal Aunt    • Heart disease Maternal Grandmother    • Hypertension Maternal Grandmother    • Heart disease Maternal Grandfather    • Hypertension Maternal Grandfather    • Liver disease Paternal Grandmother    • Hypertension Paternal Grandmother    • Heart disease Paternal Grandfather    • Hypertension Paternal Grandfather    • Dementia Sister        Allergies   Allergen Reactions   • Trazodone Irritability       ROS:   Constitutional:  Admit fatigue, tiredness.    Eyes:  Denies change in visual acuity   HENT:  Denies nasal congestion or sore throat   Respiratory: denies cough, Admit shortness of breath.   Cardiovascular:  denies chest pain, edema, admist dizziness and syncope.   GI:  Denies abdominal pain, Admit to low cortisol level: Nausea, vomiting.   :  Denies Polyuria and Polydipsia  Musculoskeletal:  Denies back pain or joint pain   Integument:  Denies dry skin, rash   Neurologic:  Denies headache, focal weakness or sensory changes   Endocrine:  Denies polyuria or polydipsia   Psychiatric:  Denies depression or anxiety      Vitals:    04/10/21 1421   BP: 110/67   Pulse: 82   Resp: 14   Temp: 97.4 °F (36.3 °C)   SpO2: 97%      Body mass index is 26.99 kg/m².     Physical Exam:  GEN: NAD, conversant  EYES: EOMI, PERRL, no conjunctival erythema  NECK: no thyromegaly, full ROM   CV: RRR, no murmurs/rubs/gallops, no peripheral edema  LUNG: CTAB, no wheezes/rales/ronchi  SKIN: no rashes, no acanthosis  MSK: no deformities, full ROM of all extremities  NEURO: no tremors, DTR normal  PSYCH: AOX3, appropriate mood, affect normal      Results Review:     I reviewed the patient's new clinical results.    Lab Results   Component Value Date    GLUCOSE 97  04/10/2021    BUN 21 04/10/2021    CREATININE 0.68 04/10/2021    EGFRIFNONA 84 04/10/2021    BCR 30.9 (H) 04/10/2021    K 4.1 04/10/2021    CO2 22.0 04/10/2021    CALCIUM 9.0 04/10/2021    ALBUMIN 3.70 04/06/2021    LABIL2 1.4 04/30/2019    AST 18 04/06/2021    ALT 17 04/06/2021       Lab Results   Component Value Date    HGBA1C 4.9 04/07/2021     Lab Results   Component Value Date    CREATININE 0.68 04/10/2021           Medication Review: Reviewed.       Current Facility-Administered Medications:   •  acetaminophen (TYLENOL) tablet 650 mg, 650 mg, Oral, Q4H PRN, 650 mg at 04/10/21 0829 **OR** acetaminophen (TYLENOL) 160 MG/5ML solution 650 mg, 650 mg, Oral, Q4H PRN **OR** acetaminophen (TYLENOL) suppository 650 mg, 650 mg, Rectal, Q4H PRN, Michael Rodriguez PA-C  •  amitriptyline (ELAVIL) tablet 25 mg, 25 mg, Oral, Nightly, Michael Rodriguez PA-C, 25 mg at 04/09/21 2006  •  atorvastatin (LIPITOR) tablet 80 mg, 80 mg, Oral, Daily, Michael Rodriguez PA-C, 80 mg at 04/10/21 0829  •  calcium gluconate 1g/50ml 0.675% NaCl IV SOLN, 1 g, Intravenous, PRN **AND** calcium gluconate 2-0.675 GM/100ML NACL IVPB, 2 g, Intravenous, PRN **AND** Calcium, Ionized, , , PRN, Michael Rodriguez PA-C  •  famotidine (PEPCID) tablet 20 mg, 20 mg, Oral, BID PRN, Michael Rodriguez PA-C  •  heparin (porcine) 5000 UNIT/ML injection 5,000 Units, 5,000 Units, Subcutaneous, Q12H, Peace Graham MD, 5,000 Units at 04/10/21 0829  •  HYDROcodone-acetaminophen (NORCO) 5-325 MG per tablet 1 tablet, 1 tablet, Oral, Q6H PRN, Peace Graham MD, 1 tablet at 04/09/21 2330  •  labetalol (NORMODYNE,TRANDATE) injection 10 mg, 10 mg, Intravenous, Q6H PRN, Michael Rodriguez PA-C  •  lisinopril (PRINIVIL,ZESTRIL) tablet 5 mg, 5 mg, Oral, Q24H, Peace Graham MD, 5 mg at 04/10/21 0829  •  Magnesium Sulfate 2 gram Bolus, followed by 8 gram infusion (total Mg dose 10 grams)- Mg less than or equal to 1mg/dL, 2 g, Intravenous, PRN **OR** Magnesium  Sulfate 2 gram / 50mL Infusion (GIVE X 3 BAGS TO EQUAL 6GM TOTAL DOSE) - Mg 1.1 - 1.5 mg/dl, 2 g, Intravenous, PRN **OR** Magnesium Sulfate 4 gram infusion- Mg 1.6-1.9 mg/dL, 4 g, Intravenous, PRN, Michael Rodriguez PA-C  •  melatonin tablet 5 mg, 5 mg, Oral, Nightly PRN, Michael Rodriguez PA-C, 5 mg at 04/09/21 2007  •  nitroglycerin (NITROSTAT) SL tablet 0.4 mg, 0.4 mg, Sublingual, Q5 Min PRN, Michael Rodriguez PA-C  •  ondansetron (ZOFRAN) tablet 4 mg, 4 mg, Oral, Q6H PRN **OR** ondansetron (ZOFRAN) injection 4 mg, 4 mg, Intravenous, Q6H PRN, Michael Rodriguez PA-C  •  pantoprazole (PROTONIX) EC tablet 40 mg, 40 mg, Oral, Daily, Michael Rodriguez PA-C, 40 mg at 04/10/21 0829  •  polyethylene glycol (MIRALAX) packet 17 g, 17 g, Oral, Daily, Michael Rodriguez PA-C  •  potassium & sodium phosphates (PHOS-NAK) 280-160-250 MG packet - for Phosphorus less than 1.25 mg/dL, 2 packet, Oral, Q6H PRN **OR** potassium & sodium phosphates (PHOS-NAK) 280-160-250 MG packet - for Phosphorus 1.25 - 2.5 mg/dL, 2 packet, Oral, Q6H PRN, Michael Rodriguez PA-C  •  potassium chloride (K-DUR,KLOR-CON) CR tablet 40 mEq, 40 mEq, Oral, PRN, Michael Rodriguez PA-C  •  potassium chloride (KLOR-CON) packet 40 mEq, 40 mEq, Oral, PRN, Michael Rodriguez PA-C  •  sennosides-docusate (PERICOLACE) 8.6-50 MG per tablet 2 tablet, 2 tablet, Oral, Nightly, Michael Rodriguez PA-C, 2 tablet at 04/06/21 2229  •  sertraline (ZOLOFT) tablet 100 mg, 100 mg, Oral, Daily, Michael Rodriguez PA-C, 100 mg at 04/10/21 0829  •  [COMPLETED] Insert peripheral IV, , , Once **AND** sodium chloride 0.9 % flush 10 mL, 10 mL, Intravenous, PRN, Michael Rodriguez PA-C  •  sodium chloride 0.9 % flush 10 mL, 10 mL, Intravenous, Q12H, Michael Rodriguez PA-C, 10 mL at 04/10/21 0829  •  sodium chloride 0.9 % flush 10 mL, 10 mL, Intravenous, PRN, Michael Rodriguez PA-C  •  sodium chloride 0.9 % flush 10 mL, 10 mL, Intravenous, Q12H,  Peace Graham MD  •  sodium chloride 0.9 % flush 10 mL, 10 mL, Intravenous, PRN, Peace Graham MD    Assessment/Plan   Low cortisol level: Most likely secondary to steroid injections that she has been getting in the knees, last injection was done in March 2 of 2021.  Will check ACTH stimulation test for further evaluation.  We will also check orthostatic vitals.    Thank you very much for the consultation.             Yuriy Gerard MD FACE.

## 2021-04-11 ENCOUNTER — READMISSION MANAGEMENT (OUTPATIENT)
Dept: CALL CENTER | Facility: HOSPITAL | Age: 76
End: 2021-04-11

## 2021-04-11 VITALS
HEIGHT: 60 IN | OXYGEN SATURATION: 95 % | WEIGHT: 138.45 LBS | HEART RATE: 92 BPM | SYSTOLIC BLOOD PRESSURE: 110 MMHG | RESPIRATION RATE: 14 BRPM | TEMPERATURE: 99.1 F | DIASTOLIC BLOOD PRESSURE: 72 MMHG | BODY MASS INDEX: 27.18 KG/M2

## 2021-04-11 PROBLEM — E27.49: Status: ACTIVE | Noted: 2021-04-11

## 2021-04-11 PROBLEM — K22.0: Status: ACTIVE | Noted: 2021-04-11

## 2021-04-11 LAB
ANION GAP SERPL CALCULATED.3IONS-SCNC: 9 MMOL/L (ref 5–15)
BASOPHILS # BLD AUTO: 0.1 10*3/MM3 (ref 0–0.2)
BASOPHILS NFR BLD AUTO: 1.1 % (ref 0–1.5)
BUN SERPL-MCNC: 20 MG/DL (ref 8–23)
BUN/CREAT SERPL: 21.5 (ref 7–25)
CALCIUM SPEC-SCNC: 9.5 MG/DL (ref 8.6–10.5)
CHLORIDE SERPL-SCNC: 105 MMOL/L (ref 98–107)
CO2 SERPL-SCNC: 26 MMOL/L (ref 22–29)
CORTIS SERPL-MCNC: 12.18 MCG/DL
CORTIS SERPL-MCNC: 13.83 MCG/DL
CORTIS SERPL-MCNC: 6.01 MCG/DL
CREAT SERPL-MCNC: 0.93 MG/DL (ref 0.57–1)
DEPRECATED RDW RBC AUTO: 43.3 FL (ref 37–54)
EOSINOPHIL # BLD AUTO: 0.2 10*3/MM3 (ref 0–0.4)
EOSINOPHIL NFR BLD AUTO: 2.4 % (ref 0.3–6.2)
ERYTHROCYTE [DISTWIDTH] IN BLOOD BY AUTOMATED COUNT: 14.4 % (ref 12.3–15.4)
GFR SERPL CREATININE-BSD FRML MDRD: 59 ML/MIN/1.73
GLUCOSE SERPL-MCNC: 99 MG/DL (ref 65–99)
HCT VFR BLD AUTO: 35.1 % (ref 34–46.6)
HGB BLD-MCNC: 12 G/DL (ref 12–15.9)
LYMPHOCYTES # BLD AUTO: 2.5 10*3/MM3 (ref 0.7–3.1)
LYMPHOCYTES NFR BLD AUTO: 38.5 % (ref 19.6–45.3)
MAGNESIUM SERPL-MCNC: 1.8 MG/DL (ref 1.6–2.4)
MCH RBC QN AUTO: 29 PG (ref 26.6–33)
MCHC RBC AUTO-ENTMCNC: 34.3 G/DL (ref 31.5–35.7)
MCV RBC AUTO: 84.8 FL (ref 79–97)
MONOCYTES # BLD AUTO: 0.5 10*3/MM3 (ref 0.1–0.9)
MONOCYTES NFR BLD AUTO: 7.9 % (ref 5–12)
NEUTROPHILS NFR BLD AUTO: 3.3 10*3/MM3 (ref 1.7–7)
NEUTROPHILS NFR BLD AUTO: 50.1 % (ref 42.7–76)
NRBC BLD AUTO-RTO: 0 /100 WBC (ref 0–0.2)
PLATELET # BLD AUTO: 256 10*3/MM3 (ref 140–450)
PMV BLD AUTO: 6 FL (ref 6–12)
POTASSIUM SERPL-SCNC: 4.8 MMOL/L (ref 3.5–5.2)
RBC # BLD AUTO: 4.14 10*6/MM3 (ref 3.77–5.28)
SODIUM SERPL-SCNC: 140 MMOL/L (ref 136–145)
WBC # BLD AUTO: 6.5 10*3/MM3 (ref 3.4–10.8)

## 2021-04-11 PROCEDURE — 99217 PR OBSERVATION CARE DISCHARGE MANAGEMENT: CPT | Performed by: INTERNAL MEDICINE

## 2021-04-11 PROCEDURE — 84244 ASSAY OF RENIN: CPT | Performed by: INTERNAL MEDICINE

## 2021-04-11 PROCEDURE — 82088 ASSAY OF ALDOSTERONE: CPT | Performed by: INTERNAL MEDICINE

## 2021-04-11 PROCEDURE — 96372 THER/PROPH/DIAG INJ SC/IM: CPT

## 2021-04-11 PROCEDURE — 99213 OFFICE O/P EST LOW 20 MIN: CPT | Performed by: INTERNAL MEDICINE

## 2021-04-11 PROCEDURE — 25010000002 COSYNTROPIN PER 0.25 MG: Performed by: INTERNAL MEDICINE

## 2021-04-11 PROCEDURE — G0378 HOSPITAL OBSERVATION PER HR: HCPCS

## 2021-04-11 PROCEDURE — 25010000002 HEPARIN (PORCINE) PER 1000 UNITS: Performed by: INTERNAL MEDICINE

## 2021-04-11 PROCEDURE — 83735 ASSAY OF MAGNESIUM: CPT | Performed by: PHYSICIAN ASSISTANT

## 2021-04-11 PROCEDURE — 82024 ASSAY OF ACTH: CPT | Performed by: INTERNAL MEDICINE

## 2021-04-11 PROCEDURE — 85025 COMPLETE CBC W/AUTO DIFF WBC: CPT | Performed by: PHYSICIAN ASSISTANT

## 2021-04-11 PROCEDURE — 82533 TOTAL CORTISOL: CPT | Performed by: INTERNAL MEDICINE

## 2021-04-11 PROCEDURE — 99214 OFFICE O/P EST MOD 30 MIN: CPT | Performed by: INTERNAL MEDICINE

## 2021-04-11 PROCEDURE — 80048 BASIC METABOLIC PNL TOTAL CA: CPT | Performed by: PHYSICIAN ASSISTANT

## 2021-04-11 RX ORDER — NITROGLYCERIN 0.4 MG/1
0.4 TABLET SUBLINGUAL
Qty: 25 TABLET | Refills: 0 | Status: ON HOLD | OUTPATIENT
Start: 2021-04-11 | End: 2022-02-07

## 2021-04-11 RX ORDER — LISINOPRIL 5 MG/1
5 TABLET ORAL DAILY
Qty: 30 TABLET | Refills: 0 | Status: SHIPPED | OUTPATIENT
Start: 2021-04-11 | End: 2021-04-20 | Stop reason: SDUPTHER

## 2021-04-11 RX ORDER — HYDROCORTISONE 10 MG/1
10 TABLET ORAL 3 TIMES DAILY
Status: DISCONTINUED | OUTPATIENT
Start: 2021-04-11 | End: 2021-04-11 | Stop reason: HOSPADM

## 2021-04-11 RX ORDER — HYDROCORTISONE 10 MG/1
10 TABLET ORAL 3 TIMES DAILY
Qty: 100 TABLET | Refills: 0 | Status: SHIPPED | OUTPATIENT
Start: 2021-04-11 | End: 2021-04-16 | Stop reason: SDUPTHER

## 2021-04-11 RX ADMIN — HYDROCORTISONE 10 MG: 10 TABLET ORAL at 12:46

## 2021-04-11 RX ADMIN — HEPARIN SODIUM 5000 UNITS: 5000 INJECTION INTRAVENOUS; SUBCUTANEOUS at 09:00

## 2021-04-11 RX ADMIN — SERTRALINE HYDROCHLORIDE 100 MG: 100 TABLET ORAL at 09:00

## 2021-04-11 RX ADMIN — LISINOPRIL 5 MG: 5 TABLET ORAL at 09:00

## 2021-04-11 RX ADMIN — PANTOPRAZOLE SODIUM 40 MG: 40 TABLET, DELAYED RELEASE ORAL at 09:00

## 2021-04-11 RX ADMIN — COSYNTROPIN 0.25 MG: 0.25 INJECTION, POWDER, LYOPHILIZED, FOR SOLUTION INTRAMUSCULAR; INTRAVENOUS at 05:59

## 2021-04-11 RX ADMIN — ATORVASTATIN CALCIUM 80 MG: 40 TABLET, FILM COATED ORAL at 09:00

## 2021-04-11 RX ADMIN — Medication 10 ML: at 09:01

## 2021-04-11 RX ADMIN — HYDROCORTISONE 10 MG: 10 TABLET ORAL at 16:32

## 2021-04-11 NOTE — PLAN OF CARE
Problem: Adult Inpatient Plan of Care  Goal: Plan of Care Review  Outcome: Ongoing, Progressing  Flowsheets  Taken 4/11/2021 0348 by Paco Mckinnon, RN  Plan of Care Reviewed With: patient  Taken 4/10/2021 1744 by Lucinda Driscoll, RN  Outcome Summary: Patient had an endocrinology consult today and Dr. Gerard came and saw the patient and would like to keep the patient another night and do an ACTH and cortisol test in the AM. VSS, ortho VSS, SR on the monitor, falls risk maintained, will monitor   Goal Outcome Evaluation:  Plan of Care Reviewed With: patient  Progress: no change

## 2021-04-11 NOTE — PROGRESS NOTES
Cardiology Progress Note    Patient Identification:  Name: Katie Reddy  Age: 76 y.o.  Sex: female  :  1945  MRN: [id number]                 Follow Up / Chief Complaint: Dizziness, recurrent syncope, orthostatic hypotension, history of CVA.  Chief Complaint   Patient presents with   • Dizziness       Interval History: Patient ruled out for MI.  Underwent Lexiscan Cardiolite 21 which is negative for ischemia.       Subjective: Patient seen and examined.  Labs reviewed.  Discussed with RN taking care of patient.      Objective: Hemoglobin is 12, BUN/creatinine 20/0.93  Cortisol is low         History of Present Illness:       This is a 76-year-old with PMH of     Uncontrolled hypertension  Dyslipidemia  History of hemorrhagic occipital CVA  GERD, osteoporosis, chronic pain  Cholecystectomy, hernia repair     Presented with complaint of recurrent dizziness and near syncope and syncope episodes.  Patient denies any bowel bladder incontinence.  Patient has been noticing very high blood pressures.  Work-up here revealed negative troponin x2.  proBNP is normal at 271.  CMP for 621 was normal.  Hemoglobin A1c for 721 was 4.9.  Lipid profile revealed cholesterol 197 HDL 57  triglycerides 127.  MRI with contrast 2021 reveals no acute intracranial abnormality no acute infarct no intracranial mass or mass-effect.  Old left occipital hemorrhagic infarct  Chronic small vessel ischemic changes     Chest x-ray 2021 reveals no acute chest findings.  EKG done 2021 reviewed by me shows sinus rhythm with a rate of 76 bpm with PVCs.  Echocardiogram 2021 reviewed by me shows EF of 60 to 65% with mild AR and mild MR  Carotid Dopplers 2021 reveal mild bilateral carotid disease        Assessment:       -Recurrent syncope  -Orthostatic hypotension  -Low a.m. cortisol  -Hypertensive emergency  -History of hemorrhagic left occipital CVA  -Mild AR, mild MR        Recommendations / Plan:          Telemetry is revealing sinus rhythm.  Echocardiogram is revealing mild AR and MR normal LV systolic function.  Checked orthostatics and patient is orthostatic.  Patient's orthostasis improved with IV fluids.  A.m. cortisol was 1.26, is low  Consulted endocrinology.  Appreciated 's input.  ACTH stimulation test has been ordered.  Will follow..  Control blood pressure with lisinopril as tolerated for now.  Give as needed intravenous labetalol.  Reviewed CT head, MRI head and chest x-ray results and documented in HPI.  Patient's EKG is unremarkable except for PVCs.  Patient says she is been passing out multiple times, but has been here many days and has not passed out yet.  Patient had Lexiscan Cardiolite today which was negative for ischemia.   Will monitor rhythm.  We will schedule for implantable loop recorder .  Patient underwent Prim[employer]‚¬[employer]Visionk implantable loop recorder 4/9/21  We will follow-up in 1 week in the office for staple removal.  Discussed about wound care, leave Band-Aid on for 1 week.  Patient had IV infiltration and right arm has some ecchymosis we will continue to monitor.  Discussed with hospitalist.  Discussed with RN taking care of patient to coordinate care.  We will follow and consider further evaluation and treatment.  Discussed with RN taking care of patient to coordinate care.  We will follow-up in 1 week as outpatient for staple removal and follow-up with recorder in pacemaker clinic.       Past Medical History:  Past Medical History:   Diagnosis Date   • Arthritis    • Bladder incontinence    • Colon polyp    • DEXA     OSTEOPENIA = 2018 (-1.3/ -1.7); 2020 (-1.7/ -1.7)   • GERD    • Headache    • Hypertension    • Intracerebral hemorrhage/ CVA    • MAMMO     NEG =2020   • Osteopenia    • Vitamin D deficiency      Past Surgical History:  Past Surgical History:   Procedure Laterality Date   • APPENDECTOMY     • BREAST CYST EXCISION     • BREAST LUMPECTOMY Left 1973    NEG   •  BUNIONECTOMY Right 5/29/2020    Procedure: BUNIONECTOMY DEVONTE;  Surgeon: MARISSA Em DPM;  Location: [facility] MAIN OR;  Service: Podiatry;  Laterality: Right;   • CARPAL TUNNEL RELEASE Right 1980   • CHOLECYSTECTOMY     • COLON SURGERY      hemorrhoid banding   • COLONOSCOPY  2017 2017/ 2019 = TA, rech 2024   Dr. Mackey   • EYE SURGERY     • HERNIA REPAIR      Umbilical removal   • HYSTERECTOMY  1970   • OTHER SURGICAL HISTORY      bladder stimulator placement     pt cant have mri   • SUBTOTAL HYSTERECTOMY          Social History:   Social History     Tobacco Use   • Smoking status: Never Smoker   • Smokeless tobacco: Never Used   Substance Use Topics   • Alcohol use: No      Family History:  Family History   Problem Relation Age of Onset   • Heart disease Mother    • Hypertension Mother    • Diabetes Father    • Heart disease Father    • Alcohol abuse Father    • Hypertension Father    • Diabetes Brother    • Heart disease Brother    • Cancer Brother 68        Prostate Cancer   • Hypertension Brother    • Diabetes Maternal Aunt    • Heart disease Maternal Grandmother    • Hypertension Maternal Grandmother    • Heart disease Maternal Grandfather    • Hypertension Maternal Grandfather    • Liver disease Paternal Grandmother    • Hypertension Paternal Grandmother    • Heart disease Paternal Grandfather    • Hypertension Paternal Grandfather    • Dementia Sister           Allergies:  Allergies   Allergen Reactions   • Trazodone Irritability     Scheduled Meds:  amitriptyline, 25 mg, Nightly  atorvastatin, 80 mg, Daily  heparin (porcine), 5,000 Units, Q12H  lisinopril, 5 mg, Q24H  pantoprazole, 40 mg, Daily  polyethylene glycol, 17 g, Daily  sennosides-docusate, 2 tablet, Nightly  sertraline, 100 mg, Daily  sodium chloride, 10 mL, Q12H  sodium chloride, 10 mL, Q12H          Review of Systems:   ROS    Constitution: Negative for chills and fever.   Cardiovascular: Negative for chest pain and palpitations.  "  Respiratory: Negative for cough and hemoptysis.    Gastrointestinal: Negative for nausea.        Constitutional:  Temp:  [97.4 °F (36.3 °C)-98.2 °F (36.8 °C)] 98.2 °F (36.8 °C)  Heart Rate:  [63-82] 72  Resp:  [12-18] 18  BP: ()/(55-77) 135/77    Physical Exam   /77 (BP Location: Right arm, Patient Position: Lying)   Pulse 72   Temp 98.2 °F (36.8 °C) (Oral)   Resp 18   Ht 152.4 cm (60\")   Wt 62.8 kg (138 lb 7.2 oz)   SpO2 95%   Breastfeeding No   BMI 27.04 kg/m²   General:  Appears in no acute distress  Eyes: Sclera is anicteric,  conjunctiva is clear   HEENT:  No JVD. Thyroid not visibly enlarged. No mucosal pallor or cyanosis  Respiratory: Respirations regular and unlabored at rest.  Bilaterally good breath sounds, with good air entry in all fields. No crackles, rubs or wheezes auscultated  Cardiovascular: S1,S2 Regular rate and rhythm. No murmur, rub or gallop auscultated.  . No pretibial pitting edema  Gastrointestinal: Abdomen nondistended, soft  Musculoskeletal:  No abnormal movements  Extremities: No digital clubbing or cyanosis  Skin: Ecchymosis in right arm from IV infiltration  Neuro: Alert and awake, no lateralizing deficits appreciated    INTAKE AND OUTPUT:    Intake/Output Summary (Last 24 hours) at 4/11/2021 0946  Last data filed at 4/11/2021 0605  Gross per 24 hour   Intake 120 ml   Output 900 ml   Net -780 ml       Cardiographics  Telemetry: Sinus rhythm    ECG:   ECG 12 Lead   Final Result   HEART RATE= 76  bpm   RR Interval= 792  ms   FL Interval= 169  ms   P Horizontal Axis= 16  deg   P Front Axis= 63  deg   QRSD Interval= 91  ms   QT Interval= 386  ms   QRS Axis= 51  deg   T Wave Axis= 21  deg   - OTHERWISE NORMAL ECG -   Sinus rhythm   Ventricular premature complex   When compared with ECG of 02-Mar-2021 10:44:35,   No significant change   Electronically Signed By: Paco Smith) 08-Apr-2021 07:20:16   Date and Time of Study: 2021-04-06 12:54:12        I have " "personally reviewed EKG    Echocardiogram: Results for orders placed during the hospital encounter of 04/06/21    Adult Transthoracic Echo Complete W/ Cont if Necessary Per Protocol    Interpretation Summary  Normal LV size and contractility EF of 60 to 65%  Normal RV size  Normal atrial size  Aortic valve appears thickened, leaflets are not well visualized.  Mild aortic regurgitation seen.  Mitral valve posterior leaflet appears calcified.  Mild mitral regurgitation seen..  Tricuspid valve appears structurally normal, no significant regurgitation seen.  No pericardial effusion seen.  Proximal aorta appears normal in size.      Lab Review   I have reviewed the labs  Results from last 7 days   Lab Units 04/09/21  0252 04/06/21  2150 04/06/21  1443   TROPONIN T ng/mL <0.010 <0.010 <0.010     Results from last 7 days   Lab Units 04/11/21  0602   MAGNESIUM mg/dL 1.8     Results from last 7 days   Lab Units 04/11/21  0602   SODIUM mmol/L 140   POTASSIUM mmol/L 4.8   BUN mg/dL 20   CREATININE mg/dL 0.93   CALCIUM mg/dL 9.5         Results from last 7 days   Lab Units 04/11/21  0602 04/10/21  0238 04/09/21  0252   WBC 10*3/mm3 6.50 7.30 6.50   HEMOGLOBIN g/dL 12.0 10.7* 11.3*   HEMATOCRIT % 35.1 31.2* 34.0   PLATELETS 10*3/mm3 256 230 229     Results from last 7 days   Lab Units 04/06/21  1443   INR  <0.93*   APTT seconds 23.1*       RADIOLOGY:  Imaging Results (Last 24 Hours)     ** No results found for the last 24 hours. **                )4/11/2021  Master Richard MD      EMR Dragon/Transcription:   \"Dictated utilizing Dragon dictation\".   "

## 2021-04-11 NOTE — DISCHARGE SUMMARY
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 (CMS/[facility]) [E27.49, K22.0]  Unknown     Priority: High   • Syncope [R55]  Yes     Priority: High   • Essential hypertension [I10]  Yes     Priority: High   • History of hemorrhagic cerebrovascular accident (CVA) with residual deficit [I69.30]  Not Applicable     Priority: Medium   • GERD (gastroesophageal reflux disease) [K21.9]  Yes     Priority: Medium   • Generalized anxiety disorder [F41.1]  Yes     Priority: Medium   • Dyslipidemia [E78.5]  Yes     Priority: Medium   • Primary osteoarthritis of right knee [M17.11]  Yes   • Primary osteoarthritis of left knee [M17.12]  Yes      Resolved Hospital Problems   No resolved problems to display.     Hospital Course:  Katie Reddy is a 76 y.o. female who has a history of hypertension who has had uncontrolled hypertension for 3 to 4 weeks prior to admission.  The patient had several syncopal events associated with this.  The patient was admitted to the hospital and was ruled out for myocardial injury.  The patient has a history of cerebrovascular accident and is chronically had a headache since that time.  The patient was seen by cardiology and underwent a stress test which was unremarkable.  The patient did have a loop recorder implanted on 2021.  She will need to be seen by Dr. Smith's office in 7 days for removal of that loop device.  They also  did an echocardiogram which showed a good ejection fraction with some aortic and mitral valve regurgitation.  This was not felt to be a significant risk at this time.  And some the patient was also seen by Dr. Gerard of endocrinology for orthostatic hypotension and for a low serum cortisol.  The patient had serum cortisol levels done and was diagnosed with glucocorticoid deficiency.  The patient was started on glucocorticoid, steroids per endocrinology and will need to follow-up with them in 2 weeks time.      The patient is a full code at the time of discharge.  The patient's medications are as listed in that section of this report.  The patient is on a cardiac diet.  The patient has no pending studies at the time of discharge.  The patient should follow-up with her primary care provider in 3 to 5 days, with Dr. Richard's office in 1 week's time and with Dr. Gerard in 2 weeks time.  The patient is discharged in satisfactory condition.      Recommendation for Outpatient Providers:     Reasons For Change In Medications and Indications for New Medications:    Day of Discharge     HPI:   The patient overall is feeling well.  The patient has had no more dizzy spells or problems with feeling faint or unable to walk in the past 48 hours.  She has been cleared by cardiology and endocrinology for discharge.    Vital Signs:   Temp:  [97.7 °F (36.5 °C)-99.1 °F (37.3 °C)] 99.1 °F (37.3 °C)  Heart Rate:  [] 92  Resp:  [12-18] 14  BP: ()/(55-77) 110/72     Physical Exam:  Physical Exam  Vitals and nursing note reviewed.   Constitutional:       General: She is not in acute distress.     Appearance: Normal appearance. She is well-developed. She is not ill-appearing, toxic-appearing or diaphoretic.   HENT:      Head: Normocephalic and atraumatic.      Right Ear: Ear canal and external ear normal.      Left Ear: Ear canal and external ear normal.      Nose: Nose normal. No congestion or rhinorrhea.      Mouth/Throat:       Mouth: Mucous membranes are moist.      Pharynx: No oropharyngeal exudate.   Eyes:      General: No scleral icterus.        Right eye: No discharge.         Left eye: No discharge.      Extraocular Movements: Extraocular movements intact.      Conjunctiva/sclera: Conjunctivae normal.      Pupils: Pupils are equal, round, and reactive to light.   Neck:      Thyroid: No thyromegaly.      Vascular: No carotid bruit or JVD.      Trachea: No tracheal deviation.   Cardiovascular:      Rate and Rhythm: Normal rate and regular rhythm.      Pulses: Normal pulses.      Heart sounds: Normal heart sounds. No murmur heard.   No friction rub. No gallop.    Pulmonary:      Effort: Pulmonary effort is normal. No respiratory distress.      Breath sounds: Normal breath sounds. No stridor. No wheezing, rhonchi or rales.   Chest:      Chest wall: No tenderness.   Abdominal:      General: Bowel sounds are normal. There is no distension.      Palpations: Abdomen is soft. There is no mass.      Tenderness: There is no abdominal tenderness. There is no guarding or rebound.      Hernia: No hernia is present.   Musculoskeletal:         General: No swelling, tenderness, deformity or signs of injury. Normal range of motion.      Cervical back: Normal range of motion and neck supple. No rigidity. No muscular tenderness.      Right lower leg: No edema.      Left lower leg: No edema.   Lymphadenopathy:      Cervical: No cervical adenopathy.   Skin:     General: Skin is warm and dry.      Coloration: Skin is not jaundiced or pale.      Findings: No bruising, erythema or rash.   Neurological:      General: No focal deficit present.      Mental Status: She is alert and oriented to person, place, and time. Mental status is at baseline.      Cranial Nerves: No cranial nerve deficit.      Sensory: No sensory deficit.      Motor: No weakness or abnormal muscle tone.      Coordination: Coordination normal.   Psychiatric:         Mood and Affect: Mood  normal.         Behavior: Behavior normal.         Thought Content: Thought content normal.         Judgment: Judgment normal.         Pertinent  and/or Most Recent Results     Results from last 7 days   Lab Units 04/11/21  0602 04/10/21  0238 04/09/21  0252 04/08/21  0438 04/07/21  0910 04/07/21  0909 04/06/21 2150 04/06/21  1443   WBC 10*3/mm3 6.50 7.30 6.50 6.00  --  5.50  --  5.60   HEMOGLOBIN g/dL 12.0 10.7* 11.3* 12.3  --  11.3*  --  12.1   HEMATOCRIT % 35.1 31.2* 34.0 36.0  --  33.2*  --  35.8   PLATELETS 10*3/mm3 256 230 229 249  --  232  --  255   SODIUM mmol/L 140 136 134* 137 140  --   --  140   POTASSIUM mmol/L 4.8 4.1 4.7 4.5 4.2  --  3.9 4.0   CHLORIDE mmol/L 105 106 102 102 105  --   --  106   CO2 mmol/L 26.0 22.0 26.0 27.0 27.0  --   --  24.0   BUN mg/dL 20 21 25* 21 18  --   --  11   CREATININE mg/dL 0.93 0.68 0.89 1.05* 0.72  --   --  0.85   GLUCOSE mg/dL 99 97 88 98 78  --   --  90   CALCIUM mg/dL 9.5 9.0 8.8 9.4 9.0  --   --  8.9     Results from last 7 days   Lab Units 04/06/21  1443   BILIRUBIN mg/dL 0.4   ALK PHOS U/L 82   ALT (SGPT) U/L 17   AST (SGOT) U/L 18   PROTIME Seconds 10.0   INR  <0.93*   APTT seconds 23.1*     Results from last 7 days   Lab Units 04/07/21  0910   CHOLESTEROL mg/dL 197   TRIGLYCERIDES mg/dL 127   HDL CHOL mg/dL 57     Results from last 7 days   Lab Units 04/11/21  0702 04/11/21  0632 04/11/21  0602 04/09/21  0252 04/07/21  0910 04/06/21 2150 04/06/21  1443   TSH uIU/mL  --   --   --   --   --   --  1.980   CORTISOL mcg/dL 13.83 12.18 6.01 1.26  --   --   --    HEMOGLOBIN A1C %  --   --   --   --  4.9  --   --    PROBNP pg/mL  --   --   --   --   --   --  271.6   TROPONIN T ng/mL  --   --   --  <0.010  --  <0.010 <0.010       Brief Urine Lab Results  (Last result in the past 365 days)      Color   Clarity   Blood   Leuk Est   Nitrite   Protein   CREAT   Urine HCG        04/06/21 1513 Yellow Clear Small (1+) Negative Negative Negative               Microbiology  Results Abnormal     Procedure Component Value - Date/Time    COVID PRE-OP / PRE-PROCEDURE SCREENING ORDER (NO ISOLATION) - Swab, Nasopharynx [[id number]]  (Normal) Collected: 04/07/21 0012    Lab Status: Final result Specimen: Swab from Nasopharynx Updated: 04/07/21 0100    Narrative:      The following orders were created for panel order COVID PRE-OP / PRE-PROCEDURE SCREENING ORDER (NO ISOLATION) - Swab, Nasopharynx.  Procedure                               Abnormality         Status                     ---------                               -----------         ------                     COVID-19,CEPHEID,COR/NINA...[[id number]]  Normal              Final result                 Please view results for these tests on the individual orders.    COVID-19,CEPHEID,COR/NINA/PAD IN-HOUSE(OR EMERGENT/ADD-ON),NP SWAB IN TRANSPORT MEDIA 3-4 HR TAT, RT-PCR - Swab, Nasopharynx [[id number]]  (Normal) Collected: 04/07/21 0012    Lab Status: Final result Specimen: Swab from Nasopharynx Updated: 04/07/21 0100     COVID19 Not Detected    Narrative:      Fact sheet for providers: https://www.fda.gov/media/251166/download     Fact sheet for patients: https://www.fda.gov/media/935101/download          CT Head Without Contrast    Result Date: 4/6/2021  Impression: 1.No acute intracranial abnormality on head CT. 2.Remote left occipital parietal lobe infarct with encephalomalacia. 3.Mild amount of low-density in the periventricular and subcortical white matter consistent with chronic small vessel ischemic change. 4.Brain MRI is more sensitive to evaluate for acute or subacute infarcts and to evaluate for intracranial metastatic disease.  Electronically Signed By-Lor Gonzalez MD On:4/6/2021 6:37 PM This report was finalized on [id number] by  Lor Gonzalez MD.    MRI Brain Without Contrast    Result Date: 4/7/2021  Impression:  1. No acute intracranial abnormality. No acute infarct. No intracranial mass/mass effect. 2. Sequela of old left  occipital hemorrhagic infarct. 3. Mild scattered periventricular and subcortical white matter high T2/FLAIR signal foci, statistically represent chronic small vessel ischemic changes.  Electronically Signed By-Derrick Orozco MD On:4/7/2021 8:18 AM This report was finalized on [id number] by  Derrick Orozco MD.    XR Chest 1 View    Result Date: 4/6/2021  Impression: No acute chest findings.  Electronically Signed By-Maddi Bhagat MD On:4/6/2021 2:09 PM This report was finalized on [id number] by  Maddi Bhagat MD.      Results for orders placed during the hospital encounter of 04/06/21    Duplex Carotid Ultrasound CAR    Interpretation Summary  · Right internal carotid artery mild stenosis.  · Left internal carotid artery mild stenosis.      Results for orders placed during the hospital encounter of 04/06/21    Duplex Carotid Ultrasound CAR    Interpretation Summary  · Right internal carotid artery mild stenosis.  · Left internal carotid artery mild stenosis.      Results for orders placed during the hospital encounter of 04/06/21    Adult Transthoracic Echo Complete W/ Cont if Necessary Per Protocol    Interpretation Summary  Normal LV size and contractility EF of 60 to 65%  Normal RV size  Normal atrial size  Aortic valve appears thickened, leaflets are not well visualized.  Mild aortic regurgitation seen.  Mitral valve posterior leaflet appears calcified.  Mild mitral regurgitation seen..  Tricuspid valve appears structurally normal, no significant regurgitation seen.  No pericardial effusion seen.  Proximal aorta appears normal in size.              Test Results Pending at Discharge  Pending Labs     Order Current Status    ACTH In process    Renin Activity & Aldosterone In process            Procedures Performed           Consults:   Consults     Date and Time Order Name Status Description    4/9/2021  3:32 PM Inpatient Endocrinology Consult      4/8/2021 10:24 AM Inpatient Cardiology Consult Completed      4/6/2021  6:57 PM Hospitalist (on-call MD unless specified) Completed             Discharge Details        Discharge Medications      New Medications      Instructions Start Date   hydrocortisone 10 MG tablet  Commonly known as: CORTEF   10 mg, Oral, 3 Times Daily      lisinopril 5 MG tablet  Commonly known as: PRINIVIL,ZESTRIL   5 mg, Oral, Daily      nitroglycerin 0.4 MG SL tablet  Commonly known as: NITROSTAT   0.4 mg, Sublingual, Every 5 Minutes PRN, Take no more than 3 doses in 15 minutes.         Continue These Medications      Instructions Start Date   albuterol 1.25 MG/3ML nebulizer solution  Commonly known as: ACCUNEB   1.25 mg, Nebulization, Every 6 Hours PRN      amitriptyline 25 MG tablet  Commonly known as: ELAVIL   25 mg, Oral, Every Night at Bedtime      atorvastatin 80 MG tablet  Commonly known as: LIPITOR   TAKE 1 TABLET BY MOUTH EVERY DAY      Calcium 1200 1812-7948 MG-UNIT chewable tablet   1 tablet, Oral, Daily      celecoxib 100 MG capsule  Commonly known as: CeleBREX   100 mg, Oral, Daily      docusate sodium 100 MG capsule  Commonly known as: COLACE   100 mg, Oral, 2 Times Daily      hydrocortisone 2.5 % cream   1 application, Topical, 2 Times Daily      ibandronate 150 MG tablet  Commonly known as: Boniva   150 mg, Oral, Every 30 Days      Myrbetriq 50 MG tablet sustained-release 24 hour 24 hr tablet  Generic drug: Mirabegron ER   50 mg, Oral, Daily      ondansetron 4 MG tablet  Commonly known as: ZOFRAN   4 mg, Oral, Every 8 Hours PRN      pantoprazole 40 MG EC tablet  Commonly known as: PROTONIX   40 mg, Oral, Daily      polyethylene glycol 17 GM/SCOOP powder  Commonly known as: MIRALAX   MIX 17 GRAMS (ONE CAPFUL) AS DIRECTED AND DRINK BY MOUTH DAILY.      sennosides-docusate 8.6-50 MG per tablet  Commonly known as: PERICOLACE   2 tablets, Oral, Nightly      sertraline 100 MG tablet  Commonly known as: ZOLOFT   100 mg, Oral, Daily         Stop These Medications    cholecalciferol 25 MCG  (1000 UT) tablet  Commonly known as: VITAMIN D3     cyclobenzaprine 10 MG tablet  Commonly known as: FLEXERIL     famotidine 20 MG tablet  Commonly known as: PEPCID            Allergies   Allergen Reactions   • Trazodone Irritability         Discharge Disposition:  Home or Self Care    Diet:  Hospital:  Diet Order   Procedures   • Diet Cardiac; Healthy Heart         Discharge Activity:   Activity Instructions     Activity as Tolerated              CODE STATUS:    Code Status and Medical Interventions:   Ordered at: 04/06/21 1941     Code Status:    CPR     Medical Interventions (Level of Support Prior to Arrest):    Full         Follow-up Appointments  Future Appointments   Date Time Provider Department Center   4/27/2021  2:30 PM Brooklyn Contreras DO [facility]   5/10/2021  2:45 PM Seipel, Joseph F, MD [facility]   6/1/2021  1:00 PM Shawn Elaine MD [facility]       Additional Instructions for the Follow-ups that You Need to Schedule     Discharge Follow-up with PCP   As directed       Currently Documented PCP:    Brooklyn Contreras DO    PCP Phone Number:    [phone]     Follow Up Details: in one week         Discharge Follow-up with Specified Provider: With Dr. Gerard of endocrinology; 2 Weeks   As directed      To: With Dr. Gerard of endocrinology    Follow Up: 2 Weeks         Discharge Follow-up with Specified Provider: with Dr. Richard; 1 Week   As directed      To: with Dr. Richard    Follow Up: 1 Week        Additional information on Labs and Follow-ups:     Dr. Gerard--endocrinology--- [phone]    [address] [place] [address], [place] [address]    Dr. Richard--cardiology---[phone]   [address] [place] [address] [place] [facility]              Condition on Discharge:      Stable      This patient has been examined wearing appropriate Personal Protective Equipment and discussed with hospital infection control department. 04/11/21      Electronically signed by Peace Graham MD, 04/11/21,  4:51 PM EDT.      Time: I spent  30  minutes on this discharge activity which included face-to-face encounter with the patient/reviewing the data in the system/coordination of the care with the nursing staff as well as consultants/documentation/entering orders.

## 2021-04-11 NOTE — NURSING NOTE
Dr. Gerard called and advised that the cortisol and ACTH test was abnormal and that he will be adding on hydrocortisone PO 10mg TID first dose now. He said that he is ok with discharge and that she needs to follow up in his office in 4-5 weeks, will monitor

## 2021-04-11 NOTE — DISCHARGE INSTR - LAB
Dr. Gerard--endocrinology--- [phone]    [address] [place] [address]    Dr. Richard--cardiology---[phone]   [address] [place] [address]

## 2021-04-11 NOTE — PLAN OF CARE
Goal Outcome Evaluation:  Plan of Care Reviewed With: patient  Progress: improving  Outcome Summary: Patient was signed off by cardiology and endocrinology. She was started on hydrocortisonePO 10mg TID due to abnormal cortisol/ACTH test, is currently having episodes of orthostatic hypotension but not having any symptoms and Dr. Gerard is ok with discharge still and to have the patient take the medication and that this should help. And is ok with discharge home. Cardiology has signed off as well, SR on the monitor, no other complaints, no dizziness, will be discharged home  Problem: Adult Inpatient Plan of Care  Goal: Plan of Care Review  Outcome: Met  Flowsheets (Taken 4/11/2021 1610)  Progress: improving  Plan of Care Reviewed With: patient  Outcome Summary: Patient was signed off by cardiology and endocrinology. She was started on hydrocortisonePO 10mg TID due to abnormal cortisol/ACTH test, is currently having episodes of orthostatic hypotension but not having any symptoms and Dr. Gerard is ok with discharge still and to have the patient take the medication and that this should help.  Goal: Patient-Specific Goal (Individualized)  Outcome: Met  Goal: Absence of Hospital-Acquired Illness or Injury  Outcome: Met  Intervention: Identify and Manage Fall Risk  Recent Flowsheet Documentation  Taken 4/11/2021 1510 by Lucinda Driscoll, RN  Safety Promotion/Fall Prevention:   safety round/check completed   activity supervised   assistive device/personal items within reach   clutter free environment maintained   fall prevention program maintained   gait belt   lighting adjusted   nonskid shoes/slippers when out of bed   room organization consistent  Taken 4/11/2021 1300 by Lucinda Driscoll, RN  Safety Promotion/Fall Prevention: safety round/check completed  Taken 4/11/2021 1120 by Lucinda Driscoll, RN  Safety Promotion/Fall Prevention:   safety round/check completed   activity supervised   assistive device/personal items  within reach   clutter free environment maintained   fall prevention program maintained   lighting adjusted   nonskid shoes/slippers when out of bed   room organization consistent  Taken 4/11/2021 0907 by Lucinda Driscoll RN  Safety Promotion/Fall Prevention: safety round/check completed  Taken 4/11/2021 0711 by Lucinda Driscoll RN  Safety Promotion/Fall Prevention:   safety round/check completed   activity supervised   assistive device/personal items within reach   clutter free environment maintained   fall prevention program maintained   gait belt   lighting adjusted   nonskid shoes/slippers when out of bed   room organization consistent  Intervention: Prevent Skin Injury  Recent Flowsheet Documentation  Taken 4/11/2021 0711 by Lucinda Driscoll, RN  Body Position:   supine   sitting up in bed   position changed independently  Goal: Optimal Comfort and Wellbeing  Outcome: Met  Intervention: Provide Person-Centered Care  Recent Flowsheet Documentation  Taken 4/11/2021 0711 by Lucinda Driscoll RN  Trust Relationship/Rapport:   care explained   choices provided   emotional support provided   empathic listening provided   questions answered   questions encouraged   reassurance provided   thoughts/feelings acknowledged  Goal: Readiness for Transition of Care  Outcome: Met     Problem: Syncope  Goal: Absence of Syncopal Symptoms  Outcome: Met  Intervention: Manage Effect of Syncopal Symptoms  Recent Flowsheet Documentation  Taken 4/11/2021 0711 by Lucinda Driscoll RN  Supportive Measures: active listening utilized     Problem: Hypertension Acute  Goal: Blood Pressure Within Desired Range  Outcome: Met  Intervention: Normalize Blood Pressure  Recent Flowsheet Documentation  Taken 4/11/2021 0711 by Lucinda Driscoll RN  Sensory Stimulation Regulation:   care clustered   auditory stimulation minimized  Medication Review/Management: medications reviewed     Problem: Fall Injury Risk  Goal: Absence of Fall and Fall-Related  Injury  Outcome: Met  Intervention: Identify and Manage Contributors to Fall Injury Risk  Recent Flowsheet Documentation  Taken 4/11/2021 0711 by Lucinda Driscoll, RN  Medication Review/Management: medications reviewed  Intervention: Promote Injury-Free Environment  Recent Flowsheet Documentation  Taken 4/11/2021 1510 by Lucinda Driscoll, RN  Safety Promotion/Fall Prevention:   safety round/check completed   activity supervised   assistive device/personal items within reach   clutter free environment maintained   fall prevention program maintained   gait belt   lighting adjusted   nonskid shoes/slippers when out of bed   room organization consistent  Taken 4/11/2021 1300 by Lucinda Driscoll, RN  Safety Promotion/Fall Prevention: safety round/check completed  Taken 4/11/2021 1120 by Lucinda Driscoll, RN  Safety Promotion/Fall Prevention:   safety round/check completed   activity supervised   assistive device/personal items within reach   clutter free environment maintained   fall prevention program maintained   lighting adjusted   nonskid shoes/slippers when out of bed   room organization consistent  Taken 4/11/2021 0907 by Lucinda Driscoll, RN  Safety Promotion/Fall Prevention: safety round/check completed  Taken 4/11/2021 0711 by Lucinda Driscoll, RN  Safety Promotion/Fall Prevention:   safety round/check completed   activity supervised   assistive device/personal items within reach   clutter free environment maintained   fall prevention program maintained   gait belt   lighting adjusted   nonskid shoes/slippers when out of bed   room organization consistent

## 2021-04-11 NOTE — OUTREACH NOTE
Prep Survey      Responses   [facility] facility patient discharged from?  [facility]   Is LACE score < 7 ?  No   Emergency Room discharge w/ pulse ox?  No   Eligibility  TCM   Hospital  [facility]   Date of Admission  04/06/21   Date of Discharge  04/11/21   Discharge Disposition  Home or Self Care   Discharge diagnosis  Hypertensive emergency, loop recorder implanted, syncope   Does the patient have one of the following disease processes/diagnoses(primary or secondary)?  Other   Does the patient have Home health ordered?  No   Is there a DME ordered?  No   Prep survey completed?  Yes          Gabriela Cunha RN

## 2021-04-11 NOTE — PROGRESS NOTES
Daily Progress Note    Patient Care Team:  Brooklyn Contreras DO as PCP - General  Brooklyn Contreras DO as PCP - Family Medicine    Chief Complaint: Follow-up low cortisol level    HPI: Patient seen and examined today.  Underwent ACTH stimulation test today.  Her peak cortisol is about 13.  This is distant with adrenal insufficiency.  Most likely secondary to the steroid injections in the knees that she has been getting so far she has gotten 5+ injections.  She has been started on hydrocortisone 10 mg 3 times daily.  Clinically doing well at this time.  Denies any significant issues but he still has some orthostasis.    ROS:   Constitutional:  Denies fatigue, tiredness.    Eyes:  Denies change in visual acuity   HENT:  Denies nasal congestion or sore throat   Respiratory: denies cough, shortness of breath.   Cardiovascular:  denies chest pain, edema   GI:  Denies abdominal pain, nausea, vomiting.   :  Denies polyuria and polydipsia  Musculoskeletal:  Denies back pain or joint pain   Integument:  Denies rash   Neurologic:  Denies headache, focal weakness or sensory changes   Endocrine:  Denies polyuria or polydipsia   Psychiatric:  Denies depression or anxiety       Vitals:    04/11/21 1100   BP: 97/66   Pulse: 108   Resp:    Temp:    SpO2:      Body mass index is 27.04 kg/m².    Physical Exam:  GEN: NAD, conversant  EYES: EOMI, PERRL, no conjunctival erythema  NECK: no thyromegaly, full ROM   CV: RRR, no murmurs/rubs/gallops, no peripheral edema  LUNG: CTAB, no wheezes/rales/ronchi  SKIN: no rashes, no acanthosis  MSK: no deformities, full ROM of all extremities  NEURO: no tremors, DTR normal  PSYCH: AOX3, appropriate mood, affect normal      Results Review:     I reviewed the patient's new clinical results.    Glucose   Date Value Ref Range Status   04/11/2021 99 65 - 99 mg/dL Final     Sodium   Date Value Ref Range Status   04/11/2021 140 136 - 145 mmol/L Final     Potassium   Date Value Ref Range Status    04/11/2021 4.8 3.5 - 5.2 mmol/L Final     CO2   Date Value Ref Range Status   04/11/2021 26.0 22.0 - 29.0 mmol/L Final     Chloride   Date Value Ref Range Status   04/11/2021 105 98 - 107 mmol/L Final     Anion Gap   Date Value Ref Range Status   04/11/2021 9.0 5.0 - 15.0 mmol/L Final     Creatinine   Date Value Ref Range Status   04/11/2021 0.93 0.57 - 1.00 mg/dL Final     BUN   Date Value Ref Range Status   04/11/2021 20 8 - 23 mg/dL Final     BUN/Creatinine Ratio   Date Value Ref Range Status   04/11/2021 21.5 7.0 - 25.0 Final     Calcium   Date Value Ref Range Status   04/11/2021 9.5 8.6 - 10.5 mg/dL Final     eGFR Non  Amer   Date Value Ref Range Status   04/11/2021 59 (L) >60 mL/min/1.73 Final     Magnesium   Date Value Ref Range Status   04/11/2021 1.8 1.6 - 2.4 mg/dL Final     Lab Results   Component Value Date    HGBA1C 4.9 04/07/2021     Results for FELTON SALAZAR (MRN [id number]) as of 4/11/2021 12:28   Ref. Range 4/9/2021 16:53 4/10/2021 02:38 4/11/2021 06:02 4/11/2021 06:32 4/11/2021 07:02   Cortisol Latest Ref Range:   mcg/dL   6.01 12.18 13.83       No results found for: GLUF, MICROALBUR              Medication Review: Reviewed.     amitriptyline, 25 mg, Oral, Nightly  atorvastatin, 80 mg, Oral, Daily  heparin (porcine), 5,000 Units, Subcutaneous, Q12H  hydrocortisone, 10 mg, Oral, TID  lisinopril, 5 mg, Oral, Q24H  pantoprazole, 40 mg, Oral, Daily  polyethylene glycol, 17 g, Oral, Daily  sennosides-docusate, 2 tablet, Oral, Nightly  sertraline, 100 mg, Oral, Daily  sodium chloride, 10 mL, Intravenous, Q12H  sodium chloride, 10 mL, Intravenous, Q12H          Assessment/Plan   Glucocorticoid deficiency: New problem, uncontrolled.  Most likely secondary to exogenous steroids, at this time I have added her on hydrocortisone 10 mg p.o. 3 times daily which will be continued.  I will check renin aldosterone level.  If clinically stable she can be discharged home on hydrocortisone 10 mg 3 times  daily and follow-up in the office in few weeks.  I explained this to the patient and the nursing staff.  All questions were answered.    Thank you very much for the consultation.             Yuriy Gerard MD. FACE

## 2021-04-12 ENCOUNTER — TRANSITIONAL CARE MANAGEMENT TELEPHONE ENCOUNTER (OUTPATIENT)
Dept: CALL CENTER | Facility: HOSPITAL | Age: 76
End: 2021-04-12

## 2021-04-12 NOTE — TELEPHONE ENCOUNTER
Last visit: 1/26/21  Next visit:4/20/21  Last labs: 4/11/21    Rx requested: Elavil   Pharmacy: [facility] in [place]

## 2021-04-12 NOTE — PROGRESS NOTES
Case Management Discharge Note             Selected Continued Care - Discharged on 4/11/2021 Admission date: 4/6/2021 - Discharge disposition: Home or Self Care     Final Discharge Disposition Code: 01 - home or self-care

## 2021-04-12 NOTE — OUTREACH NOTE
Call Center TCM Note      Responses   [facility] patient discharged from?  [facility]   Does the patient have one of the following disease processes/diagnoses(primary or secondary)?  Other   TCM attempt successful?  Yes   Call start time  0907   Call end time  0935   Meds reviewed with patient/caregiver?  Yes   Does the patient have all medications ordered at discharge?  No   Nursing Interventions  No intervention needed   Prescription comments  PATIENT STATES PHARMACY DID NOT HAVE HER PRESCRIPTIONS READY LAST NIGHT WHEN SHE WAS IN THE DRIVE THRU, BUT SOMEONE IS GOING TO PICK THEM UP THIS MORNING FOR HER.    Does the patient have a primary care provider?   Yes   Does the patient have an appointment with their PCP within 7 days of discharge?  Greater than 7 days   Comments regarding PCP  PATIENT REQUESTED HELP GETTING SOONER PCP APPOINTMENT, APPOINTMENT WITH DR. DEMARCO, AND APPOINTMENT WITH DR. SALMERON. PCP APPOINTNMENT SCHEDULED FOR 4/20, DR. DEMARCO SCHEDULED FOR 4/16. LEFT VM WITH DR. SALMERON'S  TO RETURN MY CALL TO SCHEDULE APPOINTMENT.    Nursing Interventions  Educated patient on importance of making appointment   Urgent appointment interventions  Facilitated patient appointment   Has the patient kept scheduled appointments due by today?  N/A   Has home health visited the patient within 72 hours of discharge?  N/A   Did the patient receive a copy of their discharge instructions?  Yes   Nursing interventions  Reviewed instructions with patient   What is the patient's perception of their health status since discharge?  Improving   Is the patient/caregiver able to teach back signs and symptoms related to disease process for when to call PCP?  Yes   Is the patient/caregiver able to teach back signs and symptoms related to disease process for when to call 911?  Yes   Is the patient/caregiver able to teach back the hierarchy of who to call/visit for symptoms/problems? PCP, Specialist, Home health nurse,  Urgent Care, ED, 911  Yes   If the patient is a current smoker, are they able to teach back resources for cessation?  Not a smoker   TCM call completed?  Yes      CALL RECEIVED FROM GABE AT DR. SALMERON'S OFFICE ([facility]). GABE STATES SHE WILL CALL PATIENT TO SET UP HER APPOINTMENT WITH DR. SAMLERON. CALL TO PATIENT TO INFORM HER OF HER APPOINTMENT WITH DR. DEMARCO ON 4/16/21 AT 10:00AM.    Kyra Falcon LPN    4/12/2021, 09:47 EDT

## 2021-04-13 LAB — ACTH PLAS-MCNC: 26.2 PG/ML (ref 7.2–63.3)

## 2021-04-13 RX ORDER — AMITRIPTYLINE HYDROCHLORIDE 25 MG/1
TABLET, FILM COATED ORAL
Qty: 90 TABLET | Refills: 0 | Status: SHIPPED | OUTPATIENT
Start: 2021-04-13 | End: 2021-06-29

## 2021-04-16 ENCOUNTER — OFFICE VISIT (OUTPATIENT)
Dept: ENDOCRINOLOGY | Facility: CLINIC | Age: 76
End: 2021-04-16

## 2021-04-16 ENCOUNTER — CLINICAL SUPPORT NO REQUIREMENTS (OUTPATIENT)
Dept: CARDIOLOGY | Facility: CLINIC | Age: 76
End: 2021-04-16

## 2021-04-16 VITALS
HEART RATE: 85 BPM | BODY MASS INDEX: 27.48 KG/M2 | DIASTOLIC BLOOD PRESSURE: 70 MMHG | TEMPERATURE: 97.1 F | OXYGEN SATURATION: 96 % | SYSTOLIC BLOOD PRESSURE: 110 MMHG | HEIGHT: 60 IN | WEIGHT: 140 LBS

## 2021-04-16 DIAGNOSIS — Z95.818 STATUS POST PLACEMENT OF IMPLANTABLE LOOP RECORDER: ICD-10-CM

## 2021-04-16 DIAGNOSIS — R55 SYNCOPE, UNSPECIFIED SYNCOPE TYPE: Primary | ICD-10-CM

## 2021-04-16 DIAGNOSIS — E27.49 GLUCOCORTICOID DEFICIENCY (HCC): Primary | ICD-10-CM

## 2021-04-16 PROCEDURE — 99214 OFFICE O/P EST MOD 30 MIN: CPT | Performed by: INTERNAL MEDICINE

## 2021-04-16 RX ORDER — FAMOTIDINE 20 MG/1
20 TABLET, FILM COATED ORAL 2 TIMES DAILY
COMMUNITY
End: 2021-04-20

## 2021-04-16 RX ORDER — HYDROCORTISONE 10 MG/1
TABLET ORAL
Qty: 100 TABLET | Refills: 4 | Status: SHIPPED | OUTPATIENT
Start: 2021-04-16 | End: 2021-04-20

## 2021-04-16 RX ORDER — AMLODIPINE BESYLATE 10 MG/1
10 TABLET ORAL DAILY
COMMUNITY
End: 2021-06-21

## 2021-04-16 RX ORDER — CYCLOBENZAPRINE HCL 10 MG
10 TABLET ORAL 3 TIMES DAILY PRN
COMMUNITY
End: 2021-04-20

## 2021-04-16 RX ORDER — MULTIVIT-MIN/IRON/FOLIC ACID/K 18-600-40
CAPSULE ORAL
COMMUNITY
End: 2021-04-20

## 2021-04-16 RX ORDER — ONDANSETRON 4 MG/1
4 TABLET, ORALLY DISINTEGRATING ORAL EVERY 8 HOURS PRN
COMMUNITY
End: 2021-09-09 | Stop reason: SDUPTHER

## 2021-04-16 NOTE — PROGRESS NOTES
Patient came to office S/P loop recorder placement. Removed dressing and staples with no issues. Incision well approximated, no redness, swelling or drainage present. Some resolving bruising. Discussed loop recorder function and follow up care.

## 2021-04-16 NOTE — PROGRESS NOTES
Endocrine Progress Note Outpatient     Patient Care Team:  Brooklyn Contreras DO as PCP - General  Brooklyn Contreras DO as PCP - Family Medicine  Master Richard MD as Consulting Physician (Cardiology)    Chief Complaint: Follow-up glucocorticoid deficiency          HPI: 76-year-old female with history of hypertension, osteoarthritis has been getting knee injected every few months and has done at least 5 times with the last injection in March 2021.  She started having some syncopal episodes, dizziness and near syncopal episode and was recently hospitalized.  She was found to have low cortisol and was evaluated by our service.  She failed ACTH stimulation test with a peak cortisol of about 13.  ACTH level was 26.2.  She was started on hydrocortisone 10 mg 3 times daily and she has been feeling very well and her symptoms of dizziness and syncopal episode have improved significantly.  Also gaining weight.    Past Medical History:   Diagnosis Date   • Arthritis    • Bladder incontinence    • Colon polyp    • DEXA     OSTEOPENIA = 2018 (-1.3/ -1.7); 2020 (-1.7/ -1.7)   • GERD    • Headache    • Hypertension    • Intracerebral hemorrhage/ CVA    • MAMMO     NEG =2020   • Osteopenia    • Vitamin D deficiency        Social History     Socioeconomic History   • Marital status:      Spouse name: Not on file   • Number of children: Not on file   • Years of education: Not on file   • Highest education level: Not on file   Tobacco Use   • Smoking status: Never Smoker   • Smokeless tobacco: Never Used   Vaping Use   • Vaping Use: Never used   Substance and Sexual Activity   • Alcohol use: No   • Drug use: No   • Sexual activity: Defer       Family History   Problem Relation Age of Onset   • Heart disease Mother    • Hypertension Mother    • Diabetes Father    • Heart disease Father    • Alcohol abuse Father    • Hypertension Father    • Diabetes Brother    • Heart disease Brother    • Cancer Brother 68         Prostate Cancer   • Hypertension Brother    • Diabetes Maternal Aunt    • Heart disease Maternal Grandmother    • Hypertension Maternal Grandmother    • Heart disease Maternal Grandfather    • Hypertension Maternal Grandfather    • Liver disease Paternal Grandmother    • Hypertension Paternal Grandmother    • Heart disease Paternal Grandfather    • Hypertension Paternal Grandfather    • Dementia Sister        Allergies   Allergen Reactions   • Trazodone Irritability       ROS:   Constitutional:  Denies fatigue, tiredness.    Eyes:  Denies change in visual acuity   HENT:  Denies nasal congestion or sore throat   Respiratory: denies cough, shortness of breath.   Cardiovascular:  denies chest pain, edema   GI:  Denies abdominal pain, nausea, vomiting.   Musculoskeletal:  Denies back pain or joint pain   Integument:  Denies dry skin and rash   Neurologic:  Denies headache, focal weakness or sensory changes   Endocrine:  Denies polyuria or polydipsia   Psychiatric:  Denies depression or anxiety      Vitals:    04/16/21 1206   BP: 110/70   Pulse: 85   Temp: 97.1 °F (36.2 °C)   SpO2: 96%     Body mass index is 27.34 kg/m².     Physical Exam:  GEN: NAD, conversant  EYES: EOMI, PERRL, no conjunctival erythema  NECK: no thyromegaly, full ROM   CV: RRR, no murmurs/rubs/gallops, no peripheral edema  LUNG: CTAB, no wheezes/rales/ronchi  SKIN: no rashes, no acanthosis  MSK: no deformities, full ROM of all extremities  NEURO: no tremors, DTR normal  PSYCH: AOX3, appropriate mood, affect normal      Results Review:     I reviewed the patient's new clinical results.    Lab Results   Component Value Date    HGBA1C 4.9 04/07/2021      Lab Results   Component Value Date    GLUCOSE 99 04/11/2021    BUN 20 04/11/2021    CREATININE 0.93 04/11/2021    EGFRIFNONA 59 (L) 04/11/2021    BCR 21.5 04/11/2021    K 4.8 04/11/2021    CO2 26.0 04/11/2021    CALCIUM 9.5 04/11/2021    ALBUMIN 3.70 04/06/2021    LABIL2 1.4 04/30/2019    AST 18  04/06/2021    ALT 17 04/06/2021    CHOL 197 04/07/2021    TRIG 127 04/07/2021     (H) 04/07/2021    HDL 57 04/07/2021     Lab Results   Component Value Date    TSH 1.980 04/06/2021         Medication Review: Reviewed.       Current Outpatient Medications:   •  albuterol (ACCUNEB) 1.25 MG/3ML nebulizer solution, Take 3 mL by nebulization Every 6 (Six) Hours As Needed for Wheezing or Shortness of Air for up to 30 doses., Disp: 120 each, Rfl: 2  •  amitriptyline (ELAVIL) 25 MG tablet, TAKE 1 TABLET BY MOUTH EVERYDAY AT BEDTIME, Disp: 90 tablet, Rfl: 0  •  amLODIPine (NORVASC) 10 MG tablet, Take 10 mg by mouth Daily., Disp: , Rfl:   •  atorvastatin (LIPITOR) 80 MG tablet, TAKE 1 TABLET BY MOUTH EVERY DAY, Disp: 90 tablet, Rfl: 0  •  Calcium Carbonate-Vit D-Min (Calcium 1200) 8567-1491 MG-UNIT chewable tablet, Chew 1 tablet Daily., Disp: , Rfl:   •  celecoxib (CeleBREX) 100 MG capsule, Take 1 capsule by mouth Daily., Disp: 90 capsule, Rfl: 1  •  cyclobenzaprine (FLEXERIL) 10 MG tablet, Take 10 mg by mouth 3 (Three) Times a Day As Needed for Muscle Spasms., Disp: , Rfl:   •  docusate sodium (COLACE) 100 MG capsule, Take 100 mg by mouth 2 (Two) Times a Day., Disp: , Rfl:   •  famotidine (PEPCID) 20 MG tablet, Take 20 mg by mouth 2 (Two) Times a Day., Disp: , Rfl:   •  hydrocortisone (CORTEF) 10 MG tablet, Take 1 tablet by mouth 3 (Three) Times a Day., Disp: 100 tablet, Rfl: 0  •  hydrocortisone 2.5 % cream, Apply 1 application topically to the appropriate area as directed 2 (Two) Times a Day., Disp: , Rfl:   •  ibandronate (Boniva) 150 MG tablet, Take 1 tablet by mouth Every 30 (Thirty) Days., Disp: 3 tablet, Rfl: 1  •  lisinopril (PRINIVIL,ZESTRIL) 5 MG tablet, Take 1 tablet by mouth Daily., Disp: 30 tablet, Rfl: 0  •  MECLIZINE HCL PO, Take 12.5 mg by mouth As Needed., Disp: , Rfl:   •  nitroglycerin (NITROSTAT) 0.4 MG SL tablet, Place 1 tablet under the tongue Every 5 (Five) Minutes As Needed for Chest Pain (Only  if SBP Greater Than 100). Take no more than 3 doses in 15 minutes., Disp: 25 tablet, Rfl: 0  •  ondansetron ODT (ZOFRAN-ODT) 4 MG disintegrating tablet, Place 4 mg on the tongue Every 8 (Eight) Hours As Needed for Nausea or Vomiting., Disp: , Rfl:   •  pantoprazole (PROTONIX) 40 MG EC tablet, Take 1 tablet by mouth Daily., Disp: 90 tablet, Rfl: 1  •  sertraline (ZOLOFT) 100 MG tablet, Take 1 tablet by mouth Daily., Disp: 90 tablet, Rfl: 1  •  Vitamin D, Cholecalciferol, 50 MCG (2000 UT) capsule, Take  by mouth., Disp: , Rfl:       Assessment/Plan   1.  Glucocorticoid deficiency: Most likely secondary to articular injections, I explained this to her in detail.  She is feeling very well with hydrocortisone replacement.  I have advised her to change hydrocortisone to 10 mg in the morning, 10 in the afternoon and 5 in the evening.  And will follow her clinically.  Option of going off of hydrocortisone and not getting any articular injections anymore or any steroid injections discussed with her.  She may recover she may not recover.  Since he is feeling clinically better we decided to continue hydrocortisone replacement of low cortisol.  2.  Hypertension: Well-controlled.            Yuriy Gerard MD FACE.

## 2021-04-16 NOTE — PATIENT INSTRUCTIONS
Change hydrocortisone to 10 mg in the morning, 10 mg in the afternoon and 5 in the evening  Follow-up in 6 months with labs.

## 2021-04-17 LAB
ALDOST SERPL-MCNC: 3 NG/DL (ref 0–30)
ALDOST/RENIN PLAS-RTO: 2.7 {RATIO} (ref 0–30)
RENIN PLAS-CCNC: 1.12 NG/ML/HR (ref 0.17–5.38)

## 2021-04-19 ENCOUNTER — READMISSION MANAGEMENT (OUTPATIENT)
Dept: CALL CENTER | Facility: HOSPITAL | Age: 76
End: 2021-04-19

## 2021-04-20 ENCOUNTER — OFFICE VISIT (OUTPATIENT)
Dept: FAMILY MEDICINE CLINIC | Facility: CLINIC | Age: 76
End: 2021-04-20

## 2021-04-20 VITALS
SYSTOLIC BLOOD PRESSURE: 138 MMHG | BODY MASS INDEX: 27.68 KG/M2 | HEART RATE: 79 BPM | HEIGHT: 60 IN | RESPIRATION RATE: 14 BRPM | TEMPERATURE: 98.4 F | WEIGHT: 141 LBS | DIASTOLIC BLOOD PRESSURE: 85 MMHG | OXYGEN SATURATION: 96 %

## 2021-04-20 DIAGNOSIS — F41.9 ANXIETY: ICD-10-CM

## 2021-04-20 DIAGNOSIS — I10 ESSENTIAL HYPERTENSION: Chronic | ICD-10-CM

## 2021-04-20 DIAGNOSIS — K22.0: Primary | ICD-10-CM

## 2021-04-20 DIAGNOSIS — E27.49: Primary | ICD-10-CM

## 2021-04-20 PROCEDURE — 99495 TRANSJ CARE MGMT MOD F2F 14D: CPT | Performed by: FAMILY MEDICINE

## 2021-04-20 PROCEDURE — 1111F DSCHRG MED/CURRENT MED MERGE: CPT | Performed by: FAMILY MEDICINE

## 2021-04-20 RX ORDER — LISINOPRIL 5 MG/1
5 TABLET ORAL DAILY
Qty: 90 TABLET | Refills: 0 | Status: SHIPPED | OUTPATIENT
Start: 2021-04-20 | End: 2021-06-29

## 2021-04-20 RX ORDER — HYDROCORTISONE 10 MG/1
TABLET ORAL
Qty: 100 TABLET | Refills: 4 | Status: SHIPPED
Start: 2021-04-20 | End: 2021-10-15 | Stop reason: SDUPTHER

## 2021-04-20 RX ORDER — SERTRALINE HYDROCHLORIDE 100 MG/1
150 TABLET, FILM COATED ORAL DAILY
Qty: 90 TABLET | Refills: 1
Start: 2021-04-20 | End: 2021-09-09 | Stop reason: SDUPTHER

## 2021-04-21 ENCOUNTER — TELEPHONE (OUTPATIENT)
Dept: FAMILY MEDICINE CLINIC | Facility: CLINIC | Age: 76
End: 2021-04-21

## 2021-04-21 NOTE — TELEPHONE ENCOUNTER
PATIENT IS REQUESTING A MAILED COPY OF ALL OF HER MEDICATIONS.     [id number]  [address]  [place] [address]

## 2021-05-18 ENCOUNTER — OFFICE VISIT (OUTPATIENT)
Dept: CARDIOLOGY | Facility: CLINIC | Age: 76
End: 2021-05-18

## 2021-05-18 VITALS
HEART RATE: 82 BPM | OXYGEN SATURATION: 98 % | HEIGHT: 60 IN | DIASTOLIC BLOOD PRESSURE: 82 MMHG | BODY MASS INDEX: 29.06 KG/M2 | SYSTOLIC BLOOD PRESSURE: 124 MMHG | WEIGHT: 148 LBS

## 2021-05-18 DIAGNOSIS — R09.89 LABILE HYPERTENSION: ICD-10-CM

## 2021-05-18 DIAGNOSIS — E27.1 ADRENAL INSUFFICIENCY (ADDISON'S DISEASE) (HCC): ICD-10-CM

## 2021-05-18 DIAGNOSIS — R55 RECURRENT SYNCOPE: Primary | ICD-10-CM

## 2021-05-18 DIAGNOSIS — Z86.73 HISTORY OF HEMORRHAGIC CEREBROVASCULAR ACCIDENT (CVA) WITHOUT RESIDUAL DEFICITS: ICD-10-CM

## 2021-05-18 DIAGNOSIS — Z95.818 STATUS POST PLACEMENT OF IMPLANTABLE LOOP RECORDER: ICD-10-CM

## 2021-05-18 DIAGNOSIS — E78.5 DYSLIPIDEMIA: ICD-10-CM

## 2021-05-18 PROCEDURE — 99214 OFFICE O/P EST MOD 30 MIN: CPT | Performed by: INTERNAL MEDICINE

## 2021-05-18 NOTE — PROGRESS NOTES
Subjective:     Encounter Date:05/18/2021      Patient ID: Katie Reddy is a 76 y.o. female.    Chief Complaint : Complaining of feeling tired.  Here for follow-up for loop recorder, recurrent syncope, hypertension  History of Present Illness      This is a 76-year-old with PMH of     Recurrent syncope, Biotronik ILR 4/9/2021  Uncontrolled hypertension  Dyslipidemia  Autonomic insufficiency  History of hemorrhagic occipital CVA  GERD, osteoporosis, chronic pain  Cholecystectomy, hernia repair     Here for hospital follow-up.  Patient is feeling better since went home.  Patient has some epigastric discomfort which is helped with PPIs.  Patient's arterial blood pressure is 124/82, heart rate 82, O2 sat of 98% on room air.    Patient was recently in the hospital from 4/6/2021-4/11/2021 with syncope and hypertensive emergency.  Work-up here revealed negative troponin x2.  proBNP is normal at 271.  CMP for 621 was normal.  Hemoglobin A1c for 721 was 4.9.  Lipid profile revealed cholesterol 197 HDL 57  triglycerides 127.  MRI with contrast 4/7/2021 reveals no acute intracranial abnormality no acute infarct no intracranial mass or mass-effect.  Old left occipital hemorrhagic infarct  Chronic small vessel ischemic changes     Chest x-ray 4/6/2021 reveals no acute chest findings.  EKG done 4/6/2021 reviewed by me shows sinus rhythm with a rate of 76 bpm with PVCs.  Echocardiogram 4/6/2021 reviewed by me shows EF of 60 to 65% with mild AR and mild MR  Carotid Dopplers 4/8/2021 reveal mild bilateral carotid disease  Lexiscan Cardiolite 4/9/21 which is negative for ischemia.   Hemoglobin is 12, BUN/creatinine 20/0.93  A.m. cortisol was low at 1.26     Assessment:       -Recurrent syncope  -Orthostatic hypotension  -Low a.m. cortisol/adrenal insufficiency on long-term Cortef  -History of hypertensive emergency  -History of hemorrhagic left occipital CVA  -Mild AR, mild MR  -Dyslipidemia        Recommendations /  Plan:         Patient's loop recorder is healed well we will follow-up in pacemaker clinic.  Patient's blood pressure currently is well controlled we will continue amlodipine, lisinopril as tolerated for hypertension  Reviewed importance of blood pressure control since patient had previous hemorrhagic CVA.  Advised patient to check blood pressure at home.  Continue statins for dyslipidemia and previous history of CVA  Continue Cortef and follow-up with endocrinology for adrenal insufficiency  Reviewed extensive hospital records and summarized in the chart  Discussed about COVID-19 vaccination.  Patient already took both the doses.       Assessment:         MDM     Diagnosis Plan   1. Recurrent syncope     2. Status post placement of implantable loop recorder     3. Labile hypertension     4. History of hemorrhagic cerebrovascular accident (CVA) without residual deficits     5. Adrenal insufficiency (Valencia's disease) (CMS/HCC)     6. Dyslipidemia            Plan:         Past Medical History:  Past Medical History:   Diagnosis Date   • Arthritis    • Bladder incontinence    • Colon polyp    • DEXA     OSTEOPENIA = 2018 (-1.3/ -1.7); 2020 (-1.7/ -1.7)   • GERD    • Glucocorticoid deficiency    • Headache    • Hypertension    • Intracerebral hemorrhage/ CVA    • MAMMO     NEG =2020   • Osteopenia    • Vitamin D deficiency      Past Surgical History:  Past Surgical History:   Procedure Laterality Date   • APPENDECTOMY     • BREAST CYST EXCISION     • BREAST LUMPECTOMY Left 1973    NEG   • BUNIONECTOMY Right 5/29/2020    Procedure: BUNIONECTOMY DEVONTE;  Surgeon: MARISSA Em DPM;  Location: [facility] ER;  Service: Podiatry;  Laterality: Right;   • CARPAL TUNNEL RELEASE Right 1980   • CHOLECYSTECTOMY     • COLON SURGERY      hemorrhoid banding   • COLONOSCOPY  2017 2017/ 2019 = jessica DESAI 2024   Dr. Mackey   • EYE SURGERY     • FOOT SURGERY     • HERNIA REPAIR      Umbilical removal   • HYSTERECTOMY  1970   •  OTHER SURGICAL HISTORY      bladder stimulator placement/ REMOVAL   • SUBTOTAL HYSTERECTOMY        Allergies:  Allergies   Allergen Reactions   • Trazodone Irritability     Home Meds:  Current Meds:     Current Outpatient Medications:   •  albuterol (ACCUNEB) 1.25 MG/3ML nebulizer solution, Take 3 mL by nebulization Every 6 (Six) Hours As Needed for Wheezing or Shortness of Air for up to 30 doses., Disp: 120 each, Rfl: 2  •  amitriptyline (ELAVIL) 25 MG tablet, TAKE 1 TABLET BY MOUTH EVERYDAY AT BEDTIME, Disp: 90 tablet, Rfl: 0  •  amLODIPine (NORVASC) 10 MG tablet, Take 10 mg by mouth Daily., Disp: , Rfl:   •  atorvastatin (LIPITOR) 80 MG tablet, TAKE 1 TABLET BY MOUTH EVERY DAY, Disp: 90 tablet, Rfl: 0  •  Calcium Carbonate-Vit D-Min (Calcium 1200) 2989-2785 MG-UNIT chewable tablet, Chew 1 tablet Daily., Disp: , Rfl:   •  docusate sodium (COLACE) 100 MG capsule, Take 100 mg by mouth 2 (Two) Times a Day., Disp: , Rfl:   •  hydrocortisone (CORTEF) 10 MG tablet, Take 1 tablet in the morning 1 in the afternoon and half in the evening., Disp: 100 tablet, Rfl: 4  •  hydrocortisone 2.5 % cream, Apply 1 application topically to the appropriate area as directed 2 (Two) Times a Day., Disp: , Rfl:   •  ibandronate (Boniva) 150 MG tablet, Take 1 tablet by mouth Every 30 (Thirty) Days., Disp: 3 tablet, Rfl: 1  •  lisinopril (PRINIVIL,ZESTRIL) 5 MG tablet, Take 1 tablet by mouth Daily., Disp: 90 tablet, Rfl: 0  •  MECLIZINE HCL PO, Take 12.5 mg by mouth As Needed., Disp: , Rfl:   •  nitroglycerin (NITROSTAT) 0.4 MG SL tablet, Place 1 tablet under the tongue Every 5 (Five) Minutes As Needed for Chest Pain (Only if SBP Greater Than 100). Take no more than 3 doses in 15 minutes., Disp: 25 tablet, Rfl: 0  •  ondansetron ODT (ZOFRAN-ODT) 4 MG disintegrating tablet, Place 4 mg on the tongue Every 8 (Eight) Hours As Needed for Nausea or Vomiting., Disp: , Rfl:   •  pantoprazole (PROTONIX) 40 MG EC tablet, Take 1 tablet by mouth  "Daily., Disp: 90 tablet, Rfl: 1  •  sertraline (ZOLOFT) 100 MG tablet, Take 1.5 tablets by mouth Daily., Disp: 90 tablet, Rfl: 1  Social History:   Social History     Tobacco Use   • Smoking status: Never Smoker   • Smokeless tobacco: Never Used   Substance Use Topics   • Alcohol use: No      Family History:  Family History   Problem Relation Age of Onset   • Heart disease Mother    • Hypertension Mother    • Diabetes Father    • Heart disease Father    • Alcohol abuse Father    • Hypertension Father    • Diabetes Brother    • Heart disease Brother    • Cancer Brother 68        Prostate Cancer   • Hypertension Brother    • Diabetes Maternal Aunt    • Heart disease Maternal Grandmother    • Hypertension Maternal Grandmother    • Heart disease Maternal Grandfather    • Hypertension Maternal Grandfather    • Liver disease Paternal Grandmother    • Hypertension Paternal Grandmother    • Heart disease Paternal Grandfather    • Hypertension Paternal Grandfather    • Dementia Sister         The following portions of the patient's history were reviewed and updated as appropriate: allergies, current medications, past family history, past medical history, past social history, past surgical history and problem list.      Review of Systems   Constitutional: Positive for malaise/fatigue.   Cardiovascular: Positive for chest pain. Negative for leg swelling and palpitations.   Respiratory: Positive for shortness of breath.    Skin: Positive for rash.   Neurological: Positive for dizziness and light-headedness. Negative for numbness.     All other systems are negative    Procedures EKG done 4/6/2021 reviewed by me shows sinus rhythm with a rate of 76 bpm with PVCs.       Objective:     Physical Exam  /82   Pulse 82   Ht 152.4 cm (60\")   Wt 67.1 kg (148 lb)   SpO2 98%   BMI 28.90 kg/m²   General:  Appears in no acute distress  Eyes: Sclera is anicteric,  conjunctiva is clear   HEENT:  No JVD.  No carotid " bruits  Respiratory: Respirations regular and unlabored at rest.  Clear to auscultation  Cardiovascular: S1,S2 Regular rate and rhythm. No murmur, rub or gallop auscultated.   Gastrointestinal: Abdomen nondistended, soft  Extremities: No digital clubbing or cyanosis, no edema  Skin: Color pink. Skin warm and dry to touch. No rashes  No xanthoma  Neuro: Alert and awake, no lateralizing deficits appreciated    Lab Reviewed:

## 2021-05-26 ENCOUNTER — TELEPHONE (OUTPATIENT)
Dept: CARDIOLOGY | Facility: CLINIC | Age: 76
End: 2021-05-26

## 2021-05-26 NOTE — TELEPHONE ENCOUNTER
Called patient, explained to her the function of her loop recorder and the way the data is transmitted. Explained manual transmissions and auto transmissions. Remote connected at this time and no recent events.

## 2021-06-01 ENCOUNTER — OFFICE VISIT (OUTPATIENT)
Dept: ORTHOPEDIC SURGERY | Facility: CLINIC | Age: 76
End: 2021-06-01

## 2021-06-01 DIAGNOSIS — M17.12 PRIMARY OSTEOARTHRITIS OF LEFT KNEE: ICD-10-CM

## 2021-06-01 DIAGNOSIS — M17.11 PRIMARY OSTEOARTHRITIS OF RIGHT KNEE: Primary | ICD-10-CM

## 2021-06-01 PROCEDURE — 20610 DRAIN/INJ JOINT/BURSA W/O US: CPT | Performed by: ORTHOPAEDIC SURGERY

## 2021-06-01 RX ORDER — METHYLPREDNISOLONE ACETATE 80 MG/ML
160 INJECTION, SUSPENSION INTRA-ARTICULAR; INTRALESIONAL; INTRAMUSCULAR; SOFT TISSUE
Status: COMPLETED | OUTPATIENT
Start: 2021-06-01 | End: 2021-06-01

## 2021-06-01 RX ORDER — LIDOCAINE HYDROCHLORIDE 10 MG/ML
2 INJECTION, SOLUTION INFILTRATION; PERINEURAL
Status: COMPLETED | OUTPATIENT
Start: 2021-06-01 | End: 2021-06-01

## 2021-06-01 RX ADMIN — METHYLPREDNISOLONE ACETATE 160 MG: 80 INJECTION, SUSPENSION INTRA-ARTICULAR; INTRALESIONAL; INTRAMUSCULAR; SOFT TISSUE at 13:01

## 2021-06-01 RX ADMIN — LIDOCAINE HYDROCHLORIDE 2 ML: 10 INJECTION, SOLUTION INFILTRATION; PERINEURAL at 13:01

## 2021-06-01 NOTE — PROGRESS NOTES
INJECTION    Patient: Katie Reddy    YOB: 1945    MRN: [id number]    Chief Complaint   Patient presents with   • Right Knee - Follow-up   • Left Knee - Follow-up       History of Present Illness: Patient returns today for bilateral knee pain.  Her pain is located over the medial aspect of the joint.  The pain has been progressive in nature and remains intermittent .  Her pain is worsened by going up and down stairs, driving. There has been improvement in the past with injections.     This problem is not new to this examiner.     Allergies:   Allergies   Allergen Reactions   • Trazodone Irritability       Medications:   Home Medications:  Current Outpatient Medications on File Prior to Visit   Medication Sig   • albuterol (ACCUNEB) 1.25 MG/3ML nebulizer solution Take 3 mL by nebulization Every 6 (Six) Hours As Needed for Wheezing or Shortness of Air for up to 30 doses.   • amitriptyline (ELAVIL) 25 MG tablet TAKE 1 TABLET BY MOUTH EVERYDAY AT BEDTIME   • amLODIPine (NORVASC) 10 MG tablet Take 10 mg by mouth Daily.   • atorvastatin (LIPITOR) 80 MG tablet TAKE 1 TABLET BY MOUTH EVERY DAY   • Calcium Carbonate-Vit D-Min (Calcium 1200) 4860-9112 MG-UNIT chewable tablet Chew 1 tablet Daily.   • docusate sodium (COLACE) 100 MG capsule Take 100 mg by mouth 2 (Two) Times a Day.   • hydrocortisone (CORTEF) 10 MG tablet Take 1 tablet in the morning 1 in the afternoon and half in the evening.   • hydrocortisone 2.5 % cream Apply 1 application topically to the appropriate area as directed 2 (Two) Times a Day.   • ibandronate (Boniva) 150 MG tablet Take 1 tablet by mouth Every 30 (Thirty) Days.   • lisinopril (PRINIVIL,ZESTRIL) 5 MG tablet Take 1 tablet by mouth Daily.   • MECLIZINE HCL PO Take 12.5 mg by mouth As Needed.   • nitroglycerin (NITROSTAT) 0.4 MG SL tablet Place 1 tablet under the tongue Every 5 (Five) Minutes As Needed for Chest Pain (Only if SBP Greater Than 100). Take no more than 3 doses in 15  minutes.   • ondansetron ODT (ZOFRAN-ODT) 4 MG disintegrating tablet Place 4 mg on the tongue Every 8 (Eight) Hours As Needed for Nausea or Vomiting.   • pantoprazole (PROTONIX) 40 MG EC tablet Take 1 tablet by mouth Daily.   • sertraline (ZOLOFT) 100 MG tablet Take 1.5 tablets by mouth Daily.     No current facility-administered medications on file prior to visit.     Current Medications:  Scheduled Meds:  PRN Meds:.    I have reviewed the patient's medical history in detail and updated the computerized patient record.  Review and summarization of old records include:    Past Medical History:   Diagnosis Date   • Arthritis    • Bladder incontinence    • Colon polyp    • DEXA     OSTEOPENIA = 2018 (-1.3/ -1.7); 2020 (-1.7/ -1.7)   • GERD    • Glucocorticoid deficiency    • Headache    • Hypertension    • Intracerebral hemorrhage/ CVA    • MAMMO     NEG =2020   • Osteopenia    • Vitamin D deficiency      Past Surgical History:   Procedure Laterality Date   • APPENDECTOMY     • BREAST CYST EXCISION     • BREAST LUMPECTOMY Left 1973    NEG   • BUNIONECTOMY Right 5/29/2020    Procedure: BUNIONECTOMY DEVONTE;  Surgeon: MARISSA Em DPM;  Location: [facility] MAIN OR;  Service: Podiatry;  Laterality: Right;   • CARPAL TUNNEL RELEASE Right 1980   • CHOLECYSTECTOMY     • COLON SURGERY      hemorrhoid banding   • COLONOSCOPY  2017 2017/ 2019 = jessica DESAI 2024   Dr. Mackey   • EYE SURGERY     • FOOT SURGERY     • HERNIA REPAIR      Umbilical removal   • HYSTERECTOMY  1970   • OTHER SURGICAL HISTORY      bladder stimulator placement/ REMOVAL   • SUBTOTAL HYSTERECTOMY       Social History     Occupational History   • Not on file   Tobacco Use   • Smoking status: Never Smoker   • Smokeless tobacco: Never Used   Vaping Use   • Vaping Use: Never used   Substance and Sexual Activity   • Alcohol use: No   • Drug use: No   • Sexual activity: Defer      Social History     Social History Narrative    [place] [facility] [place] [facility] [place] [employer] [place], and has a close neighbor that helps when needed/drives her to appts. Denies insecurities re affording/obtaining food, medication, transportation.     Family History   Problem Relation Age of Onset   • Heart disease Mother    • Hypertension Mother    • Diabetes Father    • Heart disease Father    • Alcohol abuse Father    • Hypertension Father    • Diabetes Brother    • Heart disease Brother    • Cancer Brother 68        Prostate Cancer   • Hypertension Brother    • Diabetes Maternal Aunt    • Heart disease Maternal Grandmother    • Hypertension Maternal Grandmother    • Heart disease Maternal Grandfather    • Hypertension Maternal Grandfather    • Liver disease Paternal Grandmother    • Hypertension Paternal Grandmother    • Heart disease Paternal Grandfather    • Hypertension Paternal Grandfather    • Dementia Sister        Review of Systems        Wt Readings from Last 3 Encounters:   05/18/21 67.1 kg (148 lb)   04/20/21 64 kg (141 lb)   04/16/21 63.5 kg (140 lb)     Ht Readings from Last 3 Encounters:   05/18/21 152.4 cm (60\")   04/20/21 152.4 cm (60\")   04/16/21 152.4 cm (60\")     There is no height or weight on file to calculate BMI.  No height and weight on file for this encounter.  There were no vitals filed for this visit.    Physical Exam    Musculoskeletal:    Bilateral knee (varus). Patient has crepitus throughout range of motion. Positive patellar grind test. Mild effusion. Lachman is negative. Pivot shift is negative. Anterior and posterior drawer signs are negative. Significant joint line tenderness is noted on the medial aspect of the knee. Patient has a varus orientation of the knee. There is fullness and tenderness in the Popliteal fossa. Mild distention of a Popliteal cyst is noted in this location. Range of motion in flexion is from 0-110 degrees. Neurovascular status is intact.  Dorsalis pedis and posterior tibial artery pulses are palpable. Common " peroneal nerve function is well preserved. Patient's gait is cautious and antalgic. Skin and soft tissues are mildly swollen, consistent with synovitis and effusion. The patient has a significant limp with the first few steps after starting the gait cycle. Getting out of a chair takes a lot of effort due to pain on knee flexion.       Diagnostics:      Procedure:  Large Joint Arthrocentesis: R knee  Date/Time: 6/1/2021 1:01 PM  Consent given by: patient  Site marked: site marked  Timeout: Immediately prior to procedure a time out was called to verify the correct patient, procedure, equipment, support staff and site/side marked as required   Supporting Documentation  Indications: pain   Procedure Details  Location: knee - R knee  Preparation: Patient was prepped and draped in the usual sterile fashion  Needle size: 25 G  Approach: anteromedial  Medications administered: 2 mL lidocaine 1 %; 160 mg methylPREDNISolone acetate 80 MG/ML  Patient tolerance: patient tolerated the procedure well with no immediate complications    Large Joint Arthrocentesis: L knee  Date/Time: 6/1/2021 1:01 PM  Consent given by: patient  Site marked: site marked  Timeout: Immediately prior to procedure a time out was called to verify the correct patient, procedure, equipment, support staff and site/side marked as required   Supporting Documentation  Indications: pain   Procedure Details  Location: knee - L knee  Preparation: Patient was prepped and draped in the usual sterile fashion  Needle size: 25 G  Approach: anteromedial  Medications administered: 2 mL lidocaine 1 %; 160 mg methylPREDNISolone acetate 80 MG/ML  Patient tolerance: patient tolerated the procedure well with no immediate complications            Assessment:   Diagnoses and all orders for this visit:    1. Primary osteoarthritis of right knee (Primary)    2. Primary osteoarthritis of left knee    Other orders  -     Large Joint Arthrocentesis: R knee  -     Large Joint  Arthrocentesis: L knee          Plan:   · Injected patient's bilateral knee joint(s)with Depo-Medrol from an anteromedial approach   · Compression/brace   · Rest, ice, compression, and elevation (RICE) therapy  · OTC Alternate Ibuprofen and Tylenol as needed  · Follow up in 3 month(s)    Date of encounter: 06/01/2021   Shawn Elaine MD

## 2021-06-11 PROCEDURE — 93298 REM INTERROG DEV EVAL SCRMS: CPT | Performed by: INTERNAL MEDICINE

## 2021-06-11 PROCEDURE — G2066 INTER DEVC REMOTE 30D: HCPCS | Performed by: INTERNAL MEDICINE

## 2021-06-21 RX ORDER — AMLODIPINE BESYLATE 10 MG/1
TABLET ORAL
Qty: 90 TABLET | Refills: 0 | Status: SHIPPED | OUTPATIENT
Start: 2021-06-21 | End: 2021-06-29

## 2021-06-21 NOTE — TELEPHONE ENCOUNTER
Last visit:  4/20/21  Next visit: none  Last labs: 4/11/21  Last lipid:4/7/21    Rx requested: Amlodipine   Pharmacy: [facility] in [place]

## 2021-06-24 RX ORDER — ONDANSETRON 4 MG/1
4 TABLET, FILM COATED ORAL EVERY 8 HOURS PRN
Qty: 30 TABLET | Refills: 0 | OUTPATIENT
Start: 2021-06-24

## 2021-06-24 RX ORDER — MECLIZINE HCL 12.5 MG/1
TABLET ORAL
Qty: 60 TABLET | Refills: 0 | OUTPATIENT
Start: 2021-06-24

## 2021-06-24 RX ORDER — DOCUSATE SODIUM, SENNOSIDES 50; 8.6 MG/1; MG/1
TABLET ORAL
Qty: 60 TABLET | Refills: 2 | OUTPATIENT
Start: 2021-06-24

## 2021-06-28 RX ORDER — ATORVASTATIN CALCIUM 80 MG/1
TABLET, FILM COATED ORAL
Qty: 90 TABLET | Refills: 2 | Status: SHIPPED | OUTPATIENT
Start: 2021-06-28 | End: 2021-12-28 | Stop reason: SDUPTHER

## 2021-06-29 RX ORDER — LISINOPRIL 5 MG/1
TABLET ORAL
Qty: 90 TABLET | Refills: 0 | Status: SHIPPED | OUTPATIENT
Start: 2021-06-29 | End: 2021-09-09 | Stop reason: SDUPTHER

## 2021-06-29 RX ORDER — AMLODIPINE BESYLATE 10 MG/1
TABLET ORAL
Qty: 90 TABLET | Refills: 0 | Status: SHIPPED | OUTPATIENT
Start: 2021-06-29 | End: 2021-09-09 | Stop reason: SDUPTHER

## 2021-06-29 RX ORDER — AMITRIPTYLINE HYDROCHLORIDE 25 MG/1
TABLET, FILM COATED ORAL
Qty: 90 TABLET | Refills: 0 | Status: SHIPPED | OUTPATIENT
Start: 2021-06-29 | End: 2021-09-09 | Stop reason: SDUPTHER

## 2021-06-29 RX ORDER — PANTOPRAZOLE SODIUM 40 MG/1
TABLET, DELAYED RELEASE ORAL
Qty: 90 TABLET | Refills: 0 | Status: SHIPPED | OUTPATIENT
Start: 2021-06-29 | End: 2021-09-09 | Stop reason: SDUPTHER

## 2021-07-12 PROCEDURE — 93298 REM INTERROG DEV EVAL SCRMS: CPT | Performed by: INTERNAL MEDICINE

## 2021-07-12 PROCEDURE — G2066 INTER DEVC REMOTE 30D: HCPCS | Performed by: INTERNAL MEDICINE

## 2021-07-21 RX ORDER — IBANDRONATE SODIUM 150 MG/1
TABLET, FILM COATED ORAL
Qty: 3 TABLET | Refills: 1 | Status: SHIPPED | OUTPATIENT
Start: 2021-07-21 | End: 2021-09-09

## 2021-08-02 ENCOUNTER — TELEPHONE (OUTPATIENT)
Dept: CARDIOLOGY | Facility: CLINIC | Age: 76
End: 2021-08-02

## 2021-08-02 NOTE — TELEPHONE ENCOUNTER
Called patient, gave [employer] [phone] number for assistance. Monitor was making a sound.

## 2021-08-12 PROCEDURE — 93298 REM INTERROG DEV EVAL SCRMS: CPT | Performed by: INTERNAL MEDICINE

## 2021-08-12 PROCEDURE — G2066 INTER DEVC REMOTE 30D: HCPCS | Performed by: INTERNAL MEDICINE

## 2021-08-16 RX ORDER — CELECOXIB 100 MG/1
CAPSULE ORAL
Qty: 90 CAPSULE | Refills: 1 | OUTPATIENT
Start: 2021-08-16

## 2021-08-30 ENCOUNTER — TELEPHONE (OUTPATIENT)
Dept: ORTHOPEDIC SURGERY | Facility: CLINIC | Age: 76
End: 2021-08-30

## 2021-08-30 NOTE — TELEPHONE ENCOUNTER
Caller: Katie Reddy    Relationship to patient: Self    Best call back number: [phone]    Chief complaint: BILATERAL KNEE PAIN     Type of visit: INJECTION     Requested date:     If rescheduling, when is the original appointment:     Additional notes: PATIENT ADVISED SHE WANTS TO CANCEL APPOINTMENT FOR INJECTION ON 9/1/21. PATIENT ADVISED SHE WILL BA OUT OF TOWN. PATIENT WILL CALL BACK TO SCHEDULE.

## 2021-09-09 ENCOUNTER — OFFICE VISIT (OUTPATIENT)
Dept: FAMILY MEDICINE CLINIC | Facility: CLINIC | Age: 76
End: 2021-09-09

## 2021-09-09 VITALS
SYSTOLIC BLOOD PRESSURE: 137 MMHG | TEMPERATURE: 96.8 F | WEIGHT: 158 LBS | HEIGHT: 60 IN | HEART RATE: 100 BPM | BODY MASS INDEX: 31.02 KG/M2 | DIASTOLIC BLOOD PRESSURE: 77 MMHG | OXYGEN SATURATION: 97 % | RESPIRATION RATE: 16 BRPM

## 2021-09-09 DIAGNOSIS — G89.29 CHRONIC MIDLINE LOW BACK PAIN WITHOUT SCIATICA: ICD-10-CM

## 2021-09-09 DIAGNOSIS — F51.02 ADJUSTMENT INSOMNIA: ICD-10-CM

## 2021-09-09 DIAGNOSIS — M54.2 NECK PAIN, BILATERAL: Primary | ICD-10-CM

## 2021-09-09 DIAGNOSIS — M54.50 CHRONIC MIDLINE LOW BACK PAIN WITHOUT SCIATICA: ICD-10-CM

## 2021-09-09 PROCEDURE — 99214 OFFICE O/P EST MOD 30 MIN: CPT | Performed by: FAMILY MEDICINE

## 2021-09-09 RX ORDER — ONDANSETRON 4 MG/1
4 TABLET, ORALLY DISINTEGRATING ORAL EVERY 8 HOURS PRN
Qty: 30 TABLET | Refills: 0 | Status: SHIPPED | OUTPATIENT
Start: 2021-09-09 | End: 2021-12-28

## 2021-09-09 RX ORDER — AMITRIPTYLINE HYDROCHLORIDE 25 MG/1
25 TABLET, FILM COATED ORAL
Qty: 90 TABLET | Refills: 0 | Status: SHIPPED | OUTPATIENT
Start: 2021-09-09 | End: 2021-12-06 | Stop reason: SDUPTHER

## 2021-09-09 RX ORDER — SUCRALFATE 1 G/1
1 TABLET ORAL
Qty: 120 TABLET | Refills: 1 | Status: SHIPPED | OUTPATIENT
Start: 2021-09-09 | End: 2021-11-10

## 2021-09-09 RX ORDER — LEMBOREXANT 5 MG/1
1 TABLET, FILM COATED ORAL NIGHTLY PRN
Qty: 10 TABLET | Refills: 0 | COMMUNITY
Start: 2021-09-09 | End: 2021-12-28

## 2021-09-09 RX ORDER — SERTRALINE HYDROCHLORIDE 100 MG/1
100 TABLET, FILM COATED ORAL NIGHTLY
Qty: 90 TABLET | Refills: 1 | Status: SHIPPED | OUTPATIENT
Start: 2021-09-09 | End: 2021-12-28 | Stop reason: SDUPTHER

## 2021-09-09 RX ORDER — LISINOPRIL 5 MG/1
5 TABLET ORAL DAILY
Qty: 90 TABLET | Refills: 0 | Status: SHIPPED | OUTPATIENT
Start: 2021-09-09 | End: 2021-12-23 | Stop reason: SDUPTHER

## 2021-09-09 RX ORDER — MECLIZINE HCL 12.5 MG/1
12.5 TABLET ORAL AS NEEDED
Qty: 30 TABLET | Refills: 3 | Status: SHIPPED | OUTPATIENT
Start: 2021-09-09 | End: 2022-02-18 | Stop reason: SDUPTHER

## 2021-09-09 RX ORDER — PANTOPRAZOLE SODIUM 40 MG/1
40 TABLET, DELAYED RELEASE ORAL DAILY
Qty: 90 TABLET | Refills: 1 | Status: SHIPPED | OUTPATIENT
Start: 2021-09-09 | End: 2021-12-23 | Stop reason: SDUPTHER

## 2021-09-09 RX ORDER — AMLODIPINE BESYLATE 10 MG/1
10 TABLET ORAL DAILY
Qty: 90 TABLET | Refills: 0 | Status: SHIPPED | OUTPATIENT
Start: 2021-09-09 | End: 2021-12-28 | Stop reason: SDUPTHER

## 2021-09-12 PROCEDURE — 93298 REM INTERROG DEV EVAL SCRMS: CPT | Performed by: INTERNAL MEDICINE

## 2021-09-12 PROCEDURE — G2066 INTER DEVC REMOTE 30D: HCPCS | Performed by: INTERNAL MEDICINE

## 2021-09-13 ENCOUNTER — TELEPHONE (OUTPATIENT)
Dept: FAMILY MEDICINE CLINIC | Facility: CLINIC | Age: 76
End: 2021-09-13

## 2021-09-13 NOTE — TELEPHONE ENCOUNTER
HUB to share    Xray C spine----some disc loss at lower neck; arthritic changes more on Left than Rt except at distal neck on Rt;   Xray L Spine-----just showing arthritis changes and large amt of stool in colon so work on regularity in BMs     LVM informing pt

## 2021-09-20 RX ORDER — FAMOTIDINE 20 MG/1
20 TABLET, FILM COATED ORAL 2 TIMES DAILY PRN
Qty: 180 TABLET | Refills: 1 | OUTPATIENT
Start: 2021-09-20

## 2021-09-21 ENCOUNTER — TELEPHONE (OUTPATIENT)
Dept: ORTHOPEDIC SURGERY | Facility: CLINIC | Age: 76
End: 2021-09-21

## 2021-09-21 NOTE — TELEPHONE ENCOUNTER
Caller: FELTON SALAZAR    Relationship to patient: SELF    Best call back number: [phone]    Chief complaint: BILATERAL KNEE PAIN    Type of visit: INJECTION    Requested date: ASAP    If rescheduling, when is the original appointment: 9/1, PT HAD TO CANCEL    Additional notes: N/A

## 2021-10-06 RX ORDER — CLINDAMYCIN PHOSPHATE 10 UG/ML
1 LOTION TOPICAL 2 TIMES DAILY
COMMUNITY
Start: 2021-09-04

## 2021-10-06 RX ORDER — AMOXICILLIN AND CLAVULANATE POTASSIUM 875; 125 MG/1; MG/1
TABLET, FILM COATED ORAL
COMMUNITY
Start: 2021-09-27 | End: 2021-10-15

## 2021-10-06 RX ORDER — MIRABEGRON 50 MG/1
50 TABLET, FILM COATED, EXTENDED RELEASE ORAL DAILY
COMMUNITY
Start: 2021-09-18 | End: 2021-12-28

## 2021-10-08 ENCOUNTER — LAB (OUTPATIENT)
Dept: LAB | Facility: HOSPITAL | Age: 76
End: 2021-10-08
Attending: INTERNAL MEDICINE

## 2021-10-08 DIAGNOSIS — E27.49 GLUCOCORTICOID DEFICIENCY (HCC): ICD-10-CM

## 2021-10-08 LAB
ALBUMIN SERPL-MCNC: 4.5 G/DL (ref 3.5–5.2)
ALBUMIN/GLOB SERPL: 1.8 G/DL
ALP SERPL-CCNC: 85 U/L (ref 39–117)
ALT SERPL W P-5'-P-CCNC: 13 U/L (ref 1–33)
ANION GAP SERPL CALCULATED.3IONS-SCNC: 11.2 MMOL/L (ref 5–15)
AST SERPL-CCNC: 15 U/L (ref 1–32)
BILIRUB SERPL-MCNC: 0.2 MG/DL (ref 0–1.2)
BUN SERPL-MCNC: 17 MG/DL (ref 8–23)
BUN/CREAT SERPL: 17.7 (ref 7–25)
CALCIUM SPEC-SCNC: 9.6 MG/DL (ref 8.6–10.5)
CHLORIDE SERPL-SCNC: 110 MMOL/L (ref 98–107)
CO2 SERPL-SCNC: 19.8 MMOL/L (ref 22–29)
CREAT SERPL-MCNC: 0.96 MG/DL (ref 0.57–1)
GFR SERPL CREATININE-BSD FRML MDRD: 57 ML/MIN/1.73
GLOBULIN UR ELPH-MCNC: 2.5 GM/DL
GLUCOSE SERPL-MCNC: 90 MG/DL (ref 65–99)
POTASSIUM SERPL-SCNC: 3.8 MMOL/L (ref 3.5–5.2)
PROT SERPL-MCNC: 7 G/DL (ref 6–8.5)
SODIUM SERPL-SCNC: 141 MMOL/L (ref 136–145)

## 2021-10-08 PROCEDURE — 36415 COLL VENOUS BLD VENIPUNCTURE: CPT

## 2021-10-08 PROCEDURE — 80053 COMPREHEN METABOLIC PANEL: CPT

## 2021-10-15 ENCOUNTER — OFFICE VISIT (OUTPATIENT)
Dept: ENDOCRINOLOGY | Facility: CLINIC | Age: 76
End: 2021-10-15

## 2021-10-15 VITALS
OXYGEN SATURATION: 97 % | HEIGHT: 60 IN | WEIGHT: 160 LBS | BODY MASS INDEX: 31.41 KG/M2 | DIASTOLIC BLOOD PRESSURE: 75 MMHG | TEMPERATURE: 97.1 F | SYSTOLIC BLOOD PRESSURE: 135 MMHG | HEART RATE: 107 BPM

## 2021-10-15 DIAGNOSIS — K22.0: Primary | ICD-10-CM

## 2021-10-15 DIAGNOSIS — Z23 FLU VACCINE NEED: ICD-10-CM

## 2021-10-15 DIAGNOSIS — E27.49: Primary | ICD-10-CM

## 2021-10-15 DIAGNOSIS — I10 ESSENTIAL HYPERTENSION: Chronic | ICD-10-CM

## 2021-10-15 PROCEDURE — 99214 OFFICE O/P EST MOD 30 MIN: CPT | Performed by: INTERNAL MEDICINE

## 2021-10-15 RX ORDER — HYDROCORTISONE 10 MG/1
TABLET ORAL
Qty: 100 TABLET | Refills: 4
Start: 2021-10-15 | End: 2021-11-12

## 2021-10-15 NOTE — PROGRESS NOTES
Endocrine Progress Note Outpatient     Patient Care Team:  Brooklyn Contreras DO as PCP - General  Brooklyn Contreras DO as PCP - Family Medicine  Master Richard MD as Consulting Physician (Cardiology)    Chief Complaint: Follow-up glucocorticoid deficiency    HPI: 76-year-old female with history of hypertension, osteoarthritis has been getting knee injected every few months and has done at least 5 times with the last injection in March 2021.  She started having some syncopal episodes, dizziness and near syncopal episode and was recently hospitalized.  She was found to have low cortisol and was evaluated by our service.  She failed ACTH stimulation test with a peak cortisol of about 13.  ACTH level was 26.2.  She was started on hydrocortisone 10 mg 3 times daily and she has been feeling very well and her symptoms of dizziness and syncopal episode have improved significantly.      Past Medical History:   Diagnosis Date   • Arthritis    • Bladder incontinence    • Colon polyp    • DEXA     OSTEOPENIA = 2018 (-1.3/ -1.7); 2020 (-1.7/ -1.7)   • GERD    • Glucocorticoid deficiency    • Headache    • Hypertension    • Intracerebral hemorrhage/ CVA    • MAMMO     NEG =2020   • Osteopenia    • Vitamin D deficiency        Social History     Socioeconomic History   • Marital status:    Tobacco Use   • Smoking status: Never Smoker   • Smokeless tobacco: Never Used   Vaping Use   • Vaping Use: Never used   Substance and Sexual Activity   • Alcohol use: No   • Drug use: No   • Sexual activity: Defer       Family History   Problem Relation Age of Onset   • Heart disease Mother    • Hypertension Mother    • Diabetes Father    • Heart disease Father    • Alcohol abuse Father    • Hypertension Father    • Diabetes Brother    • Heart disease Brother    • Cancer Brother 68        Prostate Cancer   • Hypertension Brother    • Diabetes Maternal Aunt    • Heart disease Maternal Grandmother    • Hypertension Maternal  Grandmother    • Heart disease Maternal Grandfather    • Hypertension Maternal Grandfather    • Liver disease Paternal Grandmother    • Hypertension Paternal Grandmother    • Heart disease Paternal Grandfather    • Hypertension Paternal Grandfather    • Dementia Sister        Allergies   Allergen Reactions   • Trazodone Irritability   • Boniva [Ibandronic Acid] GI Intolerance     GERD       ROS:   Constitutional:  Denies fatigue, tiredness.    Eyes:  Denies change in visual acuity   HENT:  Denies nasal congestion or sore throat   Respiratory: denies cough, shortness of breath.   Cardiovascular:  denies chest pain, edema   GI:  Denies abdominal pain, nausea, vomiting.   Musculoskeletal:  Denies back pain or joint pain   Integument:  Denies dry skin and rash   Neurologic:  Denies headache, focal weakness or sensory changes   Endocrine:  Denies polyuria or polydipsia   Psychiatric:  Denies depression or anxiety      Vitals:    10/15/21 1256   BP: 135/75   Pulse: 107   Temp: 97.1 °F (36.2 °C)   SpO2: 97%     Body mass index is 31.25 kg/m².     Physical Exam:  GEN: NAD, conversant  EYES: EOMI, PERRL, no conjunctival erythema  NECK: no thyromegaly, full ROM   CV: RRR, no murmurs/rubs/gallops, no peripheral edema  LUNG: CTAB, no wheezes/rales/ronchi  SKIN: no rashes, no acanthosis  MSK: no deformities, full ROM of all extremities  NEURO: no tremors, DTR normal  PSYCH: AOX3, appropriate mood, affect normal      Results Review:     I reviewed the patient's new clinical results.    Lab Results   Component Value Date    HGBA1C 4.9 04/07/2021      Lab Results   Component Value Date    GLUCOSE 90 10/08/2021    BUN 17 10/08/2021    CREATININE 0.96 10/08/2021    EGFRIFNONA 57 (L) 10/08/2021    BCR 17.7 10/08/2021    K 3.8 10/08/2021    CO2 19.8 (L) 10/08/2021    CALCIUM 9.6 10/08/2021    ALBUMIN 4.50 10/08/2021    LABIL2 1.4 04/30/2019    AST 15 10/08/2021    ALT 13 10/08/2021    CHOL 197 04/07/2021    TRIG 127 04/07/2021    LDL  118 (H) 04/07/2021    HDL 57 04/07/2021     Lab Results   Component Value Date    TSH 1.980 04/06/2021         Medication Review: Reviewed.       Current Outpatient Medications:   •  albuterol (ACCUNEB) 1.25 MG/3ML nebulizer solution, Take 3 mL by nebulization Every 6 (Six) Hours As Needed for Wheezing or Shortness of Air for up to 30 doses., Disp: 120 each, Rfl: 2  •  amitriptyline (ELAVIL) 25 MG tablet, Take 1 tablet by mouth every night at bedtime., Disp: 90 tablet, Rfl: 0  •  amLODIPine (NORVASC) 10 MG tablet, Take 1 tablet by mouth Daily., Disp: 90 tablet, Rfl: 0  •  atorvastatin (LIPITOR) 80 MG tablet, TAKE 1 TABLET BY MOUTH EVERY DAY, Disp: 90 tablet, Rfl: 2  •  Calcium Carbonate-Vit D-Min (Calcium 1200) 6922-9045 MG-UNIT chewable tablet, Chew 1 tablet Daily., Disp: , Rfl:   •  clindamycin (CLEOCIN T) 1 % lotion, APPLY TO AFFECTED AREA TWICE A DAY, Disp: , Rfl:   •  hydrocortisone (CORTEF) 10 MG tablet, Take 1 tablet in the morning 1 in the afternoon and half in the evening., Disp: 100 tablet, Rfl: 4  •  hydrocortisone 2.5 % cream, Apply 1 application topically to the appropriate area as directed 2 (Two) Times a Day., Disp: , Rfl:   •  Lemborexant (DayVigo) 5 MG tablet, Take 1 tablet by mouth At Night As Needed (insomnia)., Disp: 10 tablet, Rfl: 0  •  lisinopril (PRINIVIL,ZESTRIL) 5 MG tablet, Take 1 tablet by mouth Daily., Disp: 90 tablet, Rfl: 0  •  meclizine (ANTIVERT) 12.5 MG tablet, Take 1 tablet by mouth As Needed for Dizziness., Disp: 30 tablet, Rfl: 3  •  Myrbetriq 50 MG tablet sustained-release 24 hour 24 hr tablet, Take 50 mg by mouth Daily., Disp: , Rfl:   •  nitroglycerin (NITROSTAT) 0.4 MG SL tablet, Place 1 tablet under the tongue Every 5 (Five) Minutes As Needed for Chest Pain (Only if SBP Greater Than 100). Take no more than 3 doses in 15 minutes., Disp: 25 tablet, Rfl: 0  •  ondansetron ODT (ZOFRAN-ODT) 4 MG disintegrating tablet, Place 1 tablet on the tongue Every 8 (Eight) Hours As Needed  for Nausea or Vomiting., Disp: 30 tablet, Rfl: 0  •  pantoprazole (PROTONIX) 40 MG EC tablet, Take 1 tablet by mouth Daily., Disp: 90 tablet, Rfl: 1  •  sertraline (ZOLOFT) 100 MG tablet, Take 1 tablet by mouth Every Night., Disp: 90 tablet, Rfl: 1  •  sucralfate (CARAFATE) 1 g tablet, Take 1 tablet by mouth 4 (Four) Times a Day Before Meals & at Bedtime., Disp: 120 tablet, Rfl: 1      Assessment/Plan   1.  Glucocorticoid deficiency: Most likely secondary to articular injections.  She is feeling very well with hydrocortisone replacement.  I have advised her to continue hydrocortisone to 10 mg in the morning, 10 in the afternoon and 5 in the evening.  And will follow her clinically.  Option of going off of hydrocortisone and not getting any articular injections anymore or any steroid injections discussed with her.  She may recover she may not recover.  Since he is feeling clinically better we decided to continue hydrocortisone replacement of low cortisol.    2.  Hypertension: Well-controlled.            Yuriy Gerard MD FACE.

## 2021-11-10 RX ORDER — SUCRALFATE 1 G/1
1 TABLET ORAL
Qty: 120 TABLET | Refills: 1 | Status: SHIPPED | OUTPATIENT
Start: 2021-11-10 | End: 2021-12-28 | Stop reason: SDUPTHER

## 2021-11-10 NOTE — TELEPHONE ENCOUNTER
Rx Refill Note  Requested Prescriptions     Pending Prescriptions Disp Refills   • sucralfate (CARAFATE) 1 g tablet [Pharmacy Med Name: SUCRALFATE 1 GM TABLET] 120 tablet 1     Sig: TAKE 1 TABLET BY MOUTH 4 (FOUR) TIMES A DAY BEFORE MEALS & AT BEDTIME.      Last office visit with prescribing clinician: 9/9/2021      Next office visit with prescribing clinician: 12/28/2021     Comprehensive Metabolic Panel (10/08/2021 13:35)  CBC & Differential (04/11/2021 06:02)  CBC & Differential (04/10/2021 02:38)  Lipid Panel (04/02/2021 11:14)         Kaylee Napoles [facility]  11/10/21, 08:46 EST

## 2021-11-12 RX ORDER — HYDROCORTISONE 10 MG/1
TABLET ORAL
Qty: 100 TABLET | Refills: 4 | Status: ON HOLD | OUTPATIENT
Start: 2021-11-12 | End: 2022-02-07

## 2021-12-06 RX ORDER — AMITRIPTYLINE HYDROCHLORIDE 25 MG/1
25 TABLET, FILM COATED ORAL
Qty: 90 TABLET | Refills: 0 | Status: SHIPPED | OUTPATIENT
Start: 2021-12-06 | End: 2022-02-18

## 2021-12-06 NOTE — TELEPHONE ENCOUNTER
Incoming Refill Request      Medication requested (name and dose): Amitriptyline 25mg    Pharmacy where request should be sent: [facility]    Additional details provided by patient: n/a    Best call back number:     Does the patient have less than a 3 day supply:  [] Yes  [x] No    Kacy Hutchins, Muriel Rep  12/06/21, 10:19 EST

## 2021-12-06 NOTE — TELEPHONE ENCOUNTER
Rx Refill Note  Requested Prescriptions     Pending Prescriptions Disp Refills   • amitriptyline (ELAVIL) 25 MG tablet 90 tablet 0     Sig: Take 1 tablet by mouth every night at bedtime.      Last office visit with prescribing clinician: 9/9/2021      Next office visit with prescribing clinician: 12/28/2021     Comprehensive Metabolic Panel (10/08/2021 13:35)  CBC & Differential (04/11/2021 06:02)  Lipid Panel (04/07/2021 09:10)         Kaylee Napoles CMA  12/06/21, 11:18 EST

## 2021-12-08 RX ORDER — INCONTINENCE PAD,LINER,DISP
EACH MISCELLANEOUS
Qty: 132 EACH | Refills: 13 | Status: ON HOLD | OUTPATIENT
Start: 2021-12-08 | End: 2022-02-07

## 2021-12-08 NOTE — TELEPHONE ENCOUNTER
Rx Refill Note  Requested Prescriptions     Pending Prescriptions Disp Refills   • Incontinence Supply Disposable (Poise Moderate Absorbency) pads [Pharmacy Med Name: POISE PADS] 132 each 13     Sig: USE 1 PAD FIVE TIMES A DAY      Last office visit with prescribing clinician: 9/9/2021      Next office visit with prescribing clinician: 12/28/2021            Sophia Brock, RT  12/08/21, 15:31 EST

## 2021-12-23 RX ORDER — PANTOPRAZOLE SODIUM 40 MG/1
40 TABLET, DELAYED RELEASE ORAL DAILY
Qty: 90 TABLET | Refills: 0 | Status: SHIPPED | OUTPATIENT
Start: 2021-12-23 | End: 2021-12-28 | Stop reason: SDUPTHER

## 2021-12-23 RX ORDER — LISINOPRIL 5 MG/1
5 TABLET ORAL DAILY
Qty: 90 TABLET | Refills: 0 | Status: SHIPPED | OUTPATIENT
Start: 2021-12-23 | End: 2021-12-28 | Stop reason: SDUPTHER

## 2021-12-23 NOTE — TELEPHONE ENCOUNTER
Rx Refill Note  Requested Prescriptions      No prescriptions requested or ordered in this encounter      Last office visit with prescribing clinician: 9/9/2021      Next office visit with prescribing clinician: 12/28/2021     Office Visit with Brooklyn Contreras DO (09/09/2021)  Vitamin D 25 Hydroxy (04/03/2021 12:43)  Lipid Panel (04/02/2021 11:14)         Alcon Ariza  12/23/21, 09:10 EST

## 2021-12-26 RX ORDER — IBANDRONATE SODIUM 150 MG/1
150 TABLET, FILM COATED ORAL
Qty: 3 TABLET | Refills: 3 | Status: SHIPPED | OUTPATIENT
Start: 2021-12-26 | End: 2021-12-28 | Stop reason: SDUPTHER

## 2021-12-28 ENCOUNTER — OFFICE VISIT (OUTPATIENT)
Dept: FAMILY MEDICINE CLINIC | Facility: CLINIC | Age: 76
End: 2021-12-28

## 2021-12-28 ENCOUNTER — TELEPHONE (OUTPATIENT)
Dept: FAMILY MEDICINE CLINIC | Facility: CLINIC | Age: 76
End: 2021-12-28

## 2021-12-28 VITALS
HEIGHT: 60 IN | DIASTOLIC BLOOD PRESSURE: 75 MMHG | TEMPERATURE: 98.2 F | SYSTOLIC BLOOD PRESSURE: 121 MMHG | WEIGHT: 160 LBS | BODY MASS INDEX: 31.41 KG/M2 | OXYGEN SATURATION: 97 % | RESPIRATION RATE: 16 BRPM | HEART RATE: 88 BPM

## 2021-12-28 DIAGNOSIS — K59.00 CONSTIPATION, UNSPECIFIED CONSTIPATION TYPE: ICD-10-CM

## 2021-12-28 DIAGNOSIS — R10.10 PAIN OF UPPER ABDOMEN: ICD-10-CM

## 2021-12-28 DIAGNOSIS — Z00.00 MEDICARE ANNUAL WELLNESS VISIT, SUBSEQUENT: Primary | ICD-10-CM

## 2021-12-28 PROCEDURE — 99213 OFFICE O/P EST LOW 20 MIN: CPT | Performed by: FAMILY MEDICINE

## 2021-12-28 PROCEDURE — 1170F FXNL STATUS ASSESSED: CPT | Performed by: FAMILY MEDICINE

## 2021-12-28 PROCEDURE — 96160 PT-FOCUSED HLTH RISK ASSMT: CPT | Performed by: FAMILY MEDICINE

## 2021-12-28 PROCEDURE — 1160F RVW MEDS BY RX/DR IN RCRD: CPT | Performed by: FAMILY MEDICINE

## 2021-12-28 PROCEDURE — G0439 PPPS, SUBSEQ VISIT: HCPCS | Performed by: FAMILY MEDICINE

## 2021-12-28 RX ORDER — IBANDRONATE SODIUM 150 MG/1
150 TABLET, FILM COATED ORAL
Qty: 3 TABLET | Refills: 3 | Status: SHIPPED | OUTPATIENT
Start: 2021-12-28 | End: 2022-03-04

## 2021-12-28 RX ORDER — VIBEGRON 75 MG/1
1 TABLET, FILM COATED ORAL DAILY
COMMUNITY
Start: 2021-12-09

## 2021-12-28 RX ORDER — DICYCLOMINE HYDROCHLORIDE 10 MG/1
CAPSULE ORAL
Qty: 20 CAPSULE | Refills: 0 | Status: SHIPPED | OUTPATIENT
Start: 2021-12-28 | End: 2022-01-25

## 2021-12-28 RX ORDER — LISINOPRIL 5 MG/1
5 TABLET ORAL DAILY
Qty: 90 TABLET | Refills: 0 | Status: ON HOLD | OUTPATIENT
Start: 2021-12-28 | End: 2022-02-07

## 2021-12-28 RX ORDER — ONDANSETRON 4 MG/1
4 TABLET, FILM COATED ORAL EVERY 8 HOURS PRN
Qty: 30 TABLET | Refills: 0 | Status: SHIPPED | OUTPATIENT
Start: 2021-12-28 | End: 2022-02-18 | Stop reason: SDUPTHER

## 2021-12-28 RX ORDER — PANTOPRAZOLE SODIUM 40 MG/1
40 TABLET, DELAYED RELEASE ORAL DAILY
Qty: 90 TABLET | Refills: 0 | Status: SHIPPED | OUTPATIENT
Start: 2021-12-28 | End: 2021-12-28

## 2021-12-28 RX ORDER — SUCRALFATE 1 G/1
1 TABLET ORAL
Qty: 120 TABLET | Refills: 1 | Status: SHIPPED | OUTPATIENT
Start: 2021-12-28 | End: 2021-12-28

## 2021-12-28 RX ORDER — SERTRALINE HYDROCHLORIDE 100 MG/1
100 TABLET, FILM COATED ORAL NIGHTLY
Qty: 90 TABLET | Refills: 1 | Status: SHIPPED | OUTPATIENT
Start: 2021-12-28 | End: 2022-03-04

## 2021-12-28 RX ORDER — CEFIXIME 400 MG/1
CAPSULE ORAL
COMMUNITY
Start: 2021-11-01 | End: 2021-12-28

## 2021-12-28 RX ORDER — ATORVASTATIN CALCIUM 80 MG/1
80 TABLET, FILM COATED ORAL DAILY
Qty: 90 TABLET | Refills: 2 | Status: SHIPPED | OUTPATIENT
Start: 2021-12-28 | End: 2022-03-23

## 2021-12-28 RX ORDER — SUCRALFATE 1 G/1
1 TABLET ORAL 2 TIMES DAILY
Qty: 120 TABLET | Refills: 1 | Status: ON HOLD
Start: 2021-12-28 | End: 2022-02-07

## 2021-12-28 RX ORDER — AMLODIPINE BESYLATE 10 MG/1
10 TABLET ORAL DAILY
Qty: 90 TABLET | Refills: 0 | Status: SHIPPED | OUTPATIENT
Start: 2021-12-28 | End: 2022-02-18 | Stop reason: SDUPTHER

## 2021-12-28 NOTE — TELEPHONE ENCOUNTER
Caller: [facility] - [place], [place] - [address] - [phone] [facility] [phone]    Relationship: Pharmacy    Best call back number: [phone]    Requested Prescriptions:   Requested Prescriptions     Pending Prescriptions Disp Refills   • amLODIPine (NORVASC) 10 MG tablet 90 tablet 0     Sig: Take 1 tablet by mouth Daily.   • sucralfate (CARAFATE) 1 g tablet 120 tablet 1     Sig: Take 1 tablet by mouth 4 (Four) Times a Day Before Meals & at Bedtime.   • sertraline (ZOLOFT) 100 MG tablet 90 tablet 1     Sig: Take 1 tablet by mouth Every Night.   • pantoprazole (PROTONIX) 40 MG EC tablet 90 tablet 0     Sig: Take 1 tablet by mouth Daily.   • lisinopril (PRINIVIL,ZESTRIL) 5 MG tablet 90 tablet 0     Sig: Take 1 tablet by mouth Daily.   • ibandronate (BONIVA) 150 MG tablet 3 tablet 3     Sig: Take 1 tablet by mouth Every 30 (Thirty) Days.   • atorvastatin (LIPITOR) 80 MG tablet 90 tablet 2     Sig: Take 1 tablet by mouth Daily.        Pharmacy where request should be sent:   [facility]       Additional details provided by patient: LAST MEDICATION REFILLS WERE SENT TO INCORRECT PHARMACY. PLEASE RESEND TO [facility], PATIENT WILL GET ALL MAINTENANCE MEDS SENT HERE FROM NOW ON.     Does the patient have less than a 3 day supply:  [] Yes  [x] No    Muriel Garcia Rep   12/28/21 08:27 EST

## 2022-01-04 RX ORDER — SUCRALFATE 1 G/1
TABLET ORAL
Qty: 120 TABLET | Refills: 1 | OUTPATIENT
Start: 2022-01-04

## 2022-01-25 ENCOUNTER — OFFICE VISIT (OUTPATIENT)
Dept: FAMILY MEDICINE CLINIC | Facility: CLINIC | Age: 77
End: 2022-01-25

## 2022-01-25 VITALS
HEART RATE: 89 BPM | BODY MASS INDEX: 31.22 KG/M2 | SYSTOLIC BLOOD PRESSURE: 178 MMHG | DIASTOLIC BLOOD PRESSURE: 73 MMHG | RESPIRATION RATE: 16 BRPM | WEIGHT: 159 LBS | HEIGHT: 60 IN | OXYGEN SATURATION: 99 % | TEMPERATURE: 97.7 F

## 2022-01-25 DIAGNOSIS — I10 ELEVATED BLOOD PRESSURE READING WITH DIAGNOSIS OF HYPERTENSION: ICD-10-CM

## 2022-01-25 DIAGNOSIS — M67.912 ROTATOR CUFF DISORDER, LEFT: ICD-10-CM

## 2022-01-25 DIAGNOSIS — M85.89 OSTEOPENIA OF MULTIPLE SITES: ICD-10-CM

## 2022-01-25 DIAGNOSIS — E78.2 MIXED HYPERLIPIDEMIA: ICD-10-CM

## 2022-01-25 DIAGNOSIS — K59.09 CHRONIC CONSTIPATION: Primary | ICD-10-CM

## 2022-01-25 DIAGNOSIS — G47.00 RECURRENT INSOMNIA: ICD-10-CM

## 2022-01-25 DIAGNOSIS — E53.8 LOW SERUM VITAMIN B12: ICD-10-CM

## 2022-01-25 PROCEDURE — 99214 OFFICE O/P EST MOD 30 MIN: CPT | Performed by: FAMILY MEDICINE

## 2022-01-25 RX ORDER — TEMAZEPAM 30 MG/1
30 CAPSULE ORAL NIGHTLY PRN
Qty: 30 CAPSULE | Refills: 0 | Status: SHIPPED | OUTPATIENT
Start: 2022-01-25 | End: 2022-02-18 | Stop reason: SDUPTHER

## 2022-01-29 PROCEDURE — G2066 INTER DEVC REMOTE 30D: HCPCS | Performed by: INTERNAL MEDICINE

## 2022-01-29 PROCEDURE — 93298 REM INTERROG DEV EVAL SCRMS: CPT | Performed by: INTERNAL MEDICINE

## 2022-02-01 ENCOUNTER — HOSPITAL ENCOUNTER (INPATIENT)
Facility: HOSPITAL | Age: 77
LOS: 7 days | Discharge: HOME HEALTH | End: 2022-02-09
Attending: EMERGENCY MEDICINE | Admitting: INTERNAL MEDICINE

## 2022-02-01 ENCOUNTER — APPOINTMENT (OUTPATIENT)
Dept: GENERAL RADIOLOGY | Facility: HOSPITAL | Age: 77
End: 2022-02-01

## 2022-02-01 DIAGNOSIS — K52.9 COLITIS: ICD-10-CM

## 2022-02-01 DIAGNOSIS — A41.9 SEPSIS, DUE TO UNSPECIFIED ORGANISM, UNSPECIFIED WHETHER ACUTE ORGAN DYSFUNCTION PRESENT: ICD-10-CM

## 2022-02-01 DIAGNOSIS — K62.5 RECTAL BLEEDING: ICD-10-CM

## 2022-02-01 DIAGNOSIS — I20.0 UNSTABLE ANGINA PECTORIS: Primary | ICD-10-CM

## 2022-02-01 DIAGNOSIS — I10 ESSENTIAL HYPERTENSION: ICD-10-CM

## 2022-02-01 DIAGNOSIS — R07.9 CHEST PAIN, UNSPECIFIED TYPE: ICD-10-CM

## 2022-02-01 DIAGNOSIS — E27.49: ICD-10-CM

## 2022-02-01 DIAGNOSIS — K22.0: ICD-10-CM

## 2022-02-01 LAB
APTT PPP: 23.9 SECONDS (ref 24–31)
BASOPHILS # BLD AUTO: 0.2 10*3/MM3 (ref 0–0.2)
BASOPHILS NFR BLD AUTO: 1.1 % (ref 0–1.5)
DEPRECATED RDW RBC AUTO: 42.4 FL (ref 37–54)
EOSINOPHIL # BLD AUTO: 0 10*3/MM3 (ref 0–0.4)
EOSINOPHIL NFR BLD AUTO: 0 % (ref 0.3–6.2)
ERYTHROCYTE [DISTWIDTH] IN BLOOD BY AUTOMATED COUNT: 15.8 % (ref 12.3–15.4)
HCT VFR BLD AUTO: 37.3 % (ref 34–46.6)
HGB BLD-MCNC: 12.3 G/DL (ref 12–15.9)
INR PPP: 0.96 (ref 0.93–1.1)
LIPASE SERPL-CCNC: 24 U/L (ref 13–60)
LYMPHOCYTES # BLD AUTO: 1.2 10*3/MM3 (ref 0.7–3.1)
LYMPHOCYTES NFR BLD AUTO: 7 % (ref 19.6–45.3)
MCH RBC QN AUTO: 25.1 PG (ref 26.6–33)
MCHC RBC AUTO-ENTMCNC: 33.1 G/DL (ref 31.5–35.7)
MCV RBC AUTO: 75.8 FL (ref 79–97)
MONOCYTES # BLD AUTO: 0.8 10*3/MM3 (ref 0.1–0.9)
MONOCYTES NFR BLD AUTO: 5.1 % (ref 5–12)
NEUTROPHILS NFR BLD AUTO: 14.4 10*3/MM3 (ref 1.7–7)
NEUTROPHILS NFR BLD AUTO: 86.8 % (ref 42.7–76)
NRBC BLD AUTO-RTO: 0 /100 WBC (ref 0–0.2)
PLATELET # BLD AUTO: 308 10*3/MM3 (ref 140–450)
PMV BLD AUTO: 5.9 FL (ref 6–12)
PROTHROMBIN TIME: 10.7 SECONDS (ref 9.6–11.7)
RBC # BLD AUTO: 4.92 10*6/MM3 (ref 3.77–5.28)
WBC NRBC COR # BLD: 16.6 10*3/MM3 (ref 3.4–10.8)

## 2022-02-01 PROCEDURE — 25010000002 ONDANSETRON PER 1 MG: Performed by: EMERGENCY MEDICINE

## 2022-02-01 PROCEDURE — 85610 PROTHROMBIN TIME: CPT | Performed by: EMERGENCY MEDICINE

## 2022-02-01 PROCEDURE — 87635 SARS-COV-2 COVID-19 AMP PRB: CPT | Performed by: EMERGENCY MEDICINE

## 2022-02-01 PROCEDURE — 85025 COMPLETE CBC W/AUTO DIFF WBC: CPT | Performed by: EMERGENCY MEDICINE

## 2022-02-01 PROCEDURE — 99285 EMERGENCY DEPT VISIT HI MDM: CPT

## 2022-02-01 PROCEDURE — 85730 THROMBOPLASTIN TIME PARTIAL: CPT | Performed by: EMERGENCY MEDICINE

## 2022-02-01 PROCEDURE — 80053 COMPREHEN METABOLIC PANEL: CPT | Performed by: EMERGENCY MEDICINE

## 2022-02-01 PROCEDURE — 84484 ASSAY OF TROPONIN QUANT: CPT | Performed by: EMERGENCY MEDICINE

## 2022-02-01 PROCEDURE — 36415 COLL VENOUS BLD VENIPUNCTURE: CPT | Performed by: EMERGENCY MEDICINE

## 2022-02-01 PROCEDURE — 71045 X-RAY EXAM CHEST 1 VIEW: CPT

## 2022-02-01 PROCEDURE — 83690 ASSAY OF LIPASE: CPT | Performed by: EMERGENCY MEDICINE

## 2022-02-01 PROCEDURE — 93005 ELECTROCARDIOGRAM TRACING: CPT | Performed by: EMERGENCY MEDICINE

## 2022-02-01 RX ORDER — SODIUM CHLORIDE 0.9 % (FLUSH) 0.9 %
10 SYRINGE (ML) INJECTION AS NEEDED
Status: DISCONTINUED | OUTPATIENT
Start: 2022-02-01 | End: 2022-02-09 | Stop reason: HOSPADM

## 2022-02-01 RX ORDER — ONDANSETRON 2 MG/ML
4 INJECTION INTRAMUSCULAR; INTRAVENOUS ONCE
Status: COMPLETED | OUTPATIENT
Start: 2022-02-01 | End: 2022-02-01

## 2022-02-01 RX ADMIN — ONDANSETRON 4 MG: 2 INJECTION INTRAMUSCULAR; INTRAVENOUS at 23:45

## 2022-02-02 ENCOUNTER — APPOINTMENT (OUTPATIENT)
Dept: CT IMAGING | Facility: HOSPITAL | Age: 77
End: 2022-02-02

## 2022-02-02 PROBLEM — Z79.899 POLYPHARMACY: Status: ACTIVE | Noted: 2020-10-26

## 2022-02-02 PROBLEM — K52.9 COLITIS: Status: ACTIVE | Noted: 2022-02-02

## 2022-02-02 PROBLEM — K22.0: Chronic | Status: ACTIVE | Noted: 2021-04-11

## 2022-02-02 PROBLEM — E27.49: Chronic | Status: ACTIVE | Noted: 2021-04-11

## 2022-02-02 PROBLEM — J45.20 ASTHMA IN ADULT, MILD INTERMITTENT, UNCOMPLICATED: Chronic | Status: ACTIVE | Noted: 2021-01-27

## 2022-02-02 PROBLEM — F41.1 GENERALIZED ANXIETY DISORDER: Chronic | Status: ACTIVE | Noted: 2018-09-04

## 2022-02-02 PROBLEM — R07.9 CHEST PAIN: Status: ACTIVE | Noted: 2022-02-02

## 2022-02-02 PROBLEM — I69.30 HISTORY OF HEMORRHAGIC CEREBROVASCULAR ACCIDENT (CVA) WITH RESIDUAL DEFICIT: Chronic | Status: ACTIVE | Noted: 2021-04-06

## 2022-02-02 LAB
ALBUMIN SERPL-MCNC: 4.1 G/DL (ref 3.5–5.2)
ALBUMIN/GLOB SERPL: 1.3 G/DL
ALP SERPL-CCNC: 135 U/L (ref 39–117)
ALT SERPL W P-5'-P-CCNC: 16 U/L (ref 1–33)
ANION GAP SERPL CALCULATED.3IONS-SCNC: 14 MMOL/L (ref 5–15)
ANION GAP SERPL CALCULATED.3IONS-SCNC: 15 MMOL/L (ref 5–15)
AST SERPL-CCNC: 25 U/L (ref 1–32)
BILIRUB SERPL-MCNC: 0.3 MG/DL (ref 0–1.2)
BUN SERPL-MCNC: 14 MG/DL (ref 8–23)
BUN SERPL-MCNC: 18 MG/DL (ref 8–23)
BUN/CREAT SERPL: 13.6 (ref 7–25)
BUN/CREAT SERPL: 20 (ref 7–25)
CALCIUM SPEC-SCNC: 9.7 MG/DL (ref 8.6–10.5)
CALCIUM SPEC-SCNC: 9.9 MG/DL (ref 8.6–10.5)
CHLORIDE SERPL-SCNC: 104 MMOL/L (ref 98–107)
CHLORIDE SERPL-SCNC: 104 MMOL/L (ref 98–107)
CO2 SERPL-SCNC: 19 MMOL/L (ref 22–29)
CO2 SERPL-SCNC: 20 MMOL/L (ref 22–29)
CREAT SERPL-MCNC: 0.9 MG/DL (ref 0.57–1)
CREAT SERPL-MCNC: 1.03 MG/DL (ref 0.57–1)
D-LACTATE SERPL-SCNC: 1.2 MMOL/L (ref 0.5–2)
DEPRECATED RDW RBC AUTO: 42.4 FL (ref 37–54)
ERYTHROCYTE [DISTWIDTH] IN BLOOD BY AUTOMATED COUNT: 16 % (ref 12.3–15.4)
GFR SERPL CREATININE-BSD FRML MDRD: 52 ML/MIN/1.73
GFR SERPL CREATININE-BSD FRML MDRD: 61 ML/MIN/1.73
GLOBULIN UR ELPH-MCNC: 3.2 GM/DL
GLUCOSE BLDC GLUCOMTR-MCNC: 101 MG/DL (ref 70–105)
GLUCOSE SERPL-MCNC: 136 MG/DL (ref 65–99)
GLUCOSE SERPL-MCNC: 147 MG/DL (ref 65–99)
HCT VFR BLD AUTO: 35.3 % (ref 34–46.6)
HCT VFR BLD AUTO: 38.3 % (ref 34–46.6)
HGB BLD-MCNC: 11.6 G/DL (ref 12–15.9)
HGB BLD-MCNC: 12.9 G/DL (ref 12–15.9)
LIPASE SERPL-CCNC: 19 U/L (ref 13–60)
LYMPHOCYTES # BLD MANUAL: 0.96 10*3/MM3 (ref 0.7–3.1)
LYMPHOCYTES NFR BLD MANUAL: 7 % (ref 5–12)
MCH RBC QN AUTO: 25.5 PG (ref 26.6–33)
MCHC RBC AUTO-ENTMCNC: 33.6 G/DL (ref 31.5–35.7)
MCV RBC AUTO: 75.9 FL (ref 79–97)
MONOCYTES # BLD: 1.34 10*3/MM3 (ref 0.1–0.9)
NEUTROPHILS # BLD AUTO: 16.81 10*3/MM3 (ref 1.7–7)
NEUTROPHILS NFR BLD MANUAL: 88 % (ref 42.7–76)
PLAT MORPH BLD: NORMAL
PLATELET # BLD AUTO: 286 10*3/MM3 (ref 140–450)
PMV BLD AUTO: 6 FL (ref 6–12)
POTASSIUM SERPL-SCNC: 4.1 MMOL/L (ref 3.5–5.2)
POTASSIUM SERPL-SCNC: 4.3 MMOL/L (ref 3.5–5.2)
PROT SERPL-MCNC: 7.3 G/DL (ref 6–8.5)
RBC # BLD AUTO: 5.05 10*6/MM3 (ref 3.77–5.28)
RBC MORPH BLD: NORMAL
SARS-COV-2 RNA PNL SPEC NAA+PROBE: NOT DETECTED
SCAN SLIDE: NORMAL
SODIUM SERPL-SCNC: 138 MMOL/L (ref 136–145)
SODIUM SERPL-SCNC: 138 MMOL/L (ref 136–145)
TROPONIN T SERPL-MCNC: <0.01 NG/ML (ref 0–0.03)
VARIANT LYMPHS NFR BLD MANUAL: 5 % (ref 19.6–45.3)
WBC MORPH BLD: NORMAL
WBC NRBC COR # BLD: 19.1 10*3/MM3 (ref 3.4–10.8)

## 2022-02-02 PROCEDURE — 25010000002 KETOROLAC TROMETHAMINE PER 15 MG: Performed by: NURSE PRACTITIONER

## 2022-02-02 PROCEDURE — 82962 GLUCOSE BLOOD TEST: CPT

## 2022-02-02 PROCEDURE — 84484 ASSAY OF TROPONIN QUANT: CPT | Performed by: NURSE PRACTITIONER

## 2022-02-02 PROCEDURE — G0378 HOSPITAL OBSERVATION PER HR: HCPCS

## 2022-02-02 PROCEDURE — 74177 CT ABD & PELVIS W/CONTRAST: CPT

## 2022-02-02 PROCEDURE — 87040 BLOOD CULTURE FOR BACTERIA: CPT | Performed by: PHYSICIAN ASSISTANT

## 2022-02-02 PROCEDURE — 85018 HEMOGLOBIN: CPT | Performed by: NURSE PRACTITIONER

## 2022-02-02 PROCEDURE — 85007 BL SMEAR W/DIFF WBC COUNT: CPT | Performed by: NURSE PRACTITIONER

## 2022-02-02 PROCEDURE — 83605 ASSAY OF LACTIC ACID: CPT | Performed by: PHYSICIAN ASSISTANT

## 2022-02-02 PROCEDURE — 85025 COMPLETE CBC W/AUTO DIFF WBC: CPT | Performed by: NURSE PRACTITIONER

## 2022-02-02 PROCEDURE — 87040 BLOOD CULTURE FOR BACTERIA: CPT | Performed by: HOSPITALIST

## 2022-02-02 PROCEDURE — 99223 1ST HOSP IP/OBS HIGH 75: CPT | Performed by: HOSPITALIST

## 2022-02-02 PROCEDURE — 85014 HEMATOCRIT: CPT | Performed by: NURSE PRACTITIONER

## 2022-02-02 PROCEDURE — 25010000002 PIPERACILLIN SOD-TAZOBACTAM PER 1 G: Performed by: NURSE PRACTITIONER

## 2022-02-02 PROCEDURE — 0 IOPAMIDOL PER 1 ML: Performed by: EMERGENCY MEDICINE

## 2022-02-02 PROCEDURE — 25010000002 PIPERACILLIN SOD-TAZOBACTAM PER 1 G: Performed by: PHYSICIAN ASSISTANT

## 2022-02-02 PROCEDURE — 25010000002 METOCLOPRAMIDE PER 10 MG: Performed by: PHYSICIAN ASSISTANT

## 2022-02-02 PROCEDURE — 80048 BASIC METABOLIC PNL TOTAL CA: CPT | Performed by: NURSE PRACTITIONER

## 2022-02-02 RX ORDER — NITROGLYCERIN 0.4 MG/1
0.4 TABLET SUBLINGUAL
Status: DISCONTINUED | OUTPATIENT
Start: 2022-02-02 | End: 2022-02-02 | Stop reason: SDUPTHER

## 2022-02-02 RX ORDER — ONDANSETRON 4 MG/1
4 TABLET, FILM COATED ORAL EVERY 8 HOURS PRN
Status: DISCONTINUED | OUTPATIENT
Start: 2022-02-02 | End: 2022-02-02 | Stop reason: SDUPTHER

## 2022-02-02 RX ORDER — AMITRIPTYLINE HYDROCHLORIDE 25 MG/1
25 TABLET, FILM COATED ORAL NIGHTLY
Status: DISCONTINUED | OUTPATIENT
Start: 2022-02-02 | End: 2022-02-09 | Stop reason: HOSPADM

## 2022-02-02 RX ORDER — MECLIZINE HYDROCHLORIDE 25 MG/1
12.5 TABLET ORAL AS NEEDED
Status: DISCONTINUED | OUTPATIENT
Start: 2022-02-02 | End: 2022-02-09 | Stop reason: HOSPADM

## 2022-02-02 RX ORDER — PREDNISONE 20 MG/1
20 TABLET ORAL DAILY
Status: DISCONTINUED | OUTPATIENT
Start: 2022-02-04 | End: 2022-02-02

## 2022-02-02 RX ORDER — ALUMINA, MAGNESIA, AND SIMETHICONE 2400; 2400; 240 MG/30ML; MG/30ML; MG/30ML
15 SUSPENSION ORAL EVERY 6 HOURS PRN
Status: DISCONTINUED | OUTPATIENT
Start: 2022-02-02 | End: 2022-02-09 | Stop reason: HOSPADM

## 2022-02-02 RX ORDER — METOCLOPRAMIDE HYDROCHLORIDE 5 MG/ML
10 INJECTION INTRAMUSCULAR; INTRAVENOUS ONCE
Status: COMPLETED | OUTPATIENT
Start: 2022-02-02 | End: 2022-02-02

## 2022-02-02 RX ORDER — NITROGLYCERIN 0.4 MG/1
0.4 TABLET SUBLINGUAL
Status: DISCONTINUED | OUTPATIENT
Start: 2022-02-02 | End: 2022-02-09 | Stop reason: HOSPADM

## 2022-02-02 RX ORDER — ONDANSETRON 4 MG/1
4 TABLET, FILM COATED ORAL EVERY 6 HOURS PRN
Status: DISCONTINUED | OUTPATIENT
Start: 2022-02-02 | End: 2022-02-04

## 2022-02-02 RX ORDER — ACETAMINOPHEN 650 MG/1
650 SUPPOSITORY RECTAL EVERY 4 HOURS PRN
Status: DISCONTINUED | OUTPATIENT
Start: 2022-02-02 | End: 2022-02-09 | Stop reason: HOSPADM

## 2022-02-02 RX ORDER — KETOROLAC TROMETHAMINE 15 MG/ML
15 INJECTION, SOLUTION INTRAMUSCULAR; INTRAVENOUS EVERY 6 HOURS PRN
Status: DISCONTINUED | OUTPATIENT
Start: 2022-02-02 | End: 2022-02-03

## 2022-02-02 RX ORDER — TEMAZEPAM 15 MG/1
30 CAPSULE ORAL NIGHTLY PRN
Status: DISCONTINUED | OUTPATIENT
Start: 2022-02-02 | End: 2022-02-09 | Stop reason: HOSPADM

## 2022-02-02 RX ORDER — HYDROCORTISONE 10 MG/1
10 TABLET ORAL
Status: DISCONTINUED | OUTPATIENT
Start: 2022-02-03 | End: 2022-02-04

## 2022-02-02 RX ORDER — SUCRALFATE 1 G/1
1 TABLET ORAL 2 TIMES DAILY
Status: DISCONTINUED | OUTPATIENT
Start: 2022-02-02 | End: 2022-02-06

## 2022-02-02 RX ORDER — AMLODIPINE BESYLATE 5 MG/1
10 TABLET ORAL DAILY
Status: DISCONTINUED | OUTPATIENT
Start: 2022-02-03 | End: 2022-02-09 | Stop reason: HOSPADM

## 2022-02-02 RX ORDER — SODIUM CHLORIDE 0.9 % (FLUSH) 0.9 %
10 SYRINGE (ML) INJECTION EVERY 12 HOURS SCHEDULED
Status: DISCONTINUED | OUTPATIENT
Start: 2022-02-02 | End: 2022-02-09 | Stop reason: HOSPADM

## 2022-02-02 RX ORDER — ATORVASTATIN CALCIUM 40 MG/1
80 TABLET, FILM COATED ORAL NIGHTLY
Status: DISCONTINUED | OUTPATIENT
Start: 2022-02-02 | End: 2022-02-09 | Stop reason: HOSPADM

## 2022-02-02 RX ORDER — SODIUM CHLORIDE 9 MG/ML
100 INJECTION, SOLUTION INTRAVENOUS CONTINUOUS
Status: DISPENSED | OUTPATIENT
Start: 2022-02-02 | End: 2022-02-03

## 2022-02-02 RX ORDER — ONDANSETRON 2 MG/ML
4 INJECTION INTRAMUSCULAR; INTRAVENOUS EVERY 6 HOURS PRN
Status: DISCONTINUED | OUTPATIENT
Start: 2022-02-02 | End: 2022-02-04

## 2022-02-02 RX ORDER — ACETAMINOPHEN 325 MG/1
650 TABLET ORAL EVERY 4 HOURS PRN
Status: DISCONTINUED | OUTPATIENT
Start: 2022-02-02 | End: 2022-02-09 | Stop reason: HOSPADM

## 2022-02-02 RX ORDER — ACETAMINOPHEN 160 MG/5ML
650 SOLUTION ORAL EVERY 4 HOURS PRN
Status: DISCONTINUED | OUTPATIENT
Start: 2022-02-02 | End: 2022-02-09 | Stop reason: HOSPADM

## 2022-02-02 RX ORDER — CHOLECALCIFEROL (VITAMIN D3) 125 MCG
5 CAPSULE ORAL NIGHTLY PRN
Status: DISCONTINUED | OUTPATIENT
Start: 2022-02-02 | End: 2022-02-09 | Stop reason: HOSPADM

## 2022-02-02 RX ORDER — SODIUM CHLORIDE 0.9 % (FLUSH) 0.9 %
10 SYRINGE (ML) INJECTION AS NEEDED
Status: DISCONTINUED | OUTPATIENT
Start: 2022-02-02 | End: 2022-02-09 | Stop reason: HOSPADM

## 2022-02-02 RX ORDER — SERTRALINE HYDROCHLORIDE 100 MG/1
100 TABLET, FILM COATED ORAL NIGHTLY
Status: DISCONTINUED | OUTPATIENT
Start: 2022-02-02 | End: 2022-02-09 | Stop reason: HOSPADM

## 2022-02-02 RX ORDER — PREDNISONE 10 MG/1
10 TABLET ORAL DAILY
Status: DISCONTINUED | OUTPATIENT
Start: 2022-02-06 | End: 2022-02-02

## 2022-02-02 RX ORDER — ALBUTEROL SULFATE 1.25 MG/3ML
1 SOLUTION RESPIRATORY (INHALATION) EVERY 6 HOURS PRN
Status: DISCONTINUED | OUTPATIENT
Start: 2022-02-02 | End: 2022-02-09 | Stop reason: HOSPADM

## 2022-02-02 RX ORDER — LISINOPRIL 5 MG/1
5 TABLET ORAL DAILY
Status: DISCONTINUED | OUTPATIENT
Start: 2022-02-03 | End: 2022-02-09 | Stop reason: HOSPADM

## 2022-02-02 RX ORDER — LABETALOL HYDROCHLORIDE 5 MG/ML
10 INJECTION, SOLUTION INTRAVENOUS ONCE
Status: COMPLETED | OUTPATIENT
Start: 2022-02-02 | End: 2022-02-02

## 2022-02-02 RX ORDER — LABETALOL HYDROCHLORIDE 5 MG/ML
10 INJECTION, SOLUTION INTRAVENOUS EVERY 6 HOURS PRN
Status: DISCONTINUED | OUTPATIENT
Start: 2022-02-02 | End: 2022-02-03

## 2022-02-02 RX ADMIN — KETOROLAC TROMETHAMINE 15 MG: 15 INJECTION, SOLUTION INTRAMUSCULAR; INTRAVENOUS at 04:03

## 2022-02-02 RX ADMIN — SODIUM CHLORIDE 100 ML/HR: 900 INJECTION INTRAVENOUS at 09:57

## 2022-02-02 RX ADMIN — ATORVASTATIN CALCIUM 80 MG: 40 TABLET, FILM COATED ORAL at 22:15

## 2022-02-02 RX ADMIN — SODIUM CHLORIDE 100 ML/HR: 900 INJECTION INTRAVENOUS at 06:58

## 2022-02-02 RX ADMIN — AMITRIPTYLINE HYDROCHLORIDE 25 MG: 25 TABLET, FILM COATED ORAL at 22:15

## 2022-02-02 RX ADMIN — PIPERACILLIN AND TAZOBACTAM 3.38 G: 3; .375 INJECTION, POWDER, LYOPHILIZED, FOR SOLUTION INTRAVENOUS at 04:03

## 2022-02-02 RX ADMIN — KETOROLAC TROMETHAMINE 15 MG: 15 INJECTION, SOLUTION INTRAMUSCULAR; INTRAVENOUS at 22:33

## 2022-02-02 RX ADMIN — SERTRALINE 100 MG: 100 TABLET, FILM COATED ORAL at 22:15

## 2022-02-02 RX ADMIN — SODIUM CHLORIDE, PRESERVATIVE FREE 10 ML: 5 INJECTION INTRAVENOUS at 10:53

## 2022-02-02 RX ADMIN — SODIUM CHLORIDE 100 ML/HR: 900 INJECTION INTRAVENOUS at 22:14

## 2022-02-02 RX ADMIN — METOCLOPRAMIDE 10 MG: 5 INJECTION, SOLUTION INTRAMUSCULAR; INTRAVENOUS at 03:11

## 2022-02-02 RX ADMIN — LABETALOL HYDROCHLORIDE 10 MG: 5 INJECTION INTRAVENOUS at 02:34

## 2022-02-02 RX ADMIN — ACETAMINOPHEN 650 MG: 325 TABLET, FILM COATED ORAL at 10:52

## 2022-02-02 RX ADMIN — IOPAMIDOL 100 ML: 755 INJECTION, SOLUTION INTRAVENOUS at 00:42

## 2022-02-02 RX ADMIN — Medication 5 MG: at 22:33

## 2022-02-02 RX ADMIN — PIPERACILLIN AND TAZOBACTAM 3.38 G: 3; .375 INJECTION, POWDER, LYOPHILIZED, FOR SOLUTION INTRAVENOUS at 22:14

## 2022-02-02 RX ADMIN — SODIUM CHLORIDE, PRESERVATIVE FREE 10 ML: 5 INJECTION INTRAVENOUS at 22:17

## 2022-02-02 RX ADMIN — SUCRALFATE 1 G: 1 TABLET ORAL at 22:15

## 2022-02-02 RX ADMIN — PIPERACILLIN AND TAZOBACTAM 3.38 G: 3; .375 INJECTION, POWDER, LYOPHILIZED, FOR SOLUTION INTRAVENOUS at 10:28

## 2022-02-02 RX ADMIN — SODIUM CHLORIDE, PRESERVATIVE FREE 10 ML: 5 INJECTION INTRAVENOUS at 06:59

## 2022-02-02 RX ADMIN — HYDROCORTISONE 1 APPLICATION: 25 CREAM TOPICAL at 22:41

## 2022-02-02 NOTE — ED PROVIDER NOTES
Subjective   Chief complaint: Chest pain and rectal bleeding    76-year-old female presents with chest pain and rectal bleeding.  Patient states she has been having some nausea and vomiting as well as chest pain for the past 2 weeks.  She reports some mild shortness of breath.  She states she had been somewhat constipated.  Today she started having bright red blood per rectum.  She states she passed some clots as well.  She denies any hematemesis.  She denies any alleviating or exacerbating factors.  She states the chest pain is across her lower chest and upper abdomen.      History provided by:  Patient      Review of Systems   Constitutional: Negative for fever.   HENT: Negative for congestion and sore throat.    Eyes: Negative for redness.   Respiratory: Positive for shortness of breath. Negative for cough.    Cardiovascular: Positive for chest pain.   Gastrointestinal: Positive for abdominal pain, blood in stool, nausea and vomiting. Negative for diarrhea.   Genitourinary: Negative for dysuria.   Musculoskeletal: Negative for back pain.   Skin: Negative for rash.   Neurological: Negative for dizziness and headaches.   Psychiatric/Behavioral: Negative for confusion.       Past Medical History:   Diagnosis Date   • Arthritis    • Bladder incontinence    • Colon polyp    • DEXA     OSTEOPENIA = 2018 (-1.3/ -1.7); 2020 (-1.7/ -1.7)   • GERD    • Glucocorticoid deficiency    • Headache    • HTN    • Intracerebral hemorrhage/ CVA    • MAMMO     NEG =2020   • Osteopenia    • Vitamin D deficiency        Allergies   Allergen Reactions   • Trazodone Irritability   • Boniva [Ibandronic Acid] GI Intolerance     GERD   • Dayvigo [Lemborexant] Other (See Comments)     Sleep walking       Past Surgical History:   Procedure Laterality Date   • APPENDECTOMY     • BLADDER SURGERY      bladder stimulator placement/ REMOVAL   • BREAST CYST EXCISION     • BREAST LUMPECTOMY Left 1973    NEG   • BUNIONECTOMY Right 5/29/2020     "Procedure: BUNIONECTOMY DEVONTE;  Surgeon: MARISSA Em DPM;  Location: [facility] MAIN OR;  Service: Podiatry;  Laterality: Right;   • CARDIAC CATHETERIZATION     • CARPAL TUNNEL RELEASE Right 1980   • CHOLECYSTECTOMY     • COLON SURGERY      hemorrhoid banding   • COLONOSCOPY  2017 2017/ 2019 = jessica DESAI 2024   Dr. Mackey   • EYE SURGERY     • HERNIA REPAIR      Umbilical removal   • HYSTERECTOMY  1970   • SUBTOTAL HYSTERECTOMY     • UMBILICAL HERNIA REPAIR         Family History   Problem Relation Age of Onset   • Heart disease Mother    • Hypertension Mother    • Diabetes Father    • Heart disease Father    • Alcohol abuse Father    • Hypertension Father    • Diabetes Brother    • Heart disease Brother    • Cancer Brother 68        Prostate Cancer   • Hypertension Brother    • Diabetes Maternal Aunt    • Heart disease Maternal Grandmother    • Hypertension Maternal Grandmother    • Heart disease Maternal Grandfather    • Hypertension Maternal Grandfather    • Liver disease Paternal Grandmother    • Hypertension Paternal Grandmother    • Heart disease Paternal Grandfather    • Hypertension Paternal Grandfather    • Dementia Sister    • Diabetes Brother        Social History     Socioeconomic History   • Marital status:    Tobacco Use   • Smoking status: Never Smoker   • Smokeless tobacco: Never Used   Vaping Use   • Vaping Use: Never used   Substance and Sexual Activity   • Alcohol use: No   • Drug use: No   • Sexual activity: Defer       /89   Pulse 107   Temp 97.9 °F (36.6 °C) (Oral)   Resp 22   Ht 162.6 cm (64\")   Wt 72.6 kg (160 lb)   SpO2 95%   BMI 27.46 kg/m²       Objective   Physical Exam  Vitals and nursing note reviewed.   Constitutional:       Appearance: She is well-developed.   HENT:      Head: Normocephalic and atraumatic.   Eyes:      Pupils: Pupils are equal, round, and reactive to light.   Cardiovascular:      Rate and Rhythm: Normal rate and regular rhythm.      Heart sounds: " Normal heart sounds.   Pulmonary:      Effort: Pulmonary effort is normal. No respiratory distress.      Breath sounds: Normal breath sounds.   Abdominal:      General: Bowel sounds are normal.      Palpations: Abdomen is soft.      Comments: Mild tenderness diffusely, no rebound or guarding   Musculoskeletal:         General: Normal range of motion.      Cervical back: Normal range of motion and neck supple.   Skin:     General: Skin is warm and dry.   Neurological:      General: No focal deficit present.      Mental Status: She is alert and oriented to person, place, and time.         Procedures           ED Course      My interpretation of EKG shows sinus tachycardia, rate of 111, nonspecific IVCD, no STEMI     Results for orders placed or performed during the hospital encounter of 02/01/22   COVID-19,CEPHEID/BRENNEN,COR/NINA/PAD/ALVINO IN-HOUSE(OR EMERGENT/ADD-ON),NP SWAB IN TRANSPORT MEDIA 3-4 HR TAT, RT-PCR - Swab, Nasopharynx    Specimen: Nasopharynx; Swab   Result Value Ref Range    COVID19 Not Detected Not Detected - Ref. Range   Comprehensive Metabolic Panel    Specimen: Blood   Result Value Ref Range    Glucose 136 (H) 65 - 99 mg/dL    BUN 18 8 - 23 mg/dL    Creatinine 0.90 0.57 - 1.00 mg/dL    Sodium 138 136 - 145 mmol/L    Potassium 4.3 3.5 - 5.2 mmol/L    Chloride 104 98 - 107 mmol/L    CO2 19.0 (L) 22.0 - 29.0 mmol/L    Calcium 9.9 8.6 - 10.5 mg/dL    Total Protein 7.3 6.0 - 8.5 g/dL    Albumin 4.10 3.50 - 5.20 g/dL    ALT (SGPT) 16 1 - 33 U/L    AST (SGOT) 25 1 - 32 U/L    Alkaline Phosphatase 135 (H) 39 - 117 U/L    Total Bilirubin 0.3 0.0 - 1.2 mg/dL    eGFR Non African Amer 61 >60 mL/min/1.73    Globulin 3.2 gm/dL    A/G Ratio 1.3 g/dL    BUN/Creatinine Ratio 20.0 7.0 - 25.0    Anion Gap 15.0 5.0 - 15.0 mmol/L   Protime-INR    Specimen: Blood   Result Value Ref Range    Protime 10.7 9.6 - 11.7 Seconds    INR 0.96 0.93 - 1.10   aPTT    Specimen: Blood   Result Value Ref Range    PTT 23.9 (L) 24.0 - 31.0  seconds   Lipase    Specimen: Blood   Result Value Ref Range    Lipase 24 13 - 60 U/L   Troponin    Specimen: Blood   Result Value Ref Range    Troponin T <0.010 0.000 - 0.030 ng/mL   CBC Auto Differential    Specimen: Blood   Result Value Ref Range    WBC 16.60 (H) 3.40 - 10.80 10*3/mm3    RBC 4.92 3.77 - 5.28 10*6/mm3    Hemoglobin 12.3 12.0 - 15.9 g/dL    Hematocrit 37.3 34.0 - 46.6 %    MCV 75.8 (L) 79.0 - 97.0 fL    MCH 25.1 (L) 26.6 - 33.0 pg    MCHC 33.1 31.5 - 35.7 g/dL    RDW 15.8 (H) 12.3 - 15.4 %    RDW-SD 42.4 37.0 - 54.0 fl    MPV 5.9 (L) 6.0 - 12.0 fL    Platelets 308 140 - 450 10*3/mm3    Neutrophil % 86.8 (H) 42.7 - 76.0 %    Lymphocyte % 7.0 (L) 19.6 - 45.3 %    Monocyte % 5.1 5.0 - 12.0 %    Eosinophil % 0.0 (L) 0.3 - 6.2 %    Basophil % 1.1 0.0 - 1.5 %    Neutrophils, Absolute 14.40 (H) 1.70 - 7.00 10*3/mm3    Lymphocytes, Absolute 1.20 0.70 - 3.10 10*3/mm3    Monocytes, Absolute 0.80 0.10 - 0.90 10*3/mm3    Eosinophils, Absolute 0.00 0.00 - 0.40 10*3/mm3    Basophils, Absolute 0.20 0.00 - 0.20 10*3/mm3    nRBC 0.0 0.0 - 0.2 /100 WBC   Lipase    Specimen: Blood   Result Value Ref Range    Lipase 19 13 - 60 U/L   ECG 12 Lead   Result Value Ref Range    QT Interval 368 ms                                           MDM   Care will be transferred to [facility] Mccarthy pending results of CT abdomen and pelvis.    Chart review:    EKG: EKG reviewed by Dr. Mars Julien, shows sinus tachycardia rate 111 bpm, IVCD which appears to be new from prior study otherwise no ST elevation apparent.  Imaging:    CT Abdomen Pelvis With Contrast   Final Result   Impression:    1. Distal colonic wall thickening suggestive of infectious or inflammatory colitis.   2. Wall thickening and mild fluid distention of the lower esophagus. Correlate for esophagitis or reflux.      Electronically signed by:  Montrell Sharp M.D.     2/1/2022 11:18 PM      XR Chest 1 View   Final Result   No acute cardiopulmonary disease demonstrated      "  Electronically Signed By-Brannon Wiggins On:2/1/2022 10:16 PM   This report was finalized on [id number] by  Brannon Wiggins, .          Labs: White blood cell count elevated at 16.6. CMP largely unremarkable. COVID-19 swab negative. Coags otherwise unremarkable. Initial troponin negative, lipase normal.    Vitals:  /89   Pulse 107   Temp 97.9 °F (36.6 °C) (Oral)   Resp 22   Ht 162.6 cm (64\")   Wt 72.6 kg (160 lb)   SpO2 95%   BMI 27.46 kg/m²     Medications given: Patient given Zofran 4 mg IV, labetalol 12 mg IV and Reglan 10 mg IV as well as Zosyn 3.375 g IV.   Procedures:    MDM: Patient is a 76-year-old female comes in complaining of chest pain and abdominal pain as well as rectal bleeding.  White blood cell count elevated at 16.6. CMP largely unremarkable. COVID-19 swab negative. Coags otherwise unremarkable. Initial troponin negative, lipase normal. CT abdomen pelvis with contrast shows distal colonic wall thickening suggestive of infectious or inflammatory colitis. Wall thickening and mild fluid distention of the lower esophagus correlate for esophagitis or reflux. Chest x-ray unremarkable for acute findings. Patient started on Zosyn triggering sepsis and has source of infection consistent with colitis. Patient given Zofran for nausea with some relief and Reglan was subsequently ordered as patient had persistent nausea and vomiting in the ER. Patient was given labetalol for blood pressure control with improvement of blood pressure. Both with PRACHI Abraham, who accepted patient behalf of hospitalist team for admission and further work-up and treatment.  See full discharge instructions for further details. Results and plan discussed with patient and is agreeable with plan.      Final diagnoses:   Chest pain, unspecified type   Colitis   Sepsis, due to unspecified organism, unspecified whether acute organ dysfunction present (HCC)       ED Disposition  ED Disposition     ED Disposition Condition " Comment    Decision to Admit  Level of Care: Telemetry [5]   Diagnosis: Chest pain, unspecified type [[id number]]   Admitting Physician: ASHLEY CORADO [[id number]]   Attending Physician: ASHLEY CORADO [[id number]]            No follow-up provider specified.       Medication List      No changes were made to your prescriptions during this visit.          Fabio Xie PA  02/02/22 0251

## 2022-02-02 NOTE — H&P
[facility] Medicine Services      Patient Name: Katie Reddy  : 1945  MRN: [id number]  Primary Care Physician:  Brooklyn Contreras DO  Date of admission: 2022      Subjective      Chief Complaint: Chest pain, epigastric pain, rectal bleeding    History of Present Illness: Katie Reddy is a 76 y.o. femalew/PMH of HTN, GERD, anxiety, asthma, CVA hemorrhagic, adrenal insufficiency, migraines, and arthritis who presented to [facility] on 2022 complaining of a 2-week history chest pain, epigastric pain, and rectal bleeding.     Initial work-up in the emergency room included:   Vital signs at time of presentation blood pressure 176/89, heart rate 107, respiratory rate 22, oxygen saturation 94% on room air, patient afebrile with a T-max of 97.9.  Initial labs drawn with the following abnormalities noted: Glucose 136, CO2 19, alkaline phosphatase 135, WBC 16.6, neutrophils 86.8%, lymphocytes 7%, absolute neutrophils 14.40.  Blood cultures ordered results pending  Respiratory panel completed COVID-19 not detected.  Chest x-ray completed showing no acute cardiopulmonary disease/process.  Leg recorder noted left chest  CT scan abdomen pelvis with contrast completed showing: Mild wall thickening and fluid distention of the lower esophagus, gallbladder surgically absent, the stomach and scattered loops of small and large bowel have a nonobstructive pattern.  Wall thickening of the left colon and sigmoid colon, appendix is surgically absent.,  The mesentery and retroperitoneum show no adenopathy, no abnormal fluid collection mass or free air.  Urinary bladder is partly distended with a smooth contour.  There is no deep pelvic or inguinal adenopathy noted.  Reproductive organs surgically absent.  EKG completed showing sinus tachycardia heart rate 111 with ST elevation and nonspecific take intraventricular conduction delay.    Patient was administered 10 mg metoclopramide, 10  mg labetalol, and 4 mg ondansetron in the emergency department    Patient will be admitted to hospitalist group for further evaluation and treatment of epigastric pain, chest pain, and rectal bleeding    Review of Systems   Constitutional: Positive for decreased appetite and malaise/fatigue.   Eyes: Negative.    Cardiovascular: Positive for chest pain.   Respiratory: Negative.    Endocrine: Negative.    Hematologic/Lymphatic: Bruises/bleeds easily.   Skin: Negative.    Gastrointestinal: Positive for abdominal pain, bowel incontinence, diarrhea, melena, nausea and vomiting. Negative for hematemesis and hematochezia.   Genitourinary: Positive for bladder incontinence and pelvic pain.   Neurological: Positive for headaches, light-headedness and weakness.   Psychiatric/Behavioral: Negative.    Allergic/Immunologic: Negative.    All other systems reviewed and are negative.      Personal History     Past Medical History:   Diagnosis Date   • Arthritis    • Bladder incontinence    • Colon polyp    • DEXA     OSTEOPENIA = 2018 (-1.3/ -1.7); 2020 (-1.7/ -1.7)   • GERD    • Glucocorticoid deficiency    • Headache    • HTN    • Intracerebral hemorrhage/ CVA    • MAMMO     NEG =2020   • Osteopenia    • Vitamin D deficiency        Past Surgical History:   Procedure Laterality Date   • APPENDECTOMY     • BLADDER SURGERY      bladder stimulator placement/ REMOVAL   • BREAST CYST EXCISION     • BREAST LUMPECTOMY Left 1973    NEG   • BUNIONECTOMY Right 5/29/2020    Procedure: BUNIONECTOMY DEVONTE;  Surgeon: MARISSA Em DPM;  Location: [facility] OR;  Service: Podiatry;  Laterality: Right;   • CARDIAC CATHETERIZATION     • CARPAL TUNNEL RELEASE Right 1980   • CHOLECYSTECTOMY     • COLON SURGERY      hemorrhoid banding   • COLONOSCOPY  2017 2017/ 2019 = jessica DESAI 2024   Dr. Mackey   • EYE SURGERY     • HERNIA REPAIR      Umbilical removal   • HYSTERECTOMY  1970   • SUBTOTAL HYSTERECTOMY     • UMBILICAL HERNIA REPAIR          Family History: family history includes Alcohol abuse in her father; Cancer (age of onset: 68) in her brother; Dementia in her sister; Diabetes in her brother, brother, father, and maternal aunt; Heart disease in her brother, father, maternal grandfather, maternal grandmother, mother, and paternal grandfather; Hypertension in her brother, father, maternal grandfather, maternal grandmother, mother, paternal grandfather, and paternal grandmother; Liver disease in her paternal grandmother. Otherwise pertinent FHx was reviewed and not pertinent to current issue.    Social History:  reports that she has never smoked. She has never used smokeless tobacco. She reports that she does not drink alcohol and does not use drugs.    Home Medications:  Prior to Admission Medications     Prescriptions Last Dose Informant Patient Reported? Taking?    albuterol (ACCUNEB) 1.25 MG/3ML nebulizer solution   No No    Take 3 mL by nebulization Every 6 (Six) Hours As Needed for Wheezing or Shortness of Air for up to 30 doses.    amitriptyline (ELAVIL) 25 MG tablet   No No    Take 1 tablet by mouth every night at bedtime.    amLODIPine (NORVASC) 10 MG tablet   No No    Take 1 tablet by mouth Daily.    atorvastatin (LIPITOR) 80 MG tablet   No No    Take 1 tablet by mouth Daily.    Calcium Carbonate-Vit D-Min (Calcium 1200) 4686-0466 MG-UNIT chewable tablet   Yes No    Chew 1 tablet Daily.    clindamycin (CLEOCIN T) 1 % lotion   Yes No    APPLY TO AFFECTED AREA TWICE A DAY    Gemtesa 75 MG tablet   Yes No    Take 1 tablet by mouth Daily.    hydrocortisone (CORTEF) 10 MG tablet   No No    TAKE 1 TAB BY MOUTH EVERY MORNING, 1 TAB EVERY AFTERNOON, & ONE-HALF TAB IN THE EVENING.    hydrocortisone 2.5 % cream   Yes No    Apply 1 application topically to the appropriate area as directed 2 (Two) Times a Day.    ibandronate (BONIVA) 150 MG tablet   No No    Take 1 tablet by mouth Every 30 (Thirty) Days.    Incontinence Supply Disposable (Poise  Moderate Absorbency) pads   No No    USE 1 PAD FIVE TIMES A DAY    linaclotide (Linzess) 145 MCG capsule capsule   No No    Take 1 capsule by mouth Every Morning Before Breakfast.    lisinopril (PRINIVIL,ZESTRIL) 5 MG tablet   No No    Take 1 tablet by mouth Daily.    meclizine (ANTIVERT) 12.5 MG tablet   No No    Take 1 tablet by mouth As Needed for Dizziness.    nitroglycerin (NITROSTAT) 0.4 MG SL tablet   No No    Place 1 tablet under the tongue Every 5 (Five) Minutes As Needed for Chest Pain (Only if SBP Greater Than 100). Take no more than 3 doses in 15 minutes.    ondansetron (Zofran) 4 MG tablet   No No    Take 1 tablet by mouth Every 8 (Eight) Hours As Needed for Nausea or Vomiting.    sertraline (ZOLOFT) 100 MG tablet   No No    Take 1 tablet by mouth Every Night.    sucralfate (CARAFATE) 1 g tablet   No No    Take 1 tablet by mouth 2 (Two) Times a Day.    temazepam (RESTORIL) 30 MG capsule   No No    Take 1 capsule by mouth At Night As Needed for Sleep.            Allergies:  Allergies   Allergen Reactions   • Trazodone Irritability   • Boniva [Ibandronic Acid] GI Intolerance     GERD   • Dayvigo [Lemborexant] Other (See Comments)     Sleep walking       Objective      Vitals:   Temp:  [97.9 °F (36.6 °C)] 97.9 °F (36.6 °C)  Heart Rate:  [] 107  Resp:  [22] 22  BP: (150-176)/(77-89) 176/89    Physical Exam  Vitals and nursing note reviewed.   Constitutional:       Appearance: She is ill-appearing.   HENT:      Head: Atraumatic.      Nose: Nose normal.      Mouth/Throat:      Mouth: Mucous membranes are dry.   Eyes:      Pupils: Pupils are equal, round, and reactive to light.   Cardiovascular:      Rate and Rhythm: Regular rhythm. Tachycardia present.      Pulses: Normal pulses.           Radial pulses are 2+ on the right side and 2+ on the left side.        Dorsalis pedis pulses are 2+ on the right side and 2+ on the left side.        Posterior tibial pulses are 2+ on the right side and 2+ on the left  side.      Heart sounds: Normal heart sounds.   Pulmonary:      Effort: Pulmonary effort is normal.      Breath sounds: Normal breath sounds and air entry.   Abdominal:      Palpations: Abdomen is soft.      Tenderness: There is abdominal tenderness.       Musculoskeletal:         General: Normal range of motion.      Cervical back: Normal range of motion and neck supple.      Right lower leg: No edema.      Left lower leg: No edema.   Skin:     General: Skin is warm and dry.      Capillary Refill: Capillary refill takes less than 2 seconds.      Coloration: Skin is pale.      Findings: Bruising present.   Neurological:      Mental Status: She is alert and oriented to person, place, and time.   Psychiatric:         Attention and Perception: Attention normal.         Mood and Affect: Mood normal.         Speech: Speech normal.         Behavior: Behavior normal. Behavior is cooperative.         Thought Content: Thought content normal.         Cognition and Memory: Cognition and memory normal.         Judgment: Judgment normal.         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)    • History of hemorrhagic cerebrovascular accident (CVA) with residual deficit    • Asthma in adult, mild intermittent, uncomplicated    • Essential hypertension    • Dyslipidemia    • GERD (gastroesophageal reflux disease)    • Vitamin D deficiency    • Generalized anxiety disorder      Plan:   Chest pain  -Initial troponin negative<0.010  -Last  echocardiogram April 7, 2021 EF 65%  -Last stress test April 9, 2021-normal ECG stress test noted  -Continuous cardiac monitoring pulse oximetry  -Supplemental oxygen as needed for hypoxia/respiratory distress   to maintain oxygen saturation greater than 90%  -As needed nitro for chest pain per chest pain policy  -BNP ordered  -Likely from GI source  -Nitro as needed for chest pain per chest pain policy  -N.p.o.    Colitis  -New finding on CT  -Noted rectal bleeding  -Continue Zosyn  -IV fluids ordered for hydration  -Stool panel ordered  -Occult stool ordered  -Patient on Solu-Cortef for adrenal insufficiency  -Patient may benefit from GI consult  -Hold home Linzess  -Monitor CBC, BMP, H&H    Glucocorticoid deficiency with adrenal insufficiency  -Chronic  -Continue hydrocortisone  -Patient followed by Dr. Gerard outpatient  -Vital signs per policy  -Orthostatic blood pressures    Essential hypertension  -Chronic-poorly controlled  -Vital signs per policy  -Continue home amlodipine, and lisinopril pending med rec verification  -Continue as needed labetalol with parameters    Hyperlipidemia  -Chronic-controlled  -Continue home atorvastatin pending home med rec verification  -Heart healthy diet when appropriate    Asthma in adult, mild intermittent, uncomplicated  -Continuous cardiac monitoring pulse oximetry  -Supplemental oxygen as needed for hypoxia respiratory distress to maintain oxygen saturation greater than 90%  -Continue home rescue inhaler as needed for wheezing/shortness of breath    Vitamin D deficiency  -Chronic  -Continue home supplement pending home med rec verification    Generalized anxiety disorder/depression/insomnia  -Continue home temazepam, sertraline, and amitriptyline pending home med rec verification    GERD  -Continue home sucralfate and Pepcid pending home med rec verification    History of hemorrhagic cerebrovascular accident (CVA) with residual deficit  -Patient has had a chronic headache,  dizziness and weakness since about  -Continue home meclizine pending med rec verification    Incontinence  -Bowel and bladder  -Monitor I's and O's  -Provide incontinence skin care monitor for skin breakdown  -Patient has bladder stimulator    Osteoporosis  -Patient on home Boniva-encourage compliance      DVT prophylaxis:  Medical DVT prophylaxis orders are present.    CODE STATUS:    Code Status (Patient has no pulse and is not breathing): CPR (Attempt to Resuscitate)  Medical Interventions (Patient has pulse or is breathing): Full Support    Admission Status:  I believe this patient meets inpatient status.    I discussed the patient's findings and my recommendations with patient.    This patient has been examined wearing appropriate Personal Protective Equipment. 02/02/22      Signature: Electronically signed by PRACHI Acosta, 02/02/22, 2:36 AM EST.    Attending attestation:  The patient is a very pleasant 76-year-old female who presented to the emergency department complaining of a 2-week history of nausea and vomiting and the day of admission bright red blood per rectum.  She denies any hematemesis or coffee-ground emesis.  She complained of chest pain along her lower chest and pain across her upper abdomen.  Physical exam:  Vital signs and nurses notes reviewed.  Well-developed well-nourished elderly female in no acute distress sitting up in bed awake and alert; mucous membranes moist; sclerae anicteric; lungs clear to auscultation bilaterally; CV regular rate and rhythm; abdomen soft mildly tender midepigastrium and bilateral upper quadrants, nondistended with no masses; extremities with no edema, cyanosis or calf tenderness; palpable pedal pulses bilaterally;  no Monahan catheter.  Assessment and plan:  Chest pain likely noncardiac with nausea, vomiting and bright red bleeding per rectum  -CT showed distal colonic wall thickening suggestive of infection or inflammatory colitis, and lower esophageal  wall thickening with mild fluid distention of the lower esophagus  -The patient has been started on Zosyn for possible intra-abdominal infection versus ischemic bowel  -Vomiting resolved with antiemetics and IV fluids  -Monitor hemoglobin  The patient was seen and examined and chart reviewed on 2/2/2022.  I agree with the assessment and plan of the APRN.  Sarina Bates MD  Electronically signed by Sarina Bates MD, 02/04/22, 4:35 PM EST.

## 2022-02-02 NOTE — ED NOTES
Awaiting response from IV team. Pt is a difficult stick     Gladisinkreese, Jaylene, RN  02/01/22 2019

## 2022-02-03 LAB
ANION GAP SERPL CALCULATED.3IONS-SCNC: 13 MMOL/L (ref 5–15)
BASOPHILS # BLD AUTO: 0.2 10*3/MM3 (ref 0–0.2)
BASOPHILS NFR BLD AUTO: 1.2 % (ref 0–1.5)
BUN SERPL-MCNC: 21 MG/DL (ref 8–23)
BUN/CREAT SERPL: 14.1 (ref 7–25)
CALCIUM SPEC-SCNC: 8.3 MG/DL (ref 8.6–10.5)
CHLORIDE SERPL-SCNC: 106 MMOL/L (ref 98–107)
CO2 SERPL-SCNC: 19 MMOL/L (ref 22–29)
CREAT SERPL-MCNC: 1.49 MG/DL (ref 0.57–1)
DEPRECATED RDW RBC AUTO: 45.1 FL (ref 37–54)
EOSINOPHIL # BLD AUTO: 0.1 10*3/MM3 (ref 0–0.4)
EOSINOPHIL NFR BLD AUTO: 0.6 % (ref 0.3–6.2)
ERYTHROCYTE [DISTWIDTH] IN BLOOD BY AUTOMATED COUNT: 16.4 % (ref 12.3–15.4)
GFR SERPL CREATININE-BSD FRML MDRD: 34 ML/MIN/1.73
GLUCOSE BLDC GLUCOMTR-MCNC: 79 MG/DL (ref 70–105)
GLUCOSE BLDC GLUCOMTR-MCNC: 84 MG/DL (ref 70–105)
GLUCOSE SERPL-MCNC: 97 MG/DL (ref 65–99)
HCT VFR BLD AUTO: 31.9 % (ref 34–46.6)
HGB BLD-MCNC: 10.4 G/DL (ref 12–15.9)
LYMPHOCYTES # BLD AUTO: 1.9 10*3/MM3 (ref 0.7–3.1)
LYMPHOCYTES NFR BLD AUTO: 13.2 % (ref 19.6–45.3)
MCH RBC QN AUTO: 25.1 PG (ref 26.6–33)
MCHC RBC AUTO-ENTMCNC: 32.5 G/DL (ref 31.5–35.7)
MCV RBC AUTO: 77.1 FL (ref 79–97)
MONOCYTES # BLD AUTO: 1.1 10*3/MM3 (ref 0.1–0.9)
MONOCYTES NFR BLD AUTO: 7.8 % (ref 5–12)
NEUTROPHILS NFR BLD AUTO: 10.8 10*3/MM3 (ref 1.7–7)
NEUTROPHILS NFR BLD AUTO: 77.2 % (ref 42.7–76)
NRBC BLD AUTO-RTO: 0.1 /100 WBC (ref 0–0.2)
PLATELET # BLD AUTO: 221 10*3/MM3 (ref 140–450)
PMV BLD AUTO: 6.1 FL (ref 6–12)
POTASSIUM SERPL-SCNC: 3.8 MMOL/L (ref 3.5–5.2)
QT INTERVAL: 368 MS
RBC # BLD AUTO: 4.14 10*6/MM3 (ref 3.77–5.28)
SODIUM SERPL-SCNC: 138 MMOL/L (ref 136–145)
WBC NRBC COR # BLD: 14.1 10*3/MM3 (ref 3.4–10.8)

## 2022-02-03 PROCEDURE — 80048 BASIC METABOLIC PNL TOTAL CA: CPT | Performed by: NURSE PRACTITIONER

## 2022-02-03 PROCEDURE — 25010000002 PIPERACILLIN SOD-TAZOBACTAM PER 1 G: Performed by: NURSE PRACTITIONER

## 2022-02-03 PROCEDURE — 36415 COLL VENOUS BLD VENIPUNCTURE: CPT | Performed by: NURSE PRACTITIONER

## 2022-02-03 PROCEDURE — G0378 HOSPITAL OBSERVATION PER HR: HCPCS

## 2022-02-03 PROCEDURE — 82150 ASSAY OF AMYLASE: CPT | Performed by: HOSPITALIST

## 2022-02-03 PROCEDURE — 99232 SBSQ HOSP IP/OBS MODERATE 35: CPT | Performed by: HOSPITALIST

## 2022-02-03 PROCEDURE — 80076 HEPATIC FUNCTION PANEL: CPT | Performed by: HOSPITALIST

## 2022-02-03 PROCEDURE — 83690 ASSAY OF LIPASE: CPT | Performed by: HOSPITALIST

## 2022-02-03 PROCEDURE — 25010000002 NA FERRIC GLUC CPLX PER 12.5 MG: Performed by: HOSPITALIST

## 2022-02-03 PROCEDURE — 85025 COMPLETE CBC W/AUTO DIFF WBC: CPT | Performed by: NURSE PRACTITIONER

## 2022-02-03 PROCEDURE — 82962 GLUCOSE BLOOD TEST: CPT

## 2022-02-03 RX ORDER — PANTOPRAZOLE SODIUM 40 MG/1
40 TABLET, DELAYED RELEASE ORAL
Status: DISCONTINUED | OUTPATIENT
Start: 2022-02-04 | End: 2022-02-04

## 2022-02-03 RX ORDER — POLYETHYLENE GLYCOL 3350 17 G/17G
17 POWDER, FOR SOLUTION ORAL 2 TIMES DAILY WITH MEALS
Status: DISCONTINUED | OUTPATIENT
Start: 2022-02-03 | End: 2022-02-07

## 2022-02-03 RX ADMIN — AMLODIPINE BESYLATE 10 MG: 5 TABLET ORAL at 10:00

## 2022-02-03 RX ADMIN — LISINOPRIL 5 MG: 5 TABLET ORAL at 10:00

## 2022-02-03 RX ADMIN — ATORVASTATIN CALCIUM 80 MG: 40 TABLET, FILM COATED ORAL at 20:56

## 2022-02-03 RX ADMIN — HYDROCORTISONE 1 APPLICATION: 25 CREAM TOPICAL at 20:57

## 2022-02-03 RX ADMIN — SUCRALFATE 1 G: 1 TABLET ORAL at 10:00

## 2022-02-03 RX ADMIN — POLYETHYLENE GLYCOL 3350 17 G: 17 POWDER, FOR SOLUTION ORAL at 17:13

## 2022-02-03 RX ADMIN — AMITRIPTYLINE HYDROCHLORIDE 25 MG: 25 TABLET, FILM COATED ORAL at 20:57

## 2022-02-03 RX ADMIN — SODIUM CHLORIDE 250 MG: 9 INJECTION, SOLUTION INTRAVENOUS at 17:58

## 2022-02-03 RX ADMIN — SODIUM CHLORIDE, PRESERVATIVE FREE 10 ML: 5 INJECTION INTRAVENOUS at 20:56

## 2022-02-03 RX ADMIN — PIPERACILLIN AND TAZOBACTAM 3.38 G: 3; .375 INJECTION, POWDER, LYOPHILIZED, FOR SOLUTION INTRAVENOUS at 13:29

## 2022-02-03 RX ADMIN — SERTRALINE 100 MG: 100 TABLET, FILM COATED ORAL at 20:56

## 2022-02-03 RX ADMIN — PIPERACILLIN AND TAZOBACTAM 3.38 G: 3; .375 INJECTION, POWDER, LYOPHILIZED, FOR SOLUTION INTRAVENOUS at 04:56

## 2022-02-03 RX ADMIN — NALOXEGOL OXALATE 12.5 MG: 25 TABLET, FILM COATED ORAL at 20:56

## 2022-02-03 RX ADMIN — SODIUM CHLORIDE, PRESERVATIVE FREE 10 ML: 5 INJECTION INTRAVENOUS at 10:00

## 2022-02-03 RX ADMIN — HYDROCORTISONE 10 MG: 10 TABLET ORAL at 07:33

## 2022-02-03 RX ADMIN — SUCRALFATE 1 G: 1 TABLET ORAL at 20:56

## 2022-02-03 RX ADMIN — PIPERACILLIN AND TAZOBACTAM 3.38 G: 3; .375 INJECTION, POWDER, LYOPHILIZED, FOR SOLUTION INTRAVENOUS at 20:56

## 2022-02-03 RX ADMIN — HYDROCORTISONE 10 MG: 10 TABLET ORAL at 17:13

## 2022-02-03 RX ADMIN — HYDROCORTISONE 1 APPLICATION: 25 CREAM TOPICAL at 10:00

## 2022-02-03 NOTE — PLAN OF CARE
Problem: Adult Inpatient Plan of Care  Goal: Absence of Hospital-Acquired Illness or Injury  Intervention: Identify and Manage Fall Risk  Recent Flowsheet Documentation  Taken 2/3/2022 0323 by Becky Deutsch RN  Safety Promotion/Fall Prevention:   assistive device/personal items within reach   clutter free environment maintained   fall prevention program maintained   lighting adjusted   nonskid shoes/slippers when out of bed   room organization consistent   safety round/check completed  Taken 2/3/2022 0105 by Becky Deutsch RN  Safety Promotion/Fall Prevention:   assistive device/personal items within reach   clutter free environment maintained   fall prevention program maintained   lighting adjusted   nonskid shoes/slippers when out of bed   room organization consistent   safety round/check completed  Taken 2/2/2022 2315 by Becky Deutsch RN  Safety Promotion/Fall Prevention:   assistive device/personal items within reach   clutter free environment maintained   fall prevention program maintained   lighting adjusted   nonskid shoes/slippers when out of bed   room organization consistent   safety round/check completed  Taken 2/2/2022 2130 by Becky Deutsch RN  Safety Promotion/Fall Prevention:   assistive device/personal items within reach   clutter free environment maintained   fall prevention program maintained   lighting adjusted   nonskid shoes/slippers when out of bed   room organization consistent   safety round/check completed  Taken 2/2/2022 2037 by Becky Deutsch RN  Safety Promotion/Fall Prevention:   assistive device/personal items within reach   clutter free environment maintained   fall prevention program maintained   lighting adjusted   nonskid shoes/slippers when out of bed   room organization consistent   safety round/check completed  Intervention: Prevent Skin Injury  Recent Flowsheet Documentation  Taken 2/3/2022 0323 by Becky Deutsch RN  Body Position: position changed  independently  Taken 2/3/2022 0105 by Becky Deutsch RN  Body Position: position changed independently  Taken 2/2/2022 2315 by Becky Deutsch RN  Body Position: position changed independently  Taken 2/2/2022 2130 by Becky Deutsch RN  Body Position: position changed independently  Taken 2/2/2022 2037 by Becky Deutsch RN  Body Position: position changed independently  Intervention: Prevent Infection  Recent Flowsheet Documentation  Taken 2/3/2022 0323 by Becky Deutsch RN  Infection Prevention:   hand hygiene promoted   personal protective equipment utilized  Taken 2/3/2022 0105 by Becky Deutsch RN  Infection Prevention:   hand hygiene promoted   personal protective equipment utilized  Taken 2/2/2022 2315 by Becky Deutsch RN  Infection Prevention:   hand hygiene promoted   personal protective equipment utilized  Taken 2/2/2022 2130 by Becky Deutsch RN  Infection Prevention:   hand hygiene promoted   personal protective equipment utilized  Taken 2/2/2022 2037 by Becky Deutsch RN  Infection Prevention:   hand hygiene promoted   personal protective equipment utilized  Goal: Optimal Comfort and Wellbeing  Intervention: Provide Person-Centered Care  Recent Flowsheet Documentation  Taken 2/2/2022 2037 by Becky Deutsch RN  Trust Relationship/Rapport: care explained  Goal: Readiness for Transition of Care  Intervention: Mutually Develop Transition Plan  Recent Flowsheet Documentation  Taken 2/2/2022 2045 by Becky Deutsch RN  Transportation Anticipated: family or friend will provide  Patient/Family Anticipated Services at Transition: none  Patient/Family Anticipates Transition to: home  Taken 2/2/2022 2044 by Becky Deutsch RN  Equipment Currently Used at Home:   walker, standard   cane, straight     Problem: Skin Injury Risk Increased  Goal: Skin Health and Integrity  Intervention: Optimize Skin Protection  Recent Flowsheet Documentation  Taken 2/3/2022 0323 by  Becky Deutsch RN  Head of Bed (HOB): HOB elevated  Taken 2/3/2022 0105 by Becky Deutsch RN  Head of Bed (HOB): HOB elevated  Taken 2/2/2022 2315 by Becky Deutsch RN  Head of Bed (HOB): HOB elevated  Taken 2/2/2022 2130 by Becky Deutsch RN  Head of Bed (HOB): HOB elevated  Taken 2/2/2022 2037 by Becky Deutsch RN  Pressure Reduction Techniques: weight shift assistance provided  Head of Bed (HOB): HOB elevated  Pressure Reduction Devices: pressure-redistributing mattress utilized     Problem: Fall Injury Risk  Goal: Absence of Fall and Fall-Related Injury  Intervention: Identify and Manage Contributors to Fall Injury Risk  Recent Flowsheet Documentation  Taken 2/3/2022 0323 by Becky Deutsch RN  Medication Review/Management: medications reviewed  Taken 2/3/2022 0105 by Becky Deutsch RN  Medication Review/Management: medications reviewed  Taken 2/2/2022 2315 by Becky Deutsch RN  Medication Review/Management: medications reviewed  Taken 2/2/2022 2130 by Becky Deutsch RN  Medication Review/Management: medications reviewed  Taken 2/2/2022 2037 by Becky Deutsch RN  Medication Review/Management: medications reviewed  Intervention: Promote Injury-Free Environment  Recent Flowsheet Documentation  Taken 2/3/2022 0323 by Becky Deutsch RN  Safety Promotion/Fall Prevention:   assistive device/personal items within reach   clutter free environment maintained   fall prevention program maintained   lighting adjusted   nonskid shoes/slippers when out of bed   room organization consistent   safety round/check completed  Taken 2/3/2022 0105 by Becky Deutsch RN  Safety Promotion/Fall Prevention:   assistive device/personal items within reach   clutter free environment maintained   fall prevention program maintained   lighting adjusted   nonskid shoes/slippers when out of bed   room organization consistent   safety round/check completed  Taken 2/2/2022 2315 by Becky Deutsch  RN  Safety Promotion/Fall Prevention:   assistive device/personal items within reach   clutter free environment maintained   fall prevention program maintained   lighting adjusted   nonskid shoes/slippers when out of bed   room organization consistent   safety round/check completed  Taken 2/2/2022 2130 by Becky Deutsch RN  Safety Promotion/Fall Prevention:   assistive device/personal items within reach   clutter free environment maintained   fall prevention program maintained   lighting adjusted   nonskid shoes/slippers when out of bed   room organization consistent   safety round/check completed  Taken 2/2/2022 2037 by Becky Deutsch RN  Safety Promotion/Fall Prevention:   assistive device/personal items within reach   clutter free environment maintained   fall prevention program maintained   lighting adjusted   nonskid shoes/slippers when out of bed   room organization consistent   safety round/check completed     Problem: Hypertension Comorbidity  Goal: Blood Pressure in Desired Range  Intervention: Maintain Hypertension-Management Strategies  Recent Flowsheet Documentation  Taken 2/3/2022 0323 by Becky Deutsch RN  Medication Review/Management: medications reviewed  Taken 2/3/2022 0105 by Becky Deutsch RN  Medication Review/Management: medications reviewed  Taken 2/2/2022 2315 by Becky Deutsch RN  Medication Review/Management: medications reviewed  Taken 2/2/2022 2130 by Becky Deutsch RN  Medication Review/Management: medications reviewed  Taken 2/2/2022 2037 by Becky Deutsch RN  Medication Review/Management: medications reviewed     Problem: Pain Chronic (Persistent) (Comorbidity Management)  Goal: Acceptable Pain Control and Functional Ability  Intervention: Develop Pain Management Plan  Recent Flowsheet Documentation  Taken 2/2/2022 2315 by Becky Deutsch RN  Pain Management Interventions:   pillow support provided   position adjusted  Taken 2/2/2022 2037 by Get  VENU Pena  Pain Management Interventions:   pillow support provided   position adjusted  Intervention: Manage Persistent Pain  Recent Flowsheet Documentation  Taken 2/3/2022 0323 by Becky Deutsch RN  Medication Review/Management: medications reviewed  Taken 2/3/2022 0105 by Becky Deutsch RN  Medication Review/Management: medications reviewed  Taken 2/2/2022 2315 by Becky Deutsch RN  Medication Review/Management: medications reviewed  Taken 2/2/2022 2130 by Becky Deutsch RN  Medication Review/Management: medications reviewed  Taken 2/2/2022 2037 by Becky Deutsch RN  Medication Review/Management: medications reviewed  Intervention: Optimize Psychosocial Wellbeing  Recent Flowsheet Documentation  Taken 2/2/2022 2037 by Becky Deutsch RN  Diversional Activities:   television   smartphone  Family/Support System Care: support provided   Goal Outcome Evaluation:      Pt given pain meds per MAR X1  this shift. No other complaints. Pt resting in bed, call light in reach. Will continue to monitor.

## 2022-02-03 NOTE — CASE MANAGEMENT/SOCIAL WORK
Discharge Planning Assessment   [facility]     Patient Name: Katie Reddy  MRN: [id number]  Today's Date: 2/3/2022    Admit Date: 2/1/2022     Discharge Needs Assessment     Row Name 02/03/22 1350       Living Environment    Lives With alone    Current Living Arrangements home/apartment/condo    Primary Care Provided by self    Provides Primary Care For no one    Family Caregiver if Needed child(mindy), adult    Family Caregiver Names Daughter- Laura    Quality of Family Relationships helpful; involved; supportive    Able to Return to Prior Arrangements yes       Resource/Environmental Concerns    Resource/Environmental Concerns none    Transportation Concerns car, none       Transition Planning    Patient/Family Anticipates Transition to home    Patient/Family Anticipated Services at Transition none    Transportation Anticipated car, drives self; family or friend will provide       Discharge Needs Assessment    Readmission Within the Last 30 Days no previous admission in last 30 days    Equipment Currently Used at Home rollator; cane, straight; nebulizer    Concerns to be Addressed no discharge needs identified; denies needs/concerns at this time    Anticipated Changes Related to Illness none    Equipment Needed After Discharge none    Provided Post Acute Provider List? N/A    Provided Post Acute Provider Quality & Resource List? N/A               Discharge Plan     Row Name 02/03/22 8041       Plan    Plan DC plan: anticipate return home.    Provided Post Acute Provider List? N/A    Provided Post Acute Provider Quality & Resource List? N/A    Patient/Family in Agreement with Plan yes    Plan Comments Met with patient at bedside. She states she lives at home alone, drives, and is independent with ADL's. PCP and pharmacy confirmed. Uses a rollator, cane, and nebulizer at home. No issues affording meds/food. Denies any needs/services at this time.               Demographic Summary     Row Name 02/03/22 0199        General Information    Admission Type observation    Arrived From emergency department    Referral Source admission list    Reason for Consult care coordination/care conference; discharge planning    Preferred Language English     Used During This Interaction no       Contact Information    Permission Granted to Share Info With                Functional Status     Row Name 02/03/22 9460       Functional Status    Usual Activity Tolerance moderate    Current Activity Tolerance moderate       Functional Status, IADL    Medications independent    Meal Preparation independent    Housekeeping independent    Laundry independent    Shopping independent              Met with patient in room wearing PPE: mask, face shield/goggles.    Maintained distance greater than six feet and spent less than 15 minutes in the room.      Megan Naegele, RN      Office Phone: [phone]  Office Cell: [phone]

## 2022-02-04 ENCOUNTER — APPOINTMENT (OUTPATIENT)
Dept: GENERAL RADIOLOGY | Facility: HOSPITAL | Age: 77
End: 2022-02-04

## 2022-02-04 ENCOUNTER — INPATIENT HOSPITAL (OUTPATIENT)
Dept: URBAN - METROPOLITAN AREA HOSPITAL 84 | Facility: HOSPITAL | Age: 77
End: 2022-02-04

## 2022-02-04 DIAGNOSIS — R93.3 ABNORMAL FINDINGS ON DIAGNOSTIC IMAGING OF OTHER PARTS OF DI: ICD-10-CM

## 2022-02-04 DIAGNOSIS — K62.5 HEMORRHAGE OF ANUS AND RECTUM: ICD-10-CM

## 2022-02-04 DIAGNOSIS — R07.9 CHEST PAIN, UNSPECIFIED: ICD-10-CM

## 2022-02-04 DIAGNOSIS — K21.9 GASTRO-ESOPHAGEAL REFLUX DISEASE WITHOUT ESOPHAGITIS: ICD-10-CM

## 2022-02-04 DIAGNOSIS — R10.10 UPPER ABDOMINAL PAIN, UNSPECIFIED: ICD-10-CM

## 2022-02-04 DIAGNOSIS — R11.2 NAUSEA WITH VOMITING, UNSPECIFIED: ICD-10-CM

## 2022-02-04 DIAGNOSIS — I10 ESSENTIAL (PRIMARY) HYPERTENSION: ICD-10-CM

## 2022-02-04 LAB
ALBUMIN SERPL-MCNC: 3.1 G/DL (ref 3.5–5.2)
ALP SERPL-CCNC: 91 U/L (ref 39–117)
ALT SERPL W P-5'-P-CCNC: 10 U/L (ref 1–33)
AMYLASE SERPL-CCNC: 31 U/L (ref 28–100)
ANION GAP SERPL CALCULATED.3IONS-SCNC: 16 MMOL/L (ref 5–15)
AST SERPL-CCNC: 15 U/L (ref 1–32)
BASOPHILS # BLD AUTO: 0.1 10*3/MM3 (ref 0–0.2)
BASOPHILS NFR BLD AUTO: 0.6 % (ref 0–1.5)
BILIRUB CONJ SERPL-MCNC: <0.2 MG/DL (ref 0–0.3)
BILIRUB INDIRECT SERPL-MCNC: ABNORMAL MG/DL
BILIRUB SERPL-MCNC: 0.5 MG/DL (ref 0–1.2)
BUN SERPL-MCNC: 24 MG/DL (ref 8–23)
BUN/CREAT SERPL: 16.1 (ref 7–25)
CALCIUM SPEC-SCNC: 8.8 MG/DL (ref 8.6–10.5)
CHLORIDE SERPL-SCNC: 105 MMOL/L (ref 98–107)
CO2 SERPL-SCNC: 18 MMOL/L (ref 22–29)
CREAT SERPL-MCNC: 1.49 MG/DL (ref 0.57–1)
D-LACTATE SERPL-SCNC: 0.6 MMOL/L (ref 0.5–2)
DEPRECATED RDW RBC AUTO: 43.8 FL (ref 37–54)
EOSINOPHIL # BLD AUTO: 0 10*3/MM3 (ref 0–0.4)
EOSINOPHIL NFR BLD AUTO: 0.1 % (ref 0.3–6.2)
ERYTHROCYTE [DISTWIDTH] IN BLOOD BY AUTOMATED COUNT: 16.1 % (ref 12.3–15.4)
GFR SERPL CREATININE-BSD FRML MDRD: 34 ML/MIN/1.73
GLUCOSE SERPL-MCNC: 116 MG/DL (ref 65–99)
HCT VFR BLD AUTO: 39.4 % (ref 34–46.6)
HGB BLD-MCNC: 12.9 G/DL (ref 12–15.9)
LIPASE SERPL-CCNC: 14 U/L (ref 13–60)
LYMPHOCYTES # BLD AUTO: 1.2 10*3/MM3 (ref 0.7–3.1)
LYMPHOCYTES NFR BLD AUTO: 8.3 % (ref 19.6–45.3)
MAGNESIUM SERPL-MCNC: 1.9 MG/DL (ref 1.6–2.4)
MCH RBC QN AUTO: 25.1 PG (ref 26.6–33)
MCHC RBC AUTO-ENTMCNC: 32.7 G/DL (ref 31.5–35.7)
MCV RBC AUTO: 76.7 FL (ref 79–97)
MONOCYTES # BLD AUTO: 0.8 10*3/MM3 (ref 0.1–0.9)
MONOCYTES NFR BLD AUTO: 5.5 % (ref 5–12)
NEUTROPHILS NFR BLD AUTO: 12.5 10*3/MM3 (ref 1.7–7)
NEUTROPHILS NFR BLD AUTO: 85.5 % (ref 42.7–76)
NRBC BLD AUTO-RTO: 0.4 /100 WBC (ref 0–0.2)
PHOSPHATE SERPL-MCNC: 2.1 MG/DL (ref 2.5–4.5)
PLATELET # BLD AUTO: 290 10*3/MM3 (ref 140–450)
PMV BLD AUTO: 6.5 FL (ref 6–12)
POTASSIUM SERPL-SCNC: 3.7 MMOL/L (ref 3.5–5.2)
PROT SERPL-MCNC: 5.5 G/DL (ref 6–8.5)
QT INTERVAL: 355 MS
RBC # BLD AUTO: 5.14 10*6/MM3 (ref 3.77–5.28)
SODIUM SERPL-SCNC: 139 MMOL/L (ref 136–145)
WBC NRBC COR # BLD: 14.6 10*3/MM3 (ref 3.4–10.8)
WHOLE BLOOD HOLD SPECIMEN: NORMAL

## 2022-02-04 PROCEDURE — 25010000002 PIPERACILLIN SOD-TAZOBACTAM PER 1 G: Performed by: NURSE PRACTITIONER

## 2022-02-04 PROCEDURE — 93010 ELECTROCARDIOGRAM REPORT: CPT | Performed by: INTERNAL MEDICINE

## 2022-02-04 PROCEDURE — 83735 ASSAY OF MAGNESIUM: CPT | Performed by: HOSPITALIST

## 2022-02-04 PROCEDURE — 25010000002 METOCLOPRAMIDE PER 10 MG: Performed by: NURSE PRACTITIONER

## 2022-02-04 PROCEDURE — 84100 ASSAY OF PHOSPHORUS: CPT | Performed by: HOSPITALIST

## 2022-02-04 PROCEDURE — 25010000002 ONDANSETRON PER 1 MG: Performed by: NURSE PRACTITIONER

## 2022-02-04 PROCEDURE — 25010000002 HYDRALAZINE PER 20 MG: Performed by: HOSPITALIST

## 2022-02-04 PROCEDURE — 80048 BASIC METABOLIC PNL TOTAL CA: CPT | Performed by: NURSE PRACTITIONER

## 2022-02-04 PROCEDURE — 93005 ELECTROCARDIOGRAM TRACING: CPT | Performed by: HOSPITALIST

## 2022-02-04 PROCEDURE — G0378 HOSPITAL OBSERVATION PER HR: HCPCS

## 2022-02-04 PROCEDURE — 25010000002 METOCLOPRAMIDE PER 10 MG: Performed by: HOSPITALIST

## 2022-02-04 PROCEDURE — 25010000002 HYDROCORTISONE SODIUM SUCCINATE 100 MG RECONSTITUTED SOLUTION: Performed by: HOSPITALIST

## 2022-02-04 PROCEDURE — 83605 ASSAY OF LACTIC ACID: CPT | Performed by: HOSPITALIST

## 2022-02-04 PROCEDURE — 99222 1ST HOSP IP/OBS MODERATE 55: CPT | Performed by: NURSE PRACTITIONER

## 2022-02-04 PROCEDURE — 36415 COLL VENOUS BLD VENIPUNCTURE: CPT | Performed by: NURSE PRACTITIONER

## 2022-02-04 PROCEDURE — 74018 RADEX ABDOMEN 1 VIEW: CPT

## 2022-02-04 PROCEDURE — 85025 COMPLETE CBC W/AUTO DIFF WBC: CPT | Performed by: HOSPITALIST

## 2022-02-04 PROCEDURE — 99233 SBSQ HOSP IP/OBS HIGH 50: CPT | Performed by: HOSPITALIST

## 2022-02-04 RX ORDER — PANTOPRAZOLE SODIUM 40 MG/10ML
40 INJECTION, POWDER, LYOPHILIZED, FOR SOLUTION INTRAVENOUS
Status: DISCONTINUED | OUTPATIENT
Start: 2022-02-04 | End: 2022-02-07

## 2022-02-04 RX ORDER — HYDRALAZINE HYDROCHLORIDE 20 MG/ML
10 INJECTION INTRAMUSCULAR; INTRAVENOUS EVERY 6 HOURS PRN
Status: DISCONTINUED | OUTPATIENT
Start: 2022-02-04 | End: 2022-02-09 | Stop reason: HOSPADM

## 2022-02-04 RX ORDER — SORBITOL SOLUTION 70 %
50 SOLUTION, ORAL MISCELLANEOUS ONCE
Status: COMPLETED | OUTPATIENT
Start: 2022-02-05 | End: 2022-02-05

## 2022-02-04 RX ORDER — SCOLOPAMINE TRANSDERMAL SYSTEM 1 MG/1
1 PATCH, EXTENDED RELEASE TRANSDERMAL
Status: DISCONTINUED | OUTPATIENT
Start: 2022-02-04 | End: 2022-02-09 | Stop reason: HOSPADM

## 2022-02-04 RX ORDER — ONDANSETRON 2 MG/ML
4 INJECTION INTRAMUSCULAR; INTRAVENOUS EVERY 6 HOURS
Status: DISCONTINUED | OUTPATIENT
Start: 2022-02-04 | End: 2022-02-09 | Stop reason: HOSPADM

## 2022-02-04 RX ORDER — PROCHLORPERAZINE EDISYLATE 5 MG/ML
10 INJECTION INTRAMUSCULAR; INTRAVENOUS ONCE
Status: DISCONTINUED | OUTPATIENT
Start: 2022-02-04 | End: 2022-02-04

## 2022-02-04 RX ORDER — METOCLOPRAMIDE HYDROCHLORIDE 5 MG/ML
10 INJECTION INTRAMUSCULAR; INTRAVENOUS
Status: DISCONTINUED | OUTPATIENT
Start: 2022-02-04 | End: 2022-02-04

## 2022-02-04 RX ORDER — SODIUM CHLORIDE 9 MG/ML
75 INJECTION, SOLUTION INTRAVENOUS CONTINUOUS
Status: DISCONTINUED | OUTPATIENT
Start: 2022-02-04 | End: 2022-02-09 | Stop reason: HOSPADM

## 2022-02-04 RX ORDER — BISACODYL 5 MG/1
20 TABLET, DELAYED RELEASE ORAL ONCE
Status: COMPLETED | OUTPATIENT
Start: 2022-02-04 | End: 2022-02-04

## 2022-02-04 RX ORDER — FENTANYL/ROPIVACAINE/NS/PF 2-625MCG/1
15 PLASTIC BAG, INJECTION (ML) EPIDURAL AS NEEDED
Status: DISCONTINUED | OUTPATIENT
Start: 2022-02-04 | End: 2022-02-09 | Stop reason: HOSPADM

## 2022-02-04 RX ORDER — METOCLOPRAMIDE HYDROCHLORIDE 5 MG/ML
10 INJECTION INTRAMUSCULAR; INTRAVENOUS EVERY 6 HOURS
Status: DISCONTINUED | OUTPATIENT
Start: 2022-02-04 | End: 2022-02-07

## 2022-02-04 RX ORDER — MAGNESIUM CARB/ALUMINUM HYDROX 105-160MG
296 TABLET,CHEWABLE ORAL ONCE
Status: COMPLETED | OUTPATIENT
Start: 2022-02-04 | End: 2022-02-04

## 2022-02-04 RX ADMIN — Medication 296 ML: at 17:12

## 2022-02-04 RX ADMIN — HYDROCORTISONE 1 APPLICATION: 25 CREAM TOPICAL at 21:00

## 2022-02-04 RX ADMIN — SODIUM CHLORIDE, PRESERVATIVE FREE 10 ML: 5 INJECTION INTRAVENOUS at 21:55

## 2022-02-04 RX ADMIN — HYDROCORTISONE SODIUM SUCCINATE 50 MG: 100 INJECTION, POWDER, FOR SOLUTION INTRAMUSCULAR; INTRAVENOUS at 16:36

## 2022-02-04 RX ADMIN — PANTOPRAZOLE SODIUM 40 MG: 40 INJECTION, POWDER, LYOPHILIZED, FOR SOLUTION INTRAVENOUS at 16:39

## 2022-02-04 RX ADMIN — POLYETHYLENE GLYCOL 3350 17 G: 17 POWDER, FOR SOLUTION ORAL at 17:12

## 2022-02-04 RX ADMIN — LISINOPRIL 5 MG: 5 TABLET ORAL at 05:52

## 2022-02-04 RX ADMIN — SERTRALINE 100 MG: 100 TABLET, FILM COATED ORAL at 21:55

## 2022-02-04 RX ADMIN — BISACODYL 20 MG: 5 TABLET, COATED ORAL at 21:55

## 2022-02-04 RX ADMIN — PANTOPRAZOLE SODIUM 40 MG: 40 TABLET, DELAYED RELEASE ORAL at 05:18

## 2022-02-04 RX ADMIN — ONDANSETRON 4 MG: 2 INJECTION INTRAMUSCULAR; INTRAVENOUS at 14:13

## 2022-02-04 RX ADMIN — HYDRALAZINE HYDROCHLORIDE 10 MG: 20 INJECTION INTRAMUSCULAR; INTRAVENOUS at 09:06

## 2022-02-04 RX ADMIN — PIPERACILLIN AND TAZOBACTAM 3.38 G: 3; .375 INJECTION, POWDER, LYOPHILIZED, FOR SOLUTION INTRAVENOUS at 05:18

## 2022-02-04 RX ADMIN — SUCRALFATE 1 G: 1 TABLET ORAL at 21:55

## 2022-02-04 RX ADMIN — PIPERACILLIN AND TAZOBACTAM 3.38 G: 3; .375 INJECTION, POWDER, LYOPHILIZED, FOR SOLUTION INTRAVENOUS at 14:12

## 2022-02-04 RX ADMIN — SODIUM CHLORIDE, PRESERVATIVE FREE 10 ML: 5 INJECTION INTRAVENOUS at 09:05

## 2022-02-04 RX ADMIN — ONDANSETRON 4 MG: 2 INJECTION INTRAMUSCULAR; INTRAVENOUS at 21:55

## 2022-02-04 RX ADMIN — ONDANSETRON 4 MG: 2 INJECTION INTRAMUSCULAR; INTRAVENOUS at 01:03

## 2022-02-04 RX ADMIN — HYDRALAZINE HYDROCHLORIDE 10 MG: 20 INJECTION INTRAMUSCULAR; INTRAVENOUS at 16:37

## 2022-02-04 RX ADMIN — SODIUM CHLORIDE 75 ML/HR: 9 INJECTION, SOLUTION INTRAVENOUS at 12:27

## 2022-02-04 RX ADMIN — METOCLOPRAMIDE HYDROCHLORIDE 10 MG: 5 INJECTION INTRAMUSCULAR; INTRAVENOUS at 18:43

## 2022-02-04 RX ADMIN — SCOLOPAMINE TRANSDERMAL SYSTEM 1 PATCH: 1 PATCH, EXTENDED RELEASE TRANSDERMAL at 09:05

## 2022-02-04 RX ADMIN — POTASSIUM PHOSPHATE, MONOBASIC AND POTASSIUM PHOSPHATE, DIBASIC 15 MMOL: 224; 236 INJECTION, SOLUTION, CONCENTRATE INTRAVENOUS at 21:55

## 2022-02-04 RX ADMIN — METOCLOPRAMIDE 10 MG: 5 INJECTION, SOLUTION INTRAMUSCULAR; INTRAVENOUS at 12:28

## 2022-02-04 RX ADMIN — PIPERACILLIN AND TAZOBACTAM 3.38 G: 3; .375 INJECTION, POWDER, LYOPHILIZED, FOR SOLUTION INTRAVENOUS at 22:27

## 2022-02-04 RX ADMIN — ONDANSETRON 4 MG: 2 INJECTION INTRAMUSCULAR; INTRAVENOUS at 05:18

## 2022-02-04 RX ADMIN — PANTOPRAZOLE SODIUM 40 MG: 40 INJECTION, POWDER, LYOPHILIZED, FOR SOLUTION INTRAVENOUS at 09:06

## 2022-02-04 RX ADMIN — HYDROCORTISONE 1 APPLICATION: 25 CREAM TOPICAL at 09:05

## 2022-02-04 RX ADMIN — HYDROCORTISONE SODIUM SUCCINATE 50 MG: 100 INJECTION, POWDER, FOR SOLUTION INTRAMUSCULAR; INTRAVENOUS at 21:55

## 2022-02-04 RX ADMIN — AMITRIPTYLINE HYDROCHLORIDE 25 MG: 25 TABLET, FILM COATED ORAL at 21:55

## 2022-02-04 RX ADMIN — ATORVASTATIN CALCIUM 80 MG: 40 TABLET, FILM COATED ORAL at 22:21

## 2022-02-04 RX ADMIN — AMLODIPINE BESYLATE 10 MG: 5 TABLET ORAL at 05:52

## 2022-02-04 NOTE — CONSULTS
GI CONSULT  NOTE:    Referring Provider:  Dr. Bates    Chief complaint: rectal bleeding, chest pain  Subjective .     History of present illness: Katie Reddy is a 76 y.o. female with history of GERD, anxiety, asthma, hypertension, pyloroplasty, CVA, adrenal insufficiency.  She presented 2/2 with chest pain and rectal bleeding.  She also complains of nausea and vomiting that has been going on for 2 weeks.  She is taking Reglan and scopolamine for nausea, and this is not helping.  She reports 3 episodes of rectal bleeding.  She does note some clots in this.  She also complains of abdominal pain that has progressed to be generalized.  Nothing relieves this pain.  She also complains of heartburn which she takes a PPI for.  She denies dysphagia.  She also denies NSAID use.      Endo History:  7/2013 EGD/colonoscopy with Dr. Mota: Grade a erosive esophagitis, gastritis, evidence of pyloroplasty.  Grade 1 hemorrhoid, otherwise normal.    Past Medical History:  Past Medical History:   Diagnosis Date   • Arthritis    • Bladder incontinence    • Colon polyp    • DEXA     OSTEOPENIA = 2018 (-1.3/ -1.7); 2020 (-1.7/ -1.7)   • GERD    • Glucocorticoid deficiency    • Headache    • HTN    • Intracerebral hemorrhage/ CVA    • MAMMO     NEG =2020   • Osteopenia    • Vitamin D deficiency        Past Surgical History:  Past Surgical History:   Procedure Laterality Date   • APPENDECTOMY     • BLADDER SURGERY      bladder stimulator placement/ REMOVAL   • BREAST CYST EXCISION     • BREAST LUMPECTOMY Left 1973    NEG   • BUNIONECTOMY Right 5/29/2020    Procedure: BUNIONECTOMY DEVONTE;  Surgeon: MARISSA Em DPM;  Location: [facility] OR;  Service: Podiatry;  Laterality: Right;   • CARDIAC CATHETERIZATION     • CARPAL TUNNEL RELEASE Right 1980   • CHOLECYSTECTOMY     • COLON SURGERY      hemorrhoid banding   • COLONOSCOPY  2017 2017/ 2019 = jessica DESAI 2024   Dr. Mackey   • EYE SURGERY     • HERNIA REPAIR      Umbilical  removal   • HYSTERECTOMY  1970   • SUBTOTAL HYSTERECTOMY     • UMBILICAL HERNIA REPAIR         Social History:  Social History     Tobacco Use   • Smoking status: Never Smoker   • Smokeless tobacco: Never Used   Vaping Use   • Vaping Use: Never used   Substance Use Topics   • Alcohol use: No   • Drug use: No       Family History:  Family History   Problem Relation Age of Onset   • Heart disease Mother    • Hypertension Mother    • Diabetes Father    • Heart disease Father    • Alcohol abuse Father    • Hypertension Father    • Diabetes Brother    • Heart disease Brother    • Cancer Brother 68        Prostate Cancer   • Hypertension Brother    • Diabetes Maternal Aunt    • Heart disease Maternal Grandmother    • Hypertension Maternal Grandmother    • Heart disease Maternal Grandfather    • Hypertension Maternal Grandfather    • Liver disease Paternal Grandmother    • Hypertension Paternal Grandmother    • Heart disease Paternal Grandfather    • Hypertension Paternal Grandfather    • Dementia Sister    • Diabetes Brother        Medications:  Medications Prior to Admission   Medication Sig Dispense Refill Last Dose   • albuterol (ACCUNEB) 1.25 MG/3ML nebulizer solution Take 3 mL by nebulization Every 6 (Six) Hours As Needed for Wheezing or Shortness of Air for up to 30 doses. 120 each 2    • amitriptyline (ELAVIL) 25 MG tablet Take 1 tablet by mouth every night at bedtime. 90 tablet 0    • amLODIPine (NORVASC) 10 MG tablet Take 1 tablet by mouth Daily. 90 tablet 0    • atorvastatin (LIPITOR) 80 MG tablet Take 1 tablet by mouth Daily. 90 tablet 2    • Calcium Carbonate-Vit D-Min (Calcium 1200) 7008-1896 MG-UNIT chewable tablet Chew 1 tablet Daily.      • clindamycin (CLEOCIN T) 1 % lotion APPLY TO AFFECTED AREA TWICE A DAY      • Gemtesa 75 MG tablet Take 1 tablet by mouth Daily.      • hydrocortisone (CORTEF) 10 MG tablet TAKE 1 TAB BY MOUTH EVERY MORNING, 1 TAB EVERY AFTERNOON, & ONE-HALF TAB IN THE EVENING. 100  tablet 4    • hydrocortisone 2.5 % cream Apply 1 application topically to the appropriate area as directed 2 (Two) Times a Day.      • ibandronate (BONIVA) 150 MG tablet Take 1 tablet by mouth Every 30 (Thirty) Days. 3 tablet 3    • Incontinence Supply Disposable (Poise Moderate Absorbency) pads USE 1 PAD FIVE TIMES A  each 13    • linaclotide (Linzess) 145 MCG capsule capsule Take 1 capsule by mouth Every Morning Before Breakfast. 16 capsule 0    • lisinopril (PRINIVIL,ZESTRIL) 5 MG tablet Take 1 tablet by mouth Daily. 90 tablet 0    • meclizine (ANTIVERT) 12.5 MG tablet Take 1 tablet by mouth As Needed for Dizziness. 30 tablet 3    • nitroglycerin (NITROSTAT) 0.4 MG SL tablet Place 1 tablet under the tongue Every 5 (Five) Minutes As Needed for Chest Pain (Only if SBP Greater Than 100). Take no more than 3 doses in 15 minutes. 25 tablet 0    • ondansetron (Zofran) 4 MG tablet Take 1 tablet by mouth Every 8 (Eight) Hours As Needed for Nausea or Vomiting. 30 tablet 0    • sertraline (ZOLOFT) 100 MG tablet Take 1 tablet by mouth Every Night. 90 tablet 1    • sucralfate (CARAFATE) 1 g tablet Take 1 tablet by mouth 2 (Two) Times a Day. 120 tablet 1    • temazepam (RESTORIL) 30 MG capsule Take 1 capsule by mouth At Night As Needed for Sleep. 30 capsule 0        Scheduled Meds:amitriptyline, 25 mg, Oral, Nightly  amLODIPine, 10 mg, Oral, Daily  atorvastatin, 80 mg, Oral, Nightly  bisacodyl, 20 mg, Oral, Once  hydrocortisone, 1 application, Topical, BID  hydrocortisone sodium succinate, 50 mg, Intravenous, Q8H  lisinopril, 5 mg, Oral, Daily  magnesium citrate, 296 mL, Oral, Once  metoclopramide, 10 mg, Intravenous, 4x Daily AC & at Bedtime  pantoprazole, 40 mg, Intravenous, BID AC  piperacillin-tazobactam, 3.375 g, Intravenous, Q8H  polyethylene glycol, 17 g, Oral, BID With Meals  Scopolamine, 1 patch, Transdermal, Q72H  sertraline, 100 mg, Oral, Nightly  sodium chloride, 10 mL, Intravenous, Q12H  [START ON  2/5/2022] sorbitol, 50 mL, Oral, Once   Followed by  [START ON 2/5/2022] sorbitol, 50 mL, Oral, Once  sucralfate, 1 g, Oral, BID      Continuous Infusions:sodium chloride, 75 mL/hr, Last Rate: 75 mL/hr (02/04/22 1227)      PRN Meds:.•  acetaminophen **OR** acetaminophen **OR** acetaminophen  •  albuterol  •  aluminum-magnesium hydroxide-simethicone  •  hydrALAZINE  •  meclizine  •  melatonin  •  nitroglycerin  •  ondansetron **OR** ondansetron  •  potassium phosphate infusion greater than 15 mMoles **OR** potassium phosphate infusion greater than 15 mMoles **OR** potassium phosphate **OR** sodium phosphate IVPB **OR** sodium phosphate IVPB **OR** sodium phosphate IVPB  •  [COMPLETED] Insert peripheral IV **AND** sodium chloride  •  sodium chloride  •  temazepam    ALLERGIES:  Trazodone, Boniva [ibandronic acid], and Dayvigo [lemborexant]    ROS:  The following systems were reviewed and negative;   Constitution:  No fevers, chills, no unintentional weight loss  Skin: no rash, no jaundice  Eyes:  No blurry vision, no eye pain  HENT:  No change in hearing or smell  Resp:  No dyspnea or cough  CV:  No chest pain or palpitations  :  No dysuria, hematuria  Musculoskeletal:  No leg cramps or arthralgias  Neuro:  No tremor, no numbness  Psych:  No depression or confusion    Objective     Vital Signs:   Vitals:    02/04/22 0357 02/04/22 0552 02/04/22 0906 02/04/22 1020   BP: (!) 185/95 179/86 (!) 184/96 174/76   BP Location: Left arm      Patient Position: Lying      Pulse: 81 111 (!) 121 (!) 124   Resp: 18   18   Temp: 98 °F (36.7 °C)   98.5 °F (36.9 °C)   TempSrc: Oral      SpO2: 96%   98%   Weight:       Height:           Physical Exam:       General Appearance:    Awake and alert, in no acute distress   Head:    Normocephalic, without obvious abnormality, atraumatic   Throat:   No oral lesions, no thrush, oral mucosa moist   Lungs:     Respirations regular, even and unlabored   Chest Wall:    No abnormalities observed    Abdomen:     Soft, generalized abdominal tenderness, no rebound or guarding, non-distended, no hepatosplenomegaly   Rectal:     Deferred   Extremities:   Moves all extremities, no edema, no cyanosis   Pulses:   Pulses palpable and equal bilaterally   Skin:   No rash, no jaundice, normal palpation   Lymph nodes:   No cervical, supraclavicular or submandibular palpable adenopathy   Neurologic:   Cranial nerves 2 - 12 grossly intact, no asterixis       Results Review:   I reviewed the patient's labs and imaging.  CBC    Results from last 7 days   Lab Units 02/04/22  0935 02/03/22  0045 02/02/22  1252 02/02/22  0549 02/01/22 2057   WBC 10*3/mm3 14.60* 14.10*  --  19.10* 16.60*   HEMOGLOBIN g/dL 12.9 10.4* 11.6* 12.9 12.3   PLATELETS 10*3/mm3 290 221  --  286 308     CMP   Results from last 7 days   Lab Units 02/04/22  1301 02/04/22  0233 02/03/22  0045 02/02/22  0549 02/01/22  2326 02/01/22 2057   SODIUM mmol/L  --  139 138 138 138  --    POTASSIUM mmol/L  --  3.7 3.8 4.1 4.3  --    CHLORIDE mmol/L  --  105 106 104 104  --    CO2 mmol/L  --  18.0* 19.0* 20.0* 19.0*  --    BUN mg/dL  --  24* 21 14 18  --    CREATININE mg/dL  --  1.49* 1.49* 1.03* 0.90  --    GLUCOSE mg/dL  --  116* 97 147* 136*  --    ALBUMIN g/dL  --   --  3.10*  --  4.10  --    BILIRUBIN mg/dL  --   --  0.5  --  0.3  --    ALK PHOS U/L  --   --  91  --  135*  --    AST (SGOT) U/L  --   --  15  --  25  --    ALT (SGPT) U/L  --   --  10  --  16  --    MAGNESIUM mg/dL 1.9  --   --   --   --   --    PHOSPHORUS mg/dL 2.1*  --   --   --   --   --    AMYLASE U/L  --   --  31  --   --   --    LIPASE U/L  --   --  14  --  19 24     Cr Clearance Estimated Creatinine Clearance: 30.7 mL/min (A) (by C-G formula based on SCr of 1.49 mg/dL (H)).  Coag   Results from last 7 days   Lab Units 02/01/22 2057   INR  0.96   APTT seconds 23.9*     HbA1C   Lab Results   Component Value Date    HGBA1C 4.9 04/07/2021     Blood Glucose   Glucose   Date/Time Value Ref Range  Status   02/03/2022 1144 79 70 - 105 mg/dL Final     Comment:     Serial Number: [id number]:  [id number]   02/03/2022 0618 84 70 - 105 mg/dL Final     Comment:     Serial Number: [id number]:  [id number]   02/02/2022 2206 101 70 - 105 mg/dL Final     Comment:     Serial Number: [id number]:  [id number]     Infection   Results from last 7 days   Lab Units 02/02/22  1021 02/02/22  0329   BLOODCX  No growth at 2 days No growth at 2 days     UA      Radiology(recent) XR Abdomen KUB    Result Date: 2/4/2022  Nonspecific bowel gas pattern with no abnormal bowel distention in the upper abdomen, but paucity of gas filled bowel in the lower abdomen/ pelvis. Electronically Signed: Kacy Shields MD 2/4/2022 11:01 EST         ASSESSMENT   -Rectal bleeding  -Nausea and vomiting  -Abnormal CT -showing esophageal wall thickening and distal colitis  -Upper abdominal pain  -History of pyloroplasty  -HTN  -GERD  -Asthma  -CVA  -Anxiety  -Adrenal insufficiency   -History of appendectomy      PLAN  76-year-old female presented with chest pain and rectal bleeding.  She also complains of nausea and vomiting for the past 2 weeks.  She is passing bright red blood per rectum with clots.  Her CT showed esophageal wall thickening and distal colitis.  Her last EGD and colonoscopy was in 2013 which showed erosive esophagitis, gastritis, grade 1 hemorrhoid.  Her hemoglobin is stable at 12.9.  She is constantly nauseous and vomiting.  Will schedule alternating Zofran and Reglan.  Continue scopolamine patch.  Plan for EGD and colonoscopy tomorrow.  Clear liquid diet, n.p.o. after bowel prep.      I discussed the patients findings and my recommendations with the patient.    We appreciate the referral    Electronically signed by PRACHI Schultz, 02/04/22, 3:03 PM EST.

## 2022-02-04 NOTE — PLAN OF CARE
Problem: Adult Inpatient Plan of Care  Goal: Absence of Hospital-Acquired Illness or Injury  Intervention: Identify and Manage Fall Risk  Recent Flowsheet Documentation  Taken 2/4/2022 0500 by Shen Tran LPN  Safety Promotion/Fall Prevention:   safety round/check completed   room organization consistent   nonskid shoes/slippers when out of bed   muscle strengthening facilitated   mobility aid in reach   lighting adjusted   fall prevention program maintained   clutter free environment maintained   assistive device/personal items within reach   activity supervised  Taken 2/4/2022 0331 by Shen Tran LPN  Safety Promotion/Fall Prevention:   safety round/check completed   room organization consistent   nonskid shoes/slippers when out of bed   muscle strengthening facilitated   mobility aid in reach   lighting adjusted   fall prevention program maintained   clutter free environment maintained   assistive device/personal items within reach   activity supervised  Taken 2/4/2022 0100 by Shen Tran LPN  Safety Promotion/Fall Prevention:   safety round/check completed   room organization consistent   nonskid shoes/slippers when out of bed   mobility aid in reach   muscle strengthening facilitated   lighting adjusted   clutter free environment maintained   assistive device/personal items within reach   activity supervised  Taken 2/3/2022 2301 by Shen Tran LPN  Safety Promotion/Fall Prevention:   safety round/check completed   room organization consistent   nonskid shoes/slippers when out of bed   muscle strengthening facilitated  Taken 2/3/2022 2100 by Shen Tran LPN  Safety Promotion/Fall Prevention:   safety round/check completed   room organization consistent   nonskid shoes/slippers when out of bed   muscle strengthening facilitated   mobility aid in reach   lighting adjusted   fall prevention program maintained   clutter free environment maintained   assistive  device/personal items within reach   activity supervised  Taken 2/3/2022 1925 by Shen Tran LPN  Safety Promotion/Fall Prevention:   safety round/check completed   room organization consistent   nonskid shoes/slippers when out of bed   muscle strengthening facilitated   mobility aid in reach   lighting adjusted   fall prevention program maintained   clutter free environment maintained   assistive device/personal items within reach   activity supervised  Intervention: Prevent Skin Injury  Recent Flowsheet Documentation  Taken 2/4/2022 0500 by Shen Tran LPN  Body Position: position changed independently  Taken 2/4/2022 0331 by Shen Tran LPN  Body Position: position changed independently  Taken 2/4/2022 0100 by Shen Tran LPN  Body Position: position changed independently  Taken 2/3/2022 2301 by Shen Tran LPN  Body Position: position changed independently  Taken 2/3/2022 2100 by Shen Tran LPN  Body Position: position changed independently  Taken 2/3/2022 1925 by Shen Tran LPN  Body Position: position changed independently  Intervention: Prevent Infection  Recent Flowsheet Documentation  Taken 2/4/2022 0500 by Shen Tran LPN  Infection Prevention:   visitors restricted/screened   single patient room provided   rest/sleep promoted   personal protective equipment utilized   hand hygiene promoted  Taken 2/4/2022 0331 by Shen Tran LPN  Infection Prevention:   visitors restricted/screened   single patient room provided   rest/sleep promoted   personal protective equipment utilized   hand hygiene promoted   equipment surfaces disinfected  Taken 2/4/2022 0100 by Shen Tran LPN  Infection Prevention:   visitors restricted/screened   rest/sleep promoted   single patient room provided   personal protective equipment utilized   hand hygiene promoted  Taken 2/3/2022 2301 by Shen Tran LPN  Infection Prevention:    single patient room provided   visitors restricted/screened   rest/sleep promoted   personal protective equipment utilized   hand hygiene promoted  Taken 2/3/2022 2100 by Shen Tran LPN  Infection Prevention:   visitors restricted/screened   single patient room provided   rest/sleep promoted   personal protective equipment utilized   hand hygiene promoted   equipment surfaces disinfected  Taken 2/3/2022 1925 by Shen Tran LPN  Infection Prevention: equipment surfaces disinfected  Goal: Optimal Comfort and Wellbeing  Intervention: Provide Person-Centered Care  Recent Flowsheet Documentation  Taken 2/3/2022 1925 by Shen Tran LPN  Trust Relationship/Rapport: care explained     Problem: Skin Injury Risk Increased  Goal: Skin Health and Integrity  Intervention: Optimize Skin Protection  Recent Flowsheet Documentation  Taken 2/4/2022 0500 by Shen Tran LPN  Head of Bed (HOB): HOB at 20-30 degrees  Taken 2/4/2022 0331 by Shen Tran LPN  Head of Bed (HOB): HOB at 20-30 degrees  Pressure Reduction Devices: pressure-redistributing mattress utilized  Taken 2/4/2022 0100 by Shen Tran LPN  Head of Bed (HOB): HOB at 20-30 degrees  Taken 2/3/2022 2301 by Shen Tran LPN  Head of Bed (HOB): HOB at 20-30 degrees  Taken 2/3/2022 2100 by Shen Tran LPN  Head of Bed (HOB): HOB at 20-30 degrees  Taken 2/3/2022 1925 by Shen Tran LPN  Pressure Reduction Techniques: weight shift assistance provided  Head of Bed (HOB): HOB at 20-30 degrees  Pressure Reduction Devices: pressure-redistributing mattress utilized     Problem: Fall Injury Risk  Goal: Absence of Fall and Fall-Related Injury  Intervention: Identify and Manage Contributors to Fall Injury Risk  Recent Flowsheet Documentation  Taken 2/4/2022 0500 by Shen Tran LPN  Medication Review/Management: medications reviewed  Taken 2/4/2022 0331 by Shen Tran LPN  Medication  Review/Management: medications reviewed  Taken 2/4/2022 0100 by Shen Tran LPN  Medication Review/Management: medications reviewed  Taken 2/3/2022 2301 by Shen Tran LPN  Medication Review/Management: medications reviewed  Taken 2/3/2022 2100 by Shen Tran LPN  Medication Review/Management: medications reviewed  Taken 2/3/2022 1925 by Shen Tran LPN  Medication Review/Management: medications reviewed  Intervention: Promote Injury-Free Environment  Recent Flowsheet Documentation  Taken 2/4/2022 0500 by Shen Tran LPN  Safety Promotion/Fall Prevention:   safety round/check completed   room organization consistent   nonskid shoes/slippers when out of bed   muscle strengthening facilitated   mobility aid in reach   lighting adjusted   fall prevention program maintained   clutter free environment maintained   assistive device/personal items within reach   activity supervised  Taken 2/4/2022 0331 by Shen Tran LPN  Safety Promotion/Fall Prevention:   safety round/check completed   room organization consistent   nonskid shoes/slippers when out of bed   muscle strengthening facilitated   mobility aid in reach   lighting adjusted   fall prevention program maintained   clutter free environment maintained   assistive device/personal items within reach   activity supervised  Taken 2/4/2022 0100 by Shen Tran LPN  Safety Promotion/Fall Prevention:   safety round/check completed   room organization consistent   nonskid shoes/slippers when out of bed   mobility aid in reach   muscle strengthening facilitated   lighting adjusted   clutter free environment maintained   assistive device/personal items within reach   activity supervised  Taken 2/3/2022 2301 by Shen Tran LPN  Safety Promotion/Fall Prevention:   safety round/check completed   room organization consistent   nonskid shoes/slippers when out of bed   muscle strengthening facilitated  Taken  2/3/2022 2100 by Shen Tran LPN  Safety Promotion/Fall Prevention:   safety round/check completed   room organization consistent   nonskid shoes/slippers when out of bed   muscle strengthening facilitated   mobility aid in reach   lighting adjusted   fall prevention program maintained   clutter free environment maintained   assistive device/personal items within reach   activity supervised  Taken 2/3/2022 1925 by Shen Tran LPN  Safety Promotion/Fall Prevention:   safety round/check completed   room organization consistent   nonskid shoes/slippers when out of bed   muscle strengthening facilitated   mobility aid in reach   lighting adjusted   fall prevention program maintained   clutter free environment maintained   assistive device/personal items within reach   activity supervised     Problem: Hypertension Comorbidity  Goal: Blood Pressure in Desired Range  Intervention: Maintain Hypertension-Management Strategies  Recent Flowsheet Documentation  Taken 2/4/2022 0500 by Shen Tran LPN  Medication Review/Management: medications reviewed  Taken 2/4/2022 0331 by Shen Tran LPN  Medication Review/Management: medications reviewed  Taken 2/4/2022 0100 by Shen Tran LPN  Medication Review/Management: medications reviewed  Taken 2/3/2022 2301 by Shen Tran LPN  Medication Review/Management: medications reviewed  Taken 2/3/2022 2100 by Shen Tran LPN  Medication Review/Management: medications reviewed  Taken 2/3/2022 1925 by Shen Tran LPN  Medication Review/Management: medications reviewed     Problem: Pain Chronic (Persistent) (Comorbidity Management)  Goal: Acceptable Pain Control and Functional Ability  Intervention: Develop Pain Management Plan  Recent Flowsheet Documentation  Taken 2/4/2022 0331 by Shen Tran LPN  Pain Management Interventions:   see MAR   quiet environment facilitated   position adjusted   pain management plan  reviewed with patient/caregiver   medication offered but refused  Taken 2/3/2022 2301 by Shen Tran LPN  Pain Management Interventions:   see MAR   quiet environment facilitated   position adjusted  Taken 2/3/2022 1925 by Shen Tran LPN  Pain Management Interventions:   see MAR   quiet environment facilitated   position adjusted   pain management plan reviewed with patient/caregiver  Intervention: Manage Persistent Pain  Recent Flowsheet Documentation  Taken 2/4/2022 0500 by Shen Tran LPN  Medication Review/Management: medications reviewed  Taken 2/4/2022 0331 by Shen Tran LPN  Medication Review/Management: medications reviewed  Taken 2/4/2022 0100 by Shen Tran LPN  Medication Review/Management: medications reviewed  Taken 2/3/2022 2301 by Shen Tran LPN  Medication Review/Management: medications reviewed  Taken 2/3/2022 2100 by Shen Tran LPN  Medication Review/Management: medications reviewed  Taken 2/3/2022 1925 by Shen Tran LPN  Medication Review/Management: medications reviewed  Intervention: Optimize Psychosocial Wellbeing  Recent Flowsheet Documentation  Taken 2/3/2022 1925 by Shen Tran LPN  Diversional Activities:   television   smartphone  Family/Support System Care: support provided   Goal Outcome Evaluation:

## 2022-02-04 NOTE — NURSING NOTE
3  small loose stool , attempt to obtain stool specimens unsuccessful due to pt voided into stool , noted no blood or red color in stool , stool noted burnt orange in color .  C/o cramping of abd , prn zofran given,  Elevated BP admin am BP med

## 2022-02-04 NOTE — PROGRESS NOTES
"    [facility] Medicine Services Daily Progress Note    Patient Name: Katie Reddy  : 1945  MRN: [id number]  Primary Care Physician:  Brooklyn Contreras DO  Date of admission: 2022      Subjective      Chief Complaint: Epigastric pain and chest pain with rectal bleeding    Patient Reports   2/3/2022: Patient reports some improvement in her abdominal discomfort which she describes as a pressure or tightness that moves fromright to left across her upper abdomen.  She reports two episodes of bleeding per rectum with scant clots but no stool accompanying the blood.  No complaints of fever, chills, sweats, nausea, vomiting or  complaints.  2022: Pt having intractable vomiting that began this morning.  She also developed some increased heart rate confirmed by EKG to be sinus tachycardia.  She also felt like that she needed to have a bowel movement.  She complains of pain across her upper abdomen. KUB showed a paucity of air and stool in the lower intestine.  Reglan and scopolamine were initiated for nausea and presumed ileus.  She denies any hematemesis or coffee-ground emesis.  The patient had persistent nausea and vomiting so GI has been consulted.  She has had several \"rust colored\" stools today.    ROS   12 point review of systems was reviewed and was negative except 2 episodes of bleeding per rectum and some abdominal discomfort (patient denies abdominal pain or chest pain).      Objective      Vitals:   Temp:  [97.7 °F (36.5 °C)-98.5 °F (36.9 °C)] 98 °F (36.7 °C)  Heart Rate:  [] 121  Resp:  [16-18] 18  BP: (106-185)/(62-96) 184/96    Physical Exam   Vital signs and nurses notes reviewed.  Well-developed well-nourished elderly female in no acute distress sitting up in bed awake and alert; mucous membranes moist; sclerae anicteric; lungs clear to auscultation bilaterally; CV regular rate and rhythm; abdomen soft, nondistended, tender right upper quadrant, midepigastrium " and left upper quadrant but nontender and across the lower abdomen; extremities with no edema, cyanosis or calf tenderness; palpable pedal pulses bilaterally; no Monahan catheter.       Result Review    Result Review:  I have personally reviewed the results from the time of this admission to 2/4/2022 10:10 EST and agree with these findings:  [x]  Laboratory  [x]  Microbiology  [x]  Radiology  [x]  EKG/Telemetry   [x]  Cardiology/Vascular   []  Pathology  [x]  Old records  []  Other:  Most notable findings discussed in the assessment and plan.    Wounds (last 24 hours)             Assessment/Plan      Brief Patient Summary:  Katie Reddy is a 76 y.o. female with a history of HTN, GERD, anxiety, asthma, CVA hemorrhagic, adrenal insufficiency s/p loop recorder placement approximately 1 year ago, migraines, and arthritis who presented to [facility] on 2/1/2022 complaining of a 2-week history chest pain, epigastric pain, and rectal bleeding.   CT scan of the abdomen and pelvis showed thickened wall of the left colon consistent with infection or inflammation.  It also showed wall thickening and mild fluid distention of the lower esophagus.  The patient had recently seen her primary care provider and was told that she likely had some obstructing constipated stool and she was prescribed Linzess  But she had not had a chance to take it yet.    Current inpatient medications include:  amitriptyline, 25 mg, Oral, Nightly  amLODIPine, 10 mg, Oral, Daily  atorvastatin, 80 mg, Oral, Nightly  hydrocortisone, 10 mg, Oral, TID With Meals  hydrocortisone, 1 application, Topical, BID  lisinopril, 5 mg, Oral, Daily  metoclopramide, 10 mg, Intravenous, 4x Daily AC & at Bedtime  pantoprazole, 40 mg, Intravenous, BID AC  piperacillin-tazobactam, 3.375 g, Intravenous, Q8H  polyethylene glycol, 17 g, Oral, BID With Meals  Scopolamine, 1 patch, Transdermal, Q72H  sertraline, 100 mg, Oral, Nightly  sodium chloride, 10 mL,  Intravenous, Q12H  sucralfate, 1 g, Oral, BID             Active Hospital Problems:  Active Hospital Problems    Diagnosis    • **Chest pain    • Colitis    • 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)    • History of hemorrhagic cerebrovascular accident (CVA) with residual deficit    • Asthma in adult, mild intermittent, uncomplicated    • Essential hypertension    • Dyslipidemia    • GERD (gastroesophageal reflux disease)    • Vitamin D deficiency    • Generalized anxiety disorder      Plan:     Noncardiac chest pain likely GI etiology due to achalasia  -Troponin x2 negative  - EKG with sinus tachycardia with ST elevation due to nonspecific intraventricular conduction delay    Intractable vomiting, new problem 2/4/2022  -reglan and scopolamine patch ordered but symptoms persist  -CT scan of the abdomen and pelvis 2-22 showed: Wall thickening and mild fluid distention of the lower esophagus.  -KUB 2/4/2022 showed: Nonspecific bowel gas pattern with no abnormal bowel distention in the upper abdomen, but paucity of gas filled bowel in the lower abdomen/ pelvis.  -GI consulted    Lower GI bleed secondary to colitis, probable ischemia versus infectious etiology  -CT abdomen pelvis with inflammation of the left colon  -Blood cultures x2 no growth and pending  -No stool sample for GI panel or Hemoccult (the nursing staff reports that each time she has a stool it is contaminated by urine)  -Continue Zosyn  -IV fluids ordered for hydration  -Low residue diet as tolerated    Acute blood loss anemia from GI bleeding, mild  -Hemoglobin on admission 12.9 and follow-up 10.4 but subsequent hemoglobin 12.9  -IV iron given 2/3/2022  -Monitor hemoglobin  -GI referral as an outpatient    Chronic constipation  -Patient recently started on Linzess but had not taken it yet (nonformulary); Movantik given x1 dose on 2/3/2022 in its place  -Polyethylene glycol ordered twice daily  -On 2/3/2022  patient refused sorbitol or mag citrate because it makes her vomit    Glucocorticoid deficiency adrenal insufficiency  -Chronic  -No blood pressure drop with orthostatic position change  -Continue hydrocortisone; changed to IV because of vomiting with with increased stress dosing  -Taper steroids to home dose when not tolerating p.o.  -Patient followed by Dr. Gerard outpatient     Essential hypertension, controlled  -Chronic  -Continue home amlodipine and lisinopril      Hyperlipidemia  -Chronic-Continue home atorvastatin      Asthma in adult, mild intermittent, uncomplicated  -Continue home rescue inhaler as needed for wheezing/shortness of breath     Vitamin D deficiency  -Chronic  -Continue home supplement      Generalized anxiety disorder/depression/insomnia  -Continue home temazepam, sertraline, and amitriptyline     GERD  -Continue home sucralfate and Pepcid      History of hemorrhagic cerebrovascular accident (CVA) with residual deficit  -Patient has had a chronic headache, dizziness and weakness  -Patient reports chronic swelling left face, left upper and lower extremity since her stroke  -Continue home meclizine as needed     Incontinence  -Bowel and bladder  -Monitor I's and O's  -Provide incontinence skin care; monitor for skin breakdown  -Patient has bladder stimulator     Osteoporosis  -Patient on home Boniva-encourage compliance       DVT prophylaxis:  Mechanical DVT prophylaxis orders are present.    CODE STATUS:    Medical Intervention Limits: NO intubation (DNI)  Code Status (Patient has no pulse and is not breathing): No CPR (Do Not Attempt to Resuscitate)  Medical Interventions (Patient has pulse or is breathing): Limited Support      Disposition:  I expect patient to be discharged once clinically improved hopefully in the next 2 to 3 days.    This patient has been examined wearing appropriate Personal Protective Equipment and discussed with hospital infection control department.  02/04/22      Electronically signed by Sarina Bates MD, 02/04/22, 10:10 EST.  [facility]ist Team

## 2022-02-05 ENCOUNTER — ANESTHESIA EVENT (OUTPATIENT)
Dept: GASTROENTEROLOGY | Facility: HOSPITAL | Age: 77
End: 2022-02-05

## 2022-02-05 ENCOUNTER — ANESTHESIA (OUTPATIENT)
Dept: GASTROENTEROLOGY | Facility: HOSPITAL | Age: 77
End: 2022-02-05

## 2022-02-05 ENCOUNTER — INPATIENT HOSPITAL (OUTPATIENT)
Dept: URBAN - METROPOLITAN AREA HOSPITAL 84 | Facility: HOSPITAL | Age: 77
End: 2022-02-05

## 2022-02-05 DIAGNOSIS — K44.9 DIAPHRAGMATIC HERNIA WITHOUT OBSTRUCTION OR GANGRENE: ICD-10-CM

## 2022-02-05 DIAGNOSIS — K62.5 HEMORRHAGE OF ANUS AND RECTUM: ICD-10-CM

## 2022-02-05 DIAGNOSIS — K22.10 ULCER OF ESOPHAGUS WITHOUT BLEEDING: ICD-10-CM

## 2022-02-05 DIAGNOSIS — K29.50 UNSPECIFIED CHRONIC GASTRITIS WITHOUT BLEEDING: ICD-10-CM

## 2022-02-05 DIAGNOSIS — R07.89 OTHER CHEST PAIN: ICD-10-CM

## 2022-02-05 DIAGNOSIS — K55.039 ACUTE (REVERSIBLE) ISCHEMIA OF LARGE INTESTINE, EXTENT UNSPE: ICD-10-CM

## 2022-02-05 LAB
ALBUMIN SERPL-MCNC: 3.6 G/DL (ref 3.5–5.2)
ALBUMIN/GLOB SERPL: 1.2 G/DL
ALP SERPL-CCNC: 112 U/L (ref 39–117)
ALT SERPL W P-5'-P-CCNC: 13 U/L (ref 1–33)
ANION GAP SERPL CALCULATED.3IONS-SCNC: 17 MMOL/L (ref 5–15)
ANISOCYTOSIS BLD QL: ABNORMAL
AST SERPL-CCNC: 22 U/L (ref 1–32)
BILIRUB SERPL-MCNC: 0.4 MG/DL (ref 0–1.2)
BUN SERPL-MCNC: 17 MG/DL (ref 8–23)
BUN/CREAT SERPL: 14.7 (ref 7–25)
CALCIUM SPEC-SCNC: 8.8 MG/DL (ref 8.6–10.5)
CHLORIDE SERPL-SCNC: 105 MMOL/L (ref 98–107)
CO2 SERPL-SCNC: 16 MMOL/L (ref 22–29)
CREAT SERPL-MCNC: 1.16 MG/DL (ref 0.57–1)
DEPRECATED RDW RBC AUTO: 43.3 FL (ref 37–54)
ERYTHROCYTE [DISTWIDTH] IN BLOOD BY AUTOMATED COUNT: 16 % (ref 12.3–15.4)
GFR SERPL CREATININE-BSD FRML MDRD: 46 ML/MIN/1.73
GLOBULIN UR ELPH-MCNC: 3 GM/DL
GLUCOSE SERPL-MCNC: 120 MG/DL (ref 65–99)
HCT VFR BLD AUTO: 33.2 % (ref 34–46.6)
HGB BLD-MCNC: 11.1 G/DL (ref 12–15.9)
INR PPP: 0.96 (ref 0.93–1.1)
LIPASE SERPL-CCNC: 30 U/L (ref 13–60)
LYMPHOCYTES # BLD MANUAL: 0.95 10*3/MM3 (ref 0.7–3.1)
LYMPHOCYTES NFR BLD MANUAL: 2 % (ref 5–12)
MAGNESIUM SERPL-MCNC: 2.7 MG/DL (ref 1.6–2.4)
MCH RBC QN AUTO: 25.9 PG (ref 26.6–33)
MCHC RBC AUTO-ENTMCNC: 33.3 G/DL (ref 31.5–35.7)
MCV RBC AUTO: 77.7 FL (ref 79–97)
MONOCYTES # BLD: 0.19 10*3/MM3 (ref 0.1–0.9)
MYELOCYTES NFR BLD MANUAL: 1 % (ref 0–0)
NEUTROPHILS # BLD AUTO: 8.27 10*3/MM3 (ref 1.7–7)
NEUTROPHILS NFR BLD MANUAL: 84 % (ref 42.7–76)
NEUTS BAND NFR BLD MANUAL: 3 % (ref 0–5)
PHOSPHATE SERPL-MCNC: 3.5 MG/DL (ref 2.5–4.5)
PHOSPHATE SERPL-MCNC: 5.2 MG/DL (ref 2.5–4.5)
PLAT MORPH BLD: NORMAL
PLATELET # BLD AUTO: 286 10*3/MM3 (ref 140–450)
PMV BLD AUTO: 6.7 FL (ref 6–12)
POTASSIUM SERPL-SCNC: 3.5 MMOL/L (ref 3.5–5.2)
PROT SERPL-MCNC: 6.6 G/DL (ref 6–8.5)
PROTHROMBIN TIME: 10.7 SECONDS (ref 9.6–11.7)
RBC # BLD AUTO: 4.28 10*6/MM3 (ref 3.77–5.28)
SCAN SLIDE: NORMAL
SODIUM SERPL-SCNC: 138 MMOL/L (ref 136–145)
TOXIC GRANULATION: ABNORMAL
VARIANT LYMPHS NFR BLD MANUAL: 10 % (ref 19.6–45.3)
WBC NRBC COR # BLD: 9.5 10*3/MM3 (ref 3.4–10.8)

## 2022-02-05 PROCEDURE — 85025 COMPLETE CBC W/AUTO DIFF WBC: CPT | Performed by: HOSPITALIST

## 2022-02-05 PROCEDURE — 25010000002 PROPOFOL 10 MG/ML EMULSION: Performed by: ANESTHESIOLOGY

## 2022-02-05 PROCEDURE — G0378 HOSPITAL OBSERVATION PER HR: HCPCS

## 2022-02-05 PROCEDURE — 88342 IMHCHEM/IMCYTCHM 1ST ANTB: CPT | Performed by: INTERNAL MEDICINE

## 2022-02-05 PROCEDURE — 25010000002 PIPERACILLIN SOD-TAZOBACTAM PER 1 G: Performed by: NURSE PRACTITIONER

## 2022-02-05 PROCEDURE — 0DJD8ZZ INSPECTION OF LOWER INTESTINAL TRACT, VIA NATURAL OR ARTIFICIAL OPENING ENDOSCOPIC: ICD-10-PCS | Performed by: INTERNAL MEDICINE

## 2022-02-05 PROCEDURE — 25010000002 METOCLOPRAMIDE PER 10 MG: Performed by: NURSE PRACTITIONER

## 2022-02-05 PROCEDURE — 85007 BL SMEAR W/DIFF WBC COUNT: CPT | Performed by: HOSPITALIST

## 2022-02-05 PROCEDURE — 83735 ASSAY OF MAGNESIUM: CPT | Performed by: HOSPITALIST

## 2022-02-05 PROCEDURE — 43239 EGD BIOPSY SINGLE/MULTIPLE: CPT | Performed by: INTERNAL MEDICINE

## 2022-02-05 PROCEDURE — 0DB68ZX EXCISION OF STOMACH, VIA NATURAL OR ARTIFICIAL OPENING ENDOSCOPIC, DIAGNOSTIC: ICD-10-PCS | Performed by: INTERNAL MEDICINE

## 2022-02-05 PROCEDURE — 88305 TISSUE EXAM BY PATHOLOGIST: CPT | Performed by: INTERNAL MEDICINE

## 2022-02-05 PROCEDURE — 25010000002 HYDROCORTISONE SODIUM SUCCINATE 100 MG RECONSTITUTED SOLUTION: Performed by: HOSPITALIST

## 2022-02-05 PROCEDURE — 85610 PROTHROMBIN TIME: CPT | Performed by: NURSE PRACTITIONER

## 2022-02-05 PROCEDURE — 80053 COMPREHEN METABOLIC PANEL: CPT | Performed by: HOSPITALIST

## 2022-02-05 PROCEDURE — 25010000002 ONDANSETRON PER 1 MG: Performed by: NURSE PRACTITIONER

## 2022-02-05 PROCEDURE — 25010000002 HYDRALAZINE PER 20 MG: Performed by: HOSPITALIST

## 2022-02-05 PROCEDURE — 99232 SBSQ HOSP IP/OBS MODERATE 35: CPT | Performed by: HOSPITALIST

## 2022-02-05 PROCEDURE — 83690 ASSAY OF LIPASE: CPT | Performed by: HOSPITALIST

## 2022-02-05 PROCEDURE — 84100 ASSAY OF PHOSPHORUS: CPT | Performed by: HOSPITALIST

## 2022-02-05 PROCEDURE — 45378 DIAGNOSTIC COLONOSCOPY: CPT | Performed by: INTERNAL MEDICINE

## 2022-02-05 RX ORDER — ONDANSETRON 4 MG/1
4 TABLET, FILM COATED ORAL EVERY 6 HOURS PRN
Status: DISCONTINUED | OUTPATIENT
Start: 2022-02-05 | End: 2022-02-09 | Stop reason: HOSPADM

## 2022-02-05 RX ORDER — ONDANSETRON 2 MG/ML
4 INJECTION INTRAMUSCULAR; INTRAVENOUS EVERY 6 HOURS PRN
Status: DISCONTINUED | OUTPATIENT
Start: 2022-02-05 | End: 2022-02-09 | Stop reason: HOSPADM

## 2022-02-05 RX ADMIN — METOCLOPRAMIDE HYDROCHLORIDE 10 MG: 5 INJECTION INTRAMUSCULAR; INTRAVENOUS at 18:03

## 2022-02-05 RX ADMIN — TEMAZEPAM 30 MG: 15 CAPSULE ORAL at 20:36

## 2022-02-05 RX ADMIN — HYDROCORTISONE 1 APPLICATION: 25 CREAM TOPICAL at 12:18

## 2022-02-05 RX ADMIN — ONDANSETRON 4 MG: 2 INJECTION INTRAMUSCULAR; INTRAVENOUS at 04:13

## 2022-02-05 RX ADMIN — PANTOPRAZOLE SODIUM 40 MG: 40 INJECTION, POWDER, LYOPHILIZED, FOR SOLUTION INTRAVENOUS at 12:18

## 2022-02-05 RX ADMIN — METOCLOPRAMIDE HYDROCHLORIDE 10 MG: 5 INJECTION INTRAMUSCULAR; INTRAVENOUS at 00:43

## 2022-02-05 RX ADMIN — SORBITOL SOLUTION (BULK) 50 ML: 70 SOLUTION at 04:13

## 2022-02-05 RX ADMIN — LISINOPRIL 5 MG: 5 TABLET ORAL at 12:18

## 2022-02-05 RX ADMIN — PIPERACILLIN AND TAZOBACTAM 3.38 G: 3; .375 INJECTION, POWDER, LYOPHILIZED, FOR SOLUTION INTRAVENOUS at 22:40

## 2022-02-05 RX ADMIN — SUCRALFATE 1 G: 1 TABLET ORAL at 20:24

## 2022-02-05 RX ADMIN — PANTOPRAZOLE SODIUM 40 MG: 40 INJECTION, POWDER, LYOPHILIZED, FOR SOLUTION INTRAVENOUS at 18:03

## 2022-02-05 RX ADMIN — PROPOFOL 120 MCG/KG/MIN: 10 INJECTION, EMULSION INTRAVENOUS at 10:27

## 2022-02-05 RX ADMIN — HYDROCORTISONE SODIUM SUCCINATE 50 MG: 100 INJECTION, POWDER, FOR SOLUTION INTRAMUSCULAR; INTRAVENOUS at 05:39

## 2022-02-05 RX ADMIN — ONDANSETRON 4 MG: 2 INJECTION INTRAMUSCULAR; INTRAVENOUS at 15:49

## 2022-02-05 RX ADMIN — PIPERACILLIN AND TAZOBACTAM 3.38 G: 3; .375 INJECTION, POWDER, LYOPHILIZED, FOR SOLUTION INTRAVENOUS at 05:40

## 2022-02-05 RX ADMIN — AMLODIPINE BESYLATE 10 MG: 5 TABLET ORAL at 12:18

## 2022-02-05 RX ADMIN — SODIUM CHLORIDE, PRESERVATIVE FREE 10 ML: 5 INJECTION INTRAVENOUS at 12:18

## 2022-02-05 RX ADMIN — HYDRALAZINE HYDROCHLORIDE 10 MG: 20 INJECTION INTRAMUSCULAR; INTRAVENOUS at 20:25

## 2022-02-05 RX ADMIN — SCOLOPAMINE TRANSDERMAL SYSTEM 1 PATCH: 1 PATCH, EXTENDED RELEASE TRANSDERMAL at 13:29

## 2022-02-05 RX ADMIN — ATORVASTATIN CALCIUM 80 MG: 40 TABLET, FILM COATED ORAL at 20:24

## 2022-02-05 RX ADMIN — SORBITOL SOLUTION (BULK) 50 ML: 70 SOLUTION at 05:10

## 2022-02-05 RX ADMIN — METOCLOPRAMIDE HYDROCHLORIDE 10 MG: 5 INJECTION INTRAMUSCULAR; INTRAVENOUS at 13:08

## 2022-02-05 RX ADMIN — PIPERACILLIN AND TAZOBACTAM 3.38 G: 3; .375 INJECTION, POWDER, LYOPHILIZED, FOR SOLUTION INTRAVENOUS at 13:08

## 2022-02-05 RX ADMIN — AMITRIPTYLINE HYDROCHLORIDE 25 MG: 25 TABLET, FILM COATED ORAL at 20:25

## 2022-02-05 RX ADMIN — HYDROCORTISONE SODIUM SUCCINATE 50 MG: 100 INJECTION, POWDER, FOR SOLUTION INTRAMUSCULAR; INTRAVENOUS at 15:49

## 2022-02-05 RX ADMIN — SERTRALINE 100 MG: 100 TABLET, FILM COATED ORAL at 20:24

## 2022-02-05 RX ADMIN — METOCLOPRAMIDE HYDROCHLORIDE 10 MG: 5 INJECTION INTRAMUSCULAR; INTRAVENOUS at 05:39

## 2022-02-05 RX ADMIN — SUCRALFATE 1 G: 1 TABLET ORAL at 13:08

## 2022-02-05 RX ADMIN — SODIUM CHLORIDE, PRESERVATIVE FREE 10 ML: 5 INJECTION INTRAVENOUS at 20:26

## 2022-02-05 RX ADMIN — ONDANSETRON 4 MG: 2 INJECTION INTRAMUSCULAR; INTRAVENOUS at 12:18

## 2022-02-05 RX ADMIN — ACETAMINOPHEN 650 MG: 325 TABLET, FILM COATED ORAL at 20:25

## 2022-02-05 RX ADMIN — ONDANSETRON 4 MG: 2 INJECTION INTRAMUSCULAR; INTRAVENOUS at 20:25

## 2022-02-05 RX ADMIN — HYDROCORTISONE SODIUM SUCCINATE 50 MG: 100 INJECTION, POWDER, FOR SOLUTION INTRAMUSCULAR; INTRAVENOUS at 22:40

## 2022-02-05 RX ADMIN — SODIUM CHLORIDE, PRESERVATIVE FREE 10 ML: 5 INJECTION INTRAVENOUS at 20:32

## 2022-02-05 RX ADMIN — HYDROCORTISONE 1 APPLICATION: 25 CREAM TOPICAL at 20:32

## 2022-02-05 NOTE — ANESTHESIA PREPROCEDURE EVALUATION
Anesthesia Evaluation     Patient summary reviewed and Nursing notes reviewed   history of anesthetic complications: PONV  NPO Solid Status: > 8 hours  NPO Liquid Status: > 8 hours           Airway   Mallampati: II  TM distance: <3 FB  Neck ROM: full  No difficulty expected  Dental    (+) edentulous    Pulmonary    (+) asthma,  Cardiovascular     ECG reviewed    (+) hypertension, hyperlipidemia,     ROS comment: TTE 2021:  Interpretation Summary  Normal LV size and contractility EF of 60 to 65%  Normal RV size. Normal atrial size.  Aortic valve appears thickened, leaflets are not well visualized.  Mild aortic regurgitation seen.  Mitral valve posterior leaflet appears calcified.  Mild mitral regurgitation seen.. Tricuspid valve appears structurally normal, no significant regurgitation seen.  No pericardial effusion seen. Proximal aorta appears normal in size.    Neg stress test 2021.    Neuro/Psych  (+) CVA residual symptoms, headaches, psychiatric history Anxiety,     GI/Hepatic/Renal/Endo    (+)  GERD, GI bleeding ,     Musculoskeletal     Abdominal    Substance History      OB/GYN          Other   arthritis,      ROS/Med Hx Other: Katie Reddy is a 76 y.o. female w/PMH of HTN, GERD, anxiety, asthma, CVA hemorrhagic, adrenal insufficiency, migraines, and arthritis who presented to [facility] on 2/1/2022 complaining of a 2-week history chest pain, epigastric pain, and rectal bleeding.                   Anesthesia Plan    ASA 3     MAC     intravenous induction     Anesthetic plan, all risks, benefits, and alternatives have been provided, discussed and informed consent has been obtained with: patient.    Plan discussed with CRNA and CAA.

## 2022-02-05 NOTE — OP NOTE
COLONOSCOPY, ESOPHAGOGASTRODUODENOSCOPY Procedure Report    Patient Name:  Katie Reddy  YOB: 1945    Date of Surgery:  2/5/2022     Pre-Op Diagnosis:  Chest pain, unspecified type [R07.9]  Rectal bleeding [K62.5]       Post-Op Diagnosis Codes:     * Chest pain, unspecified type [R07.9]     * Rectal bleeding [K62.5]    Post-Op Diagnosis:  1.  Mild grade a erosive esophagitis  2.  Small hiatal hernia  3.  Nonerosive gastritis status post biopsy  4.  Ischemic colitis involving the sigmoid colon    Procedure/CPT® Codes:        Staff:  Surgeon(s):  Raymundo Li MD         Anesthesia: Monitored Anesthesia Care    Implants:    Nothing was implanted during the procedure    Specimen:        See Below    Complications:  None    Description of Procedure:  Informed consent was obtained for the procedure, including sedation.  Risks of perforation, hemorrhage, adverse drug reaction and aspiration were discussed.  The patient was brought into the endoscopy suite. Continuous cardiopulmonary monitoring was performed. The patient was placed in the left lateral decubitus position.  The bite block was inserted into the patient's mouth. After adequate sedation was attained, the Olympus gastroscope was inserted into the patient's mouth and advanced to the second portion of the duodenum without difficulty.  Circumferential examination was performed. A retroflex exam was performed in the patient's stomach.  On completion of the exam, the bowel was decompressed, the scope was removed from the patient, the patient tolerated the procedure well, there were no immediate post-operative complications.  There was no blood loss.      Examination of the esophagus: Small erosions at the GE junction consistent with mild grade a erosive esophagitis  Examination of the stomach: Small hiatal hernia noted.  Erythema consistent with nonerosive gastritis.  Biopsies taken to rule out H. pylori.  Examination of the duodenum:  Normal    Subsequently,  the Olympus colonoscope was inserted into the patient's rectum and advanced to the level of the cecum and terminal ileum without difficulty.  The bowel prep was excellent.  Circumferential examination of the patient's colon was performed on scope withdrawal.  The cecum, ascending colon, and hepatic flexure were examined twice.  The transverse colon, splenic flexure, descending colon, and rectum were examined.  A retroflex exam was performed in the rectum.  The bowel was decompressed, the scope was withdrawn from the patient, and the patient tolerated the procedure well. There were no immediate post-operative complications.  There was no blood loss.     Findings:   Terminal ileum: Normal  Colon: Erythema and ulceration involving the sigmoid colon consistent with ischemic colitis.  The rectum was spared.  No polyps or mass lesions noted.  No angioectasias noted.    Impression:  1.  Erosive esophagitis  2.  Hiatal hernia  3.  Nonerosive gastritis  4.  Ischemic colitis    Recommendations:  1.  High-fiber diet  2.  PPI daily  3.  Okay for discharge home from GI standpoint  4.  We will see as needed      Raymundo Li MD     Date: 2/5/2022  Time: 10:56 EST

## 2022-02-05 NOTE — PROGRESS NOTES
"    [facility] Daily Progress Note    Patient Name: Katie Rdedy  : 1945  MRN: [id number]  Primary Care Physician:  Brooklyn Contreras DO  Date of admission: 2022      Subjective      Chief Complaint: Epigastric pain and chest pain with rectal bleeding    Patient Reports   2/3/2022: Patient reports some improvement in her abdominal discomfort which she describes as a pressure or tightness that moves fromright to left across her upper abdomen.  She reports two episodes of bleeding per rectum with scant clots but no stool accompanying the blood.  No complaints of fever, chills, sweats, nausea, vomiting or  complaints.  2022: Pt having intractable vomiting that began this morning.  She also developed some increased heart rate confirmed by EKG to be sinus tachycardia.  She also felt like that she needed to have a bowel movement.  She complains of pain across her upper abdomen. KUB showed a paucity of air and stool in the lower intestine.  Reglan and scopolamine were initiated for nausea and presumed ileus.  She denies any hematemesis or coffee-ground emesis.  The patient had persistent nausea and vomiting so GI has been consulted.  She has had several \"rust colored\" stools today.  2022: Patient was seen after EGD and colonoscopy and results were discussed with her and all questions were answered.  She reports feeling a little better than yesterday.      ROS   12 point review of systems was reviewed and was negative except some persistent nausea.      Objective      Vitals:   Temp:  [97.8 °F (36.6 °C)-99.4 °F (37.4 °C)] 97.8 °F (36.6 °C)  Heart Rate:  [] 92  Resp:  [18-20] 18  BP: (101-182)/(60-81) 102/62    Physical Exam   Vital signs and nurses notes reviewed.  Well-developed well-nourished elderly female in no acute distress sitting up in bed awake and alert; mucous membranes moist; sclerae anicteric; lungs clear to auscultation bilaterally; CV " regular rate and rhythm; abdomen soft, nondistended, tender right upper quadrant, midepigastrium and left upper quadrant but nontender and across the lower abdomen; extremities with no edema, cyanosis or calf tenderness; palpable pedal pulses bilaterally; no Monahan catheter.   Exam unchanged from 2/4/2022.    Result Review    Result Review:  I have personally reviewed the results from the time of this admission to 2/6/2022 16:00 EST and agree with these findings:  [x]  Laboratory  [x]  Microbiology  [x]  Radiology  [x]  EKG/Telemetry   [x]  Cardiology/Vascular   []  Pathology  [x]  Old records  []  Other:  Most notable findings discussed in the assessment and plan.    Wounds (last 24 hours)             Assessment/Plan      Brief Patient Summary:  Katie Reddy is a 76 y.o. female with a history of HTN, GERD, anxiety, asthma, CVA hemorrhagic, adrenal insufficiency s/p loop recorder placement approximately 1 year ago, migraines, and arthritis who presented to [facility] on 2/1/2022 complaining of a 2-week history chest pain, epigastric pain, and rectal bleeding.   CT scan of the abdomen and pelvis showed thickened wall of the left colon consistent with infection or inflammation.  It also showed wall thickening and mild fluid distention of the lower esophagus.  The patient had recently seen her primary care provider and was told that she likely had some obstructing constipated stool and she was prescribed Linzess, but she had not had a chance to take it yet.    Current inpatient medications include:  amitriptyline, 25 mg, Oral, Nightly  amLODIPine, 10 mg, Oral, Daily  atorvastatin, 80 mg, Oral, Nightly  hydrocortisone, 1 application, Topical, BID  hydrocortisone sodium succinate, 50 mg, Intravenous, Q8H  lisinopril, 5 mg, Oral, Daily  metoclopramide, 10 mg, Intravenous, Q6H  ondansetron, 4 mg, Intravenous, Q6H  pantoprazole, 40 mg, Intravenous, BID AC  piperacillin-tazobactam, 3.375 g, Intravenous,  Q8H  polyethylene glycol, 17 g, Oral, BID With Meals  Scopolamine, 1 patch, Transdermal, Q72H  sertraline, 100 mg, Oral, Nightly  sodium chloride, 10 mL, Intravenous, Q12H  sucralfate, 1 g, Oral, 4x Daily AC & at Bedtime       sodium chloride, 75 mL/hr, Last Rate: 75 mL/hr (02/05/22 1027)         Active Hospital Problems:  Active Hospital Problems    Diagnosis    • **Chest pain    • Colitis    • Rectal bleeding      Added automatically from request for surgery [id number]     • 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)    • History of hemorrhagic cerebrovascular accident (CVA) with residual deficit    • Asthma in adult, mild intermittent, uncomplicated    • Essential hypertension    • Dyslipidemia    • GERD (gastroesophageal reflux disease)    • Vitamin D deficiency    • Generalized anxiety disorder      Plan:     Noncardiac chest pain likely GI etiology due to achalasia  -Troponin x2 negative  - EKG with sinus tachycardia with ST elevation due to nonspecific intraventricular conduction delay    Intractable vomiting, new problem 2/4/2022  -reglan and scopolamine patch ordered but symptoms persisted  -CT scan of the abdomen and pelvis 2/2/2022 showed: Wall thickening and mild fluid distention of the lower esophagus.  -KUB 2/4/2022 showed: Nonspecific bowel gas pattern with no abnormal bowel distention in the upper abdomen, but paucity of gas filled bowel in the lower abdomen/ pelvis.  -GI consulted  -EGD and colonoscopy were performed    Lower GI bleed secondary to ischemic colitis  -CT abdomen pelvis with inflammation of the left colon  -Blood cultures x2 no growth and pending  -No stool sample for GI panel or Hemoccult (the nursing staff reports that each time she has a stool it is contaminated by urine)  -Continue Zosyn  -IV fluids ordered for hydration  -Low residue diet as tolerated     GERD with hiatal hernia, nonerosive esophagitis and erosive gastritis seen on EGD  2/5/2022  -Increase sucralfate to before meals and at bedtime  -Pepcid changed to twice daily PPI    Acute blood loss anemia from GI bleeding, mild  -Hemoglobin on admission 12.9 and follow-up 10.4 but subsequent hemoglobin 12.9  -IV iron given 2/3/2022  -Monitor hemoglobin    Chronic constipation  -Patient recently started on Linzess but had not taken it yet (nonformulary); Movantik given x1 dose on 2/3/2022 in its place  -Polyethylene glycol ordered twice daily  -On 2/3/2022 patient refused sorbitol or mag citrate because it makes her vomit    Glucocorticoid deficiency adrenal insufficiency  -Chronic  -No blood pressure drop with orthostatic position change  -Continue hydrocortisone; changed to IV because of vomiting with increased stress dosing  -Taper steroids to home dose when tolerating p.o.  -Patient followed by Dr. Gerard outpatient     Essential hypertension, controlled  -Chronic  -Continue home amlodipine and lisinopril      Hyperlipidemia  -Chronic  -Continue home atorvastatin      Asthma in adult, mild intermittent, uncomplicated  -Continue home rescue inhaler as needed for wheezing/shortness of breath     Vitamin D deficiency  -Chronic  -Continue home supplement      Generalized anxiety disorder/depression/insomnia  -Continue home temazepam, sertraline, and amitriptyline     History of hemorrhagic cerebrovascular accident (CVA) with residual deficit  -Patient has had a chronic headache, dizziness and weakness  -Patient reports chronic swelling left face, left upper and lower extremity since her stroke  -Continue home meclizine as needed     Incontinence  -Bowel and bladder  -Monitor I's and O's  -Provide incontinence skin care; monitor for skin breakdown  -Patient has bladder stimulator     Osteoporosis  -Patient on home Boniva-encourage compliance       DVT prophylaxis:  Mechanical DVT prophylaxis orders are present.    CODE STATUS:    Medical Intervention Limits: NO intubation (DNI)  Code Status  (Patient has no pulse and is not breathing): No CPR (Do Not Attempt to Resuscitate)  Medical Interventions (Patient has pulse or is breathing): Limited Support      Disposition:  I expect patient to be discharged once clinically improved hopefully in the next 2 to 3 days.    This patient has been examined wearing appropriate Personal Protective Equipment and discussed with hospital infection control department. 02/06/22      Electronically signed by Sarina Bates MD, 02/06/22, 16:00 EST.  [facility]ist Team

## 2022-02-05 NOTE — PLAN OF CARE
Goal Outcome Evaluation:           Progress: no change  Outcome Summary: pt currently resting in bed. pt has had some complaints of nausea during the shift, but has gotten relief from scheduled medication. pt has been encouraged and assisted to turn to help prevent skin breakdown.

## 2022-02-05 NOTE — ANESTHESIA POSTPROCEDURE EVALUATION
Patient: Katie Reddy    Procedure Summary     Date: 02/05/22 Room / Location: [facility] ENDOSCOPY 1 / [facility] ENDOSCOPY    Anesthesia Start: 1027 Anesthesia Stop: 1053    Procedures:       COLONOSCOPY (N/A )      ESOPHAGOGASTRODUODENOSCOPY (N/A ) Diagnosis:       Chest pain, unspecified type      Rectal bleeding      (Chest pain, unspecified type [R07.9])      (Rectal bleeding [K62.5])    Surgeons: Raymundo Li MD Provider: Chris Brown MD    Anesthesia Type: MAC ASA Status: 3          Anesthesia Type: MAC    Vitals  Vitals Value Taken Time   /64 02/05/22 1128   Temp     Pulse 114 02/05/22 1128   Resp 15 02/05/22 1128   SpO2 95 % 02/05/22 1128           Post Anesthesia Care and Evaluation    Patient location during evaluation: PACU  Patient participation: complete - patient participated  Level of consciousness: awake  Pain scale: See nurse's notes for pain score.  Pain management: adequate  Airway patency: patent  Anesthetic complications: No anesthetic complications  PONV Status: none  Cardiovascular status: acceptable  Respiratory status: acceptable  Hydration status: acceptable    Comments: Patient seen and examined postoperatively; vital signs stable; SpO2 greater than or equal to 90%; cardiopulmonary status stable; nausea/vomiting adequately controlled; pain adequately controlled; no apparent anesthesia complications; patient discharged from anesthesia care when discharge criteria were met

## 2022-02-06 PROBLEM — K55.9 ISCHEMIC BOWEL DISEASE: Status: ACTIVE | Noted: 2022-02-06

## 2022-02-06 PROCEDURE — 99232 SBSQ HOSP IP/OBS MODERATE 35: CPT | Performed by: HOSPITALIST

## 2022-02-06 PROCEDURE — 25010000002 METOCLOPRAMIDE PER 10 MG: Performed by: NURSE PRACTITIONER

## 2022-02-06 PROCEDURE — 25010000002 PIPERACILLIN SOD-TAZOBACTAM PER 1 G: Performed by: NURSE PRACTITIONER

## 2022-02-06 PROCEDURE — 25010000002 HYDROCORTISONE SODIUM SUCCINATE 100 MG RECONSTITUTED SOLUTION: Performed by: HOSPITALIST

## 2022-02-06 PROCEDURE — 25010000002 ONDANSETRON PER 1 MG: Performed by: NURSE PRACTITIONER

## 2022-02-06 RX ORDER — SUCRALFATE 1 G/1
1 TABLET ORAL
Status: DISCONTINUED | OUTPATIENT
Start: 2022-02-06 | End: 2022-02-09 | Stop reason: HOSPADM

## 2022-02-06 RX ADMIN — PIPERACILLIN AND TAZOBACTAM 3.38 G: 3; .375 INJECTION, POWDER, LYOPHILIZED, FOR SOLUTION INTRAVENOUS at 14:19

## 2022-02-06 RX ADMIN — SUCRALFATE 1 G: 1 TABLET ORAL at 08:15

## 2022-02-06 RX ADMIN — HYDROCORTISONE SODIUM SUCCINATE 50 MG: 100 INJECTION, POWDER, FOR SOLUTION INTRAMUSCULAR; INTRAVENOUS at 23:01

## 2022-02-06 RX ADMIN — METOCLOPRAMIDE HYDROCHLORIDE 10 MG: 5 INJECTION INTRAMUSCULAR; INTRAVENOUS at 23:02

## 2022-02-06 RX ADMIN — PANTOPRAZOLE SODIUM 40 MG: 40 INJECTION, POWDER, LYOPHILIZED, FOR SOLUTION INTRAVENOUS at 08:15

## 2022-02-06 RX ADMIN — HYDROCORTISONE SODIUM SUCCINATE 50 MG: 100 INJECTION, POWDER, FOR SOLUTION INTRAMUSCULAR; INTRAVENOUS at 05:26

## 2022-02-06 RX ADMIN — ONDANSETRON 4 MG: 2 INJECTION INTRAMUSCULAR; INTRAVENOUS at 17:09

## 2022-02-06 RX ADMIN — ONDANSETRON 4 MG: 2 INJECTION INTRAMUSCULAR; INTRAVENOUS at 23:02

## 2022-02-06 RX ADMIN — SUCRALFATE 1 G: 1 TABLET ORAL at 17:09

## 2022-02-06 RX ADMIN — HYDROCORTISONE SODIUM SUCCINATE 50 MG: 100 INJECTION, POWDER, FOR SOLUTION INTRAMUSCULAR; INTRAVENOUS at 14:33

## 2022-02-06 RX ADMIN — ONDANSETRON 4 MG: 2 INJECTION INTRAMUSCULAR; INTRAVENOUS at 09:58

## 2022-02-06 RX ADMIN — LISINOPRIL 5 MG: 5 TABLET ORAL at 08:15

## 2022-02-06 RX ADMIN — METOCLOPRAMIDE HYDROCHLORIDE 10 MG: 5 INJECTION INTRAMUSCULAR; INTRAVENOUS at 05:26

## 2022-02-06 RX ADMIN — PIPERACILLIN AND TAZOBACTAM 3.38 G: 3; .375 INJECTION, POWDER, LYOPHILIZED, FOR SOLUTION INTRAVENOUS at 20:23

## 2022-02-06 RX ADMIN — SUCRALFATE 1 G: 1 TABLET ORAL at 20:22

## 2022-02-06 RX ADMIN — TEMAZEPAM 30 MG: 15 CAPSULE ORAL at 23:01

## 2022-02-06 RX ADMIN — AMITRIPTYLINE HYDROCHLORIDE 25 MG: 25 TABLET, FILM COATED ORAL at 20:22

## 2022-02-06 RX ADMIN — METOCLOPRAMIDE HYDROCHLORIDE 10 MG: 5 INJECTION INTRAMUSCULAR; INTRAVENOUS at 14:19

## 2022-02-06 RX ADMIN — ATORVASTATIN CALCIUM 80 MG: 40 TABLET, FILM COATED ORAL at 20:22

## 2022-02-06 RX ADMIN — PIPERACILLIN AND TAZOBACTAM 3.38 G: 3; .375 INJECTION, POWDER, LYOPHILIZED, FOR SOLUTION INTRAVENOUS at 05:26

## 2022-02-06 RX ADMIN — HYDROCORTISONE 1 APPLICATION: 25 CREAM TOPICAL at 20:27

## 2022-02-06 RX ADMIN — SODIUM CHLORIDE, PRESERVATIVE FREE 10 ML: 5 INJECTION INTRAVENOUS at 20:26

## 2022-02-06 RX ADMIN — ONDANSETRON 4 MG: 2 INJECTION INTRAMUSCULAR; INTRAVENOUS at 04:19

## 2022-02-06 RX ADMIN — HYDROCORTISONE 1 APPLICATION: 25 CREAM TOPICAL at 08:17

## 2022-02-06 RX ADMIN — METOCLOPRAMIDE HYDROCHLORIDE 10 MG: 5 INJECTION INTRAMUSCULAR; INTRAVENOUS at 00:53

## 2022-02-06 RX ADMIN — PANTOPRAZOLE SODIUM 40 MG: 40 INJECTION, POWDER, LYOPHILIZED, FOR SOLUTION INTRAVENOUS at 17:09

## 2022-02-06 RX ADMIN — METOCLOPRAMIDE HYDROCHLORIDE 10 MG: 5 INJECTION INTRAMUSCULAR; INTRAVENOUS at 18:24

## 2022-02-06 RX ADMIN — SERTRALINE 100 MG: 100 TABLET, FILM COATED ORAL at 20:22

## 2022-02-06 RX ADMIN — SODIUM CHLORIDE, PRESERVATIVE FREE 10 ML: 5 INJECTION INTRAVENOUS at 08:15

## 2022-02-06 RX ADMIN — AMLODIPINE BESYLATE 10 MG: 5 TABLET ORAL at 08:15

## 2022-02-06 NOTE — PLAN OF CARE
Goal Outcome Evaluation:  Plan of Care Reviewed With: patient        Progress: no change  Outcome Summary: Patient had c/o nausea early in the shift but rested well after IV med given. PRN B/P med given x 1. Patient up to BSC with A x1. Will continue to monitor.

## 2022-02-06 NOTE — PLAN OF CARE
Goal Outcome Evaluation:           Progress: no change  Outcome Summary: pt is currrently sitting up in bed with minimal complaints throughout the shift so far. pt has had some nausea during the shift but it has been relieved by he scheduled medication. pt has been encouraged and assisted to turn.

## 2022-02-06 NOTE — PROGRESS NOTES
"    [facility] Medicine Services Daily Progress Note    Patient Name: Katie Reddy  : 1945  MRN: [id number]  Primary Care Physician:  Brooklyn Contreras DO  Date of admission: 2022      Subjective      Chief Complaint: Epigastric pain and chest pain with rectal bleeding    Patient Reports   2/3/2022: Patient reports some improvement in her abdominal discomfort which she describes as a pressure or tightness that moves fromright to left across her upper abdomen.  She reports two episodes of bleeding per rectum with scant clots but no stool accompanying the blood.  No complaints of fever, chills, sweats, nausea, vomiting or  complaints.  2022: Pt having intractable vomiting that began this morning.  She also developed some increased heart rate confirmed by EKG to be sinus tachycardia.  She also felt like that she needed to have a bowel movement.  She complains of pain across her upper abdomen. KUB showed a paucity of air and stool in the lower intestine.  Reglan and scopolamine were initiated for nausea and presumed ileus.  She denies any hematemesis or coffee-ground emesis.  The patient had persistent nausea and vomiting so GI has been consulted.  She has had several \"rust colored\" stools today.  2022: Patient was seen after EGD and colonoscopy and results were discussed with her and all questions were answered.  She reports feeling a little better than yesterday.  2022: The patient continues to have nausea and is concerned about eating.  She has eaten 0% of her last several meals.  She does feel better today than yesterday.    ROS   12 point review of systems was reviewed and was negative except some persistent nausea.      Objective      Vitals:   Temp:  [97.8 °F (36.6 °C)-99.4 °F (37.4 °C)] 97.8 °F (36.6 °C)  Heart Rate:  [] 92  Resp:  [18-20] 18  BP: (101-182)/(60-81) 102/62    Physical Exam   Vital signs and nurses notes reviewed.  Well-developed " well-nourished elderly female in no acute distress sitting up in bed awake and alert; mucous membranes moist; sclerae anicteric; lungs clear to auscultation bilaterally; CV regular rate and rhythm; abdomen soft, nondistended, tender right upper quadrant, midepigastrium and left upper quadrant but nontender and across the lower abdomen; extremities with no edema, cyanosis or calf tenderness; palpable pedal pulses bilaterally; no Monahan catheter.   Exam unchanged from 2/5/2022.    Result Review    Result Review:  I have personally reviewed the results from the time of this admission to 2/6/2022 16:06 EST and agree with these findings:  [x]  Laboratory  [x]  Microbiology  [x]  Radiology  [x]  EKG/Telemetry   [x]  Cardiology/Vascular   []  Pathology  [x]  Old records  []  Other:  Most notable findings discussed in the assessment and plan.    Wounds (last 24 hours)             Assessment/Plan      Brief Patient Summary:  Katie Reddy is a 76 y.o. female with a history of HTN, GERD, anxiety, asthma, CVA hemorrhagic, adrenal insufficiency s/p loop recorder placement approximately 1 year ago, migraines, and arthritis who presented to [facility] on 2/1/2022 complaining of a 2-week history chest pain, epigastric pain, and rectal bleeding.   CT scan of the abdomen and pelvis showed thickened wall of the left colon consistent with infection or inflammation.  It also showed wall thickening and mild fluid distention of the lower esophagus.  The patient had recently seen her primary care provider and was told that she likely had some obstructing constipated stool and she was prescribed Linzess, but she had not had a chance to take it yet.    Current inpatient medications include:  amitriptyline, 25 mg, Oral, Nightly  amLODIPine, 10 mg, Oral, Daily  atorvastatin, 80 mg, Oral, Nightly  hydrocortisone, 1 application, Topical, BID  hydrocortisone sodium succinate, 50 mg, Intravenous, Q8H  lisinopril, 5 mg, Oral,  Daily  metoclopramide, 10 mg, Intravenous, Q6H  ondansetron, 4 mg, Intravenous, Q6H  pantoprazole, 40 mg, Intravenous, BID AC  piperacillin-tazobactam, 3.375 g, Intravenous, Q8H  polyethylene glycol, 17 g, Oral, BID With Meals  Scopolamine, 1 patch, Transdermal, Q72H  sertraline, 100 mg, Oral, Nightly  sodium chloride, 10 mL, Intravenous, Q12H  sucralfate, 1 g, Oral, 4x Daily AC & at Bedtime       sodium chloride, 75 mL/hr, Last Rate: 75 mL/hr (02/05/22 1027)         Active Hospital Problems:  Active Hospital Problems    Diagnosis    • **Chest pain    • Colitis    • Rectal bleeding      Added automatically from request for surgery [id number]     • 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)    • History of hemorrhagic cerebrovascular accident (CVA) with residual deficit    • Asthma in adult, mild intermittent, uncomplicated    • Essential hypertension    • Dyslipidemia    • GERD (gastroesophageal reflux disease)    • Vitamin D deficiency    • Generalized anxiety disorder      Plan:     Noncardiac chest pain likely GI etiology due to achalasia  -Troponin x2 negative  - EKG with sinus tachycardia with ST elevation due to nonspecific intraventricular conduction delay    Intractable vomiting, new problem 2/4/2022  -reglan and scopolamine patch ordered but symptoms persisted  -CT scan of the abdomen and pelvis 2/2/2022 showed: Wall thickening and mild fluid distention of the lower esophagus.  -KUB 2/4/2022 showed: Nonspecific bowel gas pattern with no abnormal bowel distention in the upper abdomen, but paucity of gas filled bowel in the lower abdomen/ pelvis.  -GI consulted  -EGD and colonoscopy were performed    Lower GI bleed secondary to ischemic colitis  -CT abdomen pelvis with inflammation of the left colon  -Blood cultures x2 no growth and pending  -No stool sample for GI panel or Hemoccult (the nursing staff reports that each time she has a stool it is contaminated by  urine)  -Continue Zosyn  -IV fluids ordered for hydration  -Low residue diet as tolerated     GERD with hiatal hernia, nonerosive esophagitis and erosive gastritis seen on EGD 2/5/2022  -Increase sucralfate to before meals and at bedtime  -Pepcid changed to twice daily PPI    Acute blood loss anemia from GI bleeding, mild  -Hemoglobin on admission 12.9 and follow-up 10.4 but subsequent hemoglobin 11.1  -IV iron given 2/3/2022  -Monitor hemoglobin    Chronic constipation  -Patient recently started on Linzess but had not taken it yet (nonformulary); Movantik given x1 dose on 2/3/2022 in its place  -Polyethylene glycol ordered twice daily  -On 2/3/2022 patient refused sorbitol or mag citrate because it makes her vomit    Glucocorticoid deficiency adrenal insufficiency  -Chronic  -No blood pressure drop with orthostatic position change  -Continue hydrocortisone; changed to IV because of vomiting with increased stress dosing  -Taper steroids to home dose when tolerating p.o.  -Patient followed by Dr. Gearrd outpatient     Essential hypertension, controlled  -Chronic  -Continue home amlodipine and lisinopril      Hyperlipidemia  -Chronic  -Continue home atorvastatin      Asthma in adult, mild intermittent, uncomplicated  -Continue home rescue inhaler as needed for wheezing/shortness of breath     Vitamin D deficiency  -Chronic  -Continue home supplement      Generalized anxiety disorder/depression/insomnia  -Continue home temazepam, sertraline, and amitriptyline     History of hemorrhagic cerebrovascular accident (CVA) with residual deficit  -Patient has had a chronic headache, dizziness and weakness  -Patient reports chronic swelling left face, left upper and lower extremity since her stroke  -Continue home meclizine as needed     Incontinence  -Bowel and bladder  -Monitor I's and O's  -Provide incontinence skin care; monitor for skin breakdown  -Patient has bladder stimulator     Osteoporosis  -Patient on home  Boniva-encourage compliance       DVT prophylaxis:  Mechanical DVT prophylaxis orders are present.    CODE STATUS:    Medical Intervention Limits: NO intubation (DNI)  Code Status (Patient has no pulse and is not breathing): No CPR (Do Not Attempt to Resuscitate)  Medical Interventions (Patient has pulse or is breathing): Limited Support      Disposition:  I expect patient to be discharged once clinically improved hopefully in the next 2 to 3 days.    This patient has been examined wearing appropriate Personal Protective Equipment and discussed with hospital infection control department. 02/06/22      Electronically signed by Sarina Bates MD, 02/06/22, 16:06 EST.  [facility]ist Team

## 2022-02-07 PROBLEM — K29.60 NONEROSIVE NONSPECIFIC GASTRITIS: Status: ACTIVE | Noted: 2022-02-07

## 2022-02-07 PROBLEM — E78.2 MIXED HYPERLIPIDEMIA: Status: ACTIVE | Noted: 2022-02-07

## 2022-02-07 PROBLEM — K55.9 COLITIS, ISCHEMIC: Status: ACTIVE | Noted: 2022-02-07

## 2022-02-07 PROBLEM — K21.00 GERD WITH ESOPHAGITIS: Status: ACTIVE | Noted: 2022-02-07

## 2022-02-07 LAB
BACTERIA SPEC AEROBE CULT: NORMAL
BACTERIA SPEC AEROBE CULT: NORMAL
TROPONIN T SERPL-MCNC: 0.02 NG/ML (ref 0–0.03)

## 2022-02-07 PROCEDURE — 99222 1ST HOSP IP/OBS MODERATE 55: CPT | Performed by: INTERNAL MEDICINE

## 2022-02-07 PROCEDURE — 25010000002 HYDROCORTISONE SODIUM SUCCINATE 100 MG RECONSTITUTED SOLUTION: Performed by: HOSPITALIST

## 2022-02-07 PROCEDURE — 84484 ASSAY OF TROPONIN QUANT: CPT | Performed by: NURSE PRACTITIONER

## 2022-02-07 PROCEDURE — 25010000002 METOCLOPRAMIDE PER 10 MG: Performed by: NURSE PRACTITIONER

## 2022-02-07 PROCEDURE — 99233 SBSQ HOSP IP/OBS HIGH 50: CPT | Performed by: INTERNAL MEDICINE

## 2022-02-07 PROCEDURE — 25010000002 PIPERACILLIN SOD-TAZOBACTAM PER 1 G: Performed by: NURSE PRACTITIONER

## 2022-02-07 PROCEDURE — 25010000002 ONDANSETRON PER 1 MG: Performed by: NURSE PRACTITIONER

## 2022-02-07 RX ORDER — PANTOPRAZOLE SODIUM 40 MG/1
40 TABLET, DELAYED RELEASE ORAL DAILY
COMMUNITY
End: 2022-02-09 | Stop reason: HOSPADM

## 2022-02-07 RX ORDER — PANTOPRAZOLE SODIUM 40 MG/1
40 TABLET, DELAYED RELEASE ORAL
Status: DISCONTINUED | OUTPATIENT
Start: 2022-02-07 | End: 2022-02-09 | Stop reason: HOSPADM

## 2022-02-07 RX ORDER — HYDROCORTISONE 10 MG/1
10 TABLET ORAL
COMMUNITY

## 2022-02-07 RX ORDER — HYDROCORTISONE 10 MG/1
5 TABLET ORAL EVERY EVENING
COMMUNITY
End: 2022-03-04

## 2022-02-07 RX ORDER — METOCLOPRAMIDE 10 MG/1
10 TABLET ORAL
Status: DISCONTINUED | OUTPATIENT
Start: 2022-02-07 | End: 2022-02-09 | Stop reason: HOSPADM

## 2022-02-07 RX ORDER — CHOLESTYRAMINE LIGHT 4 G/5.7G
1 POWDER, FOR SUSPENSION ORAL EVERY 12 HOURS SCHEDULED
Status: DISCONTINUED | OUTPATIENT
Start: 2022-02-07 | End: 2022-02-09 | Stop reason: HOSPADM

## 2022-02-07 RX ORDER — NITROGLYCERIN 0.4 MG/1
0.4 TABLET SUBLINGUAL
COMMUNITY
End: 2022-02-17 | Stop reason: SDUPTHER

## 2022-02-07 RX ORDER — SUCRALFATE 1 G/1
1 TABLET ORAL 4 TIMES DAILY
COMMUNITY
End: 2022-02-09 | Stop reason: HOSPADM

## 2022-02-07 RX ADMIN — PIPERACILLIN AND TAZOBACTAM 3.38 G: 3; .375 INJECTION, POWDER, LYOPHILIZED, FOR SOLUTION INTRAVENOUS at 05:53

## 2022-02-07 RX ADMIN — METOCLOPRAMIDE HYDROCHLORIDE 10 MG: 5 INJECTION INTRAMUSCULAR; INTRAVENOUS at 12:28

## 2022-02-07 RX ADMIN — SERTRALINE 100 MG: 100 TABLET, FILM COATED ORAL at 21:08

## 2022-02-07 RX ADMIN — Medication 5 MG: at 21:09

## 2022-02-07 RX ADMIN — AMLODIPINE BESYLATE 10 MG: 5 TABLET ORAL at 08:13

## 2022-02-07 RX ADMIN — METOCLOPRAMIDE HYDROCHLORIDE 10 MG: 5 INJECTION INTRAMUSCULAR; INTRAVENOUS at 05:51

## 2022-02-07 RX ADMIN — SUCRALFATE 1 G: 1 TABLET ORAL at 12:28

## 2022-02-07 RX ADMIN — ONDANSETRON 4 MG: 2 INJECTION INTRAMUSCULAR; INTRAVENOUS at 05:52

## 2022-02-07 RX ADMIN — ONDANSETRON 4 MG: 2 INJECTION INTRAMUSCULAR; INTRAVENOUS at 21:08

## 2022-02-07 RX ADMIN — HYDROCORTISONE 1 APPLICATION: 25 CREAM TOPICAL at 21:08

## 2022-02-07 RX ADMIN — SODIUM CHLORIDE, PRESERVATIVE FREE 10 ML: 5 INJECTION INTRAVENOUS at 21:09

## 2022-02-07 RX ADMIN — LISINOPRIL 5 MG: 5 TABLET ORAL at 08:12

## 2022-02-07 RX ADMIN — HYDROCORTISONE SODIUM SUCCINATE 50 MG: 100 INJECTION, POWDER, FOR SOLUTION INTRAMUSCULAR; INTRAVENOUS at 22:46

## 2022-02-07 RX ADMIN — SODIUM CHLORIDE, PRESERVATIVE FREE 10 ML: 5 INJECTION INTRAVENOUS at 08:15

## 2022-02-07 RX ADMIN — PIPERACILLIN AND TAZOBACTAM 3.38 G: 3; .375 INJECTION, POWDER, LYOPHILIZED, FOR SOLUTION INTRAVENOUS at 21:09

## 2022-02-07 RX ADMIN — POLYETHYLENE GLYCOL 3350 17 G: 17 POWDER, FOR SOLUTION ORAL at 08:12

## 2022-02-07 RX ADMIN — ONDANSETRON 4 MG: 2 INJECTION INTRAMUSCULAR; INTRAVENOUS at 16:34

## 2022-02-07 RX ADMIN — PIPERACILLIN AND TAZOBACTAM 3.38 G: 3; .375 INJECTION, POWDER, LYOPHILIZED, FOR SOLUTION INTRAVENOUS at 16:34

## 2022-02-07 RX ADMIN — ACETAMINOPHEN 650 MG: 325 TABLET, FILM COATED ORAL at 21:08

## 2022-02-07 RX ADMIN — PANTOPRAZOLE SODIUM 40 MG: 40 INJECTION, POWDER, LYOPHILIZED, FOR SOLUTION INTRAVENOUS at 08:13

## 2022-02-07 RX ADMIN — SUCRALFATE 1 G: 1 TABLET ORAL at 16:33

## 2022-02-07 RX ADMIN — SUCRALFATE 1 G: 1 TABLET ORAL at 21:09

## 2022-02-07 RX ADMIN — METOCLOPRAMIDE 10 MG: 10 TABLET ORAL at 21:08

## 2022-02-07 RX ADMIN — ATORVASTATIN CALCIUM 80 MG: 40 TABLET, FILM COATED ORAL at 21:08

## 2022-02-07 RX ADMIN — SUCRALFATE 1 G: 1 TABLET ORAL at 08:13

## 2022-02-07 RX ADMIN — SCOLOPAMINE TRANSDERMAL SYSTEM 1 PATCH: 1 PATCH, EXTENDED RELEASE TRANSDERMAL at 08:21

## 2022-02-07 RX ADMIN — AMITRIPTYLINE HYDROCHLORIDE 25 MG: 25 TABLET, FILM COATED ORAL at 21:08

## 2022-02-07 RX ADMIN — ONDANSETRON 4 MG: 2 INJECTION INTRAMUSCULAR; INTRAVENOUS at 09:40

## 2022-02-07 RX ADMIN — METOCLOPRAMIDE 10 MG: 10 TABLET ORAL at 16:33

## 2022-02-07 RX ADMIN — CHOLESTYRAMINE 4 G: 4 POWDER, FOR SUSPENSION ORAL at 16:44

## 2022-02-07 RX ADMIN — HYDROCORTISONE SODIUM SUCCINATE 50 MG: 100 INJECTION, POWDER, FOR SOLUTION INTRAMUSCULAR; INTRAVENOUS at 16:34

## 2022-02-07 RX ADMIN — HYDROCORTISONE 1 APPLICATION: 25 CREAM TOPICAL at 08:18

## 2022-02-07 RX ADMIN — HYDROCORTISONE SODIUM SUCCINATE 50 MG: 100 INJECTION, POWDER, FOR SOLUTION INTRAMUSCULAR; INTRAVENOUS at 05:52

## 2022-02-07 RX ADMIN — PANTOPRAZOLE SODIUM 40 MG: 40 TABLET, DELAYED RELEASE ORAL at 16:33

## 2022-02-07 NOTE — PROGRESS NOTES
"    [facility] Medicine Services Daily Progress Note    Patient Name: Katie Reddy  : 1945  MRN: [id number]  Primary Care Physician:  Brooklyn Contreras DO  Date of admission: 2022      Subjective    From previous note and with minor updates.  Chief Complaint: Epigastric pain and chest pain with rectal bleeding    Patient Reports   2/3/2022: Patient reports some improvement in her abdominal discomfort which she describes as a pressure or tightness that moves fromright to left across her upper abdomen.  She reports two episodes of bleeding per rectum with scant clots but no stool accompanying the blood.  No complaints of fever, chills, sweats, nausea, vomiting or  complaints.  2022: Pt having intractable vomiting that began this morning.  She also developed some increased heart rate confirmed by EKG to be sinus tachycardia.  She also felt like that she needed to have a bowel movement.  She complains of pain across her upper abdomen. KUB showed a paucity of air and stool in the lower intestine.  Reglan and scopolamine were initiated for nausea and presumed ileus.  She denies any hematemesis or coffee-ground emesis.  The patient had persistent nausea and vomiting so GI has been consulted.  She has had several \"rust colored\" stools today.  2022: Patient was seen after EGD and colonoscopy and results were discussed with her and all questions were answered.  She reports feeling a little better than yesterday.  2022: The patient continues to have nausea and is concerned about eating.  She has eaten 0% of her last several meals.  She does feel better today than yesterday.    ROS   12 point review of systems was reviewed and was negative except some persistent nausea.      Objective      Vitals:   Temp:  [97.5 °F (36.4 °C)-98.1 °F (36.7 °C)] 97.5 °F (36.4 °C)  Heart Rate:  [] 80  Resp:  [16-18] 16  BP: (113-137)/(70-80) 135/80    Physical Exam   Vital signs and nurses " notes reviewed.  Well-developed well-nourished elderly female in no acute distress sitting up in bed awake and alert; mucous membranes moist; sclerae anicteric; lungs clear to auscultation bilaterally; CV regular rate and rhythm; abdomen soft, nondistended, tender right upper quadrant, midepigastrium and left upper quadrant but nontender and across the lower abdomen; extremities with no edema, cyanosis or calf tenderness; palpable pedal pulses bilaterally; no Monahan catheter.   Exam unchanged from 2/5/2022.    Result Review    Result Review:  I have personally reviewed the results from the time of this admission to 2/7/2022 15:46 EST and agree with these findings:  [x]  Laboratory  [x]  Microbiology  [x]  Radiology  [x]  EKG/Telemetry   [x]  Cardiology/Vascular   []  Pathology  [x]  Old records  []  Other:  Most notable findings discussed in the assessment and plan.    Wounds (last 24 hours)             Assessment/Plan      Brief Patient Summary:  Katie Reddy is a 76 y.o. female with a history of migraines, HTN, GERD, anxiety, asthma, CVA hemorrhagic, adrenal insufficiency s/p loop recorder placement approximately 1 year ago and arthritis who presented to [facility] on 2/1/2022 complaining of a 2-week history chest pain, epigastric pain, and rectal bleeding.   CT scan of the abdomen and pelvis showed thickened wall of the left colon consistent with infection or inflammation.  It also showed wall thickening and mild fluid distention of the lower esophagus.  The patient had recently seen her primary care provider and was told that she likely had some obstructing constipated stool and she was prescribed Linzess, but she had not had a chance to take it yet.    Current inpatient medications include:  amitriptyline, 25 mg, Oral, Nightly  amLODIPine, 10 mg, Oral, Daily  atorvastatin, 80 mg, Oral, Nightly  hydrocortisone, 1 application, Topical, BID  hydrocortisone sodium succinate, 50 mg, Intravenous,  Q8H  lisinopril, 5 mg, Oral, Daily  metoclopramide, 10 mg, Oral, 4x Daily AC & at Bedtime  ondansetron, 4 mg, Intravenous, Q6H  pantoprazole, 40 mg, Oral, BID AC  piperacillin-tazobactam, 3.375 g, Intravenous, Q8H  polyethylene glycol, 17 g, Oral, BID With Meals  Scopolamine, 1 patch, Transdermal, Q72H  sertraline, 100 mg, Oral, Nightly  sodium chloride, 10 mL, Intravenous, Q12H  sucralfate, 1 g, Oral, 4x Daily AC & at Bedtime       sodium chloride, 75 mL/hr, Last Rate: 75 mL/hr (02/05/22 1027)         Active Hospital Problems:  Active Hospital Problems    Diagnosis    • **Chest pain    • Ischemic bowel disease (HCC)    • Colitis    • Rectal bleeding      Added automatically from request for surgery [id number]     • 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)    • History of hemorrhagic cerebrovascular accident (CVA) with residual deficit    • Asthma in adult, mild intermittent, uncomplicated    • Essential hypertension    • Dyslipidemia    • GERD (gastroesophageal reflux disease)    • Vitamin D deficiency    • Generalized anxiety disorder      Plan:     Noncardiac chest pain likely GI etiology due to achalasia  -Troponin x2 negative  - EKG with sinus tachycardia with ST elevation due to nonspecific intraventricular conduction delay    Intractable vomiting, new problem 2/4/2022  -reglan and scopolamine patch ordered but symptoms persisted  -CT scan of the abdomen and pelvis 2/2/2022 showed: Wall thickening and mild fluid distention of the lower esophagus.  -KUB 2/4/2022 showed: Nonspecific bowel gas pattern with no abnormal bowel distention in the upper abdomen, but paucity of gas filled bowel in the lower abdomen/ pelvis.  -GI consulted  -EGD and colonoscopy were performed    Lower GI bleed secondary to ischemic colitis  -CT abdomen pelvis with inflammation of the left colon  -Blood cultures x2 no growth and pending  -No stool sample for GI panel or Hemoccult (the nursing staff  reports that each time she has a stool it is contaminated by urine)  -Continue Zosyn  -IV fluids ordered for hydration  -Low residue diet as tolerated     GERD with hiatal hernia, nonerosive esophagitis and erosive gastritis seen on EGD 2/5/2022  -Increase sucralfate to before meals and at bedtime  -Pepcid changed to twice daily PPI    Acute blood loss anemia from GI bleeding, mild  -Hemoglobin on admission 12.9 and follow-up 10.4 but subsequent hemoglobin 11.1  -IV iron given 2/3/2022  -Monitor hemoglobin    Chronic constipation  -Patient recently started on Linzess but had not taken it yet (nonformulary); Movantik given x1 dose on 2/3/2022 in its place  -Polyethylene glycol ordered twice daily  -On 2/3/2022 patient refused sorbitol or mag citrate because it makes her vomit    Glucocorticoid deficiency adrenal insufficiency  -Chronic  -No blood pressure drop with orthostatic position change  -Continue hydrocortisone; changed to IV because of vomiting with increased stress dosing  -Taper steroids to home dose when tolerating p.o.  -Patient followed by Dr. Gerard outpatient     Essential hypertension, controlled  -Chronic  -Continue home amlodipine and lisinopril      Hyperlipidemia  -Chronic  -Continue home atorvastatin      Asthma in adult, mild intermittent, uncomplicated  -Continue home rescue inhaler as needed for wheezing/shortness of breath     Vitamin D deficiency  -Chronic  -Continue home supplement      Generalized anxiety disorder/depression/insomnia  -Continue home temazepam, sertraline, and amitriptyline     History of hemorrhagic cerebrovascular accident (CVA) with residual deficit  -Patient has had a chronic headache, dizziness and weakness  -Patient reports chronic swelling left face, left upper and lower extremity since her stroke  -Continue home meclizine as needed     Incontinence  -Bowel and bladder  -Monitor I's and O's  -Provide incontinence skin care; monitor for skin breakdown  -Patient has  bladder stimulator     Osteoporosis  -Patient on home Boniva-encourage compliance       DVT prophylaxis:  Mechanical DVT prophylaxis orders are present.    CODE STATUS:    Medical Intervention Limits: NO intubation (DNI)  Code Status (Patient has no pulse and is not breathing): No CPR (Do Not Attempt to Resuscitate)  Medical Interventions (Patient has pulse or is breathing): Limited Support      Disposition:  I expect patient to be discharged once clinically improved hopefully in the next 2 to 3 days.    This patient has been examined wearing appropriate Personal Protective Equipment and discussed with hospital infection control department. 02/07/22      Electronically signed by Panfilo Dey MD, FACP, 02/07/22, 15:46 EST.      [facility] Hospitalist Team

## 2022-02-07 NOTE — CONSULTS
Cardiology Consult Note    Patient Identification:  Name: Katie Reddy  Age: 76 y.o.  Sex: female  :  1945  MRN: [id number]             Requesting Physician :  Dr. Dey     Reason for Consultation / Chief Complaint : patient co-management  Chest pain       History of Present Illness:      This is a 76-year-old with PMH of     Recurrent syncope, Biotronik ILR 2021  Uncontrolled hypertension  Dyslipidemia  Autonomic insufficiency  History of hemorrhagic occipital CVA  GERD, osteoporosis, chronic pain  Cholecystectomy, hernia repair  Family history of heart disease in mother and father.  Non-smoker  Allergies/intolerance to trazodone, Boniva, Dayvigo     Who presented to the ED on 2022 with nausea and vomiting, upper abdominal pain for 2 weeks.  She underwent EGD/colonoscopy on 2022 that showed mild grade A erosive esophagitis, small hiatal hernia and ischemic colitis. Patient states she continues to have left sided chest pain/pressure that radiates into her left arm, as well as right sided neck pain and headache.    Serial troponin negative on admission. Labs from 2022 showed mildly elevated glucose 120, bun 17 creatinine 1.16 phosphorus 5.2 magnesium 2.7  hgb 11.1       Echocardiogram 2021 reviewed by me shows EF of 60 to 65% with mild AR and mild MR  Carotid Dopplers 2021 reveal mild bilateral carotid disease  Lexiscan Cardiolite 21 which is negative for ischemia.    Check troponin again, consider stress test vs cardiac catheterization given stress test was negative in 2021.      Further recommendations per Dr. Richard   Electronically signed by PRACHI Pacheco, 22, 4:30 PM EST.      Cardiology attending addendum :    I have personally performed a face-to-face diagnostic evaluation, physical exam and reviewed data on this patient.  I have reviewed documentation done by me and nurse practitioner Viridiana Alcala and corrected as needed.  And agree with the  different components of documentation.  This is a 76-year-old with PMH for recurrent syncope, ILR, uncontrolled hypertension, dyslipidemia, autonomic insufficiency, hemorrhagic occipital CVA, chronic pain, cholecystectomy presented through emergency room 2/1/2022 with nausea vomiting abdominal upper abdominal pain for 2 weeks.  Patient underwent EGD 2/5/2022 which showed grade a erosive esophagitis and small hiatal hernia and ischemic colitis.  Patient is complaining of recurrent, prolonged, left-sided chest pain, pressure, which radiates to her arm as well as side of the neck.  Has headaches.  EKG done 2/4/2022 reviewed/interpreted by me reveals sinus tachycardia with rate of 121 bpm with left bundle branch block.         Assessment:    -Recurrent prolonged chest pain  -Left bundle branch block  -Nausea vomiting, abdominal pain (ischemic colitis)   -Recurrent syncope  -Orthostatic hypotension  -Low a.m. cortisol/adrenal insufficiency on long-term Cortef  -History of hypertensive emergency  -History of hemorrhagic left occipital CVA  -Mild AR, mild MR  -Dyslipidemia        Recommendations / Plan:         Telemetry to monitor rhythm  Serial cardiac enzymes  We will continue medical management with aspirin, amlodipine, atorvastatin, lisinopril as tolerated.  Follow-up loop recorder in pacemaker clinic.  Patient's blood pressure currently is well controlled we will continue amlodipine, lisinopril as tolerated for hypertension  Reviewed importance of blood pressure control since patient had previous hemorrhagic CVA.  Continue statins for dyslipidemia and previous history of CVA  Continue Cortef and follow-up with endocrinology for adrenal insufficiency  Reviewed extensive hospital records and summarized in the chart  Patient is having recurrent prolonged chest pain which is concerning for unstable angina. We will schedule for cardiac cath to evaluate etiology for chest pain. Risk benefits alternatives discussed with  patient.  We will follow up and consider further evaluation treatment.           Diagnosis Plan   1. Chest pain, unspecified type  Case Request    Case Request    Tissue Pathology Exam    Tissue Pathology Exam   2. Colitis     3. Sepsis, due to unspecified organism, unspecified whether acute organ dysfunction present (HCC)     4. Rectal bleeding  Case Request    Case Request    Tissue Pathology Exam    Tissue Pathology Exam              Past Medical History:  Past Medical History:   Diagnosis Date   • Arthritis    • Bladder incontinence    • Colon polyp    • DEXA     OSTEOPENIA = 2018 (-1.3/ -1.7); 2020 (-1.7/ -1.7)   • GERD    • Glucocorticoid deficiency    • Headache    • HTN    • Intracerebral hemorrhage/ CVA    • MAMMO     NEG =2020   • Osteopenia    • Vitamin D deficiency      Past Surgical History:  Past Surgical History:   Procedure Laterality Date   • APPENDECTOMY     • BLADDER SURGERY      bladder stimulator placement/ REMOVAL   • BREAST CYST EXCISION     • BREAST LUMPECTOMY Left 1973    NEG   • BUNIONECTOMY Right 5/29/2020    Procedure: BUNIONECTOMY DEVONTE;  Surgeon: MARISSA Em DPM;  Location: [facility] OR;  Service: Podiatry;  Laterality: Right;   • CARDIAC CATHETERIZATION     • CARPAL TUNNEL RELEASE Right 1980   • CHOLECYSTECTOMY     • COLON SURGERY      hemorrhoid banding   • COLONOSCOPY  2017 2017/ 2019 = TA, jessica 2024   Dr. Mackey   • EYE SURGERY     • HERNIA REPAIR      Umbilical removal   • HYSTERECTOMY  1970   • SUBTOTAL HYSTERECTOMY     • UMBILICAL HERNIA REPAIR        Allergies:  Allergies   Allergen Reactions   • Trazodone Irritability   • Boniva [Ibandronic Acid] GI Intolerance     GERD   • Dayvigo [Lemborexant] Other (See Comments)     Sleep walking     Home Meds:  Medications Prior to Admission   Medication Sig Dispense Refill Last Dose   • albuterol (ACCUNEB) 1.25 MG/3ML nebulizer solution Take 3 mL by nebulization Every 6 (Six) Hours As Needed for Wheezing or Shortness of Air for  up to 30 doses. 120 each 2    • amitriptyline (ELAVIL) 25 MG tablet Take 1 tablet by mouth every night at bedtime. 90 tablet 0    • amLODIPine (NORVASC) 10 MG tablet Take 1 tablet by mouth Daily. 90 tablet 0    • atorvastatin (LIPITOR) 80 MG tablet Take 1 tablet by mouth Daily. 90 tablet 2    • clindamycin (CLEOCIN T) 1 % lotion Apply 1 application topically to the appropriate area as directed 2 (Two) Times a Day.      • Gemtesa 75 MG tablet Take 1 tablet by mouth Daily.      • hydrocortisone (CORTEF) 10 MG tablet Take 10 mg by mouth Daily With Breakfast & Lunch.      • hydrocortisone (CORTEF) 10 MG tablet Take 5 mg by mouth Every Evening.      • ibandronate (BONIVA) 150 MG tablet Take 1 tablet by mouth Every 30 (Thirty) Days. 3 tablet 3    • meclizine (ANTIVERT) 12.5 MG tablet Take 1 tablet by mouth As Needed for Dizziness. 30 tablet 3    • nitroglycerin (NITROSTAT) 0.4 MG SL tablet Place 0.4 mg under the tongue Every 5 (Five) Minutes As Needed for Chest Pain. Take no more than 3 doses in 15 minutes.      • ondansetron (Zofran) 4 MG tablet Take 1 tablet by mouth Every 8 (Eight) Hours As Needed for Nausea or Vomiting. 30 tablet 0    • pantoprazole (PROTONIX) 40 MG EC tablet Take 40 mg by mouth Daily.      • sertraline (ZOLOFT) 100 MG tablet Take 1 tablet by mouth Every Night. 90 tablet 1    • sucralfate (CARAFATE) 1 g tablet Take 1 g by mouth 4 (Four) Times a Day.      • temazepam (RESTORIL) 30 MG capsule Take 1 capsule by mouth At Night As Needed for Sleep. 30 capsule 0    • Calcium Carbonate-Vit D-Min (Calcium 1200) 9092-0206 MG-UNIT chewable tablet Chew 1 tablet Daily.      • hydrocortisone 2.5 % cream Apply 1 application topically to the appropriate area as directed 2 (Two) Times a Day.      • [DISCONTINUED] Incontinence Supply Disposable (Poise Moderate Absorbency) pads USE 1 PAD FIVE TIMES A  each 13      Current Meds:     Current Facility-Administered Medications:   •  acetaminophen (TYLENOL) tablet  650 mg, 650 mg, Oral, Q4H PRN, 650 mg at 02/05/22 2025 **OR** acetaminophen (TYLENOL) 160 MG/5ML solution 650 mg, 650 mg, Oral, Q4H PRN **OR** acetaminophen (TYLENOL) suppository 650 mg, 650 mg, Rectal, Q4H PRN, Leigh Washington APRN  •  albuterol (PROVENTIL) nebulizer solution 0.042% 1.25 mg/3mL, 1 ampule, Nebulization, Q6H PRN, Sarina Bates MD  •  aluminum-magnesium hydroxide-simethicone (MAALOX MAX) 400-400-40 MG/5ML suspension 15 mL, 15 mL, Oral, Q6H PRN, Leigh Washington APRN  •  amitriptyline (ELAVIL) tablet 25 mg, 25 mg, Oral, Nightly, Sarina Bates MD, 25 mg at 02/06/22 2022  •  amLODIPine (NORVASC) tablet 10 mg, 10 mg, Oral, Daily, Sarina Bates MD, 10 mg at 02/07/22 0813  •  atorvastatin (LIPITOR) tablet 80 mg, 80 mg, Oral, Nightly, Sarina Bates MD, 80 mg at 02/06/22 2022  •  cholestyramine light packet 4 g, 1 packet, Oral, Q12H, Panfilo Dey MD, 4 g at 02/07/22 1644  •  hydrALAZINE (APRESOLINE) injection 10 mg, 10 mg, Intravenous, Q6H PRN, Sarina Bates MD, 10 mg at 02/05/22 2025  •  hydrocortisone 2.5 % cream 1 application, 1 application, Topical, BID, Sarina Bates MD, 1 application at 02/07/22 0818  •  hydrocortisone sodium succinate (Solu-CORTEF) injection 50 mg, 50 mg, Intravenous, Q8H, Sarina Bates MD, 50 mg at 02/07/22 1634  •  lisinopril (PRINIVIL,ZESTRIL) tablet 5 mg, 5 mg, Oral, Daily, Sarina Bates MD, 5 mg at 02/07/22 0812  •  meclizine (ANTIVERT) tablet 12.5 mg, 12.5 mg, Oral, PRN, Sarina Bates MD  •  melatonin tablet 5 mg, 5 mg, Oral, Nightly PRN, Leigh Washington APRN, 5 mg at 02/02/22 2233  •  metoclopramide (REGLAN) tablet 10 mg, 10 mg, Oral, 4x Daily AC & at Bedtime, Panfilo Dey MD, 10 mg at 02/07/22 1633  •  nitroglycerin (NITROSTAT) SL tablet 0.4 mg, 0.4 mg, Sublingual, Q5 Min PRN, Leigh Washington APRN  •  ondansetron (ZOFRAN) injection 4 mg, 4 mg, Intravenous, Q6H, Kitty Edwards APRN, 4 mg at 02/07/22 1634  •  ondansetron  (ZOFRAN) tablet 4 mg, 4 mg, Oral, Q6H PRN **OR** ondansetron (ZOFRAN) injection 4 mg, 4 mg, Intravenous, Q6H PRN, Raymundo Li MD  •  pantoprazole (PROTONIX) EC tablet 40 mg, 40 mg, Oral, BID AC, Panfilo Dey MD, 40 mg at 02/07/22 1633  •  piperacillin-tazobactam (ZOSYN) IVPB 3.375 g in 100 mL NS (CD), 3.375 g, Intravenous, Q8H, Leigh Washington APRN, Last Rate: 0 mL/hr at 02/06/22 0300, 3.375 g at 02/07/22 1634  •  potassium phosphate 45 mmol in sodium chloride 0.9 % 500 mL infusion, 45 mmol, Intravenous, PRN **OR** potassium phosphate 30 mmol in sodium chloride 0.9 % 250 mL infusion, 30 mmol, Intravenous, PRN **OR** potassium phosphate 15 mmol in 0.9% sodium chloride 100 mL IVPB, 15 mmol, Intravenous, PRN, 15 mmol at 02/04/22 2155 **OR** sodium phosphates 45 mmol in sodium chloride 0.9 % 500 mL IVPB, 45 mmol, Intravenous, PRN **OR** sodium phosphates 30 mmol in sodium chloride 0.9 % 250 mL IVPB, 30 mmol, Intravenous, PRN **OR** sodium phosphates 15 mmol in sodium chloride 0.9 % 250 mL IVPB, 15 mmol, Intravenous, PRN, Sarina Bates MD  •  scopolamine patch 1 mg/72 hr, 1 patch, Transdermal, Q72H, Sarina Bates MD, 1 patch at 02/07/22 0821  •  sertraline (ZOLOFT) tablet 100 mg, 100 mg, Oral, Nightly, Sarina Bates MD, 100 mg at 02/06/22 2022  •  [COMPLETED] Insert peripheral IV, , , Once **AND** sodium chloride 0.9 % flush 10 mL, 10 mL, Intravenous, PRN, Mars Julien MD  •  sodium chloride 0.9 % flush 10 mL, 10 mL, Intravenous, Q12H, Leigh Washington APRN, 10 mL at 02/07/22 0815  •  sodium chloride 0.9 % flush 10 mL, 10 mL, Intravenous, PRN, Leigh Washington APRN, 10 mL at 02/05/22 2026  •  sodium chloride 0.9 % infusion, 75 mL/hr, Intravenous, Continuous, Sarina Bates MD, Last Rate: 75 mL/hr at 02/05/22 1027, Currently Infusing at 02/05/22 1027  •  sucralfate (CARAFATE) tablet 1 g, 1 g, Oral, 4x Daily AC & at Bedtime, Sarina Bates MD, 1 g at 02/07/22 1633  •  temazepam  "(RESTORIL) capsule 30 mg, 30 mg, Oral, Nightly PRN, Sarina Bates MD, 30 mg at 02/06/22 0561  Social History:   Social History     Tobacco Use   • Smoking status: Never Smoker   • Smokeless tobacco: Never Used   Substance Use Topics   • Alcohol use: No      Family History:  Family History   Problem Relation Age of Onset   • Heart disease Mother    • Hypertension Mother    • Diabetes Father    • Heart disease Father    • Alcohol abuse Father    • Hypertension Father    • Diabetes Brother    • Heart disease Brother    • Cancer Brother 68        Prostate Cancer   • Hypertension Brother    • Diabetes Maternal Aunt    • Heart disease Maternal Grandmother    • Hypertension Maternal Grandmother    • Heart disease Maternal Grandfather    • Hypertension Maternal Grandfather    • Liver disease Paternal Grandmother    • Hypertension Paternal Grandmother    • Heart disease Paternal Grandfather    • Hypertension Paternal Grandfather    • Dementia Sister    • Diabetes Brother         Review of Systems : Review of Systems   Cardiovascular: Positive for chest pain.   Respiratory: Negative for cough and shortness of breath.    Musculoskeletal: Positive for neck pain.   Gastrointestinal: Positive for abdominal pain, nausea and vomiting.        Improved     Neurological: Positive for headaches.   All other systems reviewed and are negative.         Constitutional:  Temp:  [97.5 °F (36.4 °C)-97.7 °F (36.5 °C)] 97.5 °F (36.4 °C)  Heart Rate:  [80-96] 80  Resp:  [16] 16  BP: (135-137)/(77-80) 135/80    Physical Exam   /80 (BP Location: Left arm, Patient Position: Lying)   Pulse 80   Temp 97.5 °F (36.4 °C) (Oral)   Resp 16   Ht 162.6 cm (64\")   Wt 64.4 kg (141 lb 15.6 oz)   SpO2 94%   BMI 24.37 kg/m²   Physical Exam  General:  Appears in no acute distress  Eyes: Sclera is anicteric,  conjunctiva is clear   HEENT:  No JVD. Thyroid not visibly enlarged. No mucosal pallor or cyanosis  Respiratory: Respirations regular and " unlabored at rest. Scattered rhonchi.  Cardiovascular: S1,S2 Regular rate and rhythm. No murmur, rub or gallop auscultated. No pretibial pitting edema  Gastrointestinal: Abdomen soft, flat, non tender. Bowel sounds present.   Musculoskeletal:  No abnormal movements  Extremities: No digital clubbing or cyanosis  Skin: Color pink. Skin warm and dry to touch. No rashes  No xanthoma  Neuro: Alert and awake, no lateralizing deficits appreciated    Cardiographics  ECG: EKG tracing was  personally reviewed/interpreted by me  ECG 12 Lead   Final Result   HEART RATE= 121  bpm   RR Interval= 504  ms   VA Interval= 144  ms   P Horizontal Axis= 261  deg   P Front Axis= -43  deg   QRSD Interval= 139  ms   QT Interval= 355  ms   QRS Axis= 91  deg   T Wave Axis= -56  deg   - ABNORMAL ECG -   Sinus tachycardia   Multiple ventricular premature complexes   Nonspecific intraventricular conduction delay   ST elevation secondary to IVCD   When compared with ECG of 01-Feb-2022 19:43:24,   No significant change   Electronically Signed By: Dorothy Carmona ([facility]) 04-Feb-2022 16:55:27   Date and Time of Study: 2022-02-04 10:13:23      ECG 12 Lead   Final Result   HEART RATE= 111  bpm   RR Interval= 540  ms   VA Interval= 178  ms   P Horizontal Axis= -30  deg   P Front Axis= 77  deg   QRSD Interval= 136  ms   QT Interval= 368  ms   QRS Axis= 95  deg   T Wave Axis= -60  deg   - ABNORMAL ECG -   Sinus tachycardia   Nonspecific intraventricular conduction delay   ST elevation secondary to IVCD   When compared with ECG of 06-Apr-2021 12:54:12,   Significant rate increase   New conduction abnormality   Electronically Signed By: Mars Julien ([facility]) 03-Feb-2022 16:10:43   Date and Time of Study: 2022-02-01 19:43:24      SCANNED - TELEMETRY     Final Result      SCANNED - TELEMETRY     Final Result      SCANNED - TELEMETRY     Final Result      SCANNED - TELEMETRY     Final Result      SCANNED - TELEMETRY     Final Result      SCANNED - TELEMETRY      Final Result      SCANNED - TELEMETRY     Final Result      SCANNED - TELEMETRY     Final Result      SCANNED - TELEMETRY     Final Result      SCANNED - TELEMETRY     Final Result      SCANNED - TELEMETRY     Final Result      SCANNED - TELEMETRY     Final Result      SCANNED - TELEMETRY     Final Result      SCANNED - TELEMETRY     Final Result      SCANNED - TELEMETRY     Final Result      SCANNED - TELEMETRY     Final Result      SCANNED - TELEMETRY     Final Result          Telemetry: sinus rhythm     Echocardiogram:   Results for orders placed during the hospital encounter of 04/06/21    Adult Transthoracic Echo Complete W/ Cont if Necessary Per Protocol    Interpretation Summary  Normal LV size and contractility EF of 60 to 65%  Normal RV size  Normal atrial size  Aortic valve appears thickened, leaflets are not well visualized.  Mild aortic regurgitation seen.  Mitral valve posterior leaflet appears calcified.  Mild mitral regurgitation seen..  Tricuspid valve appears structurally normal, no significant regurgitation seen.  No pericardial effusion seen.  Proximal aorta appears normal in size.      Imaging  Chest X-ray:   Imaging Results (Last 24 Hours)     ** No results found for the last 24 hours. **          Lab Review: I have reviewed the labs  Results from last 7 days   Lab Units 02/07/22  1747 02/02/22  1216 02/02/22  0549   TROPONIN T ng/mL 0.023 <0.010 <0.010     Results from last 7 days   Lab Units 02/05/22  0101   MAGNESIUM mg/dL 2.7*     Results from last 7 days   Lab Units 02/05/22  0101   SODIUM mmol/L 138   POTASSIUM mmol/L 3.5   BUN mg/dL 17   CREATININE mg/dL 1.16*   CALCIUM mg/dL 8.8     @[id number]@  Results from last 7 days   Lab Units 02/05/22  0101 02/04/22  0935 02/03/22  0045   WBC 10*3/mm3 9.50 14.60* 14.10*   HEMOGLOBIN g/dL 11.1* 12.9 10.4*   HEMATOCRIT % 33.2* 39.4 31.9*   PLATELETS 10*3/mm3 286 290 221     Results from last 7 days   Lab Units 02/05/22  0101 02/01/22 2057    INR  0.96 0.96   APTT seconds  --  23.9*             Master Richard MD  2/7/2022, 19:35 EST      EMR Dragon/Transcription:   Dictated utilizing Dragon dictation  Much of the above report is an electronic transcription/translation of the spoken language to printed text using Dragon Software. As such, the subtleties and finesse of the spoken language may permit erroneous, or at times, nonsensical words or phrases to be inadvertently transcribed; thus changes may be made at a later date to rectify these errors.

## 2022-02-07 NOTE — PLAN OF CARE
Goal Outcome Evaluation:  Plan of Care Reviewed With: patient        Progress: no change  Outcome Summary: Pt has had no c/o nausea so far this shift. Pt asking to get up in chair today. Pt has urinary incontinence that is worse at night, external catheter in place. Vitals stable.

## 2022-02-08 PROBLEM — I20.0 UNSTABLE ANGINA PECTORIS (HCC): Chronic | Status: ACTIVE | Noted: 2022-02-01

## 2022-02-08 PROBLEM — I20.0 UNSTABLE ANGINA PECTORIS (HCC): Status: ACTIVE | Noted: 2022-02-01

## 2022-02-08 LAB
ANION GAP SERPL CALCULATED.3IONS-SCNC: 12 MMOL/L (ref 5–15)
BUN SERPL-MCNC: 19 MG/DL (ref 8–23)
BUN/CREAT SERPL: 17.4 (ref 7–25)
CALCIUM SPEC-SCNC: 8.2 MG/DL (ref 8.6–10.5)
CHLORIDE SERPL-SCNC: 102 MMOL/L (ref 98–107)
CO2 SERPL-SCNC: 20 MMOL/L (ref 22–29)
CREAT SERPL-MCNC: 1.09 MG/DL (ref 0.57–1)
DEPRECATED RDW RBC AUTO: 42.9 FL (ref 37–54)
ERYTHROCYTE [DISTWIDTH] IN BLOOD BY AUTOMATED COUNT: 15.8 % (ref 12.3–15.4)
GFR SERPL CREATININE-BSD FRML MDRD: 49 ML/MIN/1.73
GLUCOSE SERPL-MCNC: 137 MG/DL (ref 65–99)
HCT VFR BLD AUTO: 30.1 % (ref 34–46.6)
HGB BLD-MCNC: 10.5 G/DL (ref 12–15.9)
LAB AP CASE REPORT: NORMAL
MCH RBC QN AUTO: 26.5 PG (ref 26.6–33)
MCHC RBC AUTO-ENTMCNC: 34.8 G/DL (ref 31.5–35.7)
MCV RBC AUTO: 76.3 FL (ref 79–97)
PATH REPORT.FINAL DX SPEC: NORMAL
PATH REPORT.GROSS SPEC: NORMAL
PLATELET # BLD AUTO: 277 10*3/MM3 (ref 140–450)
PMV BLD AUTO: 6.7 FL (ref 6–12)
POTASSIUM SERPL-SCNC: 2.1 MMOL/L (ref 3.5–5.2)
POTASSIUM SERPL-SCNC: 3.1 MMOL/L (ref 3.5–5.2)
RBC # BLD AUTO: 3.95 10*6/MM3 (ref 3.77–5.28)
SODIUM SERPL-SCNC: 134 MMOL/L (ref 136–145)
WBC NRBC COR # BLD: 9 10*3/MM3 (ref 3.4–10.8)

## 2022-02-08 PROCEDURE — 99232 SBSQ HOSP IP/OBS MODERATE 35: CPT | Performed by: INTERNAL MEDICINE

## 2022-02-08 PROCEDURE — C1769 GUIDE WIRE: HCPCS | Performed by: INTERNAL MEDICINE

## 2022-02-08 PROCEDURE — 0 IOPAMIDOL PER 1 ML: Performed by: INTERNAL MEDICINE

## 2022-02-08 PROCEDURE — 99233 SBSQ HOSP IP/OBS HIGH 50: CPT | Performed by: INTERNAL MEDICINE

## 2022-02-08 PROCEDURE — 99152 MOD SED SAME PHYS/QHP 5/>YRS: CPT | Performed by: INTERNAL MEDICINE

## 2022-02-08 PROCEDURE — 4A023N8 MEASUREMENT OF CARDIAC SAMPLING AND PRESSURE, BILATERAL, PERCUTANEOUS APPROACH: ICD-10-PCS | Performed by: INTERNAL MEDICINE

## 2022-02-08 PROCEDURE — 25010000002 PIPERACILLIN SOD-TAZOBACTAM PER 1 G: Performed by: INTERNAL MEDICINE

## 2022-02-08 PROCEDURE — 93458 L HRT ARTERY/VENTRICLE ANGIO: CPT | Performed by: INTERNAL MEDICINE

## 2022-02-08 PROCEDURE — 25010000002 FENTANYL CITRATE (PF) 100 MCG/2ML SOLUTION: Performed by: INTERNAL MEDICINE

## 2022-02-08 PROCEDURE — 25010000002 PIPERACILLIN SOD-TAZOBACTAM PER 1 G: Performed by: NURSE PRACTITIONER

## 2022-02-08 PROCEDURE — 25010000002 ONDANSETRON PER 1 MG: Performed by: INTERNAL MEDICINE

## 2022-02-08 PROCEDURE — 84132 ASSAY OF SERUM POTASSIUM: CPT | Performed by: INTERNAL MEDICINE

## 2022-02-08 PROCEDURE — 80048 BASIC METABOLIC PNL TOTAL CA: CPT | Performed by: INTERNAL MEDICINE

## 2022-02-08 PROCEDURE — 97116 GAIT TRAINING THERAPY: CPT

## 2022-02-08 PROCEDURE — 97162 PT EVAL MOD COMPLEX 30 MIN: CPT

## 2022-02-08 PROCEDURE — 25010000002 HYDROCORTISONE SODIUM SUCCINATE 100 MG RECONSTITUTED SOLUTION: Performed by: HOSPITALIST

## 2022-02-08 PROCEDURE — 25010000002 DIPHENHYDRAMINE PER 50 MG: Performed by: INTERNAL MEDICINE

## 2022-02-08 PROCEDURE — 85027 COMPLETE CBC AUTOMATED: CPT | Performed by: INTERNAL MEDICINE

## 2022-02-08 PROCEDURE — C1760 CLOSURE DEV, VASC: HCPCS | Performed by: INTERNAL MEDICINE

## 2022-02-08 PROCEDURE — 25010000002 HYDROCORTISONE SODIUM SUCCINATE 100 MG RECONSTITUTED SOLUTION: Performed by: INTERNAL MEDICINE

## 2022-02-08 PROCEDURE — B2151ZZ FLUOROSCOPY OF LEFT HEART USING LOW OSMOLAR CONTRAST: ICD-10-PCS | Performed by: INTERNAL MEDICINE

## 2022-02-08 PROCEDURE — C1894 INTRO/SHEATH, NON-LASER: HCPCS | Performed by: INTERNAL MEDICINE

## 2022-02-08 PROCEDURE — 25010000002 ONDANSETRON PER 1 MG: Performed by: NURSE PRACTITIONER

## 2022-02-08 PROCEDURE — 25010000002 MIDAZOLAM PER 1 MG: Performed by: INTERNAL MEDICINE

## 2022-02-08 PROCEDURE — B2111ZZ FLUOROSCOPY OF MULTIPLE CORONARY ARTERIES USING LOW OSMOLAR CONTRAST: ICD-10-PCS | Performed by: INTERNAL MEDICINE

## 2022-02-08 RX ORDER — POTASSIUM CHLORIDE 20 MEQ/1
40 TABLET, EXTENDED RELEASE ORAL AS NEEDED
Status: DISCONTINUED | OUTPATIENT
Start: 2022-02-08 | End: 2022-02-09 | Stop reason: HOSPADM

## 2022-02-08 RX ORDER — ACETAMINOPHEN 325 MG/1
650 TABLET ORAL EVERY 4 HOURS PRN
Status: DISCONTINUED | OUTPATIENT
Start: 2022-02-08 | End: 2022-02-09 | Stop reason: HOSPADM

## 2022-02-08 RX ORDER — MIDAZOLAM HYDROCHLORIDE 1 MG/ML
INJECTION INTRAMUSCULAR; INTRAVENOUS AS NEEDED
Status: DISCONTINUED | OUTPATIENT
Start: 2022-02-08 | End: 2022-02-08 | Stop reason: HOSPADM

## 2022-02-08 RX ORDER — SODIUM CHLORIDE 9 MG/ML
100 INJECTION, SOLUTION INTRAVENOUS CONTINUOUS
Status: DISCONTINUED | OUTPATIENT
Start: 2022-02-08 | End: 2022-02-09 | Stop reason: HOSPADM

## 2022-02-08 RX ORDER — DIPHENHYDRAMINE HYDROCHLORIDE 50 MG/ML
50 INJECTION INTRAMUSCULAR; INTRAVENOUS ONCE
Status: COMPLETED | OUTPATIENT
Start: 2022-02-08 | End: 2022-02-08

## 2022-02-08 RX ORDER — LIDOCAINE HYDROCHLORIDE 20 MG/ML
INJECTION, SOLUTION INFILTRATION; PERINEURAL AS NEEDED
Status: DISCONTINUED | OUTPATIENT
Start: 2022-02-08 | End: 2022-02-08 | Stop reason: HOSPADM

## 2022-02-08 RX ORDER — POTASSIUM CHLORIDE 1.5 G/1.77G
40 POWDER, FOR SOLUTION ORAL AS NEEDED
Status: DISCONTINUED | OUTPATIENT
Start: 2022-02-08 | End: 2022-02-09 | Stop reason: HOSPADM

## 2022-02-08 RX ORDER — ASPIRIN 81 MG/1
81 TABLET ORAL DAILY
Status: DISCONTINUED | OUTPATIENT
Start: 2022-02-08 | End: 2022-02-09 | Stop reason: HOSPADM

## 2022-02-08 RX ORDER — FENTANYL CITRATE 50 UG/ML
INJECTION, SOLUTION INTRAMUSCULAR; INTRAVENOUS AS NEEDED
Status: DISCONTINUED | OUTPATIENT
Start: 2022-02-08 | End: 2022-02-08 | Stop reason: HOSPADM

## 2022-02-08 RX ADMIN — POTASSIUM CHLORIDE 40 MEQ: 1500 TABLET, EXTENDED RELEASE ORAL at 08:25

## 2022-02-08 RX ADMIN — SODIUM CHLORIDE, PRESERVATIVE FREE 10 ML: 5 INJECTION INTRAVENOUS at 21:59

## 2022-02-08 RX ADMIN — SUCRALFATE 1 G: 1 TABLET ORAL at 21:59

## 2022-02-08 RX ADMIN — ATORVASTATIN CALCIUM 80 MG: 40 TABLET, FILM COATED ORAL at 21:59

## 2022-02-08 RX ADMIN — POTASSIUM CHLORIDE 40 MEQ: 1500 TABLET, EXTENDED RELEASE ORAL at 04:40

## 2022-02-08 RX ADMIN — AMLODIPINE BESYLATE 10 MG: 5 TABLET ORAL at 08:25

## 2022-02-08 RX ADMIN — METOCLOPRAMIDE 10 MG: 10 TABLET ORAL at 12:14

## 2022-02-08 RX ADMIN — POTASSIUM CHLORIDE 40 MEQ: 1500 TABLET, EXTENDED RELEASE ORAL at 15:43

## 2022-02-08 RX ADMIN — CHOLESTYRAMINE 4 G: 4 POWDER, FOR SUSPENSION ORAL at 08:26

## 2022-02-08 RX ADMIN — HYDROCORTISONE 1 APPLICATION: 25 CREAM TOPICAL at 08:33

## 2022-02-08 RX ADMIN — DIPHENHYDRAMINE HYDROCHLORIDE 50 MG: 50 INJECTION, SOLUTION INTRAMUSCULAR; INTRAVENOUS at 15:42

## 2022-02-08 RX ADMIN — HYDROCORTISONE 1 APPLICATION: 25 CREAM TOPICAL at 21:59

## 2022-02-08 RX ADMIN — SUCRALFATE 1 G: 1 TABLET ORAL at 12:14

## 2022-02-08 RX ADMIN — PANTOPRAZOLE SODIUM 40 MG: 40 TABLET, DELAYED RELEASE ORAL at 16:30

## 2022-02-08 RX ADMIN — HYDROCORTISONE SODIUM SUCCINATE 50 MG: 100 INJECTION, POWDER, FOR SOLUTION INTRAMUSCULAR; INTRAVENOUS at 06:06

## 2022-02-08 RX ADMIN — METOCLOPRAMIDE 10 MG: 10 TABLET ORAL at 15:42

## 2022-02-08 RX ADMIN — PIPERACILLIN AND TAZOBACTAM 3.38 G: 3; .375 INJECTION, POWDER, LYOPHILIZED, FOR SOLUTION INTRAVENOUS at 22:06

## 2022-02-08 RX ADMIN — PIPERACILLIN AND TAZOBACTAM 3.38 G: 3; .375 INJECTION, POWDER, LYOPHILIZED, FOR SOLUTION INTRAVENOUS at 06:06

## 2022-02-08 RX ADMIN — AMITRIPTYLINE HYDROCHLORIDE 25 MG: 25 TABLET, FILM COATED ORAL at 21:59

## 2022-02-08 RX ADMIN — ONDANSETRON 4 MG: 2 INJECTION INTRAMUSCULAR; INTRAVENOUS at 08:25

## 2022-02-08 RX ADMIN — METOCLOPRAMIDE 10 MG: 10 TABLET ORAL at 08:25

## 2022-02-08 RX ADMIN — ASPIRIN 81 MG: 81 TABLET, FILM COATED ORAL at 15:56

## 2022-02-08 RX ADMIN — ONDANSETRON 4 MG: 2 INJECTION INTRAMUSCULAR; INTRAVENOUS at 22:08

## 2022-02-08 RX ADMIN — ONDANSETRON 4 MG: 2 INJECTION INTRAMUSCULAR; INTRAVENOUS at 04:40

## 2022-02-08 RX ADMIN — HYDROCORTISONE SODIUM SUCCINATE 50 MG: 100 INJECTION, POWDER, FOR SOLUTION INTRAMUSCULAR; INTRAVENOUS at 22:08

## 2022-02-08 RX ADMIN — POTASSIUM CHLORIDE 40 MEQ: 1500 TABLET, EXTENDED RELEASE ORAL at 12:14

## 2022-02-08 RX ADMIN — SERTRALINE 100 MG: 100 TABLET, FILM COATED ORAL at 21:59

## 2022-02-08 RX ADMIN — SODIUM CHLORIDE, PRESERVATIVE FREE 10 ML: 5 INJECTION INTRAVENOUS at 08:26

## 2022-02-08 RX ADMIN — PIPERACILLIN AND TAZOBACTAM 3.38 G: 3; .375 INJECTION, POWDER, LYOPHILIZED, FOR SOLUTION INTRAVENOUS at 14:27

## 2022-02-08 RX ADMIN — SUCRALFATE 1 G: 1 TABLET ORAL at 16:33

## 2022-02-08 RX ADMIN — HYDROCORTISONE SODIUM SUCCINATE 50 MG: 100 INJECTION, POWDER, FOR SOLUTION INTRAMUSCULAR; INTRAVENOUS at 14:26

## 2022-02-08 RX ADMIN — LISINOPRIL 5 MG: 5 TABLET ORAL at 08:25

## 2022-02-08 RX ADMIN — SUCRALFATE 1 G: 1 TABLET ORAL at 08:25

## 2022-02-08 RX ADMIN — ONDANSETRON 4 MG: 2 INJECTION INTRAMUSCULAR; INTRAVENOUS at 15:43

## 2022-02-08 RX ADMIN — SODIUM CHLORIDE 100 ML/HR: 9 INJECTION, SOLUTION INTRAVENOUS at 20:50

## 2022-02-08 RX ADMIN — PANTOPRAZOLE SODIUM 40 MG: 40 TABLET, DELAYED RELEASE ORAL at 08:25

## 2022-02-08 RX ADMIN — METOCLOPRAMIDE 10 MG: 10 TABLET ORAL at 21:59

## 2022-02-08 NOTE — CASE MANAGEMENT/SOCIAL WORK
Continued Stay Note  GINNY Eliezer     Patient Name: Katie Reddy  MRN: [id number]  Today's Date: 2/8/2022    Admit Date: 2/1/2022     Discharge Plan     Row Name 02/08/22 1535       Plan    Plan DC plan: anticipate return home. Possible HHC.    Plan Comments Pt to have cardiac cath today. CM to follow up on possible HHC per PT eval.              Phone communication or documentation only - no physical contact with patient or family.      Megan Naegele, RN      Office Phone: [phone]  Office Cell: [phone]

## 2022-02-08 NOTE — PLAN OF CARE
"Goal Outcome Evaluation:  Plan of Care Reviewed With: patient           Outcome Summary: Pt is a 77 y/o female admitted secondary to 2 week hx of chest pain, epigastric pain and rectal bleeding.  PMH:  HTN, GERD, anxiety, asthma, CVA hemmorhagic, adrenal insufficency s/p loop recorder placement x 1 yr ago.  Chest X-ray (-), CT abdomen/pelvis: thickened wall L colon consistent with infection or inflammation.  Pt s/p EGD, colonscopy.  Pt lives alone in a home with no steps.  Pt ambulates with rollator.  Pt reports \"a couple of falls in the past 6 months\".  Pt required cga for bed mobility, cga/min A for transfers with RW and ambulated 30' x 2 with RW with cga.  Pt only wants HHPT at this time.  "

## 2022-02-08 NOTE — PLAN OF CARE
Goal Outcome Evaluation:              Outcome Summary: Patient pleasant and cooperative. Remains having discomfort in her chest. Heart cath scheduled for 1500 today. Replaced potassium x3 doses.

## 2022-02-08 NOTE — PROGRESS NOTES
"[facility] Medicine Services Daily Progress Note    Patient Name: Katie Reddy  : 1945  MRN: [id number]  Primary Care Physician:  Brooklyn Contreras DO  Date of admission: 2022      Subjective    From previous note and with minor updates.  Chief Complaint: Epigastric pain and chest pain with rectal bleeding    Patient Reports   2/3/2022: Patient reports some improvement in her abdominal discomfort which she describes as a pressure or tightness that moves fromright to left across her upper abdomen.  She reports two episodes of bleeding per rectum with scant clots but no stool accompanying the blood.  No complaints of fever, chills, sweats, nausea, vomiting or  complaints.  2022: Pt having intractable vomiting that began this morning.  She also developed some increased heart rate confirmed by EKG to be sinus tachycardia.  She also felt like that she needed to have a bowel movement.  She complains of pain across her upper abdomen. KUB showed a paucity of air and stool in the lower intestine.  Reglan and scopolamine were initiated for nausea and presumed ileus.  She denies any hematemesis or coffee-ground emesis.  The patient had persistent nausea and vomiting so GI has been consulted.  She has had several \"rust colored\" stools today.  2022: Patient was seen after EGD and colonoscopy and results were discussed with her and all questions were answered.  She reports feeling a little better than yesterday.  2022: The patient continues to have nausea and is concerned about eating.  She has eaten 0% of her last several meals.  She does feel better today than yesterday.    ROS   12 point review of systems was reviewed and was negative except some persistent nausea.      Objective      Vitals:   Temp:  [97.8 °F (36.6 °C)-98.2 °F (36.8 °C)] 98.1 °F (36.7 °C)  Heart Rate:  [] 78  Resp:  [16] 16  BP: (114-146)/(73-82) 143/82    Physical Exam   Vital signs and " nurses notes reviewed.  Well-developed well-nourished elderly female in no acute distress sitting up in bed awake and alert; mucous membranes moist; sclerae anicteric; lungs clear to auscultation bilaterally; CV regular rate and rhythm; abdomen soft, nondistended, tender right upper quadrant, midepigastrium and left upper quadrant but nontender and across the lower abdomen; extremities with no edema, cyanosis or calf tenderness; palpable pedal pulses bilaterally; no Monahan catheter.   Exam unchanged from 2/5/2022.    Result Review    Result Review:  I have personally reviewed the results from the time of this admission to 2/8/2022 16:58 EST and agree with these findings:  [x]  Laboratory  [x]  Microbiology  [x]  Radiology  [x]  EKG/Telemetry   [x]  Cardiology/Vascular   []  Pathology  [x]  Old records  []  Other:  Most notable findings discussed in the assessment and plan.    Wounds (last 24 hours)             Assessment/Plan      Brief Patient Summary:  Katie Reddy is a 76 y.o. female with a history of migraines, HTN, GERD, anxiety, asthma, CVA hemorrhagic, adrenal insufficiency s/p loop recorder placement approximately 1 year ago and arthritis who presented to [facility] on 2/1/2022 complaining of a 2-week history chest pain, epigastric pain, and rectal bleeding.   CT scan of the abdomen and pelvis showed thickened wall of the left colon consistent with infection or inflammation.  It also showed wall thickening and mild fluid distention of the lower esophagus.  The patient had recently seen her primary care provider and was told that she likely had some obstructing constipated stool and she was prescribed Linzess, but she had not had a chance to take it yet.    Current inpatient medications include:  amitriptyline, 25 mg, Oral, Nightly  amLODIPine, 10 mg, Oral, Daily  aspirin, 81 mg, Oral, Daily  atorvastatin, 80 mg, Oral, Nightly  cholestyramine light, 1 packet, Oral, Q12H  hydrocortisone, 1  application, Topical, BID  hydrocortisone sodium succinate, 50 mg, Intravenous, Q8H  lisinopril, 5 mg, Oral, Daily  metoclopramide, 10 mg, Oral, 4x Daily AC & at Bedtime  ondansetron, 4 mg, Intravenous, Q6H  pantoprazole, 40 mg, Oral, BID AC  piperacillin-tazobactam, 3.375 g, Intravenous, Q8H  Scopolamine, 1 patch, Transdermal, Q72H  sertraline, 100 mg, Oral, Nightly  sodium chloride, 10 mL, Intravenous, Q12H  sucralfate, 1 g, Oral, 4x Daily AC & at Bedtime       sodium chloride, 75 mL/hr, Last Rate: 75 mL/hr (02/05/22 1027)         Active Hospital Problems:  Active Hospital Problems    Diagnosis    • **Chest pain in adult    • Colitis, ischemic (HCC)    • Gastrointestinal hemorrhage associated with anorectal source      Added automatically from request for surgery [id number]     • Nonerosive nonspecific gastritis    • GERD with esophagitis    • Mixed hyperlipidemia    • Ischemic bowel disease (HCC)    • Colitis    • Unstable angina pectoris (HCC)      Added automatically from request for surgery [id number]     • 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)    • History of hemorrhagic cerebrovascular accident (CVA) with residual deficit    • Asthma in adult, mild intermittent, uncomplicated    • Essential hypertension    • Vitamin D deficiency    • Generalized anxiety disorder      Plan:     Noncardiac chest pain likely GI etiology due to achalasia  -Troponin x2 negative  - EKG with sinus tachycardia with ST elevation due to nonspecific intraventricular conduction delay    Intractable vomiting, new problem 2/4/2022  -reglan and scopolamine patch ordered but symptoms persisted  -CT scan of the abdomen and pelvis 2/2/2022 showed: Wall thickening and mild fluid distention of the lower esophagus.  -KUB 2/4/2022 showed: Nonspecific bowel gas pattern with no abnormal bowel distention in the upper abdomen, but paucity of gas filled bowel in the lower abdomen/ pelvis.  -GI consulted  -EGD  and colonoscopy were performed    Lower GI bleed secondary to ischemic colitis  -CT abdomen pelvis with inflammation of the left colon  -Blood cultures x2 no growth and pending  -No stool sample for GI panel or Hemoccult (the nursing staff reports that each time she has a stool it is contaminated by urine)  -Continue Zosyn  -IV fluids ordered for hydration  -Low residue diet as tolerated     GERD with hiatal hernia, nonerosive esophagitis and erosive gastritis seen on EGD 2/5/2022  -Increase sucralfate to before meals and at bedtime  -Pepcid changed to twice daily PPI    Acute blood loss anemia from GI bleeding, mild  -Hemoglobin on admission 12.9 and follow-up 10.4 but subsequent hemoglobin 11.1  -IV iron given 2/3/2022  -Monitor hemoglobin    Chronic constipation  -Patient recently started on Linzess but had not taken it yet (nonformulary); Movantik given x1 dose on 2/3/2022 in its place  -Polyethylene glycol ordered twice daily  -On 2/3/2022 patient refused sorbitol or mag citrate because it makes her vomit    Glucocorticoid deficiency adrenal insufficiency  -Chronic  -No blood pressure drop with orthostatic position change  -Continue hydrocortisone; changed to IV because of vomiting with increased stress dosing  -Taper steroids to home dose when tolerating p.o.  -Patient followed by Dr. Gerard outpatient     Essential hypertension, controlled  -Chronic  -Continue home amlodipine and lisinopril      Hyperlipidemia  -Chronic  -Continue home atorvastatin      Asthma in adult, mild intermittent, uncomplicated  -Continue home rescue inhaler as needed for wheezing/shortness of breath     Vitamin D deficiency  -Chronic  -Continue home supplement      Generalized anxiety disorder/depression/insomnia  -Continue home temazepam, sertraline, and amitriptyline     History of hemorrhagic cerebrovascular accident (CVA) with residual deficit  -Patient has had a chronic headache, dizziness and weakness  -Patient reports chronic  swelling left face, left upper and lower extremity since her stroke  -Continue home meclizine as needed     Incontinence  -Bowel and bladder  -Monitor I's and O's  -Provide incontinence skin care; monitor for skin breakdown  -Patient has bladder stimulator     Osteoporosis  -Patient on home Boniva-encourage compliance       DVT prophylaxis:  Mechanical DVT prophylaxis orders are present.    CODE STATUS:    Medical Intervention Limits: NO intubation (DNI)  Code Status (Patient has no pulse and is not breathing): No CPR (Do Not Attempt to Resuscitate)  Medical Interventions (Patient has pulse or is breathing): Limited Support      Disposition: Patient is scheduled for cardiac catheterization today and may discharge tomorrow.    This patient has been examined wearing appropriate Personal Protective Equipment and discussed with hospital infection control department. 02/08/22      Electronically signed by Panfilo Dey MD, FACP, 02/08/22, 16:58 EST.      [facility] Hospitalist Team

## 2022-02-08 NOTE — THERAPY EVALUATION
Patient Name: Katie Reddy  : 1945    MRN: [id number]                              Today's Date: 2022       Admit Date: 2022    Visit Dx:     ICD-10-CM ICD-9-CM   1. Unstable angina pectoris ([facility])  I20.0 411.1   2. Chest pain, unspecified type  R07.9 786.50   3. Colitis  K52.9 558.9   4. Sepsis, due to unspecified organism, unspecified whether acute organ dysfunction present ([facility])  A41.9 038.9     995.91   5. Rectal bleeding  K62.5 569.3   6. 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 Essential hypertension  I10 401.9     Patient Active Problem List   Diagnosis   • Arthritis   • Cataract of both eyes   • Generalized anxiety disorder   • Loss of height   • Migraine headache   • Osteopenia   • Palpitations   • Vitamin D deficiency   • Postmenopausal   • Primary osteoarthritis of right knee   • Acute pain of right knee   • Primary osteoarthritis of left knee   • Hallux valgus, acquired, bilateral   • Intracranial hemorrhage (HCC)   • Essential hypertension   • Fall   • UTI (urinary tract infection)   • Post-traumatic headache   • Polypharmacy   • Weakness   • Intractable nausea and vomiting   • Asthma in adult, mild intermittent, uncomplicated   • Hypertensive emergency   • Syncope   • History of hemorrhagic cerebrovascular accident (CVA) with residual deficit   • 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 ([facility])   • Status post placement of implantable loop recorder   • Chest pain in adult   • Colitis   • Gastrointestinal hemorrhage associated with anorectal source   • Ischemic bowel disease (HCC)   • Colitis, ischemic (HCC)   • Mixed hyperlipidemia   • Nonerosive nonspecific gastritis   • GERD with esophagitis   • Unstable angina pectoris (HCC)     Past Medical History:   Diagnosis Date   • Arthritis    • Bladder incontinence    • Colon polyp    • DEXA     OSTEOPENIA =  (-1.3/  -1.7); 2020 (-1.7/ -1.7)   • GERD    • Glucocorticoid deficiency    • Headache    • HTN    • Intracerebral hemorrhage/ CVA    • MAMMO     NEG =2020   • Osteopenia    • Vitamin D deficiency      Past Surgical History:   Procedure Laterality Date   • APPENDECTOMY     • BLADDER SURGERY      bladder stimulator placement/ REMOVAL   • BREAST CYST EXCISION     • BREAST LUMPECTOMY Left 1973    NEG   • BUNIONECTOMY Right 5/29/2020    Procedure: BUNIONECTOMY DEVONTE;  Surgeon: MARISSA Em DPM;  Location: [facility] OR;  Service: Podiatry;  Laterality: Right;   • CARDIAC CATHETERIZATION     • CARPAL TUNNEL RELEASE Right 1980   • CHOLECYSTECTOMY     • COLON SURGERY      hemorrhoid banding   • COLONOSCOPY  2017 2017/ 2019 = TA, jessica 2024   Dr. Mackey   • EYE SURGERY     • HERNIA REPAIR      Umbilical removal   • HYSTERECTOMY  1970   • SUBTOTAL HYSTERECTOMY     • UMBILICAL HERNIA REPAIR        General Information     Row Name 02/08/22 1139          Physical Therapy Time and Intention    Document Type evaluation  -AM     Mode of Treatment physical therapy  -AM     Row Name 02/08/22 1139          General Information    Patient Profile Reviewed yes  -AM     Prior Level of Function independent:; gait; transfer; bed mobility  -AM     Existing Precautions/Restrictions fall  -AM     Barriers to Rehab none identified  -AM     Row Name 02/08/22 1139          Living Environment    Lives With alone  -AM     Row Name 02/08/22 1139          Home Main Entrance    Number of Stairs, Main Entrance none  -AM     Row Name 02/08/22 1139          Stairs Within Home, Primary    Number of Stairs, Within Home, Primary none  -AM     Row Name 02/08/22 1139          Cognition    Orientation Status (Cognition) oriented x 4  -AM     Row Name 02/08/22 1139          Safety Issues, Functional Mobility    Comment, Safety Issues/Impairments (Mobility) gait belt utilized  -AM           User Key  (r) = Recorded By, (t) = Taken By, (c) = Cosigned By     Initials Name Provider Type    AM Alcon Lei, PT Physical Therapist               Mobility     Row Name 02/08/22 1140          Bed Mobility    Bed Mobility supine-sit  -AM     Supine-Sit [place] (Bed Mobility) verbal cues; contact guard; 1 person assist  -AM     Assistive Device (Bed Mobility) bed rails  -AM     Comment (Bed Mobility) with increased time and effort  -AM     Row Name 02/08/22 1140          Transfers    Comment (Transfers) cga from bed with RW, min A from commode with RW  -AM     Row Name 02/08/22 1140          Sit-Stand Transfer    Sit-Stand [place] (Transfers) contact guard; minimum assist (75% patient effort); 1 person assist  -AM     Assistive Device (Sit-Stand Transfers) walker, front-wheeled  -AM     Row Name 02/08/22 1140          Gait/Stairs (Locomotion)    [place] Level (Gait) contact guard; 1 person assist  -AM     Assistive Device (Gait) walker, front-wheeled  -AM     Distance in Feet (Gait) 30' x 2  -AM     Deviations/Abnormal Patterns (Gait) base of support, narrow; gait speed decreased  decreased balance with turns  -AM     Bilateral Gait Deviations forward flexed posture  -AM           User Key  (r) = Recorded By, (t) = Taken By, (c) = Cosigned By    Initials Name Provider Type    AM Alcon Lei, PT Physical Therapist               Obj/Interventions     Row Name 02/08/22 1141          Range of Motion Comprehensive    General Range of Motion no range of motion deficits identified  -AM     [facility] Name 02/08/22 1141          Strength Comprehensive (MMT)    Comment, General Manual Muscle Testing (MMT) Assessment LLE: 4+/5, RLE: 4/5  -AM     [facility] Name 02/08/22 1141          Motor Skills    Motor Skills functional endurance  -AM     Functional Endurance fair  -AM     Row Name 02/08/22 1141          Balance    Balance Assessment sitting static balance; sitting dynamic balance; standing static balance; standing dynamic balance  -AM     Static Sitting Balance WFL  -AM      Dynamic Sitting Balance WFL  -AM     Static Standing Balance WFL  -AM     Dynamic Standing Balance mild impairment  -AM     [facility] Name 02/08/22 1141          Sensory Assessment (Somatosensory)    Sensory Assessment (Somatosensory) sensation intact  -AM           User Key  (r) = Recorded By, (t) = Taken By, (c) = Cosigned By    Initials Name Provider Type    AM Alcon Lei, PT Physical Therapist               Goals/Plan     Row Name 02/08/22 1147          Bed Mobility Goal 1 (PT)    Activity/Assistive Device (Bed Mobility Goal 1, PT) bed mobility activities, all  -AM     [place] Level/Cues Needed (Bed Mobility Goal 1, PT) modified independence  -AM     Time Frame (Bed Mobility Goal 1, PT) long term goal (LTG)  -AM     Row Name 02/08/22 1147          Transfer Goal 1 (PT)    Activity/Assistive Device (Transfer Goal 1, PT) transfers, all  -AM     [place] Level/Cues Needed (Transfer Goal 1, PT) modified independence  -AM     Time Frame (Transfer Goal 1, PT) long term goal (LTG)  -AM     Row Name 02/08/22 1147          Gait Training Goal 1 (PT)    Activity/Assistive Device (Gait Training Goal 1, PT) gait (walking locomotion); walker, rolling  -AM     [place] Level (Gait Training Goal 1, PT) modified independence  -AM     Distance (Gait Training Goal 1, PT) 150'  -AM     Time Frame (Gait Training Goal 1, PT) long term goal (LTG)  -AM           User Key  (r) = Recorded By, (t) = Taken By, (c) = Cosigned By    Initials Name Provider Type    AM Alcon Lei, PT Physical Therapist               Clinical Impression     Row Name 02/08/22 1142          Pain    Additional Documentation Pain Scale: Numbers Pre/Post-Treatment (Group)  -AM     Row Name 02/08/22 1142          Pain Scale: Numbers Pre/Post-Treatment    Pretreatment Pain Rating 0/10 - no pain  -AM     Posttreatment Pain Rating 0/10 - no pain  -AM     Row Name 02/08/22 1142          Plan of Care Review    Plan of Care Reviewed With patient  -AM      "Outcome Summary Pt is a 75 y/o female admitted secondary to 2 week hx of chest pain, epigastric pain and rectal bleeding.  PMH:  HTN, GERD, anxiety, asthma, CVA hemmorhagic, adrenal insufficency s/p loop recorder placement x 1 yr ago.  Chest X-ray (-), CT abdomen/pelvis: thickened wall L colon consistent with infection or inflammation.  Pt s/p EGD, colonscopy.  Pt lives alone in a home with no steps.  Pt ambulates with rollator.  Pt reports \"a couple of falls in the past 6 months\".  Pt required cga for bed mobility, cga/min A for transfers with RW and ambulated 30' x 2 with RW with cga.  Pt only wants HHPT at this time.  -AM     Row Name 02/08/22 1142          Therapy Assessment/Plan (PT)    Patient/Family Therapy Goals Statement (PT) To go home  -AM     Rehab Potential (PT) good, to achieve stated therapy goals  -AM     Criteria for Skilled Interventions Met (PT) yes  -AM     Predicted Duration of Therapy Intervention (PT) until d/c  -AM     Row Name 02/08/22 1142          Vital Signs    O2 Delivery Pre Treatment room air  -AM     O2 Delivery Intra Treatment room air  -AM     O2 Delivery Post Treatment room air  -AM     Pre Patient Position Supine  -AM     Intra Patient Position Standing  -AM     Post Patient Position Sitting  -AM     Row Name 02/08/22 1142          Positioning and Restraints    Pre-Treatment Position in bed  -AM     Post Treatment Position chair  -AM     In Chair notified nsg; reclined; call light within reach; encouraged to call for assist; exit alarm on  -AM           User Key  (r) = Recorded By, (t) = Taken By, (c) = Cosigned By    Initials Name Provider Type    AM Alcon Lei, PT Physical Therapist               Outcome Measures     Row Name 02/08/22 1147          How much help from another person do you currently need...    Turning from your back to your side while in flat bed without using bedrails? 3  -AM     Moving from lying on back to sitting on the side of a flat bed without " bedrails? 3  -AM     Moving to and from a bed to a chair (including a wheelchair)? 3  -AM     Standing up from a chair using your arms (e.g., wheelchair, bedside chair)? 3  -AM     Climbing 3-5 steps with a railing? 2  -AM     To walk in hospital room? 3  -AM     AM-PAC 6 Clicks Score (PT) 17  -AM     Row Name 02/08/22 1147          Functional Assessment    Outcome Measure Options AM-PAC 6 Clicks Basic Mobility (PT)  -AM           User Key  (r) = Recorded By, (t) = Taken By, (c) = Cosigned By    Initials Name Provider Type    AM Alcon Lei, PT Physical Therapist                             Physical Therapy Education                 Title: PT OT SLP Therapies (Done)     Topic: Physical Therapy (Done)     Point: Mobility training (Done)     Learning Progress Summary           Patient Acceptance, E,TB, VU by AM at 2/8/2022 1148                   Point: Home exercise program (Done)     Learning Progress Summary           Patient Acceptance, E,TB, VU by AM at 2/8/2022 1148                   Point: Body mechanics (Done)     Learning Progress Summary           Patient Acceptance, E,TB, VU by AM at 2/8/2022 1148                   Point: Precautions (Done)     Learning Progress Summary           Patient Acceptance, E,TB, VU by AM at 2/8/2022 1148                               User Key     Initials Effective Dates Name Provider Type Discipline    AM 05/10/21 -  Alcon Lei, PT Physical Therapist PT              PT Recommendation and Plan  Planned Therapy Interventions (PT): bed mobility training, gait training, strengthening, transfer training, patient/family education  Plan of Care Reviewed With: patient  Outcome Summary: Pt is a 75 y/o female admitted secondary to 2 week hx of chest pain, epigastric pain and rectal bleeding.  PMH:  HTN, GERD, anxiety, asthma, CVA hemmorhagic, adrenal insufficency s/p loop recorder placement x 1 yr ago.  Chest X-ray (-), CT abdomen/pelvis: thickened wall L colon consistent with  "infection or inflammation.  Pt s/p EGD, colonscopy.  Pt lives alone in a home with no steps.  Pt ambulates with rollator.  Pt reports \"a couple of falls in the past 6 months\".  Pt required cga for bed mobility, cga/min A for transfers with RW and ambulated 30' x 2 with RW with cga.  Pt only wants HHPT at this time.     Time Calculation:    PT Charges     Row Name 02/08/22 1149             Time Calculation    Start Time 0838  -AM      Stop Time 0858  -AM      Time Calculation (min) 20 min  -AM      PT Received On 02/08/22  -AM      PT - Next Appointment 02/10/22  -AM      PT Goal Re-Cert Due Date 02/22/22  -AM              Time Calculation- PT    Total Timed Code Minutes- PT 10 minute(s)  -AM            User Key  (r) = Recorded By, (t) = Taken By, (c) = Cosigned By    Initials Name Provider Type    AM Alcon Lei, PT Physical Therapist              Therapy Charges for Today     Code Description Service Date Service Provider Modifiers Qty    73202252297 HC PT EVAL MOD COMPLEXITY 3 2/8/2022 Alcon Lei, PT GP 1    70681661795 HC GAIT TRAINING EA 15 MIN 2/8/2022 Alcon Lei, PT GP 1          PT G-Codes  Outcome Measure Options: AM-PAC 6 Clicks Basic Mobility (PT)  AM-PAC 6 Clicks Score (PT): 17    Alcon Lei, PT  2/8/2022    "

## 2022-02-08 NOTE — PROGRESS NOTES
Cardiology Progress Note    Patient Identification:  Name: Katie Reddy  Age: 76 y.o.  Sex: female  :  1945  MRN: [id number]                 Follow Up / Chief Complaint: Recurrent prolonged chest pain  Chief Complaint   Patient presents with   • Chest Pain       Interval History: This is a 76-year-old who presented on 2022 with nausea vomiting and abdominal pain has been having progressively worsening left-sided chest pain in addition to other symptoms.       Subjective: Patient seen and examined.  Chart reviewed.  Labs reviewed.  Discussed with RN taking care of patient.  Patient continues to have intermittent left-sided, substernal, chest pain, waxing and waning in intensity      Objective:  2022: Glucose elevated, potassium was 2.1, improved to 3.1, hemoglobin 10.5      History of Present Illness:       This is a 76-year-old with PMH of     Recurrent syncope, Biotronik ILR 2021  Uncontrolled hypertension  Dyslipidemia  Autonomic insufficiency  History of hemorrhagic occipital CVA  GERD, osteoporosis, chronic pain  Cholecystectomy, hernia repair  Family history of heart disease in mother and father.  Non-smoker  Allergies/intolerance to trazodone, Boniva, Dayvigo     Who presented to the ED on 2022 with nausea and vomiting, upper abdominal pain for 2 weeks.  She underwent EGD/colonoscopy on 2022 that showed mild grade A erosive esophagitis, small hiatal hernia and ischemic colitis. Patient states she continues to have left sided chest pain/pressure that radiates into her left arm, as well as right sided neck pain and headache.    Serial troponin negative on admission. Labs from 2022 showed mildly elevated glucose 120, bun 17 creatinine 1.16 phosphorus 5.2 magnesium 2.7  hgb 11.1         Echocardiogram 2021 reviewed by me shows EF of 60 to 65% with mild AR and mild MR  Carotid Dopplers 2021 reveal mild bilateral carotid disease  Lexiscan Cardiolite 21 which is  negative for ischemia.     EKG done 2/4/2022 reviewed/interpreted by me reveals sinus tachycardia with rate of 121 bpm with left bundle branch block.           Assessment:    -Recurrent prolonged chest pain  -Left bundle branch block  -Nausea vomiting, abdominal pain (ischemic colitis)   -Recurrent syncope  -Orthostatic hypotension  -Low a.m. cortisol/adrenal insufficiency on long-term Cortef  -History of hypertensive emergency  -History of hemorrhagic left occipital CVA  -Mild AR, mild MR  -Dyslipidemia        Recommendations / Plan:         Telemetry to monitor rhythm  Serial cardiac enzymes  We will continue medical management with aspirin, amlodipine, atorvastatin, lisinopril as tolerated.  Follow-up loop recorder in pacemaker clinic.  Patient's blood pressure currently is well controlled we will continue amlodipine, lisinopril as tolerated for hypertension  Reviewed importance of blood pressure control since patient had previous hemorrhagic CVA.  Continue statins for dyslipidemia and previous history of CVA  Continue Cortef and follow-up with endocrinology for adrenal insufficiency  Reviewed extensive hospital records and summarized in the chart  Patient is having recurrent prolonged chest pain which is concerning for unstable angina.  We will schedule for cardiac cath to evaluate the etiology of her chest pain.  Respiratory: LS explained to patient and family.  We will follow up and consider further evaluation treatment.       Past Medical History:  Past Medical History:   Diagnosis Date   • Arthritis    • Bladder incontinence    • Colon polyp    • DEXA     OSTEOPENIA = 2018 (-1.3/ -1.7); 2020 (-1.7/ -1.7)   • GERD    • Glucocorticoid deficiency    • Headache    • HTN    • Intracerebral hemorrhage/ CVA    • MAMMO     NEG =2020   • Osteopenia    • Vitamin D deficiency      Past Surgical History:  Past Surgical History:   Procedure Laterality Date   • APPENDECTOMY     • BLADDER SURGERY      bladder stimulator  placement/ REMOVAL   • BREAST CYST EXCISION     • BREAST LUMPECTOMY Left 1973    NEG   • BUNIONECTOMY Right 5/29/2020    Procedure: BUNIONECTOMY DEVONTE;  Surgeon: MARISSA Em DPM;  Location: [facility] MAIN OR;  Service: Podiatry;  Laterality: Right;   • CARDIAC CATHETERIZATION     • CARPAL TUNNEL RELEASE Right 1980   • CHOLECYSTECTOMY     • COLON SURGERY      hemorrhoid banding   • COLONOSCOPY  2017 2017/ 2019 = TA, rech 2024   Dr. Mackey   • COLONOSCOPY N/A 2/5/2022    Procedure: COLONOSCOPY;  Surgeon: Raymundo Li MD;  Location: [facility] ENDOSCOPY;  Service: Gastroenterology;  Laterality: N/A;  post op: Ischemic colitis   • ENDOSCOPY N/A 2/5/2022    Procedure: ESOPHAGOGASTRODUODENOSCOPY with biopsy x 1 area;  Surgeon: Raymundo Li MD;  Location: [facility] ENDOSCOPY;  Service: Gastroenterology;  Laterality: N/A;  post op: erosive esophagitis, hiatal hernia, Nonerosive gastritis   • EYE SURGERY     • HERNIA REPAIR      Umbilical removal   • HYSTERECTOMY  1970   • SUBTOTAL HYSTERECTOMY     • UMBILICAL HERNIA REPAIR          Social History:   Social History     Tobacco Use   • Smoking status: Never Smoker   • Smokeless tobacco: Never Used   Substance Use Topics   • Alcohol use: No      Family History:  Family History   Problem Relation Age of Onset   • Heart disease Mother    • Hypertension Mother    • Diabetes Father    • Heart disease Father    • Alcohol abuse Father    • Hypertension Father    • Diabetes Brother    • Heart disease Brother    • Cancer Brother 68        Prostate Cancer   • Hypertension Brother    • Diabetes Maternal Aunt    • Heart disease Maternal Grandmother    • Hypertension Maternal Grandmother    • Heart disease Maternal Grandfather    • Hypertension Maternal Grandfather    • Liver disease Paternal Grandmother    • Hypertension Paternal Grandmother    • Heart disease Paternal Grandfather    • Hypertension Paternal Grandfather    • Dementia Sister    • Diabetes Brother      "      Allergies:  Allergies   Allergen Reactions   • Trazodone Irritability   • Boniva [Ibandronic Acid] GI Intolerance     GERD   • Dayvigo [Lemborexant] Other (See Comments)     Sleep walking     Scheduled Meds:  amitriptyline, 25 mg, Nightly  amLODIPine, 10 mg, Daily  atorvastatin, 80 mg, Nightly  cholestyramine light, 1 packet, Q12H  diphenhydrAMINE, 50 mg, Once  hydrocortisone, 1 application, BID  hydrocortisone sodium succinate, 50 mg, Q8H  lisinopril, 5 mg, Daily  metoclopramide, 10 mg, 4x Daily AC & at Bedtime  ondansetron, 4 mg, Q6H  pantoprazole, 40 mg, BID AC  piperacillin-tazobactam, 3.375 g, Q8H  Scopolamine, 1 patch, Q72H  sertraline, 100 mg, Nightly  sodium chloride, 10 mL, Q12H  sucralfate, 1 g, 4x Daily AC & at Bedtime          Review of Systems:   ROS  Review of Systems   Constitution: Negative for chills and fever.   Respiratory: Negative for cough and hemoptysis.    Gastrointestinal: Negative for nausea.        Constitutional:  Temp:  [97.8 °F (36.6 °C)-98.2 °F (36.8 °C)] 98.1 °F (36.7 °C)  Heart Rate:  [] 78  Resp:  [16] 16  BP: (114-146)/(73-82) 143/82    Physical Exam   /82 (BP Location: Left arm, Patient Position: Sitting)   Pulse 78   Temp 98.1 °F (36.7 °C) (Oral)   Resp 16   Ht 162.6 cm (64\")   Wt 63.9 kg (140 lb 12.8 oz)   SpO2 95%   BMI 24.17 kg/m²   General:  Appears in no acute distress  Eyes: Sclera is anicteric,  conjunctiva is clear   HEENT:  No JVD. Thyroid not visibly enlarged. No mucosal pallor or cyanosis  Respiratory: Respirations regular and unlabored at rest.  Bilaterally good breath sounds, with good air entry in all fields. No crackles, rubs or wheezes auscultated  Cardiovascular: S1,S2 Regular rate and rhythm. No murmur, rub or gallop auscultated.  . No pretibial pitting edema  Gastrointestinal: Abdomen nondistended, soft  Musculoskeletal:  No abnormal movements  Extremities: No digital clubbing or cyanosis  Skin: Color pink. Skin warm and dry to touch. " No rashes  No xanthoma  Neuro: Alert and awake, no lateralizing deficits appreciated    INTAKE AND OUTPUT:    Intake/Output Summary (Last 24 hours) at 2/8/2022 1541  Last data filed at 2/7/2022 2007  Gross per 24 hour   Intake 480 ml   Output --   Net 480 ml       Cardiographics  Telemetry: Sinus rhythm    ECG:   ECG 12 Lead   Final Result   HEART RATE= 121  bpm   RR Interval= 504  ms   WI Interval= 144  ms   P Horizontal Axis= 261  deg   P Front Axis= -43  deg   QRSD Interval= 139  ms   QT Interval= 355  ms   QRS Axis= 91  deg   T Wave Axis= -56  deg   - ABNORMAL ECG -   Sinus tachycardia   Multiple ventricular premature complexes   Nonspecific intraventricular conduction delay   ST elevation secondary to IVCD   When compared with ECG of 01-Feb-2022 19:43:24,   No significant change   Electronically Signed By: Dorothy Carmona ([facility]) 04-Feb-2022 16:55:27   Date and Time of Study: 2022-02-04 10:13:23      ECG 12 Lead   Final Result   HEART RATE= 111  bpm   RR Interval= 540  ms   WI Interval= 178  ms   P Horizontal Axis= -30  deg   P Front Axis= 77  deg   QRSD Interval= 136  ms   QT Interval= 368  ms   QRS Axis= 95  deg   T Wave Axis= -60  deg   - ABNORMAL ECG -   Sinus tachycardia   Nonspecific intraventricular conduction delay   ST elevation secondary to IVCD   When compared with ECG of 06-Apr-2021 12:54:12,   Significant rate increase   New conduction abnormality   Electronically Signed By: Mars Julien ([facility]) 03-Feb-2022 16:10:43   Date and Time of Study: 2022-02-01 19:43:24      SCANNED - TELEMETRY     Final Result      SCANNED - TELEMETRY     Final Result      SCANNED - TELEMETRY     Final Result      SCANNED - TELEMETRY     Final Result      SCANNED - TELEMETRY     Final Result      SCANNED - TELEMETRY     Final Result      SCANNED - TELEMETRY     Final Result      SCANNED - TELEMETRY     Final Result      SCANNED - TELEMETRY     Final Result      SCANNED - TELEMETRY     Final Result      SCANNED -  TELEMETRY     Final Result      SCANNED - TELEMETRY     Final Result      SCANNED - TELEMETRY     Final Result      SCANNED - TELEMETRY     Final Result      SCANNED - TELEMETRY     Final Result      SCANNED - TELEMETRY     Final Result      SCANNED - TELEMETRY     Final Result      SCANNED - TELEMETRY     Final Result      SCANNED - TELEMETRY     Final Result      SCANNED - TELEMETRY     Final Result        I have personally reviewed EKG    Echocardiogram: Results for orders placed during the hospital encounter of 04/06/21    Adult Transthoracic Echo Complete W/ Cont if Necessary Per Protocol    Interpretation Summary  Normal LV size and contractility EF of 60 to 65%  Normal RV size  Normal atrial size  Aortic valve appears thickened, leaflets are not well visualized.  Mild aortic regurgitation seen.  Mitral valve posterior leaflet appears calcified.  Mild mitral regurgitation seen..  Tricuspid valve appears structurally normal, no significant regurgitation seen.  No pericardial effusion seen.  Proximal aorta appears normal in size.      Lab Review   I have reviewed the labs  Results from last 7 days   Lab Units 02/07/22  1747 02/02/22  1216 02/02/22  0549   TROPONIN T ng/mL 0.023 <0.010 <0.010     Results from last 7 days   Lab Units 02/05/22  0101   MAGNESIUM mg/dL 2.7*     Results from last 7 days   Lab Units 02/08/22  1403 02/08/22  0238 02/08/22  0238   SODIUM mmol/L  --   --  134*   POTASSIUM mmol/L 3.1*   < > 2.1*   BUN mg/dL  --   --  19   CREATININE mg/dL  --   --  1.09*   CALCIUM mg/dL  --   --  8.2*    < > = values in this interval not displayed.         Results from last 7 days   Lab Units 02/08/22  0238 02/05/22  0101 02/04/22  0935   WBC 10*3/mm3 9.00 9.50 14.60*   HEMOGLOBIN g/dL 10.5* 11.1* 12.9   HEMATOCRIT % 30.1* 33.2* 39.4   PLATELETS 10*3/mm3 277 286 290     Results from last 7 days   Lab Units 02/05/22  0101 02/01/22  2057   INR  0.96 0.96   APTT seconds  --  23.9*       RADIOLOGY:  Imaging  "Results (Last 24 Hours)     ** No results found for the last 24 hours. **                )2/8/2022  MD ISAAC Marc/Transcription:   \"Dictated utilizing Dragon dictation\".   "

## 2022-02-08 NOTE — PLAN OF CARE
Goal Outcome Evaluation:           Progress: no change  Outcome Summary: Patient rested comfortably all night. Patient states she is nervous about her heart cath tomorrow.

## 2022-02-09 ENCOUNTER — READMISSION MANAGEMENT (OUTPATIENT)
Dept: CALL CENTER | Facility: HOSPITAL | Age: 77
End: 2022-02-09

## 2022-02-09 ENCOUNTER — APPOINTMENT (OUTPATIENT)
Dept: GENERAL RADIOLOGY | Facility: HOSPITAL | Age: 77
End: 2022-02-09

## 2022-02-09 VITALS
HEIGHT: 64 IN | SYSTOLIC BLOOD PRESSURE: 138 MMHG | WEIGHT: 141.09 LBS | BODY MASS INDEX: 24.09 KG/M2 | RESPIRATION RATE: 16 BRPM | OXYGEN SATURATION: 96 % | HEART RATE: 70 BPM | TEMPERATURE: 97.6 F | DIASTOLIC BLOOD PRESSURE: 76 MMHG

## 2022-02-09 LAB
ANION GAP SERPL CALCULATED.3IONS-SCNC: 11 MMOL/L (ref 5–15)
BUN SERPL-MCNC: 12 MG/DL (ref 8–23)
BUN/CREAT SERPL: 12.1 (ref 7–25)
CALCIUM SPEC-SCNC: 8.4 MG/DL (ref 8.6–10.5)
CHLORIDE SERPL-SCNC: 113 MMOL/L (ref 98–107)
CO2 SERPL-SCNC: 18 MMOL/L (ref 22–29)
CREAT SERPL-MCNC: 0.99 MG/DL (ref 0.57–1)
DEPRECATED RDW RBC AUTO: 42 FL (ref 37–54)
ERYTHROCYTE [DISTWIDTH] IN BLOOD BY AUTOMATED COUNT: 15.8 % (ref 12.3–15.4)
GFR SERPL CREATININE-BSD FRML MDRD: 55 ML/MIN/1.73
GLUCOSE SERPL-MCNC: 92 MG/DL (ref 65–99)
HCT VFR BLD AUTO: 29 % (ref 34–46.6)
HGB BLD-MCNC: 9.7 G/DL (ref 12–15.9)
MAGNESIUM SERPL-MCNC: 1.7 MG/DL (ref 1.6–2.4)
MCH RBC QN AUTO: 25.3 PG (ref 26.6–33)
MCHC RBC AUTO-ENTMCNC: 33.4 G/DL (ref 31.5–35.7)
MCV RBC AUTO: 75.7 FL (ref 79–97)
PLATELET # BLD AUTO: 269 10*3/MM3 (ref 140–450)
PMV BLD AUTO: 6.3 FL (ref 6–12)
POTASSIUM SERPL-SCNC: 2.7 MMOL/L (ref 3.5–5.2)
POTASSIUM SERPL-SCNC: 3.3 MMOL/L (ref 3.5–5.2)
RBC # BLD AUTO: 3.83 10*6/MM3 (ref 3.77–5.28)
SODIUM SERPL-SCNC: 142 MMOL/L (ref 136–145)
WBC NRBC COR # BLD: 8.8 10*3/MM3 (ref 3.4–10.8)

## 2022-02-09 PROCEDURE — 25010000002 ONDANSETRON PER 1 MG: Performed by: INTERNAL MEDICINE

## 2022-02-09 PROCEDURE — 85027 COMPLETE CBC AUTOMATED: CPT | Performed by: INTERNAL MEDICINE

## 2022-02-09 PROCEDURE — 99239 HOSP IP/OBS DSCHRG MGMT >30: CPT | Performed by: INTERNAL MEDICINE

## 2022-02-09 PROCEDURE — 84132 ASSAY OF SERUM POTASSIUM: CPT | Performed by: INTERNAL MEDICINE

## 2022-02-09 PROCEDURE — 74018 RADEX ABDOMEN 1 VIEW: CPT

## 2022-02-09 PROCEDURE — 80048 BASIC METABOLIC PNL TOTAL CA: CPT | Performed by: INTERNAL MEDICINE

## 2022-02-09 PROCEDURE — 83735 ASSAY OF MAGNESIUM: CPT | Performed by: NURSE PRACTITIONER

## 2022-02-09 PROCEDURE — 25010000002 HYDROCORTISONE SODIUM SUCCINATE 100 MG RECONSTITUTED SOLUTION: Performed by: INTERNAL MEDICINE

## 2022-02-09 PROCEDURE — 25010000002 PIPERACILLIN SOD-TAZOBACTAM PER 1 G: Performed by: INTERNAL MEDICINE

## 2022-02-09 PROCEDURE — 99232 SBSQ HOSP IP/OBS MODERATE 35: CPT | Performed by: INTERNAL MEDICINE

## 2022-02-09 RX ORDER — PANTOPRAZOLE SODIUM 40 MG/1
40 TABLET, DELAYED RELEASE ORAL
Qty: 60 TABLET | Refills: 1 | Status: SHIPPED | OUTPATIENT
Start: 2022-02-09 | End: 2022-02-09

## 2022-02-09 RX ORDER — ASPIRIN 81 MG/1
81 TABLET ORAL DAILY
Qty: 30 TABLET | Refills: 2 | Status: SHIPPED | OUTPATIENT
Start: 2022-02-10 | End: 2022-05-11

## 2022-02-09 RX ORDER — SUCRALFATE 1 G/1
1 TABLET ORAL
Qty: 120 TABLET | Refills: 1 | Status: SHIPPED | OUTPATIENT
Start: 2022-02-09 | End: 2022-02-18

## 2022-02-09 RX ORDER — SUCRALFATE 1 G/1
1 TABLET ORAL
Qty: 120 TABLET | Refills: 1 | Status: SHIPPED | OUTPATIENT
Start: 2022-02-09 | End: 2022-02-09

## 2022-02-09 RX ORDER — PANTOPRAZOLE SODIUM 40 MG/1
40 TABLET, DELAYED RELEASE ORAL
Qty: 60 TABLET | Refills: 1 | Status: SHIPPED | OUTPATIENT
Start: 2022-02-09 | End: 2022-02-18 | Stop reason: SDUPTHER

## 2022-02-09 RX ADMIN — Medication 5 MG: at 02:32

## 2022-02-09 RX ADMIN — PIPERACILLIN AND TAZOBACTAM 3.38 G: 3; .375 INJECTION, POWDER, LYOPHILIZED, FOR SOLUTION INTRAVENOUS at 05:36

## 2022-02-09 RX ADMIN — HYDROCORTISONE SODIUM SUCCINATE 50 MG: 100 INJECTION, POWDER, FOR SOLUTION INTRAMUSCULAR; INTRAVENOUS at 15:16

## 2022-02-09 RX ADMIN — SODIUM CHLORIDE, PRESERVATIVE FREE 10 ML: 5 INJECTION INTRAVENOUS at 08:42

## 2022-02-09 RX ADMIN — ASPIRIN 81 MG: 81 TABLET, FILM COATED ORAL at 08:42

## 2022-02-09 RX ADMIN — CHOLESTYRAMINE 4 G: 4 POWDER, FOR SUSPENSION ORAL at 00:10

## 2022-02-09 RX ADMIN — AMLODIPINE BESYLATE 10 MG: 5 TABLET ORAL at 08:39

## 2022-02-09 RX ADMIN — ONDANSETRON 4 MG: 2 INJECTION INTRAMUSCULAR; INTRAVENOUS at 04:55

## 2022-02-09 RX ADMIN — POTASSIUM CHLORIDE 40 MEQ: 1500 TABLET, EXTENDED RELEASE ORAL at 08:39

## 2022-02-09 RX ADMIN — METOCLOPRAMIDE 10 MG: 10 TABLET ORAL at 17:14

## 2022-02-09 RX ADMIN — LISINOPRIL 5 MG: 5 TABLET ORAL at 08:42

## 2022-02-09 RX ADMIN — SUCRALFATE 1 G: 1 TABLET ORAL at 17:14

## 2022-02-09 RX ADMIN — ONDANSETRON 4 MG: 2 INJECTION INTRAMUSCULAR; INTRAVENOUS at 08:39

## 2022-02-09 RX ADMIN — HYDROCORTISONE SODIUM SUCCINATE 50 MG: 100 INJECTION, POWDER, FOR SOLUTION INTRAMUSCULAR; INTRAVENOUS at 05:37

## 2022-02-09 RX ADMIN — PANTOPRAZOLE SODIUM 40 MG: 40 TABLET, DELAYED RELEASE ORAL at 17:14

## 2022-02-09 RX ADMIN — HYDROCORTISONE 1 APPLICATION: 25 CREAM TOPICAL at 08:39

## 2022-02-09 RX ADMIN — POTASSIUM CHLORIDE 40 MEQ: 1500 TABLET, EXTENDED RELEASE ORAL at 15:16

## 2022-02-09 RX ADMIN — METOCLOPRAMIDE 10 MG: 10 TABLET ORAL at 11:48

## 2022-02-09 RX ADMIN — ONDANSETRON 4 MG: 2 INJECTION INTRAMUSCULAR; INTRAVENOUS at 15:16

## 2022-02-09 RX ADMIN — SUCRALFATE 1 G: 1 TABLET ORAL at 11:48

## 2022-02-09 RX ADMIN — POTASSIUM CHLORIDE 40 MEQ: 1500 TABLET, EXTENDED RELEASE ORAL at 00:23

## 2022-02-09 RX ADMIN — POTASSIUM CHLORIDE 40 MEQ: 1500 TABLET, EXTENDED RELEASE ORAL at 04:56

## 2022-02-09 RX ADMIN — SUCRALFATE 1 G: 1 TABLET ORAL at 08:39

## 2022-02-09 RX ADMIN — METOCLOPRAMIDE 10 MG: 10 TABLET ORAL at 08:39

## 2022-02-09 RX ADMIN — CHOLESTYRAMINE 4 G: 4 POWDER, FOR SUSPENSION ORAL at 08:39

## 2022-02-09 RX ADMIN — PANTOPRAZOLE SODIUM 40 MG: 40 TABLET, DELAYED RELEASE ORAL at 08:39

## 2022-02-09 NOTE — DISCHARGE SUMMARY
[facility] Medicine Services  DISCHARGE SUMMARY    Patient Name: Katie Reddy  : 1945  MRN: [id number]    Date of Admission: 2022  Discharge Diagnosis: GI bleed secondary to ischemic colitis.  Date of Discharge: 2022.  Primary Care Physician: Brooklyn Contreras DO      Presenting Problem:   Colitis [K52.9]  Chest pain, unspecified type [R07.9]  Sepsis, due to unspecified organism, unspecified whether acute organ dysfunction present (HCC) [A41.9]  Ischemic bowel disease (HCC) [K55.9]    Active and Resolved Hospital Problems:  Active Hospital Problems    Diagnosis POA   • **Chest pain in adult [R07.9] Yes     Priority: High   • Colitis, ischemic (HCC) [K55.9] Yes     Priority: High   • Gastrointestinal hemorrhage associated with anorectal source [K62.5] Yes     Priority: High   • Unstable angina pectoris (HCC) [I20.0] Yes     Priority: High   • Nonerosive nonspecific gastritis [K29.60] Yes     Priority: Medium   • GERD with esophagitis [K21.00] Yes     Priority: Medium   • Mixed hyperlipidemia [E78.2] Yes   • Ischemic bowel disease (HCC) [K55.9] Yes   • Colitis [K52.9] Yes   • 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         Hospital Course     Hospital Course:  Katie Reddy is a 76 y.o. female with a history of migraines, HTN, GERD, anxiety, asthma, CVA hemorrhagic, adrenal insufficiency s/p loop recorder placement approximately 1 year ago and arthritis who presented to [facility] on 2022 complaining of a 2-week history chest pain,  epigastric pain, and rectal bleeding.   CT scan of the abdomen and pelvis showed thickened wall of the left colon consistent with infection or inflammation.  It also showed wall thickening and mild fluid distention of the lower esophagus.  The patient had recently seen her primary care provider and was told that she likely had some obstructing constipated stool and she was prescribed Linzess, but she had not had a chance to take it yet  GI consult was completed.  Patient is status post EGD and colonoscopy.  Patient was also diagnosed with unstable angina and cardiology consult was completed.  Patient successfully underwent cardiac catheterization which showed no active coronary artery disease.  Patient was recommended to continue taking aspirin 81 mg daily.  Hypertension was treated with Norvasc.  Hyperlipidemia was treated with Lipitor.  Nausea and vomiting were treated with Zofran.  Adrenal insufficiency was a treated with hydrocortisone.  Ischemic colitis was monitored by gastroenterologist.  History of hemorrhagic cerebral vascular accident with residual deficit was monitored.  Nonerosive nonspecific gastritis was treated with sucralfate.  GERD with esophagitis was treated with Protonix.  GI bleed secondary to ischemic colitis was treated with colonoscopy.  Appropriate patient's home medications were resumed in the hospital for other chronic medical conditions.  Patient reported significant improvement in her symptoms after over 8 days in the hospital and requested to be discharged home with home health PT/OT.  Patient was advised to take her medications as prescribed.  Discharge medications are as per medication reconciliation list.  Patient was advised to follow-up with her primary care physician within 3 to 5 days of discharge.  Patient was advised to follow-up with a gastroenterologist within 14 days of discharge.  Patient was advised to follow-up with her cardiologist within 14 days of discharge.  Patient  was advised to return to the emergency department if she experiences any recurrence of her symptoms.  Patient and family agreed with the plan and patient was discharged in a stable condition.      DISCHARGE Follow Up Recommendations for labs and diagnostics:     Patient was advised to follow-up with her primary care physician who will review her current medications.    Patient was advised to follow-up with her gastroenterologist who will reassess her GI function.    Patient was advised to follow-up with the cardiologist who will reassess her cardiac function.      Reasons For Change In Medications and Indications for New Medications:      Day of Discharge     Vital Signs:  Temp:  [97.6 °F (36.4 °C)-98.2 °F (36.8 °C)] 97.6 °F (36.4 °C)  Heart Rate:  [] 70  Resp:  [15-20] 16  BP: (133-156)/(41-84) 138/76  Flow (L/min):  [2] 2    Physical Exam:  Physical Exam  Vitals and nursing note reviewed.   Constitutional:       General: She is not in acute distress.  HENT:      Head: Normocephalic and atraumatic.      Nose: Nose normal. No congestion or rhinorrhea.      Mouth/Throat:      Mouth: Mucous membranes are moist.      Pharynx: Oropharynx is clear. No oropharyngeal exudate or posterior oropharyngeal erythema.   Eyes:      Pupils: Pupils are equal, round, and reactive to light.   Cardiovascular:      Pulses: Normal pulses.      Heart sounds: Normal heart sounds. No murmur heard.  No friction rub. No gallop.       Comments: S1 and S2 present.  No tachycardia.  Pulmonary:      Effort: No respiratory distress.      Breath sounds: No wheezing, rhonchi or rales.   Chest:      Chest wall: No tenderness.   Abdominal:      General: Abdomen is flat. Bowel sounds are normal. There is no distension.      Palpations: Abdomen is soft.      Tenderness: There is no right CVA tenderness.   Musculoskeletal:         General: No swelling, tenderness, deformity or signs of injury.      Cervical back: Neck supple. No tenderness.       Right lower leg: No edema.      Left lower leg: No edema.   Skin:     Capillary Refill: Capillary refill takes less than 2 seconds.      Coloration: Skin is not jaundiced.      Findings: No bruising, lesion or rash.   Neurological:      Mental Status: She is alert.      Comments: No facial asymmetry noted.  Gait and station not tested.   Psychiatric:      Comments: No agitation.              Pertinent  and/or Most Recent Results     LAB RESULTS:      Lab 02/09/22  0650 02/08/22  0238 02/05/22  0101 02/04/22  1301 02/04/22  0935 02/03/22  0045   WBC 8.80 9.00 9.50  --  14.60* 14.10*   HEMOGLOBIN 9.7* 10.5* 11.1*  --  12.9 10.4*   HEMATOCRIT 29.0* 30.1* 33.2*  --  39.4 31.9*   PLATELETS 269 277 286  --  290 221   NEUTROS ABS  --   --  8.27*  --  12.50* 10.80*   LYMPHS ABS  --   --   --   --  1.20 1.90   MONOS ABS  --   --   --   --  0.80 1.10*   EOS ABS  --   --   --   --  0.00 0.10   MCV 75.7* 76.3* 77.7*  --  76.7* 77.1*   LACTATE  --   --   --  0.6  --   --    PROTIME  --   --  10.7  --   --   --          Lab 02/09/22  1248 02/09/22  0144 02/08/22  1403 02/08/22  0238 02/05/22  0813 02/05/22  0101 02/04/22  1301 02/04/22  0233 02/03/22  0045 02/03/22  0045   SODIUM  --  142  --  134*  --  138  --  139  --  138   POTASSIUM  --  2.7* 3.1* 2.1*  --  3.5  --  3.7   < > 3.8   CHLORIDE  --  113*  --  102  --  105  --  105  --  106   CO2  --  18.0*  --  20.0*  --  16.0*  --  18.0*  --  19.0*   ANION GAP  --  11.0  --  12.0  --  17.0*  --  16.0*  --  13.0   BUN  --  12  --  19  --  17  --  24*  --  21   CREATININE  --  0.99  --  1.09*  --  1.16*  --  1.49*  --  1.49*   GLUCOSE  --  92  --  137*  --  120*  --  116*  --  97   CALCIUM  --  8.4*  --  8.2*  --  8.8  --  8.8  --  8.3*   MAGNESIUM 1.7  --   --   --   --  2.7* 1.9  --   --   --    PHOSPHORUS  --   --   --   --  3.5 5.2* 2.1*  --   --   --     < > = values in this interval not displayed.         Lab 02/05/22  0101 02/03/22  0045   TOTAL PROTEIN 6.6 5.5*   ALBUMIN  3.60 3.10*   GLOBULIN 3.0  --    ALT (SGPT) 13 10   AST (SGOT) 22 15   BILIRUBIN 0.4 0.5   BILIRUBIN DIRECT  --  <0.2   ALK PHOS 112 91   AMYLASE  --  31   LIPASE 30 14         Lab 02/07/22  1747 02/05/22  0101   TROPONIN T 0.023  --    PROTIME  --  10.7   INR  --  0.96                 Brief Urine Lab Results  (Last result in the past 365 days)      Color   Clarity   Blood   Leuk Est   Nitrite   Protein   CREAT   Urine HCG        04/06/21 1513 Yellow   Clear   Small (1+)   Negative   Negative   Negative               Microbiology Results (last 10 days)     Procedure Component Value - Date/Time    Blood Culture - Blood, Arm, Left [[id number]]  (Normal) Collected: 02/02/22 1021    Lab Status: Final result Specimen: Blood from Arm, Left Updated: 02/07/22 1030     Blood Culture No growth at 5 days    Blood Culture - Blood, Arm, Left [[id number]]  (Normal) Collected: 02/02/22 0329    Lab Status: Final result Specimen: Blood from Arm, Left Updated: 02/07/22 0400     Blood Culture No growth at 5 days    COVID PRE-OP / PRE-PROCEDURE SCREENING ORDER (NO ISOLATION) - Swab, Nasopharynx [[id number]]  (Normal) Collected: 02/01/22 2346    Lab Status: Final result Specimen: Swab from Nasopharynx Updated: 02/02/22 0015    Narrative:      The following orders were created for panel order COVID PRE-OP / PRE-PROCEDURE SCREENING ORDER (NO ISOLATION) - Swab, Nasopharynx.  Procedure                               Abnormality         Status                     ---------                               -----------         ------                     COVID-19,CEPHEID/BRENNEN,CO...[[id number]]  Normal              Final result                 Please view results for these tests on the individual orders.    COVID-19,CEPHEID/BRENNEN,COR/NINA/PAD/ALVINO IN-HOUSE(OR EMERGENT/ADD-ON),NP SWAB IN TRANSPORT MEDIA 3-4 HR TAT, RT-PCR - Swab, Nasopharynx [[id number]]  (Normal) Collected: 02/01/22 2346    Lab Status: Final result Specimen: Swab from Nasopharynx Updated:  02/02/22 0015     COVID19 Not Detected    Narrative:      Fact sheet for providers: https://www.fda.gov/media/801936/download     Fact sheet for patients: https://www.fda.gov/media/328748/download  Fact sheet for providers: https://www.fda.gov/media/980800/download    Fact sheet for patients: https://www.fda.gov/media/659274/download    Test performed by PCR.          CT Abdomen Pelvis With Contrast    Result Date: 2/2/2022  Impression: Impression: 1. Distal colonic wall thickening suggestive of infectious or inflammatory colitis. 2. Wall thickening and mild fluid distention of the lower esophagus. Correlate for esophagitis or reflux. Electronically signed by:  Montrell Sharp M.D.  2/1/2022 11:18 PM    XR Chest 1 View    Result Date: 2/1/2022  Impression: No acute cardiopulmonary disease demonstrated  Electronically Signed By-Brannon Wiggins On:2/1/2022 10:16 PM This report was finalized on [id number] by  Brannon Wiggins, .    XR Abdomen KUB    Result Date: 2/9/2022  Impression:  1. No acute findings. 2. Moderate gaseous distention of the colon. Correlate for ileus.  Electronically Signed By-Maddi Bhagat MD On:2/9/2022 12:20 PM This report was finalized on [id number] by  Maddi Bhagat MD.    XR Abdomen KUB    Result Date: 2/4/2022  Impression: Nonspecific bowel gas pattern with no abnormal bowel distention in the upper abdomen, but paucity of gas filled bowel in the lower abdomen/ pelvis. Electronically Signed: Kacy Shields MD 2/4/2022 11:01 EST      Results for orders placed during the hospital encounter of 04/06/21    Duplex Carotid Ultrasound CAR    Interpretation Summary  · Right internal carotid artery mild stenosis.  · Left internal carotid artery mild stenosis.      Results for orders placed during the hospital encounter of 04/06/21    Duplex Carotid Ultrasound CAR    Interpretation Summary  · Right internal carotid artery mild stenosis.  · Left internal carotid artery mild stenosis.      Results  for orders placed during the hospital encounter of 04/06/21    Adult Transthoracic Echo Complete W/ Cont if Necessary Per Protocol    Interpretation Summary  Normal LV size and contractility EF of 60 to 65%  Normal RV size  Normal atrial size  Aortic valve appears thickened, leaflets are not well visualized.  Mild aortic regurgitation seen.  Mitral valve posterior leaflet appears calcified.  Mild mitral regurgitation seen..  Tricuspid valve appears structurally normal, no significant regurgitation seen.  No pericardial effusion seen.  Proximal aorta appears normal in size.      Labs Pending at Discharge:      Procedures Performed  Procedure(s):  Left Heart Cath, possible pci         Consults:   Consults     Date and Time Order Name Status Description    2/7/2022 11:49 AM Inpatient Cardiology Consult Completed     2/4/2022  2:00 PM Inpatient Gastroenterology Consult Completed             Discharge Details        Discharge Medications      New Medications      Instructions Start Date   aspirin 81 MG EC tablet   81 mg, Oral, Daily   Start Date: February 10, 2022        Changes to Medications      Instructions Start Date   pantoprazole 40 MG EC tablet  Commonly known as: PROTONIX  What changed: when to take this   40 mg, Oral, 2 Times Daily Before Meals      sucralfate 1 g tablet  Commonly known as: CARAFATE  What changed:   · when to take this  · Another medication with the same name was removed. Continue taking this medication, and follow the directions you see here.   1 g, Oral, 4 Times Daily Before Meals & Nightly         Continue These Medications      Instructions Start Date   albuterol 1.25 MG/3ML nebulizer solution  Commonly known as: ACCUNEB   1.25 mg, Nebulization, Every 6 Hours PRN      amitriptyline 25 MG tablet  Commonly known as: ELAVIL   25 mg, Oral, Every Night at Bedtime      amLODIPine 10 MG tablet  Commonly known as: NORVASC   10 mg, Oral, Daily      atorvastatin 80 MG tablet  Commonly known as:  LIPITOR   80 mg, Oral, Daily      Calcium 1200 9325-7023 MG-UNIT chewable tablet   1 tablet, Oral, Daily      clindamycin 1 % lotion  Commonly known as: CLEOCIN T   1 application, Topical, 2 Times Daily      Gemtesa 75 MG tablet  Generic drug: Vibegron   1 tablet, Oral, Daily      hydrocortisone 10 MG tablet  Commonly known as: CORTEF   10 mg, Oral, Daily With Breakfast & Lunch      hydrocortisone 10 MG tablet  Commonly known as: CORTEF   5 mg, Oral, Every Evening      hydrocortisone 2.5 % cream   1 application, Topical, 2 Times Daily      ibandronate 150 MG tablet  Commonly known as: BONIVA   150 mg, Oral, Every 30 Days      meclizine 12.5 MG tablet  Commonly known as: ANTIVERT   12.5 mg, Oral, As Needed      nitroglycerin 0.4 MG SL tablet  Commonly known as: NITROSTAT   0.4 mg, Sublingual, Every 5 Minutes PRN, Take no more than 3 doses in 15 minutes.       ondansetron 4 MG tablet  Commonly known as: Zofran   4 mg, Oral, Every 8 Hours PRN      sertraline 100 MG tablet  Commonly known as: ZOLOFT   100 mg, Oral, Nightly      temazepam 30 MG capsule  Commonly known as: RESTORIL   30 mg, Oral, Nightly PRN         Stop These Medications    Poise Moderate Absorbency pads            Allergies   Allergen Reactions   • Trazodone Irritability   • Boniva [Ibandronic Acid] GI Intolerance     GERD   • Dayvigo [Lemborexant] Other (See Comments)     Sleep walking         Discharge Disposition: Stable.  Home-Health Care [facility]    Diet:  Hospital:  Diet Order   Procedures   • Diet Cardiac, Diabetic/Consistent Carbs; Healthy Heart; Diabetic - Consistent Carb         Discharge Activity: As tolerated.        CODE STATUS:  Code Status and Medical Interventions:   Ordered at: 02/02/22 8030     Medical Intervention Limits:    NO intubation (DNI)     Code Status (Patient has no pulse and is not breathing):    No CPR (Do Not Attempt to Resuscitate)     Medical Interventions (Patient has pulse or is breathing):    Limited Support          Future Appointments   Date Time Provider Department Center   4/15/2022  2:00 PM LAB [facility] None   4/22/2022  2:00 PM Yuriy Gerard MD [facility] NA [facility]   5/19/2022  2:20 PM Master Richard MD [facility] CARD [facility] NA           Time spent on Discharge including face to face service: 40 minutes    This patient has been examined wearing appropriate Personal Protective Equipment and discussed with hospital infection control department, [facility], infectious disease specialist and pulmonologist. 02/09/22      Signature:Electronically signed by Panfilo Dey MD, FACP, 02/09/22, 3:28 PM EST.

## 2022-02-09 NOTE — OUTREACH NOTE
Prep Survey      Responses   [facility] patient discharged from? [facility]   Is LACE score < 7 ? No   Emergency Room discharge w/ pulse ox? No   Eligibility [facility]   Date of Admission 02/01/22   Date of Discharge 02/09/22   Discharge Disposition Home or Self Care   Discharge diagnosis Chest pain,    Colitis,    Gastrointestinal hemorrhage associated with anorectal source    Does the patient have one of the following disease processes/diagnoses(primary or secondary)? Other   Does the patient have Home health ordered? Yes   What is the Home health agency?   [facility]   Is there a DME ordered? No   Prep survey completed? Yes          Hillary Amin RN

## 2022-02-09 NOTE — CASE MANAGEMENT/SOCIAL WORK
Continued Stay Note  [facility]     Patient Name: Katie Reddy  MRN: [id number]  Today's Date: 2/9/2022    Admit Date: 2/1/2022     Discharge Plan     Row Name 02/09/22 1155       Plan    Plan DC Plan: Anticipate return home. Referral to [facility] - pending acceptance.     Plan Comments Met with patient in room, and she would like referral to [facility] or any agency that will accept her.  tino Leigh notified by text message, pending acceptance.  Dr. Dey notified by secure chat for [facility] order.  Possible d/c today.                    Expected Discharge Date and Time     Expected Discharge Date Expected Discharge Time    Feb 9, 2022             Charo Weinberg RN

## 2022-02-09 NOTE — PROGRESS NOTES
Spoke with pt to verify demographics and explain services. Pt is agreeable.    Spoke with dr. ayers via secure chat. She is agreeable to sign the plan of care and follow for home health orders.     Pharmacy: [facility]

## 2022-02-09 NOTE — PROGRESS NOTES
Cardiology Progress Note    Patient Identification:  Name: Katie Reddy  Age: 76 y.o.  Sex: female  :  1945  MRN: [id number]                 Follow Up / Chief Complaint: Recurrent prolonged chest pain  Chief Complaint   Patient presents with   • Chest Pain       Interval History: This is a 76-year-old who presented on 2022 with nausea vomiting and abdominal pain has been having progressively worsening left-sided chest pain in addition to other symptoms.  2022: Patient underwent cardiac cath 2022 which revealed no obstructive CAD.       Subjective: Patient seen and examined.  Chart reviewed.  Labs reviewed.  Discussed with RN taking care of patient.  Patient denies any chest pain.    Objective:  2022: Glucose elevated, potassium was 2.1, improved to 3.1, hemoglobin 10.5  2022: Hemoglobin is low at 9.7 with MCV of 75.7, potassium is 2.7    History of Present Illness:       This is a 76-year-old with PMH of     Recurrent syncope, Biotronik ILR 2021  Uncontrolled hypertension  Dyslipidemia  Autonomic insufficiency  History of hemorrhagic occipital CVA  GERD, osteoporosis, chronic pain  Cholecystectomy, hernia repair  Family history of heart disease in mother and father.  Non-smoker  Allergies/intolerance to trazodone, Boniva, Dayvigo     Who presented to the ED on 2022 with nausea and vomiting, upper abdominal pain for 2 weeks.  She underwent EGD/colonoscopy on 2022 that showed mild grade A erosive esophagitis, small hiatal hernia and ischemic colitis. Patient states she continues to have left sided chest pain/pressure that radiates into her left arm, as well as right sided neck pain and headache.    Serial troponin negative on admission. Labs from 2022 showed mildly elevated glucose 120, bun 17 creatinine 1.16 phosphorus 5.2 magnesium 2.7  hgb 11.1         Echocardiogram 2021 reviewed by me shows EF of 60 to 65% with mild AR and mild MR  Carotid Dopplers 2021  reveal mild bilateral carotid disease  Lexiscan Cardiolite 4/9/21 which is negative for ischemia.     EKG done 2/4/2022 reviewed/interpreted by me reveals sinus tachycardia with rate of 121 bpm with left bundle branch block.           Assessment:    -Recurrent prolonged chest pain  -Left bundle branch block  -Nausea vomiting, abdominal pain (ischemic colitis)   -Recurrent syncope  -Orthostatic hypotension  -Low a.m. cortisol/adrenal insufficiency on long-term Cortef  -History of hypertensive emergency  -History of hemorrhagic left occipital CVA  -Mild AR, mild MR  -Dyslipidemia  - hypokalemia         Recommendations / Plan:         Telemetry to monitor rhythm  Serial cardiac enzymes negative   We will continue medical management with aspirin, amlodipine, atorvastatin, lisinopril as tolerated.  Follow-up loop recorder in pacemaker clinic.  Patient's blood pressure currently is well controlled we will continue amlodipine, lisinopril as tolerated for hypertension  Reviewed importance of blood pressure control since patient had previous hemorrhagic CVA.  Continue statins for dyslipidemia and previous history of CVA  Continue Cortef and follow-up with endocrinology for adrenal insufficiency  Reviewed extensive hospital records and summarized in the chart  Patient is having recurrent prolonged chest pain, therefore underwent cardiac cath 2/8/2022 which showed nonobstructive CAD with normal LV systolic function and LVEDP.  We will continue medical management.  Reviewed cardiac cath films with patient and family members by bedside.  Will defer evaluating other etiologies to the primary team.  Potassium is low, replete and monitor levels.  We will follow up and consider further evaluation treatment.       Past Medical History:  Past Medical History:   Diagnosis Date   • Arthritis    • Bladder incontinence    • Colon polyp    • DEXA     OSTEOPENIA = 2018 (-1.3/ -1.7); 2020 (-1.7/ -1.7)   • GERD    • Glucocorticoid deficiency     • Headache    • HTN    • Intracerebral hemorrhage/ CVA    • MAMMO     NEG =2020   • Osteopenia    • Vitamin D deficiency      Past Surgical History:  Past Surgical History:   Procedure Laterality Date   • APPENDECTOMY     • BLADDER SURGERY      bladder stimulator placement/ REMOVAL   • BREAST CYST EXCISION     • BREAST LUMPECTOMY Left 1973    NEG   • BUNIONECTOMY Right 5/29/2020    Procedure: BUNIONECTOMY DEVONTE;  Surgeon: MARISSA Em DPM;  Location: [facility] MAIN OR;  Service: Podiatry;  Laterality: Right;   • CARDIAC CATHETERIZATION     • CARDIAC CATHETERIZATION N/A 2/8/2022    Procedure: Left Heart Cath, possible pci;  Surgeon: Master Richard MD;  Location: [facility] CATH INVASIVE LOCATION;  Service: Cardiovascular;  Laterality: N/A;   • CARPAL TUNNEL RELEASE Right 1980   • CHOLECYSTECTOMY     • COLON SURGERY      hemorrhoid banding   • COLONOSCOPY  2017 2017/ 2019 = TA, jessica 2024   Dr. Mackey   • COLONOSCOPY N/A 2/5/2022    Procedure: COLONOSCOPY;  Surgeon: Raymundo Li MD;  Location: [facility] ENDOSCOPY;  Service: Gastroenterology;  Laterality: N/A;  post op: Ischemic colitis   • ENDOSCOPY N/A 2/5/2022    Procedure: ESOPHAGOGASTRODUODENOSCOPY with biopsy x 1 area;  Surgeon: Raymundo Li MD;  Location: [facility] ENDOSCOPY;  Service: Gastroenterology;  Laterality: N/A;  post op: erosive esophagitis, hiatal hernia, Nonerosive gastritis   • EYE SURGERY     • HERNIA REPAIR      Umbilical removal   • HYSTERECTOMY  1970   • SUBTOTAL HYSTERECTOMY     • UMBILICAL HERNIA REPAIR          Social History:   Social History     Tobacco Use   • Smoking status: Never Smoker   • Smokeless tobacco: Never Used   Substance Use Topics   • Alcohol use: No      Family History:  Family History   Problem Relation Age of Onset   • Heart disease Mother    • Hypertension Mother    • Diabetes Father    • Heart disease Father    • Alcohol abuse Father    • Hypertension Father    • Diabetes Brother    • Heart  "disease Brother    • Cancer Brother 68        Prostate Cancer   • Hypertension Brother    • Diabetes Maternal Aunt    • Heart disease Maternal Grandmother    • Hypertension Maternal Grandmother    • Heart disease Maternal Grandfather    • Hypertension Maternal Grandfather    • Liver disease Paternal Grandmother    • Hypertension Paternal Grandmother    • Heart disease Paternal Grandfather    • Hypertension Paternal Grandfather    • Dementia Sister    • Diabetes Brother           Allergies:  Allergies   Allergen Reactions   • Trazodone Irritability   • Boniva [Ibandronic Acid] GI Intolerance     GERD   • Dayvigo [Lemborexant] Other (See Comments)     Sleep walking     Scheduled Meds:        Review of Systems:   ROS    Constitution: Negative for chills and fever.   Respiratory: Negative for cough and hemoptysis.    Gastrointestinal: Negative for nausea.        Constitutional:  Temp:  [97.6 °F (36.4 °C)-98.2 °F (36.8 °C)] 97.6 °F (36.4 °C)  Heart Rate:  [] 70  Resp:  [15-20] 16  BP: (133-156)/(70-83) 138/76    Physical Exam   /76 (BP Location: Left arm, Patient Position: Lying)   Pulse 70   Temp 97.6 °F (36.4 °C) (Oral)   Resp 16   Ht 162.6 cm (64\")   Wt 64 kg (141 lb 1.5 oz)   SpO2 96%   BMI 24.22 kg/m²   General:  Appears in no acute distress  Eyes: Sclera is anicteric,  conjunctiva is clear   HEENT:  No JVD. Thyroid not visibly enlarged. No mucosal pallor or cyanosis  Respiratory: Respirations regular and unlabored at rest.  Bilaterally good breath sounds, with good air entry in all fields. No crackles, rubs or wheezes auscultated  Cardiovascular: S1,S2 Regular rate and rhythm. No murmur, rub or gallop auscultated.  . No pretibial pitting edema  Gastrointestinal: Abdomen nondistended, soft  Musculoskeletal:  No abnormal movements  Extremities: No digital clubbing or cyanosis  Skin: Color pink. Skin warm and dry to touch. No rashes  No xanthoma Right groin cath site clean and dry. No bruising or " hematoma noted.   Neuro: Alert and awake, no lateralizing deficits appreciated    INTAKE AND OUTPUT:  No intake or output data in the 24 hours ending 02/09/22 2015    Cardiographics  Telemetry: Sinus rhythm    ECG:   ECG 12 Lead   Final Result   HEART RATE= 121  bpm   RR Interval= 504  ms   HI Interval= 144  ms   P Horizontal Axis= 261  deg   P Front Axis= -43  deg   QRSD Interval= 139  ms   QT Interval= 355  ms   QRS Axis= 91  deg   T Wave Axis= -56  deg   - ABNORMAL ECG -   Sinus tachycardia   Multiple ventricular premature complexes   Nonspecific intraventricular conduction delay   ST elevation secondary to IVCD   When compared with ECG of 01-Feb-2022 19:43:24,   No significant change   Electronically Signed By: Dorothy Carmona ([facility]) 04-Feb-2022 16:55:27   Date and Time of Study: 2022-02-04 10:13:23      ECG 12 Lead   Final Result   HEART RATE= 111  bpm   RR Interval= 540  ms   HI Interval= 178  ms   P Horizontal Axis= -30  deg   P Front Axis= 77  deg   QRSD Interval= 136  ms   QT Interval= 368  ms   QRS Axis= 95  deg   T Wave Axis= -60  deg   - ABNORMAL ECG -   Sinus tachycardia   Nonspecific intraventricular conduction delay   ST elevation secondary to IVCD   When compared with ECG of 06-Apr-2021 12:54:12,   Significant rate increase   New conduction abnormality   Electronically Signed By: Mars Julien ([facility]) 03-Feb-2022 16:10:43   Date and Time of Study: 2022-02-01 19:43:24      SCANNED - TELEMETRY     Final Result      SCANNED - TELEMETRY     Final Result      SCANNED - TELEMETRY     Final Result      SCANNED - TELEMETRY     Final Result      SCANNED - TELEMETRY     Final Result      SCANNED - TELEMETRY     Final Result      SCANNED - TELEMETRY     Final Result      SCANNED - TELEMETRY     Final Result      SCANNED - TELEMETRY     Final Result      SCANNED - TELEMETRY     Final Result      SCANNED - TELEMETRY     Final Result      SCANNED - TELEMETRY     Final Result      SCANNED - TELEMETRY     Final  Result      SCANNED - TELEMETRY     Final Result      SCANNED - TELEMETRY     Final Result      SCANNED - TELEMETRY     Final Result      SCANNED - TELEMETRY     Final Result      SCANNED - TELEMETRY     Final Result      SCANNED - TELEMETRY     Final Result      SCANNED - TELEMETRY     Final Result      SCANNED - TELEMETRY     Final Result      SCANNED - TELEMETRY     Final Result      SCANNED - TELEMETRY     Final Result        I have personally reviewed EKG    Echocardiogram: Results for orders placed during the hospital encounter of 04/06/21    Adult Transthoracic Echo Complete W/ Cont if Necessary Per Protocol    Interpretation Summary  Normal LV size and contractility EF of 60 to 65%  Normal RV size  Normal atrial size  Aortic valve appears thickened, leaflets are not well visualized.  Mild aortic regurgitation seen.  Mitral valve posterior leaflet appears calcified.  Mild mitral regurgitation seen..  Tricuspid valve appears structurally normal, no significant regurgitation seen.  No pericardial effusion seen.  Proximal aorta appears normal in size.      Lab Review   I have reviewed the labs  Results from last 7 days   Lab Units 02/07/22  1747   TROPONIN T ng/mL 0.023     Results from last 7 days   Lab Units 02/09/22  1248   MAGNESIUM mg/dL 1.7     Results from last 7 days   Lab Units 02/09/22  1628 02/09/22  0144 02/09/22  0144   SODIUM mmol/L  --   --  142   POTASSIUM mmol/L 3.3*   < > 2.7*   BUN mg/dL  --   --  12   CREATININE mg/dL  --   --  0.99   CALCIUM mg/dL  --   --  8.4*    < > = values in this interval not displayed.         Results from last 7 days   Lab Units 02/09/22  0650 02/08/22  0238 02/05/22  0101   WBC 10*3/mm3 8.80 9.00 9.50   HEMOGLOBIN g/dL 9.7* 10.5* 11.1*   HEMATOCRIT % 29.0* 30.1* 33.2*   PLATELETS 10*3/mm3 269 277 286     Results from last 7 days   Lab Units 02/05/22  0101   INR  0.96       RADIOLOGY:  Imaging Results (Last 24 Hours)     Procedure Component Value Units Date/Time     "XR Abdomen KUB [[id number]] Collected: 02/09/22 1219     Updated: 02/09/22 1222    Narrative:      DATE OF EXAM:  2/9/2022 11:27 AM     PROCEDURE:  XR ABDOMEN KUB-     INDICATIONS:  abd discomfort; I20.0-Unstable angina; R07.9-Chest pain, unspecified;  K52.9-Noninfective gastroenteritis and colitis, unspecified;  A41.9-Sepsis, unspecified organism; K62.5-Hemorrhage of anus and rectum;  E27.49-Other adrenocortical insufficiency; K22.0-Achalasia of cardia;  I10-Essential (primary) hypertension     COMPARISON:  KUB 2/4/2022.     TECHNIQUE:   Single radiographic view of the abdomen was obtained.        FINDINGS:  Moderate gaseous distention of the colon, greatest in the ascending and  transverse segments. No abnormally dilated small bowel loops are  identified. There is no pneumatosis. No gross free intraperitoneal air  is seen. Surgical chain sutures are seen in the midline of the lower  pelvis. Multiple calcifications are seen in the lower pelvis bilaterally  which are unchanged, and are favored to represent phleboliths.  Cholecystectomy changes are noted. There are surgical changes near the  level of the EG junction. Presumed loop recorder device projects over  the left hemithorax. The imaged lung bases are clear.        Impression:         1. No acute findings.  2. Moderate gaseous distention of the colon. Correlate for ileus.     Electronically Signed By-Maddi Bhagat MD On:2/9/2022 12:20 PM  This report was finalized on [id number] by  Maddi Bhagat MD.                )2/9/2022  Master Richard MD      EMR Dragon/Transcription:   \"Dictated utilizing Dragon dictation\".   "

## 2022-02-09 NOTE — CONSULTS
Acknowledge cardiac rehab evaluation. Does not meet criteria for cardiac rehab phase 2 insurance coverage.

## 2022-02-09 NOTE — CASE MANAGEMENT/SOCIAL WORK
Continued Stay Note   Eliezer     Patient Name: Katie Reddy  MRN: [id number]  Today's Date: 2/9/2022    Admit Date: 2/1/2022     Discharge Plan     Row Name 02/09/22 1719       Plan    Plan Accepted by [facility]    Plan Comments Notified by [facility] liaelham Leigh that they can accept patient for services. Patient's nurse, Libra informed               Discharge Codes    No documentation.               Expected Discharge Date and Time     Expected Discharge Date Expected Discharge Time    Feb 9, 2022         Bertha Pacheco RN, [facility]  Office: [phone]  Fax: [phone]  [email or web address]        Phone communication or documentation only - no physical contact with patient or family.    Bertha Pacheco RN

## 2022-02-09 NOTE — DISCHARGE PLACEMENT REQUEST
"Felton Salazar (76 y.o. Female)             Date of Birth Social Security Number Address Home Phone MRN    1945  [id number]  [place] [address] [phone] [id number]    Zoroastrian Marital Status             Catholic        Admission Date Admission Type Admitting Provider Attending Provider Department, Room/Bed    2/1/22 Emergency Panfilo Dey MD Olisa, Charles O, MD [facility] 2B MEDICAL INPATIENT, 224/1    Discharge Date Discharge Disposition Discharge Destination                         Attending Provider: Panfilo Dey MD    Allergies: Trazodone, Boniva [Ibandronic Acid], Dayvigo [Lemborexant]    Isolation: None   Infection: None   Code Status: No CPR   Advance Care Planning Activity    Ht: 162.6 cm (64\")   Wt: 64 kg (141 lb 1.5 oz)    Admission Cmt: None   Principal Problem: Chest pain in adult [R07.9]                 Active Insurance as of 2/1/2022     Primary Coverage     Payor Plan Insurance Group Employer/Plan Group    HUMANA MEDICARE REPLACEMENT HUMANA MEDICARE REPLACEMENT [id number]     Payor Plan Address Payor Plan Phone Number Payor Plan Fax Number Effective Dates    [address] [address] [phone]  5/1/2021 - None Entered    [facility] [phone]       Subscriber Name Subscriber Birth Date Member ID       FELTON SALAZAR 1945 [id number]           Secondary Coverage     Payor Plan Insurance Group Employer/Plan Group    [place]      Payor Plan Address Payor Plan Phone Number Payor Plan Fax Number Effective Dates    [address]   7/25/2019 - None Entered    [facility] [id number]       Subscriber Name Subscriber Birth Date Member ID       FELTON SALAZAR P 1945 [id number]                 Emergency Contacts      (Rel.) Home Phone Work Phone Mobile Phone    [employer] (Friend) [phone] -- [phone]    ARTEMELIAS (Daughter) [phone] -- --              "

## 2022-02-09 NOTE — PLAN OF CARE
Problem: Adult Inpatient Plan of Care  Goal: Absence of Hospital-Acquired Illness or Injury  Intervention: Identify and Manage Fall Risk  Recent Flowsheet Documentation  Taken 2/9/2022 0315 by Mary Carmen Cunha LPN  Safety Promotion/Fall Prevention:   safety round/check completed   room organization consistent   assistive device/personal items within reach   clutter free environment maintained   fall prevention program maintained  Taken 2/9/2022 0131 by Mary Carmen Cunha LPN  Safety Promotion/Fall Prevention:   safety round/check completed   room organization consistent   assistive device/personal items within reach   clutter free environment maintained   fall prevention program maintained  Taken 2/8/2022 2315 by Mary Carmen Cunha LPN  Safety Promotion/Fall Prevention:   safety round/check completed   room organization consistent   nonskid shoes/slippers when out of bed   assistive device/personal items within reach   clutter free environment maintained   fall prevention program maintained  Taken 2/8/2022 2115 by Mary Carmen Cunha LPN  Safety Promotion/Fall Prevention:   safety round/check completed   room organization consistent   assistive device/personal items within reach   clutter free environment maintained   fall prevention program maintained  Taken 2/8/2022 1945 by Mary Carmen Cunha LPN  Safety Promotion/Fall Prevention:   safety round/check completed   room organization consistent   assistive device/personal items within reach   clutter free environment maintained   fall prevention program maintained  Intervention: Prevent Skin Injury  Recent Flowsheet Documentation  Taken 2/9/2022 0315 by Mary Carmen Cunha LPN  Body Position: position changed independently  Taken 2/9/2022 0131 by Mary Carmen Cunha LPN  Body Position: position changed independently  Taken 2/8/2022 2315 by Mary Carmen Cunha LPN  Body Position: position changed independently  Taken 2/8/2022 2115 by Mary Carmen Cunha LPN  Body Position: position changed  independently  Taken 2/8/2022 1945 by Mary Carmen Cunha LPN  Body Position: supine  Skin Protection:   adhesive use limited   incontinence pads utilized  Intervention: Prevent Infection  Recent Flowsheet Documentation  Taken 2/9/2022 0315 by Mary Carmen Cunha LPN  Infection Prevention:   single patient room provided   rest/sleep promoted   personal protective equipment utilized  Taken 2/9/2022 0131 by Mary Carmen Cunha LPN  Infection Prevention:   single patient room provided   rest/sleep promoted   personal protective equipment utilized  Taken 2/8/2022 2315 by Mary Carmen Cunha LPN  Infection Prevention:   single patient room provided   rest/sleep promoted   personal protective equipment utilized   hand hygiene promoted  Taken 2/8/2022 2115 by Mary Carmen Cunha LPN  Infection Prevention:   single patient room provided   rest/sleep promoted   personal protective equipment utilized   hand hygiene promoted  Taken 2/8/2022 1945 by Mary Carmen Cunha LPN  Infection Prevention:   single patient room provided   rest/sleep promoted   personal protective equipment utilized   hand hygiene promoted  Goal: Optimal Comfort and Wellbeing  Intervention: Provide Person-Centered Care  Recent Flowsheet Documentation  Taken 2/8/2022 1945 by Mary Carmen Cunha LPN  Trust Relationship/Rapport:   care explained   questions answered     Problem: Skin Injury Risk Increased  Goal: Skin Health and Integrity  Intervention: Optimize Skin Protection  Recent Flowsheet Documentation  Taken 2/9/2022 0315 by Mary Carmen Cunha LPN  Head of Bed (HOB): HOB elevated  Taken 2/9/2022 0131 by Mary Carmen Cunha LPN  Head of Bed (HOB): HOB elevated  Taken 2/8/2022 2315 by Mary Carmen Cunha LPN  Head of Bed (HOB): HOB elevated  Taken 2/8/2022 2115 by Mary Carmen Cunha LPN  Head of Bed (HOB): HOB elevated  Taken 2/8/2022 1945 by Mary Carmen Cunha LPN  Pressure Reduction Techniques:   frequent weight shift encouraged   weight shift assistance provided  Head of Bed (HOB): HOB elevated  Pressure  Reduction Devices: pressure-redistributing mattress utilized  Skin Protection:   adhesive use limited   incontinence pads utilized     Problem: Fall Injury Risk  Goal: Absence of Fall and Fall-Related Injury  Intervention: Identify and Manage Contributors to Fall Injury Risk  Recent Flowsheet Documentation  Taken 2/9/2022 0315 by Mary Carmen Cunha LPN  Medication Review/Management: medications reviewed  Taken 2/9/2022 0131 by Mary Carmen Cunha LPN  Medication Review/Management: medications reviewed  Taken 2/8/2022 2315 by Mary Carmen Cunha LPN  Medication Review/Management: medications reviewed  Taken 2/8/2022 2115 by Mary Carmen Cunha LPN  Medication Review/Management: medications reviewed  Taken 2/8/2022 1945 by Mary Carmen Cunha LPN  Medication Review/Management: medications reviewed  Intervention: Promote Injury-Free Environment  Recent Flowsheet Documentation  Taken 2/9/2022 0315 by Mary Carmen Cunha LPN  Safety Promotion/Fall Prevention:   safety round/check completed   room organization consistent   assistive device/personal items within reach   clutter free environment maintained   fall prevention program maintained  Taken 2/9/2022 0131 by Mary Carmen Cunha LPN  Safety Promotion/Fall Prevention:   safety round/check completed   room organization consistent   assistive device/personal items within reach   clutter free environment maintained   fall prevention program maintained  Taken 2/8/2022 2315 by Mary Carmen Cunha LPN  Safety Promotion/Fall Prevention:   safety round/check completed   room organization consistent   nonskid shoes/slippers when out of bed   assistive device/personal items within reach   clutter free environment maintained   fall prevention program maintained  Taken 2/8/2022 2115 by Mary Carmen Cunha LPN  Safety Promotion/Fall Prevention:   safety round/check completed   room organization consistent   assistive device/personal items within reach   clutter free environment maintained   fall prevention program  maintained  Taken 2/8/2022 1945 by Mary Carmen Cunha LPN  Safety Promotion/Fall Prevention:   safety round/check completed   room organization consistent   assistive device/personal items within reach   clutter free environment maintained   fall prevention program maintained     Problem: Hypertension Comorbidity  Goal: Blood Pressure in Desired Range  Intervention: Maintain Hypertension-Management Strategies  Recent Flowsheet Documentation  Taken 2/9/2022 0315 by Mary Carmen Cunha LPN  Medication Review/Management: medications reviewed  Taken 2/9/2022 0131 by Mary Carmen Cunha LPN  Medication Review/Management: medications reviewed  Taken 2/8/2022 2315 by Mary Carmen Cunha LPN  Medication Review/Management: medications reviewed  Taken 2/8/2022 2115 by Mary Carmen Cunha LPN  Medication Review/Management: medications reviewed  Taken 2/8/2022 1945 by Mary Carmen Cunha LPN  Medication Review/Management: medications reviewed     Problem: Pain Chronic (Persistent) (Comorbidity Management)  Goal: Acceptable Pain Control and Functional Ability  Intervention: Manage Persistent Pain  Recent Flowsheet Documentation  Taken 2/9/2022 0315 by Mary Carmen Cunha LPN  Medication Review/Management: medications reviewed  Taken 2/9/2022 0131 by Mary Carmen Cunha LPN  Medication Review/Management: medications reviewed  Taken 2/8/2022 2315 by Mary Carmen Cunha LPN  Medication Review/Management: medications reviewed  Taken 2/8/2022 2115 by Mary Carmen Cunha LPN  Medication Review/Management: medications reviewed  Taken 2/8/2022 1945 by Mary Carmen Cunha LPN  Medication Review/Management: medications reviewed  Intervention: Optimize Psychosocial Wellbeing  Recent Flowsheet Documentation  Taken 2/8/2022 1945 by Mary Carmen Cunha LPN  Diversional Activities:   television   smartphone  Family/Support System Care:   support provided   self-care encouraged   Goal Outcome Evaluation:  Returned from heart cath this shift.  No c/o pain noted.  Site covered with transparent dressing.   Site is soft with no bleeding nor drainage noted.  Vital signs stable and pedal pulses present.   Will continue to monitor.

## 2022-02-09 NOTE — PLAN OF CARE
Goal Outcome Evaluation:              Outcome Summary: Patient pleasant and cooperative. Potassium replaced again today. No complaints of pain or discomfort noted..

## 2022-02-10 ENCOUNTER — HOME HEALTH ADMISSION (OUTPATIENT)
Dept: HOME HEALTH SERVICES | Facility: HOME HEALTHCARE | Age: 77
End: 2022-02-10

## 2022-02-10 ENCOUNTER — TRANSITIONAL CARE MANAGEMENT TELEPHONE ENCOUNTER (OUTPATIENT)
Dept: CALL CENTER | Facility: HOSPITAL | Age: 77
End: 2022-02-10

## 2022-02-10 NOTE — CASE MANAGEMENT/SOCIAL WORK
Case Management Discharge Note           Provided Post Acute Provider List?: N/A  Provided Post Acute Provider Quality & Resource List?: N/A    Selected Continued Care - Discharged on 2/9/2022 Admission date: 2/1/2022 - Discharge disposition: Home-Health Care c        Home Medical Care Coordination complete.    Service Provider Selected Services Address Phone Fax Patient Preferred    [facility]  Home Health Services [address] [facility] [id number] [phone] --                       Final Discharge Disposition Code: 06 - home with home health care

## 2022-02-10 NOTE — OUTREACH NOTE
Call Center TCM Note      Responses   [facility] patient discharged from? [facility]   Does the patient have one of the following disease processes/diagnoses(primary or secondary)? Other   TCM attempt successful? Yes   Call start time 1244   Call end time 1249   Discharge diagnosis Chest pain,    Colitis,    Gastrointestinal hemorrhage associated with anorectal source    Person spoke with today (if not patient) and relationship Patient   Meds reviewed with patient/caregiver? Yes   Is the patient having any side effects they believe may be caused by any medication additions or changes? No   Does the patient have all medications ordered at discharge? No   What is keeping the patient from filling the prescriptions? --  [Picking up aspirin today]   Nursing Interventions No intervention needed   Is the patient taking all medications as directed (includes completed medication regime)? N/A   Does the patient have a primary care provider?  Yes   Does the patient have an appointment with their PCP within 7 days of discharge? Yes   Comments regarding PCP hospital fu appt on 2/14/22 at 3:45 PM   Has the patient kept scheduled appointments due by today? N/A   What is the Home health agency?   [facility]   Has home health visited the patient within 72 hours of discharge? Call prior to 72 hours   Psychosocial issues? No   Did the patient receive a copy of their discharge instructions? Yes   Nursing interventions Reviewed instructions with patient   What is the patient's perception of their health status since discharge? Improving   Is the patient/caregiver able to teach back signs and symptoms related to disease process for when to call PCP? Yes   Is the patient/caregiver able to teach back signs and symptoms related to disease process for when to call 911? Yes   Is the patient/caregiver able to teach back the hierarchy of who to call/visit for symptoms/problems? PCP, Specialist, Home health nurse, Urgent  [facility], ED, 911 Yes   If the patient is a current smoker, are they able to teach back resources for cessation? Not a smoker   TCM call completed? Yes   Wrap up additional comments Pt states she is doing better. Pt shares she had a good experience in the hospital, and that everyone treated her so good. Pt verified PCP hospital fu appt on 2/14/22. No questions/concerns.          Serenity Patel RN    2/10/2022, 12:51 EST

## 2022-02-11 ENCOUNTER — HOME CARE VISIT (OUTPATIENT)
Dept: HOME HEALTH SERVICES | Facility: HOME HEALTHCARE | Age: 77
End: 2022-02-11

## 2022-02-15 ENCOUNTER — HOME CARE VISIT (OUTPATIENT)
Dept: HOME HEALTH SERVICES | Facility: HOME HEALTHCARE | Age: 77
End: 2022-02-15

## 2022-02-15 VITALS
RESPIRATION RATE: 18 BRPM | DIASTOLIC BLOOD PRESSURE: 80 MMHG | OXYGEN SATURATION: 98 % | HEART RATE: 78 BPM | SYSTOLIC BLOOD PRESSURE: 120 MMHG | TEMPERATURE: 98.5 F

## 2022-02-15 PROCEDURE — G0151 HHCP-SERV OF PT,EA 15 MIN: HCPCS

## 2022-02-15 NOTE — HOME HEALTH
PT EVAL  Katie Reddy is a 76 y.o. female with a history of migraines, HTN, GERD, anxiety, asthma, CVA hemorrhagic, adrenal insufficiency s/p loop recorder placement approximately 1 year ago and arthritis who presented to [facility] on 2/1/2022 complaining of a 2-week history chest pain, epigastric pain, and rectal bleeding. CT scan of the abdomen and pelvis showed thickened wall of the left colon consistent with infection or inflammation  SOCIAL SUPPORT/HOME LIVING SITUATION. Lives alone in a one level apartment. Has a rollator and cane for safe ambulation and transfers  PLOF. Px reports being independent without an AD prior to hospitalization  Vital Signs. Please see oasis/eval on this visit  Homebound status. Due to dec act tolerance, weakness, decline in transfers and ambulation. Patient requires a taxing effort to leave home, putting patient at risk for falls or injury  Skilled PT needed to improve patient's functional mobility, improve safety for transfers and ambulation and return patient to PLOF.  Patient at time of eval requires Min A for transfers, Min A for gait with an assitive device. Demonstrating dec martin but adequate foot clearance during ambulation. Patient states that she is feeling better and better every day and that she still has a copy of her exercises from a year ago. Patient agreed for just one more follow up visit after eval.

## 2022-02-16 ENCOUNTER — TELEPHONE (OUTPATIENT)
Dept: FAMILY MEDICINE CLINIC | Facility: CLINIC | Age: 77
End: 2022-02-16

## 2022-02-16 RX ORDER — NITROGLYCERIN 0.4 MG/1
0.4 TABLET SUBLINGUAL
Status: CANCELLED | OUTPATIENT
Start: 2022-02-16

## 2022-02-16 NOTE — TELEPHONE ENCOUNTER
Caller:     Relationship: [facility] NURSE    Best call back number: [phone]    Requested Prescriptions:   Requested Prescriptions     Pending Prescriptions Disp Refills   • nitroglycerin (NITROSTAT) 0.4 MG SL tablet       Sig: Place 1 tablet under the tongue Every 5 (Five) Minutes As Needed for Chest Pain. Take no more than 3 doses in 15 minutes.        Pharmacy where request should be sent: [facility] - [address] - [phone] [facility] [phone] FX  [facility] [address] [place] [facility] - [address] - [phone]  - [phone] FX  [facility] - [place]     Additional details provided by patient: BEEN     Does the patient have less than a 3 day supply:  [x] Yes  [] No    Muriel Ruvalcaba Rep   22 15:41 EST

## 2022-02-16 NOTE — TELEPHONE ENCOUNTER
Rx Refill Note  Requested Prescriptions     Pending Prescriptions Disp Refills   • nitroglycerin (NITROSTAT) 0.4 MG SL tablet       Sig: Place 1 tablet under the tongue Every 5 (Five) Minutes As Needed for Chest Pain. Take no more than 3 doses in 15 minutes.      Last office visit with prescribing clinician: 1/25/2022      Next office visit with prescribing clinician: Visit date not found     CBC (No Diff) (02/09/2022 06:50)  Basic Metabolic Panel (02/09/2022 01:44)         Kaylee Napoles CMA  02/16/22, 15:52 EST

## 2022-02-17 ENCOUNTER — READMISSION MANAGEMENT (OUTPATIENT)
Dept: CALL CENTER | Facility: HOSPITAL | Age: 77
End: 2022-02-17

## 2022-02-17 PROCEDURE — G0180 MD CERTIFICATION HHA PATIENT: HCPCS | Performed by: FAMILY MEDICINE

## 2022-02-17 RX ORDER — NITROGLYCERIN 0.4 MG/1
0.4 TABLET SUBLINGUAL
Qty: 25 TABLET | Refills: 2 | Status: SHIPPED | OUTPATIENT
Start: 2022-02-17 | End: 2022-02-18 | Stop reason: SDUPTHER

## 2022-02-17 NOTE — OUTREACH NOTE
Medical Week 2 Survey      Responses   [facility] patient discharged from? [facility]   Does the patient have one of the following disease processes/diagnoses(primary or secondary)? Other   Week 2 attempt successful? Yes   Call start time 1525   Discharge diagnosis Chest pain,    Colitis,    Gastrointestinal hemorrhage associated with anorectal source    Call end time 1527   Meds reviewed with patient/caregiver? Yes   Is the patient having any side effects they believe may be caused by any medication additions or changes? No   Does the patient have all medications ordered at discharge? Yes   Is the patient taking all medications as directed (includes completed medication regime)? Yes   Does the patient have an appointment with their PCP within 7 days of discharge? No   Comments regarding PCP Cancelled PCP appt and has not rescheduled   What is preventing the patient from scheduling follow up appointments within 7 days of discharge? --  [Cancelled scheduled appt]   Nursing Interventions Educated patient on importance of making appointment,  Advised patient to make appointment   Has the patient kept scheduled appointments due by today? N/A   What is the Home health agency?   [facility]   Has home health visited the patient within 72 hours of discharge? Yes   Psychosocial issues? No   Did the patient receive a copy of their discharge instructions? Yes   Nursing interventions Reviewed instructions with patient   What is the patient's perception of their health status since discharge? Improving   Is the patient/caregiver able to teach back signs and symptoms related to disease process for when to call PCP? Yes   Is the patient/caregiver able to teach back signs and symptoms related to disease process for when to call 911? Yes   Is the patient/caregiver able to teach back the hierarchy of who to call/visit for symptoms/problems? PCP, Specialist, Home health nurse, Urgent Care, ED, 911 Yes   If the  patient is a current smoker, are they able to teach back resources for cessation? Not a smoker   Week 2 Call Completed? Yes          Judy Granados RN

## 2022-02-18 DIAGNOSIS — F51.01 PRIMARY INSOMNIA: ICD-10-CM

## 2022-02-18 RX ORDER — HYDROCORTISONE 10 MG/1
10 TABLET ORAL
Status: CANCELLED | OUTPATIENT
Start: 2022-02-18

## 2022-02-18 RX ORDER — AMITRIPTYLINE HYDROCHLORIDE 25 MG/1
25 TABLET, FILM COATED ORAL
Qty: 90 TABLET | Refills: 0 | Status: SHIPPED | OUTPATIENT
Start: 2022-02-18 | End: 2022-04-27

## 2022-02-18 RX ORDER — SUCRALFATE 1 G/1
TABLET ORAL
Qty: 240 TABLET | Refills: 1 | Status: SHIPPED | OUTPATIENT
Start: 2022-02-18 | End: 2022-04-27

## 2022-02-18 NOTE — TELEPHONE ENCOUNTER
Rx Refill Note  Requested Prescriptions     Pending Prescriptions Disp Refills   • pantoprazole (PROTONIX) 40 MG EC tablet 60 tablet 1     Sig: Take 1 tablet by mouth 2 (Two) Times a Day Before Meals for 60 days.   • hydrocortisone 2.5 % cream 15 g 0     Sig: Apply 1 application topically to the appropriate area as directed 2 (Two) Times a Day.   • amLODIPine (NORVASC) 10 MG tablet 90 tablet 0     Sig: Take 1 tablet by mouth Daily.   • temazepam (RESTORIL) 30 MG capsule 30 capsule 0     Sig: Take 1 capsule by mouth At Night As Needed for Sleep.   • hydrocortisone (CORTEF) 10 MG tablet       Sig: Take 1 tablet by mouth Daily With Breakfast & Lunch.   • meclizine (ANTIVERT) 12.5 MG tablet 30 tablet 3     Sig: Take 1 tablet by mouth As Needed for Dizziness.   • nitroglycerin (NITROSTAT) 0.4 MG SL tablet 25 tablet 2     Sig: Place 1 tablet under the tongue Every 5 (Five) Minutes As Needed for Chest Pain. Take no more than 3 doses in 15 minutes.   • ondansetron (Zofran) 4 MG tablet 30 tablet 0     Sig: Take 1 tablet by mouth Every 8 (Eight) Hours As Needed for Nausea or Vomiting.      Last office visit with prescribing clinician: 1/25/2022      Next office visit with prescribing clinician: 2/18/2022     CBC (No Diff) (02/09/2022 06:50)  Basic Metabolic Panel (02/09/2022 01:44)         Kaylee Napoles, CARISSA  02/18/22, 14:50 EST

## 2022-02-18 NOTE — TELEPHONE ENCOUNTER
Incoming Refill Request      Medication requested (name and dose): Amlodipine 10mg  Hydrocortisone 10mg  Hydrocortisone 2.5% cream  Meclizine 12.5mg  Nitroglycerin 0.4mg SL  Ondansetron 4mg  Pantoprazole 40mg  Temazepam 30mg      Pharmacy where request should be sent: [facility]    Additional details provided by patient: n/a    Best call back number:     Does the patient have less than a 3 day supply:  [] Yes  [x] No    Kacy Hutchins, [facility] Rep  02/18/22, 13:30 EST

## 2022-02-22 ENCOUNTER — TELEPHONE (OUTPATIENT)
Dept: FAMILY MEDICINE CLINIC | Facility: CLINIC | Age: 77
End: 2022-02-22

## 2022-02-22 RX ORDER — AMLODIPINE BESYLATE 10 MG/1
10 TABLET ORAL DAILY
Qty: 90 TABLET | Refills: 0 | Status: SHIPPED | OUTPATIENT
Start: 2022-02-22 | End: 2022-06-13

## 2022-02-22 RX ORDER — PANTOPRAZOLE SODIUM 40 MG/1
40 TABLET, DELAYED RELEASE ORAL
Qty: 180 TABLET | Refills: 0 | Status: SHIPPED | OUTPATIENT
Start: 2022-02-22 | End: 2022-04-23

## 2022-02-22 RX ORDER — ONDANSETRON 4 MG/1
4 TABLET, FILM COATED ORAL EVERY 8 HOURS PRN
Qty: 60 TABLET | Refills: 0 | Status: SHIPPED | OUTPATIENT
Start: 2022-02-22 | End: 2022-03-11

## 2022-02-22 RX ORDER — MECLIZINE HCL 12.5 MG/1
12.5 TABLET ORAL AS NEEDED
Qty: 90 TABLET | Refills: 0 | Status: SHIPPED | OUTPATIENT
Start: 2022-02-22 | End: 2022-02-22 | Stop reason: SDUPTHER

## 2022-02-22 RX ORDER — NITROGLYCERIN 0.4 MG/1
0.4 TABLET SUBLINGUAL
Qty: 90 TABLET | Refills: 0 | Status: SHIPPED | OUTPATIENT
Start: 2022-02-22 | End: 2022-04-27

## 2022-02-22 RX ORDER — TEMAZEPAM 30 MG/1
30 CAPSULE ORAL NIGHTLY PRN
Qty: 30 CAPSULE | Refills: 0 | Status: SHIPPED | OUTPATIENT
Start: 2022-02-22 | End: 2022-10-28

## 2022-02-22 RX ORDER — MECLIZINE HCL 12.5 MG/1
12.5 TABLET ORAL 2 TIMES DAILY PRN
Qty: 90 TABLET | Refills: 0 | Status: SHIPPED | OUTPATIENT
Start: 2022-02-22 | End: 2022-03-28

## 2022-02-22 NOTE — TELEPHONE ENCOUNTER
[facility] sent a fax over needing clarification on a medication.     Can you resend Meclizine 12.5mg with directions   Take 1 tablet by mouth daily as needed for dizziness.     You left out the word daily.

## 2022-02-23 ENCOUNTER — HOME CARE VISIT (OUTPATIENT)
Dept: HOME HEALTH SERVICES | Facility: HOME HEALTHCARE | Age: 77
End: 2022-02-23

## 2022-02-23 VITALS
SYSTOLIC BLOOD PRESSURE: 140 MMHG | HEART RATE: 98 BPM | DIASTOLIC BLOOD PRESSURE: 78 MMHG | OXYGEN SATURATION: 97 % | TEMPERATURE: 97.8 F

## 2022-02-23 PROCEDURE — G0151 HHCP-SERV OF PT,EA 15 MIN: HCPCS

## 2022-02-23 NOTE — HOME HEALTH
Patient has had a fall last Saturday. States that she was taking out the trash and getting the mail at 9:30PM. Patient states that she made a quick turn and loss her balance. Patient tried to break her fall with her arms but her face still hit the curb. Patient crawled to the curb all the way to her car and she pulled herself up. Patient denies losing consciousness, she did not seek any medical attention at that time. Patient has visible hematoma on R orbital area, and left knee. Patient complains of pain on her shoulder and L LE. Instructed to ice her knee and take pain meds

## 2022-03-01 ENCOUNTER — HOME CARE VISIT (OUTPATIENT)
Dept: HOME HEALTH SERVICES | Facility: HOME HEALTHCARE | Age: 77
End: 2022-03-01

## 2022-03-01 VITALS
HEART RATE: 78 BPM | TEMPERATURE: 98.1 F | DIASTOLIC BLOOD PRESSURE: 68 MMHG | RESPIRATION RATE: 16 BRPM | SYSTOLIC BLOOD PRESSURE: 128 MMHG | OXYGEN SATURATION: 95 %

## 2022-03-01 PROCEDURE — G0157 HHC PT ASSISTANT EA 15: HCPCS

## 2022-03-02 NOTE — HOME HEALTH
pt up with her walker on arrival,  reports feeling fair but with weakness and functional limitations.     pt states she is improving slowly but is concerned regarding continued weakness and functional deficits.      pt is motivated to improve and return to plb     pt was educated on proper use of the walker, with cues given to remain properly within the walker,  to improve b hip flexion and to deep plb for energy conservation  pt was minimally unsteady upon standing but was able to self correct.  pt was educated today on lumbar stab techniques with both gait trg and hep review,  to avoid trunk rotation and forward flexed trunk

## 2022-03-03 ENCOUNTER — HOME CARE VISIT (OUTPATIENT)
Dept: HOME HEALTH SERVICES | Facility: HOME HEALTHCARE | Age: 77
End: 2022-03-03

## 2022-03-03 ENCOUNTER — READMISSION MANAGEMENT (OUTPATIENT)
Dept: CALL CENTER | Facility: HOSPITAL | Age: 77
End: 2022-03-03

## 2022-03-03 VITALS
RESPIRATION RATE: 16 BRPM | HEART RATE: 76 BPM | OXYGEN SATURATION: 97 % | SYSTOLIC BLOOD PRESSURE: 134 MMHG | TEMPERATURE: 97.8 F | DIASTOLIC BLOOD PRESSURE: 70 MMHG

## 2022-03-03 PROCEDURE — G0157 HHC PT ASSISTANT EA 15: HCPCS

## 2022-03-03 NOTE — OUTREACH NOTE
Medical Week 4 Survey      Responses   [facility] patient discharged from? [facility]   Does the patient have one of the following disease processes/diagnoses(primary or secondary)? Other   Week 4 attempt successful? Yes   Call start time 1005   Call end time 1007   Discharge diagnosis Chest pain,    Colitis,    Gastrointestinal hemorrhage associated with anorectal source    Meds reviewed with patient/caregiver? Yes   Is the patient having any side effects they believe may be caused by any medication additions or changes? No   Is the patient taking all medications as directed (includes completed medication regime)? Yes   Has the patient kept scheduled appointments due by today? N/A  [APPOINTMENT WITH DR. BONILLA IS TOMORROW]   Is the patient still receiving Home Health Services? Yes   Psychosocial issues? No   What is the patient's perception of their health status since discharge? Improving   Is the patient/caregiver able to teach back signs and symptoms related to disease process for when to call PCP? Yes   Is the patient/caregiver able to teach back signs and symptoms related to disease process for when to call 911? Yes   Is the patient/caregiver able to teach back the hierarchy of who to call/visit for symptoms/problems? PCP, Specialist, Home health nurse, Urgent Care, ED, 911 Yes   If the patient is a current smoker, are they able to teach back resources for cessation? Not a smoker   Week 4 Call Completed? Yes   Would the patient like one additional call? No   Graduated Yes   Is the patient interested in additional calls from an ambulatory ?  NOTE:  applies to high risk patients requiring additional follow-up. No   Did the patient feel the follow up calls were helpful during their recovery period? Yes          Kyra Falcon LPN

## 2022-03-04 ENCOUNTER — OFFICE VISIT (OUTPATIENT)
Dept: FAMILY MEDICINE CLINIC | Facility: CLINIC | Age: 77
End: 2022-03-04

## 2022-03-04 VITALS
OXYGEN SATURATION: 98 % | HEIGHT: 60 IN | RESPIRATION RATE: 18 BRPM | BODY MASS INDEX: 29.8 KG/M2 | SYSTOLIC BLOOD PRESSURE: 119 MMHG | DIASTOLIC BLOOD PRESSURE: 75 MMHG | WEIGHT: 151.8 LBS | TEMPERATURE: 97.3 F | HEART RATE: 94 BPM

## 2022-03-04 DIAGNOSIS — Z91.81 HISTORY OF RECENT FALL: ICD-10-CM

## 2022-03-04 DIAGNOSIS — K29.50 OTHER CHRONIC GASTRITIS WITHOUT HEMORRHAGE: ICD-10-CM

## 2022-03-04 DIAGNOSIS — T07.XXXA MULTIPLE CONTUSIONS: Primary | ICD-10-CM

## 2022-03-04 DIAGNOSIS — Z87.19 HISTORY OF ISCHEMIC COLITIS: ICD-10-CM

## 2022-03-04 PROCEDURE — 99213 OFFICE O/P EST LOW 20 MIN: CPT | Performed by: FAMILY MEDICINE

## 2022-03-04 RX ORDER — SERTRALINE HYDROCHLORIDE 100 MG/1
TABLET, FILM COATED ORAL
Qty: 90 TABLET | Refills: 1 | Status: SHIPPED | OUTPATIENT
Start: 2022-03-04 | End: 2022-09-07

## 2022-03-04 NOTE — PROGRESS NOTES
Subjective   Katie Reddy is a 76 y.o. female.     Chief Complaint   Patient presents with   • Transitional Care Management     hsp FU. [facility]-  bowel problems   • Fall     2 wks ago - hit head, shoulder and leg bruised and swollen         Current Outpatient Medications:   •  albuterol (ACCUNEB) 1.25 MG/3ML nebulizer solution, Take 3 mL by nebulization Every 6 (Six) Hours As Needed for Wheezing or Shortness of Air for up to 30 doses., Disp: 120 each, Rfl: 2  •  amitriptyline (ELAVIL) 25 MG tablet, TAKE 1 TABLET BY MOUTH EVERY NIGHT AT BEDTIME., Disp: 90 tablet, Rfl: 0  •  amLODIPine (NORVASC) 10 MG tablet, Take 1 tablet by mouth Daily., Disp: 90 tablet, Rfl: 0  •  aspirin 81 MG EC tablet, Take 1 tablet by mouth Daily for 90 days., Disp: 30 tablet, Rfl: 2  •  Calcium Carbonate-Vit D-Min (Calcium 1200) 7196-1941 MG-UNIT chewable tablet, Chew 1 tablet Daily., Disp: , Rfl:   •  clindamycin (CLEOCIN T) 1 % lotion, Apply 1 application topically to the appropriate area as directed 2 (Two) Times a Day., Disp: , Rfl:   •  Gemtesa 75 MG tablet, Take 1 tablet by mouth Daily., Disp: , Rfl:   •  hydrocortisone (CORTEF) 10 MG tablet, Take 10 mg by mouth Daily With Breakfast & Lunch., Disp: , Rfl:   •  lisinopril (Prinivil) 5 MG tablet, Take 5 mg by mouth Daily., Disp: , Rfl:   •  meclizine (ANTIVERT) 12.5 MG tablet, Take 1 tablet by mouth 2 (Two) Times a Day As Needed for Dizziness., Disp: 90 tablet, Rfl: 0  •  nitroglycerin (NITROSTAT) 0.4 MG SL tablet, Place 1 tablet under the tongue Every 5 (Five) Minutes As Needed for Chest Pain. Take no more than 3 doses in 15 minutes., Disp: 90 tablet, Rfl: 0  •  ondansetron (ZOFRAN) 4 MG tablet, TAKE 1 TABLET EVERY 8 HOURS AS NEEDED FOR NAUSEA OR VOMITING, Disp: 60 tablet, Rfl: 0  •  pantoprazole (PROTONIX) 40 MG EC tablet, Take 1 tablet by mouth 2 (Two) Times a Day Before Meals for 60 days., Disp: 180 tablet, Rfl: 0  •  sertraline (ZOLOFT) 100 MG tablet, TAKE 1 TABLET BY MOUTH EVERY  DAY AT NIGHT, Disp: 90 tablet, Rfl: 1  •  sucralfate (CARAFATE) 1 g tablet, TAKE 1 TABLET BY MOUTH 4 TIMES A DAY BEFORE MEALS AND AT BEDTIME., Disp: 240 tablet, Rfl: 1  •  temazepam (RESTORIL) 30 MG capsule, Take 1 capsule by mouth At Night As Needed for Sleep., Disp: 30 capsule, Rfl: 0  •  atorvastatin (LIPITOR) 80 MG tablet, TAKE 1 TABLET BY MOUTH EVERY DAY, Disp: 90 tablet, Rfl: 2  •  hydrocortisone 2.5 % cream, APPLY 1 APPLICATION TOPICALLY TO THE APPROPRIATE AREA AS DIRECTED 2 (TWO) TIMES A DAY., Disp: 20 g, Rfl: 0    Past Medical History:   Diagnosis Date   • Arthritis    • Bladder incontinence    • Colon polyp    • DEXA     OSTEOPENIA = 2018 (-1.3/ -1.7); 2020 (-1.7/ -1.7)   • GERD    • Glucocorticoid deficiency    • Headache    • HTN    • Intracerebral hemorrhage/ CVA    • MAMMO     NEG =2020   • Osteopenia    • Vitamin D deficiency        Past Surgical History:   Procedure Laterality Date   • APPENDECTOMY     • BLADDER SURGERY      bladder stimulator placement/ REMOVAL   • BREAST CYST EXCISION     • BREAST LUMPECTOMY Left 1973    NEG   • BUNIONECTOMY Right 5/29/2020    Procedure: BUNIONECTOMY DEVONTE;  Surgeon: MARISSA Em DPM;  Location: [facility] MAIN OR;  Service: Podiatry;  Laterality: Right;   • CARDIAC CATHETERIZATION     • CARDIAC CATHETERIZATION N/A 2/8/2022    Procedure: Left Heart Cath, possible pci;  Surgeon: Master Richard MD;  Location: [facility] CATH INVASIVE LOCATION;  Service: Cardiovascular;  Laterality: N/A;   • CARPAL TUNNEL RELEASE Right 1980   • CHOLECYSTECTOMY     • COLON SURGERY      hemorrhoid banding   • COLONOSCOPY  2017 2017/ 2019 = TA, jessica 2024   Dr. Mackey   • COLONOSCOPY N/A 2/5/2022    Procedure: COLONOSCOPY;  Surgeon: Raymundo Li MD;  Location: [facility] ENDOSCOPY;  Service: Gastroenterology;  Laterality: N/A;  post op: Ischemic colitis   • ENDOSCOPY N/A 2/5/2022    Procedure: ESOPHAGOGASTRODUODENOSCOPY with biopsy x 1 area;  Surgeon: Raymundo Li  MD Jassi;  Location: [facility] ENDOSCOPY;  Service: Gastroenterology;  Laterality: N/A;  post op: erosive esophagitis, hiatal hernia, Nonerosive gastritis   • EYE SURGERY     • HERNIA REPAIR      Umbilical removal   • HYSTERECTOMY  1970   • SUBTOTAL HYSTERECTOMY     • UMBILICAL HERNIA REPAIR         Family History   Problem Relation Age of Onset   • Heart disease Mother    • Hypertension Mother    • Diabetes Father    • Heart disease Father    • Alcohol abuse Father    • Hypertension Father    • Diabetes Brother    • Heart disease Brother    • Cancer Brother 68        Prostate Cancer   • Hypertension Brother    • Diabetes Maternal Aunt    • Heart disease Maternal Grandmother    • Hypertension Maternal Grandmother    • Heart disease Maternal Grandfather    • Hypertension Maternal Grandfather    • Liver disease Paternal Grandmother    • Hypertension Paternal Grandmother    • Heart disease Paternal Grandfather    • Hypertension Paternal Grandfather    • Dementia Sister    • Diabetes Brother        Social History     Socioeconomic History   • Marital status:    Tobacco Use   • Smoking status: Never Smoker   • Smokeless tobacco: Never Used   Vaping Use   • Vaping Use: Never used   Substance and Sexual Activity   • Alcohol use: No   • Drug use: No   • Sexual activity: Defer       75 y/o C female here for f/u from hosp stay for ischemic colitis/ CP and recent fall    Pt went to Hosp on 2/1 and d/c'd on 2/9-------Pt admitted for CP and GIB (ischemic colitis)/ Gastritis---Pt states she ended up w/ a heart cath which was neg and EGD/ Colonoscopy by [facility]-----Pt states she is doing pretty well overall since then    Pt canceled her f/u hosp appt but states she has been getting PTx at home since then    Pt states recently she went [place] and when she turned to go back to the house, she fell onto the concrete w/ her Lt LE/ UE and the Rt side of her face; pt got her neighbor to help her up and back to the  house----she iced her Rt eye x 24hrs........pt admits she bruised her Left shoulder and Distal Left LE......       The following portions of the patient's history were reviewed and updated as appropriate: allergies, current medications, past family history, past medical history, past social history, past surgical history and problem list.    Review of Systems   Constitutional: Positive for activity change and appetite change. Negative for unexpected weight gain and unexpected weight loss.   Eyes: Negative for blurred vision and double vision.   Gastrointestinal: Negative for abdominal pain, anal bleeding, blood in stool, constipation, diarrhea, nausea, vomiting, GERD and indigestion.   Musculoskeletal: Positive for arthralgias and myalgias. Negative for gait problem and neck pain.   Skin: Positive for bruise. Negative for color change and rash.       Vitals:    03/04/22 1554   BP: 119/75   Pulse: 94   Resp: 18   Temp: 97.3 °F (36.3 °C)   SpO2: 98%       Objective   Physical Exam  Vitals and nursing note reviewed.   Constitutional:       General: She is not in acute distress.     Appearance: Normal appearance. She is well-developed. She is not ill-appearing or toxic-appearing.   HENT:      Head: Normocephalic and atraumatic.   Eyes:     Cardiovascular:      Rate and Rhythm: Normal rate and regular rhythm.      Heart sounds: Murmur heard.       Pulmonary:      Effort: Pulmonary effort is normal. No respiratory distress.      Breath sounds: Normal breath sounds. No stridor. No wheezing, rhonchi or rales.   Musculoskeletal:      Comments: +Left UE Abduction done w/ pain   Skin:     General: Skin is warm and dry.      Findings: Bruising present. No rash.          Neurological:      Mental Status: She is alert and oriented to person, place, and time.      Cranial Nerves: No cranial nerve deficit.   Psychiatric:         Attention and Perception: Attention and perception normal.         Mood and Affect: Mood and affect  normal.         Speech: Speech normal.         Behavior: Behavior normal. Behavior is cooperative.         Thought Content: Thought content normal.         Cognition and Memory: Cognition and memory normal.         Judgment: Judgment normal.           Assessment/Plan   Diagnoses and all orders for this visit:    1. Multiple contusions (Primary)    2. History of recent fall    3. History of ischemic colitis    4. Other chronic gastritis without hemorrhage    >1/2 time of the 21minutes spent reviewing hosp stay and w/u w/ pt as well as outcome  Pt will cont w/ PTx and has f/u w/ endo and Cardio  Pt to cont w/ otc pain meds for contusions as needed  Pt to stay on PPI bid and carafate tid for Gastritis control w/ GERD trigger avoidance

## 2022-03-04 NOTE — HOME HEALTH
pt sitting up and is prepared for PT session.   pt states she has been performing hep at times but with weakness and fatigue.  pt is motivated to improve and very much wants to return to plf and is eager to participate     pts work/rest ratio today was 50/50,  several rest periods were needed with cues to deep plb for energy conservation.  pt was minimally unsteady upon standing but was able to self correct. pt was educated today on proper lumbar stab techniques with review of hep with cues to properly and fully contract/hold with hep review , with decreased martin to improve efficiency.   pt was fatigued to end PT session but was pleased to have participated

## 2022-03-08 ENCOUNTER — HOME CARE VISIT (OUTPATIENT)
Dept: HOME HEALTH SERVICES | Facility: HOME HEALTHCARE | Age: 77
End: 2022-03-08

## 2022-03-08 VITALS
TEMPERATURE: 97.9 F | DIASTOLIC BLOOD PRESSURE: 80 MMHG | OXYGEN SATURATION: 96 % | HEART RATE: 85 BPM | SYSTOLIC BLOOD PRESSURE: 140 MMHG

## 2022-03-08 PROCEDURE — G0151 HHCP-SERV OF PT,EA 15 MIN: HCPCS

## 2022-03-08 NOTE — HOME HEALTH
Patient was seen today, agreeable with PT. Patient compliant with plan of care. Tolerated all management well with sufficient rest breaks provided. If patient was complaining of SOB, patient was recommended to perform pursed lip breathing. PT today received thera ex, gait training, balance ex, HEP teaching and transfer training. Denies falls and pain this visit  Reviewed HEP education with patient. Patient performed a series of open and closed kinematic chain exercises to improve ROM, strength, LE stability, improve proprioception and balance. Patient requiring verbal cues for correct technique to improve efficiency and safety  In sitting-Hip flexion, ankle pumps, LAQ x 10 reps   In standing-, Heel/toe raises, Marching in place, Short Squats, Hip abduction, SLRs x 10 reps each        Plan next visit: HEP teaching, gait training

## 2022-03-10 ENCOUNTER — HOME CARE VISIT (OUTPATIENT)
Dept: HOME HEALTH SERVICES | Facility: HOME HEALTHCARE | Age: 77
End: 2022-03-10

## 2022-03-10 VITALS
RESPIRATION RATE: 15 BRPM | TEMPERATURE: 97.8 F | SYSTOLIC BLOOD PRESSURE: 128 MMHG | OXYGEN SATURATION: 96 % | HEART RATE: 83 BPM | DIASTOLIC BLOOD PRESSURE: 68 MMHG

## 2022-03-10 PROCEDURE — G0157 HHC PT ASSISTANT EA 15: HCPCS

## 2022-03-11 RX ORDER — ONDANSETRON 4 MG/1
TABLET, FILM COATED ORAL
Qty: 60 TABLET | Refills: 0 | Status: SHIPPED | OUTPATIENT
Start: 2022-03-11 | End: 2022-03-31

## 2022-03-11 NOTE — HOME HEALTH
pt is up and prepared for PT session,   pt states she feels fair and with no new c/o's.    pt has been performing hep daily but with limitations and continued weakness.   pt denies any pain, no med changes and no complications.        pt continues to require instruction to perform ther ex correctly and within the correct plane.  pt was educated today on proper lumbar stab techniques and to slow martin with proper plb techniques.   pt was educated today on proper step length during gait trg and with cues to avoid shuffling.  pt was challenged to maintain balance with hep review with only uni UE support at times with noted weakness/mild balance deficits.    pt was fatigued to end PT session but was pleased to have participated

## 2022-03-11 NOTE — TELEPHONE ENCOUNTER
Rx Refill Note  Requested Prescriptions     Pending Prescriptions Disp Refills   • ondansetron (ZOFRAN) 4 MG tablet [Pharmacy Med Name: ONDANSETRON HYDROCHLORIDE 4 MG Tablet] 60 tablet 0     Sig: TAKE 1 TABLET EVERY 8 HOURS AS NEEDED FOR NAUSEA OR VOMITING      Last office visit with prescribing clinician: 3/4/2022      Next office visit with prescribing clinician: 7/7/2022     Basic Metabolic Panel (02/09/2022 01:44)  CBC (No Diff) (02/09/2022 06:50)         Kaylee Napoles CMA  03/11/22, 14:10 EST

## 2022-03-15 ENCOUNTER — HOME CARE VISIT (OUTPATIENT)
Dept: HOME HEALTH SERVICES | Facility: HOME HEALTHCARE | Age: 77
End: 2022-03-15

## 2022-03-15 VITALS
SYSTOLIC BLOOD PRESSURE: 140 MMHG | TEMPERATURE: 98 F | HEART RATE: 83 BPM | DIASTOLIC BLOOD PRESSURE: 70 MMHG | OXYGEN SATURATION: 97 %

## 2022-03-15 PROCEDURE — G0151 HHCP-SERV OF PT,EA 15 MIN: HCPCS

## 2022-03-15 NOTE — HOME HEALTH
Clinical condition of patient at initial or last assessment:  CGA for transfers, CGA for gait with her rollator  Current clinical condition of patient:  Patient is now independent with all functional transfers, independent with gait without an assitive device, improved act tolerance and balance    Overall progress towards measurable treatment goals:  Patient has shown steady progress towards achieving PT goals as noted with patient's improve strength and assist level with functional tasks.    Effectiveness of current plan:  Patient has shown steady progress during this therapy period. Patient has demonstrated improved strength and balance. However, patient will benefit from continued PT services to continue to improve balance, dec pain, safety with performing transfers and gait with assistive device to ensure safe mobility inside and outside residence      Plan for continuing or discontinuing service:   Patient will be discharged on next PT visit        Changes to goals or care plan update to be completed in guideline section and communicated to MD:  Communicated to MD for additional therapy visits to work on current goals    Statement of expectation of continued progress toward goals:  Patient will reasonably meet goals in the extended therapy visits that were requested      Skilled PT needed to continue to improve safety with balance, transfers and gait with assistive device to ensure safe mobility inside residence.

## 2022-03-17 ENCOUNTER — HOME CARE VISIT (OUTPATIENT)
Dept: HOME HEALTH SERVICES | Facility: HOME HEALTHCARE | Age: 77
End: 2022-03-17

## 2022-03-17 VITALS
OXYGEN SATURATION: 98 % | SYSTOLIC BLOOD PRESSURE: 140 MMHG | TEMPERATURE: 98.1 F | RESPIRATION RATE: 17 BRPM | DIASTOLIC BLOOD PRESSURE: 80 MMHG | HEART RATE: 78 BPM

## 2022-03-17 PROCEDURE — G0151 HHCP-SERV OF PT,EA 15 MIN: HCPCS

## 2022-03-21 NOTE — TELEPHONE ENCOUNTER
Rx Refill Note  Requested Prescriptions     Pending Prescriptions Disp Refills   • hydrocortisone 2.5 % cream [Pharmacy Med Name: HYDROCORTISONE 2.5 % Cream] 20 g      Sig: APPLY 1 APPLICATION TOPICALLY TO THE APPROPRIATE AREA AS DIRECTED 2 (TWO) TIMES A DAY.      Last office visit with prescribing clinician: 3/4/2022      Next office visit with prescribing clinician: 7/7/2022     CBC (No Diff) (02/09/2022 06:50)  Basic Metabolic Panel (02/09/2022 01:44)  Lipid Panel (04/07/2021 09:10)         Kaylee Napoles CMA  03/21/22, 12:05 EDT

## 2022-03-23 RX ORDER — ATORVASTATIN CALCIUM 80 MG/1
TABLET, FILM COATED ORAL
Qty: 90 TABLET | Refills: 2 | Status: SHIPPED | OUTPATIENT
Start: 2022-03-23 | End: 2022-08-04 | Stop reason: SDUPTHER

## 2022-03-24 RX ORDER — LISINOPRIL 5 MG/1
TABLET ORAL
Qty: 90 TABLET | Refills: 1 | Status: SHIPPED | OUTPATIENT
Start: 2022-03-24 | End: 2022-05-09

## 2022-03-24 NOTE — TELEPHONE ENCOUNTER
Rx Refill Note  Requested Prescriptions     Pending Prescriptions Disp Refills   • lisinopril (PRINIVIL,ZESTRIL) 5 MG tablet [Pharmacy Med Name: LISINOPRIL 5 MG TABLET] 90 tablet      Sig: TAKE 1 TABLET BY MOUTH EVERY DAY      Last office visit with prescribing clinician: Visit date not found      Next office visit with prescribing clinician: Visit date not found     Basic Metabolic Panel (02/09/2022 01:44)         Sophia Brock, RT  03/24/22, 08:24 EDT

## 2022-03-28 RX ORDER — MECLIZINE HCL 12.5 MG/1
TABLET ORAL
Qty: 90 TABLET | Refills: 0 | Status: SHIPPED | OUTPATIENT
Start: 2022-03-28 | End: 2022-10-28

## 2022-03-31 RX ORDER — ONDANSETRON 4 MG/1
TABLET, FILM COATED ORAL
Qty: 60 TABLET | Refills: 0 | Status: SHIPPED | OUTPATIENT
Start: 2022-03-31 | End: 2022-04-18

## 2022-04-12 PROCEDURE — 93298 REM INTERROG DEV EVAL SCRMS: CPT | Performed by: INTERNAL MEDICINE

## 2022-04-12 PROCEDURE — G2066 INTER DEVC REMOTE 30D: HCPCS | Performed by: INTERNAL MEDICINE

## 2022-04-13 NOTE — TELEPHONE ENCOUNTER
Rx Refill Note  Requested Prescriptions     Pending Prescriptions Disp Refills   • hydrocortisone 2.5 % cream [Pharmacy Med Name: HYDROCORTISONE 2.5 % Cream] 20 g 0     Sig: APPLY 1 APPLICATION TOPICALLY TO THE APPROPRIATE AREA AS DIRECTED TWO TIMES A DAY      Last office visit with prescribing clinician: 3/4/2022      Next office visit with prescribing clinician: 7/7/2022     CBC (No Diff) (02/09/2022 06:50)  Basic Metabolic Panel (02/09/2022 01:44)         Kaylee Napoles CMA  04/13/22, 13:22 EDT

## 2022-04-15 ENCOUNTER — LAB (OUTPATIENT)
Dept: LAB | Facility: HOSPITAL | Age: 77
End: 2022-04-15

## 2022-04-15 ENCOUNTER — TELEPHONE (OUTPATIENT)
Dept: ENDOCRINOLOGY | Facility: CLINIC | Age: 77
End: 2022-04-15

## 2022-04-15 DIAGNOSIS — I10 ESSENTIAL HYPERTENSION: Primary | ICD-10-CM

## 2022-04-15 DIAGNOSIS — K22.0: ICD-10-CM

## 2022-04-15 DIAGNOSIS — E27.49: ICD-10-CM

## 2022-04-15 DIAGNOSIS — E78.2 MIXED HYPERLIPIDEMIA: ICD-10-CM

## 2022-04-15 DIAGNOSIS — I10 ESSENTIAL HYPERTENSION: ICD-10-CM

## 2022-04-15 LAB
ALBUMIN SERPL-MCNC: 3.9 G/DL (ref 3.5–5.2)
ALBUMIN/GLOB SERPL: 1.3 G/DL
ALP SERPL-CCNC: 106 U/L (ref 39–117)
ALT SERPL W P-5'-P-CCNC: 9 U/L (ref 1–33)
ANION GAP SERPL CALCULATED.3IONS-SCNC: 14.6 MMOL/L (ref 5–15)
AST SERPL-CCNC: 14 U/L (ref 1–32)
BILIRUB SERPL-MCNC: 0.3 MG/DL (ref 0–1.2)
BUN SERPL-MCNC: 17 MG/DL (ref 8–23)
BUN/CREAT SERPL: 17.7 (ref 7–25)
CALCIUM SPEC-SCNC: 9.9 MG/DL (ref 8.6–10.5)
CHLORIDE SERPL-SCNC: 106 MMOL/L (ref 98–107)
CO2 SERPL-SCNC: 18.4 MMOL/L (ref 22–29)
CREAT SERPL-MCNC: 0.96 MG/DL (ref 0.57–1)
EGFRCR SERPLBLD CKD-EPI 2021: 61.4 ML/MIN/1.73
GLOBULIN UR ELPH-MCNC: 3.1 GM/DL
GLUCOSE SERPL-MCNC: 96 MG/DL (ref 65–99)
POTASSIUM SERPL-SCNC: 4.6 MMOL/L (ref 3.5–5.2)
PROT SERPL-MCNC: 7 G/DL (ref 6–8.5)
SODIUM SERPL-SCNC: 139 MMOL/L (ref 136–145)

## 2022-04-15 PROCEDURE — 80053 COMPREHEN METABOLIC PANEL: CPT

## 2022-04-15 PROCEDURE — 36415 COLL VENOUS BLD VENIPUNCTURE: CPT

## 2022-04-18 RX ORDER — ONDANSETRON 4 MG/1
TABLET, FILM COATED ORAL
Qty: 60 TABLET | Refills: 0 | Status: SHIPPED | OUTPATIENT
Start: 2022-04-18 | End: 2022-10-28

## 2022-04-18 NOTE — TELEPHONE ENCOUNTER
Rx Refill Note  Requested Prescriptions     Pending Prescriptions Disp Refills   • ondansetron (ZOFRAN) 4 MG tablet [Pharmacy Med Name: ONDANSETRON HYDROCHLORIDE 4 MG Tablet] 60 tablet 0     Sig: TAKE 1 TABLET EVERY 8 HOURS AS NEEDED FOR NAUSEA OR VOMITING      Last office visit with prescribing clinician: 3/4/2022      Next office visit with prescribing clinician: 7/7/2022     Comprehensive Metabolic Panel (04/15/2022 12:19)  CBC (No Diff) (02/09/2022 06:50)  Lipid Panel (04/07/2021 09:10)         Kaylee Napoles CMA  04/18/22, 08:40 EDT

## 2022-04-27 RX ORDER — SUCRALFATE 1 G/1
TABLET ORAL
Qty: 360 TABLET | Refills: 0 | Status: SHIPPED | OUTPATIENT
Start: 2022-04-27 | End: 2022-09-26

## 2022-04-27 RX ORDER — NITROGLYCERIN 0.4 MG/1
TABLET SUBLINGUAL
Qty: 75 TABLET | Refills: 0 | Status: SHIPPED | OUTPATIENT
Start: 2022-04-27 | End: 2023-01-16

## 2022-04-27 RX ORDER — AMITRIPTYLINE HYDROCHLORIDE 25 MG/1
TABLET, FILM COATED ORAL
Qty: 90 TABLET | Refills: 0 | Status: SHIPPED | OUTPATIENT
Start: 2022-04-27 | End: 2022-09-26

## 2022-04-27 NOTE — TELEPHONE ENCOUNTER
Rx Refill Note  Requested Prescriptions     Pending Prescriptions Disp Refills   • nitroglycerin (NITROSTAT) 0.4 MG SL tablet [Pharmacy Med Name: NITROGLYCERIN 0.4 MG Tablet Sublingual] 75 tablet      Sig: DISSOLVE 1 TABLET UNDER THE TONGUE EVERY 5 MINUTES AS NEEDED FOR CHEST PAIN. TAKE NO MORE THAN 3 DOSES IN 15 MINUTES.      Last office visit with prescribing clinician: 3/4/2022      Next office visit with prescribing clinician: 7/7/2022     Comprehensive Metabolic Panel (04/15/2022 12:19)         Sophia Brock, RT  04/27/22, 13:23 EDT

## 2022-04-27 NOTE — TELEPHONE ENCOUNTER
Rx Refill Note  Requested Prescriptions     Pending Prescriptions Disp Refills   • sucralfate (CARAFATE) 1 g tablet [Pharmacy Med Name: SUCRALFATE 1 GM Tablet] 360 tablet      Sig: TAKE 1 TABLET 4 TIMES A DAY BEFORE MEALS AND AT BEDTIME.   • amitriptyline (ELAVIL) 25 MG tablet [Pharmacy Med Name: AMITRIPTYLINE HCL 25 MG Tablet] 90 tablet 0     Sig: TAKE 1 TABLET AT BEDTIME      Last office visit with prescribing clinician: 3/4/2022      Next office visit with prescribing clinician: 7/7/2022     Comprehensive Metabolic Panel (04/15/2022 12:19)         Sophia Brock, RT  04/27/22, 08:08 EDT

## 2022-05-09 RX ORDER — LISINOPRIL 5 MG/1
TABLET ORAL
Qty: 90 TABLET | Refills: 1 | Status: SHIPPED | OUTPATIENT
Start: 2022-05-09 | End: 2023-03-18 | Stop reason: SDUPTHER

## 2022-05-13 ENCOUNTER — OFFICE VISIT (OUTPATIENT)
Dept: ENDOCRINOLOGY | Facility: CLINIC | Age: 77
End: 2022-05-13

## 2022-05-13 VITALS
SYSTOLIC BLOOD PRESSURE: 122 MMHG | HEART RATE: 96 BPM | WEIGHT: 147 LBS | DIASTOLIC BLOOD PRESSURE: 68 MMHG | OXYGEN SATURATION: 96 % | HEIGHT: 60 IN | BODY MASS INDEX: 28.86 KG/M2

## 2022-05-13 DIAGNOSIS — E27.49 GLUCOCORTICOID DEFICIENCY: Primary | ICD-10-CM

## 2022-05-13 DIAGNOSIS — I10 ESSENTIAL HYPERTENSION: Chronic | ICD-10-CM

## 2022-05-13 PROCEDURE — 99214 OFFICE O/P EST MOD 30 MIN: CPT | Performed by: INTERNAL MEDICINE

## 2022-05-13 PROCEDURE — 93298 REM INTERROG DEV EVAL SCRMS: CPT | Performed by: INTERNAL MEDICINE

## 2022-05-13 PROCEDURE — G2066 INTER DEVC REMOTE 30D: HCPCS | Performed by: INTERNAL MEDICINE

## 2022-05-13 NOTE — PROGRESS NOTES
Endocrine Progress Note Outpatient     Patient Care Team:  Brooklyn Contreras DO as PCP - General  Master Richard MD as Consulting Physician (Cardiology)  Banet, Duane Edward, MD as Consulting Physician (Dermatology)  Yuriy Gerard MD as Consulting Physician (Endocrinology)  Fabio Gill MD as Consulting Physician (Urology)  Shawn Elaine MD as Surgeon (Orthopedic Surgery)    Chief Complaint: Follow-up glucocorticoid deficiency    HPI: 77-year-old female with history of hypertension, osteoarthritis developed glucocorticoid deficiency after repeated steroid injections in her knees.  She failed ACTH stimulation test.  She was started on hydrocortisone 10 mg 3 times daily and she has been feeling very well and her symptoms of dizziness and syncopal episode have improved significantly.      Past Medical History:   Diagnosis Date   • Arthritis    • Bladder incontinence    • Colon polyp    • DEXA     OSTEOPENIA = 2018 (-1.3/ -1.7); 2020 (-1.7/ -1.7)   • GERD    • Glucocorticoid deficiency    • Headache    • HTN    • Intracerebral hemorrhage/ CVA    • MAMMO     NEG =2020   • Osteopenia    • Vitamin D deficiency        Social History     Socioeconomic History   • Marital status:    • Number of children: 4   • Years of education: GED   Tobacco Use   • Smoking status: Never Smoker   • Smokeless tobacco: Never Used   Vaping Use   • Vaping Use: Never used   Substance and Sexual Activity   • Alcohol use: No   • Drug use: No   • Sexual activity: Defer       Family History   Problem Relation Age of Onset   • Heart disease Mother    • Hypertension Mother    • Diabetes Father    • Heart disease Father    • Alcohol abuse Father    • Hypertension Father    • Diabetes Brother    • Heart disease Brother    • Cancer Brother 68        Prostate Cancer   • Hypertension Brother    • Diabetes Maternal Aunt    • Heart disease Maternal Grandmother    • Hypertension Maternal Grandmother    • Heart disease Maternal  Grandfather    • Hypertension Maternal Grandfather    • Liver disease Paternal Grandmother    • Hypertension Paternal Grandmother    • Heart disease Paternal Grandfather    • Hypertension Paternal Grandfather    • Dementia Sister    • Diabetes Brother        Allergies   Allergen Reactions   • Trazodone Irritability   • Boniva [Ibandronic Acid] GI Intolerance     GERD   • Dayvigo [Lemborexant] Other (See Comments)     Sleep walking       ROS:   Constitutional:  Denies fatigue, tiredness.    Eyes:  Denies change in visual acuity   HENT:  Denies nasal congestion or sore throat   Respiratory: denies cough, shortness of breath.   Cardiovascular:  denies chest pain, edema   GI:  Denies abdominal pain, nausea, vomiting.   Musculoskeletal:  Denies back pain or joint pain   Integument:  Denies dry skin and rash   Neurologic:  Denies headache, focal weakness or sensory changes   Endocrine:  Denies polyuria or polydipsia   Psychiatric:  Denies depression or anxiety      Vitals:    05/13/22 1313   BP: 122/68   Pulse: 96   SpO2: 96%     Body mass index is 28.71 kg/m².     Physical Exam:  GEN: NAD, conversant  EYES: EOMI, PERRL, no conjunctival erythema  NECK: no thyromegaly, full ROM   CV: RRR, no murmurs/rubs/gallops, no peripheral edema  LUNG: CTAB, no wheezes/rales/ronchi  SKIN: no rashes, no acanthosis  MSK: no deformities, full ROM of all extremities  NEURO: no tremors, DTR normal  PSYCH: AOX3, appropriate mood, affect normal      Results Review:     I reviewed the patient's new clinical results.    Lab Results   Component Value Date    HGBA1C 4.9 04/07/2021      Lab Results   Component Value Date    GLUCOSE 96 04/15/2022    BUN 17 04/15/2022    CREATININE 0.96 04/15/2022    EGFRIFNONA 55 (L) 02/09/2022    BCR 17.7 04/15/2022    K 4.6 04/15/2022    CO2 18.4 (L) 04/15/2022    CALCIUM 9.9 04/15/2022    ALBUMIN 3.90 04/15/2022    LABIL2 1.4 04/30/2019    AST 14 04/15/2022    ALT 9 04/15/2022    CHOL 197 04/07/2021    TRIG 127  04/07/2021     (H) 04/07/2021    HDL 57 04/07/2021     Lab Results   Component Value Date    TSH 1.980 04/06/2021         Medication Review: Reviewed.       Current Outpatient Medications:   •  albuterol (ACCUNEB) 1.25 MG/3ML nebulizer solution, Take 3 mL by nebulization Every 6 (Six) Hours As Needed for Wheezing or Shortness of Air for up to 30 doses., Disp: 120 each, Rfl: 2  •  amitriptyline (ELAVIL) 25 MG tablet, TAKE 1 TABLET AT BEDTIME, Disp: 90 tablet, Rfl: 0  •  amLODIPine (NORVASC) 10 MG tablet, Take 1 tablet by mouth Daily., Disp: 90 tablet, Rfl: 0  •  atorvastatin (LIPITOR) 80 MG tablet, TAKE 1 TABLET BY MOUTH EVERY DAY, Disp: 90 tablet, Rfl: 2  •  Calcium Carbonate-Vit D-Min (Calcium 1200) 7865-0315 MG-UNIT chewable tablet, Chew 1 tablet Daily., Disp: , Rfl:   •  clindamycin (CLEOCIN T) 1 % lotion, Apply 1 application topically to the appropriate area as directed 2 (Two) Times a Day., Disp: , Rfl:   •  Gemtesa 75 MG tablet, Take 1 tablet by mouth Daily., Disp: , Rfl:   •  hydrocortisone (CORTEF) 10 MG tablet, Take 10 mg by mouth Daily With Breakfast & Lunch., Disp: , Rfl:   •  hydrocortisone 2.5 % cream, APPLY 1 APPLICATION TOPICALLY TO THE APPROPRIATE AREA AS DIRECTED TWO TIMES A DAY, Disp: 20 g, Rfl: 0  •  lisinopril (PRINIVIL,ZESTRIL) 5 MG tablet, TAKE 1 TABLET EVERY DAY, Disp: 90 tablet, Rfl: 1  •  meclizine (ANTIVERT) 12.5 MG tablet, TAKE 1 TABLET TWICE DAILY AS NEEDED  FOR  DIZZINESS, Disp: 90 tablet, Rfl: 0  •  nitroglycerin (NITROSTAT) 0.4 MG SL tablet, DISSOLVE 1 TABLET UNDER THE TONGUE EVERY 5 MINUTES AS NEEDED FOR CHEST PAIN. TAKE NO MORE THAN 3 DOSES IN 15 MINUTES., Disp: 75 tablet, Rfl: 0  •  ondansetron (ZOFRAN) 4 MG tablet, TAKE 1 TABLET EVERY 8 HOURS AS NEEDED FOR NAUSEA OR VOMITING, Disp: 60 tablet, Rfl: 0  •  sertraline (ZOLOFT) 100 MG tablet, TAKE 1 TABLET BY MOUTH EVERY DAY AT NIGHT, Disp: 90 tablet, Rfl: 1  •  sucralfate (CARAFATE) 1 g tablet, TAKE 1 TABLET 4 TIMES A DAY BEFORE  MEALS AND AT BEDTIME., Disp: 360 tablet, Rfl: 0  •  temazepam (RESTORIL) 30 MG capsule, Take 1 capsule by mouth At Night As Needed for Sleep., Disp: 30 capsule, Rfl: 0      Assessment & Plan   1.  Glucocorticoid deficiency: Most likely secondary to articular injections.  She is feeling very well with hydrocortisone replacement.  I have advised her to continue hydrocortisone to 10 mg in the morning, 10 in the afternoon and 5 in the evening.  And will follow her clinically.  CPM.  Sick day rules discussed with her.  She is advised to double up her steroid dose when she gets sick and also she may need IV steroid dose if she is hospitalized or requiring any procedures.  She verbalized understanding.  Also advised her to get an identification that says that she is on steroids replacement.    2.  Hypertension: Well-controlled.            Yuriy Gerard MD FACE.

## 2022-05-13 NOTE — PATIENT INSTRUCTIONS
Please remember sick day rules.  Double up her steroid dose if you get sick and may need IV steroid dose if she is hospitalized or requiring any procedures.  Also get an identification that says you are on steroids.

## 2022-05-19 ENCOUNTER — OFFICE VISIT (OUTPATIENT)
Dept: CARDIOLOGY | Facility: CLINIC | Age: 77
End: 2022-05-19

## 2022-05-19 VITALS
OXYGEN SATURATION: 97 % | WEIGHT: 149 LBS | HEART RATE: 98 BPM | SYSTOLIC BLOOD PRESSURE: 123 MMHG | HEIGHT: 60 IN | BODY MASS INDEX: 29.25 KG/M2 | DIASTOLIC BLOOD PRESSURE: 83 MMHG

## 2022-05-19 DIAGNOSIS — E78.5 DYSLIPIDEMIA: ICD-10-CM

## 2022-05-19 DIAGNOSIS — R09.89 LABILE HYPERTENSION: ICD-10-CM

## 2022-05-19 DIAGNOSIS — Z95.818 STATUS POST PLACEMENT OF IMPLANTABLE LOOP RECORDER: Primary | ICD-10-CM

## 2022-05-19 PROCEDURE — 99214 OFFICE O/P EST MOD 30 MIN: CPT | Performed by: INTERNAL MEDICINE

## 2022-05-19 NOTE — PROGRESS NOTES
Subjective:     Encounter Date:05/19/2022      Patient ID: Katie Reddy is a 77 y.o. female.    Chief Complaint : Follow-up for hypertension, dyslipidemia, autonomic insufficiency, ILR    History of Present Illness        Ms. Katie Reddy has PMH of    Chest pain, cardiac cath 2/8/2022 with no CADRecurrent syncope, Biotronik ILR 4/9/2021  Uncontrolled hypertension  Dyslipidemia  Autonomic insufficiency  History of hemorrhagic occipital CVA  GERD, osteoporosis, chronic pain  Cholecystectomy, hernia repair     Here for  follow-up.  Patient is feeling better since went home.  Patient has some epigastric discomfort which is helped with PPIs.  Patient has shortness of breath and dyspnea on exertion.  Patient recently had cardiac cath in February which showed no obstructive CAD.    Patient's arterial blood pressure is 123/83, heart rate 98 bpm, O2 sat of 97% on room air.      Patient was recently in the hospital from 4/6/2021-4/11/2021 with syncope and hypertensive emergency.  Work-up here revealed negative troponin x2.  proBNP is normal at 271.  CMP for 621 was normal.  Hemoglobin A1c for 721 was 4.9.  Lipid profile revealed cholesterol 197 HDL 57  triglycerides 127.  MRI with contrast 4/7/2021 reveals no acute intracranial abnormality no acute infarct no intracranial mass or mass-effect.  Old left occipital hemorrhagic infarct  Chronic small vessel ischemic changes     Chest x-ray 4/6/2021 reveals no acute chest findings.  EKG done 4/6/2021 reviewed by me shows sinus rhythm with a rate of 76 bpm with PVCs.  Echocardiogram 4/6/2021 reviewed by me shows EF of 60 to 65% with mild AR and mild MR  Carotid Dopplers 4/8/2021 reveal mild bilateral carotid disease  Lexiscan Cardiolite 4/9/21 which is negative for ischemia.   Hemoglobin is 12, BUN/creatinine 20/0.93  A.m. cortisol was low at 1.26  Labs from 2/9/2022 revealed hemoglobin of 9.7 and MCV of 75, CMP from 4/15/2022 is  normal.     Assessment:       -Recurrent syncope  -Orthostatic hypotension  -Low a.m. cortisol/adrenal insufficiency on long-term Cortef  -History of hypertensive emergency  -History of hemorrhagic left occipital CVA  -Mild AR, mild MR  -Dyslipidemia        Recommendations / Plan:         Loop recorder interrogation 5/13/2022 revealed normal abnormality.  Patient's blood pressure currently is well controlled we will continue amlodipine, lisinopril as tolerated for hypertension  Reviewed importance of blood pressure control since patient had previous hemorrhagic CVA.  Advised patient to check blood pressure at home.  Continue statins for dyslipidemia and previous history of CVA  Continue Cortef and follow-up with endocrinology for adrenal insufficiency  Follow-up with PMD for anemia.       Procedures EKG from 2/4/2022 reviewed/interpreted by me reveals sinus tachycardia with rate of 121 bpm with PVCs and IVCD    The following portions of the patient's history were reviewed and updated as appropriate: allergies, current medications, past family history, past medical history, past social history, past surgical history and problem list.    Assessment:         MDM     Diagnosis Plan   1. Status post placement of implantable loop recorder     2. Labile hypertension     3. Dyslipidemia            Plan:               Past Medical History:  Past Medical History:   Diagnosis Date   • Arthritis    • Bladder incontinence    • Colon polyp    • DEXA     OSTEOPENIA = 2018 (-1.3/ -1.7); 2020 (-1.7/ -1.7)   • GERD    • Glucocorticoid deficiency    • Headache    • HTN    • Intracerebral hemorrhage/ CVA    • MAMMO     NEG =2020   • Osteopenia    • Vitamin D deficiency      Past Surgical History:  Past Surgical History:   Procedure Laterality Date   • APPENDECTOMY     • BLADDER SURGERY      bladder stimulator placement/ REMOVAL   • BREAST CYST EXCISION     • BREAST LUMPECTOMY Left 1973    NEG   • BUNIONECTOMY Right 5/29/2020     Procedure: BUNIONECTOMY DEVONTE;  Surgeon: MARISSA Em DPM;  Location: [facility] MAIN OR;  Service: Podiatry;  Laterality: Right;   • CARDIAC CATHETERIZATION     • CARDIAC CATHETERIZATION N/A 2/8/2022    Procedure: Left Heart Cath, possible pci;  Surgeon: Master Richard MD;  Location: [facility] CATH INVASIVE LOCATION;  Service: Cardiovascular;  Laterality: N/A;   • CARPAL TUNNEL RELEASE Right 1980   • CHOLECYSTECTOMY     • COLON SURGERY      hemorrhoid banding   • COLONOSCOPY  2017 2017/ 2019 = TA, jessica 2024   Dr. Mackey   • COLONOSCOPY N/A 2/5/2022    Procedure: COLONOSCOPY;  Surgeon: Raymundo Li MD;  Location: [facility] ENDOSCOPY;  Service: Gastroenterology;  Laterality: N/A;  post op: Ischemic colitis   • ENDOSCOPY N/A 2/5/2022    Procedure: ESOPHAGOGASTRODUODENOSCOPY with biopsy x 1 area;  Surgeon: Raymundo Li MD;  Location: [facility] ENDOSCOPY;  Service: Gastroenterology;  Laterality: N/A;  post op: erosive esophagitis, hiatal hernia, Nonerosive gastritis   • EYE SURGERY     • HERNIA REPAIR      Umbilical removal   • HYSTERECTOMY  1970   • SUBTOTAL HYSTERECTOMY     • UMBILICAL HERNIA REPAIR        Allergies:  Allergies   Allergen Reactions   • Trazodone Irritability   • Boniva [Ibandronic Acid] GI Intolerance     GERD   • Dayvigo [Lemborexant] Other (See Comments)     Sleep walking     Home Meds:  Current Meds:     Current Outpatient Medications:   •  albuterol (ACCUNEB) 1.25 MG/3ML nebulizer solution, Take 3 mL by nebulization Every 6 (Six) Hours As Needed for Wheezing or Shortness of Air for up to 30 doses., Disp: 120 each, Rfl: 2  •  amitriptyline (ELAVIL) 25 MG tablet, TAKE 1 TABLET AT BEDTIME, Disp: 90 tablet, Rfl: 0  •  amLODIPine (NORVASC) 10 MG tablet, Take 1 tablet by mouth Daily., Disp: 90 tablet, Rfl: 0  •  atorvastatin (LIPITOR) 80 MG tablet, TAKE 1 TABLET BY MOUTH EVERY DAY, Disp: 90 tablet, Rfl: 2  •  Calcium Carbonate-Vit D-Min (Calcium 1200) 4127-3191 MG-UNIT chewable  tablet, Chew 1 tablet Daily., Disp: , Rfl:   •  clindamycin (CLEOCIN T) 1 % lotion, Apply 1 application topically to the appropriate area as directed 2 (Two) Times a Day., Disp: , Rfl:   •  Gemtesa 75 MG tablet, Take 1 tablet by mouth Daily., Disp: , Rfl:   •  hydrocortisone (CORTEF) 10 MG tablet, Take 10 mg by mouth Daily With Breakfast & Lunch., Disp: , Rfl:   •  hydrocortisone 2.5 % cream, APPLY 1 APPLICATION TOPICALLY TO THE APPROPRIATE AREA AS DIRECTED TWO TIMES A DAY, Disp: 20 g, Rfl: 0  •  lisinopril (PRINIVIL,ZESTRIL) 5 MG tablet, TAKE 1 TABLET EVERY DAY, Disp: 90 tablet, Rfl: 1  •  meclizine (ANTIVERT) 12.5 MG tablet, TAKE 1 TABLET TWICE DAILY AS NEEDED  FOR  DIZZINESS, Disp: 90 tablet, Rfl: 0  •  nitroglycerin (NITROSTAT) 0.4 MG SL tablet, DISSOLVE 1 TABLET UNDER THE TONGUE EVERY 5 MINUTES AS NEEDED FOR CHEST PAIN. TAKE NO MORE THAN 3 DOSES IN 15 MINUTES., Disp: 75 tablet, Rfl: 0  •  sertraline (ZOLOFT) 100 MG tablet, TAKE 1 TABLET BY MOUTH EVERY DAY AT NIGHT, Disp: 90 tablet, Rfl: 1  •  sucralfate (CARAFATE) 1 g tablet, TAKE 1 TABLET 4 TIMES A DAY BEFORE MEALS AND AT BEDTIME., Disp: 360 tablet, Rfl: 0  •  temazepam (RESTORIL) 30 MG capsule, Take 1 capsule by mouth At Night As Needed for Sleep., Disp: 30 capsule, Rfl: 0  •  ondansetron (ZOFRAN) 4 MG tablet, TAKE 1 TABLET EVERY 8 HOURS AS NEEDED FOR NAUSEA OR VOMITING, Disp: 60 tablet, Rfl: 0  Social History:   Social History     Tobacco Use   • Smoking status: Never Smoker   • Smokeless tobacco: Never Used   Substance Use Topics   • Alcohol use: No      Family History:  Family History   Problem Relation Age of Onset   • Heart disease Mother    • Hypertension Mother    • Diabetes Father    • Heart disease Father    • Alcohol abuse Father    • Hypertension Father    • Diabetes Brother    • Heart disease Brother    • Cancer Brother 68        Prostate Cancer   • Hypertension Brother    • Diabetes Maternal Aunt    • Heart disease Maternal Grandmother    •  "Hypertension Maternal Grandmother    • Heart disease Maternal Grandfather    • Hypertension Maternal Grandfather    • Liver disease Paternal Grandmother    • Hypertension Paternal Grandmother    • Heart disease Paternal Grandfather    • Hypertension Paternal Grandfather    • Dementia Sister    • Diabetes Brother               Review of Systems   Constitutional: Positive for malaise/fatigue.   Cardiovascular: Positive for chest pain. Negative for leg swelling and palpitations.   Respiratory: Positive for shortness of breath.    Skin: Negative for rash.   Neurological: Positive for dizziness, headaches, light-headedness and numbness.     All other systems are negative         Objective:     Physical Exam  /83   Pulse 98   Ht 152.4 cm (60\")   Wt 67.6 kg (149 lb)   SpO2 97%   BMI 29.10 kg/m²   General:  Appears in no acute distress  Eyes: Sclera is anicteric,  conjunctiva is clear   HEENT:  No JVD.  No carotid bruits  Respiratory: Respirations regular and unlabored at rest.  Clear to auscultation  Cardiovascular: S1,S2 Regular rate and rhythm. No murmur, rub or gallop auscultated.   Extremities: No digital clubbing or cyanosis, no edema  Skin: Color pink. Skin warm and dry to touch. No rashes  No xanthoma  Neuro: Alert and awake.    Lab Reviewed:         Master Richard MD  5/28/2022 07:27 EDT      EMR Dragon/Transcription:   \"Dictated utilizing Dragon dictation\".        "

## 2022-06-13 RX ORDER — AMLODIPINE BESYLATE 10 MG/1
TABLET ORAL
Qty: 90 TABLET | Refills: 0 | Status: SHIPPED | OUTPATIENT
Start: 2022-06-13 | End: 2022-10-24

## 2022-07-15 ENCOUNTER — OFFICE VISIT (OUTPATIENT)
Dept: FAMILY MEDICINE CLINIC | Facility: CLINIC | Age: 77
End: 2022-07-15

## 2022-07-15 VITALS
DIASTOLIC BLOOD PRESSURE: 68 MMHG | OXYGEN SATURATION: 98 % | HEART RATE: 81 BPM | BODY MASS INDEX: 27.88 KG/M2 | TEMPERATURE: 97.7 F | HEIGHT: 60 IN | WEIGHT: 142 LBS | RESPIRATION RATE: 16 BRPM | SYSTOLIC BLOOD PRESSURE: 111 MMHG

## 2022-07-15 DIAGNOSIS — R21 SKIN RASH: ICD-10-CM

## 2022-07-15 DIAGNOSIS — L98.9 ECZEMATOUS SKIN LESIONS: Primary | ICD-10-CM

## 2022-07-15 PROCEDURE — 99213 OFFICE O/P EST LOW 20 MIN: CPT | Performed by: FAMILY MEDICINE

## 2022-07-15 NOTE — PROGRESS NOTES
Subjective   Katie Reddy is a 77 y.o. female.     Chief Complaint   Patient presents with   • Rash     Patient states that she has a rash everywhere. Patient states that it hurt sometimes. Patient first noticed it a month ago. Patient states that she hasn't been able to see  at his new location yet.          Current Outpatient Medications:   •  albuterol (ACCUNEB) 1.25 MG/3ML nebulizer solution, Take 3 mL by nebulization Every 6 (Six) Hours As Needed for Wheezing or Shortness of Air for up to 30 doses., Disp: 120 each, Rfl: 2  •  amitriptyline (ELAVIL) 25 MG tablet, TAKE 1 TABLET AT BEDTIME, Disp: 90 tablet, Rfl: 0  •  amLODIPine (NORVASC) 10 MG tablet, TAKE 1 TABLET EVERY DAY, Disp: 90 tablet, Rfl: 0  •  atorvastatin (LIPITOR) 80 MG tablet, TAKE 1 TABLET BY MOUTH EVERY DAY, Disp: 90 tablet, Rfl: 2  •  Calcium Carbonate-Vit D-Min (Calcium 1200) 6845-0074 MG-UNIT chewable tablet, Chew 1 tablet Daily., Disp: , Rfl:   •  clindamycin (CLEOCIN T) 1 % lotion, Apply 1 application topically to the appropriate area as directed 2 (Two) Times a Day., Disp: , Rfl:   •  Gemtesa 75 MG tablet, Take 1 tablet by mouth Daily., Disp: , Rfl:   •  hydrocortisone (CORTEF) 10 MG tablet, Take 10 mg by mouth Daily With Breakfast & Lunch., Disp: , Rfl:   •  hydrocortisone 2.5 % cream, APPLY 1 APPLICATION TOPICALLY TO THE APPROPRIATE AREA AS DIRECTED TWO TIMES A DAY, Disp: 20 g, Rfl: 0  •  lisinopril (PRINIVIL,ZESTRIL) 5 MG tablet, TAKE 1 TABLET EVERY DAY, Disp: 90 tablet, Rfl: 1  •  meclizine (ANTIVERT) 12.5 MG tablet, TAKE 1 TABLET TWICE DAILY AS NEEDED  FOR  DIZZINESS, Disp: 90 tablet, Rfl: 0  •  nitroglycerin (NITROSTAT) 0.4 MG SL tablet, DISSOLVE 1 TABLET UNDER THE TONGUE EVERY 5 MINUTES AS NEEDED FOR CHEST PAIN. TAKE NO MORE THAN 3 DOSES IN 15 MINUTES., Disp: 75 tablet, Rfl: 0  •  ondansetron (ZOFRAN) 4 MG tablet, TAKE 1 TABLET EVERY 8 HOURS AS NEEDED FOR NAUSEA OR VOMITING, Disp: 60 tablet, Rfl: 0  •  sertraline (ZOLOFT)  100 MG tablet, TAKE 1 TABLET BY MOUTH EVERY DAY AT NIGHT, Disp: 90 tablet, Rfl: 1  •  sucralfate (CARAFATE) 1 g tablet, TAKE 1 TABLET 4 TIMES A DAY BEFORE MEALS AND AT BEDTIME., Disp: 360 tablet, Rfl: 0  •  temazepam (RESTORIL) 30 MG capsule, Take 1 capsule by mouth At Night As Needed for Sleep., Disp: 30 capsule, Rfl: 0    Past Medical History:   Diagnosis Date   • Arthritis    • Bladder incontinence    • Colon polyp    • DEXA     OSTEOPENIA = 2018 (-1.3/ -1.7); 2020 (-1.7/ -1.7)   • GERD    • Glucocorticoid deficiency    • Headache    • HTN    • Intracerebral hemorrhage/ CVA    • MAMMO     NEG =2020   • Osteopenia    • Vitamin D deficiency        Past Surgical History:   Procedure Laterality Date   • APPENDECTOMY     • BLADDER SURGERY      bladder stimulator placement/ REMOVAL   • BREAST CYST EXCISION     • BREAST LUMPECTOMY Left 1973    NEG   • BUNIONECTOMY Right 05/29/2020    Procedure: BUNIONECTOMY DEVONTE;  Surgeon: MARISSA Em DPM;  Location: [facility] MAIN OR;  Service: Podiatry;  Laterality: Right;   • CARDIAC CATHETERIZATION     • CARDIAC CATHETERIZATION N/A 02/08/2022    Procedure: Left Heart Cath, possible pci;  Surgeon: Master Richard MD;  Location: [facility] CATH INVASIVE LOCATION;  Service: Cardiovascular;  Laterality: N/A;   • CARPAL TUNNEL RELEASE Right 1980   • CHOLECYSTECTOMY     • COLON SURGERY      hemorrhoid banding   • COLONOSCOPY  2017 2017/ 2019 = TAjessica 2024   Dr. Mackey   • COLONOSCOPY N/A 02/05/2022    NEG= 2022   • ENDOSCOPY N/A 02/05/2022 2022EGD= erosive esophagitis, hiatal hernia, Nonerosive gastritis   • EYE SURGERY     • HERNIA REPAIR      Umbilical removal   • HYSTERECTOMY  1970   • SUBTOTAL HYSTERECTOMY     • UMBILICAL HERNIA REPAIR         Family History   Problem Relation Age of Onset   • Heart disease Mother    • Hypertension Mother    • Diabetes Father    • Heart disease Father    • Alcohol abuse Father    • Hypertension Father    • Diabetes Brother    • Heart  disease Brother    • Cancer Brother 68        Prostate Cancer   • Hypertension Brother    • Diabetes Maternal Aunt    • Heart disease Maternal Grandmother    • Hypertension Maternal Grandmother    • Heart disease Maternal Grandfather    • Hypertension Maternal Grandfather    • Liver disease Paternal Grandmother    • Hypertension Paternal Grandmother    • Heart disease Paternal Grandfather    • Hypertension Paternal Grandfather    • Dementia Sister    • Diabetes Brother        Social History     Socioeconomic History   • Marital status:    • Number of children: 4   • Years of education: GED   Tobacco Use   • Smoking status: Never Smoker   • Smokeless tobacco: Never Used   Vaping Use   • Vaping Use: Never used   Substance and Sexual Activity   • Alcohol use: No   • Drug use: No   • Sexual activity: Defer       78 y/o C female here w/ c/o's persistent rash     Patient states that she has a rash everywhere x 1 mo. Patient states that it hurt sometimes. Patient first noticed it a month ago. Patient states that she hasn't been able to see derm--- Dr. Saenz ----at his new location yet.  Admits she has spots on b/l UE and LE and one on her chest; Pt starts they start as a red bump and then it gets scaly/ raised;  Pt tried neosporin oint/ steroid crm w/o success      Denies new lotions/ soaps/ detergent and has no spots on her face or back and just one on upper chest (none on abd)       The following portions of the patient's history were reviewed and updated as appropriate: allergies, current medications, past family history, past medical history, past social history, past surgical history and problem list.    Review of Systems   Constitutional: Negative for activity change, appetite change, fever, unexpected weight gain and unexpected weight loss.   Skin: Positive for color change, dry skin, rash and skin lesions.       Vitals:    07/15/22 1329   BP: 111/68   Pulse: 81   Resp: 16   Temp: 97.7 °F (36.5 °C)   SpO2:  98%       Objective   Physical Exam  Vitals and nursing note reviewed.   Constitutional:       General: She is not in acute distress.     Appearance: She is not ill-appearing or toxic-appearing.   HENT:      Head: Normocephalic and atraumatic.   Pulmonary:      Effort: Pulmonary effort is normal.   Skin:     Findings: Lesion present.             Comments: + scattered raised <1cm nodular areas w/ scale on b/l dorsal aspects of UE and b/l distal LE > prox LE   Neurological:      General: No focal deficit present.      Mental Status: She is alert and oriented to person, place, and time.      Cranial Nerves: No cranial nerve deficit.   Psychiatric:         Attention and Perception: Attention and perception normal.         Mood and Affect: Mood and affect normal.         Speech: Speech normal.         Behavior: Behavior normal. Behavior is cooperative.         Thought Content: Thought content normal.         Cognition and Memory: Cognition and memory normal.         Judgment: Judgment normal.           Assessment & Plan   Diagnoses and all orders for this visit:    1. Eczematous skin lesions (Primary)  -     Ambulatory Referral to Dermatology    2. Skin rash  -     Ambulatory Referral to Dermatology        Derm referral for poss Bx ---eval and tx

## 2022-07-20 ENCOUNTER — TELEPHONE (OUTPATIENT)
Dept: FAMILY MEDICINE CLINIC | Facility: CLINIC | Age: 77
End: 2022-07-20

## 2022-07-20 NOTE — TELEPHONE ENCOUNTER
----- Message from Sophia Brock RT sent at 7/20/2022  8:01 AM EDT -----  Overdue fasting labs    ----- Message -----  From: SYSTEM  Sent: 7/20/2022   1:14 AM EDT  To: Adrianne Cabezas [facility]

## 2022-07-28 ENCOUNTER — CLINICAL SUPPORT (OUTPATIENT)
Dept: FAMILY MEDICINE CLINIC | Facility: CLINIC | Age: 77
End: 2022-07-28

## 2022-07-28 DIAGNOSIS — E78.2 MIXED HYPERLIPIDEMIA: ICD-10-CM

## 2022-07-28 DIAGNOSIS — M85.89 OSTEOPENIA OF MULTIPLE SITES: ICD-10-CM

## 2022-07-28 DIAGNOSIS — E53.8 LOW SERUM VITAMIN B12: ICD-10-CM

## 2022-07-28 DIAGNOSIS — I10 ELEVATED BLOOD PRESSURE READING WITH DIAGNOSIS OF HYPERTENSION: ICD-10-CM

## 2022-07-28 PROCEDURE — 80053 COMPREHEN METABOLIC PANEL: CPT | Performed by: FAMILY MEDICINE

## 2022-07-28 PROCEDURE — 82607 VITAMIN B-12: CPT | Performed by: FAMILY MEDICINE

## 2022-07-28 PROCEDURE — 82306 VITAMIN D 25 HYDROXY: CPT | Performed by: FAMILY MEDICINE

## 2022-07-28 PROCEDURE — 80061 LIPID PANEL: CPT | Performed by: FAMILY MEDICINE

## 2022-07-28 PROCEDURE — 36415 COLL VENOUS BLD VENIPUNCTURE: CPT | Performed by: FAMILY MEDICINE

## 2022-07-28 NOTE — PROGRESS NOTES
Venipuncture performed in R HAND by Sophia Brock,   with good hemostasis. Patient tolerated well. 07/28/22 Sophia Brock, RT

## 2022-07-29 LAB
25(OH)D3 SERPL-MCNC: 33.1 NG/ML (ref 30–100)
ALBUMIN SERPL-MCNC: 4.5 G/DL (ref 3.5–5.2)
ALBUMIN/GLOB SERPL: 1.8 G/DL
ALP SERPL-CCNC: 98 U/L (ref 39–117)
ALT SERPL W P-5'-P-CCNC: 11 U/L (ref 1–33)
ANION GAP SERPL CALCULATED.3IONS-SCNC: 12 MMOL/L (ref 5–15)
AST SERPL-CCNC: 22 U/L (ref 1–32)
BILIRUB SERPL-MCNC: 0.3 MG/DL (ref 0–1.2)
BUN SERPL-MCNC: 22 MG/DL (ref 8–23)
BUN/CREAT SERPL: 21 (ref 7–25)
CALCIUM SPEC-SCNC: 10.1 MG/DL (ref 8.6–10.5)
CHLORIDE SERPL-SCNC: 107 MMOL/L (ref 98–107)
CHOLEST SERPL-MCNC: 267 MG/DL (ref 0–200)
CO2 SERPL-SCNC: 21 MMOL/L (ref 22–29)
CREAT SERPL-MCNC: 1.05 MG/DL (ref 0.57–1)
EGFRCR SERPLBLD CKD-EPI 2021: 55.2 ML/MIN/1.73
GLOBULIN UR ELPH-MCNC: 2.5 GM/DL
GLUCOSE SERPL-MCNC: 86 MG/DL (ref 65–99)
HDLC SERPL-MCNC: 40 MG/DL (ref 40–60)
LDLC SERPL CALC-MCNC: 199 MG/DL (ref 0–100)
LDLC/HDLC SERPL: 4.92 {RATIO}
POTASSIUM SERPL-SCNC: 4.7 MMOL/L (ref 3.5–5.2)
PROT SERPL-MCNC: 7 G/DL (ref 6–8.5)
SODIUM SERPL-SCNC: 140 MMOL/L (ref 136–145)
TRIGL SERPL-MCNC: 152 MG/DL (ref 0–150)
VIT B12 BLD-MCNC: 384 PG/ML (ref 211–946)
VLDLC SERPL-MCNC: 28 MG/DL (ref 5–40)

## 2022-08-04 DIAGNOSIS — E78.2 MIXED HYPERLIPIDEMIA: Primary | ICD-10-CM

## 2022-08-04 RX ORDER — ATORVASTATIN CALCIUM 80 MG/1
80 TABLET, FILM COATED ORAL DAILY
Qty: 90 TABLET | Refills: 1 | Status: SHIPPED | OUTPATIENT
Start: 2022-08-04 | End: 2022-12-12

## 2022-08-30 PROCEDURE — G2066 INTER DEVC REMOTE 30D: HCPCS | Performed by: INTERNAL MEDICINE

## 2022-08-30 PROCEDURE — 93298 REM INTERROG DEV EVAL SCRMS: CPT | Performed by: INTERNAL MEDICINE

## 2022-09-07 RX ORDER — SERTRALINE HYDROCHLORIDE 100 MG/1
TABLET, FILM COATED ORAL
Qty: 90 TABLET | Refills: 1 | Status: SHIPPED | OUTPATIENT
Start: 2022-09-07

## 2022-09-21 ENCOUNTER — HOSPITAL ENCOUNTER (OUTPATIENT)
Dept: ULTRASOUND IMAGING | Facility: HOSPITAL | Age: 77
End: 2022-09-21

## 2022-09-26 RX ORDER — SUCRALFATE 1 G/1
TABLET ORAL
Qty: 360 TABLET | Refills: 0 | Status: SHIPPED | OUTPATIENT
Start: 2022-09-26

## 2022-09-26 RX ORDER — AMITRIPTYLINE HYDROCHLORIDE 25 MG/1
TABLET, FILM COATED ORAL
Qty: 90 TABLET | Refills: 0 | Status: SHIPPED | OUTPATIENT
Start: 2022-09-26

## 2022-09-26 NOTE — TELEPHONE ENCOUNTER
Rx Refill Note  Requested Prescriptions     Pending Prescriptions Disp Refills   • amitriptyline (ELAVIL) 25 MG tablet [Pharmacy Med Name: AMITRIPTYLINE HCL 25 MG Tablet] 90 tablet 0     Sig: TAKE 1 TABLET AT BEDTIME   • sucralfate (CARAFATE) 1 g tablet [Pharmacy Med Name: SUCRALFATE 1 GM Tablet] 360 tablet 0     Sig: TAKE 1 TABLET FOUR TIMES DAILY BEFORE MEALS AND AT BEDTIME      Last office visit with prescribing clinician: 7/15/2022      Next office visit with prescribing clinician: Visit date not found     Lipid Panel (07/28/2022 09:39)         Sophia Brock, RT  09/26/22, 11:16 EDT

## 2022-09-28 ENCOUNTER — HOSPITAL ENCOUNTER (OUTPATIENT)
Dept: ULTRASOUND IMAGING | Facility: HOSPITAL | Age: 77
Discharge: HOME | End: 2022-09-28
Admitting: FAMILY MEDICINE

## 2022-09-28 DIAGNOSIS — Z13.6 SCREENING FOR CARDIOVASCULAR CONDITION: ICD-10-CM

## 2022-09-28 PROCEDURE — 93799 UNLISTED CV SVC/PROCEDURE: CPT

## 2022-09-30 LAB
BH CV XLRA MEAS - PAD LEFT ABI PT: 1.1
BH CV XLRA MEAS - PAD LEFT ARM: 139 MMHG
BH CV XLRA MEAS - PAD LEFT LEG PT: 150 MMHG
BH CV XLRA MEAS - PAD RIGHT ABI PT: 1.4
BH CV XLRA MEAS - PAD RIGHT ARM: 122 MMHG
BH CV XLRA MEAS - PAD RIGHT LEG PT: 191 MMHG
BH CV XLRA MEAS - PROX AO DIAM: 1.9 CM
BH CV XLRA MEAS LEFT ICA/CCA RATIO: 0.91
BH CV XLRA MEAS LEFT MID CCA PSV: 85.3 CM/SEC
BH CV XLRA MEAS LEFT MID ICA PSV: 77.3 CM/SEC
BH CV XLRA MEAS RIGHT ICA/CCA RATIO: 0.78
BH CV XLRA MEAS RIGHT MID CCA PSV: 114.1 CM/SEC
BH CV XLRA MEAS RIGHT MID ICA PSV: 89.4 CM/SEC

## 2022-10-20 DIAGNOSIS — F51.01 PRIMARY INSOMNIA: ICD-10-CM

## 2022-10-24 RX ORDER — AMLODIPINE BESYLATE 10 MG/1
TABLET ORAL
Qty: 90 TABLET | Refills: 0 | Status: SHIPPED | OUTPATIENT
Start: 2022-10-24 | End: 2023-04-04

## 2022-10-28 RX ORDER — ONDANSETRON 4 MG/1
TABLET, FILM COATED ORAL
Qty: 60 TABLET | Refills: 0 | Status: SHIPPED | OUTPATIENT
Start: 2022-10-28

## 2022-10-28 RX ORDER — TEMAZEPAM 30 MG/1
CAPSULE ORAL
Qty: 30 CAPSULE | Refills: 0 | Status: SHIPPED | OUTPATIENT
Start: 2022-10-28

## 2022-10-28 RX ORDER — MECLIZINE HCL 12.5 MG/1
TABLET ORAL
Qty: 90 TABLET | Refills: 0 | Status: SHIPPED | OUTPATIENT
Start: 2022-10-28 | End: 2023-01-23

## 2022-10-28 NOTE — TELEPHONE ENCOUNTER
Rx Refill Note  Requested Prescriptions     Pending Prescriptions Disp Refills   • temazepam (RESTORIL) 30 MG capsule [Pharmacy Med Name: TEMAZEPAM 30 MG Capsule] 30 capsule      Sig: TAKE 1 CAPSULE EVERY NIGHT AS NEEDED FOR SLEEP   • meclizine (ANTIVERT) 12.5 MG tablet [Pharmacy Med Name: MECLIZINE HCL 12.5 MG Tablet] 90 tablet 0     Sig: TAKE 1 TABLET TWICE DAILY AS NEEDED  FOR  DIZZINESS   • ondansetron (ZOFRAN) 4 MG tablet [Pharmacy Med Name: ONDANSETRON HYDROCHLORIDE 4 MG Tablet] 60 tablet 0     Sig: TAKE 1 TABLET EVERY 8 HOURS AS NEEDED FOR NAUSEA OR VOMITING      Last office visit with prescribing clinician: 7/15/2022      Next office visit with prescribing clinician: 10/24/2022     Comprehensive Metabolic Panel (07/28/2022 09:39)  Lipid Panel (07/28/2022 09:39)         Kaylee Napoles CMA  10/28/22, 12:55 EDT

## 2022-10-28 NOTE — TELEPHONE ENCOUNTER
PATIENT IS CHECKING THE STATUS OF THESE MEDICATION REFILLS.     PATIENT STATED SHE IS ALMOST OUT OF THESE MEDICATIONS.     PLEASE CALL TO DISCUSS WITH PATIENT.

## 2022-10-31 PROCEDURE — 93298 REM INTERROG DEV EVAL SCRMS: CPT | Performed by: INTERNAL MEDICINE

## 2022-10-31 PROCEDURE — G2066 INTER DEVC REMOTE 30D: HCPCS | Performed by: INTERNAL MEDICINE

## 2022-11-09 ENCOUNTER — TELEPHONE (OUTPATIENT)
Dept: PODIATRY | Facility: CLINIC | Age: 77
End: 2022-11-09

## 2022-11-09 NOTE — TELEPHONE ENCOUNTER
Provider: EVERARDO  Caller: FELTON SALAZAR  Relationship to Patient: PATIENT    Phone Number: [phone]  Reason for Call: EST PATIENT OF DR MCGEE HAS NEW PROBLEM, LT GREAT TOE TURNING INWARD BECAUSE OF BUNION AND CAUSING PATIENT A LOT OF PAIN  PATIENT IS RECEPTIVE TO SEEING JOJO HUANG BUT HER FIRST AVAILABLE IS STILL 11.29.22 AND PATIENT WOULD LIKE TO BE SEEN BEFORE THEN

## 2022-11-10 NOTE — TELEPHONE ENCOUNTER
CALLED PATIENT AND EXPLAINED TO HER THAT DR MCGEE IS OUT OF OFFICE UNTIL MID DEC BUT I CAN SCHEDULE HER AN APPOINTMENT WITH [facility]. INFORMED PATIENT THAT SINCE SHE WANTS SURGERY [facility] CAN NOT DO THAT BUT SHE DO HAVE THE OPTION OF REACHING OUT TO ANOTHER PODIATRIST. PATIENT WANTS TO WAIT FOR DR MCGEE, SCHEDULED FOR FIRST AVAILABLE.

## 2022-12-01 PROCEDURE — G2066 INTER DEVC REMOTE 30D: HCPCS | Performed by: INTERNAL MEDICINE

## 2022-12-01 PROCEDURE — 93298 REM INTERROG DEV EVAL SCRMS: CPT | Performed by: INTERNAL MEDICINE

## 2022-12-12 RX ORDER — ATORVASTATIN CALCIUM 80 MG/1
TABLET, FILM COATED ORAL
Qty: 90 TABLET | Refills: 1 | Status: SHIPPED | OUTPATIENT
Start: 2022-12-12

## 2022-12-20 ENCOUNTER — OFFICE VISIT (OUTPATIENT)
Dept: PODIATRY | Facility: CLINIC | Age: 77
End: 2022-12-20

## 2022-12-20 VITALS — HEIGHT: 60 IN | HEART RATE: 63 BPM | WEIGHT: 142 LBS | BODY MASS INDEX: 27.88 KG/M2 | OXYGEN SATURATION: 97 %

## 2022-12-20 DIAGNOSIS — M79.672 LEFT FOOT PAIN: Primary | ICD-10-CM

## 2022-12-20 DIAGNOSIS — M21.612 BUNION, LEFT: ICD-10-CM

## 2022-12-20 PROCEDURE — 99214 OFFICE O/P EST MOD 30 MIN: CPT | Performed by: PODIATRIST

## 2022-12-20 NOTE — PROGRESS NOTES
12/20/2022  Foot and Ankle Surgery - Established Patient/Follow-up  Provider: Dr. Austin Em DPM  Location: [facility]    Subjective:  Katie Reddy is a 77 y.o. female.     Chief Complaint   Patient presents with   • Left Foot - Pain, Bunions   • Follow-up     MARISSA CarvajalnickDO, 07/28/2022       HPI: The patient presents today for a new issue involving her left foot. She was last seen in 2020 for her right foot.    She reports that her left foot is pushing against her 1st and 2nd toes. She states that the only thing that bothers this is when she puts a sock on or something that pushes it. She states that she has been wearing mesh shoes. She states that she does not put shoes on unless she is going far from her house, she adds that she ambulates in her home with socks or house slippers on. She states that she would rather have surgery. She reports that she has 3 different kinds of shoes that are painful and causes little blisters.    She states that she was having a little bit of problems with her heart years ago. Her cardiologist is Master Patrick    Allergies   Allergen Reactions   • Trazodone Irritability   • Boniva [Ibandronic Acid] GI Intolerance     GERD   • Dayvigo [Lemborexant] Other (See Comments)     Sleep walking       Current Outpatient Medications on File Prior to Visit   Medication Sig Dispense Refill   • albuterol (ACCUNEB) 1.25 MG/3ML nebulizer solution Take 3 mL by nebulization Every 6 (Six) Hours As Needed for Wheezing or Shortness of Air for up to 30 doses. 120 each 2   • amitriptyline (ELAVIL) 25 MG tablet TAKE 1 TABLET AT BEDTIME 90 tablet 0   • amLODIPine (NORVASC) 10 MG tablet TAKE 1 TABLET EVERY DAY 90 tablet 0   • atorvastatin (LIPITOR) 80 MG tablet TAKE 1 TABLET EVERY DAY 90 tablet 1   • Calcium Carbonate-Vit D-Min (Calcium 1200) 9934-2108 MG-UNIT chewable tablet Chew 1 tablet Daily.     • clindamycin (CLEOCIN T) 1 % lotion Apply 1 application topically to the  "appropriate area as directed 2 (Two) Times a Day.     • Gemtesa 75 MG tablet Take 1 tablet by mouth Daily.     • hydrocortisone (CORTEF) 10 MG tablet Take 10 mg by mouth Daily With Breakfast & Lunch.     • hydrocortisone 2.5 % cream APPLY 1 APPLICATION TOPICALLY TO THE APPROPRIATE AREA AS DIRECTED TWO TIMES A DAY 20 g 0   • lisinopril (PRINIVIL,ZESTRIL) 5 MG tablet TAKE 1 TABLET EVERY DAY 90 tablet 1   • meclizine (ANTIVERT) 12.5 MG tablet TAKE 1 TABLET TWICE DAILY AS NEEDED  FOR  DIZZINESS 90 tablet 0   • nitroglycerin (NITROSTAT) 0.4 MG SL tablet DISSOLVE 1 TABLET UNDER THE TONGUE EVERY 5 MINUTES AS NEEDED FOR CHEST PAIN. TAKE NO MORE THAN 3 DOSES IN 15 MINUTES. 75 tablet 0   • ondansetron (ZOFRAN) 4 MG tablet TAKE 1 TABLET EVERY 8 HOURS AS NEEDED FOR NAUSEA OR VOMITING 60 tablet 0   • sertraline (ZOLOFT) 100 MG tablet TAKE 1 TABLET EVERY NIGHT 90 tablet 1   • sucralfate (CARAFATE) 1 g tablet TAKE 1 TABLET FOUR TIMES DAILY BEFORE MEALS AND AT BEDTIME 360 tablet 0   • temazepam (RESTORIL) 30 MG capsule TAKE 1 CAPSULE EVERY NIGHT AS NEEDED FOR SLEEP 30 capsule 0     No current facility-administered medications on file prior to visit.       Objective   Pulse 63   Ht 152.4 cm (60\")   Wt 64.4 kg (142 lb)   SpO2 97%   BMI 27.73 kg/m²     Foot/Ankle Exam:       General:   Appearance: appears stated age and healthy    Orientation: AAOx3    Affect: appropriate      VASCULAR      Right Foot Vascularity   Normal vascular exam    Dorsalis pedis:  2+  Posterior tibial:  2+  Skin Temperature: warm    Edema Grading:  None  CFT:  < 3 seconds  Pedal Hair Growth:  Present  Varicosities: none       Left Foot Vascularity   Normal vascular exam    Dorsalis pedis:  2+  Posterior tibial:  2+  Skin Temperature: warm    Edema Grading:  None  CFT:  < 3 seconds  Pedal Hair Growth:  Present  Varicosities: none       MUSCULOSKELETAL      Right Foot Musculoskeletal   Ecchymosis:  None  Tenderness: none       Left Foot Musculoskeletal "   Ecchymosis:  None  Tenderness: none       MUSCLE STRENGTH     Right Foot Muscle Strength   Normal strength    Foot dorsiflexion:  5  Foot plantar flexion:  5  Foot inversion:  5  Foot eversion:  5     DERMATOLOGIC     Right Foot Dermatologic   Skin: skin intact    Nails: normal       Left Foot Dermatologic   Skin: skin intact    Nails: normal       Moderate bunion deformity with discomfort involving the medial aspect of the first metatarsophalangeal joint. No other gross deformity. Mild equinus contracture with knee extended and flexed.        Assessment & Plan   Diagnoses and all orders for this visit:    1. Left foot pain (Primary)  -     XR Foot 3+ View Left    2. Bunion, left  -     XR Foot 3+ View Left  -     CBC (No Diff); Future  -     Basic Metabolic Panel; Future  -     ECG 12 Lead; Future  -     XR Chest 2 View; Future  -     Case Request; Standing  -     ceFAZolin (ANCEF) 2 g in sodium chloride 0.9 % 100 mL IVPB  -     Case Request    Other orders  -     Follow Anesthesia Guidelines / Protocol; Future  -     Obtain Informed Consent; Future  -     Provide NPO Instructions to Patient; Future  -     Chlorhexidine Skin Prep; Future  -     Follow Anesthesia Guidelines / Protocol; Standing  -     Verify / Perform Chlorhexidine Skin Prep; Standing  -     Verify / Perform Chlorhexidine Skin Prep if Indicated (If Not Already Completed); Standing      Patient returns to the office with new problems involving her left foot.  Patient complains of increased pain involving the first metatarsal phalangeal joint consistent with her bunion deformity.  Patient has had these issues involving her right foot and underwent surgery approximately 2 years.  She states that she is doing quite well and would like to consider the same option for the left foot.  She has tried and failed multiple conservative treatments.  Recent imaging was reviewed showing moderate to severe bunion deformity.  She previously underwent minimally  invasive approach for her right lower extremity is done well.  I do feel that this would be an appropriate approach for her left foot.  We discussed the procedure, risk, goals, and recovery.  She understands that she will require decreased activity and nonweightbearing after surgery.  Patient would like to proceed in the near future.  Greater than 30 minutes was spent before, during, and after evaluation for patient care.      Orders Placed This Encounter   Procedures   • XR Foot 3+ View Left     Order Specific Question:   Reason for Exam:     Answer:   left foot pain and bunion of great toe        room 15       wb     Order Specific Question:   Does this patient have a diabetic monitoring/medication delivering device on?     Answer:   No     Order Specific Question:   Release to patient     Answer:   Routine Release   • XR Chest 2 View     Standing Status:   Future     Standing Expiration Date:   12/21/2023     Order Specific Question:   Reason for Exam:     Answer:   Preop   • CBC (No Diff)     Standing Status:   Future     Standing Expiration Date:   12/21/2023     Order Specific Question:   Release to patient     Answer:   Routine Release   • Basic Metabolic Panel     Standing Status:   Future     Standing Expiration Date:   12/21/2023     Order Specific Question:   Release to patient     Answer:   Routine Release   • Obtain Informed Consent     Standing Status:   Future     Order Specific Question:   Informed Consent Given For     Answer:   Bunionectomy with distal metatarsal osteotomy and internal fixation to the left foot with attempted minimally invasive approach   • Provide NPO Instructions to Patient     Standing Status:   Future   • Chlorhexidine Skin Prep     Chlorhexidine Skin Prep and Instructions For All Patients Having A Procedure Requiring an Outward Incision if Not Allergic. If Allergic, Give Antibacterial Skin Wipes and Instructions. Do Not Use For Facial Cases or on Any Mucus Membranes.      Standing Status:   Future   • ECG 12 Lead     Standing Status:   Future     Standing Expiration Date:   12/21/2023     Order Specific Question:   Reason for Exam:     Answer:   Preop          Note is dictated utilizing voice recognition software. Unfortunately this leads to occasional typographical errors. I apologize in advance if the situation occurs. If questions occur please do not hesitate to call our office.    Transcribed from ambient dictation for MARISSA Em DPM by Raisa Avery.  12/20/22   14:32 EST    Patient or patient representative verbalized consent to the visit recording.  I have personally performed the services described in this document as transcribed by the above individual, and it is both accurate and complete.

## 2022-12-20 NOTE — H&P (VIEW-ONLY)
12/20/2022  Foot and Ankle Surgery - Established Patient/Follow-up  Provider: Dr. Austin Em DPM  Location: [facility]    Subjective:  Katie Reddy is a 77 y.o. female.     Chief Complaint   Patient presents with   • Left Foot - Pain, Bunions   • Follow-up     MARISSA CarvajalnickDO, 07/28/2022       HPI: The patient presents today for a new issue involving her left foot. She was last seen in 2020 for her right foot.    She reports that her left foot is pushing against her 1st and 2nd toes. She states that the only thing that bothers this is when she puts a sock on or something that pushes it. She states that she has been wearing mesh shoes. She states that she does not put shoes on unless she is going far from her house, she adds that she ambulates in her home with socks or house slippers on. She states that she would rather have surgery. She reports that she has 3 different kinds of shoes that are painful and causes little blisters.    She states that she was having a little bit of problems with her heart years ago. Her cardiologist is Master Patrick    Allergies   Allergen Reactions   • Trazodone Irritability   • Boniva [Ibandronic Acid] GI Intolerance     GERD   • Dayvigo [Lemborexant] Other (See Comments)     Sleep walking       Current Outpatient Medications on File Prior to Visit   Medication Sig Dispense Refill   • albuterol (ACCUNEB) 1.25 MG/3ML nebulizer solution Take 3 mL by nebulization Every 6 (Six) Hours As Needed for Wheezing or Shortness of Air for up to 30 doses. 120 each 2   • amitriptyline (ELAVIL) 25 MG tablet TAKE 1 TABLET AT BEDTIME 90 tablet 0   • amLODIPine (NORVASC) 10 MG tablet TAKE 1 TABLET EVERY DAY 90 tablet 0   • atorvastatin (LIPITOR) 80 MG tablet TAKE 1 TABLET EVERY DAY 90 tablet 1   • Calcium Carbonate-Vit D-Min (Calcium 1200) 1256-1767 MG-UNIT chewable tablet Chew 1 tablet Daily.     • clindamycin (CLEOCIN T) 1 % lotion Apply 1 application topically to the  appropriate area as directed 2 (Two) Times a Day.     • Gemtesa 75 MG tablet Take 1 tablet by mouth Daily.     • hydrocortisone (CORTEF) 10 MG tablet Take 10 mg by mouth Daily With Breakfast & Lunch.     • hydrocortisone 2.5 % cream APPLY 1 APPLICATION TOPICALLY TO THE APPROPRIATE AREA AS DIRECTED TWO TIMES A DAY 20 g 0   • lisinopril (PRINIVIL,ZESTRIL) 5 MG tablet TAKE 1 TABLET EVERY DAY 90 tablet 1   • meclizine (ANTIVERT) 12.5 MG tablet TAKE 1 TABLET TWICE DAILY AS NEEDED  FOR  DIZZINESS 90 tablet 0   • nitroglycerin (NITROSTAT) 0.4 MG SL tablet DISSOLVE 1 TABLET UNDER THE TONGUE EVERY 5 MINUTES AS NEEDED FOR CHEST PAIN. TAKE NO MORE THAN 3 DOSES IN 15 MINUTES. 75 tablet 0   • ondansetron (ZOFRAN) 4 MG tablet TAKE 1 TABLET EVERY 8 HOURS AS NEEDED FOR NAUSEA OR VOMITING 60 tablet 0   • sertraline (ZOLOFT) 100 MG tablet TAKE 1 TABLET EVERY NIGHT 90 tablet 1   • sucralfate (CARAFATE) 1 g tablet TAKE 1 TABLET FOUR TIMES DAILY BEFORE MEALS AND AT BEDTIME 360 tablet 0   • temazepam (RESTORIL) 30 MG capsule TAKE 1 CAPSULE EVERY NIGHT AS NEEDED FOR SLEEP 30 capsule 0     No current facility-administered medications on file prior to visit.       Objective   Pulse 63   Ht 152.4 cm (60\")   Wt 64.4 kg (142 lb)   SpO2 97%   BMI 27.73 kg/m²     Foot/Ankle Exam:       General:   Appearance: appears stated age and healthy    Orientation: AAOx3    Affect: appropriate      VASCULAR      Right Foot Vascularity   Normal vascular exam    Dorsalis pedis:  2+  Posterior tibial:  2+  Skin Temperature: warm    Edema Grading:  None  CFT:  < 3 seconds  Pedal Hair Growth:  Present  Varicosities: none       Left Foot Vascularity   Normal vascular exam    Dorsalis pedis:  2+  Posterior tibial:  2+  Skin Temperature: warm    Edema Grading:  None  CFT:  < 3 seconds  Pedal Hair Growth:  Present  Varicosities: none       MUSCULOSKELETAL      Right Foot Musculoskeletal   Ecchymosis:  None  Tenderness: none       Left Foot Musculoskeletal    Ecchymosis:  None  Tenderness: none       MUSCLE STRENGTH     Right Foot Muscle Strength   Normal strength    Foot dorsiflexion:  5  Foot plantar flexion:  5  Foot inversion:  5  Foot eversion:  5     DERMATOLOGIC     Right Foot Dermatologic   Skin: skin intact    Nails: normal       Left Foot Dermatologic   Skin: skin intact    Nails: normal       Moderate bunion deformity with discomfort involving the medial aspect of the first metatarsophalangeal joint. No other gross deformity. Mild equinus contracture with knee extended and flexed.        Assessment & Plan   Diagnoses and all orders for this visit:    1. Left foot pain (Primary)  -     XR Foot 3+ View Left    2. Bunion, left  -     XR Foot 3+ View Left  -     CBC (No Diff); Future  -     Basic Metabolic Panel; Future  -     ECG 12 Lead; Future  -     XR Chest 2 View; Future  -     Case Request; Standing  -     ceFAZolin (ANCEF) 2 g in sodium chloride 0.9 % 100 mL IVPB  -     Case Request    Other orders  -     Follow Anesthesia Guidelines / Protocol; Future  -     Obtain Informed Consent; Future  -     Provide NPO Instructions to Patient; Future  -     Chlorhexidine Skin Prep; Future  -     Follow Anesthesia Guidelines / Protocol; Standing  -     Verify / Perform Chlorhexidine Skin Prep; Standing  -     Verify / Perform Chlorhexidine Skin Prep if Indicated (If Not Already Completed); Standing      Patient returns to the office with new problems involving her left foot.  Patient complains of increased pain involving the first metatarsal phalangeal joint consistent with her bunion deformity.  Patient has had these issues involving her right foot and underwent surgery approximately 2 years.  She states that she is doing quite well and would like to consider the same option for the left foot.  She has tried and failed multiple conservative treatments.  Recent imaging was reviewed showing moderate to severe bunion deformity.  She previously underwent minimally  invasive approach for her right lower extremity is done well.  I do feel that this would be an appropriate approach for her left foot.  We discussed the procedure, risk, goals, and recovery.  She understands that she will require decreased activity and nonweightbearing after surgery.  Patient would like to proceed in the near future.  Greater than 30 minutes was spent before, during, and after evaluation for patient care.      Orders Placed This Encounter   Procedures   • XR Foot 3+ View Left     Order Specific Question:   Reason for Exam:     Answer:   left foot pain and bunion of great toe        room 15       wb     Order Specific Question:   Does this patient have a diabetic monitoring/medication delivering device on?     Answer:   No     Order Specific Question:   Release to patient     Answer:   Routine Release   • XR Chest 2 View     Standing Status:   Future     Standing Expiration Date:   12/21/2023     Order Specific Question:   Reason for Exam:     Answer:   Preop   • CBC (No Diff)     Standing Status:   Future     Standing Expiration Date:   12/21/2023     Order Specific Question:   Release to patient     Answer:   Routine Release   • Basic Metabolic Panel     Standing Status:   Future     Standing Expiration Date:   12/21/2023     Order Specific Question:   Release to patient     Answer:   Routine Release   • Obtain Informed Consent     Standing Status:   Future     Order Specific Question:   Informed Consent Given For     Answer:   Bunionectomy with distal metatarsal osteotomy and internal fixation to the left foot with attempted minimally invasive approach   • Provide NPO Instructions to Patient     Standing Status:   Future   • Chlorhexidine Skin Prep     Chlorhexidine Skin Prep and Instructions For All Patients Having A Procedure Requiring an Outward Incision if Not Allergic. If Allergic, Give Antibacterial Skin Wipes and Instructions. Do Not Use For Facial Cases or on Any Mucus Membranes.      Standing Status:   Future   • ECG 12 Lead     Standing Status:   Future     Standing Expiration Date:   12/21/2023     Order Specific Question:   Reason for Exam:     Answer:   Preop          Note is dictated utilizing voice recognition software. Unfortunately this leads to occasional typographical errors. I apologize in advance if the situation occurs. If questions occur please do not hesitate to call our office.    Transcribed from ambient dictation for MARISSA Em DPM by Raisa Avery.  12/20/22   14:32 EST    Patient or patient representative verbalized consent to the visit recording.  I have personally performed the services described in this document as transcribed by the above individual, and it is both accurate and complete.

## 2022-12-21 PROBLEM — M21.612 BUNION, LEFT: Status: ACTIVE | Noted: 2022-12-21

## 2022-12-22 ENCOUNTER — LAB (OUTPATIENT)
Dept: LAB | Facility: HOSPITAL | Age: 77
End: 2022-12-22

## 2022-12-22 ENCOUNTER — HOSPITAL ENCOUNTER (OUTPATIENT)
Dept: GENERAL RADIOLOGY | Facility: HOSPITAL | Age: 77
Discharge: HOME | End: 2022-12-22

## 2022-12-22 ENCOUNTER — HOSPITAL ENCOUNTER (OUTPATIENT)
Dept: CARDIOLOGY | Facility: HOSPITAL | Age: 77
Discharge: HOME | End: 2022-12-22

## 2022-12-22 DIAGNOSIS — M21.612 BUNION, LEFT: ICD-10-CM

## 2022-12-22 LAB
ANION GAP SERPL CALCULATED.3IONS-SCNC: 8 MMOL/L (ref 5–15)
BUN SERPL-MCNC: 21 MG/DL (ref 8–23)
BUN/CREAT SERPL: 21 (ref 7–25)
CALCIUM SPEC-SCNC: 9.7 MG/DL (ref 8.6–10.5)
CHLORIDE SERPL-SCNC: 109 MMOL/L (ref 98–107)
CO2 SERPL-SCNC: 20 MMOL/L (ref 22–29)
CREAT SERPL-MCNC: 1 MG/DL (ref 0.57–1)
DEPRECATED RDW RBC AUTO: 39.8 FL (ref 37–54)
EGFRCR SERPLBLD CKD-EPI 2021: 58.5 ML/MIN/1.73
ERYTHROCYTE [DISTWIDTH] IN BLOOD BY AUTOMATED COUNT: 13.7 % (ref 12.3–15.4)
GLUCOSE SERPL-MCNC: 81 MG/DL (ref 65–99)
HCT VFR BLD AUTO: 36.8 % (ref 34–46.6)
HGB BLD-MCNC: 12.2 G/DL (ref 12–15.9)
MCH RBC QN AUTO: 26.4 PG (ref 26.6–33)
MCHC RBC AUTO-ENTMCNC: 33.2 G/DL (ref 31.5–35.7)
MCV RBC AUTO: 79.7 FL (ref 79–97)
PLATELET # BLD AUTO: 208 10*3/MM3 (ref 140–450)
PMV BLD AUTO: 8.9 FL (ref 6–12)
POTASSIUM SERPL-SCNC: 4.6 MMOL/L (ref 3.5–5.2)
RBC # BLD AUTO: 4.62 10*6/MM3 (ref 3.77–5.28)
SODIUM SERPL-SCNC: 137 MMOL/L (ref 136–145)
WBC NRBC COR # BLD: 5.67 10*3/MM3 (ref 3.4–10.8)

## 2022-12-22 PROCEDURE — 71046 X-RAY EXAM CHEST 2 VIEWS: CPT

## 2022-12-22 PROCEDURE — 36415 COLL VENOUS BLD VENIPUNCTURE: CPT

## 2022-12-22 PROCEDURE — 85027 COMPLETE CBC AUTOMATED: CPT

## 2022-12-22 PROCEDURE — 93005 ELECTROCARDIOGRAM TRACING: CPT | Performed by: PODIATRIST

## 2022-12-22 PROCEDURE — 93010 ELECTROCARDIOGRAM REPORT: CPT | Performed by: INTERNAL MEDICINE

## 2022-12-22 PROCEDURE — 80048 BASIC METABOLIC PNL TOTAL CA: CPT

## 2022-12-23 LAB — QT INTERVAL: 419 MS

## 2022-12-30 ENCOUNTER — ANESTHESIA EVENT (OUTPATIENT)
Dept: PERIOP | Facility: HOSPITAL | Age: 77
End: 2022-12-30
Payer: MEDICARE

## 2022-12-30 ENCOUNTER — APPOINTMENT (OUTPATIENT)
Dept: GENERAL RADIOLOGY | Facility: HOSPITAL | Age: 77
End: 2022-12-30
Payer: MEDICARE

## 2022-12-30 ENCOUNTER — HOSPITAL ENCOUNTER (OUTPATIENT)
Facility: HOSPITAL | Age: 77
Setting detail: HOSPITAL OUTPATIENT SURGERY
Discharge: HOME | End: 2022-12-30
Attending: PODIATRIST | Admitting: PODIATRIST
Payer: MEDICARE

## 2022-12-30 ENCOUNTER — ANESTHESIA (OUTPATIENT)
Dept: PERIOP | Facility: HOSPITAL | Age: 77
End: 2022-12-30
Payer: MEDICARE

## 2022-12-30 VITALS
SYSTOLIC BLOOD PRESSURE: 104 MMHG | RESPIRATION RATE: 16 BRPM | DIASTOLIC BLOOD PRESSURE: 85 MMHG | WEIGHT: 141 LBS | HEART RATE: 84 BPM | BODY MASS INDEX: 27.68 KG/M2 | TEMPERATURE: 97.8 F | HEIGHT: 60 IN | OXYGEN SATURATION: 94 %

## 2022-12-30 DIAGNOSIS — M21.612 BUNION, LEFT: ICD-10-CM

## 2022-12-30 LAB — QT INTERVAL: 438 MS

## 2022-12-30 PROCEDURE — C1713 ANCHOR/SCREW BN/BN,TIS/BN: HCPCS | Performed by: PODIATRIST

## 2022-12-30 PROCEDURE — 93010 ELECTROCARDIOGRAM REPORT: CPT | Performed by: INTERNAL MEDICINE

## 2022-12-30 PROCEDURE — 25010000002 PROPOFOL 10 MG/ML EMULSION

## 2022-12-30 PROCEDURE — 93005 ELECTROCARDIOGRAM TRACING: CPT | Performed by: ANESTHESIOLOGY

## 2022-12-30 PROCEDURE — 28296 COR HLX VLGS DSTL MTAR OSTEO: CPT | Performed by: PODIATRIST

## 2022-12-30 PROCEDURE — 73620 X-RAY EXAM OF FOOT: CPT

## 2022-12-30 PROCEDURE — 0 LIDOCAINE 1 % SOLUTION 10 ML VIAL: Performed by: PODIATRIST

## 2022-12-30 PROCEDURE — 25010000002 ONDANSETRON PER 1 MG

## 2022-12-30 PROCEDURE — 25010000002 FENTANYL CITRATE (PF) 100 MCG/2ML SOLUTION

## 2022-12-30 PROCEDURE — 76000 FLUOROSCOPY <1 HR PHYS/QHP: CPT

## 2022-12-30 PROCEDURE — 25010000002 CEFAZOLIN PER 500 MG: Performed by: PODIATRIST

## 2022-12-30 PROCEDURE — 25010000002 DEXAMETHASONE PER 1 MG

## 2022-12-30 DEVICE — IMPLANTABLE DEVICE: Type: IMPLANTABLE DEVICE | Site: FOOT | Status: FUNCTIONAL

## 2022-12-30 RX ORDER — EPHEDRINE SULFATE 5 MG/ML
5 INJECTION INTRAVENOUS ONCE AS NEEDED
Status: DISCONTINUED | OUTPATIENT
Start: 2022-12-30 | End: 2022-12-30 | Stop reason: HOSPADM

## 2022-12-30 RX ORDER — LIDOCAINE HYDROCHLORIDE 10 MG/ML
0.5 INJECTION, SOLUTION INFILTRATION; PERINEURAL ONCE AS NEEDED
Status: DISCONTINUED | OUTPATIENT
Start: 2022-12-30 | End: 2022-12-30 | Stop reason: HOSPADM

## 2022-12-30 RX ORDER — IPRATROPIUM BROMIDE AND ALBUTEROL SULFATE 2.5; .5 MG/3ML; MG/3ML
3 SOLUTION RESPIRATORY (INHALATION) ONCE AS NEEDED
Status: DISCONTINUED | OUTPATIENT
Start: 2022-12-30 | End: 2022-12-30 | Stop reason: HOSPADM

## 2022-12-30 RX ORDER — SODIUM CHLORIDE 0.9 % (FLUSH) 0.9 %
10 SYRINGE (ML) INJECTION AS NEEDED
Status: DISCONTINUED | OUTPATIENT
Start: 2022-12-30 | End: 2022-12-30 | Stop reason: HOSPADM

## 2022-12-30 RX ORDER — HYDROCODONE BITARTRATE AND ACETAMINOPHEN 7.5; 325 MG/1; MG/1
1 TABLET ORAL EVERY 6 HOURS PRN
Qty: 28 TABLET | Refills: 0 | Status: SHIPPED | OUTPATIENT
Start: 2022-12-30

## 2022-12-30 RX ORDER — FENTANYL CITRATE 50 UG/ML
INJECTION, SOLUTION INTRAMUSCULAR; INTRAVENOUS AS NEEDED
Status: DISCONTINUED | OUTPATIENT
Start: 2022-12-30 | End: 2022-12-30 | Stop reason: SURG

## 2022-12-30 RX ORDER — HYDROCODONE BITARTRATE AND ACETAMINOPHEN 7.5; 325 MG/1; MG/1
1 TABLET ORAL ONCE AS NEEDED
Status: DISCONTINUED | OUTPATIENT
Start: 2022-12-30 | End: 2022-12-30 | Stop reason: HOSPADM

## 2022-12-30 RX ORDER — LIDOCAINE HYDROCHLORIDE 20 MG/ML
INJECTION, SOLUTION EPIDURAL; INFILTRATION; INTRACAUDAL; PERINEURAL AS NEEDED
Status: DISCONTINUED | OUTPATIENT
Start: 2022-12-30 | End: 2022-12-30 | Stop reason: SURG

## 2022-12-30 RX ORDER — HYDRALAZINE HYDROCHLORIDE 20 MG/ML
5 INJECTION INTRAMUSCULAR; INTRAVENOUS
Status: DISCONTINUED | OUTPATIENT
Start: 2022-12-30 | End: 2022-12-30 | Stop reason: HOSPADM

## 2022-12-30 RX ORDER — PROPOFOL 10 MG/ML
VIAL (ML) INTRAVENOUS AS NEEDED
Status: DISCONTINUED | OUTPATIENT
Start: 2022-12-30 | End: 2022-12-30 | Stop reason: SURG

## 2022-12-30 RX ORDER — FLUMAZENIL 0.1 MG/ML
0.1 INJECTION INTRAVENOUS AS NEEDED
Status: DISCONTINUED | OUTPATIENT
Start: 2022-12-30 | End: 2022-12-30 | Stop reason: HOSPADM

## 2022-12-30 RX ORDER — SODIUM CHLORIDE, SODIUM LACTATE, POTASSIUM CHLORIDE, CALCIUM CHLORIDE 600; 310; 30; 20 MG/100ML; MG/100ML; MG/100ML; MG/100ML
1000 INJECTION, SOLUTION INTRAVENOUS CONTINUOUS
Status: DISCONTINUED | OUTPATIENT
Start: 2022-12-30 | End: 2022-12-30 | Stop reason: HOSPADM

## 2022-12-30 RX ORDER — ONDANSETRON 2 MG/ML
INJECTION INTRAMUSCULAR; INTRAVENOUS AS NEEDED
Status: DISCONTINUED | OUTPATIENT
Start: 2022-12-30 | End: 2022-12-30 | Stop reason: SURG

## 2022-12-30 RX ORDER — PHENYLEPHRINE HCL IN 0.9% NACL 1 MG/10 ML
SYRINGE (ML) INTRAVENOUS AS NEEDED
Status: DISCONTINUED | OUTPATIENT
Start: 2022-12-30 | End: 2022-12-30 | Stop reason: SURG

## 2022-12-30 RX ORDER — LABETALOL HYDROCHLORIDE 5 MG/ML
5 INJECTION, SOLUTION INTRAVENOUS
Status: DISCONTINUED | OUTPATIENT
Start: 2022-12-30 | End: 2022-12-30 | Stop reason: HOSPADM

## 2022-12-30 RX ORDER — EPHEDRINE SULFATE 5 MG/ML
INJECTION INTRAVENOUS AS NEEDED
Status: DISCONTINUED | OUTPATIENT
Start: 2022-12-30 | End: 2022-12-30 | Stop reason: SURG

## 2022-12-30 RX ORDER — NALOXONE HCL 0.4 MG/ML
0.4 VIAL (ML) INJECTION AS NEEDED
Status: DISCONTINUED | OUTPATIENT
Start: 2022-12-30 | End: 2022-12-30 | Stop reason: HOSPADM

## 2022-12-30 RX ORDER — DEXAMETHASONE SODIUM PHOSPHATE 4 MG/ML
INJECTION, SOLUTION INTRA-ARTICULAR; INTRALESIONAL; INTRAMUSCULAR; INTRAVENOUS; SOFT TISSUE AS NEEDED
Status: DISCONTINUED | OUTPATIENT
Start: 2022-12-30 | End: 2022-12-30 | Stop reason: SURG

## 2022-12-30 RX ADMIN — Medication 200 MCG: at 11:31

## 2022-12-30 RX ADMIN — Medication 200 MCG: at 11:16

## 2022-12-30 RX ADMIN — EPHEDRINE SULFATE 10 MG: 5 INJECTION INTRAVENOUS at 11:29

## 2022-12-30 RX ADMIN — Medication 100 MCG: at 11:22

## 2022-12-30 RX ADMIN — Medication 100 MCG: at 11:48

## 2022-12-30 RX ADMIN — Medication 100 MCG: at 11:18

## 2022-12-30 RX ADMIN — FENTANYL CITRATE 50 MCG: 50 INJECTION INTRAMUSCULAR; INTRAVENOUS at 11:06

## 2022-12-30 RX ADMIN — FENTANYL CITRATE 25 MCG: 50 INJECTION INTRAMUSCULAR; INTRAVENOUS at 12:01

## 2022-12-30 RX ADMIN — DEXAMETHASONE SODIUM PHOSPHATE 4 MG: 4 INJECTION, SOLUTION INTRAMUSCULAR; INTRAVENOUS at 11:13

## 2022-12-30 RX ADMIN — Medication 100 MCG: at 11:20

## 2022-12-30 RX ADMIN — Medication 200 MCG: at 11:38

## 2022-12-30 RX ADMIN — Medication 100 MCG: at 11:43

## 2022-12-30 RX ADMIN — LABETALOL HYDROCHLORIDE 5 MG: 5 INJECTION, SOLUTION INTRAVENOUS at 12:11

## 2022-12-30 RX ADMIN — Medication 100 MCG: at 11:13

## 2022-12-30 RX ADMIN — EPHEDRINE SULFATE 10 MG: 5 INJECTION INTRAVENOUS at 11:31

## 2022-12-30 RX ADMIN — Medication 100 MCG: at 11:32

## 2022-12-30 RX ADMIN — EPHEDRINE SULFATE 5 MG: 5 INJECTION INTRAVENOUS at 11:32

## 2022-12-30 RX ADMIN — CEFAZOLIN 2 G: 2 INJECTION, POWDER, FOR SOLUTION INTRAMUSCULAR; INTRAVENOUS at 11:11

## 2022-12-30 RX ADMIN — EPHEDRINE SULFATE 5 MG: 5 INJECTION INTRAVENOUS at 11:24

## 2022-12-30 RX ADMIN — EPHEDRINE SULFATE 5 MG: 5 INJECTION INTRAVENOUS at 11:21

## 2022-12-30 RX ADMIN — Medication 100 MCG: at 11:26

## 2022-12-30 RX ADMIN — Medication 100 MCG: at 11:23

## 2022-12-30 RX ADMIN — SODIUM CHLORIDE, POTASSIUM CHLORIDE, SODIUM LACTATE AND CALCIUM CHLORIDE 1000 ML: 600; 310; 30; 20 INJECTION, SOLUTION INTRAVENOUS at 08:09

## 2022-12-30 RX ADMIN — LIDOCAINE HYDROCHLORIDE 100 MG: 20 INJECTION, SOLUTION EPIDURAL; INFILTRATION; INTRACAUDAL; PERINEURAL at 11:06

## 2022-12-30 RX ADMIN — Medication 100 MCG: at 11:36

## 2022-12-30 RX ADMIN — Medication 200 MCG: at 11:30

## 2022-12-30 RX ADMIN — FENTANYL CITRATE 25 MCG: 50 INJECTION INTRAMUSCULAR; INTRAVENOUS at 11:51

## 2022-12-30 RX ADMIN — EPHEDRINE SULFATE 5 MG: 5 INJECTION INTRAVENOUS at 11:27

## 2022-12-30 RX ADMIN — PROPOFOL 150 MG: 10 INJECTION, EMULSION INTRAVENOUS at 11:06

## 2022-12-30 RX ADMIN — EPHEDRINE SULFATE 10 MG: 5 INJECTION INTRAVENOUS at 11:26

## 2022-12-30 RX ADMIN — Medication 100 MCG: at 11:11

## 2022-12-30 RX ADMIN — Medication 100 MCG: at 11:45

## 2022-12-30 RX ADMIN — ONDANSETRON 4 MG: 2 INJECTION INTRAMUSCULAR; INTRAVENOUS at 11:28

## 2022-12-30 RX ADMIN — Medication 100 MCG: at 11:28

## 2022-12-30 NOTE — ANESTHESIA PREPROCEDURE EVALUATION
Anesthesia Evaluation     Patient summary reviewed and Nursing notes reviewed   no history of anesthetic complications:  NPO Solid Status: > 8 hours  NPO Liquid Status: > 8 hours           Airway   Mallampati: II  TM distance: >3 FB  Neck ROM: full  No difficulty expected  Dental      Pulmonary     breath sounds clear to auscultation  (+) asthma,  Cardiovascular     ECG reviewed  Rhythm: regular  Rate: normal    (+) hypertension, angina, hyperlipidemia,       Neuro/Psych  (+) psychiatric history,    GI/Hepatic/Renal/Endo    (+)  GERD,      Musculoskeletal (-) negative ROS    Abdominal     Abdomen: soft.   Substance History - negative use     OB/GYN negative ob/gyn ROS         Other - negative ROS                       Anesthesia Plan    ASA 3     general     intravenous induction     Anesthetic plan, risks, benefits, and alternatives have been provided, discussed and informed consent has been obtained with: patient.    Plan discussed with CAA.        CODE STATUS:

## 2022-12-30 NOTE — OP NOTE
Operative Note   Foot and Ankle Surgery   Provider: Dr. Austin Em   Location: [facility]      Procedure:  1.  Minimally invasive bunionectomy with distal metatarsal osteotomy and internal fixation, left    Pre-operative Diagnosis:   1.  Hallux valgus, left  2.  Chronic foot pain, left    Post-operative Diagnosis: Same    Surgeon: Austin Em    Assistant: Duy Barron PGY-3    Anesthesia: General    Implants: 4.0 and 3.0 mm cannulated chamfered screw    Findings: No unexpected findings    Specimen: None    Blood Loss: Less than 5cc    Complications: None    Post Op Plan: Discharge home.  Strict nonweightbearing activity.  Follow-up with me in 2 weeks    Summary:    Patient is a 77-year-old female that presents for surgery involving her left foot.  She has been seen in office for pain involving the left first metatarsophalangeal joint consistent with bunion deformity.  Imaging was reviewed and discussed with her in office.  She has had similar issues involving her right foot and underwent minimally invasive bunionectomy.  She has done well and would like to consider the same treatment approach for her left foot.  Given the findings, I do feel that this is appropriate.  We did review the risks of minimally invasive approach including numbness, burning, and tingling to the surgical site.  We did review risk of delayed or nonunion and potentially the need for additional surgery.    Procedure, risks, complications, and goals were discussed with the patient at bedside.  Risks include but are not limited to infection, complications from anesthesia (including death), chronic pain or numbness, hematoma/seroma, deep vein thrombosis, wound complications, and potential for additional surgical procedures.  Patient understands and elects to proceed with surgery at this time. Informed consent was obtained before proceeding to the operating suite.  All questions were answered to the patient's satisfaction. No  guarantees or assurances were given or implied.    Procedure:     Patient was brought to the operating room and placed in the operative table in supine position.  Once adequate general anesthesia was administered, a pneumatic tourniquet was placed about the patient's left thigh.  A preoperative block was performed utilizing 10 cc of a one-to-one mixture of 1% lidocaine and 0.5% Marcaine plain.  The left lower extremity was scrubbed prepped and draped in usual sterile fashion.  Limb was elevated and exsanguinated pneumatic tourniquet was inflated to 300 mmHg.  A formal timeout was conducted prior skin incision.     Attention was initially directed to the 1st interspace.  A linear longitudinal incision was performed over the lateral aspect of the first metatarsophalangeal joint region.  Sharp dissection was performed to the level of the adductor hallucis tendon the tendon was identified and isolated.  The tendon was transected using a #15 blade.  The fibular sesamoid was freed from the underlying connective tissue.  Once the lateral release was fully completed, that hallux was noted to drift into a more medial corrected position.     Attention was then directed to the medial aspect of the foot.  Under fluoroscopic guidance, the surgical neck of the first metatarsal was identified.  An incision was performed at this level.  Blunt dissection was performed to the level of bone preserving the dorsal medial cutaneous nerve.  With retraction in place, an osteotomy was performed perpendicular to the second metatarsal and the weightbearing surface.  Osteotomy was performed with use of fluoroscopy throughout the duration of the case.  Once the capital fragment was free, the digit was distracted allowing for lateral translation of the capital fragment and reducing the bunion deformity.  Adequate reduction was achieved and the capital fragment was maintained in place with a guide wire which was advanced from distal to proximal  from a lateral to medial orientation allowing for placement of the 4.0 mm cannulated screw.  A second wire was then introduced from a proximal medial to distal lateral orientation across the capital fragment.  Imaging was checked both AP and lateral to ensure proper placement of the wires.  Definitive fixation was achieved utilizing a 4.0 millimeter screw through a small stab incision medially.  The screw was placed without complication.  A second small stab incision was performed over the distal wire.  Blunt dissection was performed to bone.  The 3.0 mm cannulated screw was placed.  Final imaging was performed showing excellent reduction of the bunion deformity and stable internal fixation.  The wounds were irrigated with copious amounts normal saline.  Subcutaneous structures were closed with 4-0 Vicryl and the skin was repaired with 3-0 nylon sutures.  The incisions were dressed Xeroform followed by sterile compressive dressings. The tourniquet was released and a prompt hyperemic response was noted to the digits. The patient tolerated the procedure and anesthesia well.  The patient is transferred from the operating room to the recovery room with neurovascular status intact to the operative limb.      Dr. Austin Em DPM  [facility]  [phone]    Note is dictated utilizing voice recognition software. Unfortunately this leads to occasional typographical errors. I apologize in advance if the situation occurs. If questions occur please do not hesitate to call our office.

## 2022-12-30 NOTE — ANESTHESIA POSTPROCEDURE EVALUATION
Patient: Katie Reddy    Procedure Summary     Date: 12/30/22 Room / Location: [facility] OR 07 / [facility] MAIN OR    Anesthesia Start: 1101 Anesthesia Stop:     Procedure: BUNIONECTOMY DEVONTE -MINIMALLY INVASIVE with distal metatarsal osteotomy and internal fixation (Left: Foot) Diagnosis:       Bunion, left      (Bunion, left [M21.612])    Surgeons: MARISSA Em DPM Provider:     Anesthesia Type: general ASA Status: 3          Anesthesia Type: general    Vitals  Vitals Value Taken Time   /63 12/30/22 1237   Temp 98.1 °F (36.7 °C) 12/30/22 1220   Pulse 81 12/30/22 1238   Resp 15 12/30/22 1235   SpO2 92 % 12/30/22 1238   Vitals shown include unvalidated device data.        Post Anesthesia Care and Evaluation    Patient location during evaluation: PACU  Patient participation: complete - patient participated  Level of consciousness: awake  Pain management: adequate    Airway patency: patent  Anesthetic complications: No anesthetic complications  PONV Status: controlled  Cardiovascular status: acceptable  Respiratory status: acceptable  Hydration status: acceptable

## 2023-01-01 PROCEDURE — G2066 INTER DEVC REMOTE 30D: HCPCS | Performed by: INTERNAL MEDICINE

## 2023-01-01 PROCEDURE — 93298 REM INTERROG DEV EVAL SCRMS: CPT | Performed by: INTERNAL MEDICINE

## 2023-01-12 ENCOUNTER — OFFICE VISIT (OUTPATIENT)
Dept: PODIATRY | Facility: CLINIC | Age: 78
End: 2023-01-12
Payer: MEDICARE

## 2023-01-12 VITALS — BODY MASS INDEX: 27.68 KG/M2 | WEIGHT: 141 LBS | HEIGHT: 60 IN | RESPIRATION RATE: 20 BRPM

## 2023-01-12 DIAGNOSIS — M79.672 LEFT FOOT PAIN: Primary | ICD-10-CM

## 2023-01-12 DIAGNOSIS — M21.612 BUNION, LEFT: ICD-10-CM

## 2023-01-12 PROCEDURE — 99024 POSTOP FOLLOW-UP VISIT: CPT | Performed by: PODIATRIST

## 2023-01-12 NOTE — PROGRESS NOTES
01/12/2023  Foot and Ankle Surgery - Established Patient/Follow-up  Provider: Dr. Austin Em DPM  Location: [facility]    Subjective:  Katie Reddy is a 77 y.o. female.     Chief Complaint   Patient presents with   • Left Foot - Post-op     12/30/2022   Minimally invasive bunionectomy with distal metatarsal osteotomy and internal fixation, left   • Post-op     JF Contreras do 7/28/2022       HPI:    The patient is a 77-year-old female who presents to the clinic for a follow-up regarding her left foot, 2 weeks status post bunionectomy.    The patient reports she is doing well; however, she has continued mild edema and erythema. She states she has been ambulating in the boot, primarily on her heel. She notes she has been performing gentle range of motion to her great toe. The patient reports she has been icing her foot, but she denies having compression stockings.    Allergies   Allergen Reactions   • Trazodone Irritability   • Boniva [Ibandronic Acid] GI Intolerance     GERD   • Dayvigo [Lemborexant] Other (See Comments)     Sleep walking       Current Outpatient Medications on File Prior to Visit   Medication Sig Dispense Refill   • albuterol (ACCUNEB) 1.25 MG/3ML nebulizer solution Take 3 mL by nebulization Every 6 (Six) Hours As Needed for Wheezing or Shortness of Air for up to 30 doses. 120 each 2   • amitriptyline (ELAVIL) 25 MG tablet TAKE 1 TABLET AT BEDTIME (Patient taking differently: Take 25 mg by mouth Every Night.) 90 tablet 0   • amLODIPine (NORVASC) 10 MG tablet TAKE 1 TABLET EVERY DAY (Patient taking differently: Take 10 mg by mouth Daily.) 90 tablet 0   • atorvastatin (LIPITOR) 80 MG tablet TAKE 1 TABLET EVERY DAY (Patient taking differently: Take 80 mg by mouth Daily.) 90 tablet 1   • Calcium Carbonate-Vit D-Min (Calcium 1200) 6039-9323 MG-UNIT chewable tablet Chew 1 tablet Daily.     • clindamycin (CLEOCIN T) 1 % lotion Apply 1 application topically to the appropriate area  "as directed 2 (Two) Times a Day.     • Gemtesa 75 MG tablet Take 1 tablet by mouth Daily.     • HYDROcodone-acetaminophen (NORCO) 7.5-325 MG per tablet Take 1 tablet by mouth Every 6 (Six) Hours As Needed for Moderate Pain (Pain). 28 tablet 0   • hydrocortisone (CORTEF) 10 MG tablet Take 10 mg by mouth Daily With Breakfast & Lunch.     • hydrocortisone 2.5 % cream APPLY 1 APPLICATION TOPICALLY TO THE APPROPRIATE AREA AS DIRECTED TWO TIMES A DAY 20 g 0   • lisinopril (PRINIVIL,ZESTRIL) 5 MG tablet TAKE 1 TABLET EVERY DAY (Patient taking differently: Take 5 mg by mouth Daily. HOLD 24 hours before surgery) 90 tablet 1   • meclizine (ANTIVERT) 12.5 MG tablet TAKE 1 TABLET TWICE DAILY AS NEEDED  FOR  DIZZINESS (Patient taking differently: Take 12.5 mg by mouth 3 (Three) Times a Day As Needed.) 90 tablet 0   • nitroglycerin (NITROSTAT) 0.4 MG SL tablet DISSOLVE 1 TABLET UNDER THE TONGUE EVERY 5 MINUTES AS NEEDED FOR CHEST PAIN. TAKE NO MORE THAN 3 DOSES IN 15 MINUTES. 75 tablet 0   • ondansetron (ZOFRAN) 4 MG tablet TAKE 1 TABLET EVERY 8 HOURS AS NEEDED FOR NAUSEA OR VOMITING 60 tablet 0   • sertraline (ZOLOFT) 100 MG tablet TAKE 1 TABLET EVERY NIGHT (Patient taking differently: Take 100 mg by mouth Daily.) 90 tablet 1   • sucralfate (CARAFATE) 1 g tablet TAKE 1 TABLET FOUR TIMES DAILY BEFORE MEALS AND AT BEDTIME 360 tablet 0   • temazepam (RESTORIL) 30 MG capsule TAKE 1 CAPSULE EVERY NIGHT AS NEEDED FOR SLEEP (Patient taking differently: Take 30 mg by mouth At Night As Needed.) 30 capsule 0     No current facility-administered medications on file prior to visit.       Objective   Resp 20   Ht 152.4 cm (60\")   Wt 64 kg (141 lb)   BMI 27.54 kg/m²     Foot/Ankle Exam:       General:   Appearance: appears stated age and healthy    Orientation: AAOx3    Affect: appropriate      VASCULAR      Right Foot Vascularity   Normal vascular exam    Dorsalis pedis:  2+  Posterior tibial:  2+  Skin Temperature: warm    Edema Grading: "  None  CFT:  < 3 seconds  Pedal Hair Growth:  Present  Varicosities: none       Left Foot Vascularity   Normal vascular exam    Dorsalis pedis:  2+  Posterior tibial:  2+  Skin Temperature: warm    Edema Grading:  None  CFT:  < 3 seconds  Pedal Hair Growth:  Present  Varicosities: none       MUSCULOSKELETAL      Right Foot Musculoskeletal   Ecchymosis:  None  Tenderness: none       Left Foot Musculoskeletal   Ecchymosis:  None  Tenderness: none       MUSCLE STRENGTH     Right Foot Muscle Strength   Normal strength    Foot dorsiflexion:  5  Foot plantar flexion:  5  Foot inversion:  5  Foot eversion:  5     DERMATOLOGIC     Right Foot Dermatologic   Skin: skin intact    Nails: normal       Left Foot Dermatologic   Skin: skin intact    Nails: normal        Left Foot Additional Comments: 01/12/2023  Incision sites are dry and stable with intact sutures.  No evidence of dehiscence or infection.  Rectus alignment of the great toe.  No pain with palpation.      Assessment & Plan   Diagnoses and all orders for this visit:    1. Left foot pain (Primary)    2. Bunion, left      - The patient is a 77-year-old female who returns for follow-up 2 weeks status post left foot bunionectomy. X-rays of the left foot, taken 12/30/2022, was reviewed with the patient. Upon physical examination, there is no evidence of dehiscence or infection. The incision sites are dry and stable with intact sutures. Explained swelling will need to be managed during the recovery process to reduce risk of symptoms and scar tissue development. Recommended over-the-counter mild compression stockings to manage the swelling, such as Copper Fit. She was provided with Tubigrip as well for mild compression today. Explained the area will continue to modify, although she may develop a small bump to the area, she should not experience her previous issues. Advised the patient she can begin ambulating normally in the boot. A walker may be used for stability. The  patient may begin to perform passive and active range-of-motion exercises to her great toe. She may shower and bathe normally. Advised the patient to continue with decreased activity and avoid ambulating without the boot. The patient may apply ice with a barrier to protect the area and perform warm water soaks to her foot. The patient will follow up in 2 weeks for re-evaluation and repeat x-rays.     No orders of the defined types were placed in this encounter.         Note is dictated utilizing voice recognition software. Unfortunately this leads to occasional typographical errors. I apologize in advance if the situation occurs. If questions occur please do not hesitate to call our office.    Transcribed from ambient dictation for MARISSA Em DPM by Michelle Maria.  01/12/23   12:14 EST    Patient or patient representative verbalized consent to the visit recording.  I have personally performed the services described in this document as transcribed by the above individual, and it is both accurate and complete.

## 2023-01-16 RX ORDER — NITROGLYCERIN 0.4 MG/1
TABLET SUBLINGUAL
Qty: 75 TABLET | Refills: 0 | Status: SHIPPED | OUTPATIENT
Start: 2023-01-16

## 2023-01-16 NOTE — TELEPHONE ENCOUNTER
Rx Refill Note  Requested Prescriptions     Pending Prescriptions Disp Refills   • nitroglycerin (NITROSTAT) 0.4 MG SL tablet [Pharmacy Med Name: NITROGLYCERIN 0.4 MG Tablet Sublingual] 75 tablet 0     Sig: DISSOLVE 1 TABLET UNDER THE TONGUE EVERY 5 MINUTES AS NEEDED FOR CHEST PAIN. TAKE NO MORE THAN 3 DOSES IN 15 MINUTES.      Last office visit with prescribing clinician: 7/15/2022   Last telemedicine visit with prescribing clinician: Visit date not found   Next office visit with prescribing clinician: Visit date not found                         Would you like a call back once the refill request has been completed: [] Yes [] No    If the office needs to give you a call back, can they leave a voicemail: [] Yes [] No    Kaylee Barnett CMA  01/16/23, 09:58 EST

## 2023-01-23 RX ORDER — MECLIZINE HCL 12.5 MG/1
12.5 TABLET ORAL 3 TIMES DAILY PRN
Qty: 90 TABLET | Refills: 0 | Status: SHIPPED | OUTPATIENT
Start: 2023-01-23

## 2023-01-23 NOTE — TELEPHONE ENCOUNTER
Rx Refill Note  Requested Prescriptions     Pending Prescriptions Disp Refills   • meclizine (ANTIVERT) 12.5 MG tablet [Pharmacy Med Name: MECLIZINE HYDROCHLORIDE 12.5 MG Tablet] 90 tablet 0     Sig: TAKE 1 TABLET TWICE DAILY AS NEEDED FOR DIZZINESS      Last office visit with prescribing clinician: 7/15/2022   Last telemedicine visit with prescribing clinician: Visit date not found   Next office visit with prescribing clinician: Visit date not found                       Lipid Panel (07/28/2022 09:39)    Would you like a call back once the refill request has been completed: [] Yes [] No    If the office needs to give you a call back, can they leave a voicemail: [] Yes [] No    Sophia Brock, RT  01/23/23, 11:44 EST

## 2023-02-02 ENCOUNTER — TELEPHONE (OUTPATIENT)
Dept: PODIATRY | Facility: CLINIC | Age: 78
End: 2023-02-02

## 2023-02-02 NOTE — TELEPHONE ENCOUNTER
Caller: FELTON     Relationship to patient: SELF     Best call back number: [phone]    Type of visit: 2 WEEK FU     Requested date: NEXT AVAIL      If rescheduling, when is the original appointment: 2.2.23     Additional notes: NOT SHOWING ANYTHING UNTIL MARCH , THIS IS FOR A 2 WEEK FU

## 2023-02-07 ENCOUNTER — OFFICE VISIT (OUTPATIENT)
Dept: PODIATRY | Facility: CLINIC | Age: 78
End: 2023-02-07
Payer: MEDICARE

## 2023-02-07 VITALS — WEIGHT: 141 LBS | OXYGEN SATURATION: 97 % | BODY MASS INDEX: 27.68 KG/M2 | HEIGHT: 60 IN | HEART RATE: 93 BPM

## 2023-02-07 DIAGNOSIS — M21.612 BUNION, LEFT: ICD-10-CM

## 2023-02-07 DIAGNOSIS — M79.672 LEFT FOOT PAIN: Primary | ICD-10-CM

## 2023-02-07 PROCEDURE — 99024 POSTOP FOLLOW-UP VISIT: CPT | Performed by: PODIATRIST

## 2023-02-07 NOTE — PROGRESS NOTES
"02/07/2023  Foot and Ankle Surgery - Established Patient/Follow-up  Provider: Dr. Austin Em DPM  Location: [facility]    Subjective:  Felton Salazar is a 77 y.o. female.     Chief Complaint   Patient presents with   • Left Foot - Post-op     12/30/2022 Dr Em  Minimally invasive bunionectomy with distal metatarsal osteotomy and internal fixation, left   • Post-op     T Stroot DO 09/28/2022       HPI: FELTON SALAZAR is a 77-year-old female who presents to the office today for a follow-up regarding her left foot approximately 6 weeks status post surgery.    The patient is doing well overall, and notes improvement in her symptoms. She reports mild stiffness at the joint. She notes improvement in her swelling. She denies any aching or pain in her left foot, but she notes \"it catches a little bit\"    Allergies   Allergen Reactions   • Trazodone Irritability   • Boniva [Ibandronic Acid] GI Intolerance     GERD   • Dayvigo [Lemborexant] Other (See Comments)     Sleep walking       Current Outpatient Medications on File Prior to Visit   Medication Sig Dispense Refill   • albuterol (ACCUNEB) 1.25 MG/3ML nebulizer solution Take 3 mL by nebulization Every 6 (Six) Hours As Needed for Wheezing or Shortness of Air for up to 30 doses. 120 each 2   • amitriptyline (ELAVIL) 25 MG tablet TAKE 1 TABLET AT BEDTIME (Patient taking differently: Take 25 mg by mouth Every Night.) 90 tablet 0   • amLODIPine (NORVASC) 10 MG tablet TAKE 1 TABLET EVERY DAY (Patient taking differently: Take 10 mg by mouth Daily.) 90 tablet 0   • atorvastatin (LIPITOR) 80 MG tablet TAKE 1 TABLET EVERY DAY (Patient taking differently: Take 80 mg by mouth Daily.) 90 tablet 1   • Calcium Carbonate-Vit D-Min (Calcium 1200) 9180-4659 MG-UNIT chewable tablet Chew 1 tablet Daily.     • clindamycin (CLEOCIN T) 1 % lotion Apply 1 application topically to the appropriate area as directed 2 (Two) Times a Day.     • Gemtesa 75 MG tablet Take 1 tablet " "by mouth Daily.     • HYDROcodone-acetaminophen (NORCO) 7.5-325 MG per tablet Take 1 tablet by mouth Every 6 (Six) Hours As Needed for Moderate Pain (Pain). 28 tablet 0   • hydrocortisone (CORTEF) 10 MG tablet Take 10 mg by mouth Daily With Breakfast & Lunch.     • hydrocortisone 2.5 % cream APPLY 1 APPLICATION TOPICALLY TO THE APPROPRIATE AREA AS DIRECTED TWO TIMES A DAY 20 g 0   • lisinopril (PRINIVIL,ZESTRIL) 5 MG tablet TAKE 1 TABLET EVERY DAY (Patient taking differently: Take 5 mg by mouth Daily. HOLD 24 hours before surgery) 90 tablet 1   • meclizine (ANTIVERT) 12.5 MG tablet Take 1 tablet by mouth 3 (Three) Times a Day As Needed for Dizziness. 90 tablet 0   • nitroglycerin (NITROSTAT) 0.4 MG SL tablet DISSOLVE 1 TABLET UNDER THE TONGUE EVERY 5 MINUTES AS NEEDED FOR CHEST PAIN. TAKE NO MORE THAN 3 DOSES IN 15 MINUTES. 75 tablet 0   • ondansetron (ZOFRAN) 4 MG tablet TAKE 1 TABLET EVERY 8 HOURS AS NEEDED FOR NAUSEA OR VOMITING 60 tablet 0   • sertraline (ZOLOFT) 100 MG tablet TAKE 1 TABLET EVERY NIGHT (Patient taking differently: Take 100 mg by mouth Daily.) 90 tablet 1   • sucralfate (CARAFATE) 1 g tablet TAKE 1 TABLET FOUR TIMES DAILY BEFORE MEALS AND AT BEDTIME 360 tablet 0   • temazepam (RESTORIL) 30 MG capsule TAKE 1 CAPSULE EVERY NIGHT AS NEEDED FOR SLEEP (Patient taking differently: Take 30 mg by mouth At Night As Needed.) 30 capsule 0     No current facility-administered medications on file prior to visit.       Objective   Pulse 93   Ht 152.4 cm (60\")   Wt 64 kg (141 lb)   SpO2 97%   BMI 27.54 kg/m²     Foot/Ankle Exam:       General:   Appearance: appears stated age and healthy    Orientation: AAOx3    Affect: appropriate      VASCULAR      Right Foot Vascularity   Normal vascular exam    Dorsalis pedis:  2+  Posterior tibial:  2+  Skin Temperature: warm    Edema Grading:  None  CFT:  < 3 seconds  Pedal Hair Growth:  Present  Varicosities: none       Left Foot Vascularity   Normal vascular exam  "   Dorsalis pedis:  2+  Posterior tibial:  2+  Skin Temperature: warm    Edema Grading:  None  CFT:  < 3 seconds  Pedal Hair Growth:  Present  Varicosities: none       MUSCULOSKELETAL      Right Foot Musculoskeletal   Ecchymosis:  None  Tenderness: none       Left Foot Musculoskeletal   Ecchymosis:  None  Tenderness: none       MUSCLE STRENGTH     Right Foot Muscle Strength   Normal strength    Foot dorsiflexion:  5  Foot plantar flexion:  5  Foot inversion:  5  Foot eversion:  5     DERMATOLOGIC     Right Foot Dermatologic   Skin: skin intact    Nails: normal       Left Foot Dermatologic   Skin: skin intact    Nails: normal        Left Foot Additional Comments: 01/12/2023  Incision sites are dry and stable with intact sutures.  No evidence of dehiscence or infection.  Rectus alignment of the great toe.  No pain with palpation.    Incision sites are well healed. Range of motion to the great toe joint is stable and supple. No pain with palpation. Rectus alignment of the digit.      Assessment & Plan   Diagnoses and all orders for this visit:    1. Left foot pain (Primary)    2. Bunion, left  -     XR Foot 3+ View Left      Patient returns approximately 6 weeks after surgery.  She states that she is doing very well.  She has noticed improved pain and swelling since last exam.  She has returned to her regular shoe and has been progressing activity.  She denies any significant limitation.  She does states that the toe catches at times.  Imaging was reviewed showing interval signs of healing along with stable internal fixation and maintained rectus alignment of the great toe.  I do feel that she is doing very well.  I would like her to continue with manual therapy and range of motion exercises.  I do feel that she can continue to proceed with baseline activities as tolerated.  I would like to see her in 4 weeks for reevaluation and imaging.    Orders Placed This Encounter   Procedures   • XR Foot 3+ View Left     Order  Specific Question:   Reason for Exam:     Answer:   S/P LT BUNIONECTOMY 12/30/2022   MAY LEAVE AFTER XRAY   ROOM 15  WB     Order Specific Question:   Does this patient have a diabetic monitoring/medication delivering device on?     Answer:   No     Order Specific Question:   Release to patient     Answer:   Routine Release          Note is dictated utilizing voice recognition software. Unfortunately this leads to occasional typographical errors. I apologize in advance if the situation occurs. If questions occur please do not hesitate to call our office.    Transcribed from ambient dictation for MARISSA Em DPM by Katia Leigh.  02/07/23   17:07 EST    Patient or patient representative verbalized consent to the visit recording.  I have personally performed the services described in this document as transcribed by the above individual, and it is both accurate and complete.

## 2023-03-04 PROCEDURE — G2066 INTER DEVC REMOTE 30D: HCPCS | Performed by: INTERNAL MEDICINE

## 2023-03-04 PROCEDURE — 93298 REM INTERROG DEV EVAL SCRMS: CPT | Performed by: INTERNAL MEDICINE

## 2023-03-07 ENCOUNTER — OFFICE VISIT (OUTPATIENT)
Dept: PODIATRY | Facility: CLINIC | Age: 78
End: 2023-03-07
Payer: MEDICARE

## 2023-03-07 VITALS — RESPIRATION RATE: 20 BRPM | BODY MASS INDEX: 27.68 KG/M2 | WEIGHT: 141 LBS | HEIGHT: 60 IN

## 2023-03-07 DIAGNOSIS — M21.612 BUNION, LEFT: Primary | ICD-10-CM

## 2023-03-07 PROCEDURE — 99024 POSTOP FOLLOW-UP VISIT: CPT | Performed by: PODIATRIST

## 2023-03-07 PROCEDURE — 1160F RVW MEDS BY RX/DR IN RCRD: CPT | Performed by: PODIATRIST

## 2023-03-07 PROCEDURE — 1159F MED LIST DOCD IN RCRD: CPT | Performed by: PODIATRIST

## 2023-03-07 NOTE — PROGRESS NOTES
03/07/2023  Foot and Ankle Surgery - Established Patient/Follow-up  Provider: Dr. Austin Em DPM  Location: [facility]    Subjective:  Katie Reddy is a 77 y.o. female.     Chief Complaint   Patient presents with   • Left Foot - Post-op      12/30/2022    Minimally invasive bunionectomy with distal metatarsal osteotomy and internal fixation, left      • Post-op     JF BONILLA DO 9/8/2022       HPI: The patient is a 77-year-old female who returns for follow-up of her left great toe 10 weeks after surgery.    She reports continued tenderness to her left great toe. She denies pain. The patient states she has been performing her exercises. She returned to wearing her regular shoes approximately 1 week ago. She denies discomfort when transitioning from the boot into a regular shoe.      Allergies   Allergen Reactions   • Trazodone Irritability   • Boniva [Ibandronic Acid] GI Intolerance     GERD   • Dayvigo [Lemborexant] Other (See Comments)     Sleep walking       Current Outpatient Medications on File Prior to Visit   Medication Sig Dispense Refill   • albuterol (ACCUNEB) 1.25 MG/3ML nebulizer solution Take 3 mL by nebulization Every 6 (Six) Hours As Needed for Wheezing or Shortness of Air for up to 30 doses. 120 each 2   • amitriptyline (ELAVIL) 25 MG tablet TAKE 1 TABLET AT BEDTIME (Patient taking differently: Take 1 tablet by mouth Every Night.) 90 tablet 0   • amLODIPine (NORVASC) 10 MG tablet TAKE 1 TABLET EVERY DAY (Patient taking differently: Take 1 tablet by mouth Daily.) 90 tablet 0   • atorvastatin (LIPITOR) 80 MG tablet TAKE 1 TABLET EVERY DAY (Patient taking differently: Take 1 tablet by mouth Daily.) 90 tablet 1   • Calcium Carbonate-Vit D-Min (Calcium 1200) 6983-1720 MG-UNIT chewable tablet Chew 1 tablet Daily.     • clindamycin (CLEOCIN T) 1 % lotion Apply 1 application topically to the appropriate area as directed 2 (Two) Times a Day.     • Gemtesa 75 MG tablet Take 1 tablet  "by mouth Daily.     • HYDROcodone-acetaminophen (NORCO) 7.5-325 MG per tablet Take 1 tablet by mouth Every 6 (Six) Hours As Needed for Moderate Pain (Pain). 28 tablet 0   • hydrocortisone (CORTEF) 10 MG tablet Take 1 tablet by mouth Daily With Breakfast & Lunch.     • hydrocortisone 2.5 % cream APPLY 1 APPLICATION TOPICALLY TO THE APPROPRIATE AREA AS DIRECTED TWO TIMES A DAY 20 g 0   • lisinopril (PRINIVIL,ZESTRIL) 5 MG tablet TAKE 1 TABLET EVERY DAY (Patient taking differently: Take 1 tablet by mouth Daily. HOLD 24 hours before surgery) 90 tablet 1   • meclizine (ANTIVERT) 12.5 MG tablet Take 1 tablet by mouth 3 (Three) Times a Day As Needed for Dizziness. 90 tablet 0   • nitroglycerin (NITROSTAT) 0.4 MG SL tablet DISSOLVE 1 TABLET UNDER THE TONGUE EVERY 5 MINUTES AS NEEDED FOR CHEST PAIN. TAKE NO MORE THAN 3 DOSES IN 15 MINUTES. 75 tablet 0   • ondansetron (ZOFRAN) 4 MG tablet TAKE 1 TABLET EVERY 8 HOURS AS NEEDED FOR NAUSEA OR VOMITING 60 tablet 0   • sertraline (ZOLOFT) 100 MG tablet TAKE 1 TABLET EVERY NIGHT (Patient taking differently: Take 1 tablet by mouth Daily.) 90 tablet 1   • sucralfate (CARAFATE) 1 g tablet TAKE 1 TABLET FOUR TIMES DAILY BEFORE MEALS AND AT BEDTIME 360 tablet 0   • temazepam (RESTORIL) 30 MG capsule TAKE 1 CAPSULE EVERY NIGHT AS NEEDED FOR SLEEP (Patient taking differently: Take 1 capsule by mouth At Night As Needed.) 30 capsule 0     No current facility-administered medications on file prior to visit.       Objective   Resp 20   Ht 152.4 cm (60\")   Wt 64 kg (141 lb)   BMI 27.54 kg/m²     Foot/Ankle Exam     General:   Appearance: appears stated age and healthy    Orientation: AAOx3    Affect: appropriate       VASCULAR       Right Foot Vascularity   Normal vascular exam    Dorsalis pedis:  2+  Posterior tibial:  2+  Skin Temperature: warm    Edema Grading:  None  CFT:  < 3 seconds  Pedal Hair Growth:  Present  Varicosities: none        Left Foot Vascularity   Normal vascular exam  "   Dorsalis pedis:  2+  Posterior tibial:  2+  Skin Temperature: warm    Edema Grading:  None  CFT:  < 3 seconds  Pedal Hair Growth:  Present  Varicosities: none        MUSCULOSKELETAL       Right Foot Musculoskeletal   Ecchymosis:  None  Tenderness: none        Left Foot Musculoskeletal   Ecchymosis:  None  Tenderness: none        MUSCLE STRENGTH      Right Foot Muscle Strength   Normal strength    Foot dorsiflexion:  5  Foot plantar flexion:  5  Foot inversion:  5  Foot eversion:  5      DERMATOLOGIC      Right Foot Dermatologic   Skin: skin intact    Nails: normal        Left Foot Dermatologic   Skin: skin intact    Nails: normal        Left Foot Additional Comments: 01/12/2023  Incision sites are dry and stable with intact sutures.  No evidence of dehiscence or infection.  Rectus alignment of the great toe.  No pain with palpation.    2/7/23 Incision sites are well healed. Range of motion to the great toe joint is stable and supple. No pain with palpation. Rectus alignment of the digit.    3/7/23 Incision sites are well healed. No significant swelling. No pain with palpation. No gross deformity or instability.             Assessment & Plan   Diagnoses and all orders for this visit:    1. Bunion, left (Primary)  -     XR Foot 3+ View Left        The patient returns for follow-up of her left great toe 10 weeks after surgery. I discussed with the patient that her left great toe is still healing. I discussed with the patient that she may be stressing the area a little bit with activity. I would like her to stay properly supported. I would like her to stay in a tennis shoe or something like a dedicated house shoe routinely at this time. I would like her to continue massaging her left great toe. I discussed with the patient that it will take us to about 16 weeks after the surgery and we will repeat an x-ray at that point.      Orders Placed This Encounter   Procedures   • XR Foot 3+ View Left     Order Specific  Question:   Reason for Exam:     Answer:   S/P Minimally invasive bunionectomy with distal metatarsal osteotomy and internal fixation, left   ROOM 15  WB     Order Specific Question:   Does this patient have a diabetic monitoring/medication delivering device on?     Answer:   No     Order Specific Question:   Release to patient     Answer:   Routine Release          Note is dictated utilizing voice recognition software. Unfortunately this leads to occasional typographical errors. I apologize in advance if the situation occurs. If questions occur please do not hesitate to call our office.    Transcribed from ambient dictation for MARISSA Em DPM by Raisa Avery.  03/07/23   14:34 EST    Patient or patient representative verbalized consent to the visit recording.  I have personally performed the services described in this document as transcribed by the above individual, and it is both accurate and complete.

## 2023-03-16 ENCOUNTER — LAB (OUTPATIENT)
Dept: LAB | Facility: HOSPITAL | Age: 78
End: 2023-03-16
Payer: MEDICARE

## 2023-03-16 DIAGNOSIS — E78.2 MIXED HYPERLIPIDEMIA: ICD-10-CM

## 2023-03-16 LAB
ALBUMIN SERPL-MCNC: 4.3 G/DL (ref 3.5–5.2)
ALBUMIN/GLOB SERPL: 1.5 G/DL
ALP SERPL-CCNC: 244 U/L (ref 39–117)
ALT SERPL W P-5'-P-CCNC: 762 U/L (ref 1–33)
ANION GAP SERPL CALCULATED.3IONS-SCNC: 11 MMOL/L (ref 5–15)
AST SERPL-CCNC: 862 U/L (ref 1–32)
BILIRUB SERPL-MCNC: 0.8 MG/DL (ref 0–1.2)
BUN SERPL-MCNC: 14 MG/DL (ref 8–23)
BUN/CREAT SERPL: 11 (ref 7–25)
CALCIUM SPEC-SCNC: 9.3 MG/DL (ref 8.6–10.5)
CHLORIDE SERPL-SCNC: 105 MMOL/L (ref 98–107)
CHOLEST SERPL-MCNC: 301 MG/DL (ref 0–200)
CO2 SERPL-SCNC: 22 MMOL/L (ref 22–29)
CREAT SERPL-MCNC: 1.27 MG/DL (ref 0.57–1)
EGFRCR SERPLBLD CKD-EPI 2021: 43.9 ML/MIN/1.73
GLOBULIN UR ELPH-MCNC: 2.9 GM/DL
GLUCOSE SERPL-MCNC: 92 MG/DL (ref 65–99)
HDLC SERPL-MCNC: 67 MG/DL (ref 40–60)
LDLC SERPL CALC-MCNC: 217 MG/DL (ref 0–100)
LDLC/HDLC SERPL: 3.19 {RATIO}
POTASSIUM SERPL-SCNC: 4.2 MMOL/L (ref 3.5–5.2)
PROT SERPL-MCNC: 7.2 G/DL (ref 6–8.5)
SODIUM SERPL-SCNC: 138 MMOL/L (ref 136–145)
TRIGL SERPL-MCNC: 100 MG/DL (ref 0–150)
VLDLC SERPL-MCNC: 17 MG/DL (ref 5–40)

## 2023-03-16 PROCEDURE — 80053 COMPREHEN METABOLIC PANEL: CPT

## 2023-03-16 PROCEDURE — 80061 LIPID PANEL: CPT

## 2023-03-18 RX ORDER — LISINOPRIL 5 MG/1
5 TABLET ORAL DAILY
Qty: 90 TABLET | Refills: 1 | Status: SHIPPED | OUTPATIENT
Start: 2023-03-18

## 2023-03-20 ENCOUNTER — HOSPITAL ENCOUNTER (EMERGENCY)
Facility: HOSPITAL | Age: 78
Discharge: HOME | End: 2023-03-20
Attending: EMERGENCY MEDICINE | Admitting: EMERGENCY MEDICINE
Payer: MEDICARE

## 2023-03-20 ENCOUNTER — TELEPHONE (OUTPATIENT)
Dept: FAMILY MEDICINE CLINIC | Facility: CLINIC | Age: 78
End: 2023-03-20

## 2023-03-20 VITALS
WEIGHT: 139.99 LBS | HEART RATE: 86 BPM | DIASTOLIC BLOOD PRESSURE: 60 MMHG | RESPIRATION RATE: 17 BRPM | BODY MASS INDEX: 27.48 KG/M2 | OXYGEN SATURATION: 99 % | TEMPERATURE: 98.9 F | HEIGHT: 60 IN | SYSTOLIC BLOOD PRESSURE: 158 MMHG

## 2023-03-20 DIAGNOSIS — R79.89 ELEVATED LFTS: ICD-10-CM

## 2023-03-20 DIAGNOSIS — N39.0 URINARY TRACT INFECTION WITHOUT HEMATURIA, SITE UNSPECIFIED: Primary | ICD-10-CM

## 2023-03-20 LAB
ALBUMIN SERPL-MCNC: 4.3 G/DL (ref 3.5–5.2)
ALBUMIN/GLOB SERPL: 1.4 G/DL
ALP SERPL-CCNC: 178 U/L (ref 39–117)
ALT SERPL W P-5'-P-CCNC: 137 U/L (ref 1–33)
ANION GAP SERPL CALCULATED.3IONS-SCNC: 11 MMOL/L (ref 5–15)
AST SERPL-CCNC: 40 U/L (ref 1–32)
B PARAPERT DNA SPEC QL NAA+PROBE: NOT DETECTED
B PERT DNA SPEC QL NAA+PROBE: NOT DETECTED
BACTERIA UR QL AUTO: ABNORMAL /HPF
BASOPHILS # BLD AUTO: 0.1 10*3/MM3 (ref 0–0.2)
BASOPHILS NFR BLD AUTO: 1.6 % (ref 0–1.5)
BILIRUB SERPL-MCNC: 0.4 MG/DL (ref 0–1.2)
BILIRUB UR QL STRIP: NEGATIVE
BUN SERPL-MCNC: 11 MG/DL (ref 8–23)
BUN/CREAT SERPL: 12.1 (ref 7–25)
C PNEUM DNA NPH QL NAA+NON-PROBE: NOT DETECTED
CALCIUM SPEC-SCNC: 10 MG/DL (ref 8.6–10.5)
CHLORIDE SERPL-SCNC: 107 MMOL/L (ref 98–107)
CLARITY UR: ABNORMAL
CO2 SERPL-SCNC: 21 MMOL/L (ref 22–29)
COLOR UR: YELLOW
CREAT SERPL-MCNC: 0.91 MG/DL (ref 0.57–1)
DEPRECATED RDW RBC AUTO: 42.4 FL (ref 37–54)
EGFRCR SERPLBLD CKD-EPI 2021: 65.5 ML/MIN/1.73
EOSINOPHIL # BLD AUTO: 0.2 10*3/MM3 (ref 0–0.4)
EOSINOPHIL NFR BLD AUTO: 4.1 % (ref 0.3–6.2)
ERYTHROCYTE [DISTWIDTH] IN BLOOD BY AUTOMATED COUNT: 15 % (ref 12.3–15.4)
FLUAV SUBTYP SPEC NAA+PROBE: NOT DETECTED
FLUBV RNA ISLT QL NAA+PROBE: NOT DETECTED
GLOBULIN UR ELPH-MCNC: 3 GM/DL
GLUCOSE SERPL-MCNC: 87 MG/DL (ref 65–99)
GLUCOSE UR STRIP-MCNC: NEGATIVE MG/DL
HADV DNA SPEC NAA+PROBE: NOT DETECTED
HCOV 229E RNA SPEC QL NAA+PROBE: NOT DETECTED
HCOV HKU1 RNA SPEC QL NAA+PROBE: NOT DETECTED
HCOV NL63 RNA SPEC QL NAA+PROBE: NOT DETECTED
HCOV OC43 RNA SPEC QL NAA+PROBE: NOT DETECTED
HCT VFR BLD AUTO: 37.6 % (ref 34–46.6)
HGB BLD-MCNC: 12.1 G/DL (ref 12–15.9)
HGB UR QL STRIP.AUTO: ABNORMAL
HMPV RNA NPH QL NAA+NON-PROBE: NOT DETECTED
HPIV1 RNA ISLT QL NAA+PROBE: NOT DETECTED
HPIV2 RNA SPEC QL NAA+PROBE: NOT DETECTED
HPIV3 RNA NPH QL NAA+PROBE: NOT DETECTED
HPIV4 P GENE NPH QL NAA+PROBE: NOT DETECTED
HYALINE CASTS UR QL AUTO: ABNORMAL /LPF
KETONES UR QL STRIP: NEGATIVE
LEUKOCYTE ESTERASE UR QL STRIP.AUTO: ABNORMAL
LIPASE SERPL-CCNC: 19 U/L (ref 13–60)
LYMPHOCYTES # BLD AUTO: 2 10*3/MM3 (ref 0.7–3.1)
LYMPHOCYTES NFR BLD AUTO: 36 % (ref 19.6–45.3)
M PNEUMO IGG SER IA-ACNC: NOT DETECTED
MCH RBC QN AUTO: 26.3 PG (ref 26.6–33)
MCHC RBC AUTO-ENTMCNC: 32.2 G/DL (ref 31.5–35.7)
MCV RBC AUTO: 81.6 FL (ref 79–97)
MONOCYTES # BLD AUTO: 0.5 10*3/MM3 (ref 0.1–0.9)
MONOCYTES NFR BLD AUTO: 8.6 % (ref 5–12)
NEUTROPHILS NFR BLD AUTO: 2.8 10*3/MM3 (ref 1.7–7)
NEUTROPHILS NFR BLD AUTO: 49.7 % (ref 42.7–76)
NITRITE UR QL STRIP: NEGATIVE
NRBC BLD AUTO-RTO: 0 /100 WBC (ref 0–0.2)
PH UR STRIP.AUTO: 8 [PH] (ref 5–8)
PLATELET # BLD AUTO: 283 10*3/MM3 (ref 140–450)
PMV BLD AUTO: 6.4 FL (ref 6–12)
POTASSIUM SERPL-SCNC: 4.4 MMOL/L (ref 3.5–5.2)
PROT SERPL-MCNC: 7.3 G/DL (ref 6–8.5)
PROT UR QL STRIP: NEGATIVE
RBC # BLD AUTO: 4.6 10*6/MM3 (ref 3.77–5.28)
RBC # UR STRIP: ABNORMAL /HPF
REF LAB TEST METHOD: ABNORMAL
RHINOVIRUS RNA SPEC NAA+PROBE: NOT DETECTED
RSV RNA NPH QL NAA+NON-PROBE: NOT DETECTED
SARS-COV-2 RNA NPH QL NAA+NON-PROBE: NOT DETECTED
SODIUM SERPL-SCNC: 139 MMOL/L (ref 136–145)
SP GR UR STRIP: 1.01 (ref 1–1.03)
SQUAMOUS #/AREA URNS HPF: ABNORMAL /HPF
UROBILINOGEN UR QL STRIP: ABNORMAL
WBC # UR STRIP: ABNORMAL /HPF
WBC NRBC COR # BLD: 5.7 10*3/MM3 (ref 3.4–10.8)

## 2023-03-20 PROCEDURE — 87086 URINE CULTURE/COLONY COUNT: CPT | Performed by: EMERGENCY MEDICINE

## 2023-03-20 PROCEDURE — 87077 CULTURE AEROBIC IDENTIFY: CPT | Performed by: EMERGENCY MEDICINE

## 2023-03-20 PROCEDURE — 36415 COLL VENOUS BLD VENIPUNCTURE: CPT

## 2023-03-20 PROCEDURE — 87186 SC STD MICRODIL/AGAR DIL: CPT | Performed by: EMERGENCY MEDICINE

## 2023-03-20 PROCEDURE — 0202U NFCT DS 22 TRGT SARS-COV-2: CPT | Performed by: NURSE PRACTITIONER

## 2023-03-20 PROCEDURE — 81001 URINALYSIS AUTO W/SCOPE: CPT | Performed by: EMERGENCY MEDICINE

## 2023-03-20 PROCEDURE — 83690 ASSAY OF LIPASE: CPT | Performed by: EMERGENCY MEDICINE

## 2023-03-20 PROCEDURE — 80053 COMPREHEN METABOLIC PANEL: CPT | Performed by: EMERGENCY MEDICINE

## 2023-03-20 PROCEDURE — 85025 COMPLETE CBC W/AUTO DIFF WBC: CPT | Performed by: EMERGENCY MEDICINE

## 2023-03-20 PROCEDURE — 99283 EMERGENCY DEPT VISIT LOW MDM: CPT

## 2023-03-20 RX ORDER — SODIUM CHLORIDE 0.9 % (FLUSH) 0.9 %
10 SYRINGE (ML) INJECTION AS NEEDED
Status: DISCONTINUED | OUTPATIENT
Start: 2023-03-20 | End: 2023-03-21 | Stop reason: HOSPADM

## 2023-03-20 RX ORDER — CEFDINIR 300 MG/1
300 CAPSULE ORAL 2 TIMES DAILY
Qty: 14 CAPSULE | Refills: 0 | Status: SHIPPED | OUTPATIENT
Start: 2023-03-20 | End: 2023-03-27

## 2023-03-20 RX ADMIN — SODIUM CHLORIDE 1000 ML: 9 INJECTION, SOLUTION INTRAVENOUS at 19:46

## 2023-03-20 NOTE — ED PROVIDER NOTES
Subjective   History of Present Illness  Chief complaint: Fever    77-year-old female presents with fever.  Patient states she has not felt well for the past 1 to 2 weeks.  She has had intermittent low-grade fevers.  She has had body aches.  She states she had some vomiting and diarrhea initially but that has resolved.  She states she feels like she is dehydrated.  She had some blood work done with her primary doctor a few days ago and she was told that her liver tests were off and they wanted her to come to the hospital.  She denies any cough or congestion.  No known sick contacts.  She denies any dysuria but states her urine has been dark and foul-smelling.    History provided by:  Patient      Review of Systems   Constitutional: Positive for fever.   HENT: Negative for congestion.    Respiratory: Negative for cough and shortness of breath.    Cardiovascular: Negative for chest pain.   Gastrointestinal: Positive for diarrhea and vomiting. Negative for abdominal pain.   Genitourinary: Negative for dysuria.   Musculoskeletal: Negative for back pain.   Skin: Negative for rash.   Neurological: Negative for headaches.   Psychiatric/Behavioral: Negative for confusion.       Past Medical History:   Diagnosis Date   • Arthritis    • Bladder incontinence    • Colon polyp    • DEXA     OSTEOPENIA = 2018 (-1.3/ -1.7); 2020 (-1.7/ -1.7)   • GERD    • Glucocorticoid deficiency    • Headache    • HTN    • Hyperlipidemia    • Intracerebral hemorrhage/ CVA    • MAMMO     NEG =2020   • Osteopenia    • Vitamin D deficiency        Allergies   Allergen Reactions   • Trazodone Irritability   • Boniva [Ibandronic Acid] GI Intolerance     GERD   • Dayvigo [Lemborexant] Other (See Comments)     Sleep walking       Past Surgical History:   Procedure Laterality Date   • APPENDECTOMY     • BLADDER SURGERY      bladder stimulator placement/ REMOVAL   • BREAST CYST EXCISION     • BREAST LUMPECTOMY Left 1973    NEG   • BUNIONECTOMY Right  05/29/2020    Procedure: BUNIONECTOMY DEVONTE;  Surgeon: MARISSA Em DPM;  Location: [facility] MAIN OR;  Service: Podiatry;  Laterality: Right;   • BUNIONECTOMY Left 12/30/2022    Procedure: BUNIONECTOMY DEVONTE -MINIMALLY INVASIVE with distal metatarsal osteotomy and internal fixation;  Surgeon: MARISSA Em DPM;  Location: [facility] MAIN OR;  Service: Podiatry;  Laterality: Left;   • CARDIAC CATHETERIZATION     • CARDIAC CATHETERIZATION N/A 02/08/2022    Procedure: Left Heart Cath, possible pci;  Surgeon: Master Richard MD;  Location: [facility] CATH INVASIVE LOCATION;  Service: Cardiovascular;  Laterality: N/A;   • CARPAL TUNNEL RELEASE Right 1980   • CHOLECYSTECTOMY     • COLON SURGERY      hemorrhoid banding   • COLONOSCOPY  2017 2017/ 2019 = jessica DESAI 2024   Dr. Mackey   • COLONOSCOPY N/A 02/05/2022    NEG= 2022   • ENDOSCOPY N/A 02/05/2022 2022EGD= erosive esophagitis, hiatal hernia, Nonerosive gastritis   • EYE SURGERY     • HERNIA REPAIR      Umbilical removal   • HYSTERECTOMY  1970   • SUBTOTAL HYSTERECTOMY     • UMBILICAL HERNIA REPAIR         Family History   Problem Relation Age of Onset   • Heart disease Mother    • Hypertension Mother    • Diabetes Father    • Heart disease Father    • Alcohol abuse Father    • Hypertension Father    • Diabetes Brother    • Heart disease Brother    • Cancer Brother 68        Prostate Cancer   • Hypertension Brother    • Diabetes Maternal Aunt    • Heart disease Maternal Grandmother    • Hypertension Maternal Grandmother    • Heart disease Maternal Grandfather    • Hypertension Maternal Grandfather    • Liver disease Paternal Grandmother    • Hypertension Paternal Grandmother    • Heart disease Paternal Grandfather    • Hypertension Paternal Grandfather    • Dementia Sister    • Diabetes Brother        Social History     Socioeconomic History   • Marital status:    • Number of children: 4   • Years of education: GED   Tobacco Use   • Smoking status:  "Never   • Smokeless tobacco: Never   Vaping Use   • Vaping Use: Never used   Substance and Sexual Activity   • Alcohol use: No   • Drug use: No   • Sexual activity: Defer       /79   Pulse 88   Temp 98.9 °F (37.2 °C) (Oral)   Resp 17   Ht 152.4 cm (60\")   Wt 63.5 kg (139 lb 15.9 oz)   SpO2 99%   BMI 27.34 kg/m²       Objective   Physical Exam  Vitals and nursing note reviewed.   Constitutional:       Appearance: Normal appearance.   HENT:      Head: Normocephalic and atraumatic.      Mouth/Throat:      Mouth: Mucous membranes are dry.   Cardiovascular:      Rate and Rhythm: Normal rate and regular rhythm.      Heart sounds: Normal heart sounds.   Pulmonary:      Effort: Pulmonary effort is normal. No respiratory distress.      Breath sounds: Normal breath sounds.   Abdominal:      Palpations: Abdomen is soft.      Tenderness: There is no abdominal tenderness.   Musculoskeletal:      Right lower leg: No edema.      Left lower leg: No edema.   Skin:     General: Skin is warm and dry.   Neurological:      Mental Status: She is alert and oriented to person, place, and time.         Procedures           ED Course      Results for orders placed or performed during the hospital encounter of 03/20/23   Respiratory Panel PCR w/COVID-19(SARS-CoV-2) ODALIS/SANGEETA/NINA/PAD/COR/MAD/ZOILA In-House, NP Swab in UTM/VTM, 3-4 HR TAT - Swab, Nasopharynx    Specimen: Nasopharynx; Swab   Result Value Ref Range    ADENOVIRUS, PCR Not Detected Not Detected    Coronavirus 229E Not Detected Not Detected    Coronavirus HKU1 Not Detected Not Detected    Coronavirus NL63 Not Detected Not Detected    Coronavirus OC43 Not Detected Not Detected    COVID19 Not Detected Not Detected - Ref. Range    Human Metapneumovirus Not Detected Not Detected    Human Rhinovirus/Enterovirus Not Detected Not Detected    Influenza A PCR Not Detected Not Detected    Influenza B PCR Not Detected Not Detected    Parainfluenza Virus 1 Not Detected Not Detected    " Parainfluenza Virus 2 Not Detected Not Detected    Parainfluenza Virus 3 Not Detected Not Detected    Parainfluenza Virus 4 Not Detected Not Detected    RSV, PCR Not Detected Not Detected    Bordetella pertussis pcr Not Detected Not Detected    Bordetella parapertussis PCR Not Detected Not Detected    Chlamydophila pneumoniae PCR Not Detected Not Detected    Mycoplasma pneumo by PCR Not Detected Not Detected   Comprehensive Metabolic Panel    Specimen: Arm, Right; Blood   Result Value Ref Range    Glucose 87 65 - 99 mg/dL    BUN 11 8 - 23 mg/dL    Creatinine 0.91 0.57 - 1.00 mg/dL    Sodium 139 136 - 145 mmol/L    Potassium 4.4 3.5 - 5.2 mmol/L    Chloride 107 98 - 107 mmol/L    CO2 21.0 (L) 22.0 - 29.0 mmol/L    Calcium 10.0 8.6 - 10.5 mg/dL    Total Protein 7.3 6.0 - 8.5 g/dL    Albumin 4.3 3.5 - 5.2 g/dL    ALT (SGPT) 137 (H) 1 - 33 U/L    AST (SGOT) 40 (H) 1 - 32 U/L    Alkaline Phosphatase 178 (H) 39 - 117 U/L    Total Bilirubin 0.4 0.0 - 1.2 mg/dL    Globulin 3.0 gm/dL    A/G Ratio 1.4 g/dL    BUN/Creatinine Ratio 12.1 7.0 - 25.0    Anion Gap 11.0 5.0 - 15.0 mmol/L    eGFR 65.5 >60.0 mL/min/1.73   Urinalysis With Culture If Indicated - Urine, Clean Catch    Specimen: Urine, Clean Catch   Result Value Ref Range    Color, UA Yellow Yellow, Straw    Appearance, UA Cloudy (A) Clear    pH, UA 8.0 5.0 - 8.0    Specific Gravity, UA 1.007 1.005 - 1.030    Glucose, UA Negative Negative    Ketones, UA Negative Negative    Bilirubin, UA Negative Negative    Blood, UA Moderate (2+) (A) Negative    Protein, UA Negative Negative    Leuk Esterase, UA Large (3+) (A) Negative    Nitrite, UA Negative Negative    Urobilinogen, UA 0.2 E.U./dL 0.2 - 1.0 E.U./dL   Lipase    Specimen: Arm, Right; Blood   Result Value Ref Range    Lipase 19 13 - 60 U/L   CBC Auto Differential    Specimen: Arm, Right; Blood   Result Value Ref Range    WBC 5.70 3.40 - 10.80 10*3/mm3    RBC 4.60 3.77 - 5.28 10*6/mm3    Hemoglobin 12.1 12.0 - 15.9 g/dL     Hematocrit 37.6 34.0 - 46.6 %    MCV 81.6 79.0 - 97.0 fL    MCH 26.3 (L) 26.6 - 33.0 pg    MCHC 32.2 31.5 - 35.7 g/dL    RDW 15.0 12.3 - 15.4 %    RDW-SD 42.4 37.0 - 54.0 fl    MPV 6.4 6.0 - 12.0 fL    Platelets 283 140 - 450 10*3/mm3    Neutrophil % 49.7 42.7 - 76.0 %    Lymphocyte % 36.0 19.6 - 45.3 %    Monocyte % 8.6 5.0 - 12.0 %    Eosinophil % 4.1 0.3 - 6.2 %    Basophil % 1.6 (H) 0.0 - 1.5 %    Neutrophils, Absolute 2.80 1.70 - 7.00 10*3/mm3    Lymphocytes, Absolute 2.00 0.70 - 3.10 10*3/mm3    Monocytes, Absolute 0.50 0.10 - 0.90 10*3/mm3    Eosinophils, Absolute 0.20 0.00 - 0.40 10*3/mm3    Basophils, Absolute 0.10 0.00 - 0.20 10*3/mm3    nRBC 0.0 0.0 - 0.2 /100 WBC   Urinalysis, Microscopic Only - Urine, Clean Catch    Specimen: Urine, Clean Catch   Result Value Ref Range    RBC, UA 3-5 (A) None Seen /HPF    WBC, UA 21-30 (A) None Seen /HPF    Bacteria, UA 3+ (A) None Seen /HPF    Squamous Epithelial Cells, UA 3-6 (A) None Seen, 0-2 /HPF    Hyaline Casts, UA None Seen None Seen /LPF    Methodology Manual Light Microscopy                                           Medical Decision Making  Elevated LFTs: acute illness or injury  Urinary tract infection without hematuria, site unspecified: acute illness or injury  Amount and/or Complexity of Data Reviewed  Labs: ordered. Decision-making details documented in ED Course.      Risk  Prescription drug management.         Patient had the above evaluation.  Results were discussed with the patient.  White blood cell count was normal.  CMP is significant for elevated LFTs with , AST 40, alk phos 178.  Bilirubin is normal.  These numbers are significantly improved from blood work 4 days ago where patient had , , alk phos 244.  Patient is having no abdominal pain or tenderness.  She does have a urinary tract infection with large leukocyte esterase, 21-30 white blood cells, 3+ bacteria.  Patient states she has a GI doctor that she can follow-up  with.  She will be discharged with a prescription for cefdinir.      Final diagnoses:   Urinary tract infection without hematuria, site unspecified   Elevated LFTs       ED Disposition  ED Disposition     ED Disposition   Discharge    Condition   Stable    Comment   --             Brooklyn Contreras DO  [address]  [address]  [place] [address]  [phone]    Call in 2 days      Montrell Mota MD  [address] [facility] [place] [address]  [phone]    Call in 2 days           Medication List      New Prescriptions    cefdinir 300 MG capsule  Commonly known as: OMNICEF  Take 1 capsule by mouth 2 (Two) Times a Day for 7 days.        Changed    amitriptyline 25 MG tablet  Commonly known as: ELAVIL  TAKE 1 TABLET AT BEDTIME  What changed: when to take this     sertraline 100 MG tablet  Commonly known as: ZOLOFT  TAKE 1 TABLET EVERY NIGHT  What changed: when to take this     temazepam 30 MG capsule  Commonly known as: RESTORIL  TAKE 1 CAPSULE EVERY NIGHT AS NEEDED FOR SLEEP  What changed: See the new instructions.           Where to Get Your Medications      These medications were sent to [facility]/pharmacy #[id number] - [place] - [address] - [phone]  - [phone]  [address] [place] [address]    Phone: [phone]   · cefdinir 300 MG capsule          Mars Julien MD  03/20/23 4308

## 2023-03-20 NOTE — TELEPHONE ENCOUNTER
Caller: Katie Reddy    Relationship: Self    Best call back number:     What is the best time to reach you:     Who are you requesting to speak with (clinical staff, provider,  specific staff member):     Do you know the name of the person who called:     What was the call regarding: PATIENT IS CALLING IN STATING THAT SHE HAD A LAB WITH DR BONILLA AND DR BONILLA WAS NOT PLEASED WITH THE RESULTS SO SHE WANTED THE PATIENT TO BE RETESTED AT A DIFFERENT LAB.  PATIENT SAYS THAT SHE HAS NOT BEEN ABLE TO GET THE FOLLOW UP LAB COMPLETED AND WANTS TO BE CONTACTED BY THE OFFICE TO DISCUSS.      Do you require a callback: YES

## 2023-03-20 NOTE — TELEPHONE ENCOUNTER
Returned call to patient.  Patient states she went to the [facility] and the ER both and told them she had some labs that Dr. Contreras wanted her to have rechecked.  They both told her that they don't just do labs and wouldn't know what labs Dr. Contreras would want.  There is a note dated 3/18/2023 in her lab results from 3/16/2023 where Dr. Contreras had spoken with patient about her results and instructed patient to go to the ER that same day to be evaluated.  Patient misunderstood what Dr. Contreras wanted her to do.  Patient states she is dehydrated, tired, and does not feel well.  I advised patient again to go to the ER and sign in and let them know she needs to be evaluated by a doctor.  Patient sates she will go to [facility] ER today.

## 2023-03-22 LAB — BACTERIA SPEC AEROBE CULT: ABNORMAL

## 2023-03-22 NOTE — PHARMACY RECOMMENDATION
Patient urine culture resulted with E. coli. Susceptible to Cephalosporins (3rd gen). Patient was given Rx for cefdinir. Therapy is appropriate coverage. No further follow-up required..    Microbiology Results (last 10 days)       Procedure Component Value - Date/Time    Respiratory Panel PCR w/COVID-19(SARS-CoV-2) ODALIS/SANGEETA/NINA/PAD/COR/MAD/ZOILA In-House, NP Swab in UTM/VTM, 3-4 HR TAT - Swab, Nasopharynx [[id number]]  (Normal) Collected: 03/20/23 1658    Lab Status: Final result Specimen: Swab from Nasopharynx Updated: 03/20/23 1752     ADENOVIRUS, PCR Not Detected     Coronavirus 229E Not Detected     Coronavirus HKU1 Not Detected     Coronavirus NL63 Not Detected     Coronavirus OC43 Not Detected     COVID19 Not Detected     Human Metapneumovirus Not Detected     Human Rhinovirus/Enterovirus Not Detected     Influenza A PCR Not Detected     Influenza B PCR Not Detected     Parainfluenza Virus 1 Not Detected     Parainfluenza Virus 2 Not Detected     Parainfluenza Virus 3 Not Detected     Parainfluenza Virus 4 Not Detected     RSV, PCR Not Detected     Bordetella pertussis pcr Not Detected     Bordetella parapertussis PCR Not Detected     Chlamydophila pneumoniae PCR Not Detected     Mycoplasma pneumo by PCR Not Detected    Narrative:      In the setting of a positive respiratory panel with a viral infection PLUS a negative procalcitonin without other underlying concern for bacterial infection, consider observing off antibiotics or discontinuation of antibiotics and continue supportive care. If the respiratory panel is positive for atypical bacterial infection (Bordetella pertussis, Chlamydophila pneumoniae, or Mycoplasma pneumoniae), consider antibiotic de-escalation to target atypical bacterial infection.    Urine Culture - Urine, Urine, Clean Catch [[id number]]  (Abnormal)  (Susceptibility) Collected: 03/20/23 1532    Lab Status: Final result Specimen: Urine, Clean Catch Updated: 03/22/23 0043     Urine Culture  >100,000 CFU/mL Escherichia coli    Narrative:      Colonization of the urinary tract without infection is common. Treatment is discouraged unless the patient is symptomatic, pregnant, or undergoing an invasive urologic procedure.    Susceptibility        Escherichia coli      CABRERA      Ampicillin Resistant      Ampicillin + Sulbactam Resistant      Cefazolin Susceptible      Cefepime Susceptible      Ceftazidime Susceptible      Ceftriaxone Susceptible      Gentamicin Susceptible      Levofloxacin Susceptible      Nitrofurantoin Susceptible      Piperacillin + Tazobactam Susceptible      Trimethoprim + Sulfamethoxazole Susceptible                                   Tanya Cox, PharmD  3/22/2023 14:15 EDT

## 2023-03-28 ENCOUNTER — PATIENT OUTREACH (OUTPATIENT)
Dept: CASE MANAGEMENT | Facility: OTHER | Age: 78
End: 2023-03-28
Payer: MEDICARE

## 2023-03-28 DIAGNOSIS — R74.8 ELEVATED LIVER ENZYMES: Primary | ICD-10-CM

## 2023-03-28 NOTE — OUTREACH NOTE
AMBULATORY CASE MANAGEMENT NOTE    Patient Outreach  Name and Relationship of Patient/Support Person:  - Katie Reddy/ LVMs to include RN-ACM contact info & patient call back welcome.   Patient eligible for HRCM & RN-ACM available for assistance as needed.    No additional HRCM outreach scheduled.    CATALINA FOX  Ambulatory Case Management  3/28/2023, 11:18 EDT

## 2023-03-28 NOTE — PLAN OF CARE
Goal Outcome Evaluation:              Outcome Summary: Patient pleasant and cooperative. Family at bedside. No complaints of pain. Patient states nausea much improved.   none

## 2023-03-30 ENCOUNTER — LAB (OUTPATIENT)
Dept: LAB | Facility: HOSPITAL | Age: 78
End: 2023-03-30
Payer: MEDICARE

## 2023-03-30 LAB
ALBUMIN SERPL-MCNC: 4.3 G/DL (ref 3.5–5.2)
ALBUMIN/GLOB SERPL: 1.6 G/DL
ALP SERPL-CCNC: 130 U/L (ref 39–117)
ALT SERPL W P-5'-P-CCNC: 16 U/L (ref 1–33)
ANION GAP SERPL CALCULATED.3IONS-SCNC: 10 MMOL/L (ref 5–15)
AST SERPL-CCNC: 21 U/L (ref 1–32)
BILIRUB SERPL-MCNC: 0.2 MG/DL (ref 0–1.2)
BUN SERPL-MCNC: 26 MG/DL (ref 8–23)
BUN/CREAT SERPL: 21.7 (ref 7–25)
CALCIUM SPEC-SCNC: 9.4 MG/DL (ref 8.6–10.5)
CHLORIDE SERPL-SCNC: 105 MMOL/L (ref 98–107)
CO2 SERPL-SCNC: 24 MMOL/L (ref 22–29)
CREAT SERPL-MCNC: 1.2 MG/DL (ref 0.57–1)
EGFRCR SERPLBLD CKD-EPI 2021: 46.7 ML/MIN/1.73
GLOBULIN UR ELPH-MCNC: 2.7 GM/DL
GLUCOSE SERPL-MCNC: 86 MG/DL (ref 65–99)
POTASSIUM SERPL-SCNC: 4.5 MMOL/L (ref 3.5–5.2)
PROT SERPL-MCNC: 7 G/DL (ref 6–8.5)
SODIUM SERPL-SCNC: 139 MMOL/L (ref 136–145)

## 2023-03-30 PROCEDURE — 80053 COMPREHEN METABOLIC PANEL: CPT | Performed by: FAMILY MEDICINE

## 2023-04-03 NOTE — TELEPHONE ENCOUNTER
Rx Refill Note  Requested Prescriptions     Pending Prescriptions Disp Refills   • amLODIPine (NORVASC) 10 MG tablet [Pharmacy Med Name: AMLODIPINE BESYLATE 10 MG Tablet] 90 tablet 0     Sig: TAKE 1 TABLET EVERY DAY      Last office visit with prescribing clinician: 7/15/2022   Last telemedicine visit with prescribing clinician: Visit date not found   Next office visit with prescribing clinician: Visit date not found                       Lipid Panel (03/16/2023 10:32)    Would you like a call back once the refill request has been completed: [] Yes [] No    If the office needs to give you a call back, can they leave a voicemail: [] Yes [] No    Sophia Brock, RT  04/03/23, 10:38 EDT

## 2023-04-04 PROBLEM — G44.309 POST-TRAUMATIC HEADACHE: Status: RESOLVED | Noted: 2020-10-22 | Resolved: 2023-04-04

## 2023-04-04 PROBLEM — Z79.899 POLYPHARMACY: Status: RESOLVED | Noted: 2020-10-26 | Resolved: 2023-04-04

## 2023-04-04 PROBLEM — Z95.818 STATUS POST PLACEMENT OF IMPLANTABLE LOOP RECORDER: Status: RESOLVED | Noted: 2021-04-16 | Resolved: 2023-04-04

## 2023-04-04 PROBLEM — M25.561 ACUTE PAIN OF RIGHT KNEE: Status: RESOLVED | Noted: 2020-02-25 | Resolved: 2023-04-04

## 2023-04-04 PROBLEM — I16.1 HYPERTENSIVE EMERGENCY: Status: RESOLVED | Noted: 2021-04-06 | Resolved: 2023-04-04

## 2023-04-04 PROBLEM — Z86.79 HISTORY OF INTRACRANIAL HEMORRHAGE: Status: ACTIVE | Noted: 2020-10-22

## 2023-04-04 PROBLEM — R55 SYNCOPE: Status: RESOLVED | Noted: 2021-04-06 | Resolved: 2023-04-04

## 2023-04-04 PROBLEM — R07.9 CHEST PAIN IN ADULT: Status: RESOLVED | Noted: 2022-02-02 | Resolved: 2023-04-04

## 2023-04-04 PROCEDURE — G2066 INTER DEVC REMOTE 30D: HCPCS | Performed by: INTERNAL MEDICINE

## 2023-04-04 PROCEDURE — 93298 REM INTERROG DEV EVAL SCRMS: CPT | Performed by: INTERNAL MEDICINE

## 2023-04-04 RX ORDER — AMLODIPINE BESYLATE 10 MG/1
10 TABLET ORAL DAILY
Qty: 90 TABLET | Refills: 0 | Status: SHIPPED | OUTPATIENT
Start: 2023-04-04

## 2023-04-12 RX ORDER — HYDROCORTISONE 10 MG/1
TABLET ORAL
Qty: 200 TABLET | Refills: 0 | Status: SHIPPED | OUTPATIENT
Start: 2023-04-12

## 2023-04-14 ENCOUNTER — TELEPHONE (OUTPATIENT)
Dept: CARDIOLOGY | Facility: CLINIC | Age: 78
End: 2023-04-14
Payer: MEDICARE

## 2023-04-18 ENCOUNTER — OFFICE VISIT (OUTPATIENT)
Dept: PODIATRY | Facility: CLINIC | Age: 78
End: 2023-04-18
Payer: MEDICARE

## 2023-04-18 VITALS — WEIGHT: 139 LBS | HEIGHT: 60 IN | BODY MASS INDEX: 27.29 KG/M2

## 2023-04-18 DIAGNOSIS — M21.612 BUNION, LEFT: Primary | ICD-10-CM

## 2023-04-18 NOTE — PROGRESS NOTES
04/18/2023  Foot and Ankle Surgery - Established Patient/Follow-up  Provider: Dr. Austin Em DPM  Location: [facility]    Subjective:  Felton Salazar is a 78 y.o. female.     Chief Complaint   Patient presents with   • Left Foot - Follow-up     S/P 12/30/22 BUNIONECTOMY DEVONTE -MINIMALLY INVASIVE with distal metatarsal osteotomy and internal fixation   • Follow-up     MATTEO Tony 4/4/2023       HPI:  FELTON SALAZAR is a 78-year-old female who presents to the office today for a follow-up regarding her left foot.    The patient is doing well overall, and notes improvement in her symptoms. She is able to perform all of her activities of daily living.    Allergies   Allergen Reactions   • Trazodone Irritability   • Boniva [Ibandronic Acid] GI Intolerance     GERD   • Dayvigo [Lemborexant] Other (See Comments)     Sleep walking       Current Outpatient Medications on File Prior to Visit   Medication Sig Dispense Refill   • albuterol (ACCUNEB) 1.25 MG/3ML nebulizer solution Take 3 mL by nebulization Every 6 (Six) Hours As Needed for Wheezing or Shortness of Air for up to 30 doses. 120 each 2   • amitriptyline (ELAVIL) 25 MG tablet TAKE 1 TABLET AT BEDTIME (Patient taking differently: Take 1 tablet by mouth Every Night.) 90 tablet 0   • amLODIPine (NORVASC) 10 MG tablet Take 1 tablet by mouth Daily. 90 tablet 0   • atorvastatin (LIPITOR) 80 MG tablet TAKE 1 TABLET EVERY DAY (Patient taking differently: Take 1 tablet by mouth Daily.) 90 tablet 1   • Calcium Carbonate-Vit D-Min (Calcium 1200) 2747-0568 MG-UNIT chewable tablet Chew 1 tablet Daily.     • clindamycin (CLEOCIN T) 1 % lotion Apply 1 application topically to the appropriate area as directed 2 (Two) Times a Day.     • Gemtesa 75 MG tablet Take 1 tablet by mouth Daily.     • hydrocortisone (CORTEF) 10 MG tablet TAKE 1 TABLET EVERY MORNING,  AFTERNOON AND 1/2 TABLET IN THE EVENING 200 tablet 0   • hydrocortisone 2.5 % cream APPLY 1 APPLICATION  "TOPICALLY TO THE APPROPRIATE AREA AS DIRECTED TWICE DAILY 20 g 0   • lisinopril (PRINIVIL,ZESTRIL) 5 MG tablet Take 1 tablet by mouth Daily. 90 tablet 1   • meclizine (ANTIVERT) 12.5 MG tablet Take 1 tablet by mouth 3 (Three) Times a Day As Needed for Dizziness. 90 tablet 0   • nitroglycerin (NITROSTAT) 0.4 MG SL tablet DISSOLVE 1 TABLET UNDER THE TONGUE EVERY 5 MINUTES AS NEEDED FOR CHEST PAIN. TAKE NO MORE THAN 3 DOSES IN 15 MINUTES. 75 tablet 0   • ondansetron (ZOFRAN) 4 MG tablet TAKE 1 TABLET EVERY 8 HOURS AS NEEDED FOR NAUSEA OR VOMITING 60 tablet 0   • sertraline (ZOLOFT) 100 MG tablet TAKE 1 TABLET EVERY NIGHT (Patient taking differently: Take 1 tablet by mouth Daily.) 90 tablet 1   • sucralfate (CARAFATE) 1 g tablet TAKE 1 TABLET FOUR TIMES DAILY BEFORE MEALS AND AT BEDTIME 360 tablet 0   • temazepam (RESTORIL) 30 MG capsule TAKE 1 CAPSULE EVERY NIGHT AS NEEDED FOR SLEEP (Patient taking differently: Take 1 capsule by mouth At Night As Needed.) 30 capsule 0   • HYDROcodone-acetaminophen (NORCO) 7.5-325 MG per tablet Take 1 tablet by mouth Every 6 (Six) Hours As Needed for Moderate Pain (Pain). (Patient not taking: Reported on 4/18/2023) 28 tablet 0     No current facility-administered medications on file prior to visit.       Objective   Ht 152.4 cm (60\")   Wt 63 kg (139 lb)   BMI 27.15 kg/m²     Foot/Ankle Exam  General:   Appearance: appears stated age and healthy    Orientation: AAOx3    Affect: appropriate       VASCULAR       Right Foot Vascularity   Normal vascular exam    Dorsalis pedis:  2+  Posterior tibial:  2+  Skin Temperature: warm    Edema Grading:  None  CFT:  < 3 seconds  Pedal Hair Growth:  Present  Varicosities: none        Left Foot Vascularity   Normal vascular exam    Dorsalis pedis:  2+  Posterior tibial:  2+  Skin Temperature: warm    Edema Grading:  None  CFT:  < 3 seconds  Pedal Hair Growth:  Present  Varicosities: none        MUSCULOSKELETAL       Right Foot Musculoskeletal "   Ecchymosis:  None  Tenderness: none        Left Foot Musculoskeletal   Ecchymosis:  None  Tenderness: none        MUSCLE STRENGTH      Right Foot Muscle Strength   Normal strength    Foot dorsiflexion:  5  Foot plantar flexion:  5  Foot inversion:  5  Foot eversion:  5      DERMATOLOGIC      Right Foot Dermatologic   Skin: skin intact    Nails: normal        Left Foot Dermatologic   Skin: skin intact    Nails: normal        Left Foot Additional Comments: 01/12/2023  Incision sites are dry and stable with intact sutures.  No evidence of dehiscence or infection.  Rectus alignment of the great toe.  No pain with palpation.    2/7/23 Incision sites are well healed. Range of motion to the great toe joint is stable and supple. No pain with palpation. Rectus alignment of the digit.    3/7/23 Incision sites are well healed. No significant swelling. No pain with palpation. No gross deformity or instability.      04/18/2023: No significant changes as compared to preoperative or previous assessment. Range of motion has improved. No pain with palpation and rectus alignment of the digits.    Assessment & Plan   Diagnoses and all orders for this visit:    1. Bunion, left (Primary)  -     XR Foot 3+ View Left    Patient returns for follow-up regarding her left foot. Imaging was independently reviewed showing good consolidation. I discussed with the patient that she has done very well through the process and the screws will stay intact forever as long as she does not have any other problems from here around out. I discussed with the patient that she can wear supportive shoes, keep up with lower extremity stretching and exercises, and stay active. Greater than 20 minutes spent before, during, and after evaluation for patient care.    Orders Placed This Encounter   Procedures   • XR Foot 3+ View Left     Order Specific Question:   Reason for Exam:     Answer:   S/P  BUNIONECTOMY DEVONTE -MINIMALLY INVASIVE with distal metatarsal  osteotomy and internal fixationrm 14, wb     Order Specific Question:   Does this patient have a diabetic monitoring/medication delivering device on?     Answer:   No     Order Specific Question:   Release to patient     Answer:   Routine Release          Note is dictated utilizing voice recognition software. Unfortunately this leads to occasional typographical errors. I apologize in advance if the situation occurs. If questions occur please do not hesitate to call our office.    Transcribed from ambient dictation for T Austin Em DPM by Michelle Moore.  04/18/23   14:42 EDT    Patient or patient representative verbalized consent to the visit recording.  I have personally performed the services described in this document as transcribed by the above individual, and it is both accurate and complete.

## 2023-04-23 NOTE — PROGRESS NOTES
The ABCs of the Annual Wellness Visit  Subsequent Medicare Wellness Visit    Subjective    History of Present Illness:  Katie Reddy is a 78 y.o. female who presents for a Subsequent Medicare Wellness Visit.    The following portions of the patient's history were reviewed and   updated as appropriate: allergies, current medications, past family history, past medical history, past social history, past surgical history and problem list.    Compared to one year ago, the patient feels her physical   health is worse.    Compared to one year ago, the patient feels her mental   health is the same.    Recent Hospitalizations:  She was not admitted to the hospital during the last year.       Current Medical Providers:  Patient Care Team:  Nayeli Tony MD as PCP - General (Family Medicine)  Master Richard MD as Consulting Physician (Cardiology)  Banet, Duane Edward, MD as Consulting Physician (Dermatology)  Yuriy Gerard MD as Consulting Physician (Endocrinology)  Fabio Gill MD as Consulting Physician (Urology)  Shawn Elaine MD as Surgeon (Orthopedic Surgery)  Bertrand Parks MD as Consulting Physician (Cardiology)    Outpatient Medications Prior to Visit   Medication Sig Dispense Refill   • amitriptyline (ELAVIL) 25 MG tablet TAKE 1 TABLET AT BEDTIME (Patient taking differently: Take 1 tablet by mouth Every Night.) 90 tablet 0   • amLODIPine (NORVASC) 10 MG tablet Take 1 tablet by mouth Daily. 90 tablet 0   • atorvastatin (LIPITOR) 80 MG tablet TAKE 1 TABLET EVERY DAY (Patient taking differently: Take 1 tablet by mouth Daily.) 90 tablet 1   • Calcium Carbonate-Vit D-Min (Calcium 1200) 8684-1127 MG-UNIT chewable tablet Chew 1 tablet Daily.     • clindamycin (CLEOCIN T) 1 % lotion Apply 1 application topically to the appropriate area as directed 2 (Two) Times a Day.     • Gemtesa 75 MG tablet Take 1 tablet by mouth Daily.     • hydrocortisone (CORTEF) 10 MG tablet TAKE 1 TABLET EVERY  MORNING,  AFTERNOON AND 1/2 TABLET IN THE EVENING 200 tablet 0   • hydrocortisone 2.5 % cream APPLY 1 APPLICATION TOPICALLY TO THE APPROPRIATE AREA AS DIRECTED TWICE DAILY 20 g 0   • lisinopril (PRINIVIL,ZESTRIL) 5 MG tablet Take 1 tablet by mouth Daily. 90 tablet 1   • meclizine (ANTIVERT) 12.5 MG tablet Take 1 tablet by mouth 3 (Three) Times a Day As Needed for Dizziness. 90 tablet 0   • nitroglycerin (NITROSTAT) 0.4 MG SL tablet DISSOLVE 1 TABLET UNDER THE TONGUE EVERY 5 MINUTES AS NEEDED FOR CHEST PAIN. TAKE NO MORE THAN 3 DOSES IN 15 MINUTES. 75 tablet 0   • ondansetron (ZOFRAN) 4 MG tablet TAKE 1 TABLET EVERY 8 HOURS AS NEEDED FOR NAUSEA OR VOMITING 60 tablet 0   • sertraline (ZOLOFT) 100 MG tablet TAKE 1 TABLET EVERY NIGHT (Patient taking differently: Take 1 tablet by mouth Daily.) 90 tablet 1   • sucralfate (CARAFATE) 1 g tablet TAKE 1 TABLET FOUR TIMES DAILY BEFORE MEALS AND AT BEDTIME 360 tablet 0   • temazepam (RESTORIL) 30 MG capsule TAKE 1 CAPSULE EVERY NIGHT AS NEEDED FOR SLEEP (Patient taking differently: Take 1 capsule by mouth At Night As Needed.) 30 capsule 0   • albuterol (ACCUNEB) 1.25 MG/3ML nebulizer solution Take 3 mL by nebulization Every 6 (Six) Hours As Needed for Wheezing or Shortness of Air for up to 30 doses. (Patient not taking: Reported on 4/24/2023) 120 each 2   • HYDROcodone-acetaminophen (NORCO) 7.5-325 MG per tablet Take 1 tablet by mouth Every 6 (Six) Hours As Needed for Moderate Pain (Pain). (Patient not taking: Reported on 4/24/2023) 28 tablet 0     No facility-administered medications prior to visit.       No opioid medication identified on active medication list. I have reviewed chart for other potential  high risk medication/s and harmful drug interactions in the elderly.          Aspirin is not on active medication list.  Aspirin use is not indicated based on review of current medical condition/s. Risk of harm outweighs potential benefits.  .  Patient Active Problem List  "  Diagnosis   • Arthritis   • Cataract of both eyes   • Generalized anxiety disorder   • Loss of height   • Migraine headache   • Osteopenia   • Palpitations   • Vitamin D deficiency   • Postmenopausal   • Primary osteoarthritis of right knee   • Primary osteoarthritis of left knee   • Hallux valgus, acquired, bilateral   • Essential hypertension   • Fall   • UTI (urinary tract infection)   • Weakness   • Intractable nausea and vomiting   • Asthma in adult, mild intermittent, uncomplicated   • History of hemorrhagic cerebrovascular accident (CVA) with residual deficit   • Glucocorticoid deficiency   • Gastrointestinal hemorrhage associated with anorectal source   • Colitis, ischemic   • Mixed hyperlipidemia   • GERD with esophagitis   • Unstable angina pectoris   • Bunion, left   • Bladder incontinence   • Overweight with body mass index (BMI) of 27 to 27.9 in adult   • Colitis   • Encounter for annual general medical examination with abnormal findings in adult   • Encounter for hepatitis C virus screening test for high risk patient   • Screening for HIV (human immunodeficiency virus)   • Post-menopausal     Advance Care Planning  Advance Directive is not on file.  ACP discussion was held with the patient during this visit. Patient has an advance directive (not in EMR), copy requested.     Objective    Vitals:    04/24/23 1156 04/24/23 1202   BP: 125/77 143/82   BP Location: Right arm Left arm   Patient Position: Sitting Sitting   Cuff Size: Adult Adult   Pulse: 92    Temp: 98.2 °F (36.8 °C)    TempSrc: Tympanic    SpO2: 95%    Weight: 64 kg (141 lb)    Height: 152.4 cm (60\")      Estimated body mass index is 27.54 kg/m² as calculated from the following:    Height as of this encounter: 152.4 cm (60\").    Weight as of this encounter: 64 kg (141 lb).    BMI is >= 25 and <30. (Overweight) The following options were offered after discussion;: exercise counseling/recommendations and nutrition " counseling/recommendations      Does the patient have evidence of cognitive impairment? No    Lab Results   Component Value Date    TRIG 100 2023    HDL 67 (H) 2023     (H) 2023    VLDL 17 2023        HEALTH RISK ASSESSMENT    Smoking Status:  Social History     Tobacco Use   Smoking Status Never   Smokeless Tobacco Never     Alcohol Consumption:  Social History     Substance and Sexual Activity   Alcohol Use No     Fall Risk Screen:    ROSIO Fall Risk Assessment has not been completed.    Depression Screenin/24/2023    12:00 PM   PHQ-2/PHQ-9 Depression Screening   Little Interest or Pleasure in Doing Things 0-->not at all   Feeling Down, Depressed or Hopeless 0-->not at all   PHQ-9: Brief Depression Severity Measure Score 0       Health Habits and Functional and Cognitive Screenin/28/2021     2:00 PM   Functional & Cognitive Status   Do you have difficulty preparing food and eating? No   Do you have difficulty bathing yourself, getting dressed or grooming yourself? No   Do you have difficulty using the toilet? Yes   Do you have difficulty moving around from place to place? Yes   Do you have trouble with steps or getting out of a bed or a chair? Yes   Current Diet Well Balanced Diet   Dental Exam Not up to date   Eye Exam Not up to date   Exercise (times per week) 6 times per week   Current Exercises Include Walking   Do you need help using the phone?  No   Are you deaf or do you have serious difficulty hearing?  No   Do you need help with transportation? No   Do you need help shopping? No   Do you need help preparing meals?  No   Do you need help with housework?  No   Do you need help with laundry? No   Do you need help taking your medications? No   Do you need help managing money? No   Do you ever drive or ride in a car without wearing a seat belt? No   Have you felt unusual stress, anger or loneliness in the last month? Yes   Who do you live with? Alone   If you  need help, do you have trouble finding someone available to you? No   Have you been bothered in the last four weeks by sexual problems? No   Do you have difficulty concentrating, remembering or making decisions? Yes       Age-appropriate Screening Schedule:  Refer to the list below for future screening recommendations based on patient's age, sex and/or medical conditions. Orders for these recommended tests are listed in the plan section. The patient has been provided with a written plan.    Health Maintenance   Topic Date Due   • TDAP/TD VACCINES (1 - Tdap) Never done   • HEPATITIS C SCREENING  Never done   • COVID-19 Vaccine (4 - Booster for Pfizer series) 07/08/2022   • DXA SCAN  10/06/2022   • INFLUENZA VACCINE  08/01/2023   • LIPID PANEL  03/16/2024   • ANNUAL WELLNESS VISIT  04/24/2024   • Pneumococcal Vaccine 65+  Completed   • ZOSTER VACCINE  Completed   • COLORECTAL CANCER SCREENING  Discontinued                CMS Preventative Services Quick Reference  Risk Factors Identified During Encounter  None Identified  The above risks/problems have been discussed with the patient.  Follow up actions/plans if indicated are seen below in the Assessment/Plan Section.  Pertinent information has been shared with the patient in the After Visit Summary.  An After Visit Summary and PPPS were made available to the patient.    Follow Up:   Next Medicare Wellness visit to be scheduled in 1 year.         Additional E&M Note during same encounter follows:  Patient has multiple medical problems which are significant and separately identifiable that require additional work above and beyond the Medicare Wellness Visit.        Chief Complaint  Establish Care    Subjective        HPI  Katie Reddy also presents for encounter for hepatitis C and HIV screening, postmenopausal, migraine without status, osteopenia, palpitations, hypertension, asthma, CVA with residual deficit, glucocorticoid deficiency, colitis, GERD,  hyperlipidemia, unstable angina, and overweight with a body mass index of 27.      The patient would like to discuss her last liver panel. She was at [facility] because her liver enzymes were elevated.     She notes she follows urologist, Dr. Gill for her urinary incontinence.     Patient states she follows [facility] for bowel incontinence. She states she had a colonoscopy done in 2022 but it only showed irritation.     Patient notes she walks and does light exercise daily.     Patient states she had a stroke in 10/2020, she still experiences weakness on her right side in her arms and legs. Patient notes she has not followed up with neurology.     Patient states she follows cardiologist, Dr. Richard and electrophysiologist, Dr. Parks on 05/02/2023.     She states she has a blood pressure cuff at home. The patient's blood pressure was 125/77 mmHg in the right arm and 143/82 mmHg in the left arm when checked in the clinic today.     Patient states she did not know she had asthma; she has not been wheezing. She notes she does have a nebulizer and treatments on hand at home.     Patient notes she experiences hematochezia. She notes she has been seen at the ER for this. She notes she needs to schedule a follow up visit with gastroenterology.     She reports she is still using the clindamycin 1 percent lotion.     She is still taking Cortef 10 mg prescribed by Dr. Stock. Her next follow up with Dr. Stock is 05/09/2023.     She notes her GERD is controlled with Carafate 1g tablet 4 times daily.     The patient reports she had to take her nitroglycerin 0.4 mg tablets twice in 2022.     The patient states her physical health is worse compared to one year ago. The patient states her mental health is the same compared to one year ago. The patient has not been overnight in the hospital in the last 365 days. She notes she does have an advanced directive and will provide a copy for the  "clinic. She takes Aspirin 81 mg daily. The patient declined any additional information on alcohol misuse, chronic pain, depression, drug use, fall risk, glaucoma, hearing problems, immunizations, loneliness, inactivity, polypharmacy, high risk sexual behavior, and tobacco use.     Patient states she has never smoked tobacco    The patient's allergies are as follows; TRAZAONDE which causes irritability and BONIVA which causes GI intolerance and DAYVIGO.     Patient notes she has a family history of asthma. Both her mother and father had a history of migraines. No family history of cancer. Patient states she has 10 brothers and 1 sister. She has a family history of heart disease. She has one brother that passed away from heart problems, and her sister passed away from dementia.   Review of Systems   Constitutional: Positive for chills. Negative for diaphoresis, fatigue and fever.   HENT: Negative for hearing loss, tinnitus and trouble swallowing.    Eyes: Negative for photophobia and visual disturbance.   Respiratory: Negative for cough, chest tightness, shortness of breath and wheezing.    Cardiovascular: Negative for chest pain and palpitations.   Gastrointestinal: Negative for abdominal pain, blood in stool, constipation, diarrhea and vomiting.   Genitourinary: Negative for dysuria, hematuria and vaginal discharge.   Neurological: Positive for weakness. Negative for seizures and syncope.   Psychiatric/Behavioral: Negative for suicidal ideas.       Objective   Vital Signs:  /82 (BP Location: Left arm, Patient Position: Sitting, Cuff Size: Adult)   Pulse 92   Temp 98.2 °F (36.8 °C) (Tympanic)   Ht 152.4 cm (60\")   Wt 64 kg (141 lb)   SpO2 95%   BMI 27.54 kg/m²     Physical Exam  Constitutional:       Appearance: She is obese.   HENT:      Right Ear: Tympanic membrane normal.      Left Ear: Tympanic membrane normal.      Nose: Nose normal.      Mouth/Throat:      Mouth: Mucous membranes are moist.      " Pharynx: Oropharynx is clear.   Eyes:      Pupils: Pupils are equal, round, and reactive to light.   Neck:      Vascular: No carotid bruit.   Cardiovascular:      Rate and Rhythm: Normal rate and regular rhythm.      Pulses: Normal pulses.      Heart sounds: Normal heart sounds. No murmur heard.    No gallop.   Pulmonary:      Breath sounds: Normal breath sounds. No wheezing, rhonchi or rales.   Abdominal:      General: Bowel sounds are normal.      Palpations: There is no mass.      Tenderness: There is no abdominal tenderness.      Hernia: No hernia is present.   Musculoskeletal:      Right lower leg: No edema.      Left lower leg: No edema.   Lymphadenopathy:      Cervical: No cervical adenopathy.   Psychiatric:         Mood and Affect: Mood normal.                         Assessment and Plan   Diagnoses and all orders for this visit:    1. Encounter for annual general medical examination with abnormal findings in adult (Primary)  Assessment & Plan:  - order placed for future fasting labs in 1 week  - advised patient to wear sunscreen and a seatbelt      2. Encounter for hepatitis C virus screening test for high risk patient  Assessment & Plan:  - order placed for future labs in 1 week    Orders:  -     Hepatitis C Antibody; Future    3. Screening for HIV (human immunodeficiency virus)  Assessment & Plan:  - order placed for future labs in 1 week    Orders:  -     HIV-1 / O / 2 Ag / Antibody 4th Generation; Future    4. Post-menopausal    5. Migraine without status migrainosus, not intractable, unspecified migraine type  Assessment & Plan:  - patient treats with over-the-counter medication, denied prescription medication at this time          6. Osteopenia of multiple sites  Assessment & Plan:  - advised patient to increase daily walking and weight bearing exercise      7. Palpitations  Assessment & Plan:  - patient has a follow up with Dr. Parks on 05/02/2023    Orders:  -     CBC Auto Differential;  Future  -     Magnesium; Future  -     TSH; Future    8. Essential hypertension  Assessment & Plan:  - advised patient to record 10 blood pressure readings over the next 2 weeks and report them back to the clinic    Orders:  -     Comprehensive Metabolic Panel; Future    9. Asthma in adult, mild intermittent, uncomplicated  Assessment & Plan:  - well controlled  - patient has nebulizer and treatments on hand if needed          10. History of hemorrhagic cerebrovascular accident (CVA) with residual deficit  Assessment & Plan:  - referral placed to Neurology    Orders:  -     Vitamin B12; Future  -     Ambulatory Referral to Neurology    11. Glucocorticoid deficiency  Assessment & Plan:  - well controlled      12. Colitis    13. Gastrointestinal hemorrhage associated with anorectal source    14. Colitis, ischemic    15. Mixed hyperlipidemia  Assessment & Plan:  - order placed for future fasting labs in 1 week  -advised patient to monitor saturated fat intake and increase daily walking    Orders:  -     Lipid Panel; Future    16. Gastroesophageal reflux disease with esophagitis, unspecified whether hemorrhage  Assessment & Plan:  - well controlled      17. Unstable angina pectoris  Assessment & Plan:  - patient will continue to follow up with Dr. Richard      18. Overweight with body mass index (BMI) of 27 to 27.9 in adult  Assessment & Plan:  - advised patient to monitor portion size and increase daily exercise      19. Acute cystitis without hematuria  Assessment & Plan:  - urinalysis will be obtained today    Orders:  -     Urine Culture - Urine, Urine, Clean Catch; Future  -     POC Urinalysis Dipstick, Automated  -     Urine Culture - Urine, Urine, Clean Catch           Follow Up   Patient will follow up in 3 months for recheck.  Patient was given instructions and counseling regarding her condition or for health maintenance advice. Please see specific information pulled into the AVS if appropriate.   Transcribed  from ambient dictation for Nayeli Tony MD by Sudha Brown.  04/24/23   14:59 EDT    Patient or patient representative verbalized consent to the visit recording.  I have personally performed the services described in this document as transcribed by the above individual, and it is both accurate and complete.

## 2023-04-23 NOTE — PATIENT INSTRUCTIONS
Health Maintenance Due   Topic Date Due    TDAP/TD VACCINES (1 - Tdap) Never done    HEPATITIS C SCREENING  Never done    COVID-19 Vaccine (4 - Booster for Pfizer series) 07/08/2022    DXA SCAN  10/06/2022    ANNUAL WELLNESS VISIT  12/28/2022    Patient to send copy of living will.    12 hour fast for labs next week     Check blood pressure cuff for accuracy and send 10 blood pressures over 2 weeks.  Watch sodium, alcohol and weight     Patient to set up recheck with gastroenterology.    Patient to reschedule with dermatology

## 2023-04-24 ENCOUNTER — OFFICE VISIT (OUTPATIENT)
Dept: FAMILY MEDICINE CLINIC | Facility: CLINIC | Age: 78
End: 2023-04-24
Payer: MEDICARE

## 2023-04-24 VITALS
TEMPERATURE: 98.2 F | BODY MASS INDEX: 27.68 KG/M2 | HEART RATE: 92 BPM | WEIGHT: 141 LBS | SYSTOLIC BLOOD PRESSURE: 143 MMHG | OXYGEN SATURATION: 95 % | HEIGHT: 60 IN | DIASTOLIC BLOOD PRESSURE: 82 MMHG

## 2023-04-24 DIAGNOSIS — E66.3 OVERWEIGHT WITH BODY MASS INDEX (BMI) OF 27 TO 27.9 IN ADULT: ICD-10-CM

## 2023-04-24 DIAGNOSIS — Z91.89 ENCOUNTER FOR HEPATITIS C VIRUS SCREENING TEST FOR HIGH RISK PATIENT: ICD-10-CM

## 2023-04-24 DIAGNOSIS — K62.5 GASTROINTESTINAL HEMORRHAGE ASSOCIATED WITH ANORECTAL SOURCE: ICD-10-CM

## 2023-04-24 DIAGNOSIS — M85.89 OSTEOPENIA OF MULTIPLE SITES: ICD-10-CM

## 2023-04-24 DIAGNOSIS — K52.9 COLITIS: ICD-10-CM

## 2023-04-24 DIAGNOSIS — Z78.0 POST-MENOPAUSAL: ICD-10-CM

## 2023-04-24 DIAGNOSIS — E27.49 GLUCOCORTICOID DEFICIENCY: ICD-10-CM

## 2023-04-24 DIAGNOSIS — J45.20 ASTHMA IN ADULT, MILD INTERMITTENT, UNCOMPLICATED: Chronic | ICD-10-CM

## 2023-04-24 DIAGNOSIS — I10 ESSENTIAL HYPERTENSION: Chronic | ICD-10-CM

## 2023-04-24 DIAGNOSIS — I69.30 HISTORY OF HEMORRHAGIC CEREBROVASCULAR ACCIDENT (CVA) WITH RESIDUAL DEFICIT: Chronic | ICD-10-CM

## 2023-04-24 DIAGNOSIS — K21.00 GASTROESOPHAGEAL REFLUX DISEASE WITH ESOPHAGITIS, UNSPECIFIED WHETHER HEMORRHAGE: ICD-10-CM

## 2023-04-24 DIAGNOSIS — K55.9 COLITIS, ISCHEMIC: ICD-10-CM

## 2023-04-24 DIAGNOSIS — N30.00 ACUTE CYSTITIS WITHOUT HEMATURIA: ICD-10-CM

## 2023-04-24 DIAGNOSIS — Z11.59 ENCOUNTER FOR HEPATITIS C VIRUS SCREENING TEST FOR HIGH RISK PATIENT: ICD-10-CM

## 2023-04-24 DIAGNOSIS — Z00.01 ENCOUNTER FOR ANNUAL GENERAL MEDICAL EXAMINATION WITH ABNORMAL FINDINGS IN ADULT: Primary | ICD-10-CM

## 2023-04-24 DIAGNOSIS — E78.2 MIXED HYPERLIPIDEMIA: ICD-10-CM

## 2023-04-24 DIAGNOSIS — R00.2 PALPITATIONS: ICD-10-CM

## 2023-04-24 DIAGNOSIS — G43.909 MIGRAINE WITHOUT STATUS MIGRAINOSUS, NOT INTRACTABLE, UNSPECIFIED MIGRAINE TYPE: ICD-10-CM

## 2023-04-24 DIAGNOSIS — I20.0 UNSTABLE ANGINA PECTORIS: Chronic | ICD-10-CM

## 2023-04-24 DIAGNOSIS — Z11.4 SCREENING FOR HIV (HUMAN IMMUNODEFICIENCY VIRUS): ICD-10-CM

## 2023-04-24 LAB
BILIRUB BLD-MCNC: NEGATIVE MG/DL
CLARITY, POC: CLEAR
COLOR UR: YELLOW
EXPIRATION DATE: ABNORMAL
GLUCOSE UR STRIP-MCNC: NEGATIVE MG/DL
KETONES UR QL: NEGATIVE
LEUKOCYTE EST, POC: ABNORMAL
Lab: ABNORMAL
NITRITE UR-MCNC: NEGATIVE MG/ML
PH UR: 5 [PH] (ref 5–8)
PROT UR STRIP-MCNC: NEGATIVE MG/DL
RBC # UR STRIP: ABNORMAL /UL
SP GR UR: 1.01 (ref 1–1.03)
UROBILINOGEN UR QL: ABNORMAL

## 2023-04-24 PROCEDURE — 87186 SC STD MICRODIL/AGAR DIL: CPT | Performed by: PREVENTIVE MEDICINE

## 2023-04-24 PROCEDURE — 87077 CULTURE AEROBIC IDENTIFY: CPT | Performed by: PREVENTIVE MEDICINE

## 2023-04-24 PROCEDURE — 87086 URINE CULTURE/COLONY COUNT: CPT | Performed by: PREVENTIVE MEDICINE

## 2023-04-24 NOTE — ASSESSMENT & PLAN NOTE
- patient treats with over-the-counter medication, denied prescription medication at this time

## 2023-04-24 NOTE — ASSESSMENT & PLAN NOTE
- order placed for future fasting labs in 1 week  -advised patient to monitor saturated fat intake and increase daily walking

## 2023-04-24 NOTE — ASSESSMENT & PLAN NOTE
- advised patient to record 10 blood pressure readings over the next 2 weeks and report them back to the clinic

## 2023-04-24 NOTE — ASSESSMENT & PLAN NOTE
- order placed for future fasting labs in 1 week  - advised patient to wear sunscreen and a seatbelt

## 2023-04-25 ENCOUNTER — TELEPHONE (OUTPATIENT)
Dept: FAMILY MEDICINE CLINIC | Facility: CLINIC | Age: 78
End: 2023-04-25
Payer: MEDICARE

## 2023-04-25 DIAGNOSIS — K55.9 COLITIS, ISCHEMIC: ICD-10-CM

## 2023-04-25 DIAGNOSIS — K21.9 GERD WITHOUT ESOPHAGITIS: Primary | ICD-10-CM

## 2023-04-25 NOTE — TELEPHONE ENCOUNTER
PATIENT TRIED TO MAKE APPT WITH GASTRO- SHE SEE'S [facility]. REFERRAL NEEDED DUE TO INSURANCE.

## 2023-04-26 ENCOUNTER — TELEPHONE (OUTPATIENT)
Dept: FAMILY MEDICINE CLINIC | Facility: CLINIC | Age: 78
End: 2023-04-26
Payer: MEDICARE

## 2023-04-26 DIAGNOSIS — N39.0 URINARY TRACT INFECTION WITHOUT HEMATURIA, SITE UNSPECIFIED: Primary | ICD-10-CM

## 2023-04-26 LAB — BACTERIA SPEC AEROBE CULT: ABNORMAL

## 2023-04-26 RX ORDER — SERTRALINE HYDROCHLORIDE 100 MG/1
TABLET, FILM COATED ORAL
Qty: 90 TABLET | Refills: 1 | Status: SHIPPED | OUTPATIENT
Start: 2023-04-26

## 2023-04-26 RX ORDER — NITROFURANTOIN 25; 75 MG/1; MG/1
100 CAPSULE ORAL 2 TIMES DAILY
Qty: 14 CAPSULE | Refills: 0 | Status: SHIPPED | OUTPATIENT
Start: 2023-04-26

## 2023-04-26 NOTE — TELEPHONE ENCOUNTER
HUB TO READ:    ----- Message from Nayeli Tony MD sent at 4/26/2023  7:13 AM EDT -----  Urine is infected-have sent antibiotics and asfter completes-wait 3 days and repeat urine culture.

## 2023-04-26 NOTE — TELEPHONE ENCOUNTER
MESSAGE RELAYED TO PATIENT    VERBALIZED UNDERSTANDING.    WILL CALL BACK TO SCHEDULE URINE LABS IN 3 DAYS

## 2023-05-02 ENCOUNTER — LAB (OUTPATIENT)
Dept: LAB | Facility: HOSPITAL | Age: 78
End: 2023-05-02
Payer: MEDICARE

## 2023-05-02 ENCOUNTER — OFFICE VISIT (OUTPATIENT)
Dept: CARDIOLOGY | Facility: CLINIC | Age: 78
End: 2023-05-02
Payer: MEDICARE

## 2023-05-02 VITALS
WEIGHT: 139 LBS | BODY MASS INDEX: 27.29 KG/M2 | HEIGHT: 60 IN | OXYGEN SATURATION: 96 % | SYSTOLIC BLOOD PRESSURE: 122 MMHG | DIASTOLIC BLOOD PRESSURE: 78 MMHG | HEART RATE: 91 BPM

## 2023-05-02 DIAGNOSIS — E27.49 GLUCOCORTICOID DEFICIENCY: ICD-10-CM

## 2023-05-02 DIAGNOSIS — I50.22 CHRONIC SYSTOLIC CONGESTIVE HEART FAILURE: Primary | ICD-10-CM

## 2023-05-02 DIAGNOSIS — E78.2 MIXED HYPERLIPIDEMIA: ICD-10-CM

## 2023-05-02 DIAGNOSIS — I44.7 LBBB (LEFT BUNDLE BRANCH BLOCK): ICD-10-CM

## 2023-05-02 PROCEDURE — 36415 COLL VENOUS BLD VENIPUNCTURE: CPT

## 2023-05-02 PROCEDURE — 80053 COMPREHEN METABOLIC PANEL: CPT

## 2023-05-02 RX ORDER — TRIAMCINOLONE ACETONIDE 1 MG/G
CREAM TOPICAL
COMMUNITY
Start: 2023-04-26

## 2023-05-02 NOTE — LETTER
May 2, 2023     Master Richard MD  [address] [facility] [place] [address]    Patient: Katie Reddy   YOB: 1945   Date of Visit: 2023       Dear Dr. Hilary MD:    Thank you for referring Katie Reddy to me for evaluation. Below are the relevant portions of my assessment and plan of care.    If you have questions, please do not hesitate to call me. I look forward to following Katie along with you.         Sincerely,        Bertrand Parks MD        CC: No Recipients    Bertrand Parks MD  [phone]  Sign when Signing Visit  HP      Name: Katie Reddy ADMIT: (Not on file)   : 1945  PCP: Nayeli Tony MD    MRN: [id number] LOS: 0 days   AGE/SEX: 78 y.o. female  ROOM: Room/bed info not found     Chief Complaint   Patient presents with   • Consult     ABN LOOP RHYTHM        Subjective         History of present illness  Katie Reddy is a 78-year-old female patient who is referred for evaluation of abnormal tracing on her loop recorder.  Patient has been complaining of shortness of breath, decreased activity tolerance for some time now.  She denies exertional chest pain, no significant palpitations.    Past Medical History:   Diagnosis Date   • Arthritis    • Bladder incontinence    • Colon polyp    • Glucocorticoid deficiency    • Osteopenia    • Vitamin D deficiency      Past Surgical History:   Procedure Laterality Date   • APPENDECTOMY     • BLADDER SURGERY      bladder stimulator placement/ REMOVAL   • BREAST CYST EXCISION     • BREAST LUMPECTOMY Left     NEG   • BUNIONECTOMY Right 2020    Procedure: BUNIONECTOMY DEVONTE;  Surgeon: MARISSA Em DPM;  Location: [facility] OR;  Service: Podiatry;  Laterality: Right;   • BUNIONECTOMY Left 2022    Procedure: BUNIONECTYI WHITNEY -MINIMALLY INVASIVE with distal metatarsal osteotomy and internal fixation;  Surgeon: MARISSA Em DPM;  Location: [facility] OR;  Service:  Podiatry;  Laterality: Left;   • CARDIAC CATHETERIZATION     • CARDIAC CATHETERIZATION N/A 02/08/2022    Procedure: Left Heart Cath, possible pci;  Surgeon: Master Richard MD;  Location: [facility] CATH INVASIVE LOCATION;  Service: Cardiovascular;  Laterality: N/A;   • CARPAL TUNNEL RELEASE Right 1980   • CHOLECYSTECTOMY     • COLON SURGERY      hemorrhoid banding   • COLONOSCOPY  2017 2017/ 2019 = TA, rech 2024   Dr. Mackey   • COLONOSCOPY N/A 02/05/2022    NEG= 2022   • ENDOSCOPY N/A 02/05/2022 2022EGD= erosive esophagitis, hiatal hernia, Nonerosive gastritis   • EYE SURGERY     • HERNIA REPAIR      Umbilical removal   • HYSTERECTOMY  1970   • SUBTOTAL HYSTERECTOMY     • UMBILICAL HERNIA REPAIR       Family History   Problem Relation Age of Onset   • Heart disease Mother    • Hypertension Mother    • Diabetes Father    • Heart disease Father    • Alcohol abuse Father    • Hypertension Father    • Diabetes Brother    • Heart disease Brother    • Cancer Brother 68        Prostate Cancer   • Hypertension Brother    • Diabetes Maternal Aunt    • Heart disease Maternal Grandmother    • Hypertension Maternal Grandmother    • Heart disease Maternal Grandfather    • Hypertension Maternal Grandfather    • Liver disease Paternal Grandmother    • Hypertension Paternal Grandmother    • Heart disease Paternal Grandfather    • Hypertension Paternal Grandfather    • Dementia Sister    • Diabetes Brother      Social History     Tobacco Use   • Smoking status: Never     Passive exposure: Never   • Smokeless tobacco: Never   Vaping Use   • Vaping Use: Never used   Substance Use Topics   • Alcohol use: No   • Drug use: No     (Not in a hospital admission)    Allergies:  Trazodone, Boniva [ibandronic acid], and Dayvigo [lemborexant]    Review of systems    Constitutional: Negative.    Respiratory and cardiovascular: As detailed in HPI section.  Gastrointestinal: Negative for constipation, nausea and vomiting negative for  abdominal distention, abdominal pain and diarrhea.   Genitourinary: Negative for difficulty urinating and flank pain.   Musculoskeletal: Negative for arthralgias, joint swelling and myalgias.   Skin: Negative for color change, rash and wound.   Neurological: Negative for dizziness, syncope, weakness and headaches.   Hematological: Negative for adenopathy.   Psychiatric/Behavioral: Negative for confusion.   All other systems reviewed and are negative.    Physical Exam  VITALS REVIEWED    General:      well developed, in no acute distress.    Head:      normocephalic and atraumatic.    Eyes:      PERRL/EOM intact, conjunctiva and sclera clear with out nystagmus.    Neck:      no masses, thyromegaly,  trachea central with normal respiratory effort and PMI displaced laterally  Lungs:      Clear to auscultation bilaterally  Heart:       Regular rate and rhythm, systolic murmur  Msk:      no deformity or scoliosis noted of thoracic or lumbar spine.    Pulses:      pulses normal in all 4 extremities.    Extremities:       No lower extremity edema  Neurologic:      no focal deficits.   alert oriented x3  Skin:      intact without lesions or rashes.    Psych:      alert and cooperative; normal mood and affect; normal attention span and concentration.      Result Review :               Pertinent cardiac workup    Heart cath 2/8/2022 no significant disease, EF 60%.  EKG March 2, 2021 sinus rhythm, narrow QRS.  EKG 4/6/2021 sinus rhythm narrow QRS.  EKG 2/1/2022 sinus rhythm, left bundle branch block.          ECG 12 Lead    Date/Time: 5/2/2023 1:15 PM  Performed by: Bertrand Parks MD  Authorized by: Bertrand Parks MD   Comparison: compared with previous ECG   Similar to previous ECG  Rhythm: sinus rhythm  Rate: normal  BPM: 79  Conduction: left bundle branch block  ST Segments: ST segments normal  QRS axis: normal  Other findings: non-specific ST-T wave changes               Assessment and Plan      Katie Reddy  is a 78-year-old female patient who has been complaining of shortness of breath and fatigue for almost a year now, is here today for evaluation of abnormal rhythm on her loop recorder.  I reviewed the tracing from her loop recorder, she does have a wide complex rhythm, however she has developed left bundle branch block sometime between April 2021 and February 2022 based on EKG reviews.  I did not appreciate any arrhythmias on loop recorder readings aside from wide-complex rhythm from her left bundle branch block.  It is possible that her symptoms of shortness of breath and fatigue are related to her cardiomyopathy since she developed left bundle branch block, otherwise she does not have significant valvular disease or coronary disease, and her EF as of February 2022 was normal.  I have ordered an echocardiogram to be performed before her next visit with Dr. Richard.  If her EF is truly low, then I recommend a CRT device for cardiac resynchronization and to improve her ejection fraction.    Diagnoses and all orders for this visit:    1. Chronic systolic congestive heart failure (Primary)  -     Adult Transthoracic Echo Complete W/ Cont if Necessary Per Protocol; Future    2. LBBB (left bundle branch block)    3. Mixed hyperlipidemia           No follow-ups on file.  Patient was given instructions and counseling regarding her condition or for health maintenance advice. Please see specific information pulled into the AVS if appropriate.

## 2023-05-02 NOTE — PROGRESS NOTES
HP      Name: Katie Reddy ADMIT: (Not on file)   : 1945  PCP: Nayeli Tony MD    MRN: [id number] LOS: 0 days   AGE/SEX: 78 y.o. female  ROOM: Room/bed info not found     Chief Complaint   Patient presents with   • Consult     ABN LOOP RHYTHM        Subjective        History of present illness  Katie Reddy is a 78-year-old female patient who is referred for evaluation of abnormal tracing on her loop recorder.  Patient has been complaining of shortness of breath, decreased activity tolerance for some time now.  She denies exertional chest pain, no significant palpitations.    Past Medical History:   Diagnosis Date   • Arthritis    • Bladder incontinence    • Colon polyp    • Glucocorticoid deficiency    • Osteopenia    • Vitamin D deficiency      Past Surgical History:   Procedure Laterality Date   • APPENDECTOMY     • BLADDER SURGERY      bladder stimulator placement/ REMOVAL   • BREAST CYST EXCISION     • BREAST LUMPECTOMY Left     NEG   • BUNIONECTOMY Right 2020    Procedure: BUNIONECTOMY DEVONTE;  Surgeon: MARISSA Em DPM;  Location: [facility] MAIN OR;  Service: Podiatry;  Laterality: Right;   • BUNIONECTOMY Left 2022    Procedure: BUNIONECTOMY DEVONTE -MINIMALLY INVASIVE with distal metatarsal osteotomy and internal fixation;  Surgeon: MARISSA Em DPM;  Location: [facility] MAIN OR;  Service: Podiatry;  Laterality: Left;   • CARDIAC CATHETERIZATION     • CARDIAC CATHETERIZATION N/A 2022    Procedure: Left Heart Cath, possible pci;  Surgeon: Master Richard MD;  Location: [facility] CATH INVASIVE LOCATION;  Service: Cardiovascular;  Laterality: N/A;   • CARPAL TUNNEL RELEASE Right    • CHOLECYSTECTOMY     • COLON SURGERY      hemorrhoid banding   • COLONOSCOPY  2017 = jessica DESAI    Dr. Mackey   • COLONOSCOPY N/A 2022    NEG=    • ENDOSCOPY N/A 2022EGD= erosive esophagitis, hiatal hernia, Nonerosive gastritis   •  EYE SURGERY     • HERNIA REPAIR      Umbilical removal   • HYSTERECTOMY  1970   • SUBTOTAL HYSTERECTOMY     • UMBILICAL HERNIA REPAIR       Family History   Problem Relation Age of Onset   • Heart disease Mother    • Hypertension Mother    • Diabetes Father    • Heart disease Father    • Alcohol abuse Father    • Hypertension Father    • Diabetes Brother    • Heart disease Brother    • Cancer Brother 68        Prostate Cancer   • Hypertension Brother    • Diabetes Maternal Aunt    • Heart disease Maternal Grandmother    • Hypertension Maternal Grandmother    • Heart disease Maternal Grandfather    • Hypertension Maternal Grandfather    • Liver disease Paternal Grandmother    • Hypertension Paternal Grandmother    • Heart disease Paternal Grandfather    • Hypertension Paternal Grandfather    • Dementia Sister    • Diabetes Brother      Social History     Tobacco Use   • Smoking status: Never     Passive exposure: Never   • Smokeless tobacco: Never   Vaping Use   • Vaping Use: Never used   Substance Use Topics   • Alcohol use: No   • Drug use: No     (Not in a hospital admission)    Allergies:  Trazodone, Boniva [ibandronic acid], and Dayvigo [lemborexant]    Review of systems    Constitutional: Negative.    Respiratory and cardiovascular: As detailed in HPI section.  Gastrointestinal: Negative for constipation, nausea and vomiting negative for abdominal distention, abdominal pain and diarrhea.   Genitourinary: Negative for difficulty urinating and flank pain.   Musculoskeletal: Negative for arthralgias, joint swelling and myalgias.   Skin: Negative for color change, rash and wound.   Neurological: Negative for dizziness, syncope, weakness and headaches.   Hematological: Negative for adenopathy.   Psychiatric/Behavioral: Negative for confusion.   All other systems reviewed and are negative.    Physical Exam  VITALS REVIEWED    General:      well developed, in no acute distress.    Head:      normocephalic and  atraumatic.    Eyes:      PERRL/EOM intact, conjunctiva and sclera clear with out nystagmus.    Neck:      no masses, thyromegaly,  trachea central with normal respiratory effort and PMI displaced laterally  Lungs:      Clear to auscultation bilaterally  Heart:       Regular rate and rhythm, systolic murmur  Msk:      no deformity or scoliosis noted of thoracic or lumbar spine.    Pulses:      pulses normal in all 4 extremities.    Extremities:       No lower extremity edema  Neurologic:      no focal deficits.   alert oriented x3  Skin:      intact without lesions or rashes.    Psych:      alert and cooperative; normal mood and affect; normal attention span and concentration.      Result Review :               Pertinent cardiac workup    1. Heart cath 2/8/2022 no significant disease, EF 60%.  2. EKG March 2, 2021 sinus rhythm, narrow QRS.  3. EKG 4/6/2021 sinus rhythm narrow QRS.  4. EKG 2/1/2022 sinus rhythm, left bundle branch block.          ECG 12 Lead    Date/Time: 5/2/2023 1:15 PM  Performed by: Bertrand Parks MD  Authorized by: Bertrand Parks MD   Comparison: compared with previous ECG   Similar to previous ECG  Rhythm: sinus rhythm  Rate: normal  BPM: 79  Conduction: left bundle branch block  ST Segments: ST segments normal  QRS axis: normal  Other findings: non-specific ST-T wave changes                Assessment and Plan      Katie Reddy is a 78-year-old female patient who has been complaining of shortness of breath and fatigue for almost a year now, is here today for evaluation of abnormal rhythm on her loop recorder.  I reviewed the tracing from her loop recorder, she does have a wide complex rhythm, however she has developed left bundle branch block sometime between April 2021 and February 2022 based on EKG reviews.  I did not appreciate any arrhythmias on loop recorder readings aside from wide-complex rhythm from her left bundle branch block.  It is possible that her symptoms of  shortness of breath and fatigue are related to her cardiomyopathy since she developed left bundle branch block, otherwise she does not have significant valvular disease or coronary disease, and her EF as of February 2022 was normal.  I have ordered an echocardiogram to be performed before her next visit with Dr. Richard.  If her EF is truly low, then I recommend a CRT device for cardiac resynchronization and to improve her ejection fraction.    Diagnoses and all orders for this visit:    1. Chronic systolic congestive heart failure (Primary)  -     Adult Transthoracic Echo Complete W/ Cont if Necessary Per Protocol; Future    2. LBBB (left bundle branch block)    3. Mixed hyperlipidemia           No follow-ups on file.  Patient was given instructions and counseling regarding her condition or for health maintenance advice. Please see specific information pulled into the AVS if appropriate.

## 2023-05-03 LAB
ALBUMIN SERPL-MCNC: 4.7 G/DL (ref 3.5–5.2)
ALBUMIN/GLOB SERPL: 1.6 G/DL
ALP SERPL-CCNC: 119 U/L (ref 39–117)
ALT SERPL W P-5'-P-CCNC: 14 U/L (ref 1–33)
ANION GAP SERPL CALCULATED.3IONS-SCNC: 12.9 MMOL/L (ref 5–15)
AST SERPL-CCNC: 25 U/L (ref 1–32)
BILIRUB SERPL-MCNC: 0.3 MG/DL (ref 0–1.2)
BUN SERPL-MCNC: 19 MG/DL (ref 8–23)
BUN/CREAT SERPL: 13.5 (ref 7–25)
CALCIUM SPEC-SCNC: 10.7 MG/DL (ref 8.6–10.5)
CHLORIDE SERPL-SCNC: 101 MMOL/L (ref 98–107)
CO2 SERPL-SCNC: 23.1 MMOL/L (ref 22–29)
CREAT SERPL-MCNC: 1.41 MG/DL (ref 0.57–1)
EGFRCR SERPLBLD CKD-EPI 2021: 38.5 ML/MIN/1.73
GLOBULIN UR ELPH-MCNC: 2.9 GM/DL
GLUCOSE SERPL-MCNC: 97 MG/DL (ref 65–99)
POTASSIUM SERPL-SCNC: 5.5 MMOL/L (ref 3.5–5.2)
PROT SERPL-MCNC: 7.6 G/DL (ref 6–8.5)
SODIUM SERPL-SCNC: 137 MMOL/L (ref 136–145)

## 2023-05-05 PROCEDURE — G2066 INTER DEVC REMOTE 30D: HCPCS | Performed by: INTERNAL MEDICINE

## 2023-05-05 PROCEDURE — 93298 REM INTERROG DEV EVAL SCRMS: CPT | Performed by: INTERNAL MEDICINE

## 2023-05-06 ENCOUNTER — PATIENT ROUNDING (BHMG ONLY) (OUTPATIENT)
Dept: FAMILY MEDICINE CLINIC | Facility: CLINIC | Age: 78
End: 2023-05-06
Payer: MEDICARE

## 2023-05-07 NOTE — PROGRESS NOTES
A oNoise message has been sent to the patient [place] [facility] [place]

## 2023-05-09 ENCOUNTER — LAB (OUTPATIENT)
Dept: LAB | Facility: HOSPITAL | Age: 78
End: 2023-05-09
Payer: MEDICARE

## 2023-05-09 ENCOUNTER — OFFICE VISIT (OUTPATIENT)
Dept: ENDOCRINOLOGY | Facility: CLINIC | Age: 78
End: 2023-05-09
Payer: MEDICARE

## 2023-05-09 VITALS
WEIGHT: 137 LBS | DIASTOLIC BLOOD PRESSURE: 75 MMHG | OXYGEN SATURATION: 97 % | HEIGHT: 60 IN | HEART RATE: 94 BPM | BODY MASS INDEX: 26.9 KG/M2 | SYSTOLIC BLOOD PRESSURE: 130 MMHG

## 2023-05-09 DIAGNOSIS — I10 ESSENTIAL HYPERTENSION: Chronic | ICD-10-CM

## 2023-05-09 DIAGNOSIS — E83.52 HYPERCALCEMIA: ICD-10-CM

## 2023-05-09 DIAGNOSIS — E27.49 GLUCOCORTICOID DEFICIENCY: Primary | ICD-10-CM

## 2023-05-09 PROCEDURE — 3078F DIAST BP <80 MM HG: CPT | Performed by: INTERNAL MEDICINE

## 2023-05-09 PROCEDURE — 80053 COMPREHEN METABOLIC PANEL: CPT

## 2023-05-09 PROCEDURE — 83970 ASSAY OF PARATHORMONE: CPT

## 2023-05-09 PROCEDURE — 36415 COLL VENOUS BLD VENIPUNCTURE: CPT

## 2023-05-09 PROCEDURE — 3075F SYST BP GE 130 - 139MM HG: CPT | Performed by: INTERNAL MEDICINE

## 2023-05-09 PROCEDURE — 1159F MED LIST DOCD IN RCRD: CPT | Performed by: INTERNAL MEDICINE

## 2023-05-09 PROCEDURE — 99214 OFFICE O/P EST MOD 30 MIN: CPT | Performed by: INTERNAL MEDICINE

## 2023-05-09 PROCEDURE — 1160F RVW MEDS BY RX/DR IN RCRD: CPT | Performed by: INTERNAL MEDICINE

## 2023-05-09 NOTE — PATIENT INSTRUCTIONS
Check CMP and PTH level today  Continue current medications including hydrocortisone and remember sick day rules.  
no

## 2023-05-09 NOTE — PROGRESS NOTES
Endocrine Progress Note Outpatient     Patient Care Team:  Nayeli oTny MD as PCP - General (Family Medicine)  Master Richard MD as Consulting Physician (Cardiology)  Banet, Duane Edward, MD as Consulting Physician (Dermatology)  Yuriy Gerard MD as Consulting Physician (Endocrinology)  Fabio Gill MD as Consulting Physician (Urology)  Shawn Elaine MD as Surgeon (Orthopedic Surgery)  Bertrand Parks MD as Consulting Physician (Cardiology)    Chief Complaint: Follow-up glucocorticoid deficiency    HPI: 78-year-old female with history of hypertension, osteoarthritis developed glucocorticoid deficiency after repeated steroid injections in her knees.  She failed ACTH stimulation test.  She was started on hydrocortisone 10 mg 3 times daily and she has been feeling very well and her symptoms of dizziness and syncopal episode have improved significantly.      Hypertension: Well-controlled    Hypercalcemia: New problem.  Denies any history of kidney stones or osteoporosis or reflux disease.    Past Medical History:   Diagnosis Date   • Arthritis    • Bladder incontinence    • Colon polyp    • Glucocorticoid deficiency    • Osteopenia    • Vitamin D deficiency        Social History     Socioeconomic History   • Marital status:    • Number of children: 4   • Years of education: GED   Tobacco Use   • Smoking status: Never     Passive exposure: Never   • Smokeless tobacco: Never   Vaping Use   • Vaping Use: Never used   Substance and Sexual Activity   • Alcohol use: No   • Drug use: No   • Sexual activity: Defer       Family History   Problem Relation Age of Onset   • Heart disease Mother    • Hypertension Mother    • Diabetes Father    • Heart disease Father    • Alcohol abuse Father    • Hypertension Father    • Diabetes Brother    • Heart disease Brother    • Cancer Brother 68        Prostate Cancer   • Hypertension Brother    • Diabetes Maternal Aunt    • Heart disease Maternal  Grandmother    • Hypertension Maternal Grandmother    • Heart disease Maternal Grandfather    • Hypertension Maternal Grandfather    • Liver disease Paternal Grandmother    • Hypertension Paternal Grandmother    • Heart disease Paternal Grandfather    • Hypertension Paternal Grandfather    • Dementia Sister    • Diabetes Brother        Allergies   Allergen Reactions   • Trazodone Irritability   • Boniva [Ibandronic Acid] GI Intolerance     GERD   • Dayvigo [Lemborexant] Other (See Comments)     Sleep walking       ROS:   Constitutional:  Denies fatigue, tiredness.    Eyes:  Denies change in visual acuity   HENT:  Denies nasal congestion or sore throat   Respiratory: denies cough, shortness of breath.   Cardiovascular:  denies chest pain, edema   GI:  Denies abdominal pain, nausea, vomiting.   Musculoskeletal:  Denies back pain or joint pain   Integument:  Denies dry skin and rash   Neurologic:  Denies headache, focal weakness or sensory changes   Endocrine:  Denies polyuria or polydipsia   Psychiatric:  Denies depression or anxiety      Vitals:    05/09/23 1411   BP: 130/75   Pulse: 94   SpO2: 97%     Body mass index is 26.76 kg/m².     Physical Exam:  GEN: NAD, conversant  EYES: EOMI, PERRL, no conjunctival erythema  NECK: no thyromegaly, full ROM   CV: RRR, no murmurs/rubs/gallops, no peripheral edema  LUNG: CTAB, no wheezes/rales/ronchi  SKIN: no rashes, no acanthosis  MSK: no deformities, full ROM of all extremities  NEURO: no tremors, DTR normal  PSYCH: AOX3, appropriate mood, affect normal      Results Review:     I reviewed the patient's new clinical results.    Lab Results   Component Value Date    HGBA1C 4.9 04/07/2021      Lab Results   Component Value Date    GLUCOSE 97 05/02/2023    BUN 19 05/02/2023    CREATININE 1.41 (H) 05/02/2023    EGFRIFNONA 55 (L) 02/09/2022    BCR 13.5 05/02/2023    K 5.5 (H) 05/02/2023    CO2 23.1 05/02/2023    CALCIUM 10.7 (H) 05/02/2023    ALBUMIN 4.7 05/02/2023    LABIL2 1.4  04/30/2019    AST 25 05/02/2023    ALT 14 05/02/2023    CHOL 301 (H) 03/16/2023    TRIG 100 03/16/2023     (H) 03/16/2023    HDL 67 (H) 03/16/2023     Lab Results   Component Value Date    TSH 1.980 04/06/2021         Medication Review: Reviewed.       Current Outpatient Medications:   •  amitriptyline (ELAVIL) 25 MG tablet, TAKE 1 TABLET AT BEDTIME (Patient taking differently: Take 1 tablet by mouth Every Night.), Disp: 90 tablet, Rfl: 0  •  amLODIPine (NORVASC) 10 MG tablet, Take 1 tablet by mouth Daily., Disp: 90 tablet, Rfl: 0  •  atorvastatin (LIPITOR) 80 MG tablet, TAKE 1 TABLET EVERY DAY (Patient taking differently: Take 1 tablet by mouth Daily.), Disp: 90 tablet, Rfl: 1  •  Calcium Carbonate-Vit D-Min (Calcium 1200) 6196-9042 MG-UNIT chewable tablet, Chew 1 tablet Daily., Disp: , Rfl:   •  clindamycin (CLEOCIN T) 1 % lotion, Apply 1 application topically to the appropriate area as directed 2 (Two) Times a Day., Disp: , Rfl:   •  Gemtesa 75 MG tablet, Take 1 tablet by mouth Daily., Disp: , Rfl:   •  hydrocortisone (CORTEF) 10 MG tablet, TAKE 1 TABLET EVERY MORNING,  AFTERNOON AND 1/2 TABLET IN THE EVENING, Disp: 200 tablet, Rfl: 0  •  hydrocortisone 2.5 % cream, APPLY 1 APPLICATION TOPICALLY TO THE APPROPRIATE AREA AS DIRECTED TWICE DAILY, Disp: 20 g, Rfl: 0  •  lisinopril (PRINIVIL,ZESTRIL) 5 MG tablet, Take 1 tablet by mouth Daily., Disp: 90 tablet, Rfl: 1  •  meclizine (ANTIVERT) 12.5 MG tablet, Take 1 tablet by mouth 3 (Three) Times a Day As Needed for Dizziness., Disp: 90 tablet, Rfl: 0  •  nitrofurantoin, macrocrystal-monohydrate, (Macrobid) 100 MG capsule, Take 1 capsule by mouth 2 (Two) Times a Day., Disp: 14 capsule, Rfl: 0  •  nitroglycerin (NITROSTAT) 0.4 MG SL tablet, DISSOLVE 1 TABLET UNDER THE TONGUE EVERY 5 MINUTES AS NEEDED FOR CHEST PAIN. TAKE NO MORE THAN 3 DOSES IN 15 MINUTES., Disp: 75 tablet, Rfl: 0  •  ondansetron (ZOFRAN) 4 MG tablet, TAKE 1 TABLET EVERY 8 HOURS AS NEEDED FOR  NAUSEA OR VOMITING, Disp: 60 tablet, Rfl: 0  •  sertraline (ZOLOFT) 100 MG tablet, TAKE 1 TABLET EVERY NIGHT, Disp: 90 tablet, Rfl: 1  •  sucralfate (CARAFATE) 1 g tablet, TAKE 1 TABLET FOUR TIMES DAILY BEFORE MEALS AND AT BEDTIME, Disp: 360 tablet, Rfl: 0  •  temazepam (RESTORIL) 30 MG capsule, TAKE 1 CAPSULE EVERY NIGHT AS NEEDED FOR SLEEP (Patient taking differently: Take 1 capsule by mouth At Night As Needed.), Disp: 30 capsule, Rfl: 0  •  triamcinolone (KENALOG) 0.1 % cream, PLEASE SEE ATTACHED FOR DETAILED DIRECTIONS, Disp: , Rfl:       Assessment & Plan   1.  Glucocorticoid deficiency: Most likely secondary to articular injections.  She is feeling very well with hydrocortisone replacement.  I have advised her to continue hydrocortisone to 10 mg in the morning, 10 in the afternoon and 5 in the evening.  And will follow her clinically.  CPM.  Sick day rules discussed with her.  She is advised to double up her steroid dose when she gets sick and also she may need IV steroid dose if she is hospitalized or requiring any procedures.  She verbalized understanding.  Also advised her to get an identification that says that she is on steroids replacement.    2.  Hypertension: Well-controlled.    3.  Hypercalcemia: New problem, she denies any history of kidney stones or osteoporosis or reflux disease.  We will recheck CMP with PTH level.    Assessment and plan from 8/13/2022 reviewed and updated.            Yuriy Gerard MD FACE.

## 2023-05-10 ENCOUNTER — CLINICAL SUPPORT (OUTPATIENT)
Dept: FAMILY MEDICINE CLINIC | Facility: CLINIC | Age: 78
End: 2023-05-10
Payer: MEDICARE

## 2023-05-10 DIAGNOSIS — Z91.89 ENCOUNTER FOR HEPATITIS C VIRUS SCREENING TEST FOR HIGH RISK PATIENT: ICD-10-CM

## 2023-05-10 DIAGNOSIS — N39.0 URINARY TRACT INFECTION WITHOUT HEMATURIA, SITE UNSPECIFIED: ICD-10-CM

## 2023-05-10 DIAGNOSIS — Z11.59 ENCOUNTER FOR HEPATITIS C VIRUS SCREENING TEST FOR HIGH RISK PATIENT: ICD-10-CM

## 2023-05-10 DIAGNOSIS — E78.2 MIXED HYPERLIPIDEMIA: ICD-10-CM

## 2023-05-10 DIAGNOSIS — R00.2 PALPITATIONS: ICD-10-CM

## 2023-05-10 DIAGNOSIS — Z11.4 SCREENING FOR HIV (HUMAN IMMUNODEFICIENCY VIRUS): ICD-10-CM

## 2023-05-10 DIAGNOSIS — I10 ESSENTIAL HYPERTENSION: Chronic | ICD-10-CM

## 2023-05-10 DIAGNOSIS — I69.30 HISTORY OF HEMORRHAGIC CEREBROVASCULAR ACCIDENT (CVA) WITH RESIDUAL DEFICIT: Chronic | ICD-10-CM

## 2023-05-10 LAB
ALBUMIN SERPL-MCNC: 4.4 G/DL (ref 3.5–5.2)
ALBUMIN SERPL-MCNC: 4.5 G/DL (ref 3.5–5.2)
ALBUMIN/GLOB SERPL: 1.5 G/DL
ALBUMIN/GLOB SERPL: 1.6 G/DL
ALP SERPL-CCNC: 113 U/L (ref 39–117)
ALP SERPL-CCNC: 118 U/L (ref 39–117)
ALT SERPL W P-5'-P-CCNC: 12 U/L (ref 1–33)
ALT SERPL W P-5'-P-CCNC: 13 U/L (ref 1–33)
ANION GAP SERPL CALCULATED.3IONS-SCNC: 14 MMOL/L (ref 5–15)
ANION GAP SERPL CALCULATED.3IONS-SCNC: 15 MMOL/L (ref 5–15)
AST SERPL-CCNC: 21 U/L (ref 1–32)
AST SERPL-CCNC: 25 U/L (ref 1–32)
BASOPHILS # BLD AUTO: 0.09 10*3/MM3 (ref 0–0.2)
BASOPHILS NFR BLD AUTO: 1.3 % (ref 0–1.5)
BILIRUB SERPL-MCNC: 0.3 MG/DL (ref 0–1.2)
BILIRUB SERPL-MCNC: 0.3 MG/DL (ref 0–1.2)
BUN SERPL-MCNC: 21 MG/DL (ref 8–23)
BUN SERPL-MCNC: 25 MG/DL (ref 8–23)
BUN/CREAT SERPL: 19.1 (ref 7–25)
BUN/CREAT SERPL: 21.2 (ref 7–25)
CALCIUM SPEC-SCNC: 10.4 MG/DL (ref 8.6–10.5)
CALCIUM SPEC-SCNC: 9.8 MG/DL (ref 8.6–10.5)
CHLORIDE SERPL-SCNC: 101 MMOL/L (ref 98–107)
CHLORIDE SERPL-SCNC: 103 MMOL/L (ref 98–107)
CHOLEST SERPL-MCNC: 280 MG/DL (ref 0–200)
CO2 SERPL-SCNC: 19 MMOL/L (ref 22–29)
CO2 SERPL-SCNC: 20 MMOL/L (ref 22–29)
CREAT SERPL-MCNC: 1.1 MG/DL (ref 0.57–1)
CREAT SERPL-MCNC: 1.18 MG/DL (ref 0.57–1)
DEPRECATED RDW RBC AUTO: 41.3 FL (ref 37–54)
EGFRCR SERPLBLD CKD-EPI 2021: 47.7 ML/MIN/1.73
EGFRCR SERPLBLD CKD-EPI 2021: 51.9 ML/MIN/1.73
EOSINOPHIL # BLD AUTO: 0.29 10*3/MM3 (ref 0–0.4)
EOSINOPHIL NFR BLD AUTO: 4.2 % (ref 0.3–6.2)
ERYTHROCYTE [DISTWIDTH] IN BLOOD BY AUTOMATED COUNT: 14 % (ref 12.3–15.4)
GLOBULIN UR ELPH-MCNC: 2.8 GM/DL
GLOBULIN UR ELPH-MCNC: 2.9 GM/DL
GLUCOSE SERPL-MCNC: 81 MG/DL (ref 65–99)
GLUCOSE SERPL-MCNC: 92 MG/DL (ref 65–99)
HCT VFR BLD AUTO: 39.4 % (ref 34–46.6)
HCV AB SER DONR QL: NORMAL
HDLC SERPL-MCNC: 50 MG/DL (ref 40–60)
HGB BLD-MCNC: 12.7 G/DL (ref 12–15.9)
HIV1+2 AB SER QL: NORMAL
IMM GRANULOCYTES # BLD AUTO: 0.03 10*3/MM3 (ref 0–0.05)
IMM GRANULOCYTES NFR BLD AUTO: 0.4 % (ref 0–0.5)
LDLC SERPL CALC-MCNC: 198 MG/DL (ref 0–100)
LDLC/HDLC SERPL: 3.92 {RATIO}
LYMPHOCYTES # BLD AUTO: 2.3 10*3/MM3 (ref 0.7–3.1)
LYMPHOCYTES NFR BLD AUTO: 33.5 % (ref 19.6–45.3)
MAGNESIUM SERPL-MCNC: 1.9 MG/DL (ref 1.6–2.4)
MCH RBC QN AUTO: 26.5 PG (ref 26.6–33)
MCHC RBC AUTO-ENTMCNC: 32.2 G/DL (ref 31.5–35.7)
MCV RBC AUTO: 82.1 FL (ref 79–97)
MONOCYTES # BLD AUTO: 0.63 10*3/MM3 (ref 0.1–0.9)
MONOCYTES NFR BLD AUTO: 9.2 % (ref 5–12)
NEUTROPHILS NFR BLD AUTO: 3.53 10*3/MM3 (ref 1.7–7)
NEUTROPHILS NFR BLD AUTO: 51.4 % (ref 42.7–76)
NRBC BLD AUTO-RTO: 0 /100 WBC (ref 0–0.2)
PLATELET # BLD AUTO: 263 10*3/MM3 (ref 140–450)
PMV BLD AUTO: 8.7 FL (ref 6–12)
POTASSIUM SERPL-SCNC: 4.4 MMOL/L (ref 3.5–5.2)
POTASSIUM SERPL-SCNC: 4.7 MMOL/L (ref 3.5–5.2)
PROT SERPL-MCNC: 7.3 G/DL (ref 6–8.5)
PROT SERPL-MCNC: 7.3 G/DL (ref 6–8.5)
PTH-INTACT SERPL-MCNC: 23.6 PG/ML (ref 15–65)
RBC # BLD AUTO: 4.8 10*6/MM3 (ref 3.77–5.28)
SODIUM SERPL-SCNC: 136 MMOL/L (ref 136–145)
SODIUM SERPL-SCNC: 136 MMOL/L (ref 136–145)
TRIGL SERPL-MCNC: 170 MG/DL (ref 0–150)
TSH SERPL DL<=0.05 MIU/L-ACNC: 2.28 UIU/ML (ref 0.27–4.2)
VIT B12 BLD-MCNC: 457 PG/ML (ref 211–946)
VLDLC SERPL-MCNC: 32 MG/DL (ref 5–40)
WBC NRBC COR # BLD: 6.87 10*3/MM3 (ref 3.4–10.8)

## 2023-05-10 PROCEDURE — 84443 ASSAY THYROID STIM HORMONE: CPT | Performed by: PREVENTIVE MEDICINE

## 2023-05-10 PROCEDURE — 83735 ASSAY OF MAGNESIUM: CPT | Performed by: PREVENTIVE MEDICINE

## 2023-05-10 PROCEDURE — 87186 SC STD MICRODIL/AGAR DIL: CPT | Performed by: PREVENTIVE MEDICINE

## 2023-05-10 PROCEDURE — 82607 VITAMIN B-12: CPT | Performed by: PREVENTIVE MEDICINE

## 2023-05-10 PROCEDURE — 85025 COMPLETE CBC W/AUTO DIFF WBC: CPT | Performed by: PREVENTIVE MEDICINE

## 2023-05-10 PROCEDURE — 86803 HEPATITIS C AB TEST: CPT | Performed by: PREVENTIVE MEDICINE

## 2023-05-10 PROCEDURE — 80061 LIPID PANEL: CPT | Performed by: PREVENTIVE MEDICINE

## 2023-05-10 PROCEDURE — G0432 EIA HIV-1/HIV-2 SCREEN: HCPCS | Performed by: PREVENTIVE MEDICINE

## 2023-05-10 PROCEDURE — 87086 URINE CULTURE/COLONY COUNT: CPT | Performed by: PREVENTIVE MEDICINE

## 2023-05-10 PROCEDURE — 87077 CULTURE AEROBIC IDENTIFY: CPT | Performed by: PREVENTIVE MEDICINE

## 2023-05-10 PROCEDURE — 80053 COMPREHEN METABOLIC PANEL: CPT | Performed by: PREVENTIVE MEDICINE

## 2023-05-10 NOTE — PROGRESS NOTES
Venipuncture performed on Right Arm by Kristy Rodríguez MA  with good hemostasis. Patient tolerated well. 05/10/23   Nayeli Tony MD

## 2023-05-12 ENCOUNTER — TELEPHONE (OUTPATIENT)
Dept: FAMILY MEDICINE CLINIC | Facility: CLINIC | Age: 78
End: 2023-05-12
Payer: MEDICARE

## 2023-05-12 DIAGNOSIS — N30.00 ACUTE CYSTITIS WITHOUT HEMATURIA: Primary | ICD-10-CM

## 2023-05-12 LAB — BACTERIA SPEC AEROBE CULT: ABNORMAL

## 2023-05-12 NOTE — TELEPHONE ENCOUNTER
HUB TO READ:  ----- Message from Nayeli Tony MD sent at 5/12/2023  8:00 AM EDT -----  Culture showed uti-have sent Amoxicillin 875 and 3 days off antibiotic,need to culture to make sure uti gone.  Kidney function has decreased so avoid ibuprofen, Aleve, motrin and limit xray dyes.  Vitamin B12 is slightly low, so increase 1000 units a day or two per week.  Total and bad cholesterol still both elevated despite maximum Atorvastatin-if she has been watching sat fats, walking and taking med-can add Zetia-let us know.  Call if concerns

## 2023-05-12 NOTE — PROGRESS NOTES
Culture showed uti-have sent Amoxicillin 875 and 3 days off antibiotic,need to culture to make sure uti gone.  Kidney function has decreased so avoid ibuprofen, Aleve, motrin and limit xray dyes.  Vitamin B12 is slightly low, so increase 1000 units a day or two per week.  Total and bad cholesterol still both elevated despite maximum Atorvastatin-if she has been watching sat fats, walking and taking med-can add Zetia-let us know.  Call if concerns

## 2023-05-15 ENCOUNTER — HOSPITAL ENCOUNTER (OUTPATIENT)
Dept: CARDIOLOGY | Facility: HOSPITAL | Age: 78
Discharge: HOME | End: 2023-05-15
Admitting: INTERNAL MEDICINE
Payer: MEDICARE

## 2023-05-15 VITALS
HEIGHT: 60 IN | SYSTOLIC BLOOD PRESSURE: 115 MMHG | BODY MASS INDEX: 26.88 KG/M2 | WEIGHT: 136.91 LBS | DIASTOLIC BLOOD PRESSURE: 66 MMHG

## 2023-05-15 DIAGNOSIS — I50.22 CHRONIC SYSTOLIC CONGESTIVE HEART FAILURE: ICD-10-CM

## 2023-05-15 PROCEDURE — 93306 TTE W/DOPPLER COMPLETE: CPT

## 2023-05-15 PROCEDURE — 93306 TTE W/DOPPLER COMPLETE: CPT | Performed by: INTERNAL MEDICINE

## 2023-05-15 RX ORDER — AMOXICILLIN AND CLAVULANATE POTASSIUM 875; 125 MG/1; MG/1
1 TABLET, FILM COATED ORAL 2 TIMES DAILY
Qty: 14 TABLET | Refills: 0 | Status: SHIPPED | OUTPATIENT
Start: 2023-05-15 | End: 2023-05-22

## 2023-05-15 NOTE — TELEPHONE ENCOUNTER
No medication has been sent for patient can you sent to [facility] in [place]

## 2023-05-16 LAB
BH CV ECHO MEAS - ACS: 1.29 CM
BH CV ECHO MEAS - AI P1/2T: 525.9 MSEC
BH CV ECHO MEAS - AO MAX PG: 8.4 MMHG
BH CV ECHO MEAS - AO MEAN PG: 4.4 MMHG
BH CV ECHO MEAS - AO ROOT DIAM: 2.7 CM
BH CV ECHO MEAS - AO V2 MAX: 144.7 CM/SEC
BH CV ECHO MEAS - AO V2 VTI: 31 CM
BH CV ECHO MEAS - AVA(I,D): 2.6 CM2
BH CV ECHO MEAS - EDV(CUBED): 78.8 ML
BH CV ECHO MEAS - EDV(MOD-SP4): 83.5 ML
BH CV ECHO MEAS - EF(MOD-BP): 65 %
BH CV ECHO MEAS - EF(MOD-SP4): 64.7 %
BH CV ECHO MEAS - ESV(CUBED): 17.1 ML
BH CV ECHO MEAS - ESV(MOD-SP4): 29.5 ML
BH CV ECHO MEAS - FS: 39.9 %
BH CV ECHO MEAS - IVS/LVPW: 0.99 CM
BH CV ECHO MEAS - IVSD: 0.91 CM
BH CV ECHO MEAS - LA DIMENSION: 3.2 CM
BH CV ECHO MEAS - LV DIASTOLIC VOL/BSA (35-75): 52.6 CM2
BH CV ECHO MEAS - LV MASS(C)D: 125.3 GRAMS
BH CV ECHO MEAS - LV MAX PG: 4.9 MMHG
BH CV ECHO MEAS - LV MEAN PG: 2.5 MMHG
BH CV ECHO MEAS - LV SYSTOLIC VOL/BSA (12-30): 18.6 CM2
BH CV ECHO MEAS - LV V1 MAX: 111.1 CM/SEC
BH CV ECHO MEAS - LV V1 VTI: 25.9 CM
BH CV ECHO MEAS - LVIDD: 4.3 CM
BH CV ECHO MEAS - LVIDS: 2.6 CM
BH CV ECHO MEAS - LVOT AREA: 3.1 CM2
BH CV ECHO MEAS - LVOT DIAM: 1.98 CM
BH CV ECHO MEAS - LVPWD: 0.92 CM
BH CV ECHO MEAS - MR MAX PG: 179.6 MMHG
BH CV ECHO MEAS - MR MAX VEL: 669.7 CM/SEC
BH CV ECHO MEAS - MV A MAX VEL: 130.6 CM/SEC
BH CV ECHO MEAS - MV DEC SLOPE: 389.5 CM/SEC2
BH CV ECHO MEAS - MV DEC TIME: 0.25 MSEC
BH CV ECHO MEAS - MV E MAX VEL: 96.1 CM/SEC
BH CV ECHO MEAS - MV E/A: 0.74
BH CV ECHO MEAS - MV MAX PG: 8.3 MMHG
BH CV ECHO MEAS - MV MEAN PG: 3.2 MMHG
BH CV ECHO MEAS - MV V2 VTI: 32.2 CM
BH CV ECHO MEAS - MVA(VTI): 2.47 CM2
BH CV ECHO MEAS - PA ACC TIME: 0.1 SEC
BH CV ECHO MEAS - PA PR(ACCEL): 34 MMHG
BH CV ECHO MEAS - PA V2 MAX: 92.4 CM/SEC
BH CV ECHO MEAS - PI END-D VEL: 95.3 CM/SEC
BH CV ECHO MEAS - PULM A REVS DUR: 0.11 SEC
BH CV ECHO MEAS - PULM A REVS VEL: 36 CM/SEC
BH CV ECHO MEAS - PULM DIAS VEL: 43.2 CM/SEC
BH CV ECHO MEAS - PULM S/D: 1.59
BH CV ECHO MEAS - PULM SYS VEL: 68.5 CM/SEC
BH CV ECHO MEAS - RAP SYSTOLE: 10 MMHG
BH CV ECHO MEAS - RVSP: 51.4 MMHG
BH CV ECHO MEAS - SI(MOD-SP4): 34 ML/M2
BH CV ECHO MEAS - SV(LVOT): 79.5 ML
BH CV ECHO MEAS - SV(MOD-SP4): 54 ML
BH CV ECHO MEAS - TAPSE (>1.6): 2.5 CM
BH CV ECHO MEAS - TR MAX PG: 41.4 MMHG
BH CV ECHO MEAS - TR MAX VEL: 293.7 CM/SEC
BH CV XLRA - RV BASE: 3.7 CM
BH CV XLRA - RV MID: 2.6 CM
MAXIMAL PREDICTED HEART RATE: 143 BPM
STRESS TARGET HR: 122 BPM

## 2023-05-21 NOTE — PROGRESS NOTES
Subjective:     Encounter Date:05/22/2023      Patient ID: Katie Reddy is a 78 y.o. female.    Chief Complaint and history of present illness:     Follow-up for hypertension, dyslipidemia, autonomic insufficiency, ILR    History of Present Illness        Ms. Katie Reddy has PMH of    Chest pain, cardiac cath 2/8/2022 with no CAD  Recurrent syncope, Biotronik ILR 4/9/2021  Uncontrolled hypertension  Dyslipidemia  Autonomic insufficiency  History of hemorrhagic occipital CVA  GERD, osteoporosis, chronic pain  Cholecystectomy, hernia repair     Here for  follow-up.  Patient is complaining of intermittent chest pain.  Has some shortness of breath.  Had M cot which was abnormal.    Patient's arterial blood pressure is 146/66, heart rate 80 O2 sat of 99% on room air.  Repeat blood pressure was 139/64.  Patient was recently in the hospital from 4/6/2021-4/11/2021 with syncope and hypertensive emergency.  Work-up here revealed negative troponin x2.  proBNP is normal at 271.  CMP for 621 was normal.  Hemoglobin A1c for 721 was 4.9.  Lipid profile revealed cholesterol 197 HDL 57  triglycerides 127.  MRI with contrast 4/7/2021 reveals no acute intracranial abnormality no acute infarct no intracranial mass or mass-effect.  Old left occipital hemorrhagic infarct  Chronic small vessel ischemic changes     Chest x-ray 4/6/2021 reveals no acute chest findings.  EKG done 4/6/2021 reviewed by me shows sinus rhythm with a rate of 76 bpm with PVCs.  Echocardiogram 4/6/2021 reviewed by me shows EF of 60 to 65% with mild AR and mild MR  Carotid Dopplers 4/8/2021 reveal mild bilateral carotid disease  Lexiscan Cardiolite 4/9/21 which is negative for ischemia.   Hemoglobin is 12, BUN/creatinine 20/0.93  A.m. cortisol was low at 1.26  Labs from 2/9/2022 revealed hemoglobin of 9.7 and MCV of 75, CMP from 4/15/2022 is normal.     Assessment:       -Recurrent chest pain  -Nonsustained VT on bilateral  -Orthostatic  hypotension  -Low a.m. cortisol/adrenal insufficiency on long-term Cortef  -History of hypertensive emergency  -History of hemorrhagic left occipital CVA  -Mild AR, mild MR  -Dyslipidemia        Recommendations / Plan:         Patient's Biotronik loop recorder is revealing for ventricular tachycardia.  Is complaining of chest pain and shortness of breath palpitations fatigue lightheadedness dizziness.  We will schedule for stress test.  Patient's blood pressure currently is well controlled we will continue amlodipine, lisinopril as tolerated for hypertension  Reviewed importance of blood pressure control since patient had previous hemorrhagic CVA.  Advised patient to check blood pressure at home.  Continue statins for dyslipidemia and previous history of CVA  Continue Cortef and follow-up with endocrinology for adrenal insufficiency  Follow-up with PMD for anemia.         Procedures    EKG done 5/2/2023 reviewed/interpreted by me reveals sinus rhythm with rate of 79 bpm with left bundle branch block and QRS duration of 141 ms.    Copied text in this portion of the note has been reviewed and is accurate as of 6/5/2023  The following portions of the patient's history were reviewed and updated as appropriate: allergies, current medications, past family history, past medical history, past social history, past surgical history and problem list.    Assessment:         MDM       Diagnosis Plan   1. Precordial pain  Stress Test With Myocardial Perfusion One Day      2. Essential hypertension  Stress Test With Myocardial Perfusion One Day      3. Dyslipidemia  Stress Test With Myocardial Perfusion One Day      4. History of hemorrhagic cerebrovascular accident (CVA) without residual deficits  Stress Test With Myocardial Perfusion One Day      5. Nonrheumatic mitral valve regurgitation  Stress Test With Myocardial Perfusion One Day      6. Nonrheumatic aortic valve insufficiency  Stress Test With Myocardial Perfusion One Day              Plan:               Past Medical History:  Past Medical History:   Diagnosis Date    Arthritis     Bladder incontinence     Colon polyp     Glucocorticoid deficiency     Hyperlipidemia     Hypertension     Osteopenia     Vitamin D deficiency      Past Surgical History:  Past Surgical History:   Procedure Laterality Date    APPENDECTOMY      BLADDER SURGERY      bladder stimulator placement/ REMOVAL    BREAST CYST EXCISION      BREAST LUMPECTOMY Left 1973    NEG    BUNIONECTOMY Right 05/29/2020    Procedure: BUNIONECTOMY DEVONTE;  Surgeon: MARISSA Em DPM;  Location: [facility] MAIN OR;  Service: Podiatry;  Laterality: Right;    BUNIONECTOMY Left 12/30/2022    Procedure: BUNIONECTOMY DEVONTE -MINIMALLY INVASIVE with distal metatarsal osteotomy and internal fixation;  Surgeon: MARISSA Em DPM;  Location: [facility] MAIN OR;  Service: Podiatry;  Laterality: Left;    CARDIAC CATHETERIZATION      CARDIAC CATHETERIZATION N/A 02/08/2022    Procedure: Left Heart Cath, possible pci;  Surgeon: Master Richard MD;  Location: [facility] CATH INVASIVE LOCATION;  Service: Cardiovascular;  Laterality: N/A;    CARPAL TUNNEL RELEASE Right 1980    CHOLECYSTECTOMY      COLON SURGERY      hemorrhoid banding    COLONOSCOPY  2017 2017/ 2019 = jessica DESAI 2024   Dr. Mackey    COLONOSCOPY N/A 02/05/2022    NEG= 2022    ENDOSCOPY N/A 02/05/2022 2022EGD= erosive esophagitis, hiatal hernia, Nonerosive gastritis    EYE SURGERY      HERNIA REPAIR      Umbilical removal    HYSTERECTOMY  1970    SUBTOTAL HYSTERECTOMY      UMBILICAL HERNIA REPAIR        Allergies:  Allergies   Allergen Reactions    Trazodone Irritability    Boniva [Ibandronic Acid] GI Intolerance     GERD    Dayvigo [Lemborexant] Other (See Comments)     Sleep walking     Home Meds:  Current Meds:     Current Outpatient Medications:     amitriptyline (ELAVIL) 25 MG tablet, TAKE 1 TABLET AT BEDTIME (Patient taking differently: Take 1 tablet by mouth Every  Night.), Disp: 90 tablet, Rfl: 0    amLODIPine (NORVASC) 10 MG tablet, Take 1 tablet by mouth Daily., Disp: 90 tablet, Rfl: 0    atorvastatin (LIPITOR) 80 MG tablet, TAKE 1 TABLET EVERY DAY (Patient taking differently: Take 1 tablet by mouth Daily.), Disp: 90 tablet, Rfl: 1    Calcium Carbonate-Vit D-Min (Calcium 1200) 4962-2712 MG-UNIT chewable tablet, Chew 1 tablet Daily., Disp: , Rfl:     Gemtesa 75 MG tablet, Take 1 tablet by mouth Daily., Disp: , Rfl:     hydrocortisone (CORTEF) 10 MG tablet, TAKE 1 TABLET EVERY MORNING,  AFTERNOON AND 1/2 TABLET IN THE EVENING, Disp: 200 tablet, Rfl: 0    lisinopril (PRINIVIL,ZESTRIL) 5 MG tablet, Take 1 tablet by mouth Daily., Disp: 90 tablet, Rfl: 1    meclizine (ANTIVERT) 12.5 MG tablet, Take 1 tablet by mouth 3 (Three) Times a Day As Needed for Dizziness., Disp: 90 tablet, Rfl: 0    nitroglycerin (NITROSTAT) 0.4 MG SL tablet, DISSOLVE 1 TABLET UNDER THE TONGUE EVERY 5 MINUTES AS NEEDED FOR CHEST PAIN. TAKE NO MORE THAN 3 DOSES IN 15 MINUTES., Disp: 75 tablet, Rfl: 0    ondansetron (ZOFRAN) 4 MG tablet, TAKE 1 TABLET EVERY 8 HOURS AS NEEDED FOR NAUSEA OR VOMITING, Disp: 60 tablet, Rfl: 0    sertraline (ZOLOFT) 100 MG tablet, TAKE 1 TABLET EVERY NIGHT, Disp: 90 tablet, Rfl: 1    sucralfate (CARAFATE) 1 g tablet, TAKE 1 TABLET FOUR TIMES DAILY BEFORE MEALS AND AT BEDTIME, Disp: 360 tablet, Rfl: 0    temazepam (RESTORIL) 30 MG capsule, TAKE 1 CAPSULE EVERY NIGHT AS NEEDED FOR SLEEP (Patient taking differently: Take 1 capsule by mouth At Night As Needed.), Disp: 30 capsule, Rfl: 0    aspirin 81 MG EC tablet, Take 1 tablet by mouth Daily., Disp: 90 tablet, Rfl: 3    hydrocortisone 2.5 % cream, APPLY 1 APPLICATION TOPICALLY TO THE APPROPRIATE AREA AS DIRECTED TWICE DAILY (Patient not taking: Reported on 5/22/2023), Disp: 20 g, Rfl: 0    metoprolol succinate XL (TOPROL-XL) 50 MG 24 hr tablet, Take 1 tablet by mouth Daily., Disp: 90 tablet, Rfl: 3    nitrofurantoin,  "macrocrystal-monohydrate, (Macrobid) 100 MG capsule, Take 1 capsule by mouth 2 (Two) Times a Day., Disp: 14 capsule, Rfl: 0  Social History:   Social History     Tobacco Use    Smoking status: Never     Passive exposure: Never    Smokeless tobacco: Never   Substance Use Topics    Alcohol use: No      Family History:  Family History   Problem Relation Age of Onset    Heart disease Mother     Hypertension Mother     Diabetes Father     Heart disease Father     Alcohol abuse Father     Hypertension Father     Diabetes Brother     Heart disease Brother     Cancer Brother 68        Prostate Cancer    Hypertension Brother     Diabetes Maternal Aunt     Heart disease Maternal Grandmother     Hypertension Maternal Grandmother     Heart disease Maternal Grandfather     Hypertension Maternal Grandfather     Liver disease Paternal Grandmother     Hypertension Paternal Grandmother     Heart disease Paternal Grandfather     Hypertension Paternal Grandfather     Dementia Sister     Diabetes Brother               Review of Systems   Constitutional: Positive for malaise/fatigue.   Cardiovascular:  Positive for chest pain and palpitations. Negative for leg swelling.   Respiratory:  Positive for shortness of breath.    Skin:  Negative for rash.   Neurological:  Positive for dizziness. Negative for light-headedness and numbness.   All other systems are negative         Objective:     Physical Exam  /64   Pulse 80   Ht 152.4 cm (60\")   Wt 61.2 kg (135 lb)   SpO2 99%   BMI 26.37 kg/m²   General:  Appears in no acute distress  Eyes: Sclera is anicteric,  conjunctiva is clear   HEENT:  No JVD.  No carotid bruits  Respiratory: Respirations regular and unlabored at rest.  Clear to auscultation  Cardiovascular: S1,S2 Regular rate and rhythm. No murmur, rub or gallop auscultated.   Extremities: No digital clubbing or cyanosis, no edema  Skin: Color pink. Skin warm and dry to touch. No rashes  No xanthoma  Neuro: Alert and " "awake.    Lab Reviewed:         Master Richard MD  6/5/2023 17:26 EDT      EMR Dragon/Transcription:   \"Dictated utilizing Dragon dictation\".        "

## 2023-05-22 ENCOUNTER — OFFICE VISIT (OUTPATIENT)
Dept: CARDIOLOGY | Facility: CLINIC | Age: 78
End: 2023-05-22
Payer: MEDICARE

## 2023-05-22 VITALS
BODY MASS INDEX: 26.5 KG/M2 | HEART RATE: 80 BPM | OXYGEN SATURATION: 99 % | DIASTOLIC BLOOD PRESSURE: 64 MMHG | SYSTOLIC BLOOD PRESSURE: 139 MMHG | HEIGHT: 60 IN | WEIGHT: 135 LBS

## 2023-05-22 DIAGNOSIS — I35.1 NONRHEUMATIC AORTIC VALVE INSUFFICIENCY: ICD-10-CM

## 2023-05-22 DIAGNOSIS — E78.5 DYSLIPIDEMIA: ICD-10-CM

## 2023-05-22 DIAGNOSIS — I47.29 NONSUSTAINED VENTRICULAR TACHYCARDIA: ICD-10-CM

## 2023-05-22 DIAGNOSIS — I10 ESSENTIAL HYPERTENSION: ICD-10-CM

## 2023-05-22 DIAGNOSIS — R07.2 PRECORDIAL PAIN: Primary | ICD-10-CM

## 2023-05-22 DIAGNOSIS — I34.0 NONRHEUMATIC MITRAL VALVE REGURGITATION: ICD-10-CM

## 2023-05-22 DIAGNOSIS — Z86.73 HISTORY OF HEMORRHAGIC CEREBROVASCULAR ACCIDENT (CVA) WITHOUT RESIDUAL DEFICITS: ICD-10-CM

## 2023-05-22 PROCEDURE — 1159F MED LIST DOCD IN RCRD: CPT | Performed by: INTERNAL MEDICINE

## 2023-05-22 PROCEDURE — 1160F RVW MEDS BY RX/DR IN RCRD: CPT | Performed by: INTERNAL MEDICINE

## 2023-05-22 PROCEDURE — 3075F SYST BP GE 130 - 139MM HG: CPT | Performed by: INTERNAL MEDICINE

## 2023-05-22 PROCEDURE — 99214 OFFICE O/P EST MOD 30 MIN: CPT | Performed by: INTERNAL MEDICINE

## 2023-05-22 PROCEDURE — 3078F DIAST BP <80 MM HG: CPT | Performed by: INTERNAL MEDICINE

## 2023-05-22 RX ORDER — METOPROLOL SUCCINATE 50 MG/1
50 TABLET, EXTENDED RELEASE ORAL DAILY
Qty: 90 TABLET | Refills: 3 | Status: SHIPPED | OUTPATIENT
Start: 2023-05-22

## 2023-05-26 ENCOUNTER — OFFICE (OUTPATIENT)
Dept: URBAN - METROPOLITAN AREA CLINIC 64 | Facility: CLINIC | Age: 78
End: 2023-05-26

## 2023-05-26 VITALS — WEIGHT: 136 LBS | SYSTOLIC BLOOD PRESSURE: 132 MMHG | HEART RATE: 79 BPM | DIASTOLIC BLOOD PRESSURE: 74 MMHG

## 2023-05-26 DIAGNOSIS — K21.00 GASTRO-ESOPHAGEAL REFLUX DISEASE WITH ESOPHAGITIS, WITHOUT B: ICD-10-CM

## 2023-05-26 DIAGNOSIS — R15.9 FULL INCONTINENCE OF FECES: ICD-10-CM

## 2023-05-26 PROCEDURE — 99214 OFFICE O/P EST MOD 30 MIN: CPT | Performed by: INTERNAL MEDICINE

## 2023-05-26 RX ORDER — SUCRALFATE 1 G/1
TABLET ORAL
Refills: 1 | Status: COMPLETED
End: 2023-05-26

## 2023-05-26 RX ORDER — PANTOPRAZOLE SODIUM 40 MG/1
40 TABLET, DELAYED RELEASE ORAL
Qty: 90 | Refills: 3 | Status: ACTIVE
Start: 2023-05-26

## 2023-05-26 RX ORDER — COLESTIPOL HYDROCHLORIDE 1 G/1
TABLET, FILM COATED ORAL
Qty: 60 | Refills: 11 | Status: ACTIVE
Start: 2023-05-26

## 2023-06-02 ENCOUNTER — CLINICAL SUPPORT (OUTPATIENT)
Dept: FAMILY MEDICINE CLINIC | Facility: CLINIC | Age: 78
End: 2023-06-02
Payer: MEDICARE

## 2023-06-02 DIAGNOSIS — R35.0 URINARY FREQUENCY: Primary | ICD-10-CM

## 2023-06-02 LAB
BILIRUB BLD-MCNC: NEGATIVE MG/DL
CLARITY, POC: CLEAR
COLOR UR: YELLOW
EXPIRATION DATE: ABNORMAL
GLUCOSE UR STRIP-MCNC: NEGATIVE MG/DL
KETONES UR QL: NEGATIVE
LEUKOCYTE EST, POC: ABNORMAL
Lab: ABNORMAL
NITRITE UR-MCNC: NEGATIVE MG/ML
PH UR: 5.5 [PH] (ref 5–8)
PROT UR STRIP-MCNC: NEGATIVE MG/DL
RBC # UR STRIP: ABNORMAL /UL
SP GR UR: 1 (ref 1–1.03)
UROBILINOGEN UR QL: ABNORMAL

## 2023-06-02 PROCEDURE — 87086 URINE CULTURE/COLONY COUNT: CPT | Performed by: PREVENTIVE MEDICINE

## 2023-06-02 PROCEDURE — 87186 SC STD MICRODIL/AGAR DIL: CPT | Performed by: PREVENTIVE MEDICINE

## 2023-06-02 PROCEDURE — 87077 CULTURE AEROBIC IDENTIFY: CPT | Performed by: PREVENTIVE MEDICINE

## 2023-06-04 LAB — BACTERIA SPEC AEROBE CULT: ABNORMAL

## 2023-06-05 ENCOUNTER — TELEPHONE (OUTPATIENT)
Dept: FAMILY MEDICINE CLINIC | Facility: CLINIC | Age: 78
End: 2023-06-05
Payer: MEDICARE

## 2023-06-05 DIAGNOSIS — N39.0 URINARY TRACT INFECTION WITHOUT HEMATURIA, SITE UNSPECIFIED: Primary | ICD-10-CM

## 2023-06-05 RX ORDER — NITROFURANTOIN 25; 75 MG/1; MG/1
100 CAPSULE ORAL 2 TIMES DAILY
Qty: 14 CAPSULE | Refills: 0 | Status: SHIPPED | OUTPATIENT
Start: 2023-06-05

## 2023-06-05 NOTE — PROGRESS NOTES
Urine was infected.  Have sent antibiotic to pharmacy.  Take antibiotics till gone and drink plenty of fluids-3 days after off antibiotics we'll have you do careful clean catch to make sure infection gone.

## 2023-06-05 NOTE — TELEPHONE ENCOUNTER
HUB TO READ:  ----- Message from Nayeli Tony MD sent at 6/5/2023  7:31 AM EDT -----  Urine was infected.  Have sent antibiotic to pharmacy.  Take antibiotics till gone and drink plenty of fluids-3 days after off antibiotics we'll have you do careful clean catch to make sure infection gone.

## 2023-07-11 ENCOUNTER — OFFICE (OUTPATIENT)
Dept: URBAN - METROPOLITAN AREA CLINIC 64 | Facility: CLINIC | Age: 78
End: 2023-07-11
Payer: MEDICAID

## 2023-07-11 VITALS — HEART RATE: 78 BPM | SYSTOLIC BLOOD PRESSURE: 108 MMHG | DIASTOLIC BLOOD PRESSURE: 57 MMHG | WEIGHT: 136 LBS

## 2023-07-11 DIAGNOSIS — K21.9 GASTRO-ESOPHAGEAL REFLUX DISEASE WITHOUT ESOPHAGITIS: ICD-10-CM

## 2023-07-11 DIAGNOSIS — R15.9 FULL INCONTINENCE OF FECES: ICD-10-CM

## 2023-07-11 PROCEDURE — 99214 OFFICE O/P EST MOD 30 MIN: CPT

## 2023-07-11 RX ORDER — CHOLESTYRAMINE 4 G/9G
4 POWDER, FOR SUSPENSION ORAL
Qty: 30 | Refills: 11 | Status: ACTIVE
Start: 2023-07-11
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
## 2023-07-19 ENCOUNTER — TELEPHONE (OUTPATIENT)
Dept: FAMILY MEDICINE CLINIC | Facility: CLINIC | Age: 78
End: 2023-07-19

## 2023-07-19 NOTE — TELEPHONE ENCOUNTER
Hub staff attempted to follow warm transfer process and was unsuccessful     Caller: Katie Reddy    Relationship to patient: Self    Best call back number: [phone]    Patient is needing:     RETURNING PHONE CALL TO OFFICE

## 2023-07-26 ENCOUNTER — HOSPITAL ENCOUNTER (OUTPATIENT)
Dept: BONE DENSITY | Facility: HOSPITAL | Age: 78
Discharge: HOME | End: 2023-07-26
Admitting: PREVENTIVE MEDICINE
Payer: MEDICARE

## 2023-07-26 ENCOUNTER — TELEPHONE (OUTPATIENT)
Dept: CARDIOLOGY | Facility: CLINIC | Age: 78
End: 2023-07-26
Payer: MEDICARE

## 2023-07-26 DIAGNOSIS — Z78.0 POSTMENOPAUSE: ICD-10-CM

## 2023-07-26 PROCEDURE — 77080 DXA BONE DENSITY AXIAL: CPT

## 2023-07-26 NOTE — TELEPHONE ENCOUNTER
Left detailed message asking pt to contact the office to schedule an appointment for an abnormal loop report.

## 2023-07-26 NOTE — TELEPHONE ENCOUNTER
Pt called back and was transferred to me for an appointment with Dr Richard     Appt was made for next week, pt needed a morning appointment she said

## 2023-07-27 ENCOUNTER — TELEPHONE (OUTPATIENT)
Dept: FAMILY MEDICINE CLINIC | Facility: CLINIC | Age: 78
End: 2023-07-27
Payer: MEDICARE

## 2023-07-27 DIAGNOSIS — E55.9 VITAMIN D DEFICIENCY: Primary | ICD-10-CM

## 2023-07-27 NOTE — PROGRESS NOTES
Bone density does show that she has some thinning of her bones so make sure she is getting 20 minutes of weightbearing exercise daily.  Her last calcium was recently and it is normal.  We were unable to repeat her vitamin D as it was normal 7/28/2022 and her insurance only allows us to check it once a year.  It would be a good idea to check some time in August to make sure that it is adequate.  Order has been placed.  If patient has had no history of blood clots she could consider starting a medicine called raloxifene to help keep her bones strong.  Please call and let us know what she decides and make sure that she does get the vitamin D checked in August.  This can be done nonfasting

## 2023-07-27 NOTE — TELEPHONE ENCOUNTER
HUB TO READ:  ----- Message from Nayeli Tony MD sent at 7/27/2023  8:00 AM EDT -----  Bone density does show that she has some thinning of her bones so make sure she is getting 20 minutes of weightbearing exercise daily.  Her last calcium was recently and it is normal.  We were unable to repeat her vitamin D as it was normal 7/28/2022 and her insurance only allows us to check it once a year.  It would be a good idea to check some time in August to make sure that it is adequate.  Order has been placed.  If patient has had no history of blood clots she could consider starting a medicine called raloxifene to help keep her bones strong.  Please call and let us know what she decides and make sure that she does get the vitamin D checked in August.  This can be done nonfasting

## 2023-07-27 NOTE — LETTER
July 28, 2023    Katie Reddy  [address]  [address]  [place] [address]          Bone density does show that she has some thinning of her bones so make sure she is getting 20 minutes of weightbearing exercise daily.  Her last calcium was recently and it is normal.  We were unable to repeat her vitamin D as it was normal 7/28/2022 and her insurance only allows us to check it once a year.  It would be a good idea to check some time in August to make sure that it is adequate.  Order has been placed.  If patient has had no history of blood clots she could consider starting a medicine called raloxifene to help keep her bones strong.  Please call and let us know what she decides and make sure that she does get the vitamin D checked in August.  This can be done nonfasting                      Nayeli Tony MD

## 2023-08-02 ENCOUNTER — OFFICE VISIT (OUTPATIENT)
Dept: CARDIOLOGY | Facility: CLINIC | Age: 78
End: 2023-08-02
Payer: MEDICARE

## 2023-08-02 VITALS
WEIGHT: 131 LBS | SYSTOLIC BLOOD PRESSURE: 126 MMHG | HEIGHT: 60 IN | BODY MASS INDEX: 25.72 KG/M2 | DIASTOLIC BLOOD PRESSURE: 70 MMHG | HEART RATE: 72 BPM | OXYGEN SATURATION: 98 %

## 2023-08-02 DIAGNOSIS — I44.7 LBBB (LEFT BUNDLE BRANCH BLOCK): ICD-10-CM

## 2023-08-02 DIAGNOSIS — I34.0 NONRHEUMATIC MITRAL VALVE REGURGITATION: ICD-10-CM

## 2023-08-02 DIAGNOSIS — I10 ESSENTIAL HYPERTENSION: Primary | ICD-10-CM

## 2023-08-02 DIAGNOSIS — E78.5 DYSLIPIDEMIA: ICD-10-CM

## 2023-08-02 PROCEDURE — 3078F DIAST BP <80 MM HG: CPT | Performed by: INTERNAL MEDICINE

## 2023-08-02 PROCEDURE — 3074F SYST BP LT 130 MM HG: CPT | Performed by: INTERNAL MEDICINE

## 2023-08-02 PROCEDURE — 1160F RVW MEDS BY RX/DR IN RCRD: CPT | Performed by: INTERNAL MEDICINE

## 2023-08-02 PROCEDURE — 1159F MED LIST DOCD IN RCRD: CPT | Performed by: INTERNAL MEDICINE

## 2023-08-02 PROCEDURE — 99214 OFFICE O/P EST MOD 30 MIN: CPT | Performed by: INTERNAL MEDICINE

## 2023-08-02 RX ORDER — METOPROLOL SUCCINATE 100 MG/1
100 TABLET, EXTENDED RELEASE ORAL DAILY
Qty: 90 TABLET | Refills: 3 | Status: SHIPPED | OUTPATIENT
Start: 2023-08-02

## 2023-08-02 RX ORDER — AMLODIPINE BESYLATE 10 MG/1
5 TABLET ORAL DAILY
Qty: 90 TABLET | Refills: 3 | Status: SHIPPED | OUTPATIENT
Start: 2023-08-02

## 2023-08-02 NOTE — PROGRESS NOTES
Subjective:     Encounter Date:08/02/2023      Patient ID: Katie Reddy is a 78 y.o. female.    Chief Complaint and history of present illness:    Follow-up for hypertension, dyslipidemia, autonomic insufficiency, ILR    History of Present Illness        Ms. Katie Reddy has PMH of    Chest pain, cardiac cath 2/8/2022 with no CAD  Recurrent syncope, Biotronik ILR 4/9/2021  Uncontrolled hypertension  Dyslipidemia  Autonomic insufficiency  History of hemorrhagic occipital CVA  GERD, osteoporosis, chronic pain  Cholecystectomy, hernia repair     Here for stress test follow-up.  Patient is complaining of intermittent chest pain.  Has some shortness of breath.  Had M cot which was abnormal.  With nonsustained VT underwent stress test is here for follow-up.    Patient's arterial blood pressure is 126/70, heart rate 72 bpm, O2 sat of 98% on room air.    Patient was recently in the hospital from 4/6/2021-4/11/2021 with syncope and hypertensive emergency.  Work-up here revealed negative troponin x2.  proBNP is normal at 271.  CMP for 621 was normal.  Hemoglobin A1c for 721 was 4.9.  Lipid profile revealed cholesterol 197 HDL 57  triglycerides 127.  MRI with contrast 4/7/2021 reveals no acute intracranial abnormality no acute infarct no intracranial mass or mass-effect.  Old left occipital hemorrhagic infarct  Chronic small vessel ischemic changes     Chest x-ray 4/6/2021 reveals no acute chest findings.  EKG done 4/6/2021 reviewed by me shows sinus rhythm with a rate of 76 bpm with PVCs.  Echocardiogram 4/6/2021 reviewed by me shows EF of 60 to 65% with mild AR and mild MR  Carotid Dopplers 4/8/2021 reveal mild bilateral carotid disease  Lexiscan Cardiolite 4/9/21 which is negative for ischemia.  Echocardiogram 5/16/2023 reveals EF of 60 to 65% PA pressures 45, mild MR  Lexiscan Cardiolite 6/27/2023 was negative for ischemia EF of 93%  Cardiac cath 2/2022 nonobstructive CAD      Hemoglobin is 12,  BUN/creatinine 20/0.93. A.m. cortisol was low at 1.26  Labs from 2/9/2022 revealed hemoglobin of 9.7 and MCV of 75, CMP from 4/15/2022 is normal.     Assessment:       -Recurrent chest pain  -Nonsustained VT on  loop  -Orthostatic hypotension  -Low a.m. cortisol/adrenal insufficiency on long-term Cortef  -History of hypertensive emergency  -History of hemorrhagic left occipital CVA  -Mild AR, mild MR  -Dyslipidemia        Recommendations / Plan:        Patient had stress test negative for ischemia as 6/27/2023 we will continue medical management  Continue metoprolol, aspirin, atorvastatin, amlodipine, nitroglycerin as tolerated.   Reviewed importance of blood pressure control since patient had previous hemorrhagic CVA.  Advised patient to check blood pressure at home.  Continue statins for dyslipidemia and previous history of CVA  Continue Cortef and follow-up with endocrinology for adrenal insufficiency  Follow-up with PMD for anemia.         Procedures    EKG done 5/2/2023 reviewed/interpreted by me reveals sinus rhythm with rate of 79 bpm with left bundle branch block and QRS duration of 141 ms.      Procedures    Stress Cardiolite performed 6/22/2022 reviewed/interpreted by me reveals normal perfusion EF of 62%.    Procedures    Copied text in this portion of the note has been reviewed and is accurate as of 8/17/2023  The following portions of the patient's history were reviewed and updated as appropriate: allergies, current medications, past family history, past medical history, past social history, past surgical history and problem list.    Assessment:         MDM       Diagnosis Plan   1. Essential hypertension        2. Dyslipidemia        3. LBBB (left bundle branch block)        4. Nonrheumatic mitral valve regurgitation               Plan:               Past Medical History:  Past Medical History:   Diagnosis Date    Arthritis     Bladder incontinence     Colon polyp     Glucocorticoid deficiency      Hyperlipidemia     Hypertension     Osteopenia     Vitamin D deficiency      Past Surgical History:  Past Surgical History:   Procedure Laterality Date    APPENDECTOMY      BLADDER SURGERY      bladder stimulator placement/ REMOVAL    BREAST CYST EXCISION      BREAST LUMPECTOMY Left 1973    NEG    BUNIONECTOMY Right 05/29/2020    Procedure: BUNIONECTOMY DEVONTE;  Surgeon: MARISSA Em DPM;  Location: [facility] MAIN OR;  Service: Podiatry;  Laterality: Right;    BUNIONECTOMY Left 12/30/2022    Procedure: BUNIONECTOMY DEVONTE -MINIMALLY INVASIVE with distal metatarsal osteotomy and internal fixation;  Surgeon: MARISSA Em DPM;  Location: [facility] MAIN OR;  Service: Podiatry;  Laterality: Left;    CARDIAC CATHETERIZATION      CARDIAC CATHETERIZATION N/A 02/08/2022    Procedure: Left Heart Cath, possible pci;  Surgeon: Master Richard MD;  Location: [facility] CATH INVASIVE LOCATION;  Service: Cardiovascular;  Laterality: N/A;    CARPAL TUNNEL RELEASE Right 1980    CHOLECYSTECTOMY      COLON SURGERY      hemorrhoid banding    COLONOSCOPY  2017 2017/ 2019 = TA, rech 2024   Dr. Mackey    COLONOSCOPY N/A 02/05/2022    NEG= 2022    ENDOSCOPY N/A 02/05/2022 2022EGD= erosive esophagitis, hiatal hernia, Nonerosive gastritis    EYE SURGERY      HERNIA REPAIR      Umbilical removal    HYSTERECTOMY  1970    SUBTOTAL HYSTERECTOMY      UMBILICAL HERNIA REPAIR        Allergies:  Allergies   Allergen Reactions    Trazodone Irritability    Boniva [Ibandronic Acid] GI Intolerance     GERD    Dayvigo [Lemborexant] Other (See Comments)     Sleep walking     Home Meds:  Current Meds:     Current Outpatient Medications:     amitriptyline (ELAVIL) 25 MG tablet, TAKE 1 TABLET AT BEDTIME, Disp: 90 tablet, Rfl: 0    amLODIPine (NORVASC) 10 MG tablet, Take 0.5 tablets by mouth Daily., Disp: 90 tablet, Rfl: 3    aspirin 81 MG EC tablet, Take 1 tablet by mouth Daily., Disp: 90 tablet, Rfl: 3    atorvastatin (LIPITOR) 80 MG tablet,  Take 1 tablet by mouth Daily., Disp: 90 tablet, Rfl: 1    Calcium Carbonate-Vit D-Min (Calcium 1200) 0167-3512 MG-UNIT chewable tablet, Chew 1 tablet Daily., Disp: , Rfl:     cholestyramine (QUESTRAN) 4 g packet, Take 1 packet by mouth 2 (Two) Times a Day With Meals., Disp: , Rfl:     colestipol (COLESTID) 1 g tablet, Take 1 tablet by mouth Daily., Disp: , Rfl:     Gemtesa 75 MG tablet, Take 1 tablet by mouth Daily., Disp: 30 tablet, Rfl: 0    hydrocortisone (CORTEF) 10 MG tablet, TAKE 1 TABLET EVERY MORNING,  AFTERNOON AND 1/2 TABLET IN THE EVENING, Disp: 200 tablet, Rfl: 0    lisinopril (PRINIVIL,ZESTRIL) 5 MG tablet, Take 1 tablet by mouth Daily., Disp: 90 tablet, Rfl: 1    meclizine (ANTIVERT) 12.5 MG tablet, Take 1 tablet by mouth 3 (Three) Times a Day As Needed for Dizziness., Disp: 90 tablet, Rfl: 0    metoprolol succinate XL (TOPROL-XL) 100 MG 24 hr tablet, Take 1 tablet by mouth Daily., Disp: 90 tablet, Rfl: 3    nitroglycerin (NITROSTAT) 0.4 MG SL tablet, Place 1 tablet under the tongue Every 5 (Five) Minutes As Needed for Chest Pain. Take no more than 3 doses in 15 minutes., Disp: 75 tablet, Rfl: 0    pantoprazole (PROTONIX) 40 MG EC tablet, Take 1 tablet by mouth Every Morning., Disp: , Rfl:     sertraline (ZOLOFT) 100 MG tablet, TAKE 1 TABLET EVERY NIGHT, Disp: 90 tablet, Rfl: 1    temazepam (RESTORIL) 30 MG capsule, TAKE 1 CAPSULE EVERY NIGHT AS NEEDED FOR SLEEP (Patient taking differently: Take 1 capsule by mouth At Night As Needed.), Disp: 30 capsule, Rfl: 0    hydrocortisone 2.5 % cream, APPLY 1 APPLICATION TOPICALLY TO THE APPROPRIATE AREA AS DIRECTED TWICE DAILY, Disp: 20 g, Rfl: 0    nitrofurantoin, macrocrystal-monohydrate, (Macrobid) 100 MG capsule, Take 1 capsule by mouth 2 (Two) Times a Day., Disp: 14 capsule, Rfl: 0    ondansetron (ZOFRAN) 4 MG tablet, TAKE 1 TABLET BY MOUTH EVERY 8 HOURS AS NEEDED FOR NAUSEA OR VOMITING., Disp: 60 tablet, Rfl: 0    raloxifene (EVISTA) 60 MG tablet, Take 1  "tablet by mouth Daily., Disp: 90 tablet, Rfl: 3    sucralfate (CARAFATE) 1 g tablet, TAKE 1 TABLET FOUR TIMES DAILY BEFORE MEALS AND AT BEDTIME, Disp: 360 tablet, Rfl: 0  Social History:   Social History     Tobacco Use    Smoking status: Never     Passive exposure: Never    Smokeless tobacco: Never   Substance Use Topics    Alcohol use: No      Family History:  Family History   Problem Relation Age of Onset    Heart disease Mother     Hypertension Mother     Diabetes Father     Heart disease Father     Alcohol abuse Father     Hypertension Father     Diabetes Brother     Heart disease Brother     Cancer Brother 68        Prostate Cancer    Hypertension Brother     Diabetes Maternal Aunt     Heart disease Maternal Grandmother     Hypertension Maternal Grandmother     Heart disease Maternal Grandfather     Hypertension Maternal Grandfather     Liver disease Paternal Grandmother     Hypertension Paternal Grandmother     Heart disease Paternal Grandfather     Hypertension Paternal Grandfather     Dementia Sister     Diabetes Brother               Review of Systems   Constitutional: Positive for malaise/fatigue.   Cardiovascular:  Positive for chest pain and palpitations. Negative for leg swelling.   Respiratory:  Positive for shortness of breath.    Skin:  Negative for rash.   Neurological:  Positive for dizziness and light-headedness. Negative for numbness.   All other systems are negative         Objective:     Physical Exam  /70   Pulse 72   Ht 152.4 cm (60\")   Wt 59.4 kg (131 lb)   SpO2 98%   BMI 25.58 kg/mý   General:  Appears in no acute distress  Eyes: Sclera is anicteric,  conjunctiva is clear   HEENT:  No JVD.  No carotid bruits  Respiratory: Respirations regular and unlabored at rest.  Clear to auscultation  Cardiovascular: S1,S2 Regular rate and rhythm. No murmur, rub or gallop auscultated.   Extremities: No digital clubbing or cyanosis, no edema  Skin: Color pink. Skin warm and dry to touch. No " "rashes  No xanthoma  Neuro: Alert and awake.    Lab Reviewed:         Master Richard MD  8/17/2023 12:40 EDT      EMR Dragon/Transcription:   \"Dictated utilizing Dragon dictation\".        "

## 2023-08-03 ENCOUNTER — LAB (OUTPATIENT)
Dept: LAB | Facility: HOSPITAL | Age: 78
End: 2023-08-03
Payer: MEDICARE

## 2023-08-03 ENCOUNTER — TRANSCRIBE ORDERS (OUTPATIENT)
Dept: ADMINISTRATIVE | Facility: HOSPITAL | Age: 78
End: 2023-08-03
Payer: MEDICARE

## 2023-08-03 DIAGNOSIS — N39.0 URINARY TRACT INFECTION WITHOUT HEMATURIA, SITE UNSPECIFIED: ICD-10-CM

## 2023-08-03 DIAGNOSIS — E55.9 VITAMIN D DEFICIENCY: ICD-10-CM

## 2023-08-03 DIAGNOSIS — L29.8 PRURITUS SENILIS: ICD-10-CM

## 2023-08-03 DIAGNOSIS — L29.8 PRURITUS SENILIS: Primary | ICD-10-CM

## 2023-08-03 LAB
25(OH)D3 SERPL-MCNC: 34.2 NG/ML (ref 30–100)
ALBUMIN SERPL-MCNC: 4.5 G/DL (ref 3.5–5.2)
ALBUMIN/GLOB SERPL: 1.7 G/DL
ALP SERPL-CCNC: 102 U/L (ref 39–117)
ALT SERPL W P-5'-P-CCNC: 14 U/L (ref 1–33)
ANION GAP SERPL CALCULATED.3IONS-SCNC: 13.8 MMOL/L (ref 5–15)
AST SERPL-CCNC: 24 U/L (ref 1–32)
BASOPHILS # BLD AUTO: 0.09 10*3/MM3 (ref 0–0.2)
BASOPHILS NFR BLD AUTO: 1.5 % (ref 0–1.5)
BILIRUB CONJ SERPL-MCNC: <0.2 MG/DL (ref 0–0.3)
BILIRUB SERPL-MCNC: 0.3 MG/DL (ref 0–1.2)
BUN SERPL-MCNC: 27 MG/DL (ref 8–23)
BUN/CREAT SERPL: 21.4 (ref 7–25)
CALCIUM SPEC-SCNC: 9.8 MG/DL (ref 8.6–10.5)
CEA SERPL-MCNC: 3.62 NG/ML
CHLORIDE SERPL-SCNC: 102 MMOL/L (ref 98–107)
CO2 SERPL-SCNC: 20.2 MMOL/L (ref 22–29)
CREAT SERPL-MCNC: 1.26 MG/DL (ref 0.57–1)
DEPRECATED RDW RBC AUTO: 39.8 FL (ref 37–54)
EGFRCR SERPLBLD CKD-EPI 2021: 44.1 ML/MIN/1.73
EOSINOPHIL # BLD AUTO: 0.16 10*3/MM3 (ref 0–0.4)
EOSINOPHIL NFR BLD AUTO: 2.7 % (ref 0.3–6.2)
ERYTHROCYTE [DISTWIDTH] IN BLOOD BY AUTOMATED COUNT: 13.9 % (ref 12.3–15.4)
GLOBULIN UR ELPH-MCNC: 2.6 GM/DL
GLUCOSE SERPL-MCNC: 88 MG/DL (ref 65–99)
HCT VFR BLD AUTO: 36.8 % (ref 34–46.6)
HGB BLD-MCNC: 12.2 G/DL (ref 12–15.9)
HIV 1+2 AB+HIV1 P24 AG SERPL QL IA: NORMAL
IMM GRANULOCYTES # BLD AUTO: 0.02 10*3/MM3 (ref 0–0.05)
IMM GRANULOCYTES NFR BLD AUTO: 0.3 % (ref 0–0.5)
LYMPHOCYTES # BLD AUTO: 2.05 10*3/MM3 (ref 0.7–3.1)
LYMPHOCYTES NFR BLD AUTO: 35 % (ref 19.6–45.3)
MCH RBC QN AUTO: 26.7 PG (ref 26.6–33)
MCHC RBC AUTO-ENTMCNC: 33.2 G/DL (ref 31.5–35.7)
MCV RBC AUTO: 80.5 FL (ref 79–97)
MONOCYTES # BLD AUTO: 0.58 10*3/MM3 (ref 0.1–0.9)
MONOCYTES NFR BLD AUTO: 9.9 % (ref 5–12)
NEUTROPHILS NFR BLD AUTO: 2.96 10*3/MM3 (ref 1.7–7)
NEUTROPHILS NFR BLD AUTO: 50.6 % (ref 42.7–76)
NRBC BLD AUTO-RTO: 0 /100 WBC (ref 0–0.2)
PLATELET # BLD AUTO: 230 10*3/MM3 (ref 140–450)
PMV BLD AUTO: 8.7 FL (ref 6–12)
POTASSIUM SERPL-SCNC: 4.8 MMOL/L (ref 3.5–5.2)
PROT SERPL-MCNC: 7.1 G/DL (ref 6–8.5)
RBC # BLD AUTO: 4.57 10*6/MM3 (ref 3.77–5.28)
SODIUM SERPL-SCNC: 136 MMOL/L (ref 136–145)
T4 FREE SERPL-MCNC: 0.9 NG/DL (ref 0.93–1.7)
TSH SERPL DL<=0.05 MIU/L-ACNC: 1.62 UIU/ML (ref 0.27–4.2)
WBC NRBC COR # BLD: 5.86 10*3/MM3 (ref 3.4–10.8)

## 2023-08-03 PROCEDURE — 82306 VITAMIN D 25 HYDROXY: CPT

## 2023-08-03 PROCEDURE — 85025 COMPLETE CBC W/AUTO DIFF WBC: CPT

## 2023-08-03 PROCEDURE — 82248 BILIRUBIN DIRECT: CPT

## 2023-08-03 PROCEDURE — G0432 EIA HIV-1/HIV-2 SCREEN: HCPCS

## 2023-08-03 PROCEDURE — 82378 CARCINOEMBRYONIC ANTIGEN: CPT

## 2023-08-03 PROCEDURE — 87086 URINE CULTURE/COLONY COUNT: CPT

## 2023-08-03 PROCEDURE — 80053 COMPREHEN METABOLIC PANEL: CPT

## 2023-08-03 PROCEDURE — 84443 ASSAY THYROID STIM HORMONE: CPT

## 2023-08-03 PROCEDURE — G0499 HEPB SCREEN HIGH RISK INDIV: HCPCS

## 2023-08-03 PROCEDURE — 36415 COLL VENOUS BLD VENIPUNCTURE: CPT

## 2023-08-03 PROCEDURE — 86803 HEPATITIS C AB TEST: CPT

## 2023-08-03 PROCEDURE — 87186 SC STD MICRODIL/AGAR DIL: CPT

## 2023-08-03 PROCEDURE — 84439 ASSAY OF FREE THYROXINE: CPT

## 2023-08-03 RX ORDER — ONDANSETRON 4 MG/1
TABLET, FILM COATED ORAL
Qty: 60 TABLET | Refills: 0 | Status: SHIPPED | OUTPATIENT
Start: 2023-08-03

## 2023-08-04 LAB
HBV CORE AB SERPL QL IA: NEGATIVE
HBV SURFACE AB SER QL: NON REACTIVE
HBV SURFACE AG SERPL QL IA: NEGATIVE
HCV AB SERPL QL IA: NORMAL
HCV IGG SERPL QL IA: NON REACTIVE

## 2023-08-05 DIAGNOSIS — E03.9 HYPOTHYROIDISM, UNSPECIFIED TYPE: ICD-10-CM

## 2023-08-05 DIAGNOSIS — R79.89 ELEVATED SERUM CREATININE: Primary | ICD-10-CM

## 2023-08-05 DIAGNOSIS — N39.0 URINARY TRACT INFECTION WITHOUT HEMATURIA, SITE UNSPECIFIED: Primary | ICD-10-CM

## 2023-08-05 LAB — BACTERIA SPEC AEROBE CULT: ABNORMAL

## 2023-08-05 RX ORDER — NITROFURANTOIN 25; 75 MG/1; MG/1
100 CAPSULE ORAL 2 TIMES DAILY
Qty: 14 CAPSULE | Refills: 0 | Status: SHIPPED | OUTPATIENT
Start: 2023-08-05

## 2023-08-05 NOTE — PROGRESS NOTES
Urine is infected again so we have again sent Macrobid to your pharmacy 3 days after you are off the antibiotic we need to reculture your urine at the office again.  There is a slight decrease in renal function so avoid ibuprofen Aleve Motrin and limit x-ray dyes.  We should recheck that again nonfasting in 6 weeks.  Active thyroid lab test was slightly decreased however the TSH was normal.  We will repeat that again nonfasting in 6 weeks as well rest and drink plenty of fluids call if any other questions or concerns

## 2023-08-06 PROCEDURE — 93298 REM INTERROG DEV EVAL SCRMS: CPT | Performed by: INTERNAL MEDICINE

## 2023-08-06 PROCEDURE — G2066 INTER DEVC REMOTE 30D: HCPCS | Performed by: INTERNAL MEDICINE

## 2023-08-07 ENCOUNTER — TELEPHONE (OUTPATIENT)
Dept: FAMILY MEDICINE CLINIC | Facility: CLINIC | Age: 78
End: 2023-08-07
Payer: MEDICARE

## 2023-08-07 RX ORDER — RALOXIFENE HYDROCHLORIDE 60 MG/1
60 TABLET, FILM COATED ORAL DAILY
Qty: 90 TABLET | Refills: 3 | Status: SHIPPED | OUTPATIENT
Start: 2023-08-07

## 2023-08-07 NOTE — TELEPHONE ENCOUNTER
HUB TO READ:  ----- Message from Nayeli Tony MD sent at 8/5/2023 10:16 AM EDT -----  Urine is infected again so we have again sent Macrobid to your pharmacy 3 days after you are off the antibiotic we need to reculture your urine at the office again.  There is a slight decrease in renal function so avoid ibuprofen Aleve Motrin and limit x-ray dyes.  We should recheck that again nonfasting in 6 weeks.  Active thyroid lab test was slightly decreased however the TSH was normal.  We will repeat that again nonfasting in 6 weeks as well rest and drink plenty of fluids call if any other questions or concerns

## 2023-08-18 RX ORDER — MECLIZINE HCL 12.5 MG/1
TABLET ORAL
Qty: 90 TABLET | Refills: 0 | Status: SHIPPED | OUTPATIENT
Start: 2023-08-18

## 2023-08-18 NOTE — TELEPHONE ENCOUNTER
Rx Refill Note  Requested Prescriptions     Pending Prescriptions Disp Refills    meclizine (ANTIVERT) 12.5 MG tablet [Pharmacy Med Name: MECLIZINE HYDROCHLORIDE 12.5 MG Tablet] 90 tablet 0     Sig: TAKE 1 TABLET THREE TIMES DAILY AS NEEDED FOR DIZZINESS      Last office visit with prescribing clinician: 7/17/2023      Next office visit with prescribing clinician: 9/12/2023   3}  Nan Washington  08/18/23, 10:31 EDT

## 2023-08-18 NOTE — TELEPHONE ENCOUNTER
Can give 30 more Meclazine but if she is still having dizziness, should have her evaluated by ENT-just got 90 tablets in July

## 2023-08-24 ENCOUNTER — TRANSCRIBE ORDERS (OUTPATIENT)
Dept: ADMINISTRATIVE | Facility: HOSPITAL | Age: 78
End: 2023-08-24
Payer: MEDICARE

## 2023-08-24 ENCOUNTER — LAB (OUTPATIENT)
Dept: LAB | Facility: HOSPITAL | Age: 78
End: 2023-08-24
Payer: MEDICARE

## 2023-08-24 ENCOUNTER — HOSPITAL ENCOUNTER (OUTPATIENT)
Dept: CARDIOLOGY | Facility: HOSPITAL | Age: 78
Discharge: HOME | End: 2023-08-24
Payer: MEDICARE

## 2023-08-24 DIAGNOSIS — R15.9 ENCOPRESIS WITHOUT CONSTIPATION AND OVERFLOW INCONTINENCE: ICD-10-CM

## 2023-08-24 DIAGNOSIS — N39.41 URGE INCONTINENCE: ICD-10-CM

## 2023-08-24 DIAGNOSIS — R39.15 URGENCY OF URINATION: ICD-10-CM

## 2023-08-24 DIAGNOSIS — N39.41 URGE INCONTINENCE: Primary | ICD-10-CM

## 2023-08-24 LAB
ANION GAP SERPL CALCULATED.3IONS-SCNC: 11 MMOL/L (ref 5–15)
BASOPHILS # BLD AUTO: 0.08 10*3/MM3 (ref 0–0.2)
BASOPHILS NFR BLD AUTO: 1.4 % (ref 0–1.5)
BUN SERPL-MCNC: 12 MG/DL (ref 8–23)
BUN/CREAT SERPL: 13.2 (ref 7–25)
CALCIUM SPEC-SCNC: 9.3 MG/DL (ref 8.6–10.5)
CHLORIDE SERPL-SCNC: 109 MMOL/L (ref 98–107)
CO2 SERPL-SCNC: 17 MMOL/L (ref 22–29)
CREAT SERPL-MCNC: 0.91 MG/DL (ref 0.57–1)
DEPRECATED RDW RBC AUTO: 42.2 FL (ref 37–54)
EGFRCR SERPLBLD CKD-EPI 2021: 65.1 ML/MIN/1.73
EOSINOPHIL # BLD AUTO: 0.15 10*3/MM3 (ref 0–0.4)
EOSINOPHIL NFR BLD AUTO: 2.6 % (ref 0.3–6.2)
ERYTHROCYTE [DISTWIDTH] IN BLOOD BY AUTOMATED COUNT: 14.3 % (ref 12.3–15.4)
GLUCOSE SERPL-MCNC: 84 MG/DL (ref 65–99)
HCT VFR BLD AUTO: 35.3 % (ref 34–46.6)
HGB BLD-MCNC: 11.6 G/DL (ref 12–15.9)
IMM GRANULOCYTES # BLD AUTO: 0.03 10*3/MM3 (ref 0–0.05)
IMM GRANULOCYTES NFR BLD AUTO: 0.5 % (ref 0–0.5)
LYMPHOCYTES # BLD AUTO: 1.93 10*3/MM3 (ref 0.7–3.1)
LYMPHOCYTES NFR BLD AUTO: 33 % (ref 19.6–45.3)
MCH RBC QN AUTO: 26.9 PG (ref 26.6–33)
MCHC RBC AUTO-ENTMCNC: 32.9 G/DL (ref 31.5–35.7)
MCV RBC AUTO: 81.7 FL (ref 79–97)
MONOCYTES # BLD AUTO: 0.46 10*3/MM3 (ref 0.1–0.9)
MONOCYTES NFR BLD AUTO: 7.9 % (ref 5–12)
NEUTROPHILS NFR BLD AUTO: 3.19 10*3/MM3 (ref 1.7–7)
NEUTROPHILS NFR BLD AUTO: 54.6 % (ref 42.7–76)
NRBC BLD AUTO-RTO: 0 /100 WBC (ref 0–0.2)
PLATELET # BLD AUTO: 237 10*3/MM3 (ref 140–450)
PMV BLD AUTO: 8.8 FL (ref 6–12)
POTASSIUM SERPL-SCNC: 4.9 MMOL/L (ref 3.5–5.2)
RBC # BLD AUTO: 4.32 10*6/MM3 (ref 3.77–5.28)
SODIUM SERPL-SCNC: 137 MMOL/L (ref 136–145)
WBC NRBC COR # BLD: 5.84 10*3/MM3 (ref 3.4–10.8)

## 2023-08-24 PROCEDURE — 80048 BASIC METABOLIC PNL TOTAL CA: CPT

## 2023-08-24 PROCEDURE — 93005 ELECTROCARDIOGRAM TRACING: CPT | Performed by: UROLOGY

## 2023-08-24 PROCEDURE — 36415 COLL VENOUS BLD VENIPUNCTURE: CPT

## 2023-08-24 PROCEDURE — 85025 COMPLETE CBC W/AUTO DIFF WBC: CPT

## 2023-08-29 LAB
QT INTERVAL: 388 MS
QTC INTERVAL: 420 MS

## 2023-09-06 PROCEDURE — 93298 REM INTERROG DEV EVAL SCRMS: CPT | Performed by: INTERNAL MEDICINE

## 2023-09-06 PROCEDURE — G2066 INTER DEVC REMOTE 30D: HCPCS | Performed by: INTERNAL MEDICINE

## 2023-09-11 RX ORDER — ONDANSETRON 4 MG/1
TABLET, FILM COATED ORAL
Qty: 60 TABLET | Refills: 0 | Status: SHIPPED | OUTPATIENT
Start: 2023-09-11

## 2023-09-12 RX ORDER — SERTRALINE HYDROCHLORIDE 100 MG/1
100 TABLET, FILM COATED ORAL NIGHTLY
Qty: 90 TABLET | Refills: 0 | Status: SHIPPED | OUTPATIENT
Start: 2023-09-12

## 2023-09-12 NOTE — TELEPHONE ENCOUNTER
Caller: Katie Reddy    Relationship: Self    Best call back number:[phone]    Requested Prescriptions:   Requested Prescriptions     Pending Prescriptions Disp Refills    sertraline (ZOLOFT) 100 MG tablet 90 tablet 1     Sig: Take 1 tablet by mouth Every Night.        Pharmacy where request should be sent: [id number] - [place], [place] [address] - [phone]  - [phone]     Last office visit with prescribing clinician: 7/17/2023   Last telemedicine visit with prescribing clinician: Visit date not found   Next office visit with prescribing clinician: 9/19/2023     Additional details provided by patient:     Does the patient have less than a 3 day supply:  [] Yes  [] No    Would you like a call back once the refill request has been completed: [] Yes [] No    If the office needs to give you a call back, can they leave a voicemail: [] Yes [] No    Muriel Venegas   09/12/23 15:51 EDT

## 2023-09-27 ENCOUNTER — CLINICAL SUPPORT (OUTPATIENT)
Dept: FAMILY MEDICINE CLINIC | Facility: CLINIC | Age: 78
End: 2023-09-27
Payer: MEDICARE

## 2023-09-27 DIAGNOSIS — E03.9 HYPOTHYROIDISM, UNSPECIFIED TYPE: ICD-10-CM

## 2023-09-27 DIAGNOSIS — R79.89 ELEVATED SERUM CREATININE: ICD-10-CM

## 2023-09-27 DIAGNOSIS — N39.0 URINARY TRACT INFECTION WITHOUT HEMATURIA, SITE UNSPECIFIED: ICD-10-CM

## 2023-09-27 LAB
CREAT SERPL-MCNC: 0.97 MG/DL (ref 0.57–1)
EGFRCR SERPLBLD CKD-EPI 2021: 60.3 ML/MIN/1.73
T4 FREE SERPL-MCNC: 0.91 NG/DL (ref 0.93–1.7)
TSH SERPL DL<=0.05 MIU/L-ACNC: 1.91 UIU/ML (ref 0.27–4.2)

## 2023-09-27 PROCEDURE — 82565 ASSAY OF CREATININE: CPT | Performed by: PREVENTIVE MEDICINE

## 2023-09-27 PROCEDURE — 84443 ASSAY THYROID STIM HORMONE: CPT | Performed by: PREVENTIVE MEDICINE

## 2023-09-27 PROCEDURE — 84439 ASSAY OF FREE THYROXINE: CPT | Performed by: PREVENTIVE MEDICINE

## 2023-09-27 PROCEDURE — 36415 COLL VENOUS BLD VENIPUNCTURE: CPT | Performed by: PREVENTIVE MEDICINE

## 2023-09-27 NOTE — PROGRESS NOTES
Venipuncture performed on Left Arm by Karen Real MA  with good hemostasis. Patient tolerated well. 09/27/23  PRACHI Farr

## 2023-09-28 ENCOUNTER — TELEPHONE (OUTPATIENT)
Dept: FAMILY MEDICINE CLINIC | Facility: CLINIC | Age: 78
End: 2023-09-28
Payer: MEDICARE

## 2023-09-28 NOTE — TELEPHONE ENCOUNTER
----- Message from Nayeli Tony MD sent at 9/28/2023  7:51 AM EDT -----  Kidney function is now back to being within the normal range and the thyroid-stimulating hormone is normal.  T4 is slightly decreased so we should probably check this again in 3 months call if any other questions or concerns

## 2023-09-28 NOTE — PROGRESS NOTES
Kidney function is now back to being within the normal range and the thyroid-stimulating hormone is normal.  T4 is slightly decreased so we should probably check this again in 3 months call if any other questions or concerns

## 2023-09-28 NOTE — TELEPHONE ENCOUNTER
HUB TO READ:  ----- Message from Nayeli Tony MD sent at 9/28/2023  7:51 AM EDT -----  Kidney function is now back to being within the normal range and the thyroid-stimulating hormone is normal.  T4 is slightly decreased so we should probably check this again in 3 months call if any other questions or concerns

## 2023-10-03 ENCOUNTER — TELEPHONE (OUTPATIENT)
Dept: FAMILY MEDICINE CLINIC | Facility: CLINIC | Age: 78
End: 2023-10-03
Payer: MEDICARE

## 2023-10-03 NOTE — TELEPHONE ENCOUNTER
Caller: Katie Reddy    Relationship to patient: Self    Best call back number: [phone]    Patient is needing: PATIENT CALLED AND SAID DR. JOHNSON WAS SUPPOSED TO HAVE FILLED OUT A FORM FOR HER [place], AND HAVE IT SENT BACK    SHE SAID WHEN SHE WAS IN THE OFFICE LAST SHE ASKED ABOUT IT, AND SOMEONE TOLD HER IT HAD BEEN MAILED OUT BUT SHE SAID SHE HAD NOT RECEIVED IT YET, SHE WAS EXPECTING IT TO COME TO HER ADDRESS    SHE IS WANTING TO SEE IF IT WAS MAILED OUT AND IF SO, TO WHOM

## 2023-10-05 RX ORDER — SUCRALFATE 1 G/1
TABLET ORAL
Qty: 360 TABLET | Refills: 0 | Status: SHIPPED | OUTPATIENT
Start: 2023-10-05

## 2023-10-05 RX ORDER — AMITRIPTYLINE HYDROCHLORIDE 25 MG/1
TABLET, FILM COATED ORAL
Qty: 90 TABLET | Refills: 0 | Status: SHIPPED | OUTPATIENT
Start: 2023-10-05

## 2023-10-07 PROCEDURE — 93298 REM INTERROG DEV EVAL SCRMS: CPT | Performed by: INTERNAL MEDICINE

## 2023-10-07 PROCEDURE — G2066 INTER DEVC REMOTE 30D: HCPCS | Performed by: INTERNAL MEDICINE

## 2023-10-18 RX ORDER — VIBEGRON 75 MG/1
1 TABLET, FILM COATED ORAL DAILY
Qty: 30 TABLET | Refills: 10 | Status: SHIPPED | OUTPATIENT
Start: 2023-10-18

## 2023-10-20 RX ORDER — ONDANSETRON 4 MG/1
TABLET, FILM COATED ORAL
Qty: 60 TABLET | Refills: 10 | Status: SHIPPED | OUTPATIENT
Start: 2023-10-20

## 2023-10-23 NOTE — PATIENT INSTRUCTIONS
Health Maintenance Due   Topic Date Due    TDAP/TD VACCINES (1 - Tdap) Never done    INFLUENZA VACCINE  08/01/2023    COVID-19 Vaccine (4 - 2023-24 season) 09/01/2023

## 2023-10-24 ENCOUNTER — OFFICE VISIT (OUTPATIENT)
Dept: FAMILY MEDICINE CLINIC | Facility: CLINIC | Age: 78
End: 2023-10-24
Payer: MEDICARE

## 2023-10-24 ENCOUNTER — TELEPHONE (OUTPATIENT)
Dept: FAMILY MEDICINE CLINIC | Facility: CLINIC | Age: 78
End: 2023-10-24

## 2023-10-24 VITALS
HEART RATE: 85 BPM | TEMPERATURE: 98 F | BODY MASS INDEX: 26.01 KG/M2 | HEIGHT: 60 IN | WEIGHT: 132.5 LBS | OXYGEN SATURATION: 98 % | SYSTOLIC BLOOD PRESSURE: 124 MMHG | DIASTOLIC BLOOD PRESSURE: 75 MMHG

## 2023-10-24 DIAGNOSIS — M54.2 NECK PAIN: ICD-10-CM

## 2023-10-24 DIAGNOSIS — E66.3 OVERWEIGHT WITH BODY MASS INDEX (BMI) OF 25 TO 25.9 IN ADULT: ICD-10-CM

## 2023-10-24 DIAGNOSIS — R00.2 PALPITATIONS: ICD-10-CM

## 2023-10-24 DIAGNOSIS — F41.1 GENERALIZED ANXIETY DISORDER: Chronic | ICD-10-CM

## 2023-10-24 DIAGNOSIS — E27.49 GLUCOCORTICOID DEFICIENCY: ICD-10-CM

## 2023-10-24 DIAGNOSIS — I10 ESSENTIAL HYPERTENSION: Chronic | ICD-10-CM

## 2023-10-24 DIAGNOSIS — E78.2 MIXED HYPERLIPIDEMIA: ICD-10-CM

## 2023-10-24 DIAGNOSIS — J45.20 ASTHMA IN ADULT, MILD INTERMITTENT, UNCOMPLICATED: Chronic | ICD-10-CM

## 2023-10-24 DIAGNOSIS — G44.51 HEMICRANIA CONTINUA: ICD-10-CM

## 2023-10-24 DIAGNOSIS — K62.5 GASTROINTESTINAL HEMORRHAGE ASSOCIATED WITH ANORECTAL SOURCE: ICD-10-CM

## 2023-10-24 DIAGNOSIS — K21.00 GASTROESOPHAGEAL REFLUX DISEASE WITH ESOPHAGITIS, UNSPECIFIED WHETHER HEMORRHAGE: ICD-10-CM

## 2023-10-24 DIAGNOSIS — I20.0 UNSTABLE ANGINA PECTORIS: Primary | Chronic | ICD-10-CM

## 2023-10-24 DIAGNOSIS — I69.30 HISTORY OF HEMORRHAGIC CEREBROVASCULAR ACCIDENT (CVA) WITH RESIDUAL DEFICIT: Chronic | ICD-10-CM

## 2023-10-24 DIAGNOSIS — M85.89 OSTEOPENIA OF MULTIPLE SITES: ICD-10-CM

## 2023-10-24 LAB
ALBUMIN SERPL-MCNC: 4.4 G/DL (ref 3.5–5.2)
ALBUMIN/GLOB SERPL: 1.5 G/DL
ALP SERPL-CCNC: 108 U/L (ref 39–117)
ALT SERPL W P-5'-P-CCNC: 17 U/L (ref 1–33)
ANION GAP SERPL CALCULATED.3IONS-SCNC: 16 MMOL/L (ref 5–15)
AST SERPL-CCNC: 22 U/L (ref 1–32)
BASOPHILS # BLD AUTO: 0.08 10*3/MM3 (ref 0–0.2)
BASOPHILS NFR BLD AUTO: 1.2 % (ref 0–1.5)
BILIRUB SERPL-MCNC: 0.3 MG/DL (ref 0–1.2)
BUN SERPL-MCNC: 22 MG/DL (ref 8–23)
BUN/CREAT SERPL: 19.8 (ref 7–25)
CALCIUM SPEC-SCNC: 10.3 MG/DL (ref 8.6–10.5)
CHLORIDE SERPL-SCNC: 105 MMOL/L (ref 98–107)
CHOLEST SERPL-MCNC: 290 MG/DL (ref 0–200)
CO2 SERPL-SCNC: 17 MMOL/L (ref 22–29)
CREAT SERPL-MCNC: 1.11 MG/DL (ref 0.57–1)
DEPRECATED RDW RBC AUTO: 42.7 FL (ref 37–54)
EGFRCR SERPLBLD CKD-EPI 2021: 51.3 ML/MIN/1.73
EOSINOPHIL # BLD AUTO: 0.22 10*3/MM3 (ref 0–0.4)
EOSINOPHIL NFR BLD AUTO: 3.3 % (ref 0.3–6.2)
ERYTHROCYTE [DISTWIDTH] IN BLOOD BY AUTOMATED COUNT: 14 % (ref 12.3–15.4)
GLOBULIN UR ELPH-MCNC: 2.9 GM/DL
GLUCOSE SERPL-MCNC: 82 MG/DL (ref 65–99)
HCT VFR BLD AUTO: 39.4 % (ref 34–46.6)
HDLC SERPL-MCNC: 53 MG/DL (ref 40–60)
HGB BLD-MCNC: 12.7 G/DL (ref 12–15.9)
IMM GRANULOCYTES # BLD AUTO: 0.03 10*3/MM3 (ref 0–0.05)
IMM GRANULOCYTES NFR BLD AUTO: 0.5 % (ref 0–0.5)
LDLC SERPL CALC-MCNC: 205 MG/DL (ref 0–100)
LDLC/HDLC SERPL: 3.82 {RATIO}
LYMPHOCYTES # BLD AUTO: 1.83 10*3/MM3 (ref 0.7–3.1)
LYMPHOCYTES NFR BLD AUTO: 27.8 % (ref 19.6–45.3)
MAGNESIUM SERPL-MCNC: 2.4 MG/DL (ref 1.6–2.4)
MCH RBC QN AUTO: 27 PG (ref 26.6–33)
MCHC RBC AUTO-ENTMCNC: 32.2 G/DL (ref 31.5–35.7)
MCV RBC AUTO: 83.7 FL (ref 79–97)
MONOCYTES # BLD AUTO: 0.55 10*3/MM3 (ref 0.1–0.9)
MONOCYTES NFR BLD AUTO: 8.3 % (ref 5–12)
NEUTROPHILS NFR BLD AUTO: 3.88 10*3/MM3 (ref 1.7–7)
NEUTROPHILS NFR BLD AUTO: 58.9 % (ref 42.7–76)
NRBC BLD AUTO-RTO: 0 /100 WBC (ref 0–0.2)
PLATELET # BLD AUTO: 237 10*3/MM3 (ref 140–450)
PMV BLD AUTO: 8.9 FL (ref 6–12)
POTASSIUM SERPL-SCNC: 5.2 MMOL/L (ref 3.5–5.2)
PROT SERPL-MCNC: 7.3 G/DL (ref 6–8.5)
RBC # BLD AUTO: 4.71 10*6/MM3 (ref 3.77–5.28)
SODIUM SERPL-SCNC: 138 MMOL/L (ref 136–145)
TRIGL SERPL-MCNC: 172 MG/DL (ref 0–150)
TSH SERPL DL<=0.05 MIU/L-ACNC: 2.25 UIU/ML (ref 0.27–4.2)
VIT B12 BLD-MCNC: 440 PG/ML (ref 211–946)
VLDLC SERPL-MCNC: 32 MG/DL (ref 5–40)
WBC NRBC COR # BLD: 6.59 10*3/MM3 (ref 3.4–10.8)

## 2023-10-24 PROCEDURE — 84443 ASSAY THYROID STIM HORMONE: CPT | Performed by: PREVENTIVE MEDICINE

## 2023-10-24 PROCEDURE — 80053 COMPREHEN METABOLIC PANEL: CPT | Performed by: PREVENTIVE MEDICINE

## 2023-10-24 PROCEDURE — 3078F DIAST BP <80 MM HG: CPT | Performed by: PREVENTIVE MEDICINE

## 2023-10-24 PROCEDURE — G0008 ADMIN INFLUENZA VIRUS VAC: HCPCS | Performed by: PREVENTIVE MEDICINE

## 2023-10-24 PROCEDURE — 1159F MED LIST DOCD IN RCRD: CPT | Performed by: PREVENTIVE MEDICINE

## 2023-10-24 PROCEDURE — 90662 IIV NO PRSV INCREASED AG IM: CPT | Performed by: PREVENTIVE MEDICINE

## 2023-10-24 PROCEDURE — 82607 VITAMIN B-12: CPT | Performed by: PREVENTIVE MEDICINE

## 2023-10-24 PROCEDURE — 3074F SYST BP LT 130 MM HG: CPT | Performed by: PREVENTIVE MEDICINE

## 2023-10-24 PROCEDURE — 80061 LIPID PANEL: CPT | Performed by: PREVENTIVE MEDICINE

## 2023-10-24 PROCEDURE — 83735 ASSAY OF MAGNESIUM: CPT | Performed by: PREVENTIVE MEDICINE

## 2023-10-24 PROCEDURE — 36415 COLL VENOUS BLD VENIPUNCTURE: CPT | Performed by: PREVENTIVE MEDICINE

## 2023-10-24 PROCEDURE — 85025 COMPLETE CBC W/AUTO DIFF WBC: CPT | Performed by: PREVENTIVE MEDICINE

## 2023-10-24 PROCEDURE — 99214 OFFICE O/P EST MOD 30 MIN: CPT | Performed by: PREVENTIVE MEDICINE

## 2023-10-24 PROCEDURE — 1160F RVW MEDS BY RX/DR IN RCRD: CPT | Performed by: PREVENTIVE MEDICINE

## 2023-10-24 RX ORDER — HYDROCORTISONE 10 MG/1
10 TABLET ORAL 3 TIMES DAILY
Qty: 270 TABLET | Refills: 1 | Status: SHIPPED | OUTPATIENT
Start: 2023-10-24

## 2023-10-24 NOTE — PROGRESS NOTES
Venipuncture performed on Left Arm by Karen Real MA  with good hemostasis. Patient tolerated well. 10/24/23

## 2023-10-24 NOTE — TELEPHONE ENCOUNTER
Please call patient and make sure that she knows to restart the Cortef 10 mg and take them once 3 times a day.  She should take it on a full stomach and call us if they still are upsetting her stomach.  This should definitely help her symptoms.

## 2023-10-24 NOTE — PROGRESS NOTES
Chief Complaint  Hypertension (Patient would like the Flu shot./Patient had sugar free 7up this morning.), Headache (On right side of the head.  Hard to get rid of.  Off and on for a month), Dizziness (Been going on for a month), Fatigue, Hot Flashes, and Joint Pain (Right side hurts all the time is going to PT 2 times a week.)    Katie Reddy presents today for follow-up on unstable angina, palpitations, osteopenia, hyperlipidemia, migraine, history of CVA with residual deficit, glucocorticoid deficiency, GERD, hypertension, asthma, generalized anxiety disorder and overweight with a body mass index of 25.      The patient reports having a right sided headache. She states its the worse headache ever. She denies going to the hospital for treatment for headaches. She notes having blurred vision and weakness on her right side arm and leg. She reports having the weakness for several months. She states she did not have weakness after her CVA. She denies having vomiting, but states she is taking Aldosterone. She notes the headache the headache last all day. The patient reports she does not wake with the headache, but develops it after being up and around in the morning. She denies having loss of bladder or bowels.  She states she had a syncopal episode while sitting down to eat dinner 10/17/2023. She notes she has been having lightheadedness and dizziness for months. She reports being confused for several months. She states she continues to take meclizine for vertigo.    The patient reports her shoulder, elbow, knee and hip hurt all the time. She states she has been going to physical therapy the past 2 months.    The patient reports having shortness of breath and chest pain. She states the cardiologist found nothing wrong.      Subjective        Katie Reddy presents to [facility]  Hypertension  Associated symptoms include headaches.   Headache  Dizziness  Associated symptoms include  "arthralgias, fatigue and headaches.   Fatigue  Associated symptoms include arthralgias, fatigue and headaches.   Joint Pain  Associated symptoms include arthralgias, fatigue and headaches.       Objective   Vital Signs:  /75 (BP Location: Left arm, Patient Position: Lying, Cuff Size: Adult)   Pulse 85   Temp 98 °F (36.7 °C) (Temporal)   Ht 152.4 cm (60\")   Wt 60.1 kg (132 lb 8 oz)   SpO2 98%   BMI 25.88 kg/m²   Estimated body mass index is 25.88 kg/m² as calculated from the following:    Height as of this encounter: 152.4 cm (60\").    Weight as of this encounter: 60.1 kg (132 lb 8 oz).           ROS:   Negative: chest pain, palpitations, vomiting  Positive: Dizziness, syncope, left leg pain, left leg weakness, headache, neck pain, blurred vision, fatigue    Physical Exam  Constitutional:       Appearance: Normal appearance.   HENT:      Right Ear: Tympanic membrane normal.      Left Ear: Tympanic membrane normal.   Eyes:      Pupils: Pupils are equal, round, and reactive to light.   Neck:      Vascular: No carotid bruit.      Comments: No thyromegaly, thyroid masses  Cardiovascular:      Rate and Rhythm: Normal rate and regular rhythm.      Pulses: Normal pulses.      Heart sounds: Normal heart sounds. No murmur heard.  Pulmonary:      Effort: Pulmonary effort is normal.      Breath sounds: Normal breath sounds.   Abdominal:      General: Bowel sounds are normal. There is no distension.      Palpations: There is no mass.      Tenderness: There is no abdominal tenderness.      Comments: Examined in the seated position   Musculoskeletal:      Cervical back: No rigidity or tenderness.      Right lower leg: No edema.      Left lower leg: No edema.   Lymphadenopathy:      Cervical: No cervical adenopathy.   Skin:     General: Skin is warm and dry.   Neurological:      Mental Status: She is alert.      Comments:  Patient does have some left leg or excuse me, right leg weakness, but right upper extremity is " presently not weak. Sensation of the right and left hand is intact. Patient's range of motion of her eyes is normal. Patient does have pain with full extension of her neck and has about 50% right lateral flexion of the neck. Patient has about 75% of the left lateral flexion. Patient does have full left lateral rotation and 75% right lateral rotation. Tandem walking was accomplished with some difficulty. Patients drift was normal. Patient is wobbly and Romberg was difficult. Biceps, triceps, brachial, radialis reflexes and patellar reflexes are 2+ and equal bilaterally.   Psychiatric:         Mood and Affect: Mood normal.         Behavior: Behavior normal.        Result Review :                   Assessment and Plan   Diagnoses and all orders for this visit:    1. Unstable angina pectoris (Primary)    2. Palpitations  -     Magnesium; Future  -     TSH; Future  -     Magnesium  -     TSH    3. Osteopenia of multiple sites    4. Mixed hyperlipidemia  -     Lipid Panel; Future  -     Lipid Panel    5. History of hemorrhagic cerebrovascular accident (CVA) with residual deficit    6. Glucocorticoid deficiency    7. Gastroesophageal reflux disease with esophagitis, unspecified whether hemorrhage  -     CBC Auto Differential; Future  -     CBC Auto Differential    8. Gastrointestinal hemorrhage associated with anorectal source    9. Essential hypertension  -     Comprehensive Metabolic Panel; Future  -     Comprehensive Metabolic Panel    10. Asthma in adult, mild intermittent, uncomplicated    11. Generalized anxiety disorder  -     Vitamin B12; Future  -     Vitamin B12    12. Overweight with body mass index (BMI) of 25 to 25.9 in adult    13. Hemicrania continua  -     MRI Brain With & Without Contrast; Future    14. Neck pain  -     Cancel: CT Cervical Spine Without Contrast; Future  -     MRI Cervical Spine Without Contrast; Future    Other orders  -     Fluzone High-Dose 65+yrs ([id number])  -     hydrocortisone  (CORTEF) 10 MG tablet; Take 1 tablet by mouth 3 times a day.  Dispense: 270 tablet; Refill: 1             Follow Up   No follow-ups on file.  Patient was given instructions and counseling regarding her condition or for health maintenance advice. Please see specific information pulled into the AVS if appropriate.     Transcribed from ambient dictation for Nayeli Tony MD by Shelia Moore.  10/24/23   13:18 EDT    Patient or patient representative verbalized consent to the visit recording.  I have personally performed the services described in this document as transcribed by the above individual, and it is both accurate and complete.

## 2023-10-26 NOTE — PROGRESS NOTES
Total and bad cholesterol are both elevated and you are already taking them maximum dose of atorvastatin if you have not been taking that at night every night please let us know.  Otherwise you may need to consider an injectable that you actually take once a month but I would continue to limit your saturated fats and increase your walking please let us know about the above.  Kidney function is slightly decreased so avoid ibuprofen Aleve Motrin and limit x-ray dyes vitamin B12 is slightly low you might take at 1000 units 1 extra day per week call if any other questions or concerns and let us know about the above

## 2023-10-27 ENCOUNTER — TELEPHONE (OUTPATIENT)
Dept: FAMILY MEDICINE CLINIC | Facility: CLINIC | Age: 78
End: 2023-10-27
Payer: MEDICARE

## 2023-10-27 NOTE — TELEPHONE ENCOUNTER
----- Message from Nayeli Tony MD sent at 10/26/2023  4:56 PM EDT -----  Total and bad cholesterol are both elevated and you are already taking them maximum dose of atorvastatin if you have not been taking that at night every night please let us know.  Otherwise you may need to consider an injectable that you actually take once a month but I would continue to limit your saturated fats and increase your walking please let us know about the above.  Kidney function is slightly decreased so avoid ibuprofen Aleve Motrin and limit x-ray dyes vitamin B12 is slightly low you might take at 1000 units 1 extra day per week call if any other questions or concerns and let us know about the above

## 2023-10-27 NOTE — TELEPHONE ENCOUNTER
"Relay     \"Total and bad cholesterol are both elevated and you are already taking them maximum dose of atorvastatin if you have not been taking that at night every night please let us know.  Otherwise you may need to consider an injectable that you actually take once a month but I would continue to limit your saturated fats and increase your walking please let us know about the above.  Kidney function is slightly decreased so avoid ibuprofen Aleve Motrin and limit x-ray dyes vitamin B12 is slightly low you might take at 1000 units 1 extra day per week call if any other questions or concerns and let us know about the above \"                "

## 2023-10-27 NOTE — LETTER
"  October 30, 2023    Katie Reddy  [id number]  [address]  [place]      We have been unable to reach you:      \"Total and bad cholesterol are both elevated and you are already taking them maximum dose of atorvastatin if you have not been taking that at night every night please let us know.  Otherwise you may need to consider an injectable that you actually take once a month but I would continue to limit your saturated fats and increase your walking please let us know about the above.  Kidney function is slightly decreased so avoid ibuprofen Aleve Motrin and limit x-ray dyes vitamin B12 is slightly low you might take at 1000 units 1 extra day per week call if any other questions or concerns and let us know about the above \"                             Nayeli Tony MD  "

## 2023-10-27 NOTE — PROGRESS NOTES
Subjective: History of hemorrhagic cerebrovascular accident (CVA) with residual deficit    Patient ID: Katie Reddy is a 78 y.o. female.    CHIEF COMPLAINT:History of hemorrhagic cerebrovascular accident (CVA) with residual deficit     History of Present Illness Ms. Reddy is a very pleasant 78-year-old  female who was referred by Dr. Cristobal as a new patient for history of hemorrhagic stroke in 2020 with residual effects.    The patient states she had a hemorrhagic stroke in October 2020, she reports she still has weakness in the right arm and right leg, has visual symptoms, is very fatigued and falls asleep very easily, has very severe right neck pain, very severe back pain which radiates into the right leg and right sided headaches.  She denies any problems with bowel or bladder.  She is on a baby aspirin and atorvastatin and B12 for stroke prevention.  The patient's blood pressure is very good.      Recent testing:    MRI Brain 10/30/2023  IMPRESSION:  Impression:  Redemonstrated expected evolution of old left occipital lobe hemorrhagic infarct, with some increasing cystic encephalomalacia present. No acute intracranial findings. No abnormal enhancement.      MRI Cervical Spine 10/30/2023  IMPRESSION:  1. No acute cervical spine findings.  2. Moderate to severe right neural foraminal stenosis is suggested at C6-7. 3. Borderline to mild canal stenosis at C4-5 through C6-7 with moderate diminished disc height at C6-7. No high-grade cervical canal stenosis is seen.      The following portions of the patient's history were reviewed and updated as appropriate: allergies, current medications, past family history, past medical history, past social history, past surgical history and problem list.      Family History   Problem Relation Age of Onset    Heart disease Mother     Hypertension Mother     Diabetes Father     Heart disease Father     Alcohol abuse Father     Hypertension Father     Diabetes  Brother     Heart disease Brother     Cancer Brother 68        Prostate Cancer    Hypertension Brother     Diabetes Maternal Aunt     Heart disease Maternal Grandmother     Hypertension Maternal Grandmother     Heart disease Maternal Grandfather     Hypertension Maternal Grandfather     Liver disease Paternal Grandmother     Hypertension Paternal Grandmother     Heart disease Paternal Grandfather     Hypertension Paternal Grandfather     Dementia Sister     Diabetes Brother        Past Medical History:   Diagnosis Date    Arthritis     Bladder incontinence     Colon polyp     Glucocorticoid deficiency     Hyperlipidemia     Hypertension     Osteopenia     Vitamin D deficiency        Social History     Socioeconomic History    Marital status:     Number of children: 4    Years of education: GED   Tobacco Use    Smoking status: Never     Passive exposure: Never    Smokeless tobacco: Never   Vaping Use    Vaping Use: Never used   Substance and Sexual Activity    Alcohol use: No    Drug use: No    Sexual activity: Defer         Current Outpatient Medications:     amitriptyline (ELAVIL) 25 MG tablet, TAKE 1 TABLET AT BEDTIME, Disp: 90 tablet, Rfl: 0    amLODIPine (NORVASC) 10 MG tablet, Take 0.5 tablets by mouth Daily., Disp: 90 tablet, Rfl: 3    aspirin 81 MG EC tablet, Take 1 tablet by mouth Daily., Disp: 90 tablet, Rfl: 3    atorvastatin (LIPITOR) 80 MG tablet, Take 1 tablet by mouth Daily., Disp: 90 tablet, Rfl: 1    Calcium Carbonate-Vit D-Min (Calcium 1200) 0189-0711 MG-UNIT chewable tablet, Chew 1 tablet Daily., Disp: , Rfl:     cholestyramine (QUESTRAN) 4 g packet, Take 1 packet by mouth 2 (Two) Times a Day With Meals., Disp: , Rfl:     colestipol (COLESTID) 1 g tablet, Take 1 tablet by mouth Daily., Disp: , Rfl:     Evolocumab (REPATHA) solution prefilled syringe injection, Inject 1 mL under the skin into the appropriate area as directed Every 14 (Fourteen) Days., Disp: 6 mL, Rfl: 1    Gemtesa 75 MG  tablet, TAKE 1 TABLET EVERY DAY, Disp: 30 tablet, Rfl: 10    hydrocortisone (CORTEF) 10 MG tablet, Take 1 tablet by mouth 3 times a day., Disp: 270 tablet, Rfl: 1    lisinopril (PRINIVIL,ZESTRIL) 5 MG tablet, Take 1 tablet by mouth Daily., Disp: 90 tablet, Rfl: 1    meclizine (ANTIVERT) 12.5 MG tablet, TAKE 1 TABLET THREE TIMES DAILY AS NEEDED FOR DIZZINESS, Disp: 90 tablet, Rfl: 0    metoprolol succinate XL (TOPROL-XL) 100 MG 24 hr tablet, Take 1 tablet by mouth Daily., Disp: 90 tablet, Rfl: 3    nitroglycerin (NITROSTAT) 0.4 MG SL tablet, Place 1 tablet under the tongue Every 5 (Five) Minutes As Needed for Chest Pain. Take no more than 3 doses in 15 minutes., Disp: 75 tablet, Rfl: 0    ondansetron (ZOFRAN) 4 MG tablet, TAKE 1 TABLET BY MOUTH EVERY 8 HOURS AS NEEDED FOR NAUSEA OR VOMITING., Disp: 60 tablet, Rfl: 10    pantoprazole (PROTONIX) 40 MG EC tablet, Take 1 tablet by mouth Every Morning., Disp: , Rfl:     raloxifene (EVISTA) 60 MG tablet, Take 1 tablet by mouth Daily., Disp: 90 tablet, Rfl: 3    sucralfate (CARAFATE) 1 g tablet, TAKE 1 TABLET FOUR TIMES DAILY BEFORE MEALS AND AT BEDTIME, Disp: 360 tablet, Rfl: 0    gabapentin (NEURONTIN) 300 MG capsule, Take 1 capsule by mouth every night at bedtime for 150 days., Disp: 30 capsule, Rfl: 4    Review of Systems   Constitutional:  Positive for activity change, chills, fatigue and fever.   HENT:  Positive for dental problem, hearing loss and voice change.    Eyes:  Positive for itching and visual disturbance.   Respiratory:  Positive for cough, chest tightness, shortness of breath and wheezing.    Cardiovascular:  Positive for chest pain, palpitations and leg swelling.   Gastrointestinal:  Positive for abdominal pain, anal bleeding, blood in stool, diarrhea, nausea and rectal pain.   Endocrine: Positive for cold intolerance.   Genitourinary:  Positive for difficulty urinating and vaginal discharge.   Musculoskeletal:  Positive for back pain, gait problem, neck  pain and neck stiffness.   Skin:  Positive for rash.   Neurological:  Positive for dizziness, tremors, facial asymmetry, weakness, light-headedness, numbness and headaches.   Hematological:  Bruises/bleeds easily.   Psychiatric/Behavioral:  Positive for agitation, behavioral problems, confusion, decreased concentration and sleep disturbance. The patient is nervous/anxious and is hyperactive.    All other systems reviewed and are negative.       I have reviewed ROS completed by medical assistant.     Objective:    Neurologic Exam     Mental Status   Oriented to person, place, and time.     Cranial Nerves     CN III, IV, VI   Pupils are equal, round, and reactive to light.    Gait, Coordination, and Reflexes     Coordination   Finger to nose coordination: normal  Tandem walking coordination: abnormal (Could not walk heels toes or tandem)    Reflexes   Right brachioradialis: 2+  Left brachioradialis: 2+  Right biceps: 2+  Left biceps: 2+  Right triceps: 2+  Left triceps: 2+  Right patellar: 2+  Left patellar: 2+  Right achilles: 2+  Left achilles: 2+    Physical Exam  Vitals reviewed.   Constitutional:       Appearance: Normal appearance. She is well-groomed and normal weight.   HENT:      Head: Normocephalic and atraumatic.      Right Ear: Hearing normal.      Left Ear: Hearing normal.      Nose: Nose normal.      Mouth/Throat:      Lips: Pink.      Mouth: Mucous membranes are moist.   Eyes:      General: Lids are normal. Visual field deficit (Left harmonious hemianopsia) present.      Extraocular Movements:      Right eye: Abnormal extraocular motion (Left homonymous hemianopsia) present. No nystagmus.      Left eye: Abnormal extraocular motion (Left Timoney is hemianopsia) present. No nystagmus.      Pupils: Pupils are equal, round, and reactive to light.   Cardiovascular:      Rate and Rhythm: Normal rate and regular rhythm.      Pulses: Normal pulses.      Heart sounds: S1 normal and S2 normal. Murmur heard.  "  Pulmonary:      Effort: Pulmonary effort is normal.      Breath sounds: Normal breath sounds and air entry.   Musculoskeletal:         General: Normal range of motion.        Arms:       Cervical back: Pain with movement (Moderate to severe pain with lateral movements or flexion) present.        Legs:       Comments: Left arm/hand 5/5  Right arm right hand 4/5  Left leg 5/5 dorsiflexion left foot plantarflexion 5/5, no clonus  Right leg 4/5 dorsiflexion plantarflexion 4/5, 2 beat clonus right foot   Skin:     General: Skin is warm and dry.   Neurological:      Mental Status: She is alert and oriented to person, place, and time.      Cranial Nerves: No dysarthria or facial asymmetry.      Sensory: No sensory deficit.      Motor: Weakness (Right arm right leg) and abnormal muscle tone (2 beat clonus right foot) present. No tremor, atrophy, seizure activity or pronator drift.      Coordination: Romberg sign negative. Heel to Shin Test abnormal (Unable to complete). Finger-Nose-Finger Test normal. Rapid alternating movements normal.      Gait: Tandem walk abnormal (Could not walk heels toes or tandem). Gait normal.      Deep Tendon Reflexes: Babinski sign absent on the right side. Babinski sign absent on the left side.      Reflex Scores:       Tricep reflexes are 2+ on the right side and 2+ on the left side.       Bicep reflexes are 2+ on the right side and 2+ on the left side.       Brachioradialis reflexes are 2+ on the right side and 2+ on the left side.       Patellar reflexes are 2+ on the right side and 2+ on the left side.       Achilles reflexes are 2+ on the right side and 2+ on the left side.  Psychiatric:         Attention and Perception: Attention and perception normal.         Mood and Affect: Mood normal.         Behavior: Behavior is cooperative.     Assessment/Plan:  They were educated on the Stroke Acronym \"BE FAST\" B=Balance issues, E=Vision changes, F=Face weakness or numbness, A=Arm or Leg " weakness or numbness, S=Speech problems and T=Time to call, 911 he voiced understanding.  Given pamphlet.  For the foraminal stenosis of the cervical spine, the patient will be referred for an evaluation by neurosurgery.  She was notified that she possibly may not be a surgical candidate and will have to go to the [facility] pain clinic which is very good.  The patient also has chronic right-sided headaches along with her right-sided neck pain.  I offered her some Neurontin 300 mg to help with pain and the patient reports that she would only take it at night.  She is to call the clinic if she would like to increase the dose.  The patient is very sleepy and recounted several incidents of falling asleep watching TV knocking food off of her TV tray.  She was in agreement with undergoing a home sleep study.  As the patient has moderate to severe low back pain and clonus of 2 beats in the right foot she will be sent for an MRI of the lumbar spine.  Due to her left homonymous hemianopsia the patient will be sent to ophthalmology.    Stroke risk the patient's blood pressure is good.  She states she  is not diabetic.  She is on atorvastatin.  She does take a B12 supplement.  Her weight is fairly controlled.  She states she does walk approximately 20 minutes 3 times a week.  She is on a statin and a baby aspirin, she states she does eat a healthy heart diet.  She also reports that she is in physical therapy at this time trying to strengthen the right arm right leg.        Diagnoses and all orders for this visit:    1. Foraminal stenosis of cervical region (Primary)  -     Ambulatory Referral to Neurosurgery  -     gabapentin (NEURONTIN) 300 MG capsule; Take 1 capsule by mouth every night at bedtime for 150 days.  Dispense: 30 capsule; Refill: 4    2. Chronic right-sided headaches  -     gabapentin (NEURONTIN) 300 MG capsule; Take 1 capsule by mouth every night at bedtime for 150 days.  Dispense: 30 capsule; Refill:  4    3. Hypersomnia  -     Home Sleep Study; Future    4. Clonus  -     MRI Lumbar Spine With Contrast; Future    5. Lumbar radiculopathy, right  -     MRI Lumbar Spine With Contrast; Future    6. Visual field loss following cerebrovascular accident  -     Ambulatory Referral to Ophthalmology    7. H/O: CVA (cerebrovascular accident)    PCP to control: BP<130/90, A1c<6.5, LDL<70, B12>500, control weight, exercise 20 min TIW,  continue anticoagulant, continue statin, healthy heart diet.       Return in about 6 months (around 4/30/2024) for Next scheduled follow up Lori Selby .    I spent 60 minutes caring for Katie on this date of service. This time includes time spent by me in the following activities: reviewing tests, obtaining and/or reviewing a separately obtained history, performing a medically appropriate examination and/or evaluation, counseling and educating the patient/family/caregiver, ordering medications, tests, or procedures, documenting information in the medical record, independently interpreting results and communicating that information with the patient/family/caregiver, and I spent approximately 15 minutes educating the patient on stroke risk .      This document has been electronically signed by Lori RIVERA on October 31, 2023 16:06 EDT

## 2023-10-30 ENCOUNTER — PRIOR AUTHORIZATION (OUTPATIENT)
Dept: FAMILY MEDICINE CLINIC | Facility: CLINIC | Age: 78
End: 2023-10-30
Payer: MEDICARE

## 2023-10-30 ENCOUNTER — HOSPITAL ENCOUNTER (OUTPATIENT)
Dept: MRI IMAGING | Facility: HOSPITAL | Age: 78
Discharge: HOME | End: 2023-10-30
Payer: MEDICARE

## 2023-10-30 ENCOUNTER — TELEPHONE (OUTPATIENT)
Dept: FAMILY MEDICINE CLINIC | Facility: CLINIC | Age: 78
End: 2023-10-30
Payer: MEDICARE

## 2023-10-30 DIAGNOSIS — G44.51 HEMICRANIA CONTINUA: ICD-10-CM

## 2023-10-30 DIAGNOSIS — M54.2 NECK PAIN: ICD-10-CM

## 2023-10-30 PROCEDURE — A9579 GAD-BASE MR CONTRAST NOS,1ML: HCPCS | Performed by: PREVENTIVE MEDICINE

## 2023-10-30 PROCEDURE — 25010000002 GADOTERIDOL PER 1 ML: Performed by: PREVENTIVE MEDICINE

## 2023-10-30 PROCEDURE — 72141 MRI NECK SPINE W/O DYE: CPT

## 2023-10-30 PROCEDURE — 70553 MRI BRAIN STEM W/O & W/DYE: CPT

## 2023-10-30 RX ADMIN — GADOTERIDOL 12 ML: 279.3 INJECTION, SOLUTION INTRAVENOUS at 10:19

## 2023-10-30 NOTE — PROGRESS NOTES
MRI shows no acute intracranial findings.  Keep follow-up with neurology tomorrow.  Call if any other questions or concerns

## 2023-10-30 NOTE — TELEPHONE ENCOUNTER
RELAY    ----- Message from Nayeli Tony MD sent at 10/30/2023  4:34 PM EDT -----  MRI shows no acute intracranial findings.  Keep follow-up with neurology tomorrow.  Call if any other questions or concerns

## 2023-10-31 ENCOUNTER — OFFICE VISIT (OUTPATIENT)
Dept: NEUROLOGY | Facility: CLINIC | Age: 78
End: 2023-10-31
Payer: MEDICARE

## 2023-10-31 VITALS
DIASTOLIC BLOOD PRESSURE: 74 MMHG | WEIGHT: 131 LBS | BODY MASS INDEX: 25.72 KG/M2 | HEART RATE: 88 BPM | HEIGHT: 60 IN | SYSTOLIC BLOOD PRESSURE: 124 MMHG

## 2023-10-31 DIAGNOSIS — G89.29 CHRONIC RIGHT-SIDED HEADACHES: ICD-10-CM

## 2023-10-31 DIAGNOSIS — M54.16 LUMBAR RADICULOPATHY, RIGHT: ICD-10-CM

## 2023-10-31 DIAGNOSIS — Z86.73 H/O: CVA (CEREBROVASCULAR ACCIDENT): ICD-10-CM

## 2023-10-31 DIAGNOSIS — M48.02 FORAMINAL STENOSIS OF CERVICAL REGION: Primary | ICD-10-CM

## 2023-10-31 DIAGNOSIS — R25.8 CLONUS: ICD-10-CM

## 2023-10-31 DIAGNOSIS — I69.398 VISUAL FIELD LOSS FOLLOWING CEREBROVASCULAR ACCIDENT: ICD-10-CM

## 2023-10-31 DIAGNOSIS — H54.7 VISUAL FIELD LOSS FOLLOWING CEREBROVASCULAR ACCIDENT: ICD-10-CM

## 2023-10-31 DIAGNOSIS — G47.10 HYPERSOMNIA: ICD-10-CM

## 2023-10-31 DIAGNOSIS — R51.9 CHRONIC RIGHT-SIDED HEADACHES: ICD-10-CM

## 2023-10-31 PROCEDURE — 99215 OFFICE O/P EST HI 40 MIN: CPT | Performed by: NURSE PRACTITIONER

## 2023-10-31 PROCEDURE — 3078F DIAST BP <80 MM HG: CPT | Performed by: NURSE PRACTITIONER

## 2023-10-31 PROCEDURE — 3074F SYST BP LT 130 MM HG: CPT | Performed by: NURSE PRACTITIONER

## 2023-10-31 RX ORDER — GABAPENTIN 300 MG/1
300 CAPSULE ORAL
Qty: 30 CAPSULE | Refills: 4 | Status: SHIPPED | OUTPATIENT
Start: 2023-10-31 | End: 2024-03-29

## 2023-11-02 NOTE — PROGRESS NOTES
Neurosurgical Consultation      Katie Reddy is a 78 y.o. female is being seen for consultation today at the request of Lori Selby, *for  neck pain. Today patient reports right sided neck pain into her shoulder and arm.    Chief Complaint   Patient presents with    Neck Pain     New evaluation        Previous treatment:Gabapentin, Physical Therapy at [facility]    HPI: This is a 78-year-old woman with a history of an intracranial hemorrhagic infarct.  She has chronic headaches as well as right-sided weakness and numbness.  She was evaluated by neurology for her headaches.  MRI of her cervical spine did show right-sided C6/C7 foraminal stenosis and she was referred to me for this.  She describes neck pain with activity that radiates from her ear down to her shoulder.  It does not go into her arm.  She has not attempted significant interventions.  She was recently ordered physical therapy.  She has just begun this treatment.    Past Medical History:   Diagnosis Date    Arthritis     Bladder incontinence     Colon polyp     Glucocorticoid deficiency     Hyperlipidemia     Hypertension     Intractable nausea and vomiting 11/11/2020    Osteopenia     Vitamin D deficiency         Past Surgical History:   Procedure Laterality Date    APPENDECTOMY      BLADDER SURGERY      bladder stimulator placement/ REMOVAL    BREAST CYST EXCISION      BREAST LUMPECTOMY Left 1973    NEG    BUNIONECTOMY Right 05/29/2020    Procedure: BUNIONECTOMY DEVONTE;  Surgeon: MARISSA Em DPM;  Location: [facility] OR;  Service: Podiatry;  Laterality: Right;    BUNIONECTOMY Left 12/30/2022    Procedure: BUNIONECTOMY DEVONTE -MINIMALLY INVASIVE with distal metatarsal osteotomy and internal fixation;  Surgeon: MARISSA Em DPM;  Location: [facility];  Service: Podiatry;  Laterality: Left;    CARDIAC CATHETERIZATION      CARDIAC CATHETERIZATION N/A 02/08/2022    Procedure: Left Heart Cath, possible pci;  Surgeon: Hilary  Master Garcia MD;  Location: [facility] INVASIVE LOCATION;  Service: Cardiovascular;  Laterality: N/A;    CARPAL TUNNEL RELEASE Right 1980    CHOLECYSTECTOMY      COLON SURGERY      hemorrhoid banding    COLONOSCOPY  2017 2017/ 2019 = jessica DESAI 2024   Dr. Mackey    COLONOSCOPY N/A 02/05/2022    NEG= 2022    ENDOSCOPY N/A 02/05/2022 2022EGD= erosive esophagitis, hiatal hernia, Nonerosive gastritis    EYE SURGERY      HERNIA REPAIR      Umbilical removal    HYSTERECTOMY  1970    SUBTOTAL HYSTERECTOMY      UMBILICAL HERNIA REPAIR          Current Outpatient Medications on File Prior to Visit   Medication Sig Dispense Refill    amitriptyline (ELAVIL) 25 MG tablet TAKE 1 TABLET AT BEDTIME 90 tablet 0    amLODIPine (NORVASC) 10 MG tablet Take 0.5 tablets by mouth Daily. 90 tablet 3    aspirin 81 MG EC tablet Take 1 tablet by mouth Daily. 90 tablet 3    atorvastatin (LIPITOR) 80 MG tablet Take 1 tablet by mouth Daily. 90 tablet 1    Calcium Carbonate-Vit D-Min (Calcium 1200) 5640-1524 MG-UNIT chewable tablet Chew 1 tablet Daily.      cholestyramine (QUESTRAN) 4 g packet Take 1 packet by mouth 2 (Two) Times a Day With Meals.      colestipol (COLESTID) 1 g tablet Take 1 tablet by mouth Daily.      Evolocumab (REPATHA) solution prefilled syringe injection Inject 1 mL under the skin into the appropriate area as directed Every 14 (Fourteen) Days. 6 mL 1    gabapentin (NEURONTIN) 300 MG capsule Take 1 capsule by mouth every night at bedtime for 150 days. 30 capsule 4    Gemtesa 75 MG tablet TAKE 1 TABLET EVERY DAY 30 tablet 10    hydrocortisone (CORTEF) 10 MG tablet Take 1 tablet by mouth 3 times a day. 270 tablet 1    lisinopril (PRINIVIL,ZESTRIL) 5 MG tablet Take 1 tablet by mouth Daily. 90 tablet 1    meclizine (ANTIVERT) 12.5 MG tablet TAKE 1 TABLET THREE TIMES DAILY AS NEEDED FOR DIZZINESS 90 tablet 0    metoprolol succinate XL (TOPROL-XL) 100 MG 24 hr tablet Take 1 tablet by mouth Daily. 90 tablet 3    nitroglycerin  "(NITROSTAT) 0.4 MG SL tablet Place 1 tablet under the tongue Every 5 (Five) Minutes As Needed for Chest Pain. Take no more than 3 doses in 15 minutes. 75 tablet 0    ondansetron (ZOFRAN) 4 MG tablet TAKE 1 TABLET BY MOUTH EVERY 8 HOURS AS NEEDED FOR NAUSEA OR VOMITING. 60 tablet 10    pantoprazole (PROTONIX) 40 MG EC tablet Take 1 tablet by mouth Every Morning.      raloxifene (EVISTA) 60 MG tablet Take 1 tablet by mouth Daily. 90 tablet 3    sucralfate (CARAFATE) 1 g tablet TAKE 1 TABLET FOUR TIMES DAILY BEFORE MEALS AND AT BEDTIME 360 tablet 0     No current facility-administered medications on file prior to visit.        Allergies   Allergen Reactions    Trazodone Irritability    Boniva [Ibandronic Acid] GI Intolerance     GERD    Dayvigo [Lemborexant] Other (See Comments)     Sleep walking        Social History     Socioeconomic History    Marital status:     Number of children: 4    Years of education: GED   Tobacco Use    Smoking status: Never     Passive exposure: Never    Smokeless tobacco: Never   Vaping Use    Vaping Use: Never used   Substance and Sexual Activity    Alcohol use: No    Drug use: No    Sexual activity: Defer          Review of Systems   Constitutional:  Positive for activity change.   HENT: Negative.     Eyes:  Positive for pain (right).   Respiratory: Negative.     Cardiovascular: Negative.    Gastrointestinal: Negative.    Endocrine: Negative.    Musculoskeletal:  Positive for arthralgias, back pain, myalgias, neck pain and neck stiffness.   Skin: Negative.    Allergic/Immunologic: Negative.    Neurological:  Positive for weakness (right sided), numbness (tingling/right sided) and headache.   Psychiatric/Behavioral: Negative.          Physical Examination:     Vitals:    11/07/23 1500   BP: 117/73   Pulse: 80   Weight: 59.3 kg (130 lb 12.8 oz)   Height: 152.4 cm (60\")   PainSc:   8        Physical Exam  Eyes:      General: Lids are normal.      Extraocular Movements: Extraocular " movements intact.      Pupils: Pupils are equal, round, and reactive to light.   Neurological:      Motor: Motor strength is normal.  Psychiatric:         Speech: Speech normal.          Neurological Exam  Mental Status  Awake, alert and oriented to person, place and time. Speech is normal. Language is fluent with no aphasia.    Cranial Nerves  CN III, IV, VI: Extraocular movements intact bilaterally. Normal lids and orbits bilaterally. Pupils equal round and reactive to light bilaterally.  CN V: Facial sensation is normal.  CN VII: Full and symmetric facial movement.  CN IX, X: Palate elevates symmetrically  CN XI: Shoulder shrug strength is normal.  CN XII: Tongue midline without atrophy or fasciculations.    Motor  Normal muscle bulk throughout. Strength is 5/5 throughout all four extremities.    Sensory  Light touch is normal in upper and lower extremities.     Reflexes    Right pathological reflexes: Jn's absent.  Left pathological reflexes: Jn's present.       Result Review  The following data was reviewed by: Hermann Miramontes MD on 11/07/2023:    Data reviewed : Radiologic studies MRI of the cervical spine shows disc degeneration and spondylosis at C6/C7.  This does result in bilateral foraminal stenosis      Assessment/plan:  This is a 78-year-old woman with a history of a stroke and some chronic headaches who also describes neck pain and right shoulder pain.  This does not correlate with a C7 radiculopathy which would occur from a foraminal stenosis which is evident on her MRI.  I do not recommend any neurosurgical intervention at this juncture.  She can fully engage with physical therapy.  She can follow-up with me in an as-needed basis.  I have encouraged her to call with any questions or concerns.    Diagnoses and all orders for this visit:    1. Neck pain on right side (Primary)         Return if symptoms worsen or fail to improve.            Hermann Miramontes MD

## 2023-11-07 ENCOUNTER — OFFICE VISIT (OUTPATIENT)
Dept: NEUROSURGERY | Facility: CLINIC | Age: 78
End: 2023-11-07
Payer: MEDICARE

## 2023-11-07 VITALS
BODY MASS INDEX: 25.68 KG/M2 | SYSTOLIC BLOOD PRESSURE: 117 MMHG | WEIGHT: 130.8 LBS | DIASTOLIC BLOOD PRESSURE: 73 MMHG | HEART RATE: 80 BPM | HEIGHT: 60 IN

## 2023-11-07 DIAGNOSIS — M54.2 NECK PAIN ON RIGHT SIDE: Primary | ICD-10-CM

## 2023-11-16 ENCOUNTER — HOSPITAL ENCOUNTER (OUTPATIENT)
Dept: SLEEP MEDICINE | Facility: HOSPITAL | Age: 78
Discharge: HOME | End: 2023-11-16
Admitting: NURSE PRACTITIONER
Payer: MEDICARE

## 2023-11-16 DIAGNOSIS — G47.10 HYPERSOMNIA: ICD-10-CM

## 2023-11-16 PROCEDURE — 95806 SLEEP STUDY UNATT&RESP EFFT: CPT | Performed by: PSYCHIATRY & NEUROLOGY

## 2023-11-16 PROCEDURE — 95806 SLEEP STUDY UNATT&RESP EFFT: CPT

## 2023-11-27 RX ORDER — SERTRALINE HYDROCHLORIDE 100 MG/1
100 TABLET, FILM COATED ORAL NIGHTLY
Qty: 90 TABLET | Refills: 3 | OUTPATIENT
Start: 2023-11-27

## 2023-12-01 ENCOUNTER — HOSPITAL ENCOUNTER (OUTPATIENT)
Dept: MRI IMAGING | Facility: HOSPITAL | Age: 78
Discharge: HOME | End: 2023-12-01
Payer: MEDICARE

## 2023-12-01 DIAGNOSIS — M54.16 LUMBAR RADICULOPATHY, RIGHT: ICD-10-CM

## 2023-12-01 DIAGNOSIS — R25.8 CLONUS: ICD-10-CM

## 2023-12-02 ENCOUNTER — HOSPITAL ENCOUNTER (OUTPATIENT)
Dept: MRI IMAGING | Facility: HOSPITAL | Age: 78
Discharge: HOME | End: 2023-12-02
Admitting: NURSE PRACTITIONER
Payer: MEDICARE

## 2023-12-02 PROCEDURE — 25010000002 GADOTERIDOL PER 1 ML: Performed by: NURSE PRACTITIONER

## 2023-12-02 PROCEDURE — 72158 MRI LUMBAR SPINE W/O & W/DYE: CPT

## 2023-12-02 PROCEDURE — A9579 GAD-BASE MR CONTRAST NOS,1ML: HCPCS | Performed by: NURSE PRACTITIONER

## 2023-12-02 RX ADMIN — GADOTERIDOL 12 ML: 279.3 INJECTION, SOLUTION INTRAVENOUS at 12:37

## 2023-12-04 ENCOUNTER — TELEPHONE (OUTPATIENT)
Dept: NEUROLOGY | Facility: CLINIC | Age: 78
End: 2023-12-04
Payer: MEDICARE

## 2023-12-04 DIAGNOSIS — M54.16 LUMBAR RADICULOPATHY, RIGHT: Primary | ICD-10-CM

## 2023-12-04 LAB
CREAT BLDA-MCNC: 1.6 MG/DL (ref 0.6–1.3)
EGFRCR SERPLBLD CKD-EPI 2021: 33.1 ML/MIN/1.73

## 2023-12-04 NOTE — PROGRESS NOTES
Notify the patient that I did refer her to neurosurgery for an evaluation of her lumbar spine.  Her MRI of her lumbar spine showed: Foraminal stenosis and multiple disc bulges.  Impression:  1. L4-L5: Grade 1 spondylolisthesis. There is uncovering of the disc with mild bulge. The central canal is patent. There is mild bilateral foraminal stenosis with facet hypertrophy. There is mild reactive edema and enhancement of the facet joints.    2. Mild multilevel disc bulges  Electronically Signed: Ludin Liu MD    12/4/2023 10:18 AM EST    Workstation ID: [id number]

## 2023-12-04 NOTE — TELEPHONE ENCOUNTER
----- Message from PRACHI Robertson sent at 12/4/2023 10:26 AM EST -----  Notify the patient that I did refer her to neurosurgery for an evaluation of her lumbar spine.  Her MRI of her lumbar spine showed: Foraminal stenosis and multiple disc bulges.  Impression:  1. L4-L5: Grade 1 spondylolisthesis. There is uncovering of the disc with mild bulge. The central canal is patent. There is mild bilateral foraminal stenosis with facet hypertrophy. There is mild reactive edema and enhancement of the facet joints.     2. Mild multilevel disc bulges  Electronically Signed: Ludin Liu MD    12/4/2023 10:18 AM EST    Workstation ID: [id number]

## 2023-12-05 DIAGNOSIS — K22.0: Primary | ICD-10-CM

## 2023-12-05 DIAGNOSIS — E27.49: Primary | ICD-10-CM

## 2023-12-05 DIAGNOSIS — E83.52 HYPERCALCEMIA: ICD-10-CM

## 2023-12-05 DIAGNOSIS — I10 ESSENTIAL HYPERTENSION: ICD-10-CM

## 2023-12-06 ENCOUNTER — TELEPHONE (OUTPATIENT)
Dept: FAMILY MEDICINE CLINIC | Facility: CLINIC | Age: 78
End: 2023-12-06
Payer: MEDICARE

## 2023-12-06 NOTE — TELEPHONE ENCOUNTER
Caller: Katie Reddy    Relationship to patient: Self    Best call back number: [phone]    Patient is needing:     RETURNING MISSED CALL FROM OFFICE. NOT SURE WHAT IT WAS IN REGARDS TO.

## 2023-12-08 ENCOUNTER — LAB (OUTPATIENT)
Dept: LAB | Facility: HOSPITAL | Age: 78
End: 2023-12-08
Payer: MEDICARE

## 2023-12-08 DIAGNOSIS — I10 ESSENTIAL HYPERTENSION: ICD-10-CM

## 2023-12-08 DIAGNOSIS — E27.49: ICD-10-CM

## 2023-12-08 DIAGNOSIS — E83.52 HYPERCALCEMIA: ICD-10-CM

## 2023-12-08 DIAGNOSIS — K22.0: ICD-10-CM

## 2023-12-08 LAB
ALBUMIN SERPL-MCNC: 4.7 G/DL (ref 3.5–5.2)
ALBUMIN/GLOB SERPL: 2 G/DL
ALP SERPL-CCNC: 99 U/L (ref 39–117)
ALT SERPL W P-5'-P-CCNC: 16 U/L (ref 1–33)
ANION GAP SERPL CALCULATED.3IONS-SCNC: 11 MMOL/L (ref 5–15)
AST SERPL-CCNC: 23 U/L (ref 1–32)
BILIRUB SERPL-MCNC: 0.3 MG/DL (ref 0–1.2)
BUN SERPL-MCNC: 21 MG/DL (ref 8–23)
BUN/CREAT SERPL: 15.2 (ref 7–25)
CALCIUM SPEC-SCNC: 9.8 MG/DL (ref 8.6–10.5)
CHLORIDE SERPL-SCNC: 101 MMOL/L (ref 98–107)
CO2 SERPL-SCNC: 24 MMOL/L (ref 22–29)
CREAT SERPL-MCNC: 1.38 MG/DL (ref 0.57–1)
EGFRCR SERPLBLD CKD-EPI 2021: 39.5 ML/MIN/1.73
GLOBULIN UR ELPH-MCNC: 2.4 GM/DL
GLUCOSE SERPL-MCNC: 91 MG/DL (ref 65–99)
POTASSIUM SERPL-SCNC: 5.8 MMOL/L (ref 3.5–5.2)
PROT SERPL-MCNC: 7.1 G/DL (ref 6–8.5)
PTH-INTACT SERPL-MCNC: 74.6 PG/ML (ref 15–65)
SODIUM SERPL-SCNC: 136 MMOL/L (ref 136–145)

## 2023-12-08 PROCEDURE — 83970 ASSAY OF PARATHORMONE: CPT

## 2023-12-08 PROCEDURE — 80053 COMPREHEN METABOLIC PANEL: CPT

## 2023-12-08 PROCEDURE — 36415 COLL VENOUS BLD VENIPUNCTURE: CPT

## 2023-12-10 RX ORDER — ATORVASTATIN CALCIUM 80 MG/1
80 TABLET, FILM COATED ORAL DAILY
Qty: 90 TABLET | Refills: 3 | Status: SHIPPED | OUTPATIENT
Start: 2023-12-10

## 2023-12-14 RX ORDER — BETAMETHASONE DIPROPIONATE 0.5 MG/G
CREAM TOPICAL
COMMUNITY
Start: 2023-11-16

## 2023-12-14 RX ORDER — SERTRALINE HYDROCHLORIDE 100 MG/1
TABLET, FILM COATED ORAL
COMMUNITY

## 2023-12-21 NOTE — PROGRESS NOTES
Neurosurgical Consultation      Katie Reddy is a 78 y.o. female is here today for follow-up for low back pain. Today patient reports lower back pain into her hips, right greater than left.    Chief Complaint   Patient presents with    Back Pain     Follow up         Previous treatment: Gabapentin    HPI: This is a 78-year-old woman with a history of an intracranial hemorrhagic infarct.  I last evaluated her on November 7, 2023.  At that evaluation we discussed mostly neck pain.  She was referred to me for C6/C7 foraminal stenosis.  I did not believe there was indication of clear radiculopathy and recommended physical therapy.  She did engage with physical therapy.  This did not profoundly improve her symptoms.  She returns back to me today with a new MRI of her lumbar spine.  She has chronic back pain.  It is worse on the right side than the left side.  It does extend down her leg.  She comments that she has had this pain for an extended period of time and has failed physical therapy.  She is also failed spinal canal injections.  She does not have any red flag symptoms.    Past Medical History:   Diagnosis Date    Arthritis     Bladder incontinence     Colon polyp     Glucocorticoid deficiency     Hyperlipidemia     Hypertension     Intractable nausea and vomiting 11/11/2020    Osteopenia     Vitamin D deficiency         Past Surgical History:   Procedure Laterality Date    APPENDECTOMY      BLADDER SURGERY      bladder stimulator placement/ REMOVAL    BREAST CYST EXCISION      BREAST LUMPECTOMY Left 1973    NEG    BUNIONECTOMY Right 05/29/2020    Procedure: KEVINCTYI WHITNEY;  Surgeon: MARISSA Em DPM;  Location: [facility];  Service: Podiatry;  Laterality: Right;    BUNIONECTOMY Left 12/30/2022    Procedure: AP WHITNEY -MINIMALLY INVASIVE with distal metatarsal osteotomy and internal fixation;  Surgeon: MARISSA Em DPM;  Location: [facility] OR;  Service: Podiatry;  Laterality:  Left;    CARDIAC CATHETERIZATION      CARDIAC CATHETERIZATION N/A 02/08/2022    Procedure: Left Heart Cath, possible pci;  Surgeon: Master Richard MD;  Location: [facility] CATH INVASIVE LOCATION;  Service: Cardiovascular;  Laterality: N/A;    CARPAL TUNNEL RELEASE Right 1980    CHOLECYSTECTOMY      COLON SURGERY      hemorrhoid banding    COLONOSCOPY  2017 2017/ 2019 = TA, jessica 2024   Dr. Mackey    COLONOSCOPY N/A 02/05/2022    NEG= 2022    ENDOSCOPY N/A 02/05/2022 2022EGD= erosive esophagitis, hiatal hernia, Nonerosive gastritis    EYE SURGERY      HERNIA REPAIR      Umbilical removal    HYSTERECTOMY  1970    SUBTOTAL HYSTERECTOMY      UMBILICAL HERNIA REPAIR          Current Outpatient Medications on File Prior to Visit   Medication Sig Dispense Refill    amitriptyline (ELAVIL) 25 MG tablet TAKE 1 TABLET AT BEDTIME 90 tablet 0    amLODIPine (NORVASC) 10 MG tablet Take 0.5 tablets by mouth Daily. 90 tablet 3    aspirin 81 MG EC tablet Take 1 tablet by mouth Daily. 90 tablet 3    atorvastatin (LIPITOR) 80 MG tablet TAKE 1 TABLET EVERY DAY 90 tablet 3    betamethasone dipropionate 0.05 % cream APPLY TO AFFECTED AREAS ON RIGHT LEG TWICE DAILY 2 WEEKS ON 1 WEEK OFF AS NEEDED.      Calcium Carbonate-Vit D-Min (Calcium 1200) 2697-5388 MG-UNIT chewable tablet Chew 1 tablet Daily.      cholestyramine (QUESTRAN) 4 g packet Take 1 packet by mouth 2 (Two) Times a Day With Meals.      colestipol (COLESTID) 1 g tablet Take 1 tablet by mouth Daily.      Evolocumab (REPATHA) solution prefilled syringe injection Inject 1 mL under the skin into the appropriate area as directed Every 14 (Fourteen) Days. 6 mL 1    gabapentin (NEURONTIN) 300 MG capsule Take 1 capsule by mouth every night at bedtime for 150 days. 30 capsule 4    Gemtesa 75 MG tablet TAKE 1 TABLET EVERY DAY 30 tablet 10    hydrocortisone (CORTEF) 10 MG tablet Take 1 tablet by mouth 3 times a day. 270 tablet 1    lisinopril (PRINIVIL,ZESTRIL) 5 MG tablet Take  1 tablet by mouth Daily. 90 tablet 1    meclizine (ANTIVERT) 12.5 MG tablet TAKE 1 TABLET THREE TIMES DAILY AS NEEDED FOR DIZZINESS 90 tablet 0    metoprolol succinate XL (TOPROL-XL) 100 MG 24 hr tablet Take 1 tablet by mouth Daily. 90 tablet 3    nitroglycerin (NITROSTAT) 0.4 MG SL tablet Place 1 tablet under the tongue Every 5 (Five) Minutes As Needed for Chest Pain. Take no more than 3 doses in 15 minutes. 75 tablet 0    ondansetron (ZOFRAN) 4 MG tablet TAKE 1 TABLET BY MOUTH EVERY 8 HOURS AS NEEDED FOR NAUSEA OR VOMITING. 60 tablet 10    pantoprazole (PROTONIX) 40 MG EC tablet Take 1 tablet by mouth Every Morning.      raloxifene (EVISTA) 60 MG tablet Take 1 tablet by mouth Daily. 90 tablet 3    sertraline (ZOLOFT) 100 MG tablet       sucralfate (CARAFATE) 1 g tablet TAKE 1 TABLET FOUR TIMES DAILY BEFORE MEALS AND AT BEDTIME 360 tablet 0     No current facility-administered medications on file prior to visit.        Allergies   Allergen Reactions    Trazodone Irritability    Dayvigo [Lemborexant] Other (See Comments)     Sleep walking    Ibandronic Acid GI Intolerance     GERD        Social History     Socioeconomic History    Marital status:     Number of children: 4    Years of education: GED   Tobacco Use    Smoking status: Never     Passive exposure: Never    Smokeless tobacco: Never   Vaping Use    Vaping Use: Never used   Substance and Sexual Activity    Alcohol use: No    Drug use: No    Sexual activity: Defer          Review of Systems   Constitutional:  Positive for activity change.   HENT: Negative.     Eyes: Negative.    Respiratory: Negative.     Cardiovascular: Negative.    Gastrointestinal: Negative.    Endocrine: Negative.    Genitourinary: Negative.    Musculoskeletal:  Positive for arthralgias, back pain and myalgias.   Skin: Negative.    Allergic/Immunologic: Negative.    Neurological:  Positive for numbness (tingling/bilateral legs).   Hematological: Negative.    Psychiatric/Behavioral:  " Positive for sleep disturbance.         Physical Examination:     Vitals:    12/28/23 1346   BP: 129/79   Pulse: 77   Weight: 59.3 kg (130 lb 12.8 oz)   Height: 152.4 cm (60\")   PainSc:   8        Physical Exam     Neurological Exam   Neurological examination appears stable compared to my last evaluation.  I do not appreciate any new red flag signs.    Result Review  The following data was reviewed by: Hermann Miramontes MD on 12/28/2023:    Data reviewed : Radiologic studies MRI of the lumbar spine shows minor grade 1 spondylolisthesis at L4-L5.  There is some mild lateral recess stenosis at this level.  There is no profound central canal stenosis or profound neural foraminal stenosis.      Assessment/plan:  This is a 78-year-old woman with chronic neck and back pain.  She has had extensive physical therapy and pain management injections.  This has not provided profound relief.  I discussed with her the possibility of exploration of spinal cord stimulator and she expresses strong desire to discontinue management of the symptoms with physicians.  She no longer desires to engage with any specific treatment plans for the symptoms.  I do recommend a flexion-extension x-ray of the lumbar spine to confirm absence of dynamic spondylolisthesis.  She can follow-up with me on an as-needed basis.  I have encouraged her to call with any questions or concerns.    Diagnoses and all orders for this visit:    1. Acquired spondylolisthesis (Primary)  -     XR Spine Lumbar Complete With Flex & Ext; Future         Return if symptoms worsen or fail to improve.            Hermann Miramontes MD  "

## 2023-12-28 ENCOUNTER — OFFICE VISIT (OUTPATIENT)
Dept: NEUROSURGERY | Facility: CLINIC | Age: 78
End: 2023-12-28
Payer: MEDICARE

## 2023-12-28 VITALS
SYSTOLIC BLOOD PRESSURE: 129 MMHG | HEART RATE: 77 BPM | WEIGHT: 130.8 LBS | DIASTOLIC BLOOD PRESSURE: 79 MMHG | HEIGHT: 60 IN | BODY MASS INDEX: 25.68 KG/M2

## 2023-12-28 DIAGNOSIS — M43.10 ACQUIRED SPONDYLOLISTHESIS: Primary | ICD-10-CM

## 2024-01-23 ENCOUNTER — TELEPHONE (OUTPATIENT)
Dept: NEUROLOGY | Facility: CLINIC | Age: 79
End: 2024-01-23
Payer: MEDICARE

## 2024-01-23 DIAGNOSIS — G47.33 OBSTRUCTIVE SLEEP APNEA: Primary | ICD-10-CM

## 2024-01-23 NOTE — TELEPHONE ENCOUNTER
Provider: BRENNAN    Caller: FELTON    Relationship to Patient: SELF    Phone Number: [phone]    Reason for Call:PATIENT IS REQUESTING A ALL BACK TO RECEIVE AN UPDATE ON HER CPAP MACHINE.      PLEASE REVIEW    THANK YOU

## 2024-01-30 NOTE — TELEPHONE ENCOUNTER
Caller: Katie Reddy    Relationship: Self    Best call back number: [phone]    What is the best time to reach you:     Who are you requesting to speak with (clinical staff, provider,  specific staff member):     Do you know the name of the person who called:     What was the call regarding: RETURNING CLINICS CALL.   CALLED S/W ABHISHEK AND ADVISED TO SEND A MESSAGE D/T JUDITH STEPPED OUT OF THE OFFICE   ADVISED PT AND SHE V/U.    Is it okay if the provider responds through [employer]:     PLEASE CALL & ADVISE

## 2024-01-31 NOTE — TELEPHONE ENCOUNTER
Caller: Katie Reddy    Relationship: Self    Best call back number: [phone]    What is the best time to reach you:     Who are you requesting to speak with (clinical staff, provider,  specific staff member):     Do you know the name of the person who called:     What was the call regarding: PT CALLING BACK FOR JUDITH.   CALLED S/W OLIMPIA AT CLINIC, ADVISED TO TELL PT JUDITH WILL CALL HER BACK SHE IS ON A CALL.   ADVISED PT AN SHE V/U.    Is it okay if the provider responds through [employer]:       PLEASE  CALL & ADVISE

## 2024-02-08 ENCOUNTER — TELEPHONE (OUTPATIENT)
Dept: NEUROLOGY | Facility: CLINIC | Age: 79
End: 2024-02-08
Payer: MEDICARE

## 2024-02-08 NOTE — TELEPHONE ENCOUNTER
Provider: RABIA VERNON    Caller: PATIENT    Relationship to Patient: SELF    Phone Number: [phone]    Reason for Call: PT WAS TOLD AN ORDER WAS BEING SENT TO [facility] FOR A CPAP MACHINE AND PT JUST SPOKE WITH [facility] AND WAS TOLD THEY HAVE NOT RECEIVED AN ORDER.  PLEASE CALL PT TO ADVISE     THANK YOU

## 2024-02-20 ENCOUNTER — TELEPHONE (OUTPATIENT)
Dept: FAMILY MEDICINE CLINIC | Facility: CLINIC | Age: 79
End: 2024-02-20
Payer: MEDICARE

## 2024-02-20 NOTE — TELEPHONE ENCOUNTER
Caller: [employer]    Relationship:     Best call back number: [phone]    What form or medical record are you requesting: APPT NOTES FOR LAST VISIT     Who is requesting this form or medical record from you: [employer]    How would you like to receive the form or medical records (pick-up, mail, fax): FAX  If fax, what is the fax number: [phone]  If mail, what is the address:   If pick-up, provide patient with address and location details    Timeframe paperwork needed: ASAP    Additional notes: NOTES FOR COMPRESSION STOCKINGS

## 2024-02-26 ENCOUNTER — OFFICE VISIT (OUTPATIENT)
Dept: ENDOCRINOLOGY | Facility: CLINIC | Age: 79
End: 2024-02-26
Payer: MEDICARE

## 2024-02-26 VITALS
BODY MASS INDEX: 25.91 KG/M2 | SYSTOLIC BLOOD PRESSURE: 144 MMHG | HEART RATE: 80 BPM | WEIGHT: 132 LBS | OXYGEN SATURATION: 97 % | DIASTOLIC BLOOD PRESSURE: 80 MMHG | HEIGHT: 60 IN

## 2024-02-26 DIAGNOSIS — E83.52 HYPERCALCEMIA: ICD-10-CM

## 2024-02-26 DIAGNOSIS — I10 ESSENTIAL HYPERTENSION: Chronic | ICD-10-CM

## 2024-02-26 DIAGNOSIS — E27.49 GLUCOCORTICOID DEFICIENCY: Primary | ICD-10-CM

## 2024-02-26 PROCEDURE — 1159F MED LIST DOCD IN RCRD: CPT | Performed by: INTERNAL MEDICINE

## 2024-02-26 PROCEDURE — 1160F RVW MEDS BY RX/DR IN RCRD: CPT | Performed by: INTERNAL MEDICINE

## 2024-02-26 PROCEDURE — 3079F DIAST BP 80-89 MM HG: CPT | Performed by: INTERNAL MEDICINE

## 2024-02-26 PROCEDURE — G2211 COMPLEX E/M VISIT ADD ON: HCPCS | Performed by: INTERNAL MEDICINE

## 2024-02-26 PROCEDURE — 99214 OFFICE O/P EST MOD 30 MIN: CPT | Performed by: INTERNAL MEDICINE

## 2024-02-26 PROCEDURE — 3077F SYST BP >= 140 MM HG: CPT | Performed by: INTERNAL MEDICINE

## 2024-03-05 RX ORDER — SERTRALINE HYDROCHLORIDE 100 MG/1
100 TABLET, FILM COATED ORAL
Qty: 90 TABLET | Refills: 1 | Status: SHIPPED | OUTPATIENT
Start: 2024-03-05

## 2024-03-05 NOTE — TELEPHONE ENCOUNTER
Rx Refill Note  Requested Prescriptions     Pending Prescriptions Disp Refills   • sertraline (ZOLOFT) 100 MG tablet [Pharmacy Med Name: SERTRALINE  MG TABLET] 90 tablet      Sig: TAKE 1 TABLET BY MOUTH EVERY DAY AT NIGHT      Last office visit with prescribing clinician: 10/24/2023      Next office visit with prescribing clinician: 4/26/2024   3}  Nan Washington  03/05/24, 07:48 EST    Statement Selected

## 2024-03-10 NOTE — PROGRESS NOTES
Subjective:     Encounter Date:03/14/2024      Patient ID: Katie Reddy is a 78 y.o. female.    Chief Complaint and history of present illness:     Follow-up for hypertension, dyslipidemia, autonomic insufficiency, ILR     History of Present Illness  :        Ms. Katie Reddy has PMH of     Chest pain, cardiac cath 2/8/2022 with no CAD  Recurrent syncope, Biotronik ILR 4/9/2021    Uncontrolled hypertension  Dyslipidemia  Autonomic insufficiency  History of hemorrhagic occipital CVA  GERD, osteoporosis, chronic pain  Cholecystectomy, hernia repair     Here for   follow-up.  Patient is complaining of recurrent short-lived chest pains with no aggravating or relieving factors.     Patient's arterial blood pressure is 139/73, heart rate 79, O2 sat of 98% on room air.     Patient was recently in the hospital from 4/6/2021-4/11/2021 with syncope and hypertensive emergency.  Work-up here revealed negative troponin x2.  proBNP is normal at 271.  CMP for 621 was normal.  Hemoglobin A1c for 721 was 4.9.  Lipid profile revealed cholesterol 197 HDL 57  triglycerides 127.  MRI with contrast 4/7/2021 reveals no acute intracranial abnormality no acute infarct no intracranial mass or mass-effect.  Old left occipital hemorrhagic infarct  Chronic small vessel ischemic changes     Chest x-ray 4/6/2021 reveals no acute chest findings.  EKG done 4/6/2021 reviewed by me shows sinus rhythm with a rate of 76 bpm with PVCs.  Echocardiogram 4/6/2021 reviewed by me shows EF of 60 to 65% with mild AR and mild MR  Carotid Dopplers 4/8/2021 reveal mild bilateral carotid disease  Lexiscan Cardiolite 4/9/21 which is negative for ischemia.  Echocardiogram 5/16/2023 reveals EF of 60 to 65% PA pressures 45, mild MR  Lexiscan Cardiolite 6/27/2023 was negative for ischemia EF of 93%  Cardiac cath 2/2022 nonobstructive CAD        Hemoglobin is 12, BUN/creatinine 20/0.93. A.m. cortisol was low at 1.26  Labs from 2/9/2022 revealed  hemoglobin of 9.7 and MCV of 75, CMP from 4/15/2022 is normal.  Labs from 10/24/2023 reveal lipid profile with cholesterol 290, triglycerides 172, HDL 53, .  CMP with a creatinine of 1.11, EGFR 51.  Labs from 12/8/2023 revealed CMP with a creatinine of 1.38, EGFR 39.  Potassium of 5.8 probably hemolyzed.     Assessment:       -Recurrent chest pain  -Nonsustained VT on  loop  -Orthostatic hypotension  -Low a.m. cortisol/adrenal insufficiency on long-term Cortef  -History of hypertensive emergency  -History of hemorrhagic left occipital CVA  -Mild AR, mild MR  -Dyslipidemia        Recommendations / Plan:         Patient had stress test negative for ischemia as 6/27/2023   Will check a CT calcium score.  Continue metoprolol, aspirin, atorvastatin, amlodipine, nitroglycerin as tolerated.   Reviewed importance of blood pressure control since patient had previous hemorrhagic CVA.  Advised patient to check blood pressure at home.  Continue statins and Repatha for dyslipidemia and previous history of CVA  Continue Cortef and follow-up with endocrinology for adrenal insufficiency  Follow-up with PMD for anemia.  Patient had labs showing elevated creatinine and potassium.  Will send to nephrology.        Procedures     Stress Cardiolite performed 6/22/2022 reviewed/interpreted by me reveals normal perfusion EF of 62%.           ECG 12 Lead    Date/Time: 3/14/2024 4:41 PM  Performed by: Master Richard MD    Authorized by: Master Richard MD  Comparison: compared with previous ECG from 5/2/2023  Comparison to previous ECG: EKG did not reveal reviewed/interpreted by me reveals incomplete left bundle branch block with Q waves in V1 V2 in 1 and aVL, compared EKG from 5/2/2023 patient does not have left bundle anymore.          Copied text in this portion of the note has been reviewed and is accurate as of 3/14/2024  The following portions of the patient's history were reviewed and updated as  appropriate: allergies, current medications, past family history, past medical history, past social history, past surgical history and problem list.    Assessment:         [facility]       Diagnosis Plan   1. Precordial pain  Basic Metabolic Panel    BNP    Ambulatory Referral to Nephrology    CT Cardiac Calcium Score Without Dye      2. Essential hypertension  Basic Metabolic Panel    BNP    Ambulatory Referral to Nephrology    CT Cardiac Calcium Score Without Dye      3. Dyslipidemia  Basic Metabolic Panel    BNP    Ambulatory Referral to Nephrology    CT Cardiac Calcium Score Without Dye      4. LBBB (left bundle branch block)  Basic Metabolic Panel    BNP    Ambulatory Referral to Nephrology    CT Cardiac Calcium Score Without Dye      5. Status post placement of implantable loop recorder  Basic Metabolic Panel    BNP    Ambulatory Referral to Nephrology    CT Cardiac Calcium Score Without Dye      6. Renal insufficiency  Basic Metabolic Panel    BNP    Ambulatory Referral to Nephrology    CT Cardiac Calcium Score Without Dye      7. Dyspnea on exertion  Basic Metabolic Panel    BNP    Ambulatory Referral to Nephrology    CT Cardiac Calcium Score Without Dye             Plan:               Past Medical History:  Past Medical History:   Diagnosis Date    Arthritis     Bladder incontinence     Colon polyp     Glucocorticoid deficiency     Hyperlipidemia     Hypertension     Intractable nausea and vomiting 11/11/2020    Osteopenia     Vitamin D deficiency      Past Surgical History:  Past Surgical History:   Procedure Laterality Date    APPENDECTOMY      BLADDER SURGERY      bladder stimulator placement/ REMOVAL    BREAST CYST EXCISION      BREAST LUMPECTOMY Left 1973    NEG    BUNIONECTOMY Right 05/29/2020    Procedure: AP WHITNEY;  Surgeon: MARISSA Em DPM;  Location: [facility] MAIN OR;  Service: Podiatry;  Laterality: Right;    BUNIONECTOMY Left 12/30/2022    Procedure: AP WHITNEY -MINIMALLY  INVASIVE with distal metatarsal osteotomy and internal fixation;  Surgeon: MARISSA Em DPM;  Location: [facility] MAIN OR;  Service: Podiatry;  Laterality: Left;    CARDIAC CATHETERIZATION      CARDIAC CATHETERIZATION N/A 02/08/2022    Procedure: Left Heart Cath, possible pci;  Surgeon: Master Richard MD;  Location: [facility] CATH INVASIVE LOCATION;  Service: Cardiovascular;  Laterality: N/A;    CARPAL TUNNEL RELEASE Right 1980    CHOLECYSTECTOMY      COLON SURGERY      hemorrhoid banding    COLONOSCOPY  2017 2017/ 2019 = jessica DESAI 2024   Dr. Mackey    COLONOSCOPY N/A 02/05/2022    NEG= 2022    ENDOSCOPY N/A 02/05/2022 2022EGD= erosive esophagitis, hiatal hernia, Nonerosive gastritis    EYE SURGERY      HERNIA REPAIR      Umbilical removal    HYSTERECTOMY  1970    SUBTOTAL HYSTERECTOMY      UMBILICAL HERNIA REPAIR        Allergies:  Allergies   Allergen Reactions    Trazodone Irritability    Dayvigo [Lemborexant] Other (See Comments)     Sleep walking    Ibandronic Acid GI Intolerance     GERD     Home Meds:  Current Meds:     Current Outpatient Medications:     amitriptyline (ELAVIL) 25 MG tablet, TAKE 1 TABLET AT BEDTIME, Disp: 90 tablet, Rfl: 0    amLODIPine (NORVASC) 10 MG tablet, Take 0.5 tablets by mouth Daily., Disp: 90 tablet, Rfl: 3    aspirin 81 MG EC tablet, Take 1 tablet by mouth Daily., Disp: 90 tablet, Rfl: 3    atorvastatin (LIPITOR) 80 MG tablet, TAKE 1 TABLET EVERY DAY, Disp: 90 tablet, Rfl: 3    betamethasone dipropionate 0.05 % cream, APPLY TO AFFECTED AREAS ON RIGHT LEG TWICE DAILY 2 WEEKS ON 1 WEEK OFF AS NEEDED., Disp: , Rfl:     Calcium Carbonate-Vit D-Min (Calcium 1200) 0786-7857 MG-UNIT chewable tablet, Chew 1 tablet Daily., Disp: , Rfl:     colestipol (COLESTID) 1 g tablet, Take 1 tablet by mouth Daily., Disp: , Rfl:     Evolocumab (REPATHA) solution prefilled syringe injection, Inject 1 mL under the skin into the appropriate area as directed Every 14 (Fourteen) Days., Disp: 6  mL, Rfl: 1    gabapentin (NEURONTIN) 300 MG capsule, Take 1 capsule by mouth every night at bedtime for 150 days., Disp: 30 capsule, Rfl: 4    Gemtesa 75 MG tablet, TAKE 1 TABLET EVERY DAY, Disp: 30 tablet, Rfl: 10    hydrocortisone (CORTEF) 10 MG tablet, Take 1 tablet by mouth 3 times a day., Disp: 270 tablet, Rfl: 1    lisinopril (PRINIVIL,ZESTRIL) 5 MG tablet, Take 1 tablet by mouth Daily., Disp: 90 tablet, Rfl: 1    meclizine (ANTIVERT) 12.5 MG tablet, TAKE 1 TABLET THREE TIMES DAILY AS NEEDED FOR DIZZINESS, Disp: 90 tablet, Rfl: 0    metoprolol succinate XL (TOPROL-XL) 100 MG 24 hr tablet, Take 1 tablet by mouth Daily., Disp: 90 tablet, Rfl: 3    nitroglycerin (NITROSTAT) 0.4 MG SL tablet, Place 1 tablet under the tongue Every 5 (Five) Minutes As Needed for Chest Pain. Take no more than 3 doses in 15 minutes., Disp: 75 tablet, Rfl: 0    ondansetron (ZOFRAN) 4 MG tablet, TAKE 1 TABLET BY MOUTH EVERY 8 HOURS AS NEEDED FOR NAUSEA OR VOMITING., Disp: 60 tablet, Rfl: 10    pantoprazole (PROTONIX) 40 MG EC tablet, Take 1 tablet by mouth Every Morning., Disp: , Rfl:     raloxifene (EVISTA) 60 MG tablet, Take 1 tablet by mouth Daily., Disp: 90 tablet, Rfl: 3    sertraline (ZOLOFT) 100 MG tablet, TAKE 1 TABLET BY MOUTH EVERY DAY AT NIGHT, Disp: 90 tablet, Rfl: 1    sucralfate (CARAFATE) 1 g tablet, TAKE 1 TABLET FOUR TIMES DAILY BEFORE MEALS AND AT BEDTIME, Disp: 360 tablet, Rfl: 0  Social History:   Social History     Tobacco Use    Smoking status: Never     Passive exposure: Never    Smokeless tobacco: Never   Substance Use Topics    Alcohol use: No      Family History:  Family History   Problem Relation Age of Onset    Heart disease Mother     Hypertension Mother     Diabetes Father     Heart disease Father     Alcohol abuse Father     Hypertension Father     Diabetes Brother     Heart disease Brother     Cancer Brother 68        Prostate Cancer    Hypertension Brother     Diabetes Maternal Aunt     Heart disease  "Maternal Grandmother     Hypertension Maternal Grandmother     Heart disease Maternal Grandfather     Hypertension Maternal Grandfather     Liver disease Paternal Grandmother     Hypertension Paternal Grandmother     Heart disease Paternal Grandfather     Hypertension Paternal Grandfather     Dementia Sister     Diabetes Brother               Review of Systems   Cardiovascular:  Positive for chest pain and palpitations. Negative for leg swelling.   Respiratory:  Positive for shortness of breath.    Neurological:  Positive for dizziness. Negative for numbness.     All other systems are negative         Objective:     Physical Exam  /73 (BP Location: Left arm, Patient Position: Sitting, Cuff Size: Adult)   Pulse 79   Ht 152.4 cm (60\")   Wt 59.4 kg (131 lb)   SpO2 98%   BMI 25.58 kg/m²   General:  Appears in no acute distress  Eyes: Sclera is anicteric,  conjunctiva is clear   HEENT:  No JVD.  No carotid bruits  Respiratory: Respirations regular and unlabored at rest.  Clear to auscultation  Cardiovascular: S1,S2 Regular rate and rhythm. .   Extremities: No digital clubbing or cyanosis, no edema  Skin: Color pink. Skin warm and dry to touch. No rashes  No xanthoma  Neuro: Alert and awake.    Lab Reviewed:         Master Richard MD  3/14/2024 16:49 EDT      EMR Dragon/Transcription:   \"Dictated utilizing Dragon dictation\".        "

## 2024-03-14 ENCOUNTER — OFFICE VISIT (OUTPATIENT)
Dept: CARDIOLOGY | Facility: CLINIC | Age: 79
End: 2024-03-14
Payer: MEDICARE

## 2024-03-14 ENCOUNTER — TELEPHONE (OUTPATIENT)
Dept: CARDIOLOGY | Facility: CLINIC | Age: 79
End: 2024-03-14

## 2024-03-14 VITALS
WEIGHT: 131 LBS | HEART RATE: 79 BPM | DIASTOLIC BLOOD PRESSURE: 73 MMHG | SYSTOLIC BLOOD PRESSURE: 139 MMHG | HEIGHT: 60 IN | BODY MASS INDEX: 25.72 KG/M2 | OXYGEN SATURATION: 98 %

## 2024-03-14 DIAGNOSIS — R07.2 PRECORDIAL PAIN: Primary | ICD-10-CM

## 2024-03-14 DIAGNOSIS — I10 ESSENTIAL HYPERTENSION: ICD-10-CM

## 2024-03-14 DIAGNOSIS — E78.5 DYSLIPIDEMIA: ICD-10-CM

## 2024-03-14 DIAGNOSIS — N28.9 RENAL INSUFFICIENCY: ICD-10-CM

## 2024-03-14 DIAGNOSIS — Z95.818 STATUS POST PLACEMENT OF IMPLANTABLE LOOP RECORDER: ICD-10-CM

## 2024-03-14 DIAGNOSIS — R06.09 DYSPNEA ON EXERTION: ICD-10-CM

## 2024-03-14 DIAGNOSIS — I44.7 LBBB (LEFT BUNDLE BRANCH BLOCK): ICD-10-CM

## 2024-03-14 PROCEDURE — 93000 ELECTROCARDIOGRAM COMPLETE: CPT | Performed by: INTERNAL MEDICINE

## 2024-03-14 PROCEDURE — 99214 OFFICE O/P EST MOD 30 MIN: CPT | Performed by: INTERNAL MEDICINE

## 2024-03-14 PROCEDURE — 3075F SYST BP GE 130 - 139MM HG: CPT | Performed by: INTERNAL MEDICINE

## 2024-03-14 PROCEDURE — 1159F MED LIST DOCD IN RCRD: CPT | Performed by: INTERNAL MEDICINE

## 2024-03-14 PROCEDURE — 1160F RVW MEDS BY RX/DR IN RCRD: CPT | Performed by: INTERNAL MEDICINE

## 2024-03-14 PROCEDURE — 3078F DIAST BP <80 MM HG: CPT | Performed by: INTERNAL MEDICINE

## 2024-03-14 NOTE — TELEPHONE ENCOUNTER
The patient asked at check out about getting her loop recorder removed. She said she has had it for over 3 years and would like it taken out. Please advise.

## 2024-03-14 NOTE — TELEPHONE ENCOUNTER
As of 3/3/2024 check for loop recorder still has battery.  So we will explant when battery is depleted.

## 2024-03-18 ENCOUNTER — TELEPHONE (OUTPATIENT)
Dept: CARDIOLOGY | Facility: CLINIC | Age: 79
End: 2024-03-18
Payer: MEDICARE

## 2024-03-18 NOTE — TELEPHONE ENCOUNTER
Spoke to patient, advised her Dr Richard prefers her to keep loop recorder in as long as the battery lasts so he can monitor slow or fast heart rates.She is okay with this decision.

## 2024-03-18 NOTE — TELEPHONE ENCOUNTER
I spoke with pt to let her know that [facility] scheduling should be calling her to schedule the cardiac CT and Dr Enrique's office will be calling her to schedule. I also gave pt the contact info for Dr Enrique and the [facility] scheduling dept.

## 2024-03-18 NOTE — TELEPHONE ENCOUNTER
Patient would like to know process for scheduling CT Cardiac Calcium score and nephrology referral. Are we to schedule or will hospital call her?

## 2024-03-21 ENCOUNTER — LAB (OUTPATIENT)
Dept: LAB | Facility: HOSPITAL | Age: 79
End: 2024-03-21
Payer: MEDICARE

## 2024-03-21 DIAGNOSIS — N28.9 RENAL INSUFFICIENCY: ICD-10-CM

## 2024-03-21 DIAGNOSIS — R06.09 DYSPNEA ON EXERTION: ICD-10-CM

## 2024-03-21 DIAGNOSIS — E78.5 DYSLIPIDEMIA: ICD-10-CM

## 2024-03-21 DIAGNOSIS — Z95.818 STATUS POST PLACEMENT OF IMPLANTABLE LOOP RECORDER: ICD-10-CM

## 2024-03-21 DIAGNOSIS — I10 ESSENTIAL HYPERTENSION: ICD-10-CM

## 2024-03-21 DIAGNOSIS — I44.7 LBBB (LEFT BUNDLE BRANCH BLOCK): ICD-10-CM

## 2024-03-21 DIAGNOSIS — R07.2 PRECORDIAL PAIN: ICD-10-CM

## 2024-03-21 LAB
ANION GAP SERPL CALCULATED.3IONS-SCNC: 14 MMOL/L (ref 5–15)
BUN SERPL-MCNC: 24 MG/DL (ref 8–23)
BUN/CREAT SERPL: 20.9 (ref 7–25)
CALCIUM SPEC-SCNC: 9.8 MG/DL (ref 8.6–10.5)
CHLORIDE SERPL-SCNC: 102 MMOL/L (ref 98–107)
CO2 SERPL-SCNC: 22 MMOL/L (ref 22–29)
CREAT SERPL-MCNC: 1.15 MG/DL (ref 0.57–1)
EGFRCR SERPLBLD CKD-EPI 2021: 49.2 ML/MIN/1.73
GLUCOSE SERPL-MCNC: 87 MG/DL (ref 65–99)
NT-PROBNP SERPL-MCNC: 184 PG/ML (ref 0–1800)
POTASSIUM SERPL-SCNC: 4.9 MMOL/L (ref 3.5–5.2)
SODIUM SERPL-SCNC: 138 MMOL/L (ref 136–145)

## 2024-03-21 PROCEDURE — 80048 BASIC METABOLIC PNL TOTAL CA: CPT

## 2024-03-21 PROCEDURE — 36415 COLL VENOUS BLD VENIPUNCTURE: CPT

## 2024-03-21 PROCEDURE — 83880 ASSAY OF NATRIURETIC PEPTIDE: CPT

## 2024-03-27 ENCOUNTER — HOSPITAL ENCOUNTER (OUTPATIENT)
Dept: CT IMAGING | Facility: HOSPITAL | Age: 79
Discharge: HOME | End: 2024-03-27
Admitting: INTERNAL MEDICINE

## 2024-03-27 DIAGNOSIS — I10 ESSENTIAL HYPERTENSION: ICD-10-CM

## 2024-03-27 DIAGNOSIS — R06.09 DYSPNEA ON EXERTION: ICD-10-CM

## 2024-03-27 DIAGNOSIS — R07.2 PRECORDIAL PAIN: ICD-10-CM

## 2024-03-27 DIAGNOSIS — E78.5 DYSLIPIDEMIA: ICD-10-CM

## 2024-03-27 DIAGNOSIS — Z95.818 STATUS POST PLACEMENT OF IMPLANTABLE LOOP RECORDER: ICD-10-CM

## 2024-03-27 DIAGNOSIS — I44.7 LBBB (LEFT BUNDLE BRANCH BLOCK): ICD-10-CM

## 2024-03-27 DIAGNOSIS — N28.9 RENAL INSUFFICIENCY: ICD-10-CM

## 2024-03-27 PROCEDURE — 75571 CT HRT W/O DYE W/CA TEST: CPT

## 2024-04-08 ENCOUNTER — TELEPHONE (OUTPATIENT)
Dept: CARDIOLOGY | Facility: CLINIC | Age: 79
End: 2024-04-08
Payer: MEDICARE

## 2024-04-08 ENCOUNTER — PREP FOR SURGERY (OUTPATIENT)
Dept: OTHER | Facility: HOSPITAL | Age: 79
End: 2024-04-08
Payer: MEDICARE

## 2024-04-08 DIAGNOSIS — I10 ESSENTIAL HYPERTENSION: Chronic | ICD-10-CM

## 2024-04-08 DIAGNOSIS — R93.1 AGATSTON CORONARY ARTERY CALCIUM SCORE GREATER THAN 400: Primary | ICD-10-CM

## 2024-04-08 DIAGNOSIS — E78.2 MIXED HYPERLIPIDEMIA: ICD-10-CM

## 2024-04-08 DIAGNOSIS — I47.29 NON-SUSTAINED VENTRICULAR TACHYCARDIA: ICD-10-CM

## 2024-04-08 DIAGNOSIS — R93.1 AGATSTON CORONARY ARTERY CALCIUM SCORE GREATER THAN 400: ICD-10-CM

## 2024-04-08 DIAGNOSIS — I44.7 LBBB (LEFT BUNDLE BRANCH BLOCK): Primary | ICD-10-CM

## 2024-04-08 DIAGNOSIS — E78.5 DYSLIPIDEMIA: ICD-10-CM

## 2024-04-08 DIAGNOSIS — I10 ESSENTIAL HYPERTENSION: ICD-10-CM

## 2024-04-08 NOTE — TELEPHONE ENCOUNTER
Called patient's CT results.       Orders placed for Cardiac cath.  Please schedule.      Discussed risk for worsening kidney disease with patient.       She is supposed to have referral to nephrology, but it did not go through-- please check on this.

## 2024-04-08 NOTE — TELEPHONE ENCOUNTER
----- Message from PRACHI Pacheco sent at 4/5/2024  4:27 PM EDT -----    ----- Message -----  From: Charlotte Rad Results Atka In  Sent: 3/28/2024   9:55 AM EDT  To: Master Richard MD

## 2024-04-09 PROBLEM — E78.5 DYSLIPIDEMIA: Status: ACTIVE | Noted: 2024-04-08

## 2024-04-09 NOTE — TELEPHONE ENCOUNTER
PATIENT CALLED BACK TO ASK ABOUT HAVING AN INNER STEM AND IF SHE NEEDED TO BRING THE CONTROL FOR THAT? PLEASE REACH OUT TO HER.

## 2024-04-09 NOTE — TELEPHONE ENCOUNTER
Called patient and gave her update on nephrology order. I asked her about the inner stem and she said it is a bladder stimulator and she was wondering if she would need to turn it off for the procedure. I told her to go ahead and bring the control to turn it off just in case.

## 2024-04-16 ENCOUNTER — HOSPITAL ENCOUNTER (OUTPATIENT)
Dept: CARDIOLOGY | Facility: HOSPITAL | Age: 79
Discharge: HOME | End: 2024-04-16
Payer: MEDICARE

## 2024-04-16 ENCOUNTER — HOSPITAL ENCOUNTER (OUTPATIENT)
Facility: HOSPITAL | Age: 79
Setting detail: HOSPITAL OUTPATIENT SURGERY
Discharge: HOME | End: 2024-04-16
Attending: INTERNAL MEDICINE | Admitting: INTERNAL MEDICINE
Payer: MEDICARE

## 2024-04-16 ENCOUNTER — LAB (OUTPATIENT)
Dept: LAB | Facility: HOSPITAL | Age: 79
End: 2024-04-16
Payer: MEDICARE

## 2024-04-16 ENCOUNTER — HOSPITAL ENCOUNTER (OUTPATIENT)
Dept: GENERAL RADIOLOGY | Facility: HOSPITAL | Age: 79
Discharge: HOME | End: 2024-04-16
Payer: MEDICARE

## 2024-04-16 VITALS
RESPIRATION RATE: 17 BRPM | WEIGHT: 130.73 LBS | SYSTOLIC BLOOD PRESSURE: 108 MMHG | HEIGHT: 60 IN | DIASTOLIC BLOOD PRESSURE: 48 MMHG | OXYGEN SATURATION: 99 % | HEART RATE: 74 BPM | TEMPERATURE: 98.4 F | BODY MASS INDEX: 25.67 KG/M2

## 2024-04-16 DIAGNOSIS — I10 ESSENTIAL HYPERTENSION: ICD-10-CM

## 2024-04-16 DIAGNOSIS — E78.5 DYSLIPIDEMIA: ICD-10-CM

## 2024-04-16 DIAGNOSIS — I47.29 NON-SUSTAINED VENTRICULAR TACHYCARDIA: ICD-10-CM

## 2024-04-16 DIAGNOSIS — R93.1 AGATSTON CORONARY ARTERY CALCIUM SCORE GREATER THAN 400: ICD-10-CM

## 2024-04-16 DIAGNOSIS — R93.1 AGATSTON CORONARY ARTERY CALCIUM SCORE GREATER THAN 400: Primary | ICD-10-CM

## 2024-04-16 PROBLEM — I25.10 CORONARY ARTERY CALCIFICATION SEEN ON CT SCAN: Status: ACTIVE | Noted: 2024-04-16

## 2024-04-16 PROBLEM — I25.10 CORONARY ARTERY CALCIFICATION SEEN ON CT SCAN: Chronic | Status: ACTIVE | Noted: 2024-04-16

## 2024-04-16 LAB
ALBUMIN SERPL-MCNC: 4.5 G/DL (ref 3.5–5.2)
ALBUMIN/GLOB SERPL: 1.7 G/DL
ALP SERPL-CCNC: 107 U/L (ref 39–117)
ALT SERPL W P-5'-P-CCNC: 11 U/L (ref 1–33)
ANION GAP SERPL CALCULATED.3IONS-SCNC: 12 MMOL/L (ref 5–15)
AST SERPL-CCNC: 19 U/L (ref 1–32)
BASOPHILS # BLD AUTO: 0.07 10*3/MM3 (ref 0–0.2)
BASOPHILS NFR BLD AUTO: 1.1 % (ref 0–1.5)
BILIRUB SERPL-MCNC: 0.4 MG/DL (ref 0–1.2)
BUN SERPL-MCNC: 33 MG/DL (ref 8–23)
BUN/CREAT SERPL: 25.2 (ref 7–25)
CALCIUM SPEC-SCNC: 10.1 MG/DL (ref 8.6–10.5)
CHLORIDE SERPL-SCNC: 106 MMOL/L (ref 98–107)
CO2 SERPL-SCNC: 22 MMOL/L (ref 22–29)
CREAT SERPL-MCNC: 1.31 MG/DL (ref 0.57–1)
DEPRECATED RDW RBC AUTO: 43.2 FL (ref 37–54)
EGFRCR SERPLBLD CKD-EPI 2021: 41.8 ML/MIN/1.73
EOSINOPHIL # BLD AUTO: 0.23 10*3/MM3 (ref 0–0.4)
EOSINOPHIL NFR BLD AUTO: 3.5 % (ref 0.3–6.2)
ERYTHROCYTE [DISTWIDTH] IN BLOOD BY AUTOMATED COUNT: 14 % (ref 12.3–15.4)
GLOBULIN UR ELPH-MCNC: 2.6 GM/DL
GLUCOSE SERPL-MCNC: 81 MG/DL (ref 65–99)
HCT VFR BLD AUTO: 34.4 % (ref 34–46.6)
HGB BLD-MCNC: 10.7 G/DL (ref 12–15.9)
IMM GRANULOCYTES # BLD AUTO: 0.03 10*3/MM3 (ref 0–0.05)
IMM GRANULOCYTES NFR BLD AUTO: 0.5 % (ref 0–0.5)
INR PPP: 0.94 (ref 0.93–1.1)
LYMPHOCYTES # BLD AUTO: 2.1 10*3/MM3 (ref 0.7–3.1)
LYMPHOCYTES NFR BLD AUTO: 31.8 % (ref 19.6–45.3)
MCH RBC QN AUTO: 26.4 PG (ref 26.6–33)
MCHC RBC AUTO-ENTMCNC: 31.1 G/DL (ref 31.5–35.7)
MCV RBC AUTO: 84.9 FL (ref 79–97)
MONOCYTES # BLD AUTO: 0.56 10*3/MM3 (ref 0.1–0.9)
MONOCYTES NFR BLD AUTO: 8.5 % (ref 5–12)
NEUTROPHILS NFR BLD AUTO: 3.61 10*3/MM3 (ref 1.7–7)
NEUTROPHILS NFR BLD AUTO: 54.6 % (ref 42.7–76)
NRBC BLD AUTO-RTO: 0 /100 WBC (ref 0–0.2)
PLATELET # BLD AUTO: 224 10*3/MM3 (ref 140–450)
PMV BLD AUTO: 8.6 FL (ref 6–12)
POTASSIUM SERPL-SCNC: 4.8 MMOL/L (ref 3.5–5.2)
PROT SERPL-MCNC: 7.1 G/DL (ref 6–8.5)
PROTHROMBIN TIME: 10.3 SECONDS (ref 9.6–11.7)
RBC # BLD AUTO: 4.05 10*6/MM3 (ref 3.77–5.28)
SODIUM SERPL-SCNC: 140 MMOL/L (ref 136–145)
WBC NRBC COR # BLD AUTO: 6.6 10*3/MM3 (ref 3.4–10.8)

## 2024-04-16 PROCEDURE — C1760 CLOSURE DEV, VASC: HCPCS | Performed by: INTERNAL MEDICINE

## 2024-04-16 PROCEDURE — C1769 GUIDE WIRE: HCPCS | Performed by: INTERNAL MEDICINE

## 2024-04-16 PROCEDURE — 25010000002 MIDAZOLAM PER 1 MG: Performed by: INTERNAL MEDICINE

## 2024-04-16 PROCEDURE — 85025 COMPLETE CBC W/AUTO DIFF WBC: CPT

## 2024-04-16 PROCEDURE — C1894 INTRO/SHEATH, NON-LASER: HCPCS | Performed by: INTERNAL MEDICINE

## 2024-04-16 PROCEDURE — 25010000002 LIDOCAINE 1 % SOLUTION: Performed by: INTERNAL MEDICINE

## 2024-04-16 PROCEDURE — 25010000002 FENTANYL CITRATE (PF) 100 MCG/2ML SOLUTION: Performed by: INTERNAL MEDICINE

## 2024-04-16 PROCEDURE — 99152 MOD SED SAME PHYS/QHP 5/>YRS: CPT | Performed by: INTERNAL MEDICINE

## 2024-04-16 PROCEDURE — 93458 L HRT ARTERY/VENTRICLE ANGIO: CPT | Performed by: INTERNAL MEDICINE

## 2024-04-16 PROCEDURE — 36415 COLL VENOUS BLD VENIPUNCTURE: CPT

## 2024-04-16 PROCEDURE — 80053 COMPREHEN METABOLIC PANEL: CPT

## 2024-04-16 PROCEDURE — 85610 PROTHROMBIN TIME: CPT

## 2024-04-16 PROCEDURE — 71046 X-RAY EXAM CHEST 2 VIEWS: CPT

## 2024-04-16 PROCEDURE — 25510000001 IOPAMIDOL PER 1 ML: Performed by: INTERNAL MEDICINE

## 2024-04-16 PROCEDURE — 93005 ELECTROCARDIOGRAM TRACING: CPT | Performed by: NURSE PRACTITIONER

## 2024-04-16 RX ORDER — LIDOCAINE HYDROCHLORIDE 10 MG/ML
INJECTION, SOLUTION INFILTRATION; PERINEURAL
Status: DISCONTINUED | OUTPATIENT
Start: 2024-04-16 | End: 2024-04-16 | Stop reason: HOSPADM

## 2024-04-16 RX ORDER — NITROGLYCERIN 0.4 MG/1
0.4 TABLET SUBLINGUAL
Status: DISCONTINUED | OUTPATIENT
Start: 2024-04-16 | End: 2024-04-16 | Stop reason: HOSPADM

## 2024-04-16 RX ORDER — FENTANYL CITRATE 50 UG/ML
INJECTION, SOLUTION INTRAMUSCULAR; INTRAVENOUS
Status: DISCONTINUED | OUTPATIENT
Start: 2024-04-16 | End: 2024-04-16 | Stop reason: HOSPADM

## 2024-04-16 RX ORDER — MECLIZINE HCL 12.5 MG/1
12.5 TABLET ORAL 3 TIMES DAILY PRN
COMMUNITY

## 2024-04-16 RX ORDER — SODIUM CHLORIDE 9 MG/ML
100 INJECTION, SOLUTION INTRAVENOUS CONTINUOUS
Status: DISCONTINUED | OUTPATIENT
Start: 2024-04-16 | End: 2024-04-16 | Stop reason: HOSPADM

## 2024-04-16 RX ORDER — ONDANSETRON 4 MG/1
4 TABLET, ORALLY DISINTEGRATING ORAL EVERY 8 HOURS PRN
COMMUNITY

## 2024-04-16 RX ORDER — VIBEGRON 75 MG/1
TABLET, FILM COATED ORAL
COMMUNITY

## 2024-04-16 RX ORDER — SUCRALFATE 1 G/1
1 TABLET ORAL 4 TIMES DAILY
COMMUNITY

## 2024-04-16 RX ORDER — ACETAMINOPHEN 325 MG/1
650 TABLET ORAL EVERY 4 HOURS PRN
Status: DISCONTINUED | OUTPATIENT
Start: 2024-04-16 | End: 2024-04-16 | Stop reason: HOSPADM

## 2024-04-16 RX ORDER — SERTRALINE HYDROCHLORIDE 100 MG/1
100 TABLET, FILM COATED ORAL NIGHTLY
COMMUNITY

## 2024-04-16 RX ORDER — ATORVASTATIN CALCIUM 80 MG/1
80 TABLET, FILM COATED ORAL DAILY
COMMUNITY

## 2024-04-16 RX ORDER — AMITRIPTYLINE HYDROCHLORIDE 25 MG/1
25 TABLET, FILM COATED ORAL NIGHTLY
COMMUNITY

## 2024-04-16 RX ORDER — MIDAZOLAM HYDROCHLORIDE 1 MG/ML
INJECTION INTRAMUSCULAR; INTRAVENOUS
Status: DISCONTINUED | OUTPATIENT
Start: 2024-04-16 | End: 2024-04-16 | Stop reason: HOSPADM

## 2024-04-16 NOTE — H&P
Patient Care Team:  Nayeli Tony MD as PCP - General (Family Medicine)  Master Richard MD as Consulting Physician (Cardiology)  Banet, Duane Edward, MD as Consulting Physician (Dermatology)  Yuriy Gerard MD as Consulting Physician (Endocrinology)  Fabio Gill MD as Consulting Physician (Urology)  Shawn Elaine MD as Surgeon (Orthopedic Surgery)  Bertrand Parks MD as Consulting Physician (Cardiology)  Montrell Mota MD as Consulting Physician (Gastroenterology)  Vilma Melton APRN as Nurse Practitioner (Nurse Practitioner)  Mikey Green MD as Consulting Physician (Urology)    Chief complaint chest pain, CAD    Subjective   Patient with recurrent chest pain and high CT calcium score here for cardiac cath.    History of Present Illness    Ms. Katie Reddy has PMH of     Chest pain, cardiac cath 2/8/2022 with no CAD  Recurrent syncope, Biotronik ILR 4/9/2021     Uncontrolled hypertension  Dyslipidemia  Autonomic insufficiency  History of hemorrhagic occipital CVA  GERD, osteoporosis, chronic pain  Cholecystectomy, hernia repair     Has been having recurrent prolonged chest pain both at rest and with exercise has been having recurrent dizziness.  Underwent CT calcium score which was over thousand therefore is here for cardiac cath.    Patient was recently in the hospital from 4/6/2021-4/11/2021 with syncope and hypertensive emergency.  Work-up here revealed negative troponin x2.  proBNP is normal at 271.  CMP for 621 was normal.  Hemoglobin A1c for 721 was 4.9.  Lipid profile revealed cholesterol 197 HDL 57  triglycerides 127.  MRI with contrast 4/7/2021 reveals no acute intracranial abnormality no acute infarct no intracranial mass or mass-effect.  Old left occipital hemorrhagic infarct  Chronic small vessel ischemic changes     Chest x-ray 4/6/2021 reveals no acute chest findings.  EKG done 4/6/2021 reviewed by me shows sinus rhythm with a rate of 76 bpm  with PVCs.  Echocardiogram 4/6/2021 reviewed by me shows EF of 60 to 65% with mild AR and mild MR  Carotid Dopplers 4/8/2021 reveal mild bilateral carotid disease  Lexiscan Cardiolite 4/9/21 which is negative for ischemia.  Echocardiogram 5/16/2023 reveals EF of 60 to 65% PA pressures 45, mild MR  Lexiscan Cardiolite 6/27/2023 was negative for ischemia EF of 93%  Cardiac cath 2/2022 nonobstructive CAD        Hemoglobin is 12, BUN/creatinine 20/0.93. A.m. cortisol was low at 1.26  Labs from 2/9/2022 revealed hemoglobin of 9.7 and MCV of 75, CMP from 4/15/2022 is normal.  Labs from 10/24/2023 reveal lipid profile with cholesterol 290, triglycerides 172, HDL 53, .  CMP with a creatinine of 1.11, EGFR 51.  Labs from 12/8/2023 revealed CMP with a creatinine of 1.38, EGFR 39.  Potassium of 5.8 probably hemolyzed.     Assessment:       -Recurrent chest pain, CAD by CT calcium score of 1100  -Nonsustained VT on  loop  -Orthostatic hypotension  -Low a.m. cortisol/adrenal insufficiency on long-term Cortef  -History of hypertensive emergency  -History of hemorrhagic left occipital CVA  -Mild AR, mild MR  -Dyslipidemia        Recommendations / Plan:         Patient had stress test negative for ischemia as 6/27/2023 , continues to have chest pain on maximal medical management and had CT calcium score 3/27/2024 which was 1171.  Therefore is here for cardiac cath.  Risk benefits alternatives explained.  Continue metoprolol, aspirin, atorvastatin, amlodipine, nitroglycerin as tolerated.   Reviewed importance of blood pressure control since patient had previous hemorrhagic CVA.  Advised patient to check blood pressure at home.  Continue statins and Repatha for dyslipidemia and previous history of CVA  Continue Cortef and follow-up with endocrinology for adrenal insufficiency  Follow-up with PMD for anemia.  Patient had labs showing elevated creatinine and potassium.  Will send to nephrology.        Procedures     Stress  Cardiolite performed 6/22/2022 reviewed/interpreted by me reveals normal perfusion EF of 62%.              ECG 12 Lead     Date/Time: 3/14/2024 4:41 PM  Performed by: Master Richard MD     Authorized by: Master Richard MD  Comparison: compared with previous ECG from 5/2/2023  Comparison to previous ECG: EKG did not reveal reviewed/interpreted by me reveals incomplete left bundle branch block with Q waves in V1 V2 in 1 and aVL, compared EKG from 5/2/2023 patient does not have left bundle anymore.        Review of Systems   See HPI    Past Medical History:   Diagnosis Date    Arthritis     Bladder incontinence     Colon polyp     CVA (cerebrovascular accident due to intracerebral hemorrhage) 10/22/2020    Glucocorticoid deficiency     History of loop recorder     Hyperlipidemia     Hypertension     Intractable nausea and vomiting 11/11/2020    Osteopenia     Vitamin D deficiency      Past Surgical History:   Procedure Laterality Date    APPENDECTOMY      BLADDER SURGERY      bladder stimulator placement/ REMOVAL    BREAST CYST EXCISION      BREAST LUMPECTOMY Left 1973    NEG    BUNIONECTOMY Right 05/29/2020    Procedure: BUNIONECTOMY DEVONTE;  Surgeon: MARISSA Em DPM;  Location: [facility] MAIN OR;  Service: Podiatry;  Laterality: Right;    BUNIONECTOMY Left 12/30/2022    Procedure: BUNIONECTOMY DEVONTE -MINIMALLY INVASIVE with distal metatarsal osteotomy and internal fixation;  Surgeon: MARISSA Em DPM;  Location: [facility] MAIN OR;  Service: Podiatry;  Laterality: Left;    CARDIAC CATHETERIZATION      CARDIAC CATHETERIZATION N/A 02/08/2022    Procedure: Left Heart Cath, possible pci;  Surgeon: Master Richard MD;  Location: [facility] CATH INVASIVE LOCATION;  Service: Cardiovascular;  Laterality: N/A;    CARPAL TUNNEL RELEASE Right 1980    CHOLECYSTECTOMY      COLON SURGERY      hemorrhoid banding    COLONOSCOPY  2017 2017/ 2019 = LLOYD, jessica 2024   Dr. Mackey    COLONOSCOPY N/A  02/05/2022    NEG= 2022    ENDOSCOPY N/A 02/05/2022 2022EGD= erosive esophagitis, hiatal hernia, Nonerosive gastritis    EYE SURGERY      HERNIA REPAIR      Umbilical removal    HYSTERECTOMY  1970    SUBTOTAL HYSTERECTOMY      UMBILICAL HERNIA REPAIR       Family History   Problem Relation Age of Onset    Heart disease Mother     Hypertension Mother     Diabetes Father     Heart disease Father     Alcohol abuse Father     Hypertension Father     Diabetes Brother     Heart disease Brother     Cancer Brother 68        Prostate Cancer    Hypertension Brother     Diabetes Maternal Aunt     Heart disease Maternal Grandmother     Hypertension Maternal Grandmother     Heart disease Maternal Grandfather     Hypertension Maternal Grandfather     Liver disease Paternal Grandmother     Hypertension Paternal Grandmother     Heart disease Paternal Grandfather     Hypertension Paternal Grandfather     Dementia Sister     Diabetes Brother      Social History     Tobacco Use    Smoking status: Never     Passive exposure: Never    Smokeless tobacco: Never   Vaping Use    Vaping status: Never Used   Substance Use Topics    Alcohol use: Not Currently    Drug use: Not Currently     E-cigarette/Vaping    E-cigarette/Vaping Use Never User     Passive Exposure No     Counseling Given No      E-cigarette/Vaping Substances    Nicotine No     THC No     CBD No     Flavoring No      Medications Prior to Admission   Medication Sig Dispense Refill Last Dose    amitriptyline (ELAVIL) 25 MG tablet Take 1 tablet by mouth Every Night.   4/15/2024    amLODIPine (NORVASC) 10 MG tablet Take 0.5 tablets by mouth Daily. 90 tablet 3 4/16/2024    aspirin 81 MG EC tablet Take 1 tablet by mouth Daily. 90 tablet 3 4/15/2024 at 0930    atorvastatin (LIPITOR) 80 MG tablet Take 1 tablet by mouth Daily.   4/16/2024    Calcium Carbonate-Vit D-Min (Calcium 1200) 9629-1737 MG-UNIT chewable tablet Chew 1 tablet Daily.   4/16/2024    Evolocumab (REPATHA) solution  "prefilled syringe injection Inject 1 mL under the skin into the appropriate area as directed Every 14 (Fourteen) Days. 6 mL 1 4/15/2024    gabapentin (NEURONTIN) 300 MG capsule Take 1 capsule by mouth every night at bedtime for 150 days. 30 capsule 4 4/15/2024    hydrocortisone (CORTEF) 10 MG tablet Take 1 tablet by mouth 3 times a day. 270 tablet 1 4/16/2024    lisinopril (PRINIVIL,ZESTRIL) 5 MG tablet Take 1 tablet by mouth Daily. 90 tablet 1 4/16/2024    meclizine (ANTIVERT) 12.5 MG tablet Take 1 tablet by mouth 3 (Three) Times a Day As Needed for Dizziness.   Past Week    metoprolol succinate XL (TOPROL-XL) 100 MG 24 hr tablet Take 1 tablet by mouth Daily. 90 tablet 3 4/16/2024    nitroglycerin (NITROSTAT) 0.4 MG SL tablet Place 1 tablet under the tongue Every 5 (Five) Minutes As Needed for Chest Pain. Take no more than 3 doses in 15 minutes. 75 tablet 0     ondansetron ODT (ZOFRAN-ODT) 4 MG disintegrating tablet Place 1 tablet on the tongue Every 8 (Eight) Hours As Needed for Nausea or Vomiting.   4/15/2024    pantoprazole (PROTONIX) 40 MG EC tablet Take 1 tablet by mouth Every Morning.   4/16/2024    raloxifene (EVISTA) 60 MG tablet Take 1 tablet by mouth Daily. 90 tablet 3 4/16/2024    sertraline (ZOLOFT) 100 MG tablet Take 1 tablet by mouth Every Night.   4/15/2024    sucralfate (CARAFATE) 1 g tablet Take 1 tablet by mouth 4 (Four) Times a Day.   4/15/2024    Vibegron (Gemtesa) 75 MG tablet Take  by mouth.   4/16/2024     Allergies:  Trazodone, Dayvigo [lemborexant], and Ibandronic acid    Objective      Vital Signs       Physical Exam    Physical Exam   /54 (BP Location: Right arm, Patient Position: Sitting)   Pulse 75   Temp 98.4 °F (36.9 °C) (Oral)   Resp 17   Ht 151.8 cm (59.75\")   Wt 59.3 kg (130 lb 11.7 oz)   SpO2 99%   BMI 25.75 kg/m²   General:  Appears in no acute distress  Eyes: Sclera is anicteric,  conjunctiva is clear   HEENT:  No JVD. Thyroid not visibly enlarged. No mucosal pallor " or cyanosis  Respiratory: Respirations regular and unlabored at rest.  Clear to auscultation  Cardiovascular: S1,S2 Regular rate and rhythm. No murmur, rub or gallop auscultated.  . No pretibial pitting edema  Gastrointestinal: Abdomen nondistended.  Musculoskeletal:  No abnormal movements  Extremities: No digital clubbing or cyanosis  Skin: Color pink.   Neuro: Alert and awake.   Results Review:    I reviewed the patient's new clinical results.      Assessment & Plan       Dyslipidemia    Essential hypertension    Agatston coronary artery calcium score greater than 400    Non-sustained ventricular tachycardia      Assessment & Plan    I discussed the patients findings and my recommendations with patient    Master Richard MD  04/16/24  12:12 EDT

## 2024-04-18 LAB
QT INTERVAL: 392 MS
QTC INTERVAL: 429 MS

## 2024-04-19 ENCOUNTER — TELEPHONE (OUTPATIENT)
Dept: CARDIOLOGY | Facility: CLINIC | Age: 79
End: 2024-04-19
Payer: MEDICARE

## 2024-04-19 RX ORDER — METOPROLOL SUCCINATE 100 MG/1
100 TABLET, EXTENDED RELEASE ORAL DAILY
Qty: 90 TABLET | Refills: 3 | Status: SHIPPED | OUTPATIENT
Start: 2024-04-19

## 2024-04-19 NOTE — TELEPHONE ENCOUNTER
Rx Refill Note  Requested Prescriptions     Pending Prescriptions Disp Refills    metoprolol succinate XL (TOPROL-XL) 50 MG 24 hr tablet [Pharmacy Med Name: METOPROLOL SUCC ER 50 MG TAB] 90 tablet 3     Sig: TAKE 1 TABLET BY MOUTH EVERY DAY      Last office visit with prescribing clinician: 3/14/2024   Last telemedicine visit with prescribing clinician: Visit date not found   Next office visit with prescribing clinician: 3/17/2025                         Would you like a call back once the refill request has been completed: [] Yes [] No    If the office needs to give you a call back, can they leave a voicemail: [] Yes [] No    Penny Chaudhari MA  04/19/24, 15:13 EDT

## 2024-04-19 NOTE — TELEPHONE ENCOUNTER
I was able to contact Dr Enrique's office and spoke with Shabnam to ask for an update on pt's referral. I was told that the referral coordinator Evelyn is not in the office on Fridays and was transferred to Evelyn's extension. I left Evelyn a voicemail to call our office back on Monday for an update on the pt's referral.

## 2024-04-19 NOTE — TELEPHONE ENCOUNTER
Patient states that she has not received a nephrology appointment. She has called and spoke to Dr. Enrique's office and was told they need more info. She has also contacted our office.     Patient aware that this will bee looked into and to call back next Tuesday, if she does not hear from anyone.

## 2024-04-22 NOTE — TELEPHONE ENCOUNTER
Called Iva's office and had to leave a message for Evelyn the . I asked her to call me back directly if there is something still missing that they need prior to scheduling. Otherwise I asked that they reach out to the patient today.

## 2024-04-24 PROBLEM — R11.2 INTRACTABLE NAUSEA AND VOMITING: Status: RESOLVED | Noted: 2020-11-11 | Resolved: 2024-04-24

## 2024-04-24 NOTE — ASSESSMENT & PLAN NOTE
Patient's (There is no height or weight on file to calculate BMI.) indicates that they are overweight with health conditions that include hypertension, dyslipidemias, and GERD . Weight is worsening. BMI is is above average; BMI management plan is completed. We discussed portion control and increasing exercise.

## 2024-04-24 NOTE — PROGRESS NOTES
The ABCs of the Annual Wellness Visit  Subsequent Medicare Wellness Visit    Subjective    History of Present Illness:  Katie Reddy is a 79 y.o. female who presents for a Subsequent Medicare Wellness Visit.    The following portions of the patient's history were reviewed and   updated as appropriate: allergies, current medications, past family history, past medical history, past social history, past surgical history, and problem list.    Compared to one year ago, the patient feels her physical   health is worse.    Compared to one year ago, the patient feels her mental   health is worse.    Recent Hospitalizations:  She was not admitted to the hospital during the last year.       Current Medical Providers:  Patient Care Team:  Nayeli Tony MD as PCP - General (Family Medicine)  Master Richard MD as Consulting Physician (Cardiology)  Banet, Duane Edward, MD as Consulting Physician (Dermatology)  Yuriy Gerard MD as Consulting Physician (Endocrinology)  Fabio Gill MD as Consulting Physician (Urology)  Shawn Elaine MD as Surgeon (Orthopedic Surgery)  Bertrand Parks MD as Consulting Physician (Cardiology)  Montrell Mota MD as Consulting Physician (Gastroenterology)  Vilma Melton APRN as Nurse Practitioner (Nurse Practitioner)  Mikey Green MD as Consulting Physician (Urology)    Outpatient Medications Prior to Visit   Medication Sig Dispense Refill    amLODIPine (NORVASC) 10 MG tablet Take 0.5 tablets by mouth Daily. 90 tablet 3    aspirin 81 MG EC tablet Take 1 tablet by mouth Daily. 90 tablet 3    atorvastatin (LIPITOR) 80 MG tablet Take 1 tablet by mouth Daily.      Calcium Carbonate-Vit D-Min (Calcium 1200) 7340-4211 MG-UNIT chewable tablet Chew 1 tablet Daily.      Evolocumab (REPATHA) solution prefilled syringe injection Inject 1 mL under the skin into the appropriate area as directed Every 14 (Fourteen) Days. 6 mL 1    gabapentin (NEURONTIN) 300 MG  capsule Take 1 capsule by mouth every night at bedtime for 150 days. 30 capsule 4    hydrocortisone (CORTEF) 10 MG tablet Take 1 tablet by mouth 3 times a day. 270 tablet 1    lisinopril (PRINIVIL,ZESTRIL) 5 MG tablet Take 1 tablet by mouth Daily. 90 tablet 1    meclizine (ANTIVERT) 12.5 MG tablet Take 1 tablet by mouth 3 (Three) Times a Day As Needed for Dizziness.      metoprolol succinate XL (TOPROL-XL) 100 MG 24 hr tablet Take 1 tablet by mouth Daily. 90 tablet 3    ondansetron ODT (ZOFRAN-ODT) 4 MG disintegrating tablet Place 1 tablet on the tongue Every 8 (Eight) Hours As Needed for Nausea or Vomiting.      pantoprazole (PROTONIX) 40 MG EC tablet Take 1 tablet by mouth Every Morning.      raloxifene (EVISTA) 60 MG tablet Take 1 tablet by mouth Daily. 90 tablet 3    sertraline (ZOLOFT) 100 MG tablet Take 1 tablet by mouth Every Night.      sucralfate (CARAFATE) 1 g tablet Take 1 tablet by mouth 4 (Four) Times a Day.      Vibegron (Gemtesa) 75 MG tablet Take  by mouth.      amitriptyline (ELAVIL) 25 MG tablet Take 1 tablet by mouth Every Night.      metoprolol succinate XL (TOPROL-XL) 100 MG 24 hr tablet Take 1 tablet by mouth Daily. 90 tablet 3    nitroglycerin (NITROSTAT) 0.4 MG SL tablet Place 1 tablet under the tongue Every 5 (Five) Minutes As Needed for Chest Pain. Take no more than 3 doses in 15 minutes. 75 tablet 0     No facility-administered medications prior to visit.       No opioid medication identified on active medication list. I have reviewed chart for other potential  high risk medication/s and harmful drug interactions in the elderly.        Aspirin is on active medication list. Aspirin use is indicated based on review of current medical condition/s. Pros and cons of this therapy have been discussed today. Benefits of this medication outweigh potential harm.  Patient has been encouraged to continue taking this medication.  .    Patient Active Problem List   Diagnosis    Arthritis    Cataract of  "both eyes    Generalized anxiety disorder    Loss of height    Osteopenia    Palpitations    Vitamin D deficiency    Postmenopausal    Primary osteoarthritis of right knee    Primary osteoarthritis of left knee    Hallux valgus, acquired, bilateral    Essential hypertension    UTI (urinary tract infection)    Weakness    Asthma in adult, mild intermittent, uncomplicated    History of hemorrhagic cerebrovascular accident (CVA) with residual deficit    Glucocorticoid deficiency    Gastrointestinal hemorrhage associated with anorectal source    Colitis, ischemic    Mixed hyperlipidemia    GERD with esophagitis    Unstable angina pectoris    Bunion, left    Bladder incontinence    Overweight with body mass index (BMI) of 26 to 26.9 in adult    LBBB (left bundle branch block)    Hypercalcemia    Hemicrania continua    Agatston coronary artery calcium score greater than 400    Non-sustained ventricular tachycardia    Rash     Advance Care Planning  Advance Directive is on file.  ACP discussion was held with the patient during this visit. Patient has an advance directive in EMR which is still valid.      Objective    Vitals:    04/26/24 0919 04/26/24 0920   BP: 135/78 144/78   BP Location: Right arm Left arm   Patient Position: Sitting Sitting   Cuff Size: Adult Adult   Pulse: 89 76   Temp: 98 °F (36.7 °C)    TempSrc: Temporal    SpO2: 98%    Weight: 59.9 kg (132 lb)    Height: 151.8 cm (59.75\")    PainSc:   8    PainLoc: Generalized      Estimated body mass index is 26 kg/m² as calculated from the following:    Height as of this encounter: 151.8 cm (59.75\").    Weight as of this encounter: 59.9 kg (132 lb).           Does the patient have evidence of cognitive impairment? No          HEALTH RISK ASSESSMENT    Smoking Status:  Social History     Tobacco Use   Smoking Status Never    Passive exposure: Never   Smokeless Tobacco Never     Alcohol Consumption:  Social History     Substance and Sexual Activity   Alcohol Use Not " Currently     Fall Risk Screen:    ROSIO Fall Risk Assessment was completed, and patient is at LOW risk for falls.Assessment completed on:2024    Depression Screenin/26/2024     9:18 AM   PHQ-2/PHQ-9 Depression Screening   Little Interest or Pleasure in Doing Things 0-->not at all   Feeling Down, Depressed or Hopeless 0-->not at all   PHQ-9: Brief Depression Severity Measure Score 0       Health Habits and Functional and Cognitive Screenin/26/2024     9:21 AM   Functional & Cognitive Status   Do you have difficulty preparing food and eating? No   Do you have difficulty bathing yourself, getting dressed or grooming yourself? No   Do you have difficulty using the toilet? No   Do you have difficulty moving around from place to place? No   Do you have trouble with steps or getting out of a bed or a chair? Yes   Current Diet Well Balanced Diet   Dental Exam Not up to date   Eye Exam Not up to date   Exercise (times per week) 7 times per week   Current Exercises Include Walking   Do you need help using the phone?  No   Are you deaf or do you have serious difficulty hearing?  No   Do you need help to go to places out of walking distance? No   Do you need help shopping? No   Do you need help preparing meals?  No   Do you need help with housework?  No   Do you need help with laundry? No   Do you need help taking your medications? No   Do you need help managing money? No   Do you ever drive or ride in a car without wearing a seat belt? No   Have you felt unusual stress, anger or loneliness in the last month? No   Who do you live with? Alone   If you need help, do you have trouble finding someone available to you? No   Have you been bothered in the last four weeks by sexual problems? No   Do you have difficulty concentrating, remembering or making decisions? Yes       Age-appropriate Screening Schedule:  Refer to the list below for future screening recommendations based on patient's age, sex and/or  medical conditions. Orders for these recommended tests are listed in the plan section. The patient has been provided with a written plan.    Health Maintenance   Topic Date Due    TDAP/TD VACCINES (1 - Tdap) Never done    RSV Vaccine - Adults (1 - 1-dose 60+ series) Never done    COVID-19 Vaccine (4 - 2023-24 season) 09/01/2023    INFLUENZA VACCINE  08/01/2024    LIPID PANEL  10/24/2024    ANNUAL WELLNESS VISIT  04/26/2025    BMI FOLLOWUP  04/26/2025    DXA SCAN  07/26/2025    HEPATITIS C SCREENING  Completed    Pneumococcal Vaccine 65+  Completed    ZOSTER VACCINE  Completed    COLORECTAL CANCER SCREENING  Discontinued                CMS Preventative Services Quick Reference  Risk Factors Identified During Encounter  None Identified  The above risks/problems have been discussed with the patient.  Follow up actions/plans if indicated are seen below in the Assessment/Plan Section.  Pertinent information has been shared with the patient in the After Visit Summary.  An After Visit Summary and PPPS were made available to the patient.    Follow Up:   Next Medicare Wellness visit to be scheduled in 1 year.         Additional E&M Note during same encounter follows:  Patient has multiple medical problems which are significant and separately identifiable that require additional work above and beyond the Medicare Wellness Visit.        Chief Complaint  Medicare Wellness-subsequent (Patient is fasting.), Fatigue, Rash (On back started last week.  ), and urinary odor    Subjective        HPI  Katie Reddy also presents   Chief Complaint   Patient presents with    Medicare Wellness-subsequent     Patient is fasting.    Fatigue    Rash     On back started last week.      urinary odor   .  Patient is here for an age-specific physical and has been advised to wear sunscreen and a seatbelt.    Patient is also here with for more 5 more problems than she had had prior to thisFirst is urinary body odor and patient's urine did show  signs of infection cultures pending and she was placed on cefdinir 300 mg twice daily secondly patient has had rash since she had COVID and was treated with some sort of cream which did help but the rash never completely went away its affecting mainly her right arm her back and abdomen.  It this is excoriated with redness and she has used nothing new since she did have COVID.  Patient was given betamethasone and referred to dermatology.  Next problem is dizziness patient states that this occurs mainly when she gets up and down quickly.  She relates that she has not followed and will try to do so her most slowly.  Next problem is coronary artery calcifications which showed up on the cardiac calcium score of 400 so she underwent heart catheterization was not found to have anything that presently is treatable..  History of Present Illness  The patient is a 79-year-old female who is here today for her annual wellness exam and also an age-specific physical for Medicare. The patient also has asthma, colitis, hypertension, GERD, glucocorticoid deficiency, history of hemorrhagic CVA, hyperlipidemia, osteopenia, palpitations, overweight with a body mass index of 26, unstable angina, vitamin D deficiency, anxiety, postmenopausal, urinary incontinence, calcium score greater than 400, urinary body odor, and rash.    The patient reports a slight deterioration in her vision, which her ophthalmologist attributes to a previous eye surgery she underwent several years ago. She denies any dysphagia or dyspepsia. She also denies the presence of hemoptysis, sputum, or wheezing.    The patient presents with a rash that began slightly over a week ago, with no other household members exhibiting similar symptoms. She denies the introduction of new laundry detergents or indoor pets. The rash does not extend to her hands. She recalls a similar rash on her arms following her COVID-19 vaccination several years ago, which was somewhat alleviated  by a dermatologist with a topical medication. However, the rash has since recurred on her arm.    The patient has a chronic heart murmur. She had a consultation with her cardiologist, Dr. Chow, approximately 2 months ago, during which an echocardiogram and CT scan revealed calcium accumulation. A week later, she underwent a cardiac catheterization at the hospital, which did not necessitate stent placement. She has experienced several episodes of palpitations, including near syncope, which lasted approximately 45 minutes to 1 hour. She also reports balance issues, dizziness when looking down, and chest pain during dizzy spells. She has consulted a neurologist, who assessed her carotid arteries and referred her to a neurosurgeon, who did not recommend surgery at this time. She also reports pain in her arms and lower back. She underwent physical therapy at [facility], which did not provide significant relief. She has an InterStim in place, which she reports as beneficial.    The patient denies hematuria but reports dark urine and an unpleasant odor.    The patient's physical and mental health have slightly worsened over the past year. She has not been hospitalized overnight in the past year. She takes aspirin regularly. She has an advanced directive in place and reports no changes. She wears a seatbelt and does not go out in the sun. Her asthma is well-managed. She denies any abdominal pain from colitis. She is under the care of Dr. Becerril for glucocorticoid deficiency and takes ChlorTep 10 mg thrice daily. Her anxiety is well-managed, and she denies any homicidal or suicidal ideation.    The patient is currently on raloxifene for osteopenia. She is also taking cholecalciferol, vitamin C, and vitamin D.   She is allergic to TRAZODONE and 2 other medications.    Review of Systems   Cardiovascular:  Negative for chest pain and palpitations.   Genitourinary:  Positive for difficulty urinating.   Skin:  Positive for rash.  "  Neurological:  Positive for dizziness.       Objective   Vital Signs:  /78 (BP Location: Left arm, Patient Position: Sitting, Cuff Size: Adult)   Pulse 76   Temp 98 °F (36.7 °C) (Temporal)   Ht 151.8 cm (59.75\")   Wt 59.9 kg (132 lb)   SpO2 98%   BMI 26.00 kg/m²     Physical Exam  Vitals reviewed.   Constitutional:       General: She is not in acute distress.     Appearance: Normal appearance. She is well-developed. She is not ill-appearing or toxic-appearing.   HENT:      Head: Normocephalic and atraumatic.      Right Ear: Tympanic membrane, ear canal and external ear normal.      Left Ear: Tympanic membrane, ear canal and external ear normal.      Nose: Nose normal.      Mouth/Throat:      Mouth: Mucous membranes are moist.      Pharynx: No posterior oropharyngeal erythema.   Eyes:      Extraocular Movements: Extraocular movements intact.      Conjunctiva/sclera: Conjunctivae normal.      Pupils: Pupils are equal, round, and reactive to light.   Cardiovascular:      Rate and Rhythm: Normal rate and regular rhythm.      Heart sounds: Normal heart sounds.   Pulmonary:      Effort: Pulmonary effort is normal.      Breath sounds: Normal breath sounds.   Abdominal:      General: Bowel sounds are normal. There is no distension.      Palpations: Abdomen is soft. There is no mass.      Tenderness: There is no abdominal tenderness. There is no right CVA tenderness or left CVA tenderness.   Musculoskeletal:         General: Normal range of motion.      Cervical back: Neck supple.   Skin:     General: Skin is warm.      Findings: Erythema, lesion and rash present.   Neurological:      General: No focal deficit present.      Mental Status: She is alert and oriented to person, place, and time.   Psychiatric:         Mood and Affect: Mood normal.         Behavior: Behavior normal.        Physical Exam            Results  Laboratory Studies  Kidney function was 41.8. Urine test showed infection.    Imaging  CT scan " showed calcium buildup. Heart catheterization showed no changes.             Assessment and Plan   Diagnoses and all orders for this visit:    1. Encounter for annual general medical examination with abnormal findings in adult (Primary)  -     Urine Culture - Urine, Urine, Clean Catch; Future  -     Urine Culture - Urine, Urine, Clean Catch    2. Asthma in adult, mild intermittent, uncomplicated    3. Colitis, ischemic  -     Urine Culture - Urine, Urine, Clean Catch; Future  -     Urine Culture - Urine, Urine, Clean Catch    4. Essential hypertension  -     CBC Auto Differential  -     Cancel: Comprehensive Metabolic Panel    5. Gastrointestinal hemorrhage associated with anorectal source    6. Gastroesophageal reflux disease with esophagitis, unspecified whether hemorrhage  -     Magnesium    7. Glucocorticoid deficiency  -     Comprehensive Metabolic Panel    8. History of hemorrhagic cerebrovascular accident (CVA) with residual deficit    9. Mixed hyperlipidemia  -     Lipid Panel    10. Osteopenia of multiple sites    11. Palpitations  -     TSH Rfx On Abnormal To Free T4    12. Overweight with body mass index (BMI) of 26 to 26.9 in adult  Assessment & Plan:  Patient's (There is no height or weight on file to calculate BMI.) indicates that they are overweight with health conditions that include hypertension, dyslipidemias, and GERD . Weight is worsening. BMI is is above average; BMI management plan is completed. We discussed portion control and increasing exercise.       13. Unstable angina pectoris    14. Vitamin D deficiency  -     Vitamin D,25-Hydroxy    15. Generalized anxiety disorder  -     Vitamin B12    16. Postmenopausal    17. Urinary incontinence, unspecified type  -     Urine Culture - Urine, Urine, Clean Catch; Future  -     Urine Culture - Urine, Urine, Clean Catch    18. Agatston coronary artery calcium score greater than 400    19. Urinary body odor  -     POCT urinalysis dipstick, automated  -      Urine Culture - Urine, Urine, Clean Catch; Future  -     Urine Culture - Urine, Urine, Clean Catch    20. Rash  -     Ambulatory Referral to Dermatology    21. Dizziness  -     US Carotid Bilateral; Future  -     Urine Culture - Urine, Urine, Clean Catch; Future  -     Urine Culture - Urine, Urine, Clean Catch    Other orders  -     nitroglycerin (NITROSTAT) 0.4 MG SL tablet; Place 1 tablet under the tongue Every 5 (Five) Minutes As Needed for Chest Pain. Take no more than 3 doses in 15 minutes.  Dispense: 75 tablet; Refill: 0  -     amitriptyline (ELAVIL) 25 MG tablet; Take 1 tablet by mouth Every Night.  Dispense: 90 tablet; Refill: 3  -     betamethasone valerate (VALISONE) 0.1 % cream; Apply 1 Application topically to the appropriate area as directed 2 (Two) Times a Day.  Dispense: 45 g; Refill: 0  -     cefdinir (OMNICEF) 300 MG capsule; Take 1 capsule by mouth 2 (Two) Times a Day.  Dispense: 14 capsule; Refill: 0      Assessment & Plan  1. Rash.  A referral to dermatology has been initiated. In the interim, a regimen of betamethasone dipropionate cream has been prescribed, with instructions for the patient to apply it sparingly.    2. Annual wellness examination.  The patient's blood pressure, pulse, oxygen saturation, and temperature are within normal range. The patient has been advised to monitor portion sizes, maintain physical activity, and maintain weight within the normal range.    3. Osteopenia.  The patient will continue her current regimen of raloxifene.    4. Heart flutters.  The patient's heart flutters have remained stable. A prescription for nitroglycerin has been refilled.    5. Urinary tract infection.  The patient has been advised to maintain adequate hydration. Cefdinir 300 mg has been prescribed, with a 14-day course. After 3 days, the patient will return for a urine sample collection.    Follow-up  The patient is scheduled for a follow-up visit in 3 months.         Follow Up   Return in  about 3 months (around 7/26/2024).  Patient was given instructions and counseling regarding her condition or for health maintenance advice. Please see specific information pulled into the AVS if appropriate.   Patient or patient representative verbalized consent for the use of Ambient Listening during the visit with  Nayeli Tony MD for chart documentation. 4/26/2024  19:15 EDT

## 2024-04-24 NOTE — PATIENT INSTRUCTIONS
Health Maintenance Due   Topic Date Due    TDAP/TD VACCINES (1 - Tdap) Never done    RSV Vaccine - Adults (1 - 1-dose 60+ series) Never done    COVID-19 Vaccine (4 - 2023-24 season) 09/01/2023    ANNUAL WELLNESS VISIT  04/24/2024    Drink plenty fuids and after off antibiotics for 3 days needs repeat urine culture

## 2024-04-26 ENCOUNTER — OFFICE VISIT (OUTPATIENT)
Dept: FAMILY MEDICINE CLINIC | Facility: CLINIC | Age: 79
End: 2024-04-26
Payer: MEDICARE

## 2024-04-26 VITALS
SYSTOLIC BLOOD PRESSURE: 144 MMHG | HEIGHT: 60 IN | TEMPERATURE: 98 F | HEART RATE: 76 BPM | DIASTOLIC BLOOD PRESSURE: 78 MMHG | WEIGHT: 132 LBS | BODY MASS INDEX: 25.91 KG/M2 | OXYGEN SATURATION: 98 %

## 2024-04-26 DIAGNOSIS — R00.2 PALPITATIONS: ICD-10-CM

## 2024-04-26 DIAGNOSIS — K21.00 GASTROESOPHAGEAL REFLUX DISEASE WITH ESOPHAGITIS, UNSPECIFIED WHETHER HEMORRHAGE: ICD-10-CM

## 2024-04-26 DIAGNOSIS — I20.0 UNSTABLE ANGINA PECTORIS: Chronic | ICD-10-CM

## 2024-04-26 DIAGNOSIS — R21 RASH: ICD-10-CM

## 2024-04-26 DIAGNOSIS — E27.49 GLUCOCORTICOID DEFICIENCY: ICD-10-CM

## 2024-04-26 DIAGNOSIS — R93.1 AGATSTON CORONARY ARTERY CALCIUM SCORE GREATER THAN 400: Chronic | ICD-10-CM

## 2024-04-26 DIAGNOSIS — R32 URINARY INCONTINENCE, UNSPECIFIED TYPE: ICD-10-CM

## 2024-04-26 DIAGNOSIS — F41.1 GENERALIZED ANXIETY DISORDER: Chronic | ICD-10-CM

## 2024-04-26 DIAGNOSIS — Z78.0 POSTMENOPAUSAL: ICD-10-CM

## 2024-04-26 DIAGNOSIS — I10 ESSENTIAL HYPERTENSION: Chronic | ICD-10-CM

## 2024-04-26 DIAGNOSIS — E66.3 OVERWEIGHT WITH BODY MASS INDEX (BMI) OF 26 TO 26.9 IN ADULT: ICD-10-CM

## 2024-04-26 DIAGNOSIS — Z00.01 ENCOUNTER FOR ANNUAL GENERAL MEDICAL EXAMINATION WITH ABNORMAL FINDINGS IN ADULT: Primary | ICD-10-CM

## 2024-04-26 DIAGNOSIS — M85.89 OSTEOPENIA OF MULTIPLE SITES: ICD-10-CM

## 2024-04-26 DIAGNOSIS — K62.5 GASTROINTESTINAL HEMORRHAGE ASSOCIATED WITH ANORECTAL SOURCE: ICD-10-CM

## 2024-04-26 DIAGNOSIS — R42 DIZZINESS: ICD-10-CM

## 2024-04-26 DIAGNOSIS — J45.20 ASTHMA IN ADULT, MILD INTERMITTENT, UNCOMPLICATED: Chronic | ICD-10-CM

## 2024-04-26 DIAGNOSIS — E55.9 VITAMIN D DEFICIENCY: Chronic | ICD-10-CM

## 2024-04-26 DIAGNOSIS — L75.0 URINARY BODY ODOR: ICD-10-CM

## 2024-04-26 DIAGNOSIS — E78.2 MIXED HYPERLIPIDEMIA: ICD-10-CM

## 2024-04-26 DIAGNOSIS — I69.30 HISTORY OF HEMORRHAGIC CEREBROVASCULAR ACCIDENT (CVA) WITH RESIDUAL DEFICIT: Chronic | ICD-10-CM

## 2024-04-26 DIAGNOSIS — K55.9 COLITIS, ISCHEMIC: ICD-10-CM

## 2024-04-26 LAB
BASOPHILS # BLD AUTO: 0.08 10*3/MM3 (ref 0–0.2)
BASOPHILS NFR BLD AUTO: 1.2 % (ref 0–1.5)
BILIRUB BLD-MCNC: NEGATIVE MG/DL
CLARITY, POC: CLEAR
COLOR UR: YELLOW
DEPRECATED RDW RBC AUTO: 40.8 FL (ref 37–54)
EOSINOPHIL # BLD AUTO: 0.26 10*3/MM3 (ref 0–0.4)
EOSINOPHIL NFR BLD AUTO: 3.8 % (ref 0.3–6.2)
ERYTHROCYTE [DISTWIDTH] IN BLOOD BY AUTOMATED COUNT: 14.2 % (ref 12.3–15.4)
EXPIRATION DATE: ABNORMAL
GLUCOSE UR STRIP-MCNC: NEGATIVE MG/DL
HCT VFR BLD AUTO: 33.2 % (ref 34–46.6)
HGB BLD-MCNC: 11.3 G/DL (ref 12–15.9)
IMM GRANULOCYTES # BLD AUTO: 0.03 10*3/MM3 (ref 0–0.05)
IMM GRANULOCYTES NFR BLD AUTO: 0.4 % (ref 0–0.5)
KETONES UR QL: ABNORMAL
LEUKOCYTE EST, POC: ABNORMAL
LYMPHOCYTES # BLD AUTO: 1.86 10*3/MM3 (ref 0.7–3.1)
LYMPHOCYTES NFR BLD AUTO: 26.9 % (ref 19.6–45.3)
Lab: ABNORMAL
MCH RBC QN AUTO: 27.3 PG (ref 26.6–33)
MCHC RBC AUTO-ENTMCNC: 34 G/DL (ref 31.5–35.7)
MCV RBC AUTO: 80.2 FL (ref 79–97)
MONOCYTES # BLD AUTO: 0.56 10*3/MM3 (ref 0.1–0.9)
MONOCYTES NFR BLD AUTO: 8.1 % (ref 5–12)
NEUTROPHILS NFR BLD AUTO: 4.12 10*3/MM3 (ref 1.7–7)
NEUTROPHILS NFR BLD AUTO: 59.6 % (ref 42.7–76)
NITRITE UR-MCNC: POSITIVE MG/ML
NRBC BLD AUTO-RTO: 0 /100 WBC (ref 0–0.2)
PH UR: 5.5 [PH] (ref 5–8)
PLATELET # BLD AUTO: 211 10*3/MM3 (ref 140–450)
PMV BLD AUTO: 9.9 FL (ref 6–12)
PROT UR STRIP-MCNC: ABNORMAL MG/DL
RBC # BLD AUTO: 4.14 10*6/MM3 (ref 3.77–5.28)
RBC # UR STRIP: ABNORMAL /UL
SP GR UR: 1.01 (ref 1–1.03)
TSH SERPL DL<=0.05 MIU/L-ACNC: 3.11 UIU/ML (ref 0.27–4.2)
UROBILINOGEN UR QL: ABNORMAL
WBC NRBC COR # BLD AUTO: 6.91 10*3/MM3 (ref 3.4–10.8)

## 2024-04-26 PROCEDURE — 87186 SC STD MICRODIL/AGAR DIL: CPT | Performed by: PREVENTIVE MEDICINE

## 2024-04-26 PROCEDURE — 85025 COMPLETE CBC W/AUTO DIFF WBC: CPT | Performed by: PREVENTIVE MEDICINE

## 2024-04-26 PROCEDURE — 87077 CULTURE AEROBIC IDENTIFY: CPT | Performed by: PREVENTIVE MEDICINE

## 2024-04-26 PROCEDURE — 84443 ASSAY THYROID STIM HORMONE: CPT | Performed by: PREVENTIVE MEDICINE

## 2024-04-26 PROCEDURE — 87086 URINE CULTURE/COLONY COUNT: CPT | Performed by: PREVENTIVE MEDICINE

## 2024-04-26 RX ORDER — AMITRIPTYLINE HYDROCHLORIDE 25 MG/1
25 TABLET, FILM COATED ORAL NIGHTLY
Qty: 90 TABLET | Refills: 3 | Status: SHIPPED | OUTPATIENT
Start: 2024-04-26

## 2024-04-26 RX ORDER — NITROGLYCERIN 0.4 MG/1
0.4 TABLET SUBLINGUAL
Qty: 75 TABLET | Refills: 0 | Status: SHIPPED | OUTPATIENT
Start: 2024-04-26

## 2024-04-26 RX ORDER — CEFDINIR 300 MG/1
300 CAPSULE ORAL 2 TIMES DAILY
Qty: 14 CAPSULE | Refills: 0 | Status: SHIPPED | OUTPATIENT
Start: 2024-04-26

## 2024-04-26 NOTE — PROGRESS NOTES
Venipuncture performed on left hand by Karen Real MA  with good hemostasis. Patient tolerated well. 04/26/24

## 2024-04-28 ENCOUNTER — TELEPHONE (OUTPATIENT)
Dept: FAMILY MEDICINE CLINIC | Facility: CLINIC | Age: 79
End: 2024-04-28
Payer: MEDICARE

## 2024-04-28 LAB — BACTERIA SPEC AEROBE CULT: ABNORMAL

## 2024-04-28 NOTE — PROGRESS NOTES
Mild anemia has patient noticed any blood loss in stool urine bloody nose or perhaps heartburn?  If not then we should consider sending you to [facility] to look for blood in your stool.  In the meantime try to eat foods that are rich in iron and vitamin C.  Urine did show infection and the antibiotic that you are on should take care of it make sure that you do reculture your urine 3 days after you have finished your antibiotic and do take your antibiotic till gone call if any other questions or concerns

## 2024-04-29 NOTE — TELEPHONE ENCOUNTER
Name: Katie Reddy      Relationship: Self      Best Callback Number: [phone]      HUB PROVIDED THE RELAY MESSAGE FROM THE OFFICE      PATIENT: SCHEDULED PER NOTE    ADDITIONAL INFORMATION:  PATIENT DID STATE THAT SHE IS EXPERIENCING BLOOD IN HER STOOL.

## 2024-04-29 NOTE — TELEPHONE ENCOUNTER
Relay  ---- Message from Nan S sent at 4/28/2024  2:18 PM EDT -----  Mild anemia has patient noticed any blood loss in stool urine bloody nose or perhaps heartburn?  If not then we should consider sending you to [facility] to look for blood in your stool.  In the meantime try to eat foods that are rich in iron and vitamin C.  Urine did show infection and the antibiotic that you are on should take care of it make sure that you do reculture your urine 3 days after you have finished your antibiotic and do take your antibiotic till gone call if any other questions or concerns  Not enough sample need to redraw some of the labs.

## 2024-05-02 ENCOUNTER — LAB (OUTPATIENT)
Dept: LAB | Facility: HOSPITAL | Age: 79
End: 2024-05-02
Payer: MEDICARE

## 2024-05-02 DIAGNOSIS — I10 ESSENTIAL HYPERTENSION: Chronic | ICD-10-CM

## 2024-05-02 DIAGNOSIS — K55.9 COLITIS, ISCHEMIC: ICD-10-CM

## 2024-05-02 DIAGNOSIS — F41.1 GENERALIZED ANXIETY DISORDER: Chronic | ICD-10-CM

## 2024-05-02 DIAGNOSIS — R21 RASH: ICD-10-CM

## 2024-05-02 DIAGNOSIS — Z78.0 POSTMENOPAUSAL: ICD-10-CM

## 2024-05-02 DIAGNOSIS — M85.89 OSTEOPENIA OF MULTIPLE SITES: ICD-10-CM

## 2024-05-02 DIAGNOSIS — L75.0 URINARY BODY ODOR: ICD-10-CM

## 2024-05-02 DIAGNOSIS — Z00.01 ENCOUNTER FOR ANNUAL GENERAL MEDICAL EXAMINATION WITH ABNORMAL FINDINGS IN ADULT: ICD-10-CM

## 2024-05-02 DIAGNOSIS — R00.2 PALPITATIONS: ICD-10-CM

## 2024-05-02 DIAGNOSIS — E27.49 GLUCOCORTICOID DEFICIENCY: ICD-10-CM

## 2024-05-02 DIAGNOSIS — R32 URINARY INCONTINENCE, UNSPECIFIED TYPE: ICD-10-CM

## 2024-05-02 DIAGNOSIS — K62.5 GASTROINTESTINAL HEMORRHAGE ASSOCIATED WITH ANORECTAL SOURCE: ICD-10-CM

## 2024-05-02 DIAGNOSIS — R93.1 AGATSTON CORONARY ARTERY CALCIUM SCORE GREATER THAN 400: Chronic | ICD-10-CM

## 2024-05-02 DIAGNOSIS — I69.30 HISTORY OF HEMORRHAGIC CEREBROVASCULAR ACCIDENT (CVA) WITH RESIDUAL DEFICIT: Chronic | ICD-10-CM

## 2024-05-02 DIAGNOSIS — K21.00 GASTROESOPHAGEAL REFLUX DISEASE WITH ESOPHAGITIS, UNSPECIFIED WHETHER HEMORRHAGE: ICD-10-CM

## 2024-05-02 DIAGNOSIS — E66.3 OVERWEIGHT WITH BODY MASS INDEX (BMI) OF 26 TO 26.9 IN ADULT: ICD-10-CM

## 2024-05-02 DIAGNOSIS — J45.20 ASTHMA IN ADULT, MILD INTERMITTENT, UNCOMPLICATED: Chronic | ICD-10-CM

## 2024-05-02 DIAGNOSIS — R42 DIZZINESS: ICD-10-CM

## 2024-05-02 DIAGNOSIS — I20.0 UNSTABLE ANGINA PECTORIS: Chronic | ICD-10-CM

## 2024-05-02 DIAGNOSIS — E55.9 VITAMIN D DEFICIENCY: Chronic | ICD-10-CM

## 2024-05-02 DIAGNOSIS — E78.2 MIXED HYPERLIPIDEMIA: ICD-10-CM

## 2024-05-02 LAB
25(OH)D3 SERPL-MCNC: 25.9 NG/ML (ref 30–100)
ALBUMIN SERPL-MCNC: 4.4 G/DL (ref 3.5–5.2)
ALBUMIN/GLOB SERPL: 1.8 G/DL
ALP SERPL-CCNC: 100 U/L (ref 39–117)
ALT SERPL W P-5'-P-CCNC: 10 U/L (ref 1–33)
ANION GAP SERPL CALCULATED.3IONS-SCNC: 11.2 MMOL/L (ref 5–15)
AST SERPL-CCNC: 18 U/L (ref 1–32)
BILIRUB SERPL-MCNC: 0.3 MG/DL (ref 0–1.2)
BUN SERPL-MCNC: 26 MG/DL (ref 8–23)
BUN/CREAT SERPL: 21.3 (ref 7–25)
CALCIUM SPEC-SCNC: 9.6 MG/DL (ref 8.6–10.5)
CHLORIDE SERPL-SCNC: 107 MMOL/L (ref 98–107)
CHOLEST SERPL-MCNC: 258 MG/DL (ref 0–200)
CO2 SERPL-SCNC: 20.8 MMOL/L (ref 22–29)
CREAT SERPL-MCNC: 1.22 MG/DL (ref 0.57–1)
EGFRCR SERPLBLD CKD-EPI 2021: 45.5 ML/MIN/1.73
GLOBULIN UR ELPH-MCNC: 2.5 GM/DL
GLUCOSE SERPL-MCNC: 83 MG/DL (ref 65–99)
HDLC SERPL-MCNC: 58 MG/DL (ref 40–60)
LDLC SERPL CALC-MCNC: 184 MG/DL (ref 0–100)
LDLC/HDLC SERPL: 3.13 {RATIO}
MAGNESIUM SERPL-MCNC: 2 MG/DL (ref 1.6–2.4)
POTASSIUM SERPL-SCNC: 5 MMOL/L (ref 3.5–5.2)
PROT SERPL-MCNC: 6.9 G/DL (ref 6–8.5)
SODIUM SERPL-SCNC: 139 MMOL/L (ref 136–145)
TRIGL SERPL-MCNC: 93 MG/DL (ref 0–150)
VIT B12 BLD-MCNC: 426 PG/ML (ref 211–946)
VLDLC SERPL-MCNC: 16 MG/DL (ref 5–40)

## 2024-05-02 PROCEDURE — 82607 VITAMIN B-12: CPT

## 2024-05-02 PROCEDURE — 36415 COLL VENOUS BLD VENIPUNCTURE: CPT

## 2024-05-02 PROCEDURE — 80061 LIPID PANEL: CPT

## 2024-05-02 PROCEDURE — 80053 COMPREHEN METABOLIC PANEL: CPT

## 2024-05-02 PROCEDURE — 83735 ASSAY OF MAGNESIUM: CPT

## 2024-05-02 PROCEDURE — 82306 VITAMIN D 25 HYDROXY: CPT

## 2024-05-03 ENCOUNTER — TELEPHONE (OUTPATIENT)
Dept: FAMILY MEDICINE CLINIC | Facility: CLINIC | Age: 79
End: 2024-05-03
Payer: MEDICARE

## 2024-05-03 NOTE — PROGRESS NOTES
Total cholesterol is 258 and goal is below 200 bad cholesterol is 184 and goal is below 100.  You are already on maximum doses of atorvastatin and the injectable Repatha.  Please continue to watch her diet and increase your walking.  Kidney function has improved slightly from 42-46 but I would continue to avoid ibuprofen Aleve Motrin and limit x-ray dyes we will continue to monitor both vitamin D and B12 are slightly low so increase to 1000 units a day or 2/week.  Call if any other questions or concerns

## 2024-05-10 ENCOUNTER — HOSPITAL ENCOUNTER (OUTPATIENT)
Dept: ULTRASOUND IMAGING | Facility: HOSPITAL | Age: 79
Discharge: HOME | End: 2024-05-10
Payer: MEDICARE

## 2024-05-10 DIAGNOSIS — R42 DIZZINESS: ICD-10-CM

## 2024-05-10 PROCEDURE — 93880 EXTRACRANIAL BILAT STUDY: CPT

## 2024-05-13 ENCOUNTER — TELEPHONE (OUTPATIENT)
Dept: FAMILY MEDICINE CLINIC | Facility: CLINIC | Age: 79
End: 2024-05-13

## 2024-05-13 ENCOUNTER — CLINICAL SUPPORT (OUTPATIENT)
Dept: FAMILY MEDICINE CLINIC | Facility: CLINIC | Age: 79
End: 2024-05-13
Payer: MEDICARE

## 2024-05-13 DIAGNOSIS — N39.0 URINARY TRACT INFECTION WITHOUT HEMATURIA, SITE UNSPECIFIED: Primary | ICD-10-CM

## 2024-05-13 PROCEDURE — 87086 URINE CULTURE/COLONY COUNT: CPT | Performed by: PREVENTIVE MEDICINE

## 2024-05-13 PROCEDURE — 87186 SC STD MICRODIL/AGAR DIL: CPT | Performed by: PREVENTIVE MEDICINE

## 2024-05-13 NOTE — TELEPHONE ENCOUNTER
RELAY    Blockage on the right side is less than 50% blockage on the left is 50 to 69%.  We should repeat again in a year unless you have symptoms sooner than that call if any other questions or concerns

## 2024-05-16 DIAGNOSIS — N30.00 ACUTE CYSTITIS WITHOUT HEMATURIA: Primary | ICD-10-CM

## 2024-05-16 LAB — BACTERIA SPEC AEROBE CULT: ABNORMAL

## 2024-05-16 RX ORDER — CIPROFLOXACIN 250 MG/1
250 TABLET, FILM COATED ORAL 2 TIMES DAILY
Qty: 10 TABLET | Refills: 0 | Status: SHIPPED | OUTPATIENT
Start: 2024-05-16

## 2024-05-16 NOTE — PROGRESS NOTES
Urine does show a slight infection and I would be glad to put you on a medicine called Cipro to help clear this out since you are not allergic.  3 days after you are off the medicine you should reculture the urine again to make sure that off the antibiotic the infection is gone call if any questions or concerns and increase your fluids in the meantime

## 2024-05-17 ENCOUNTER — TELEPHONE (OUTPATIENT)
Dept: FAMILY MEDICINE CLINIC | Facility: CLINIC | Age: 79
End: 2024-05-17
Payer: MEDICARE

## 2024-05-17 NOTE — TELEPHONE ENCOUNTER
"Relay     \"Urine does show a slight infection and I would be glad to put you on a medicine called Cipro to help clear this out since you are not allergic.  3 days after you are off the medicine you should reculture the urine again to make sure that off the antibiotic the infection is gone call if any questions or concerns and increase your fluids in the meantime \"                "

## 2024-05-17 NOTE — TELEPHONE ENCOUNTER
"Relay     \"Urine does show a slight infection and I would be glad to put you on a medicine called Cipro to help clear this out since you are not allergic.  3 days after you are off the medicine you should reculture the urine again to make sure that off the antibiotic the infection is gone call if any questions or concerns and increase your fluids in the meantime\"                "

## 2024-05-21 ENCOUNTER — OFFICE VISIT (OUTPATIENT)
Dept: NEUROLOGY | Facility: CLINIC | Age: 79
End: 2024-05-21
Payer: MEDICARE

## 2024-05-21 VITALS
HEIGHT: 60 IN | HEART RATE: 86 BPM | SYSTOLIC BLOOD PRESSURE: 123 MMHG | DIASTOLIC BLOOD PRESSURE: 72 MMHG | BODY MASS INDEX: 25.13 KG/M2 | WEIGHT: 128 LBS

## 2024-05-21 DIAGNOSIS — R55 SYNCOPE, UNSPECIFIED SYNCOPE TYPE: Primary | ICD-10-CM

## 2024-05-21 DIAGNOSIS — M48.02 FORAMINAL STENOSIS OF CERVICAL REGION: ICD-10-CM

## 2024-05-21 DIAGNOSIS — G89.29 CHRONIC RIGHT-SIDED HEADACHES: ICD-10-CM

## 2024-05-21 DIAGNOSIS — R51.9 CHRONIC RIGHT-SIDED HEADACHES: ICD-10-CM

## 2024-05-21 RX ORDER — GABAPENTIN 300 MG/1
300 CAPSULE ORAL 2 TIMES DAILY
Qty: 60 CAPSULE | Refills: 5 | Status: SHIPPED | OUTPATIENT
Start: 2024-05-21 | End: 2024-11-17

## 2024-05-29 ENCOUNTER — CLINICAL SUPPORT (OUTPATIENT)
Dept: FAMILY MEDICINE CLINIC | Facility: CLINIC | Age: 79
End: 2024-05-29
Payer: MEDICARE

## 2024-05-29 DIAGNOSIS — N30.00 ACUTE CYSTITIS WITHOUT HEMATURIA: ICD-10-CM

## 2024-05-29 PROCEDURE — 87086 URINE CULTURE/COLONY COUNT: CPT | Performed by: PREVENTIVE MEDICINE

## 2024-05-30 ENCOUNTER — TELEPHONE (OUTPATIENT)
Dept: FAMILY MEDICINE CLINIC | Facility: CLINIC | Age: 79
End: 2024-05-30
Payer: MEDICARE

## 2024-05-30 LAB — BACTERIA SPEC AEROBE CULT: NORMAL

## 2024-05-30 NOTE — PROGRESS NOTES
Urine culture did not show signs of infection so if you are still having symptoms we would be glad to reculture again

## 2024-05-30 NOTE — TELEPHONE ENCOUNTER
RELAY  Urine culture did not show signs of infection so if you are still having symptoms we would be glad to reculture again

## 2024-06-07 RX ORDER — SERTRALINE HYDROCHLORIDE 100 MG/1
100 TABLET, FILM COATED ORAL
Qty: 90 TABLET | Refills: 1 | Status: SHIPPED | OUTPATIENT
Start: 2024-06-07

## 2024-06-07 RX ORDER — LISINOPRIL 5 MG/1
5 TABLET ORAL DAILY
Qty: 90 TABLET | Refills: 0 | Status: SHIPPED | OUTPATIENT
Start: 2024-06-07

## 2024-06-07 NOTE — TELEPHONE ENCOUNTER
Caller: Katie Reddy    Relationship: Self    Best call back number: [phone]     Requested Prescriptions:   Requested Prescriptions     Pending Prescriptions Disp Refills    lisinopril (PRINIVIL,ZESTRIL) 5 MG tablet 90 tablet 1     Sig: Take 1 tablet by mouth Daily.        Pharmacy where request should be sent: [facility] #[id number] - [place], [place] - [address] - [phone]  - [phone] FX     Last office visit with prescribing clinician: 4/26/2024   Last telemedicine visit with prescribing clinician: Visit date not found   Next office visit with prescribing clinician: 7/26/2024     Additional details provided by patient: PATIENT STATED SHE IS OUT OF THIS MEDICATION AS OF 06-.    Does the patient have less than a 3 day supply:  [x] Yes  [] No    Would you like a call back once the refill request has been completed: [] Yes [x] No    If the office needs to give you a call back, can they leave a voicemail: [] Yes [x] No    Muriel Harding Rep   06/07/24 10:41 EDT

## 2024-06-20 ENCOUNTER — HOSPITAL ENCOUNTER (OUTPATIENT)
Dept: NEUROLOGY | Facility: HOSPITAL | Age: 79
Discharge: HOME | End: 2024-06-20
Payer: MEDICARE

## 2024-06-20 ENCOUNTER — HOSPITAL ENCOUNTER (OUTPATIENT)
Dept: CT IMAGING | Facility: HOSPITAL | Age: 79
Discharge: HOME | End: 2024-06-20
Payer: MEDICARE

## 2024-06-20 ENCOUNTER — HOSPITAL ENCOUNTER (OUTPATIENT)
Dept: NEUROLOGY | Facility: HOSPITAL | Age: 79
End: 2024-06-20
Payer: MEDICARE

## 2024-06-20 DIAGNOSIS — R55 SYNCOPE, UNSPECIFIED SYNCOPE TYPE: ICD-10-CM

## 2024-06-20 PROCEDURE — 70450 CT HEAD/BRAIN W/O DYE: CPT

## 2024-06-24 ENCOUNTER — TELEPHONE (OUTPATIENT)
Dept: NEUROLOGY | Facility: CLINIC | Age: 79
End: 2024-06-24
Payer: MEDICARE

## 2024-06-24 NOTE — TELEPHONE ENCOUNTER
Spoke to patient and went of CT results. Let her know we will call back with EEG results when they are received.

## 2024-06-24 NOTE — TELEPHONE ENCOUNTER
----- Message from Lori Selby sent at 6/21/2024  9:34 AM EDT -----  Regarding: MRI Brain  MRI Brain Result  MRI of the brain completed 6/20/2024 compared to MRI of the brain October 30, 2023  Impression:  1.  Old insult left occipital lobe  2.  Periventricular white matter changes as well as minimal hypodensity left basal ganglia suggestive of more chronic small vessel ischemic change.  Electronically signed by Tre Min MD    Notify the patient that the MRI was compared to a previous 1 in October 2023.  It showed an old injury in the left occipital lobe.  It also showed chronic microvascular disease.  Chronic microvascular disease is associated with a history of hypertension diabetes chronic migraine and basically means that we have smaller vessels in the the area of the brain than the average person.  ----- Message -----  From: Interface, Rad Results [place]  Sent: 6/20/2024   2:24 PM EDT  To: PRACHI Robertson

## 2024-06-26 ENCOUNTER — TELEPHONE (OUTPATIENT)
Dept: NEUROLOGY | Facility: CLINIC | Age: 79
End: 2024-06-26
Payer: MEDICARE

## 2024-06-26 NOTE — TELEPHONE ENCOUNTER
----- Message from Lori Selby sent at 6/26/2024  2:49 PM EDT -----  Tell her with her history of stroke that is where this is coming from.  I am in the clinic seeing patients on relaying this information through you thank you

## 2024-07-19 RX ORDER — LISINOPRIL 5 MG/1
5 TABLET ORAL DAILY
Qty: 90 TABLET | Refills: 0 | Status: SHIPPED | OUTPATIENT
Start: 2024-07-19

## 2024-08-07 NOTE — PATIENT INSTRUCTIONS
Health Maintenance Due   Topic Date Due    TDAP/TD VACCINES (1 - Tdap) Never done    RSV Vaccine - Adults (1 - 1-dose 60+ series) Never done    COVID-19 Vaccine (4 - 2023-24 season) 09/01/2023    INFLUENZA VACCINE  08/01/2024    Patient to call NP Kartik and get back to see her over next few weeks.    Patient to call Xray to schedule flexion and extension low back xrays.    Patient to set up her eye doctor appointment next day or two  for Left eye contusion

## 2024-08-08 ENCOUNTER — CLINICAL SUPPORT (OUTPATIENT)
Dept: FAMILY MEDICINE CLINIC | Facility: CLINIC | Age: 79
End: 2024-08-08
Payer: MEDICARE

## 2024-08-08 ENCOUNTER — OFFICE VISIT (OUTPATIENT)
Dept: FAMILY MEDICINE CLINIC | Facility: CLINIC | Age: 79
End: 2024-08-08
Payer: MEDICARE

## 2024-08-08 VITALS
HEART RATE: 76 BPM | HEIGHT: 60 IN | BODY MASS INDEX: 24.85 KG/M2 | SYSTOLIC BLOOD PRESSURE: 117 MMHG | DIASTOLIC BLOOD PRESSURE: 78 MMHG | TEMPERATURE: 97 F | OXYGEN SATURATION: 98 % | WEIGHT: 126.6 LBS

## 2024-08-08 DIAGNOSIS — R93.1 AGATSTON CORONARY ARTERY CALCIUM SCORE GREATER THAN 400: Chronic | ICD-10-CM

## 2024-08-08 DIAGNOSIS — M79.604 RIGHT LEG PAIN: ICD-10-CM

## 2024-08-08 DIAGNOSIS — E55.9 VITAMIN D DEFICIENCY: Chronic | ICD-10-CM

## 2024-08-08 DIAGNOSIS — J45.20 ASTHMA IN ADULT, MILD INTERMITTENT, UNCOMPLICATED: Chronic | ICD-10-CM

## 2024-08-08 DIAGNOSIS — I47.29 NON-SUSTAINED VENTRICULAR TACHYCARDIA: Chronic | ICD-10-CM

## 2024-08-08 DIAGNOSIS — K62.5 GASTROINTESTINAL HEMORRHAGE ASSOCIATED WITH ANORECTAL SOURCE: ICD-10-CM

## 2024-08-08 DIAGNOSIS — I10 ESSENTIAL HYPERTENSION: Primary | Chronic | ICD-10-CM

## 2024-08-08 DIAGNOSIS — E78.2 MIXED HYPERLIPIDEMIA: ICD-10-CM

## 2024-08-08 DIAGNOSIS — M85.89 OSTEOPENIA OF MULTIPLE SITES: ICD-10-CM

## 2024-08-08 DIAGNOSIS — I69.30 HISTORY OF HEMORRHAGIC CEREBROVASCULAR ACCIDENT (CVA) WITH RESIDUAL DEFICIT: Chronic | ICD-10-CM

## 2024-08-08 DIAGNOSIS — R00.2 PALPITATIONS: ICD-10-CM

## 2024-08-08 DIAGNOSIS — K21.00 GASTROESOPHAGEAL REFLUX DISEASE WITH ESOPHAGITIS, UNSPECIFIED WHETHER HEMORRHAGE: ICD-10-CM

## 2024-08-08 LAB
25(OH)D3 SERPL-MCNC: 28.7 NG/ML (ref 30–100)
ALBUMIN SERPL-MCNC: 4.3 G/DL (ref 3.5–5.2)
ALBUMIN/GLOB SERPL: 1.5 G/DL
ALP SERPL-CCNC: 137 U/L (ref 39–117)
ALT SERPL W P-5'-P-CCNC: 81 U/L (ref 1–33)
ANION GAP SERPL CALCULATED.3IONS-SCNC: 13.4 MMOL/L (ref 5–15)
AST SERPL-CCNC: 27 U/L (ref 1–32)
BASOPHILS # BLD AUTO: 0.07 10*3/MM3 (ref 0–0.2)
BASOPHILS NFR BLD AUTO: 1.2 % (ref 0–1.5)
BILIRUB SERPL-MCNC: <0.2 MG/DL (ref 0–1.2)
BUN SERPL-MCNC: 28 MG/DL (ref 8–23)
BUN/CREAT SERPL: 21.9 (ref 7–25)
CALCIUM SPEC-SCNC: 10 MG/DL (ref 8.6–10.5)
CHLORIDE SERPL-SCNC: 100 MMOL/L (ref 98–107)
CHOLEST SERPL-MCNC: 277 MG/DL (ref 0–200)
CO2 SERPL-SCNC: 20.6 MMOL/L (ref 22–29)
CREAT SERPL-MCNC: 1.28 MG/DL (ref 0.57–1)
DEPRECATED RDW RBC AUTO: 41.6 FL (ref 37–54)
EGFRCR SERPLBLD CKD-EPI 2021: 43 ML/MIN/1.73
EOSINOPHIL # BLD AUTO: 0.12 10*3/MM3 (ref 0–0.4)
EOSINOPHIL NFR BLD AUTO: 2.1 % (ref 0.3–6.2)
ERYTHROCYTE [DISTWIDTH] IN BLOOD BY AUTOMATED COUNT: 13.8 % (ref 12.3–15.4)
GLOBULIN UR ELPH-MCNC: 2.9 GM/DL
GLUCOSE SERPL-MCNC: 85 MG/DL (ref 65–99)
HCT VFR BLD AUTO: 37.7 % (ref 34–46.6)
HDLC SERPL-MCNC: 59 MG/DL (ref 40–60)
HGB BLD-MCNC: 12.2 G/DL (ref 12–15.9)
IMM GRANULOCYTES # BLD AUTO: 0.03 10*3/MM3 (ref 0–0.05)
IMM GRANULOCYTES NFR BLD AUTO: 0.5 % (ref 0–0.5)
LDLC SERPL CALC-MCNC: 203 MG/DL (ref 0–100)
LDLC/HDLC SERPL: 3.39 {RATIO}
LYMPHOCYTES # BLD AUTO: 1.83 10*3/MM3 (ref 0.7–3.1)
LYMPHOCYTES NFR BLD AUTO: 32.4 % (ref 19.6–45.3)
MAGNESIUM SERPL-MCNC: 2.2 MG/DL (ref 1.6–2.4)
MCH RBC QN AUTO: 27.2 PG (ref 26.6–33)
MCHC RBC AUTO-ENTMCNC: 32.4 G/DL (ref 31.5–35.7)
MCV RBC AUTO: 84 FL (ref 79–97)
MONOCYTES # BLD AUTO: 0.43 10*3/MM3 (ref 0.1–0.9)
MONOCYTES NFR BLD AUTO: 7.6 % (ref 5–12)
NEUTROPHILS NFR BLD AUTO: 3.16 10*3/MM3 (ref 1.7–7)
NEUTROPHILS NFR BLD AUTO: 56.2 % (ref 42.7–76)
NRBC BLD AUTO-RTO: 0 /100 WBC (ref 0–0.2)
PLATELET # BLD AUTO: 265 10*3/MM3 (ref 140–450)
PMV BLD AUTO: 8.8 FL (ref 6–12)
POTASSIUM SERPL-SCNC: 5.6 MMOL/L (ref 3.5–5.2)
PROT SERPL-MCNC: 7.2 G/DL (ref 6–8.5)
RBC # BLD AUTO: 4.49 10*6/MM3 (ref 3.77–5.28)
SODIUM SERPL-SCNC: 134 MMOL/L (ref 136–145)
TRIGL SERPL-MCNC: 91 MG/DL (ref 0–150)
VIT B12 BLD-MCNC: 609 PG/ML (ref 211–946)
VLDLC SERPL-MCNC: 15 MG/DL (ref 5–40)
WBC NRBC COR # BLD AUTO: 5.64 10*3/MM3 (ref 3.4–10.8)

## 2024-08-08 PROCEDURE — 82306 VITAMIN D 25 HYDROXY: CPT | Performed by: PREVENTIVE MEDICINE

## 2024-08-08 PROCEDURE — 80061 LIPID PANEL: CPT | Performed by: PREVENTIVE MEDICINE

## 2024-08-08 PROCEDURE — G2211 COMPLEX E/M VISIT ADD ON: HCPCS | Performed by: PREVENTIVE MEDICINE

## 2024-08-08 PROCEDURE — 3078F DIAST BP <80 MM HG: CPT | Performed by: PREVENTIVE MEDICINE

## 2024-08-08 PROCEDURE — 82607 VITAMIN B-12: CPT | Performed by: PREVENTIVE MEDICINE

## 2024-08-08 PROCEDURE — 83735 ASSAY OF MAGNESIUM: CPT | Performed by: PREVENTIVE MEDICINE

## 2024-08-08 PROCEDURE — 85025 COMPLETE CBC W/AUTO DIFF WBC: CPT | Performed by: PREVENTIVE MEDICINE

## 2024-08-08 PROCEDURE — 1160F RVW MEDS BY RX/DR IN RCRD: CPT | Performed by: PREVENTIVE MEDICINE

## 2024-08-08 PROCEDURE — 80053 COMPREHEN METABOLIC PANEL: CPT | Performed by: PREVENTIVE MEDICINE

## 2024-08-08 PROCEDURE — 36415 COLL VENOUS BLD VENIPUNCTURE: CPT | Performed by: PREVENTIVE MEDICINE

## 2024-08-08 PROCEDURE — 99214 OFFICE O/P EST MOD 30 MIN: CPT | Performed by: PREVENTIVE MEDICINE

## 2024-08-08 PROCEDURE — 1125F AMNT PAIN NOTED PAIN PRSNT: CPT | Performed by: PREVENTIVE MEDICINE

## 2024-08-08 PROCEDURE — 3074F SYST BP LT 130 MM HG: CPT | Performed by: PREVENTIVE MEDICINE

## 2024-08-08 PROCEDURE — 1159F MED LIST DOCD IN RCRD: CPT | Performed by: PREVENTIVE MEDICINE

## 2024-08-09 NOTE — PROGRESS NOTES
2 of your liver functions are elevated so lets stop the atorvastatin for the time being and repeat your liver functions again in 3 weeks.  Kidney function has decreased from 46-43-so lets stop the lisinopril and increase the metoprolol to 150 mg daily I would take 50 in the morning and 100 at night I have sent in the 50 mg pills.  Vitamin D is also decreased so take of 1000 units a couple of days per week.  Finally the cholesterol is not controlled with the atorvastatin anyway.  So continue to watch your saturated fats and increase your walking.  I have sent Farxiga to the pharmacy to see if we can improve your renal function and help decrease your all cause mortality.  Repeat fasting labs in 3 weeks.

## 2024-08-09 NOTE — PROGRESS NOTES
Subjective   Katie Reddy is a 79 y.o. female presents for   Chief Complaint   Patient presents with    Fatigue    Pain     In right leg up into hip       Health Maintenance Due   Topic Date Due    TDAP/TD VACCINES (1 - Tdap) Never done    RSV Vaccine - Adults (1 - 1-dose 60+ series) Never done    COVID-19 Vaccine (4 - 2023-24 season) 09/01/2023    INFLUENZA VACCINE  08/01/2024       Fatigue  Associated symptoms include arthralgias, fatigue, myalgias and neck pain.   Pain  Associated symptoms include arthralgias, fatigue, myalgias and neck pain.      History of Present Illness  The patient is a 79-year-old female who is here today for hypertension, GERD, with anorectal bleeding from gastrointestinal hemorrhage, history of CVA, hemorrhagic with residual deficit, hyperlipidemia, nonsustained ventricular tachycardia, osteopenia, body mass index of 24, palpitations, vitamin D deficiency, EGGstrom, coronary artery calcium score greater than 400, and asthma in an adult, mild intermittent, and right leg pain.    She has been experiencing right leg pain for over a month, without any preceding falls or injuries. The pain, which originates in her calf, radiates up to her knee and hip, accompanied by a burning sensation. She has no history of back issues. She is currently under the care of a nephrologist and a neurologist for kidney health. An EEG was performed at [facility], but she has not yet received the results. She has no history of concussion. She has a history of stroke and has previously consulted with Dr. Miramnotes for back problems. She experiences neck pain and takes gabapentin, which provides some relief but causes drowsiness. She takes one gabapentin pill when the pain is severe. Her urologist does not believe the pain is related to her kidneys. She has consulted with pain management and received injections in her right knee and right hip, but these did not provide relief. She experiences intermittent  "neck pain with movement, but it does not radiate into her legs. She has an InterStim device implanted for urinary incontinence, but it does not provide relief. She experiences episodes of everything blackening and nausea, but has not lost consciousness. She uses a walker for mobility.    She injured her left eye while working in her garden yesterday afternoon, resulting in a bruise. She has not seen an eye doctor. Her vision is normal.    Her asthma is well-managed. She denies chest pressure, heartburn, dark or red stools, or stroke symptoms.    Vitals:    08/08/24 1308 08/08/24 1309   BP: 127/78 117/78   BP Location: Left arm Right arm   Patient Position: Sitting Sitting   Cuff Size: Adult Adult   Pulse: 74 76   Temp: 97 °F (36.1 °C)    TempSrc: Temporal    SpO2: 98%    Weight: 57.4 kg (126 lb 9.6 oz)    Height: 151.8 cm (59.75\")      Body mass index is 24.93 kg/m².    Current Outpatient Medications on File Prior to Visit   Medication Sig Dispense Refill    amitriptyline (ELAVIL) 25 MG tablet Take 1 tablet by mouth Every Night. 90 tablet 3    amLODIPine (NORVASC) 10 MG tablet Take 0.5 tablets by mouth Daily. 90 tablet 3    aspirin 81 MG EC tablet Take 1 tablet by mouth Daily. 90 tablet 3    atorvastatin (LIPITOR) 80 MG tablet Take 1 tablet by mouth Daily.      betamethasone valerate (VALISONE) 0.1 % cream APPLY TOPICALLY TO THE APPROPRIATE AREA TWICE A DAY AS DIRECTED 45 g 0    Calcium Carbonate-Vit D-Min (Calcium 1200) 7912-0099 MG-UNIT chewable tablet Chew 1 tablet Daily.      Evolocumab (REPATHA) solution prefilled syringe injection Inject 1 mL under the skin into the appropriate area as directed Every 14 (Fourteen) Days. 6 mL 1    gabapentin (NEURONTIN) 300 MG capsule Take 1 capsule by mouth 2 (Two) Times a Day for 180 days. 60 capsule 5    hydrocortisone (CORTEF) 10 MG tablet Take 1 tablet by mouth 3 times a day. 270 tablet 1    lisinopril (PRINIVIL,ZESTRIL) 5 MG tablet TAKE 1 TABLET BY MOUTH EVERY DAY 90 " tablet 0    meclizine (ANTIVERT) 12.5 MG tablet Take 1 tablet by mouth 3 (Three) Times a Day As Needed for Dizziness.      metoprolol succinate XL (TOPROL-XL) 100 MG 24 hr tablet Take 1 tablet by mouth Daily. 90 tablet 3    nitroglycerin (NITROSTAT) 0.4 MG SL tablet Place 1 tablet under the tongue Every 5 (Five) Minutes As Needed for Chest Pain. Take no more than 3 doses in 15 minutes. 75 tablet 0    ondansetron ODT (ZOFRAN-ODT) 4 MG disintegrating tablet Place 1 tablet on the tongue Every 8 (Eight) Hours As Needed for Nausea or Vomiting.      pantoprazole (PROTONIX) 40 MG EC tablet Take 1 tablet by mouth Every Morning.      raloxifene (EVISTA) 60 MG tablet Take 1 tablet by mouth Daily. 90 tablet 3    sertraline (ZOLOFT) 100 MG tablet TAKE 1 TABLET BY MOUTH EVERY DAY AT NIGHT 90 tablet 1    sucralfate (CARAFATE) 1 g tablet Take 1 tablet by mouth 4 (Four) Times a Day.      Vibegron (Gemtesa) 75 MG tablet Take  by mouth.       No current facility-administered medications on file prior to visit.       The following portions of the patient's history were reviewed and updated as appropriate: allergies, current medications, past family history, past medical history, past social history, past surgical history, and problem list.    Review of Systems   Constitutional:  Positive for fatigue.   Cardiovascular:  Negative for palpitations.   Gastrointestinal:  Negative for anal bleeding and GERD.   Musculoskeletal:  Positive for arthralgias, back pain, gait problem, myalgias and neck pain.       Objective   Physical Exam  Vitals reviewed.   Constitutional:       General: She is not in acute distress.     Appearance: Normal appearance. She is well-developed. She is not ill-appearing or toxic-appearing.   HENT:      Head: Normocephalic and atraumatic.      Right Ear: Tympanic membrane, ear canal and external ear normal.      Left Ear: Tympanic membrane, ear canal and external ear normal.      Nose: Nose normal.      Mouth/Throat:       Mouth: Mucous membranes are moist.      Pharynx: No posterior oropharyngeal erythema.   Eyes:      Extraocular Movements: Extraocular movements intact.      Conjunctiva/sclera: Conjunctivae normal.      Pupils: Pupils are equal, round, and reactive to light.   Neck:      Vascular: No carotid bruit.   Cardiovascular:      Rate and Rhythm: Normal rate and regular rhythm.      Heart sounds: Normal heart sounds.   Pulmonary:      Effort: Pulmonary effort is normal.      Comments: Decreased breath sounds bilaterally no wheezes rales or rhonchi  Abdominal:      General: Bowel sounds are normal. There is no distension.      Palpations: Abdomen is soft. There is no mass.      Tenderness: There is no abdominal tenderness.   Musculoskeletal:         General: Tenderness present. No signs of injury.      Cervical back: Neck supple. No tenderness.      Right lower leg: No edema.      Left lower leg: No edema.      Comments: 's around L4-5 and sacral area straight leg raising was positive on the right and she did get pain into the right leg when left hip was extended.  Patient had a difficult time toe walking and heel walking but could do those squat was strong forward flexion was full extension was 25% right and left lateral range of motion 50%.  Soft touch sensation was decreased along the outer aspect of the right leg and fifth toe    Gesticulating freely with her neck  was strong   Lymphadenopathy:      Cervical: No cervical adenopathy.   Skin:     General: Skin is warm.   Neurological:      General: No focal deficit present.      Mental Status: She is alert and oriented to person, place, and time.   Psychiatric:         Mood and Affect: Mood normal.         Behavior: Behavior normal.       Physical Exam  Vital Signs  Body mass index is 24.    PHQ-9 Total Score:    Results  Imaging  EEG showed no seizure activity but showed some abnormality in the left back side. MRI in October showed an old injury to the left back  side.         Assessment & Plan   Diagnoses and all orders for this visit:    1. Essential hypertension (Primary)  -     Comprehensive Metabolic Panel  -     CBC Auto Differential    2. Agatston coronary artery calcium score greater than 400    3. Asthma in adult, mild intermittent, uncomplicated    4. Gastrointestinal hemorrhage associated with anorectal source  -     Magnesium    5. Gastroesophageal reflux disease with esophagitis, unspecified whether hemorrhage    6. History of hemorrhagic cerebrovascular accident (CVA) with residual deficit    7. Mixed hyperlipidemia  -     Lipid Panel    8. Non-sustained ventricular tachycardia    9. Osteopenia of multiple sites    10. Body mass index (BMI) of 24.0-24.9 in adult    11. Palpitations  -     Vitamin B12    12. Vitamin D deficiency  -     Vitamin D,25-Hydroxy    13. Right leg pain  -     Ambulatory Referral to Neurosurgery      Assessment & Plan  1. Right leg pain.  The pain appears to be originating from the back. Flexion and extension lumbar spine x-rays have been ordered in [place]. A referral has been made to Dr. Campo's office. Should Dr. Campo not recommend surgery, a referral to pain management will be considered.    2. Left eye pain.  The redness in her left eye may be due to bruising of the eyelid. An appointment with an ophthalmologist has been scheduled.    3. Possible stroke.  An EEG revealed some abnormality in the left back side, but no seizure activity. An MRI in 10/2023 revealed an old injury to the left back side, suggesting a possible stroke.    4. Osteopenia.  She has been advised to walk for 20 minutes daily.    5. Asthma.    6. Health maintenance.  Her blood pressure is within normal range today. She has been advised to monitor her cholesterol levels. Fasting labs have been ordered.    Follow-up  A follow-up appointment is scheduled in 3 months.    Patient Instructions     Health Maintenance Due   Topic Date Due    TDAP/TD VACCINES (1 -  Tdap) Never done    RSV Vaccine - Adults (1 - 1-dose 60+ series) Never done    COVID-19 Vaccine (4 - 2023-24 season) 09/01/2023    INFLUENZA VACCINE  08/01/2024    Patient to call NP Kartik and get back to see her over next few weeks.    Patient to call Xray to schedule flexion and extension low back xrays.    Patient to set up her eye doctor appointment next day or two  for Left eye contusion       Patient or patient representative verbalized consent for the use of Ambient Listening during the visit with  Nayeli Tony MD for chart documentation. 8/9/2024  07:49 EDT

## 2024-08-26 NOTE — PROGRESS NOTES
Subjective     Chief Complaint   Patient presents with    Leg Pain         Previous Treatment: Physical therapy, gabapentin    HPI: Katie Reddy is a 79 y.o. female with a history of bladder incontinence and osteopenia who presents with complaints of pain numbness and tingling in her right leg for the past year.  The symptoms started without injury or inciting event.  Symptoms traverse along the anterior aspect of the right leg down into her foot.  She has occasional pain in her right lower back and right hip as well.  She has no symptoms going down the back of her leg and no lower extremity weakness.  She tried physical therapy earlier this year with no lasting relief.  She currently takes gabapentin which seems to help some however it makes her drowsy.        PMH:  Past Medical History:   Diagnosis Date    Arthritis     Bladder incontinence     Colon polyp     Glucocorticoid deficiency     Intractable nausea and vomiting 11/11/2020    Osteopenia     Vitamin D deficiency          Current Outpatient Medications:     amitriptyline (ELAVIL) 25 MG tablet, Take 1 tablet by mouth Every Night., Disp: 90 tablet, Rfl: 3    amLODIPine (NORVASC) 10 MG tablet, Take 0.5 tablets by mouth Daily., Disp: 90 tablet, Rfl: 3    aspirin 81 MG EC tablet, Take 1 tablet by mouth Daily., Disp: 90 tablet, Rfl: 3    atorvastatin (LIPITOR) 80 MG tablet, Take 1 tablet by mouth Daily., Disp: , Rfl:     betamethasone valerate (VALISONE) 0.1 % cream, APPLY TOPICALLY TO THE APPROPRIATE AREA TWICE A DAY AS DIRECTED, Disp: 45 g, Rfl: 0    Calcium Carbonate-Vit D-Min (Calcium 1200) 7292-0289 MG-UNIT chewable tablet, Chew 1 tablet Daily., Disp: , Rfl:     Evolocumab (REPATHA) solution prefilled syringe injection, Inject 1 mL under the skin into the appropriate area as directed Every 14 (Fourteen) Days., Disp: 6 mL, Rfl: 1    Farxiga 5 MG tablet tablet, Take 1 tablet by mouth Daily., Disp: 30 tablet, Rfl: 1    gabapentin (NEURONTIN) 300 MG  capsule, Take 1 capsule by mouth 2 (Two) Times a Day for 180 days., Disp: 60 capsule, Rfl: 5    hydrocortisone (CORTEF) 10 MG tablet, Take 1 tablet by mouth 3 times a day., Disp: 270 tablet, Rfl: 1    lisinopril (PRINIVIL,ZESTRIL) 5 MG tablet, TAKE 1 TABLET BY MOUTH EVERY DAY, Disp: 90 tablet, Rfl: 0    meclizine (ANTIVERT) 12.5 MG tablet, Take 1 tablet by mouth 3 (Three) Times a Day As Needed for Dizziness., Disp: , Rfl:     metoprolol succinate XL (TOPROL-XL) 100 MG 24 hr tablet, Take 1 tablet by mouth Daily., Disp: 90 tablet, Rfl: 3    nitroglycerin (NITROSTAT) 0.4 MG SL tablet, Place 1 tablet under the tongue Every 5 (Five) Minutes As Needed for Chest Pain. Take no more than 3 doses in 15 minutes., Disp: 75 tablet, Rfl: 0    ondansetron ODT (ZOFRAN-ODT) 4 MG disintegrating tablet, Place 1 tablet on the tongue Every 8 (Eight) Hours As Needed for Nausea or Vomiting., Disp: , Rfl:     pantoprazole (PROTONIX) 40 MG EC tablet, Take 1 tablet by mouth Every Morning., Disp: , Rfl:     raloxifene (EVISTA) 60 MG tablet, Take 1 tablet by mouth Daily., Disp: 90 tablet, Rfl: 3    sertraline (ZOLOFT) 100 MG tablet, TAKE 1 TABLET BY MOUTH EVERY DAY AT NIGHT, Disp: 90 tablet, Rfl: 1    sucralfate (CARAFATE) 1 g tablet, Take 1 tablet by mouth 4 (Four) Times a Day., Disp: , Rfl:     Vibegron (Gemtesa) 75 MG tablet, Take  by mouth., Disp: , Rfl:      Allergies   Allergen Reactions    Trazodone Irritability    Dayvigo [Lemborexant] Other (See Comments)     Sleep walking    Ibandronic Acid GI Intolerance     GERD        Past Surgical History:   Procedure Laterality Date    APPENDECTOMY      BLADDER SURGERY      bladder stimulator placement/ REMOVAL    BREAST CYST EXCISION      BREAST LUMPECTOMY Left 1973    NEG    BUNIONECTOMY Right 05/29/2020    Procedure: AP WHITNEY;  Surgeon: MARISSA mE DPM;  Location: [facility] MAIN OR;  Service: Podiatry;  Laterality: Right;    BUNIONECTOMY Left 12/30/2022    Procedure:  BUNIONECTOMY DEVONTE -MINIMALLY INVASIVE with distal metatarsal osteotomy and internal fixation;  Surgeon: MARISSA Em DPM;  Location: [facility] MAIN OR;  Service: Podiatry;  Laterality: Left;    CARDIAC CATHETERIZATION      CARDIAC CATHETERIZATION N/A 02/08/2022    Procedure: Left Heart Cath, possible pci;  Surgeon: Master Richard MD;  Location: [facility] CATH INVASIVE LOCATION;  Service: Cardiovascular;  Laterality: N/A;    CARDIAC CATHETERIZATION N/A 4/16/2024    Procedure: Left Heart Cath;  Surgeon: Master Richard MD;  Location: [facility] CATH INVASIVE LOCATION;  Service: Cardiovascular;  Laterality: N/A;    CARPAL TUNNEL RELEASE Right 1980    CHOLECYSTECTOMY      COLON SURGERY      hemorrhoid banding    COLONOSCOPY  2017 2017/ 2019 = jessica DESAI 2024   Dr. Mackey    COLONOSCOPY N/A 02/05/2022    NEG= 2022    ENDOSCOPY N/A 02/05/2022 2022EGD= erosive esophagitis, hiatal hernia, Nonerosive gastritis    EYE SURGERY      HERNIA REPAIR      Umbilical removal    HYSTERECTOMY  1970    SUBTOTAL HYSTERECTOMY      UMBILICAL HERNIA REPAIR          Family History   Problem Relation Age of Onset    Heart disease Mother     Hypertension Mother     Diabetes Father     Heart disease Father     Alcohol abuse Father     Hypertension Father     Diabetes Brother     Heart disease Brother     Cancer Brother 68        Prostate Cancer    Hypertension Brother     Diabetes Maternal Aunt     Heart disease Maternal Grandmother     Hypertension Maternal Grandmother     Heart disease Maternal Grandfather     Hypertension Maternal Grandfather     Liver disease Paternal Grandmother     Hypertension Paternal Grandmother     Heart disease Paternal Grandfather     Hypertension Paternal Grandfather     Dementia Sister     Diabetes Brother          Social Hx:  Social History     Tobacco Use   Smoking Status Never    Passive exposure: Never   Smokeless Tobacco Never      Alcohol Use: Not At Risk (1/26/2021)    AUDIT-C      "Frequency of Alcohol Consumption: Never     Average Number of Drinks: Not on file     Frequency of Binge Drinking: Not on file      Social History     Substance and Sexual Activity   Drug Use Not Currently          Review of Systems   Constitutional:  Positive for activity change.   HENT: Negative.     Eyes: Negative.    Respiratory: Negative.     Cardiovascular: Negative.    Gastrointestinal: Negative.    Endocrine: Negative.    Genitourinary: Negative.    Musculoskeletal:  Positive for arthralgias, back pain, myalgias, neck pain and neck stiffness.   Skin: Negative.    Allergic/Immunologic: Negative.    Neurological:  Positive for weakness (right sided) and numbness (+tingling in arms and hands).        Right side pain from her neck to her ankle   Burning around right hip    Psychiatric/Behavioral:  Positive for sleep disturbance.          Objective     /65   Pulse 76   Resp 18   Ht 151.8 cm (59.75\")   Wt 58.1 kg (128 lb)   SpO2 98%   BMI 25.21 kg/m²    Body mass index is 25.21 kg/m².         Neurologic Exam     Motor Exam   Muscle bulk: normal    Strength   Strength 5/5 except as noted.   Right iliopsoas: 5/5  Left iliopsoas: 5/5  Right quadriceps: 5/5  Left quadriceps: 5/5  Right anterior tibial: 5/5  Left anterior tibial: 5/5  Right gastroc: 5/5  Left gastroc: 5/5    Sensory Exam   Light touch normal.     Gait, Coordination, and Reflexes     Gait  Gait: normal    Reflexes   Right patellar: 2+  Left patellar: 2+  Right achilles: 2+  Left achilles: 2+          Results Review  I personally reviewed and interpreted the images from the following studies:    X-ray lumbar spine with flexion and extension views  Chronic degenerative changes most notable at L4-5 and L5-S1.  There is grade 1 anterolisthesis of L4 and L5 that is not significantly changed in flexion or extension views.    MRI lumbar spine without contrast  Mild chronic degenerative changes throughout the lumbar spine with varying degrees of " mild to moderate central stenosis.  Changes are most notable from L4-S1 where there is mild foraminal stenosis bilaterally.  No high-grade central or foraminal stenosis seen      Assessment & Plan     MDM: Katie Reddy is a 79 y.o. female who presents with pain numbness and tingling down her right leg for the past year.  Symptoms go down the front of her right leg do not follow a classic dermatomal pattern.  She has mild to moderate degenerative changes in the lumbar spine with fixed grade 1 spondylolisthesis at L4-5.  There is not appear to be any high-grade foraminal stenosis to explain her symptoms.  She has no focal neurologic deficits.  She has failed physical therapy and medication management thus far.    Given her failure of conservative management and lack of compressive pathology on imaging, I recommend an EMG nerve conduction study of the right lower extremity.  I also recommend patient establish care with pain management as she states she is not interested in surgical treatment options.  Could potentially benefit from injection therapy.  I will see patient again after completing the EMG.  She is agreeable to this plan.         Diagnosis Plan   1. Idiopathic peripheral neuropathy  EMG Right Leg    Nerve Conduction Test Right Leg          No follow-ups on file.      Katie Reddy  reports that she has never smoked. She has never been exposed to tobacco smoke. She has never used smokeless tobacco.        BMI is within normal parameters. No other follow-up for BMI required.         This patient was examined wearing appropriate personal protective equipment.            Antione Leigh PA-C    09/09/24  13:42 EDT      Part of this note may be an electronic transcription/translation of spoken language to printed text using the Dragon Dictation System.

## 2024-08-28 DIAGNOSIS — E87.5 HYPERKALEMIA: Primary | ICD-10-CM

## 2024-08-28 RX ORDER — AMLODIPINE BESYLATE 10 MG/1
5 TABLET ORAL DAILY
Qty: 90 TABLET | Refills: 3 | Status: SHIPPED | OUTPATIENT
Start: 2024-08-28

## 2024-08-28 NOTE — TELEPHONE ENCOUNTER
Last potassium was somewhat elevated I think that we discussed that with you several days ago please get the repeat on the potassium as soon as possible so we can refill your amlodipine.

## 2024-08-28 NOTE — TELEPHONE ENCOUNTER
Rx Refill Note  Requested Prescriptions     Pending Prescriptions Disp Refills    amLODIPine (NORVASC) 10 MG tablet 90 tablet 3     Sig: Take 0.5 tablets by mouth Daily.      Last office visit with prescribing clinician: 8/8/2024   Last telemedicine visit with prescribing clinician: Visit date not found   Next office visit with prescribing clinician: 11/11/2024                         Would you like a call back once the refill request has been completed: [] Yes [] No    If the office needs to give you a call back, can they leave a voicemail: [] Yes [] No    Karen Real MA  08/28/24, 13:55 EDT

## 2024-08-28 NOTE — TELEPHONE ENCOUNTER
Caller: Katie Reddy    Relationship: Self    Best call back number: [phone]     Requested Prescriptions:   Requested Prescriptions     Pending Prescriptions Disp Refills    amLODIPine (NORVASC) 10 MG tablet 90 tablet 3     Sig: Take 0.5 tablets by mouth Daily.        Pharmacy where request should be sent: [facility]ACY #[id number] - [place], [place] [address] - [phone]  - [phone] FX     Last office visit with prescribing clinician: 8/8/2024   Last telemedicine visit with prescribing clinician: Visit date not found   Next office visit with prescribing clinician: 11/11/2024     Does the patient have less than a 3 day supply:  [] Yes  [x] No    Muriel Bishop Rep   08/28/24 13:30 EDT

## 2024-08-29 ENCOUNTER — LAB (OUTPATIENT)
Dept: FAMILY MEDICINE CLINIC | Facility: CLINIC | Age: 79
End: 2024-08-29
Payer: MEDICARE

## 2024-08-29 DIAGNOSIS — R94.5 LIVER FUNCTION ABNORMALITY: Primary | ICD-10-CM

## 2024-08-29 LAB
ALBUMIN SERPL-MCNC: 3.9 G/DL (ref 3.5–5.2)
ALBUMIN/GLOB SERPL: 1.6 G/DL
ALP SERPL-CCNC: 99 U/L (ref 39–117)
ALT SERPL W P-5'-P-CCNC: 11 U/L (ref 1–33)
ANION GAP SERPL CALCULATED.3IONS-SCNC: 11.3 MMOL/L (ref 5–15)
AST SERPL-CCNC: 18 U/L (ref 1–32)
BILIRUB SERPL-MCNC: 0.2 MG/DL (ref 0–1.2)
BUN SERPL-MCNC: 17 MG/DL (ref 8–23)
BUN/CREAT SERPL: 14.2 (ref 7–25)
CALCIUM SPEC-SCNC: 9.9 MG/DL (ref 8.6–10.5)
CHLORIDE SERPL-SCNC: 106 MMOL/L (ref 98–107)
CO2 SERPL-SCNC: 22.7 MMOL/L (ref 22–29)
CREAT SERPL-MCNC: 1.2 MG/DL (ref 0.57–1)
EGFRCR SERPLBLD CKD-EPI 2021: 46.4 ML/MIN/1.73
GLOBULIN UR ELPH-MCNC: 2.5 GM/DL
GLUCOSE SERPL-MCNC: 102 MG/DL (ref 65–99)
POTASSIUM SERPL-SCNC: 4.7 MMOL/L (ref 3.5–5.2)
POTASSIUM SERPL-SCNC: 4.7 MMOL/L (ref 3.5–5.2)
PROT SERPL-MCNC: 6.4 G/DL (ref 6–8.5)
SODIUM SERPL-SCNC: 140 MMOL/L (ref 136–145)

## 2024-08-29 PROCEDURE — 80053 COMPREHEN METABOLIC PANEL: CPT | Performed by: PREVENTIVE MEDICINE

## 2024-08-29 PROCEDURE — 84132 ASSAY OF SERUM POTASSIUM: CPT | Performed by: PREVENTIVE MEDICINE

## 2024-08-29 PROCEDURE — 36415 COLL VENOUS BLD VENIPUNCTURE: CPT | Performed by: PREVENTIVE MEDICINE

## 2024-08-30 ENCOUNTER — TELEPHONE (OUTPATIENT)
Dept: FAMILY MEDICINE CLINIC | Facility: CLINIC | Age: 79
End: 2024-08-30
Payer: MEDICARE

## 2024-08-30 RX ORDER — DAPAGLIFLOZIN 5 MG/1
5 TABLET, FILM COATED ORAL DAILY
Qty: 30 TABLET | Refills: 1 | Status: SHIPPED | OUTPATIENT
Start: 2024-08-30

## 2024-08-30 NOTE — TELEPHONE ENCOUNTER
Katie eRddy notified and voiced comprehension and understanding.  Patient stated her appointment with nephrology is in November.  She is willing to try either medicine.

## 2024-08-30 NOTE — PROGRESS NOTES
Blood sugar was 102 so watch her carbs and keep up your walking if this was a fasting test.  Kidney function has improved slightly from 43-46 but would advise avoiding ibuprofen Aleve Motrin and limiting x-ray dyes.  Make sure you keep your follow-up with nephrology and can refer you if you do not have a recheck scheduled.  Also would like you to consider a medicine such as Jardiance or Farxiga to help improve kidney function.  I would consider sending 1 of those to the pharmacy and if your co-pay is too high then we would be glad to send the other medication please let us know if you agree

## 2024-08-30 NOTE — TELEPHONE ENCOUNTER
RELAY----- Message from Nayeli Tony sent at 8/30/2024  8:00 AM EDT -----  Blood sugar was 102 so watch her carbs and keep up your walking if this was a fasting test.  Kidney function has improved slightly from 43-46 but would advise avoiding ibuprofen Aleve Motrin and limiting x-ray dyes.  Make sure you keep your follow-up with nephrology and can refer you if you do not have a recheck scheduled.  Also would like you to consider a medicine such as Jardiance or Farxiga to help improve kidney function.  I would consider sending 1 of those to the pharmacy and if your co-pay is too high then we would be glad to send the other medication please let us know if you agree

## 2024-09-03 ENCOUNTER — HOSPITAL ENCOUNTER (OUTPATIENT)
Dept: GENERAL RADIOLOGY | Facility: HOSPITAL | Age: 79
Discharge: HOME | End: 2024-09-03
Admitting: PREVENTIVE MEDICINE
Payer: MEDICARE

## 2024-09-03 ENCOUNTER — TELEPHONE (OUTPATIENT)
Dept: FAMILY MEDICINE CLINIC | Facility: CLINIC | Age: 79
End: 2024-09-03
Payer: MEDICARE

## 2024-09-03 DIAGNOSIS — M43.10 ACQUIRED SPONDYLOLISTHESIS: ICD-10-CM

## 2024-09-03 DIAGNOSIS — M43.10 ACQUIRED SPONDYLOLISTHESIS: Primary | ICD-10-CM

## 2024-09-03 PROCEDURE — 72114 X-RAY EXAM L-S SPINE BENDING: CPT

## 2024-09-03 NOTE — TELEPHONE ENCOUNTER
PREVIOUSLY ORDERED BY DR MARTINEZ- ORDERED CANCELED SINCE PROVIDER GONE- PATIENT WAITING AT [facility] FOR TEST.

## 2024-09-05 ENCOUNTER — TELEPHONE (OUTPATIENT)
Dept: FAMILY MEDICINE CLINIC | Facility: CLINIC | Age: 79
End: 2024-09-05
Payer: MEDICARE

## 2024-09-05 NOTE — PROGRESS NOTES
Spine x-rays did show some curve to the right and some arthritis but no other obvious acute bony or disc space abnormality.  Continue follow-up as planned and if you are to start physical therapy we would be glad to put in an order call if any other questions or concerns

## 2024-09-05 NOTE — TELEPHONE ENCOUNTER
"Relay     \"Spine x-rays did show some curve to the right and some arthritis but no other obvious acute bony or disc space abnormality.  Continue follow-up as planned and if you are to start physical therapy we would be glad to put in an order call if any other questions or concerns\"                "

## 2024-09-08 PROCEDURE — 93298 REM INTERROG DEV EVAL SCRMS: CPT | Performed by: INTERNAL MEDICINE

## 2024-09-09 ENCOUNTER — OFFICE VISIT (OUTPATIENT)
Dept: NEUROSURGERY | Facility: CLINIC | Age: 79
End: 2024-09-09
Payer: MEDICARE

## 2024-09-09 VITALS
RESPIRATION RATE: 18 BRPM | DIASTOLIC BLOOD PRESSURE: 65 MMHG | WEIGHT: 128 LBS | HEIGHT: 60 IN | OXYGEN SATURATION: 98 % | SYSTOLIC BLOOD PRESSURE: 126 MMHG | HEART RATE: 76 BPM | BODY MASS INDEX: 25.13 KG/M2

## 2024-09-09 DIAGNOSIS — G60.9 IDIOPATHIC PERIPHERAL NEUROPATHY: Primary | ICD-10-CM

## 2024-09-09 PROCEDURE — 3078F DIAST BP <80 MM HG: CPT

## 2024-09-09 PROCEDURE — 3074F SYST BP LT 130 MM HG: CPT

## 2024-09-09 PROCEDURE — 99214 OFFICE O/P EST MOD 30 MIN: CPT

## 2024-09-10 ENCOUNTER — PATIENT ROUNDING (BHMG ONLY) (OUTPATIENT)
Dept: NEUROSURGERY | Facility: CLINIC | Age: 79
End: 2024-09-10
Payer: MEDICARE

## 2024-09-18 RX ORDER — ASPIRIN 81 MG/1
81 TABLET, COATED ORAL DAILY
Qty: 90 TABLET | Refills: 3 | Status: SHIPPED | OUTPATIENT
Start: 2024-09-18

## 2024-09-19 RX ORDER — VIBEGRON 75 MG/1
75 TABLET, FILM COATED ORAL DAILY
Qty: 30 TABLET | Refills: 0 | Status: SHIPPED | OUTPATIENT
Start: 2024-09-19

## 2024-09-23 RX ORDER — LISINOPRIL 5 MG/1
5 TABLET ORAL DAILY
Qty: 90 TABLET | Refills: 0 | Status: SHIPPED | OUTPATIENT
Start: 2024-09-23

## 2024-09-30 ENCOUNTER — TELEPHONE (OUTPATIENT)
Dept: NEUROSURGERY | Facility: CLINIC | Age: 79
End: 2024-09-30
Payer: MEDICARE

## 2024-09-30 NOTE — TELEPHONE ENCOUNTER
The patients pharmacy in her rehab called asking us to fax over the last office note to provide why elizabeth had sent in a medication for her to take. I don't see he gave her anything.

## 2024-10-17 NOTE — PROGRESS NOTES
EMG and Nerve Conduction Studies    Patient Name: Katie Reddy    Date of Study:10/123/24    Referring Provider:WILL HINTON    History:    This patient is a 79-year-old female who complains of intermittent paresthesias of the right arm and right leg with similar symptoms in the arm and leg she denies significant symptoms in the left leg.  The symptoms apparently began several months ago.      Results:    The complete report includes the data sheets.     Prior to starting the procedure, the patient's identity was verified, pertinent available records were reviewed, the nature of the procedure was explained, the appropriate site of the exam were confirmed directly with the patient, and a pre-procedure pause was performed for final verification of all the above.    1.  The right sural sensory nerve conduction study is normal.  The right medial plantar study is essentially normal given the patient's age of 79 years with normal distal latency and slightly reduced peak-to-peak amplitude.    2.  The right peroneal and right tibial motor nerve conduction studies are normal with normal corresponding F-wave latencies.    3.  The right vastus lateralis, tibialis anterior, peroneus longus, abductor hallucis, and gastrocnemius muscles were normal.    Impression:    This is a normal study.  There is no evidence of significant sensorimotor neuropathy affecting the large diameter fast conducting fibers evaluated by this technique.  Electromyography of the muscles examined in the right L3-S1 myotomes were normal.      Electronically signed by :    Joseph Seipel, M.D.  October 23, 2024

## 2024-10-23 ENCOUNTER — PROCEDURE VISIT (OUTPATIENT)
Dept: NEUROLOGY | Facility: CLINIC | Age: 79
End: 2024-10-23
Payer: MEDICARE

## 2024-10-23 VITALS
WEIGHT: 128 LBS | BODY MASS INDEX: 25.13 KG/M2 | HEIGHT: 60 IN | HEART RATE: 78 BPM | DIASTOLIC BLOOD PRESSURE: 81 MMHG | SYSTOLIC BLOOD PRESSURE: 129 MMHG

## 2024-10-23 DIAGNOSIS — G60.9 IDIOPATHIC PERIPHERAL NEUROPATHY: ICD-10-CM

## 2024-10-23 DIAGNOSIS — R20.2 PARESTHESIA OF RIGHT ARM AND LEG: Primary | ICD-10-CM

## 2024-10-29 NOTE — PROGRESS NOTES
Subjective     Chief Complaint   Patient presents with    Back Pain       HPI: Katie Reddy is a 79 y.o. female with a history of bladder incontinence and osteopenia who presents with complaints of pain numbness and tingling in her right leg for the past year.  The symptoms started without injury or inciting event.  Symptoms traverse along the anterior aspect of the right leg down into her foot.  She has occasional pain in her right lower back and right hip as well.  She has no symptoms going down the back of her leg and no lower extremity weakness.  She tried physical therapy earlier this year with no lasting relief.  She currently takes gabapentin which seems to help some however it makes her drowsy.     Interval history: 11/4/2024  Patient reports no significant changes since her last evaluation.  She primarily complains of polyarthralgia today, especially in her knees.  She continues to have pain in her right buttock going into her proximal leg and points to her right hip and SI joint.  She denies weakness in her extremities.  No other new complaints.        Previous Treatment: Physical therapy, gabapentin, injections with pain management    Previous Injections: none    Previous Neurosurgery: none      PAST MEDICAL HISTORY:    The following portions of the patient's history were reviewed and updated as appropriate: allergies, current medications, past family history, past medical history, past social history, past surgical history, and problem list.      Review of Systems   Constitutional:  Positive for activity change.   HENT: Negative.     Eyes: Negative.    Respiratory: Negative.     Cardiovascular: Negative.    Gastrointestinal: Negative.    Endocrine: Negative.    Genitourinary:  Positive for urgency (in shital and bladder).   Musculoskeletal:  Positive for arthralgias, back pain and myalgias.   Skin: Negative.    Neurological:  Positive for numbness (+tingling in her left shoulder and right hip  "sometimes).        Burning pain is in her lower back on right side   Her hands Lauro and she can't write anymore as much    Hematological: Negative.    Psychiatric/Behavioral:  Positive for sleep disturbance.          Objective     /78   Pulse 74   Resp 18   Ht 151.8 cm (59.75\")   Wt 57.6 kg (127 lb)   SpO2 100%   BMI 25.01 kg/m²    Body mass index is 25.01 kg/m².      Physical Exam  Vitals reviewed.   Constitutional:       General: She is not in acute distress.     Appearance: Normal appearance. She is well-developed and well-groomed.   Pulmonary:      Effort: Pulmonary effort is normal. No respiratory distress.   Skin:     General: Skin is warm and dry.   Neurological:      Deep Tendon Reflexes:      Reflex Scores:       Patellar reflexes are 2+ on the right side and 2+ on the left side.       Achilles reflexes are 2+ on the right side and 2+ on the left side.     Comments:             Neurological Exam    Motor   Normal muscle tone. Strength is 5/5 in all four extremities except as noted.                                             Right                     Left   Iliopsoas                               5                          5   Quadriceps                           5                          5   Gastrocnemius                     5                           5   Anterior tibialis                      5                          5    Sensory  Light touch is normal in upper and lower extremities. Pinprick is normal in upper and lower extremities.     Reflexes  Deep tendon reflexes are 2+ and symmetric except as noted.                                            Right                      Left  Patellar                                2+                         2+  Achilles                                2+                         2+    Gait  Casual gait is normal including stance, stride, and arm swing.            Results Review  I personally reviewed and interpreted the images from the following " studies:    EMG nerve conduction study right lower extremity  This is a normal study. There is no evidence of significant sensorimotor neuropathy affecting the large diameter fast conducting fibers evaluated by this technique. Electromyography of the muscles examined in the right L3-S1 myotomes were normal       Assessment & Plan     MDM: Katie Reddy is a 79 y.o. female presenting with polyarthralgia as well as pain localized to her right lower back and proximal leg.  She has no focal neurologic deficits on exam.  Imaging is relatively unremarkable and shows no significant stenosis in the lumbar spine.  EMG shows no significant sensorimotor neuropathy.  She does not have positive provocative maneuvers on exam for SI joint dysfunction.  She has failed extensive conservative measures including physical therapy, medication management, and previous injections.    At this point I do not appreciate any surgical etiology for patient's symptoms.  I discussed various options moving forward including diagnostic SI joint injection as well as spinal cord stimulator trial.  Patient not interested in either option at the moment and wishes to focus on her polyarthralgia.  I recommend patient follow-up with PCP in this regard.  She may see me as needed moving forward.  Patient is agreeable to this plan.       Diagnosis Plan   1. Polyarthralgia            Return if symptoms worsen or fail to improve.      Katie Reddy  reports that she has never smoked. She has never been exposed to tobacco smoke. She has never used smokeless tobacco.                   This patient was examined wearing appropriate personal protective equipment.            Antione Leigh PA-C    11/04/24  13:54 EST      Part of this note may be an electronic transcription/translation of spoken language to printed text using the Dragon Dictation System.

## 2024-11-04 ENCOUNTER — OFFICE VISIT (OUTPATIENT)
Dept: NEUROSURGERY | Facility: CLINIC | Age: 79
End: 2024-11-04
Payer: MEDICARE

## 2024-11-04 VITALS
SYSTOLIC BLOOD PRESSURE: 131 MMHG | DIASTOLIC BLOOD PRESSURE: 78 MMHG | BODY MASS INDEX: 24.94 KG/M2 | OXYGEN SATURATION: 100 % | HEIGHT: 60 IN | HEART RATE: 74 BPM | WEIGHT: 127 LBS | RESPIRATION RATE: 18 BRPM

## 2024-11-04 DIAGNOSIS — M25.50 POLYARTHRALGIA: Primary | ICD-10-CM

## 2024-11-04 PROCEDURE — 3078F DIAST BP <80 MM HG: CPT

## 2024-11-04 PROCEDURE — 3075F SYST BP GE 130 - 139MM HG: CPT

## 2024-11-04 PROCEDURE — 99213 OFFICE O/P EST LOW 20 MIN: CPT

## 2024-11-14 ENCOUNTER — TELEPHONE (OUTPATIENT)
Dept: FAMILY MEDICINE CLINIC | Facility: CLINIC | Age: 79
End: 2024-11-14
Payer: MEDICARE

## 2024-11-14 NOTE — TELEPHONE ENCOUNTER
Caller: Katie Reddy    Relationship: Self    Best call back number: [phone]    What is the medical concern/diagnosis: Rheumatoid arthritis    What specialty or service is being requested: Rheumatoid arthritis    What is the provider, practice or medical service name: [facility]ONE [facility]     What is the office location:     What is the office phone number:     Any additional details:     PATIENT STATED DR. KYLER SOLIS INFORMED HER TO ASK FOR A REFERRAL FOR HER ARTHRITIS.    PLEASE CALL TO ADVISE

## 2024-11-14 NOTE — TELEPHONE ENCOUNTER
Caller: Katie Reddy    Relationship: Self    Best call back number: [phone]    Requested Prescriptions:   Requested Prescriptions     Pending Prescriptions Disp Refills    meclizine (ANTIVERT) 12.5 MG tablet       Sig: Take 1 tablet by mouth 3 (Three) Times a Day As Needed for Dizziness.    ondansetron ODT (ZOFRAN-ODT) 4 MG disintegrating tablet       Sig: Place 1 tablet on the tongue Every 8 (Eight) Hours As Needed for Nausea or Vomiting.    sucralfate (CARAFATE) 1 g tablet       Sig: Take 1 tablet by mouth 4 (Four) Times a Day.        Pharmacy where request should be sent: [facility]/PHARMACY #[id number] - [place], [place] - [address] - [phone]  - [phone] FX     Last office visit with prescribing clinician: 8/8/2024   Last telemedicine visit with prescribing clinician: Visit date not found   Next office visit with prescribing clinician: 11/26/2024     Additional details provided by patient:     Does the patient have less than a 3 day supply:  [x] Yes  [] No    Would you like a call back once the refill request has been completed: [] Yes [] No    If the office needs to give you a call back, can they leave a voicemail: [] Yes [] No    Muriel Story Rep   11/14/24 13:34 EST

## 2024-11-14 NOTE — TELEPHONE ENCOUNTER
Rx Refill Note  Requested Prescriptions     Pending Prescriptions Disp Refills    meclizine (ANTIVERT) 12.5 MG tablet       Sig: Take 1 tablet by mouth 3 (Three) Times a Day As Needed for Dizziness.    ondansetron ODT (ZOFRAN-ODT) 4 MG disintegrating tablet       Sig: Place 1 tablet on the tongue Every 8 (Eight) Hours As Needed for Nausea or Vomiting.    sucralfate (CARAFATE) 1 g tablet       Sig: Take 1 tablet by mouth 4 (Four) Times a Day.      Last office visit with prescribing clinician: 8/8/2024   Last telemedicine visit with prescribing clinician: Visit date not found   Next office visit with prescribing clinician: 11/14/2024                         Would you like a call back once the refill request has been completed: [] Yes [] No    If the office needs to give you a call back, can they leave a voicemail: [] Yes [] No    Karen Real MA  11/14/24, 13:57 EST

## 2024-11-15 ENCOUNTER — LAB (OUTPATIENT)
Dept: FAMILY MEDICINE CLINIC | Facility: CLINIC | Age: 79
End: 2024-11-15
Payer: MEDICARE

## 2024-11-15 ENCOUNTER — TELEPHONE (OUTPATIENT)
Dept: FAMILY MEDICINE CLINIC | Facility: CLINIC | Age: 79
End: 2024-11-15
Payer: MEDICARE

## 2024-11-15 DIAGNOSIS — M13.0 POLYARTHRITIS: Primary | ICD-10-CM

## 2024-11-15 DIAGNOSIS — M13.0 POLYARTHRITIS: ICD-10-CM

## 2024-11-15 LAB
CHROMATIN AB SERPL-ACNC: 13 IU/ML (ref 0–14)
CK SERPL-CCNC: 47 U/L (ref 20–180)
CRP SERPL-MCNC: <0.3 MG/DL (ref 0–0.5)
ERYTHROCYTE [SEDIMENTATION RATE] IN BLOOD: 13 MM/HR (ref 0–30)

## 2024-11-15 PROCEDURE — 86431 RHEUMATOID FACTOR QUANT: CPT | Performed by: PREVENTIVE MEDICINE

## 2024-11-15 PROCEDURE — 36415 COLL VENOUS BLD VENIPUNCTURE: CPT

## 2024-11-15 PROCEDURE — 82550 ASSAY OF CK (CPK): CPT | Performed by: PREVENTIVE MEDICINE

## 2024-11-15 PROCEDURE — 86038 ANTINUCLEAR ANTIBODIES: CPT | Performed by: PREVENTIVE MEDICINE

## 2024-11-15 PROCEDURE — 85652 RBC SED RATE AUTOMATED: CPT | Performed by: PREVENTIVE MEDICINE

## 2024-11-15 PROCEDURE — 86140 C-REACTIVE PROTEIN: CPT | Performed by: PREVENTIVE MEDICINE

## 2024-11-15 PROCEDURE — 86200 CCP ANTIBODY: CPT | Performed by: PREVENTIVE MEDICINE

## 2024-11-15 RX ORDER — SUCRALFATE 1 G/1
1 TABLET ORAL 4 TIMES DAILY
Qty: 120 TABLET | Refills: 1 | Status: SHIPPED | OUTPATIENT
Start: 2024-11-15

## 2024-11-15 RX ORDER — MECLIZINE HCL 12.5 MG 12.5 MG/1
12.5 TABLET ORAL 3 TIMES DAILY PRN
Qty: 30 TABLET | Refills: 0 | Status: SHIPPED | OUTPATIENT
Start: 2024-11-15

## 2024-11-15 RX ORDER — ONDANSETRON 4 MG/1
4 TABLET, ORALLY DISINTEGRATING ORAL EVERY 8 HOURS PRN
Qty: 12 TABLET | Refills: 0 | Status: SHIPPED | OUTPATIENT
Start: 2024-11-15

## 2024-11-15 NOTE — TELEPHONE ENCOUNTER
Who diagnosed her with rheumatoid arthritis?  I do not see that in her list of problems.  I see that she has osteoarthritis.    I do not see that she has even had a sed rate and a C-reactive protein.  We would be glad to order some additional testing unless this is been done somewhere else and we just do not have that lab work available.  Please let us know.    If none is available I will put in some arthritic lab requests and then we will follow-up on those.  We do not have any records from Dr. Talbot when did you see him last and we will obtain records.

## 2024-11-15 NOTE — TELEPHONE ENCOUNTER
I now see note from physician assistant in regards to your complaints of arthritis.  I will put in some laboratory evaluations so that we can send you to an appropriate physician.

## 2024-11-15 NOTE — TELEPHONE ENCOUNTER
HUB TO RELAY:  I now see note from physician assistant in regards to your complaints of arthritis.  I will put in some laboratory evaluations so that we can send you to an appropriate physician.

## 2024-11-15 NOTE — TELEPHONE ENCOUNTER
Katie Reddy notified and voiced comprehension and understanding.  Patient will come today for labs.

## 2024-11-16 LAB — CCP IGA+IGG SERPL IA-ACNC: 6 UNITS (ref 0–19)

## 2024-11-18 ENCOUNTER — TELEPHONE (OUTPATIENT)
Dept: FAMILY MEDICINE CLINIC | Facility: CLINIC | Age: 79
End: 2024-11-18
Payer: MEDICARE

## 2024-11-18 DIAGNOSIS — M25.50 ARTHRALGIA, UNSPECIFIED JOINT: Primary | ICD-10-CM

## 2024-11-18 LAB — ANA SER QL: NEGATIVE

## 2024-11-18 NOTE — TELEPHONE ENCOUNTER
----- Message from Nayeli Tony sent at 11/18/2024  3:58 PM EST -----  All of the inflammatory arthritic tests were negative.  If you still wish to see the rheumatologist the staff will put in the referral and give you the phone number to call.

## 2024-11-18 NOTE — PROGRESS NOTES
All of the inflammatory arthritic tests were negative.  If you still wish to see the rheumatologist the staff will put in the referral and give you the phone number to call.

## 2024-11-20 ENCOUNTER — EXTERNAL PBMM DATA (OUTPATIENT)
Dept: PHARMACY | Facility: OTHER | Age: 79
End: 2024-11-20
Payer: MEDICARE

## 2024-11-21 RX ORDER — DAPAGLIFLOZIN 5 MG/1
5 TABLET, FILM COATED ORAL DAILY
Qty: 30 TABLET | Refills: 0 | Status: SHIPPED | OUTPATIENT
Start: 2024-11-21

## 2024-12-04 ENCOUNTER — TELEPHONE (OUTPATIENT)
Dept: NEUROLOGY | Facility: CLINIC | Age: 79
End: 2024-12-04

## 2024-12-04 NOTE — TELEPHONE ENCOUNTER
Caller: Katie Reddy    Relationship:  Self    Best call back number: [phone]    PATIENT CALLED REQUESTING TO CANCEL SAME DAY APPT.    Did the patient call AFTER the start time of their scheduled appointment?  []YES  [x]NO    Was the patient's appointment rescheduled? []YES  [x]NO    Any additional information:       PT TO CALL BACK TO RESCHEDULE AT ANOTHER TIME.

## 2024-12-05 ENCOUNTER — POP HEALTH PHARMACY (OUTPATIENT)
Dept: PHARMACY | Facility: OTHER | Age: 79
End: 2024-12-05
Payer: MEDICARE

## 2024-12-11 RX ORDER — DAPAGLIFLOZIN 5 MG/1
5 TABLET, FILM COATED ORAL DAILY
Qty: 30 TABLET | Refills: 0 | Status: SHIPPED | OUTPATIENT
Start: 2024-12-11

## 2024-12-12 RX ORDER — SUCRALFATE 1 G/1
1 TABLET ORAL 4 TIMES DAILY
Qty: 120 TABLET | Refills: 1 | Status: SHIPPED | OUTPATIENT
Start: 2024-12-12

## 2024-12-12 NOTE — TELEPHONE ENCOUNTER
Rx Refill Note  Requested Prescriptions     Pending Prescriptions Disp Refills    sucralfate (CARAFATE) 1 g tablet 120 tablet 1     Sig: Take 1 tablet by mouth 4 (Four) Times a Day.      Last office visit with prescribing clinician: 8/8/2024   Last telemedicine visit with prescribing clinician: Visit date not found   Next office visit with prescribing clinician: 12/13/2024                         Would you like a call back once the refill request has been completed: [] Yes [] No    If the office needs to give you a call back, can they leave a voicemail: [] Yes [] No    Karen Real MA  12/12/24, 11:11 EST

## 2024-12-18 RX ORDER — LISINOPRIL 5 MG/1
5 TABLET ORAL DAILY
Qty: 90 TABLET | Refills: 0 | Status: SHIPPED | OUTPATIENT
Start: 2024-12-18

## 2024-12-18 RX ORDER — SERTRALINE HYDROCHLORIDE 100 MG/1
100 TABLET, FILM COATED ORAL
Qty: 90 TABLET | Refills: 1 | Status: SHIPPED | OUTPATIENT
Start: 2024-12-18

## 2024-12-18 NOTE — PATIENT INSTRUCTIONS
Health Maintenance Due   Topic Date Due    TDAP/TD VACCINES (1 - Tdap) Never done    RSV Vaccine - Adults (1 - 1-dose 75+ series) Never done    INFLUENZA VACCINE  07/01/2024    COVID-19 Vaccine (4 - 2024-25 season) 09/01/2024    You are due for adacel Tdap vaccination. (provides protection against tetanus, diptheria and whooping cough) Please  get the immunization at your local pharmacy at your earliest convenience.  Please click on the link for more information about this vaccine.    https://www.cdc.gov/vaccines/vpd/dtap-tdap-td/public/index.html    Patient advised not to take Meclazine(antivert) within 2 days of seeing ear nose and throat doctor.    Patient to call [facility] and schedule recheck for Right sided pain and diarrhea.    12 hour fast for labs.

## 2024-12-19 ENCOUNTER — OFFICE VISIT (OUTPATIENT)
Dept: FAMILY MEDICINE CLINIC | Facility: CLINIC | Age: 79
End: 2024-12-19
Payer: MEDICARE

## 2024-12-19 VITALS
HEART RATE: 73 BPM | HEIGHT: 60 IN | WEIGHT: 127.6 LBS | TEMPERATURE: 97.3 F | OXYGEN SATURATION: 98 % | SYSTOLIC BLOOD PRESSURE: 119 MMHG | BODY MASS INDEX: 25.05 KG/M2 | DIASTOLIC BLOOD PRESSURE: 76 MMHG

## 2024-12-19 DIAGNOSIS — J45.20 ASTHMA IN ADULT, MILD INTERMITTENT, UNCOMPLICATED: Chronic | ICD-10-CM

## 2024-12-19 DIAGNOSIS — E55.9 VITAMIN D DEFICIENCY: Chronic | ICD-10-CM

## 2024-12-19 DIAGNOSIS — M85.89 OSTEOPENIA OF MULTIPLE SITES: ICD-10-CM

## 2024-12-19 DIAGNOSIS — K55.9 COLITIS, ISCHEMIC: ICD-10-CM

## 2024-12-19 DIAGNOSIS — I47.29 NON-SUSTAINED VENTRICULAR TACHYCARDIA: Chronic | ICD-10-CM

## 2024-12-19 DIAGNOSIS — K62.5 GASTROINTESTINAL HEMORRHAGE ASSOCIATED WITH ANORECTAL SOURCE: ICD-10-CM

## 2024-12-19 DIAGNOSIS — I69.30 HISTORY OF HEMORRHAGIC CEREBROVASCULAR ACCIDENT (CVA) WITH RESIDUAL DEFICIT: Chronic | ICD-10-CM

## 2024-12-19 DIAGNOSIS — K21.00 GASTROESOPHAGEAL REFLUX DISEASE WITH ESOPHAGITIS, UNSPECIFIED WHETHER HEMORRHAGE: ICD-10-CM

## 2024-12-19 DIAGNOSIS — R42 DIZZINESS: ICD-10-CM

## 2024-12-19 DIAGNOSIS — E66.3 OVERWEIGHT WITH BODY MASS INDEX (BMI) OF 25 TO 25.9 IN ADULT: ICD-10-CM

## 2024-12-19 DIAGNOSIS — R00.2 PALPITATIONS: ICD-10-CM

## 2024-12-19 DIAGNOSIS — E78.2 MIXED HYPERLIPIDEMIA: ICD-10-CM

## 2024-12-19 DIAGNOSIS — I10 ESSENTIAL HYPERTENSION: Primary | Chronic | ICD-10-CM

## 2024-12-19 DIAGNOSIS — R93.1 AGATSTON CORONARY ARTERY CALCIUM SCORE GREATER THAN 400: Chronic | ICD-10-CM

## 2024-12-19 PROCEDURE — 1159F MED LIST DOCD IN RCRD: CPT | Performed by: PREVENTIVE MEDICINE

## 2024-12-19 PROCEDURE — 1125F AMNT PAIN NOTED PAIN PRSNT: CPT | Performed by: PREVENTIVE MEDICINE

## 2024-12-19 PROCEDURE — 1160F RVW MEDS BY RX/DR IN RCRD: CPT | Performed by: PREVENTIVE MEDICINE

## 2024-12-19 PROCEDURE — 99214 OFFICE O/P EST MOD 30 MIN: CPT | Performed by: PREVENTIVE MEDICINE

## 2024-12-19 PROCEDURE — 3074F SYST BP LT 130 MM HG: CPT | Performed by: PREVENTIVE MEDICINE

## 2024-12-19 PROCEDURE — 3078F DIAST BP <80 MM HG: CPT | Performed by: PREVENTIVE MEDICINE

## 2024-12-19 RX ORDER — FLUOCINONIDE 0.5 MG/G
CREAM TOPICAL
COMMUNITY
Start: 2024-12-17

## 2024-12-19 NOTE — PROGRESS NOTES
Subjective   Katie Reddy is a 79 y.o. female presents for   Chief Complaint   Patient presents with    Dizziness     Dizzy spells. months       Health Maintenance Due   Topic Date Due    TDAP/TD VACCINES (1 - Tdap) Never done    RSV Vaccine - Adults (1 - 1-dose 75+ series) Never done    INFLUENZA VACCINE  07/01/2024    COVID-19 Vaccine (4 - 2024-25 season) 09/01/2024       Dizziness  Symptoms include abdominal pain.       History of Present Illness  The patient is a 79-year-old female who is here today to follow up on hypertension, dizziness, GERD, history of hemorrhagic CVA, hyperlipidemia, nonsustained ventricular tachycardia, osteopenia, palpitations, vitamin D deficiency, need for COVID-19 vaccine, need for influenza vaccine, Agatston coronary artery calcium score greater than 400, asthma, overweight with a body mass index of 25, and ischemic colitis.    She reports experiencing fluctuations in her blood pressure.    She has been experiencing episodes of dizziness for several months, characterized by a sensation of blacking out, necessitating her to sit down. She has had a couple of syncopal episodes, the most recent one occurring 3 months ago while exiting the shower. She did not fall during this episode as she was seated. She also reports difficulty breathing during these episodes, which typically occur in hot and humid environments. She experiences dizziness when turning her head, accompanied by significant pain on her right side. Her dizziness has remained stable over the past week. She took meclizine yesterday, which provided some relief from her dizziness.    She reports no issues with heartburn or swallowing. She has not observed any blood in her stool. She does not experience chest pressure or palpitations. She has not used nitroglycerin. She reports no ear pain or changes in hearing. She does not have dysphagia.    She has been under the care of Dr. Dawkins, an endocrinologist, who has been  monitoring her blood pressure and conducting regular blood work. She has been compliant with her steroid medication regimen, which she is instructed to double up on when ill.    She has been making dietary modifications to manage her cholesterol levels.    She maintains a daily walking routine of 20 minutes and takes vitamin D supplements.    Her asthma is well-controlled, and she does not report any respiratory distress.    She does not experience abdominal pain associated with her ischemic colitis.    She has been experiencing urinary incontinence for the past 5 years, which has not improved with InterStim therapy. She has been advised against paying a $ 120 bill by Medicaid and Medicare. She reports no dysuria or hematuria but struggles with urinary control.    She has been experiencing back pain, which makes it difficult for her to stand up. She has not sought pain management for this issue. She has been seeing a neurosurgeon for her back pain. She has been experiencing polyarthralgia. She has an upcoming appointment with Dr. Blake on 01/02/2024 to discuss potential infusions for her arthritis. She has not recently consulted a gastroenterologist but plans to do so. She has been receiving injections in both knees and right hip for an extended period, but they have not provided relief, so she has discontinued them. She experiences most of her discomfort on the right side.    Supplemental Information  She has been following up with a nephrologist, Dr. Green, and has a scheduled visit in 6 months. She has been adhering to her nephrologist's advice to maintain adequate hydration.    MEDICATIONS  Current: hydrocortisone, Repatha, atorvastatin, metoprolol, amitriptyline, gemtesa, meclizine    IMMUNIZATIONS  She had the influenza vaccine last year and the COVID-19 vaccine 2 years ago.    Vitals:    12/19/24 1446 12/19/24 1448   BP: 118/72 119/76   BP Location: Right arm Left arm   Patient Position: Sitting  "Sitting   Cuff Size: Adult Adult   Pulse: 73    Temp: 97.3 °F (36.3 °C)    TempSrc: Tympanic    SpO2: 98%    Weight: 57.9 kg (127 lb 9.6 oz)    Height: 151.8 cm (59.76\")      Body mass index is 25.12 kg/m².    Current Outpatient Medications on File Prior to Visit   Medication Sig Dispense Refill    amitriptyline (ELAVIL) 25 MG tablet Take 1 tablet by mouth Every Night. 90 tablet 3    amLODIPine (NORVASC) 10 MG tablet Take 0.5 tablets by mouth Daily. 90 tablet 3    Aspirin Low Dose 81 MG EC tablet TAKE 1 TABLET BY MOUTH EVERY DAY 90 tablet 3    betamethasone valerate (VALISONE) 0.1 % cream APPLY TOPICALLY TO THE APPROPRIATE AREA TWICE A DAY AS DIRECTED 45 g 0    Calcium Carbonate-Vit D-Min (Calcium 1200) 0109-3674 MG-UNIT chewable tablet Chew 1 tablet Daily.      Evolocumab (REPATHA) solution prefilled syringe injection Inject 1 mL under the skin into the appropriate area as directed Every 14 (Fourteen) Days. 6 mL 1    Farxiga 5 MG tablet tablet TAKE 1 TABLET BY MOUTH EVERY DAY 30 tablet 0    fluocinonide (LIDEX) 0.05 % cream       hydrocortisone (CORTEF) 10 MG tablet Take 1 tablet by mouth 3 times a day. 270 tablet 1    lisinopril (PRINIVIL,ZESTRIL) 5 MG tablet TAKE 1 TABLET BY MOUTH EVERY DAY 90 tablet 0    meclizine (ANTIVERT) 12.5 MG tablet Take 1 tablet by mouth 3 (Three) Times a Day As Needed for Dizziness. 30 tablet 0    metoprolol succinate XL (TOPROL-XL) 100 MG 24 hr tablet Take 1 tablet by mouth Daily. 90 tablet 3    nitroglycerin (NITROSTAT) 0.4 MG SL tablet Place 1 tablet under the tongue Every 5 (Five) Minutes As Needed for Chest Pain. Take no more than 3 doses in 15 minutes. 75 tablet 0    ondansetron ODT (ZOFRAN-ODT) 4 MG disintegrating tablet Place 1 tablet on the tongue Every 8 (Eight) Hours As Needed for Nausea or Vomiting. 12 tablet 0    pantoprazole (PROTONIX) 40 MG EC tablet Take 1 tablet by mouth Every Morning.      raloxifene (EVISTA) 60 MG tablet Take 1 tablet by mouth Daily. 90 tablet 3    " sertraline (ZOLOFT) 100 MG tablet TAKE 1 TABLET BY MOUTH EVERY DAY AT NIGHT 90 tablet 1    sucralfate (CARAFATE) 1 g tablet Take 1 tablet by mouth 4 (Four) Times a Day. 120 tablet 1    Vibegron (Gemtesa) 75 MG tablet Take 1 tablet by mouth Daily. 30 tablet 0    atorvastatin (LIPITOR) 80 MG tablet Take 1 tablet by mouth Daily. (Patient not taking: Reported on 12/19/2024)      gabapentin (NEURONTIN) 300 MG capsule Take 1 capsule by mouth 2 (Two) Times a Day for 180 days. 60 capsule 5     No current facility-administered medications on file prior to visit.       The following portions of the patient's history were reviewed and updated as appropriate: allergies, current medications, past family history, past medical history, past social history, past surgical history, and problem list.    Review of Systems   Gastrointestinal:  Positive for abdominal pain.   Neurological:  Positive for dizziness.       Objective   Physical Exam  Vitals reviewed.   Constitutional:       General: She is not in acute distress.     Appearance: Normal appearance. She is well-developed. She is not ill-appearing or toxic-appearing.   HENT:      Head: Normocephalic and atraumatic.      Right Ear: Tympanic membrane, ear canal and external ear normal.      Left Ear: Tympanic membrane, ear canal and external ear normal.      Nose: Nose normal.      Mouth/Throat:      Mouth: Mucous membranes are moist.      Pharynx: No posterior oropharyngeal erythema.   Eyes:      Extraocular Movements: Extraocular movements intact.      Conjunctiva/sclera: Conjunctivae normal.      Pupils: Pupils are equal, round, and reactive to light.      Comments: No nystagmus   Neck:      Vascular: No carotid bruit.   Cardiovascular:      Rate and Rhythm: Normal rate and regular rhythm.      Heart sounds: Normal heart sounds.   Pulmonary:      Effort: Pulmonary effort is normal.      Comments: Decreased bilaterally  Abdominal:      General: Bowel sounds are normal. There is  no distension.      Palpations: Abdomen is soft. There is no mass.      Tenderness: There is no abdominal tenderness.   Musculoskeletal:         General: Normal range of motion.      Cervical back: Neck supple. No tenderness.   Lymphadenopathy:      Cervical: No cervical adenopathy.   Skin:     General: Skin is warm.   Neurological:      General: No focal deficit present.      Mental Status: She is alert and oriented to person, place, and time.   Psychiatric:         Mood and Affect: Mood normal.         Behavior: Behavior normal.       Physical Exam  There is a little bit of wax in one ear. Throat appears normal.  Lungs were auscultated.  Heart rate and rhythm are normal.    Vital Signs  Blood pressure readings are 118/72 and 119/76.    PHQ-9 Total Score:    Results           Assessment & Plan   Diagnoses and all orders for this visit:    1. Essential hypertension (Primary)  -     CBC Auto Differential; Future  -     Comprehensive Metabolic Panel; Future    2. Agatston coronary artery calcium score greater than 400    3. Asthma in adult, mild intermittent, uncomplicated    4. Overweight with body mass index (BMI) of 25 to 25.9 in adult    5. Colitis, ischemic    6. Gastrointestinal hemorrhage associated with anorectal source  -     Magnesium; Future    7. Gastroesophageal reflux disease with esophagitis, unspecified whether hemorrhage    8. History of hemorrhagic cerebrovascular accident (CVA) with residual deficit    9. Mixed hyperlipidemia  -     Lipid Panel; Future    10. Non-sustained ventricular tachycardia    11. Osteopenia of multiple sites    12. Palpitations  -     TSH Rfx On Abnormal To Free T4; Future  -     Vitamin D,25-Hydroxy; Future    13. Vitamin D deficiency  -     Vitamin D,25-Hydroxy; Future    14. Dizziness  -     Ambulatory Referral to ENT (Otolaryngology)      Assessment & Plan  1. Hypertension.  Her blood pressure readings today were within the normal range at 118/72 and 119/76. She should  continue her current antihypertensive medications.    2. Dizziness.  She reports dizziness that has been ongoing for months, sometimes leading to near syncope, especially when getting up too quickly or in hot and moist environments like the shower. An ENT consultation will be arranged to investigate the potential link between her dizziness and ENT-related issues. An electrocardiogram (EKG) will be performed today. She is advised to discontinue meclizine 2 days prior to her ENT appointment to ensure accurate testing results.    3. Gastroesophageal reflux disease (GERD).  She reports that her heartburn is currently well-controlled. She should continue her current GERD management plan.    4. History of hemorrhagic cerebrovascular accident (CVA).  She reports no new stroke symptoms except for dizziness. She should continue her current medications and follow up with her neurologist as scheduled.    5. Hyperlipidemia.  She is currently taking Repatha and atorvastatin. She should continue these medications and monitor her lipid levels.    6. Nonsustained ventricular tachycardia.  She should continue her current medications and follow up with her cardiologist as scheduled.    7. Osteopenia.  She should continue her current management plan and follow up with her healthcare provider for bone density monitoring.    8. Palpitations.  She reports no chest pressure or heart flutters. She should continue her current medications and follow up with her cardiologist as scheduled.    9. Vitamin D deficiency.  She is currently taking vitamin D supplements. She should continue this supplementation and follow up with her healthcare provider for lab monitoring.    10. Need for COVID-19 and influenza vaccines.  She is due for both the COVID-19 and influenza vaccines. She is advised to return for these vaccinations when she feels better.    11. Agatston coronary artery calcium score greater than 400.  She should continue her current  medications and follow up with her cardiologist as scheduled.    12. Asthma.  She reports no trouble with her breathing except for shortness of breath during dizzy spells. She should continue her current asthma management plan.    13. Overweight with a body mass index of 25.  She should continue her current lifestyle modifications, including watching saturated fats, drinking plenty of fluids, and getting 20 minutes of walking daily.    14. Ischemic colitis.  She reports no abdominal pain. She should continue her current management plan and follow up with her gastroenterologist as scheduled.    15. Chronic kidney disease stage 3a.  She should continue her current medications and follow up with her nephrologist as scheduled.    16. Hypertensive renal disease.  She should continue her current medications and follow up with her nephrologist as scheduled.    17. Osteoarthritis.  She reports chronic pain on her right side. She should continue her current pain management plan and follow up with her healthcare provider for further evaluation.    18. Urinary incontinence.  She reports ongoing issues with urinary incontinence. She should continue her current medications, including Gemtesa, and follow up with her urologist as scheduled.    19. Medication management.  She is currently taking hydrocortisone 3 times a day, Repatha, atorvastatin, metoprolol, amitriptyline, Gemtesa, and meclizine. She should continue these medications as prescribed.    Follow-up  The patient is scheduled for a follow-up visit at the end of April 2025.    Patient Instructions     Health Maintenance Due   Topic Date Due    TDAP/TD VACCINES (1 - Tdap) Never done    RSV Vaccine - Adults (1 - 1-dose 75+ series) Never done    INFLUENZA VACCINE  07/01/2024    COVID-19 Vaccine (4 - 2024-25 season) 09/01/2024    You are due for adacel Tdap vaccination. (provides protection against tetanus, diptheria and whooping cough) Please  get the immunization at your  local pharmacy at your earliest convenience.  Please click on the link for more information about this vaccine.    https://www.cdc.gov/vaccines/vpd/dtap-tdap-td/public/index.html    Patient advised not to take Meclazine(antivert) within 2 days of seeing ear nose and throat doctor.    Patient to call [facility] and schedule recheck for Right sided pain and diarrhea.    12 hour fast for labs.           Patient or patient representative verbalized consent for the use of Ambient Listening during the visit with  Nayeli Tony MD for chart documentation. 12/19/2024  18:30 EST

## 2024-12-20 ENCOUNTER — LAB (OUTPATIENT)
Dept: FAMILY MEDICINE CLINIC | Facility: CLINIC | Age: 79
End: 2024-12-20
Payer: MEDICARE

## 2024-12-20 DIAGNOSIS — E78.2 MIXED HYPERLIPIDEMIA: ICD-10-CM

## 2024-12-20 DIAGNOSIS — R00.2 PALPITATIONS: ICD-10-CM

## 2024-12-20 DIAGNOSIS — K62.5 GASTROINTESTINAL HEMORRHAGE ASSOCIATED WITH ANORECTAL SOURCE: ICD-10-CM

## 2024-12-20 DIAGNOSIS — I10 ESSENTIAL HYPERTENSION: Chronic | ICD-10-CM

## 2024-12-20 DIAGNOSIS — E55.9 VITAMIN D DEFICIENCY: Chronic | ICD-10-CM

## 2024-12-20 LAB
25(OH)D3 SERPL-MCNC: 16.9 NG/ML (ref 30–100)
ALBUMIN SERPL-MCNC: 4 G/DL (ref 3.5–5.2)
ALBUMIN/GLOB SERPL: 1.5 G/DL
ALP SERPL-CCNC: 90 U/L (ref 39–117)
ALT SERPL W P-5'-P-CCNC: 10 U/L (ref 1–33)
ANION GAP SERPL CALCULATED.3IONS-SCNC: 12.6 MMOL/L (ref 5–15)
AST SERPL-CCNC: 22 U/L (ref 1–32)
BASOPHILS # BLD AUTO: 0.11 10*3/MM3 (ref 0–0.2)
BASOPHILS NFR BLD AUTO: 2 % (ref 0–1.5)
BILIRUB SERPL-MCNC: 0.2 MG/DL (ref 0–1.2)
BUN SERPL-MCNC: 26 MG/DL (ref 8–23)
BUN/CREAT SERPL: 19.5 (ref 7–25)
CALCIUM SPEC-SCNC: 9.2 MG/DL (ref 8.6–10.5)
CHLORIDE SERPL-SCNC: 104 MMOL/L (ref 98–107)
CHOLEST SERPL-MCNC: 261 MG/DL (ref 0–200)
CO2 SERPL-SCNC: 19.4 MMOL/L (ref 22–29)
CREAT SERPL-MCNC: 1.33 MG/DL (ref 0.57–1)
DEPRECATED RDW RBC AUTO: 41.6 FL (ref 37–54)
EGFRCR SERPLBLD CKD-EPI 2021: 41 ML/MIN/1.73
EOSINOPHIL # BLD AUTO: 0.22 10*3/MM3 (ref 0–0.4)
EOSINOPHIL NFR BLD AUTO: 3.9 % (ref 0.3–6.2)
ERYTHROCYTE [DISTWIDTH] IN BLOOD BY AUTOMATED COUNT: 13.6 % (ref 12.3–15.4)
GLOBULIN UR ELPH-MCNC: 2.6 GM/DL
GLUCOSE SERPL-MCNC: 108 MG/DL (ref 65–99)
HCT VFR BLD AUTO: 33.9 % (ref 34–46.6)
HDLC SERPL-MCNC: 54 MG/DL (ref 40–60)
HGB BLD-MCNC: 11 G/DL (ref 12–15.9)
IMM GRANULOCYTES # BLD AUTO: 0.02 10*3/MM3 (ref 0–0.05)
IMM GRANULOCYTES NFR BLD AUTO: 0.4 % (ref 0–0.5)
LDLC SERPL CALC-MCNC: 178 MG/DL (ref 0–100)
LDLC/HDLC SERPL: 3.25 {RATIO}
LYMPHOCYTES # BLD AUTO: 2.13 10*3/MM3 (ref 0.7–3.1)
LYMPHOCYTES NFR BLD AUTO: 38 % (ref 19.6–45.3)
MAGNESIUM SERPL-MCNC: 2 MG/DL (ref 1.6–2.4)
MCH RBC QN AUTO: 27 PG (ref 26.6–33)
MCHC RBC AUTO-ENTMCNC: 32.4 G/DL (ref 31.5–35.7)
MCV RBC AUTO: 83.3 FL (ref 79–97)
MONOCYTES # BLD AUTO: 0.54 10*3/MM3 (ref 0.1–0.9)
MONOCYTES NFR BLD AUTO: 9.6 % (ref 5–12)
NEUTROPHILS NFR BLD AUTO: 2.59 10*3/MM3 (ref 1.7–7)
NEUTROPHILS NFR BLD AUTO: 46.1 % (ref 42.7–76)
NRBC BLD AUTO-RTO: 0 /100 WBC (ref 0–0.2)
PLATELET # BLD AUTO: 229 10*3/MM3 (ref 140–450)
PMV BLD AUTO: 9.3 FL (ref 6–12)
POTASSIUM SERPL-SCNC: 4.7 MMOL/L (ref 3.5–5.2)
PROT SERPL-MCNC: 6.6 G/DL (ref 6–8.5)
RBC # BLD AUTO: 4.07 10*6/MM3 (ref 3.77–5.28)
SODIUM SERPL-SCNC: 136 MMOL/L (ref 136–145)
TRIGL SERPL-MCNC: 157 MG/DL (ref 0–150)
TSH SERPL DL<=0.05 MIU/L-ACNC: 2.39 UIU/ML (ref 0.27–4.2)
VLDLC SERPL-MCNC: 29 MG/DL (ref 5–40)
WBC NRBC COR # BLD AUTO: 5.61 10*3/MM3 (ref 3.4–10.8)

## 2024-12-20 PROCEDURE — 85025 COMPLETE CBC W/AUTO DIFF WBC: CPT | Performed by: PREVENTIVE MEDICINE

## 2024-12-20 PROCEDURE — 84443 ASSAY THYROID STIM HORMONE: CPT | Performed by: PREVENTIVE MEDICINE

## 2024-12-20 PROCEDURE — 36415 COLL VENOUS BLD VENIPUNCTURE: CPT

## 2024-12-20 PROCEDURE — 82306 VITAMIN D 25 HYDROXY: CPT | Performed by: PREVENTIVE MEDICINE

## 2024-12-20 PROCEDURE — 80061 LIPID PANEL: CPT | Performed by: PREVENTIVE MEDICINE

## 2024-12-20 PROCEDURE — 80053 COMPREHEN METABOLIC PANEL: CPT | Performed by: PREVENTIVE MEDICINE

## 2024-12-20 PROCEDURE — 83735 ASSAY OF MAGNESIUM: CPT | Performed by: PREVENTIVE MEDICINE

## 2024-12-22 DIAGNOSIS — D64.9 ANEMIA, UNSPECIFIED TYPE: Primary | ICD-10-CM

## 2024-12-22 DIAGNOSIS — R79.89 ELEVATED SERUM CREATININE: ICD-10-CM

## 2024-12-22 RX ORDER — DAPAGLIFLOZIN 10 MG/1
10 TABLET, FILM COATED ORAL DAILY
Qty: 90 TABLET | Refills: 3 | Status: SHIPPED | OUTPATIENT
Start: 2024-12-22

## 2024-12-22 NOTE — PROGRESS NOTES
Blood sugar was 108 so watch her carbs and increase your walking as goal is below 100.  Kidney function has gone from 46-41 so we do need to increase the Farxiga to 10 when you get it refilled the next time.  Total and bad cholesterol are both elevated and you are on the maximum dose dose of atorvastatin and have you been taking your Repatha?  If you have been taking it regularly then just continue to watch your saturated fats and increase your walking.  You do have some mild anemia so we should check your blood count again in a month if it continues to deteriorate we should workup.  Have you noted any blood loss in stool urine nosebleeds etc.  Vitamin D is also low so increase of 1000 units daily over-the-counter and we will check recheck that as well call if you have any other questions or concerns

## 2024-12-23 ENCOUNTER — TELEPHONE (OUTPATIENT)
Dept: FAMILY MEDICINE CLINIC | Facility: CLINIC | Age: 79
End: 2024-12-23
Payer: MEDICARE

## 2024-12-23 NOTE — TELEPHONE ENCOUNTER
HUB TO RELAY:  ----- Message from Nayeli Cecily sent at 12/22/2024 10:29 AM EST -----  Blood sugar was 108 so watch her carbs and increase your walking as goal is below 100.  Kidney function has gone from 46-41 so we do need to increase the Farxiga to 10 when you get it refilled the next time.  Total and bad cholesterol are both elevated and you are on the maximum dose dose of atorvastatin and have you been taking your Repatha?  If you have been taking it regularly then just continue to watch your saturated fats and increase your walking.  You do have some mild anemia so we should check your blood count again in a month if it continues to deteriorate we should workup.  Have you noted any blood loss in stool urine nosebleeds etc.  Vitamin D is also low so increase of 1000 units daily over-the-counter and we will check recheck that as well call if you have any other questions or concerns

## 2024-12-23 NOTE — TELEPHONE ENCOUNTER
Please call patient and see if she would consider restarting-if yes, please put in order and I'll sign

## 2024-12-23 NOTE — TELEPHONE ENCOUNTER
Katie Reddy notified and voiced comprehension and understanding.    Patient stated she has not taken Repatha for quite some time and does not want to at this time.    No blood loss noted.

## 2025-01-21 ENCOUNTER — LAB (OUTPATIENT)
Dept: FAMILY MEDICINE CLINIC | Facility: CLINIC | Age: 80
End: 2025-01-21
Payer: MEDICARE

## 2025-01-21 DIAGNOSIS — R79.89 ELEVATED SERUM CREATININE: ICD-10-CM

## 2025-01-21 DIAGNOSIS — D64.9 ANEMIA, UNSPECIFIED TYPE: ICD-10-CM

## 2025-01-21 LAB
BASOPHILS # BLD AUTO: 0.09 10*3/MM3 (ref 0–0.2)
BASOPHILS NFR BLD AUTO: 1.4 % (ref 0–1.5)
CREAT SERPL-MCNC: 1 MG/DL (ref 0.57–1)
DEPRECATED RDW RBC AUTO: 40.8 FL (ref 37–54)
EGFRCR SERPLBLD CKD-EPI 2021: 57.8 ML/MIN/1.73
EOSINOPHIL # BLD AUTO: 0.19 10*3/MM3 (ref 0–0.4)
EOSINOPHIL NFR BLD AUTO: 3 % (ref 0.3–6.2)
ERYTHROCYTE [DISTWIDTH] IN BLOOD BY AUTOMATED COUNT: 13.6 % (ref 12.3–15.4)
HCT VFR BLD AUTO: 37.4 % (ref 34–46.6)
HGB BLD-MCNC: 12.3 G/DL (ref 12–15.9)
IMM GRANULOCYTES # BLD AUTO: 0.02 10*3/MM3 (ref 0–0.05)
IMM GRANULOCYTES NFR BLD AUTO: 0.3 % (ref 0–0.5)
LYMPHOCYTES # BLD AUTO: 2.75 10*3/MM3 (ref 0.7–3.1)
LYMPHOCYTES NFR BLD AUTO: 42.7 % (ref 19.6–45.3)
MCH RBC QN AUTO: 27.2 PG (ref 26.6–33)
MCHC RBC AUTO-ENTMCNC: 32.9 G/DL (ref 31.5–35.7)
MCV RBC AUTO: 82.6 FL (ref 79–97)
MONOCYTES # BLD AUTO: 0.55 10*3/MM3 (ref 0.1–0.9)
MONOCYTES NFR BLD AUTO: 8.5 % (ref 5–12)
NEUTROPHILS NFR BLD AUTO: 2.84 10*3/MM3 (ref 1.7–7)
NEUTROPHILS NFR BLD AUTO: 44.1 % (ref 42.7–76)
NRBC BLD AUTO-RTO: 0 /100 WBC (ref 0–0.2)
PLATELET # BLD AUTO: 237 10*3/MM3 (ref 140–450)
PMV BLD AUTO: 8.8 FL (ref 6–12)
RBC # BLD AUTO: 4.53 10*6/MM3 (ref 3.77–5.28)
WBC NRBC COR # BLD AUTO: 6.44 10*3/MM3 (ref 3.4–10.8)

## 2025-01-21 PROCEDURE — 82565 ASSAY OF CREATININE: CPT | Performed by: PREVENTIVE MEDICINE

## 2025-01-21 PROCEDURE — 36415 COLL VENOUS BLD VENIPUNCTURE: CPT

## 2025-01-21 PROCEDURE — 85025 COMPLETE CBC W/AUTO DIFF WBC: CPT | Performed by: PREVENTIVE MEDICINE

## 2025-01-22 ENCOUNTER — TELEPHONE (OUTPATIENT)
Dept: FAMILY MEDICINE CLINIC | Facility: CLINIC | Age: 80
End: 2025-01-22
Payer: MEDICARE

## 2025-01-22 NOTE — PROGRESS NOTES
CBC was normal and kidney function has improved from 41-58.  Call if any other questions or concerns

## 2025-01-22 NOTE — TELEPHONE ENCOUNTER
HUB TO RELAY:  ----- Message from Nayeli Tony sent at 1/22/2025  9:49 AM EST -----  CBC was normal and kidney function has improved from 41-58.  Call if any other questions or concerns

## 2025-02-10 DIAGNOSIS — E83.52 HYPERCALCEMIA: Primary | ICD-10-CM

## 2025-02-10 DIAGNOSIS — E27.49 GLUCOCORTICOID DEFICIENCY: ICD-10-CM

## 2025-02-10 DIAGNOSIS — I10 ESSENTIAL HYPERTENSION: ICD-10-CM

## 2025-02-12 RX ORDER — SUCRALFATE 1 G/1
1 TABLET ORAL 4 TIMES DAILY
Qty: 120 TABLET | Refills: 1 | Status: SHIPPED | OUTPATIENT
Start: 2025-02-12

## 2025-02-12 NOTE — TELEPHONE ENCOUNTER
Rx Refill Note  Requested Prescriptions     Pending Prescriptions Disp Refills    sucralfate (CARAFATE) 1 g tablet 120 tablet 1     Sig: Take 1 tablet by mouth 4 (Four) Times a Day.      Last office visit with prescribing clinician: 12/19/2024   Last telemedicine visit with prescribing clinician: Visit date not found   Next office visit with prescribing clinician: 4/11/2025                         Would you like a call back once the refill request has been completed: [] Yes [] No    If the office needs to give you a call back, can they leave a voicemail: [] Yes [] No    Kristy Rodríguez MA  02/12/25, 15:26 EST

## 2025-02-12 NOTE — TELEPHONE ENCOUNTER
Rx Refill Note  Requested Prescriptions     Pending Prescriptions Disp Refills   • sucralfate (CARAFATE) 1 g tablet 120 tablet 1     Sig: Take 1 tablet by mouth 4 (Four) Times a Day.      Last office visit with prescribing clinician: 12/19/2024   Last telemedicine visit with prescribing clinician: Visit date not found   Next office visit with prescribing clinician: 4/11/2025       Would you like a call back once the refill request has been completed: [] Yes [] No    If the office needs to give you a call back, can they leave a voicemail: [] Yes [] No    Kristy Rodríguez MA  02/12/25, 15:33 EST

## 2025-02-14 ENCOUNTER — TELEPHONE (OUTPATIENT)
Dept: FAMILY MEDICINE CLINIC | Facility: CLINIC | Age: 80
End: 2025-02-14

## 2025-02-14 NOTE — TELEPHONE ENCOUNTER
Caller: Katie Reddy    Relationship: Self    Best call back number: [phone]     What is the best time to reach you: ANY    Who are you requesting to speak with (clinical staff, provider,  specific staff member): PCP    Do you know the name of the person who called:     What was the call regarding: PATIENT NEEDS A LETTER STATING THAT PCP DOES NOT WANT HER TO DRIVE ANYMORE DUE TO BLACK OUTS.    Is it okay if the provider responds through MyChart:

## 2025-02-14 NOTE — TELEPHONE ENCOUNTER
Please call patient and see if she kept her appointment with then [facility] the end of January.  We need to get that report before we would allow her such a letter.  Also please asked the patient if she has been seen by cardiology yet I do see that she has a loop recorder report that is present.  I do not see a cardiology evaluation

## 2025-02-17 NOTE — TELEPHONE ENCOUNTER
Spoke with patient, she states she had to reschedule the ENT appt due to the snow and her appt is this week. Patient states she is not due to see her cardiologist until march so she has not seen them.     Patient states she could barely hear me on the phone and would call our office back.

## 2025-02-17 NOTE — TELEPHONE ENCOUNTER
If she needs a note stating that she cannot drive until testing is complete we can do that as well.

## 2025-02-18 NOTE — PROGRESS NOTES
Venipuncture performed on Left Arm by Kristy Rodríguez MA  with good hemostasis. Patient tolerated well. 08/08/2024

## 2025-02-24 RX ORDER — DAPAGLIFLOZIN 5 MG/1
1 TABLET, FILM COATED ORAL DAILY
COMMUNITY
Start: 2024-12-24

## 2025-02-25 ENCOUNTER — OFFICE VISIT (OUTPATIENT)
Dept: ENDOCRINOLOGY | Facility: CLINIC | Age: 80
End: 2025-02-25
Payer: MEDICARE

## 2025-02-25 VITALS
HEART RATE: 76 BPM | SYSTOLIC BLOOD PRESSURE: 120 MMHG | BODY MASS INDEX: 25.32 KG/M2 | DIASTOLIC BLOOD PRESSURE: 80 MMHG | HEIGHT: 60 IN | OXYGEN SATURATION: 97 % | WEIGHT: 129 LBS

## 2025-02-25 DIAGNOSIS — I10 ESSENTIAL HYPERTENSION: Chronic | ICD-10-CM

## 2025-02-25 DIAGNOSIS — E27.49 GLUCOCORTICOID DEFICIENCY: Primary | ICD-10-CM

## 2025-02-25 DIAGNOSIS — E83.52 HYPERCALCEMIA: ICD-10-CM

## 2025-02-25 PROCEDURE — G2211 COMPLEX E/M VISIT ADD ON: HCPCS | Performed by: INTERNAL MEDICINE

## 2025-02-25 PROCEDURE — 3074F SYST BP LT 130 MM HG: CPT | Performed by: INTERNAL MEDICINE

## 2025-02-25 PROCEDURE — 99214 OFFICE O/P EST MOD 30 MIN: CPT | Performed by: INTERNAL MEDICINE

## 2025-02-25 PROCEDURE — 1160F RVW MEDS BY RX/DR IN RCRD: CPT | Performed by: INTERNAL MEDICINE

## 2025-02-25 PROCEDURE — 1159F MED LIST DOCD IN RCRD: CPT | Performed by: INTERNAL MEDICINE

## 2025-02-25 PROCEDURE — 3079F DIAST BP 80-89 MM HG: CPT | Performed by: INTERNAL MEDICINE

## 2025-02-25 NOTE — PROGRESS NOTES
Endocrine Progress Note Outpatient     Patient Care Team:  Nayeli Tony MD as PCP - General (Family Medicine)  Master Richard MD as Consulting Physician (Cardiology)  Banet, Duane Edward, MD as Consulting Physician (Dermatology)  Yuriy Gerard MD as Consulting Physician (Endocrinology)  Fabio Gill MD as Consulting Physician (Urology)  Shawn Elaine MD as Surgeon (Orthopedic Surgery)  Bertrand Parks MD as Consulting Physician (Cardiology)  Montrell Mota MD as Consulting Physician (Gastroenterology)  Vilma Melton APRN as Nurse Practitioner (Nurse Practitioner)  Mikey Green MD as Consulting Physician (Urology)  Panfilo Blake Jr., MD (Rheumatology)    Chief Complaint: Follow-up glucocorticoid deficiency    HPI: 79-year-old female with history of hypertension, osteoarthritis developed glucocorticoid deficiency after repeated steroid injections in her knees.  She failed ACTH stimulation test.  She was prescribed hydrocortisone which she was taking on the last visit but apparently 6 months ago her primary care physician and stopped it.  She is now again having some dizziness with nausea and weight loss.  She was on hydrocortisone which did help however about 6 months ago her primary care physician stopped it and she is now again having nausea vomiting and weight loss.    Hypertension: Well-controlled    Hypercalcemia: New problem.  Denies any history of kidney stones or osteoporosis or reflux disease.    Past Medical History:   Diagnosis Date    Arthritis     Bladder incontinence     Glucocorticoid deficiency     Intractable nausea and vomiting 11/11/2020    Osteopenia     Vitamin D deficiency        Social History     Socioeconomic History    Marital status:     Number of children: 4    Years of education: GED   Tobacco Use    Smoking status: Never     Passive exposure: Never    Smokeless tobacco: Never   Vaping Use    Vaping status: Never Used   Substance  and Sexual Activity    Alcohol use: Not Currently    Drug use: Not Currently    Sexual activity: Defer       Family History   Problem Relation Age of Onset    Heart disease Mother     Hypertension Mother     Diabetes Father     Heart disease Father     Alcohol abuse Father     Hypertension Father     Diabetes Brother     Heart disease Brother     Cancer Brother 68        Prostate Cancer    Hypertension Brother     Diabetes Maternal Aunt     Heart disease Maternal Grandmother     Hypertension Maternal Grandmother     Heart disease Maternal Grandfather     Hypertension Maternal Grandfather     Liver disease Paternal Grandmother     Hypertension Paternal Grandmother     Heart disease Paternal Grandfather     Hypertension Paternal Grandfather     Dementia Sister     Diabetes Brother        Allergies   Allergen Reactions    Trazodone Irritability    Dayvigo [Lemborexant] Other (See Comments)     Sleep walking    Ibandronate GI Intolerance     GERD       ROS:   Constitutional:  Denies fatigue, tiredness.    Eyes:  Denies change in visual acuity   HENT:  Denies nasal congestion or sore throat   Respiratory: denies cough, shortness of breath.   Cardiovascular:  denies chest pain, edema   GI:  Denies abdominal pain, nausea, vomiting.   Musculoskeletal:  Denies back pain or joint pain   Integument:  Denies dry skin and rash   Neurologic:  Denies headache, focal weakness or sensory changes   Endocrine:  Denies polyuria or polydipsia   Psychiatric:  Denies depression or anxiety      Vitals:    02/25/25 1326   BP: 120/80   Pulse: 76   SpO2: 97%     Body mass index is 25.4 kg/m².     Physical Exam:  GEN: NAD, conversant  EYES: EOMI  NECK: no thyromegaly  CV: RRR,   LUNG: CTA  PSYCH: AOX3, appropriate mood, affect normal      Results Review:     I reviewed the patient's new clinical results.    Lab Results   Component Value Date    HGBA1C 4.9 04/07/2021      Lab Results   Component Value Date    GLUCOSE 94 02/18/2025    BUN 24  02/18/2025    CREATININE 1.10 (H) 02/18/2025    EGFRIFNONA 55 (L) 02/09/2022    BCR 22 02/18/2025    K 5.0 02/18/2025    CO2 17 (L) 02/18/2025    CALCIUM 10.3 02/18/2025    ALBUMIN 5.1 (H) 02/18/2025    AST 24 02/18/2025    ALT 15 02/18/2025    CHOL 261 (H) 12/20/2024    TRIG 157 (H) 12/20/2024     (H) 12/20/2024    HDL 54 12/20/2024     Lab Results   Component Value Date    TSH 2.390 12/20/2024    FREET4 0.91 (L) 09/27/2023         Medication Review: Reviewed.       Current Outpatient Medications:     amitriptyline (ELAVIL) 25 MG tablet, Take 1 tablet by mouth Every Night., Disp: 90 tablet, Rfl: 3    amLODIPine (NORVASC) 10 MG tablet, Take 0.5 tablets by mouth Daily., Disp: 90 tablet, Rfl: 3    Aspirin Low Dose 81 MG EC tablet, TAKE 1 TABLET BY MOUTH EVERY DAY, Disp: 90 tablet, Rfl: 3    atorvastatin (LIPITOR) 80 MG tablet, Take 1 tablet by mouth Daily., Disp: , Rfl:     betamethasone valerate (VALISONE) 0.1 % cream, APPLY TOPICALLY TO THE APPROPRIATE AREA TWICE A DAY AS DIRECTED, Disp: 45 g, Rfl: 0    Calcium Carbonate-Vit D-Min (Calcium 1200) 7512-0398 MG-UNIT chewable tablet, Chew 1 tablet Daily., Disp: , Rfl:     dapagliflozin Propanediol (Farxiga) 10 MG tablet, Take 10 mg by mouth Daily., Disp: 90 tablet, Rfl: 3    Evolocumab (REPATHA) solution prefilled syringe injection, Inject 1 mL under the skin into the appropriate area as directed Every 14 (Fourteen) Days., Disp: 6 mL, Rfl: 1    Farxiga 5 MG tablet tablet, Take 1 tablet by mouth Daily., Disp: , Rfl:     fluocinonide (LIDEX) 0.05 % cream, , Disp: , Rfl:     hydrocortisone (CORTEF) 10 MG tablet, Take 1 tablet by mouth 3 times a day., Disp: 270 tablet, Rfl: 1    lisinopril (PRINIVIL,ZESTRIL) 5 MG tablet, TAKE 1 TABLET BY MOUTH EVERY DAY, Disp: 90 tablet, Rfl: 0    meclizine (ANTIVERT) 12.5 MG tablet, Take 1 tablet by mouth 3 (Three) Times a Day As Needed for Dizziness., Disp: 30 tablet, Rfl: 0    metoprolol succinate XL (TOPROL-XL) 100 MG 24 hr  tablet, Take 1 tablet by mouth Daily., Disp: 90 tablet, Rfl: 3    nitroglycerin (NITROSTAT) 0.4 MG SL tablet, Place 1 tablet under the tongue Every 5 (Five) Minutes As Needed for Chest Pain. Take no more than 3 doses in 15 minutes., Disp: 75 tablet, Rfl: 0    ondansetron ODT (ZOFRAN-ODT) 4 MG disintegrating tablet, Place 1 tablet on the tongue Every 8 (Eight) Hours As Needed for Nausea or Vomiting., Disp: 12 tablet, Rfl: 0    pantoprazole (PROTONIX) 40 MG EC tablet, Take 1 tablet by mouth Every Morning., Disp: , Rfl:     raloxifene (EVISTA) 60 MG tablet, Take 1 tablet by mouth Daily., Disp: 90 tablet, Rfl: 3    sertraline (ZOLOFT) 100 MG tablet, TAKE 1 TABLET BY MOUTH EVERY DAY AT NIGHT, Disp: 90 tablet, Rfl: 1    sucralfate (CARAFATE) 1 g tablet, Take 1 tablet by mouth 4 (Four) Times a Day., Disp: 120 tablet, Rfl: 1    Vibegron (Gemtesa) 75 MG tablet, Take 1 tablet by mouth Daily., Disp: 30 tablet, Rfl: 0    gabapentin (NEURONTIN) 300 MG capsule, Take 1 capsule by mouth 2 (Two) Times a Day for 180 days., Disp: 60 capsule, Rfl: 5      Assessment & Plan   1.  Glucocorticoid deficiency: Most likely secondary to intra-articular injections.  She was on hydrocortisone 10 mg p.o. 3 times daily and on our last visit back in February 2024 we continue that however about 6 months ago she tells me her primary care doctor told her to stop it.  She is now again having some dizziness and syncopal episode.  He is complaining of nausea.  She also have lost some weight.  Will check a.m. cortisol and ACTH level.    2.  Hypertension: Well-controlled.    3.  Hypercalcemia: Repeat calcium and PTH normal.    Assessment and plan from February 26, 2024 reviewed and updated.            Yuriy Gerard MD FACE.

## 2025-03-02 LAB
ACTH PLAS-MCNC: 28.7 PG/ML (ref 7.2–63.3)
CORTIS AM PEAK SERPL-MCNC: 16.3 UG/DL (ref 6.2–19.4)

## 2025-03-10 RX ORDER — SUCRALFATE 1 G/1
1 TABLET ORAL 4 TIMES DAILY
Qty: 360 TABLET | Refills: 1 | Status: SHIPPED | OUTPATIENT
Start: 2025-03-10

## 2025-03-10 NOTE — TELEPHONE ENCOUNTER
Rx Refill Note  Requested Prescriptions     Pending Prescriptions Disp Refills    sucralfate (CARAFATE) 1 g tablet 360 tablet 1     Sig: Take 1 tablet by mouth 4 (Four) Times a Day.      Last office visit with prescribing clinician: 12/19/2024   Last telemedicine visit with prescribing clinician: Visit date not found   Next office visit with prescribing clinician: 4/11/2025                         Would you like a call back once the refill request has been completed: [] Yes [] No    If the office needs to give you a call back, can they leave a voicemail: [] Yes [] No    Mervat Weathers MA  03/10/25, 10:53 EDT

## 2025-03-13 ENCOUNTER — TELEPHONE (OUTPATIENT)
Dept: FAMILY MEDICINE CLINIC | Facility: CLINIC | Age: 80
End: 2025-03-13
Payer: MEDICARE

## 2025-03-13 NOTE — LETTER
March 17, 2025     To whom it may concern,    It is in the best interest of public safety if Katie Reddy refrains from getting behind the wheel of any motorized vehicle.       Sincerely,        Nayeli Tony MD        CC: No Recipients    Nayeli Tony MD  03/13/25 4424  Signed  Okay to give her the note and I will sign

## 2025-03-15 NOTE — PROGRESS NOTES
Subjective:     Encounter Date:03/17/2025      Patient ID: Katie Reddy is a 79 y.o. female.    Chief Complaint and history of present illness:     Follow-up for chest pain, dizziness, hypertension, dyslipidemia, MR, pulmonary hypertension, ILR     History of Present Illness  :        Ms. Katie Reddy has PMH of     Chest pain, cardiac cath 2/8/2022 with no CAD  Recurrent syncope, Biotronik ILR 4/9/2021     Uncontrolled hypertension  Dyslipidemia  Carotid disease  Autonomic insufficiency  History of hemorrhagic occipital CVA  GERD, osteoporosis, chronic pain  Cholecystectomy, hernia repair     Here for   follow-up.  Patient says she continues to have recurrent chest pain radiating to the right shoulder.  Has dizziness ringing of the ears and feels like her walking is abnormal and balance is off balance.  Patient has carotid disease does not want to have surgery done.    Patient's arterial blood pressure is 139/73, heart rate 79, O2 sat of 98% on room air.     Patient was  in the hospital from 4/6/2021-4/11/2021 with syncope and hypertensive emergency.  Work-up here revealed negative troponin x2.  proBNP is normal at 271.  CMP for 621 was normal.  Hemoglobin A1c for 721 was 4.9.  Lipid profile revealed cholesterol 197 HDL 57  triglycerides 127.  MRI with contrast 4/7/2021 reveals no acute intracranial abnormality no acute infarct no intracranial mass or mass-effect.  Old left occipital hemorrhagic infarct  Chronic small vessel ischemic changes     Chest x-ray 4/6/2021 reveals no acute chest findings.  EKG done 4/6/2021 reviewed by me shows sinus rhythm with a rate of 76 bpm with PVCs.  Echocardiogram 4/6/2021 reviewed by me shows EF of 60 to 65% with mild AR and mild MR  Carotid Dopplers 4/8/2021 reveal mild bilateral carotid disease  Lexiscan Cardiolite 4/9/21 which is negative for ischemia.  Echocardiogram 5/16/2023 reveals EF of 60 to 65% PA pressures 45, mild MR  Lexiscan Cardiolite 6/27/2023  was negative for ischemia EF of 93%  Cardiac cath 2/2022 nonobstructive CAD        Hemoglobin is 12, BUN/creatinine 20/0.93. A.m. cortisol was low at 1.26  Labs from 2/9/2022 revealed hemoglobin of 9.7 and MCV of 75, CMP from 4/15/2022 is normal.  Labs from 10/24/2023 reveal lipid profile with cholesterol 290, triglycerides 172, HDL 53, .  CMP with a creatinine of 1.11, EGFR 51.  Labs from 12/8/2023 revealed CMP with a creatinine of 1.38, EGFR 39.  Potassium of 5.8 probably hemolyzed.  Labs from 1/21/2025 reveal normal CBC, TSH, magnesium.  Lipid profile with cholesterol 261, triglycerides 157, HDL 54, .  CMP with a creatinine of 1.33, EGFR 41.  Labs from 2/12/2025 reveal CMP with a creatinine of 1.10, EGFR 51     Assessment:       -Recurrent chest pain  -Nonsustained VT on  loop  -Orthostatic hypotension  -Low a.m. cortisol/adrenal insufficiency on long-term Cortef  -History of hypertensive emergency  -History of hemorrhagic left occipital CVA  -Mild AR, mild MR  -Dyslipidemia        Recommendations / Plan:         Patient is having recurrent chest pain which is atypical radiating to the right.  Had Cardiac cath done 4/16/2024 which revealed nonobstructive disease in distal LAD  Patient is complaining of recurrent dizziness and balance issues.  Had implantable loop recorder done which is not revealing any arrhythmias.  Patient has ringing in the ears in addition to dizziness and also issues with gait.  Which makes it less likely that is cardiac in origin.  Patient's blood pressure is elevated at 153 follow-up with PMD for blood pressure control.  Patient has elevated cholesterol was given statins and Repatha which she stopped.  Wants to follow-up with PMD.  Patient has previous history of hemorrhagic CVA advised her to control blood pressure closely.  Continue medical management with amlodipine, aspirin, Farxiga, metoprolol as tolerated  Patient was follow-up with PMD for lipids and  hypertension.  Follow-up with PMD for renal insufficiency, hypertension, dyslipidemia and labs.  Reviewed EKG results with patient.  Will check an echo to assess pulmonary hypertension and heart murmur     Procedures     Stress Cardiolite performed 6/22/2022 reviewed/interpreted by me reveals normal perfusion EF of 62%.           Copied text in this portion of the note has been reviewed and is accurate as of 3/14/2024  The following portions of the patient's history were reviewed and updated as appropriate: allergies, current medications, past family history, past medical history, past social history, past surgical history and problem list.                ECG 12 Lead    Date/Time: 3/17/2025 12:03 PM  Performed by: Master Richard MD    Authorized by: Master Richard MD  Comparison: compared with previous ECG from 4/16/2024  Comparison to previous ECG: EKG done today reviewed/interpreted by me reveals sinus rhythm at the rate of 74 bpm, Q waves in V1 V2 V3 and aVL, unchanged from EKG from 4/16/2024          ECHOCARDIOGRAM:  Results for orders placed during the hospital encounter of 05/15/23    Adult Transthoracic Echo Complete W/ Cont if Necessary Per Protocol    Interpretation Summary    Estimated right ventricular systolic pressure from tricuspid regurgitation is moderately elevated (45-55 mmHg).      Normal LV size and contractility EF of 60 to 65%  Normal RV size  Normal atrial size  Pulmonic valve is not well visualized.  Posterior mitral leaflet is calcified with decreased cusp separation.  Anterior leaflet is opening well.  Mild mitral regurgitation seen.  Aortic valve,  tricuspid valve appears structurally normal, mild aortic and tricuspid regurgitation seen.  Calculated RV systolic pressure 45 mmHg.  No pericardial effusion seen.  Proximal aorta appears normal in size.      STRESS TEST  Results for orders placed during the hospital encounter of 06/22/23    Stress Test With Myocardial  Perfusion One Day    Interpretation Summary  LEXISCAN CARDIOLITE REPORT    DATE OF PROCEDURE: 23    INDICATION FOR PROCEDURE:  Chest pain, hypertension, dyslipidemia, hemorrhagic CVA, MR, AR    PROCEDURE PERFORMED: Lexiscan Cardiolite    PROCEDURE COMMENTS:    After informed consent was obtained.  Patient's resting heart rate was 74 bpm, resting blood pressure was 146/70, resting EKG revealed sinus rhythm with rate of 74 bpm with Q waves in V1 V2.  Patient was given 0.4 mg of regadenosine for stress testing.  There was no significant change in heart rate, blood pressure, symptoms with regadenoson injection.  Patient tolerated procedure well.  Complications were none.    NUCLEAR IMAGIN.  There was  uniform uptake of Cardiolite both in resting and post stress images, no ischemia seen.  2.  Gated images reveal  normal LV size and contractility, LVEF of 93%.    CONCLUSION:  1.  Lexiscan Cardiolite with normal perfusion, negative for ischemia.  2.  Normal wall motion.  LVEF of 93%.    RECOMMENDATIONS:    Clinical correlation recommended.      Master Richard MD  23  15:31 EDT          HEART CATHETERIZATION  Results for orders placed during the hospital encounter of 24    Cardiac Catheterization/Vascular Study    Conclusion  Table formatting from the original result was not included.  2024      Heart Cath Report    NAME:              Katie Reddy  :                1945  AGE/SEX:        79 y.o. female  MRN:                [id number]        Procedures Performed    Left heart catheterization  Selective coronary angiography  Left ventriculography  Mynx closure device    :   Master Richard MD    Vascular Access Site: Femoral    Indication for procedure: Recurrent prolonged chest pain at rest, CAD by CT calcium score over 1100, nonsustained VT on loop recorder      Complications:  None  Blood Loss: minimal    Hemodynamics    Pressures    Ao:    128/53  mmHg  LV:    155/10 mmHg  End-diastolic pressure:  10  mmHg  No significant aortic valvular gradient on pullback    Coronary Angiography    Left Main :  The left main   is without disease    Left Anterior Descending : Starts of is a good caliber vessel.  It has intramyocardial course in midportion and multiple tortuous corkscrew like loops in the distal portion distal one third of the vessel is severely diseased with over 50 to 60% disease and distal LAD.    Ramus intermedius : Is a large vessel takes a 360 degree loop in midportion and divides into 2 without disease    Left Circumflex : Continues a single marginal which divides into 2 and distal branches are very tortuous with corkscrew like loops in the distal portion.    Right Coronary Artery :  The right coronary artery   is dominant vessel.  Has 20% ostial narrowing distally is very tortuous with PDA having multiple corkscrew like bands with some disease in the bands.  PLV branches without disease.    Dominance:  []  Left  []  Right  []  Co-Dominant      Left Ventriculography:    Was done with a hand-injection with a JR4.  LV size and contractility appeared preserved.  EF over 65%.    Impression:    Tortuous vessel with severe disease in distal LAD best suited for medical management    Recommendations:    Medical management and risk factor modification        I sincerely appreciate the opportunity to participate in your patient's care. Please feel free to contact me anytime if I can be of assistance in this or any other way.      Master Richard MD  4/16/2024  15:56 EDT  Electronically signed by Master Richard MD, 04/16/24, 3:56 PM EDT.      Copied text in this portion of the note has been reviewed and is accurate as of 3/17/2025  The following portions of the patient's history were reviewed and updated as appropriate: allergies, current medications, past family history, past medical history, past social history, past surgical history and  problem list.    Assessment:         [facility]       Diagnosis Plan   1. Precordial pain  Adult Transthoracic Echo Complete W/ Cont if Necessary Per Protocol      2. Dizziness  Adult Transthoracic Echo Complete W/ Cont if Necessary Per Protocol      3. Pulmonary hypertension  Adult Transthoracic Echo Complete W/ Cont if Necessary Per Protocol      4. Nonrheumatic mitral valve regurgitation  Adult Transthoracic Echo Complete W/ Cont if Necessary Per Protocol      5. Coronary artery disease involving native coronary artery of native heart without angina pectoris  Adult Transthoracic Echo Complete W/ Cont if Necessary Per Protocol             Plan:               Past Medical History:  Past Medical History:   Diagnosis Date    Arthritis     Bladder incontinence     Glucocorticoid deficiency     Hyperlipidemia     Intractable nausea and vomiting 11/11/2020    Osteopenia     Vitamin D deficiency      Past Surgical History:  Past Surgical History:   Procedure Laterality Date    APPENDECTOMY      BLADDER SURGERY      bladder stimulator placement/ REMOVAL    BREAST CYST EXCISION      BREAST LUMPECTOMY Left 1973    NEG    BUNIONECTOMY Right 05/29/2020    Procedure: BUNIONECTOMY DEVONTE;  Surgeon: MARISSA Em DPM;  Location: [facility] MAIN OR;  Service: Podiatry;  Laterality: Right;    BUNIONECTOMY Left 12/30/2022    Procedure: BUNIONECTOMY DEVONTE -MINIMALLY INVASIVE with distal metatarsal osteotomy and internal fixation;  Surgeon: MARISSA Em DPM;  Location: [facility] MAIN OR;  Service: Podiatry;  Laterality: Left;    CARDIAC CATHETERIZATION      CARDIAC CATHETERIZATION N/A 02/08/2022    Procedure: Left Heart Cath, possible pci;  Surgeon: Master Richard MD;  Location: [facility] CATH INVASIVE LOCATION;  Service: Cardiovascular;  Laterality: N/A;    CARDIAC CATHETERIZATION N/A 4/16/2024    Procedure: Left Heart Cath;  Surgeon: Master Richard MD;  Location: [facility] CATH INVASIVE LOCATION;  Service:  Cardiovascular;  Laterality: N/A;    CARPAL TUNNEL RELEASE Right 1980    CHOLECYSTECTOMY      COLON SURGERY      hemorrhoid banding    COLONOSCOPY  2017 2017/ 2019 = TA, rech 2024   Dr. Mackey    COLONOSCOPY N/A 02/05/2022    NEG= 2022    ENDOSCOPY N/A 02/05/2022 2022EGD= erosive esophagitis, hiatal hernia, Nonerosive gastritis    EYE SURGERY      HERNIA REPAIR      Umbilical removal    HYSTERECTOMY  1970    SUBTOTAL HYSTERECTOMY      UMBILICAL HERNIA REPAIR        Allergies:  Allergies   Allergen Reactions    Trazodone Irritability    Dayvigo [Lemborexant] Other (See Comments)     Sleep walking    Ibandronate GI Intolerance     GERD     Home Meds:  Current Meds:     Current Outpatient Medications:     amitriptyline (ELAVIL) 25 MG tablet, Take 1 tablet by mouth Every Night., Disp: 90 tablet, Rfl: 3    amLODIPine (NORVASC) 10 MG tablet, Take 0.5 tablets by mouth Daily., Disp: 90 tablet, Rfl: 3    Aspirin Low Dose 81 MG EC tablet, TAKE 1 TABLET BY MOUTH EVERY DAY, Disp: 90 tablet, Rfl: 3    atorvastatin (LIPITOR) 80 MG tablet, Take 1 tablet by mouth Daily., Disp: , Rfl:     Calcium Carbonate-Vit D-Min (Calcium 1200) 8021-8050 MG-UNIT chewable tablet, Chew 1 tablet Daily., Disp: , Rfl:     dapagliflozin Propanediol (Farxiga) 10 MG tablet, Take 10 mg by mouth Daily., Disp: 90 tablet, Rfl: 3    Evolocumab (REPATHA) solution prefilled syringe injection, Inject 1 mL under the skin into the appropriate area as directed Every 14 (Fourteen) Days., Disp: 6 mL, Rfl: 1    lisinopril (PRINIVIL,ZESTRIL) 5 MG tablet, TAKE 1 TABLET BY MOUTH EVERY DAY, Disp: 90 tablet, Rfl: 0    meclizine (ANTIVERT) 12.5 MG tablet, Take 1 tablet by mouth 3 (Three) Times a Day As Needed for Dizziness., Disp: 30 tablet, Rfl: 0    metoprolol succinate XL (TOPROL-XL) 100 MG 24 hr tablet, Take 1 tablet by mouth Daily., Disp: 90 tablet, Rfl: 3    nitroglycerin (NITROSTAT) 0.4 MG SL tablet, Place 1 tablet under the tongue Every 5 (Five) Minutes As  Needed for Chest Pain. Take no more than 3 doses in 15 minutes., Disp: 75 tablet, Rfl: 0    ondansetron ODT (ZOFRAN-ODT) 4 MG disintegrating tablet, Place 1 tablet on the tongue Every 8 (Eight) Hours As Needed for Nausea or Vomiting., Disp: 12 tablet, Rfl: 0    pantoprazole (PROTONIX) 40 MG EC tablet, Take 1 tablet by mouth Every Morning., Disp: , Rfl:     raloxifene (EVISTA) 60 MG tablet, Take 1 tablet by mouth Daily., Disp: 90 tablet, Rfl: 3    sertraline (ZOLOFT) 100 MG tablet, TAKE 1 TABLET BY MOUTH EVERY DAY AT NIGHT, Disp: 90 tablet, Rfl: 1    sucralfate (CARAFATE) 1 g tablet, Take 1 tablet by mouth 4 (Four) Times a Day., Disp: 360 tablet, Rfl: 1    Vibegron (Gemtesa) 75 MG tablet, Take 1 tablet by mouth Daily., Disp: 30 tablet, Rfl: 0    betamethasone valerate (VALISONE) 0.1 % cream, APPLY TOPICALLY TO THE APPROPRIATE AREA TWICE A DAY AS DIRECTED, Disp: 45 g, Rfl: 0    Farxiga 5 MG tablet tablet, Take 1 tablet by mouth Daily., Disp: , Rfl:     fluocinonide (LIDEX) 0.05 % cream, , Disp: , Rfl:     gabapentin (NEURONTIN) 300 MG capsule, Take 1 capsule by mouth 2 (Two) Times a Day for 180 days., Disp: 60 capsule, Rfl: 5    Triamcinolone Acetonide (NASACORT) 55 MCG/ACT nasal inhaler, 2 SPRAY IN EACH NOSTRIL BY INTRANASAL ROUTE ONCE DAILY FOR 30 DAYS IN THE MORNINGS, Disp: , Rfl:   Social History:   Social History     Tobacco Use    Smoking status: Never     Passive exposure: Never    Smokeless tobacco: Never   Substance Use Topics    Alcohol use: Not Currently      Family History:  Family History   Problem Relation Age of Onset    Heart disease Mother     Hypertension Mother     Diabetes Father     Heart disease Father     Alcohol abuse Father     Hypertension Father     Diabetes Brother     Heart disease Brother     Cancer Brother 68        Prostate Cancer    Hypertension Brother     Diabetes Maternal Aunt     Heart disease Maternal Grandmother     Hypertension Maternal Grandmother     Heart disease Maternal  "Grandfather     Hypertension Maternal Grandfather     Liver disease Paternal Grandmother     Hypertension Paternal Grandmother     Heart disease Paternal Grandfather     Hypertension Paternal Grandfather     Dementia Sister     Diabetes Brother               Review of Systems   Constitutional: Positive for malaise/fatigue.   Cardiovascular:  Positive for chest pain, palpitations and syncope. Negative for leg swelling.   Respiratory:  Positive for shortness of breath.    Skin:  Negative for rash.   Gastrointestinal:  Positive for nausea.   Neurological:  Positive for dizziness and light-headedness. Negative for numbness.     All other systems are negative         Objective:     Physical Exam  /88   Pulse 75   Ht 151.8 cm (59.75\")   Wt 58.5 kg (129 lb)   SpO2 100%   BMI 25.40 kg/m²   General:  Appears in no acute distress  Eyes: Sclera is anicteric,  conjunctiva is clear   HEENT:  No JVD.  Still for carotid bruit.  Respiratory: Respirations regular and unlabored at rest.  Clear to auscultation  Cardiovascular: S1,S2 Regular rate and rhythm.  3/6 holosystolic murmur at the apex.   Extremities: No digital clubbing or cyanosis, no edema  Skin: Color pink. Skin warm and dry to touch. No rashes  No xanthoma  Neuro: Alert and awake.    Lab Reviewed:         Master Richard MD  3/17/2025 12:22 EDT      EMR Dragon/Transcription:   \"Dictated utilizing Dragon dictation\".        "

## 2025-03-17 ENCOUNTER — OFFICE VISIT (OUTPATIENT)
Dept: CARDIOLOGY | Facility: CLINIC | Age: 80
End: 2025-03-17
Payer: MEDICARE

## 2025-03-17 VITALS
HEART RATE: 75 BPM | BODY MASS INDEX: 25.32 KG/M2 | OXYGEN SATURATION: 100 % | DIASTOLIC BLOOD PRESSURE: 88 MMHG | WEIGHT: 129 LBS | SYSTOLIC BLOOD PRESSURE: 153 MMHG | HEIGHT: 60 IN

## 2025-03-17 DIAGNOSIS — I25.10 CORONARY ARTERY DISEASE INVOLVING NATIVE CORONARY ARTERY OF NATIVE HEART WITHOUT ANGINA PECTORIS: ICD-10-CM

## 2025-03-17 DIAGNOSIS — I34.0 NONRHEUMATIC MITRAL VALVE REGURGITATION: ICD-10-CM

## 2025-03-17 DIAGNOSIS — R42 DIZZINESS: ICD-10-CM

## 2025-03-17 DIAGNOSIS — I27.20 PULMONARY HYPERTENSION: ICD-10-CM

## 2025-03-17 DIAGNOSIS — R07.2 PRECORDIAL PAIN: Primary | ICD-10-CM

## 2025-03-17 PROCEDURE — 3079F DIAST BP 80-89 MM HG: CPT | Performed by: INTERNAL MEDICINE

## 2025-03-17 PROCEDURE — 3077F SYST BP >= 140 MM HG: CPT | Performed by: INTERNAL MEDICINE

## 2025-03-17 PROCEDURE — 99214 OFFICE O/P EST MOD 30 MIN: CPT | Performed by: INTERNAL MEDICINE

## 2025-03-17 PROCEDURE — 93000 ELECTROCARDIOGRAM COMPLETE: CPT | Performed by: INTERNAL MEDICINE

## 2025-03-17 PROCEDURE — 1159F MED LIST DOCD IN RCRD: CPT | Performed by: INTERNAL MEDICINE

## 2025-03-17 PROCEDURE — 1160F RVW MEDS BY RX/DR IN RCRD: CPT | Performed by: INTERNAL MEDICINE

## 2025-03-20 ENCOUNTER — OFFICE (OUTPATIENT)
Dept: URBAN - METROPOLITAN AREA CLINIC 64 | Facility: CLINIC | Age: 80
End: 2025-03-20
Payer: MEDICARE

## 2025-03-20 VITALS
SYSTOLIC BLOOD PRESSURE: 150 MMHG | HEART RATE: 73 BPM | SYSTOLIC BLOOD PRESSURE: 154 MMHG | DIASTOLIC BLOOD PRESSURE: 89 MMHG | DIASTOLIC BLOOD PRESSURE: 84 MMHG | WEIGHT: 127 LBS

## 2025-03-20 DIAGNOSIS — R19.7 DIARRHEA, UNSPECIFIED: ICD-10-CM

## 2025-03-20 DIAGNOSIS — R15.9 FULL INCONTINENCE OF FECES: ICD-10-CM

## 2025-03-20 DIAGNOSIS — K21.00 GASTRO-ESOPHAGEAL REFLUX DISEASE WITH ESOPHAGITIS, WITHOUT B: ICD-10-CM

## 2025-03-20 PROCEDURE — 99214 OFFICE O/P EST MOD 30 MIN: CPT | Performed by: NURSE PRACTITIONER

## 2025-03-20 RX ORDER — COLESEVELAM HYDROCHLORIDE 625 MG/1
TABLET, FILM COATED ORAL
Qty: 180 | Refills: 0 | Status: ACTIVE

## 2025-04-18 ENCOUNTER — HOSPITAL ENCOUNTER (OUTPATIENT)
Dept: CARDIOLOGY | Facility: HOSPITAL | Age: 80
Discharge: HOME | End: 2025-04-18
Payer: MEDICARE

## 2025-04-18 VITALS
HEIGHT: 60 IN | HEART RATE: 97 BPM | DIASTOLIC BLOOD PRESSURE: 80 MMHG | BODY MASS INDEX: 25.32 KG/M2 | WEIGHT: 129 LBS | SYSTOLIC BLOOD PRESSURE: 144 MMHG

## 2025-04-18 DIAGNOSIS — R07.2 PRECORDIAL PAIN: ICD-10-CM

## 2025-04-18 DIAGNOSIS — I25.10 CORONARY ARTERY DISEASE INVOLVING NATIVE CORONARY ARTERY OF NATIVE HEART WITHOUT ANGINA PECTORIS: ICD-10-CM

## 2025-04-18 DIAGNOSIS — R42 DIZZINESS: ICD-10-CM

## 2025-04-18 DIAGNOSIS — I27.20 PULMONARY HYPERTENSION: ICD-10-CM

## 2025-04-18 DIAGNOSIS — I34.0 NONRHEUMATIC MITRAL VALVE REGURGITATION: ICD-10-CM

## 2025-04-18 LAB
AORTIC DIMENSIONLESS INDEX: 0.91 (DI)
AV MEAN PRESS GRAD SYS DOP V1V2: 5.9 MMHG
AV VMAX SYS DOP: 158 CM/SEC
BH CV ECHO MEAS - AI P1/2T: 404.1 MSEC
BH CV ECHO MEAS - AO MAX PG: 10 MMHG
BH CV ECHO MEAS - AO ROOT DIAM: 2.8 CM
BH CV ECHO MEAS - AO V2 VTI: 32.1 CM
BH CV ECHO MEAS - AVA(I,D): 2.37 CM2
BH CV ECHO MEAS - EDV(CUBED): 44.6 ML
BH CV ECHO MEAS - EDV(MOD-SP4): 45.8 ML
BH CV ECHO MEAS - EF(MOD-SP4): 72.7 %
BH CV ECHO MEAS - ESV(CUBED): 10.4 ML
BH CV ECHO MEAS - ESV(MOD-SP4): 12.5 ML
BH CV ECHO MEAS - FS: 38.4 %
BH CV ECHO MEAS - IVS/LVPW: 0.88 CM
BH CV ECHO MEAS - IVSD: 0.86 CM
BH CV ECHO MEAS - LA DIMENSION: 3.8 CM
BH CV ECHO MEAS - LAT PEAK E' VEL: 5.3 CM/SEC
BH CV ECHO MEAS - LV MASS(C)D: 93.5 GRAMS
BH CV ECHO MEAS - LV MAX PG: 7.6 MMHG
BH CV ECHO MEAS - LV MEAN PG: 4.9 MMHG
BH CV ECHO MEAS - LV V1 MAX: 137.5 CM/SEC
BH CV ECHO MEAS - LV V1 VTI: 29.2 CM
BH CV ECHO MEAS - LVIDD: 3.5 CM
BH CV ECHO MEAS - LVIDS: 2.18 CM
BH CV ECHO MEAS - LVOT AREA: 2.6 CM2
BH CV ECHO MEAS - LVOT DIAM: 1.82 CM
BH CV ECHO MEAS - LVPWD: 0.98 CM
BH CV ECHO MEAS - MED PEAK E' VEL: 3.4 CM/SEC
BH CV ECHO MEAS - MR MAX PG: 138.4 MMHG
BH CV ECHO MEAS - MR MAX VEL: 588.3 CM/SEC
BH CV ECHO MEAS - MV A MAX VEL: 117.2 CM/SEC
BH CV ECHO MEAS - MV DEC SLOPE: 262.6 CM/SEC2
BH CV ECHO MEAS - MV DEC TIME: 0.27 SEC
BH CV ECHO MEAS - MV E MAX VEL: 71.2 CM/SEC
BH CV ECHO MEAS - MV E/A: 0.61
BH CV ECHO MEAS - MV MAX PG: 7.1 MMHG
BH CV ECHO MEAS - MV MEAN PG: 2.37 MMHG
BH CV ECHO MEAS - MV V2 VTI: 34 CM
BH CV ECHO MEAS - MVA(VTI): 2.24 CM2
BH CV ECHO MEAS - PA V2 MAX: 95.3 CM/SEC
BH CV ECHO MEAS - PI END-D VEL: 96.3 CM/SEC
BH CV ECHO MEAS - RAP SYSTOLE: 3 MMHG
BH CV ECHO MEAS - RV MAX PG: 1.67 MMHG
BH CV ECHO MEAS - RV V1 MAX: 64.6 CM/SEC
BH CV ECHO MEAS - RV V1 VTI: 11.8 CM
BH CV ECHO MEAS - RVSP: 19.2 MMHG
BH CV ECHO MEAS - SV(LVOT): 76.2 ML
BH CV ECHO MEAS - SV(MOD-SP4): 33.3 ML
BH CV ECHO MEAS - TAPSE (>1.6): 1.82 CM
BH CV ECHO MEAS - TR MAX PG: 16.2 MMHG
BH CV ECHO MEAS - TR MAX VEL: 200.1 CM/SEC
BH CV ECHO MEASUREMENTS AVERAGE E/E' RATIO: 16.37
LV EF BIPLANE MOD: 70 %

## 2025-04-18 PROCEDURE — 93306 TTE W/DOPPLER COMPLETE: CPT | Performed by: INTERNAL MEDICINE

## 2025-04-18 PROCEDURE — 93306 TTE W/DOPPLER COMPLETE: CPT

## 2025-04-22 NOTE — TELEPHONE ENCOUNTER
Caller: Maureen Katie FALCON    Relationship: Self    Best call back number: [phone]    Requested Prescriptions:   Requested Prescriptions     Pending Prescriptions Disp Refills    amitriptyline (ELAVIL) 25 MG tablet 90 tablet 3     Sig: Take 1 tablet by mouth every night at bedtime.        Pharmacy where request should be sent: [facility] #[id number] - [address] AT [facility] OF [address] [place] [address] - [phone] [place] [phone] FX     Last office visit with prescribing clinician: 12/19/2024   Last telemedicine visit with prescribing clinician: Visit date not found   Next office visit with prescribing clinician: 5/16/2025     Does the patient have less than a 3 day supply:  [x] Yes  [] No    Muriel Garcia Rep   04/22/25 11:31 EDT

## 2025-04-24 ENCOUNTER — RESULTS FOLLOW-UP (OUTPATIENT)
Dept: CARDIOLOGY | Facility: CLINIC | Age: 80
End: 2025-04-24
Payer: MEDICARE

## 2025-04-24 ENCOUNTER — LAB (OUTPATIENT)
Dept: LAB | Facility: HOSPITAL | Age: 80
End: 2025-04-24
Payer: MEDICARE

## 2025-04-24 DIAGNOSIS — I10 ESSENTIAL HYPERTENSION: ICD-10-CM

## 2025-04-24 DIAGNOSIS — R93.1 ABNORMAL ECHOCARDIOGRAM: ICD-10-CM

## 2025-04-24 DIAGNOSIS — R55 NEAR SYNCOPE: ICD-10-CM

## 2025-04-24 DIAGNOSIS — I51.89 DIASTOLIC DYSFUNCTION: ICD-10-CM

## 2025-04-24 DIAGNOSIS — R93.1 ABNORMAL ECHOCARDIOGRAM: Primary | ICD-10-CM

## 2025-04-24 LAB — NT-PROBNP SERPL-MCNC: 530 PG/ML (ref 0–1800)

## 2025-04-24 PROCEDURE — 83521 IG LIGHT CHAINS FREE EACH: CPT

## 2025-04-24 PROCEDURE — 84155 ASSAY OF PROTEIN SERUM: CPT

## 2025-04-24 PROCEDURE — 36415 COLL VENOUS BLD VENIPUNCTURE: CPT

## 2025-04-24 PROCEDURE — 86334 IMMUNOFIX E-PHORESIS SERUM: CPT

## 2025-04-24 PROCEDURE — 83880 ASSAY OF NATRIURETIC PEPTIDE: CPT

## 2025-04-24 PROCEDURE — 82784 ASSAY IGA/IGD/IGG/IGM EACH: CPT

## 2025-04-24 PROCEDURE — 84165 PROTEIN E-PHORESIS SERUM: CPT

## 2025-04-25 NOTE — TELEPHONE ENCOUNTER
Caller: Katie Reddy    Relationship to patient: Self    Best call back number:   Telephone Information:   Mobile [phone]         Patient is needing: PATIENT CALLING TO CHECK STATUS OF REFILL. PATIENT ADVISED IT WAS CALLED INTO THE WRONG PHARMACY ON 4/15/25. PLEASE SEND TO [facility] PER REFILL REQUEST

## 2025-04-28 ENCOUNTER — LAB (OUTPATIENT)
Dept: LAB | Facility: HOSPITAL | Age: 80
End: 2025-04-28
Payer: MEDICARE

## 2025-04-28 DIAGNOSIS — I10 ESSENTIAL HYPERTENSION: ICD-10-CM

## 2025-04-28 DIAGNOSIS — R55 NEAR SYNCOPE: ICD-10-CM

## 2025-04-28 DIAGNOSIS — I51.89 DIASTOLIC DYSFUNCTION: ICD-10-CM

## 2025-04-28 DIAGNOSIS — R93.1 ABNORMAL ECHOCARDIOGRAM: ICD-10-CM

## 2025-04-28 LAB
ALBUMIN SERPL ELPH-MCNC: 3.5 G/DL (ref 2.9–4.4)
ALBUMIN/GLOB SERPL: 1.3 {RATIO} (ref 0.7–1.7)
ALPHA1 GLOB SERPL ELPH-MCNC: 0.2 G/DL (ref 0–0.4)
ALPHA2 GLOB SERPL ELPH-MCNC: 0.7 G/DL (ref 0.4–1)
B-GLOBULIN SERPL ELPH-MCNC: 1 G/DL (ref 0.7–1.3)
GAMMA GLOB SERPL ELPH-MCNC: 0.8 G/DL (ref 0.4–1.8)
GLOBULIN SER-MCNC: 2.7 G/DL (ref 2.2–3.9)
IGA SERPL-MCNC: 206 MG/DL (ref 64–422)
IGG SERPL-MCNC: 753 MG/DL (ref 586–1602)
IGM SERPL-MCNC: 125 MG/DL (ref 26–217)
INTERPRETATION SERPL IEP-IMP: ABNORMAL
KAPPA LC FREE SER-MCNC: 34.1 MG/L (ref 3.3–19.4)
KAPPA LC FREE/LAMBDA FREE SER: 1.56 {RATIO} (ref 0.26–1.65)
LABORATORY COMMENT REPORT: ABNORMAL
LAMBDA LC FREE SERPL-MCNC: 21.8 MG/L (ref 5.7–26.3)
M PROTEIN SERPL ELPH-MCNC: ABNORMAL G/DL
PROT SERPL-MCNC: 6.2 G/DL (ref 6–8.5)

## 2025-04-28 PROCEDURE — 84156 ASSAY OF PROTEIN URINE: CPT

## 2025-04-28 PROCEDURE — 84166 PROTEIN E-PHORESIS/URINE/CSF: CPT

## 2025-04-29 ENCOUNTER — RESULTS FOLLOW-UP (OUTPATIENT)
Dept: CARDIOLOGY | Facility: CLINIC | Age: 80
End: 2025-04-29
Payer: MEDICARE

## 2025-04-29 DIAGNOSIS — D89.89 LIGHT CHAIN DISEASE, KAPPA TYPE: Primary | ICD-10-CM

## 2025-04-29 DIAGNOSIS — R89.9 ABNORMAL LABORATORY TEST: ICD-10-CM

## 2025-04-29 NOTE — TELEPHONE ENCOUNTER
Spoke to patient and relayed message per AP VENU Alcala. Patient verbalized understanding and had no further questions or concerns at this time. Patient will reach out to the office if anything arises.

## 2025-04-29 NOTE — TELEPHONE ENCOUNTER
Patient had one abnormal lab.  Based on echo and lab.  We will refer to hematology  (blood doctor) for further evaluation.

## 2025-04-30 LAB
ALBUMIN 24H MFR UR ELPH: 32.1 %
ALPHA1 GLOB 24H MFR UR ELPH: 10.4 %
ALPHA2 GLOB 24H MFR UR ELPH: 9.9 %
B-GLOBULIN 24H MFR UR ELPH: 34.9 %
GAMMA GLOB 24H MFR UR ELPH: 12.8 %
LABORATORY COMMENT REPORT: ABNORMAL
M PROTEIN 24H MFR UR ELPH: ABNORMAL %
PROT 24H UR-MRATE: 374 MG/24 HR (ref 30–150)
PROT UR-MCNC: 19.7 MG/DL

## 2025-05-12 RX ORDER — METOPROLOL SUCCINATE 100 MG/1
100 TABLET, EXTENDED RELEASE ORAL DAILY
Qty: 90 TABLET | Refills: 3 | Status: SHIPPED | OUTPATIENT
Start: 2025-05-12

## 2025-05-12 NOTE — TELEPHONE ENCOUNTER
Rx Refill Note  Requested Prescriptions     Pending Prescriptions Disp Refills    metoprolol succinate XL (TOPROL-XL) 100 MG 24 hr tablet [Pharmacy Med Name: METOPROLOL SUCC  MG TAB] 90 tablet 3     Sig: TAKE 1 TABLET BY MOUTH EVERY DAY      Last office visit with prescribing clinician: 3/17/2025   Last telemedicine visit with prescribing clinician: Visit date not found   Next office visit with prescribing clinician: 3/19/2026                         Would you like a call back once the refill request has been completed: [] Yes [] No    If the office needs to give you a call back, can they leave a voicemail: [] Yes [] No    Penny Chaudhari MA  05/12/25, 09:37 EDT

## 2025-05-15 ENCOUNTER — LAB (OUTPATIENT)
Dept: LAB | Facility: HOSPITAL | Age: 80
End: 2025-05-15
Payer: MEDICARE

## 2025-05-15 ENCOUNTER — CONSULT (OUTPATIENT)
Dept: ONCOLOGY | Facility: CLINIC | Age: 80
End: 2025-05-15
Payer: MEDICARE

## 2025-05-15 VITALS
TEMPERATURE: 97.8 F | BODY MASS INDEX: 24.97 KG/M2 | RESPIRATION RATE: 14 BRPM | OXYGEN SATURATION: 95 % | DIASTOLIC BLOOD PRESSURE: 75 MMHG | HEIGHT: 60 IN | HEART RATE: 79 BPM | SYSTOLIC BLOOD PRESSURE: 135 MMHG | WEIGHT: 127.2 LBS

## 2025-05-15 DIAGNOSIS — D89.89 LIGHT CHAIN DISEASE, KAPPA TYPE: ICD-10-CM

## 2025-05-15 DIAGNOSIS — E55.9 VITAMIN D DEFICIENCY: Chronic | ICD-10-CM

## 2025-05-15 DIAGNOSIS — E78.2 MIXED HYPERLIPIDEMIA: ICD-10-CM

## 2025-05-15 DIAGNOSIS — D64.9 ANEMIA, UNSPECIFIED TYPE: ICD-10-CM

## 2025-05-15 DIAGNOSIS — D89.89 LIGHT CHAIN DISEASE, KAPPA TYPE: Primary | ICD-10-CM

## 2025-05-15 DIAGNOSIS — R00.2 PALPITATIONS: ICD-10-CM

## 2025-05-15 PROBLEM — N39.0 UTI (URINARY TRACT INFECTION): Status: RESOLVED | Noted: 2020-10-22 | Resolved: 2025-05-15

## 2025-05-15 LAB
ALBUMIN SERPL-MCNC: 4.4 G/DL (ref 3.5–5.2)
ALBUMIN/GLOB SERPL: 1.7 G/DL
ALP SERPL-CCNC: 94 U/L (ref 39–117)
ALT SERPL W P-5'-P-CCNC: 13 U/L (ref 1–33)
ANION GAP SERPL CALCULATED.3IONS-SCNC: 13.1 MMOL/L (ref 5–15)
AST SERPL-CCNC: 23 U/L (ref 1–32)
BASOPHILS # BLD AUTO: 0.07 10*3/MM3 (ref 0–0.2)
BASOPHILS NFR BLD AUTO: 0.9 % (ref 0–1.5)
BILIRUB SERPL-MCNC: 0.2 MG/DL (ref 0–1.2)
BUN SERPL-MCNC: 24 MG/DL (ref 8–23)
BUN/CREAT SERPL: 22.9 (ref 7–25)
CALCIUM SPEC-SCNC: 9.5 MG/DL (ref 8.6–10.5)
CHLORIDE SERPL-SCNC: 106 MMOL/L (ref 98–107)
CO2 SERPL-SCNC: 20.9 MMOL/L (ref 22–29)
CREAT SERPL-MCNC: 1.05 MG/DL (ref 0.57–1)
DEPRECATED RDW RBC AUTO: 43.2 FL (ref 37–54)
EGFRCR SERPLBLD CKD-EPI 2021: 54.2 ML/MIN/1.73
EOSINOPHIL # BLD AUTO: 0.3 10*3/MM3 (ref 0–0.4)
EOSINOPHIL NFR BLD AUTO: 3.7 % (ref 0.3–6.2)
ERYTHROCYTE [DISTWIDTH] IN BLOOD BY AUTOMATED COUNT: 14.4 % (ref 12.3–15.4)
FERRITIN SERPL-MCNC: 58 NG/ML (ref 13–150)
FOLATE SERPL-MCNC: 7.84 NG/ML (ref 4.78–24.2)
GLOBULIN UR ELPH-MCNC: 2.6 GM/DL
GLUCOSE SERPL-MCNC: 100 MG/DL (ref 65–99)
HCT VFR BLD AUTO: 36.1 % (ref 34–46.6)
HGB BLD-MCNC: 11.6 G/DL (ref 12–15.9)
IRON 24H UR-MRATE: 48 MCG/DL (ref 37–145)
IRON SATN MFR SERPL: 12 % (ref 20–50)
LYMPHOCYTES # BLD AUTO: 2.28 10*3/MM3 (ref 0.7–3.1)
LYMPHOCYTES NFR BLD AUTO: 28.3 % (ref 19.6–45.3)
MCH RBC QN AUTO: 26.9 PG (ref 26.6–33)
MCHC RBC AUTO-ENTMCNC: 32.1 G/DL (ref 31.5–35.7)
MCV RBC AUTO: 83.6 FL (ref 79–97)
MONOCYTES # BLD AUTO: 0.69 10*3/MM3 (ref 0.1–0.9)
MONOCYTES NFR BLD AUTO: 8.6 % (ref 5–12)
NEUTROPHILS NFR BLD AUTO: 4.73 10*3/MM3 (ref 1.7–7)
NEUTROPHILS NFR BLD AUTO: 58.5 % (ref 42.7–76)
PLATELET # BLD AUTO: 173 10*3/MM3 (ref 140–450)
PMV BLD AUTO: 9.4 FL (ref 6–12)
POTASSIUM SERPL-SCNC: 4.4 MMOL/L (ref 3.5–5.2)
PROT SERPL-MCNC: 7 G/DL (ref 6–8.5)
RBC # BLD AUTO: 4.32 10*6/MM3 (ref 3.77–5.28)
SODIUM SERPL-SCNC: 140 MMOL/L (ref 136–145)
TIBC SERPL-MCNC: 407 MCG/DL (ref 298–536)
TRANSFERRIN SERPL-MCNC: 273 MG/DL (ref 200–360)
VIT B12 BLD-MCNC: 346 PG/ML (ref 211–946)
WBC NRBC COR # BLD AUTO: 8.07 10*3/MM3 (ref 3.4–10.8)

## 2025-05-15 PROCEDURE — 1159F MED LIST DOCD IN RCRD: CPT | Performed by: INTERNAL MEDICINE

## 2025-05-15 PROCEDURE — 84466 ASSAY OF TRANSFERRIN: CPT | Performed by: INTERNAL MEDICINE

## 2025-05-15 PROCEDURE — 99204 OFFICE O/P NEW MOD 45 MIN: CPT | Performed by: INTERNAL MEDICINE

## 2025-05-15 PROCEDURE — 82607 VITAMIN B-12: CPT | Performed by: INTERNAL MEDICINE

## 2025-05-15 PROCEDURE — 85025 COMPLETE CBC W/AUTO DIFF WBC: CPT

## 2025-05-15 PROCEDURE — 80053 COMPREHEN METABOLIC PANEL: CPT | Performed by: INTERNAL MEDICINE

## 2025-05-15 PROCEDURE — 1125F AMNT PAIN NOTED PAIN PRSNT: CPT | Performed by: INTERNAL MEDICINE

## 2025-05-15 PROCEDURE — 82746 ASSAY OF FOLIC ACID SERUM: CPT | Performed by: INTERNAL MEDICINE

## 2025-05-15 PROCEDURE — 82728 ASSAY OF FERRITIN: CPT | Performed by: INTERNAL MEDICINE

## 2025-05-15 PROCEDURE — 3075F SYST BP GE 130 - 139MM HG: CPT | Performed by: INTERNAL MEDICINE

## 2025-05-15 PROCEDURE — 84443 ASSAY THYROID STIM HORMONE: CPT | Performed by: INTERNAL MEDICINE

## 2025-05-15 PROCEDURE — 36415 COLL VENOUS BLD VENIPUNCTURE: CPT

## 2025-05-15 PROCEDURE — 82306 VITAMIN D 25 HYDROXY: CPT | Performed by: INTERNAL MEDICINE

## 2025-05-15 PROCEDURE — 80061 LIPID PANEL: CPT | Performed by: INTERNAL MEDICINE

## 2025-05-15 PROCEDURE — 1160F RVW MEDS BY RX/DR IN RCRD: CPT | Performed by: INTERNAL MEDICINE

## 2025-05-15 PROCEDURE — 83735 ASSAY OF MAGNESIUM: CPT | Performed by: INTERNAL MEDICINE

## 2025-05-15 PROCEDURE — 83540 ASSAY OF IRON: CPT | Performed by: INTERNAL MEDICINE

## 2025-05-15 PROCEDURE — 3078F DIAST BP <80 MM HG: CPT | Performed by: INTERNAL MEDICINE

## 2025-05-15 RX ORDER — COLESEVELAM 180 1/1
TABLET ORAL
COMMUNITY
Start: 2025-03-21

## 2025-05-15 NOTE — PROGRESS NOTES
Subjective   The ABCs of the Annual Wellness Visit  Medicare Wellness Visit      Katie Reddy is a 80 y.o. patient who presents for a Medicare Wellness Visit.    The following portions of the patient's history were reviewed and   updated as appropriate: allergies, current medications, past family history, past medical history, past social history, past surgical history, and problem list.    Compared to one year ago, the patient's physical   health is worse.  Compared to one year ago, the patient's mental   health is worse.    Recent Hospitalizations:  She was not admitted to the hospital during the last year.     Current Medical Providers:  Patient Care Team:  Nayeli Tony MD as PCP - General (Family Medicine)  Master Richard MD as Consulting Physician (Cardiology)  Banet, Duane Edward, MD as Consulting Physician (Dermatology)  Yuriy Gerard MD as Consulting Physician (Endocrinology)  Fabio Gill MD as Consulting Physician (Urology)  Shawn Elaine MD as Surgeon (Orthopedic Surgery)  Bertrand Parks MD as Consulting Physician (Cardiology)  Montrell Mota MD as Consulting Physician (Gastroenterology)  Vilma Melton APRN as Nurse Practitioner (Nurse Practitioner)  Mikey Green MD as Consulting Physician (Urology)  Panfilo Blake Jr., MD (Rheumatology)  Arnel Abbasi MD as Consulting Physician (Hematology and Oncology)    Outpatient Medications Prior to Visit   Medication Sig Dispense Refill    amitriptyline (ELAVIL) 25 MG tablet Take 1 tablet by mouth every night at bedtime. 90 tablet 3    amLODIPine (NORVASC) 10 MG tablet Take 0.5 tablets by mouth Daily. 90 tablet 3    Aspirin Low Dose 81 MG EC tablet TAKE 1 TABLET BY MOUTH EVERY DAY 90 tablet 3    Calcium Carbonate-Vit D-Min (Calcium 1200) 3516-6102 MG-UNIT chewable tablet Chew 1 tablet Daily.      colesevelam (WELCHOL) 625 MG tablet       dapagliflozin Propanediol (Farxiga) 10 MG tablet Take 10 mg by mouth  Daily. 90 tablet 3    lisinopril (PRINIVIL,ZESTRIL) 5 MG tablet TAKE 1 TABLET BY MOUTH EVERY DAY 90 tablet 0    meclizine (ANTIVERT) 12.5 MG tablet Take 1 tablet by mouth 3 (Three) Times a Day As Needed for Dizziness. 30 tablet 0    metoprolol succinate XL (TOPROL-XL) 100 MG 24 hr tablet TAKE 1 TABLET BY MOUTH EVERY DAY 90 tablet 3    nitroglycerin (NITROSTAT) 0.4 MG SL tablet Place 1 tablet under the tongue Every 5 (Five) Minutes As Needed for Chest Pain. Take no more than 3 doses in 15 minutes. 75 tablet 0    ondansetron ODT (ZOFRAN-ODT) 4 MG disintegrating tablet Place 1 tablet on the tongue Every 8 (Eight) Hours As Needed for Nausea or Vomiting. 12 tablet 0    pantoprazole (PROTONIX) 40 MG EC tablet Take 1 tablet by mouth Every Morning.      raloxifene (EVISTA) 60 MG tablet Take 1 tablet by mouth Daily. 90 tablet 3    sertraline (ZOLOFT) 100 MG tablet TAKE 1 TABLET BY MOUTH EVERY DAY AT NIGHT 90 tablet 1    sucralfate (CARAFATE) 1 g tablet Take 1 tablet by mouth 4 (Four) Times a Day. 360 tablet 1    Triamcinolone Acetonide (NASACORT) 55 MCG/ACT nasal inhaler 2 SPRAY IN EACH NOSTRIL BY INTRANASAL ROUTE ONCE DAILY FOR 30 DAYS IN THE MORNINGS      Vibegron (Gemtesa) 75 MG tablet Take 1 tablet by mouth Daily. 30 tablet 0    Farxiga 5 MG tablet tablet Take 1 tablet by mouth Daily. (Patient not taking: Reported on 5/16/2025)      fluocinonide (LIDEX) 0.05 % cream  (Patient not taking: Reported on 5/16/2025)      gabapentin (NEURONTIN) 300 MG capsule Take 1 capsule by mouth 2 (Two) Times a Day for 180 days. 60 capsule 5    atorvastatin (LIPITOR) 80 MG tablet Take 1 tablet by mouth Daily. (Patient not taking: Reported on 5/16/2025)      betamethasone valerate (VALISONE) 0.1 % cream APPLY TOPICALLY TO THE APPROPRIATE AREA TWICE A DAY AS DIRECTED (Patient not taking: Reported on 5/16/2025) 45 g 0    Evolocumab (REPATHA) solution prefilled syringe injection Inject 1 mL under the skin into the appropriate area as directed  Every 14 (Fourteen) Days. (Patient not taking: Reported on 5/16/2025) 6 mL 1     No facility-administered medications prior to visit.     No opioid medication identified on active medication list. I have reviewed chart for other potential  high risk medication/s and harmful drug interactions in the elderly.      Aspirin is on active medication list. Aspirin use is indicated based on review of current medical condition/s. Pros and cons of this therapy have been discussed today. Benefits of this medication outweigh potential harm.  Patient has been encouraged to continue taking this medication.  .      Patient Active Problem List   Diagnosis    Arthritis    Cataract of both eyes    Generalized anxiety disorder    Loss of height    Osteopenia    Palpitations    Vitamin D deficiency    Postmenopausal    Primary osteoarthritis of right knee    Primary osteoarthritis of left knee    Hallux valgus, acquired, bilateral    Essential hypertension    Weakness    Asthma in adult, mild intermittent, uncomplicated    History of hemorrhagic cerebrovascular accident (CVA) with residual deficit    Glucocorticoid deficiency    Colitis, ischemic    Mixed hyperlipidemia    GERD with esophagitis    Unstable angina pectoris    Bunion, left    Bladder incontinence    Body mass index (BMI) of 24.0 to 24.9 in adult    LBBB (left bundle branch block)    Hypercalcemia    Hemicrania continua    Agatston coronary artery calcium score greater than 400    Non-sustained ventricular tachycardia    Rash    Right leg pain    Dizziness    Cervical stenosis of spinal canal     Advance Care Planning Advance Directive is on file.  ACP discussion was held with the patient during this visit. Patient has an advance directive in EMR which is still valid.             Objective   Vitals:    05/16/25 1010 05/16/25 1023   BP: 155/80 153/78   BP Location: Left arm Right arm   Patient Position: Sitting Sitting   Cuff Size: Adult Adult   Pulse: 75 75   Temp: 97.1  "°F (36.2 °C)    TempSrc: Infrared    SpO2: 94%    Weight: 57.6 kg (127 lb)    Height: 152.4 cm (60\")    PainSc: 8     PainLoc: Head        Estimated body mass index is 24.8 kg/m² as calculated from the following:    Height as of this encounter: 152.4 cm (60\").    Weight as of this encounter: 57.6 kg (127 lb).    BMI is >= 25 and <30. (Overweight) The following options were offered after discussion;: exercise counseling/recommendations and nutrition counseling/recommendations           Does the patient have evidence of cognitive impairment? No  Lab Results   Component Value Date    TRIG 87 05/15/2025    HDL 59 05/15/2025     (H) 05/15/2025    VLDL 15 05/15/2025                                                                                                Health  Risk Assessment    Smoking Status:  Social History     Tobacco Use   Smoking Status Never    Passive exposure: Never   Smokeless Tobacco Never     Alcohol Consumption:  Social History     Substance and Sexual Activity   Alcohol Use Not Currently       Fall Risk Screen  STEADI Fall Risk Assessment was completed, and patient is at LOW risk for falls.Assessment completed on:2025    Depression Screening   Little interest or pleasure in doing things? Not at all   Feeling down, depressed, or hopeless? Not at all   PHQ-2 Total Score 0      Health Habits and Functional and Cognitive Screenin/16/2025    10:18 AM   Functional & Cognitive Status   Do you have difficulty preparing food and eating? No   Do you have difficulty bathing yourself, getting dressed or grooming yourself? Yes   Do you have difficulty using the toilet? No   Do you have difficulty moving around from place to place? Yes   Do you have trouble with steps or getting out of a bed or a chair? No   Current Diet Well Balanced Diet   Dental Exam Not up to date   Eye Exam Up to date   Exercise (times per week) 7 times per week   Current Exercises Include House Cleaning   Do you need help " using the phone?  No   Are you deaf or do you have serious difficulty hearing?  Yes   Do you need help to go to places out of walking distance? No   Do you need help shopping? No   Do you need help preparing meals?  No   Do you need help with housework?  Yes   Do you need help with laundry? No   Do you need help taking your medications? No   Do you need help managing money? No   Do you ever drive or ride in a car without wearing a seat belt? No   Have you felt unusual stress, anger or loneliness in the last month? Yes   Who do you live with? Alone   If you need help, do you have trouble finding someone available to you? No   Have you been bothered in the last four weeks by sexual problems? No   Do you have difficulty concentrating, remembering or making decisions? Yes           Age-appropriate Screening Schedule:  Refer to the list below for future screening recommendations based on patient's age, sex and/or medical conditions. Orders for these recommended tests are listed in the plan section. The patient has been provided with a written plan.    Health Maintenance List  Health Maintenance   Topic Date Due    RSV Vaccine - Adults (1 - 1-dose 75+ series) Never done    DXA SCAN  07/26/2025    COVID-19 Vaccine (4 - 2024-25 season) 11/16/2025 (Originally 9/1/2024)    INFLUENZA VACCINE  07/01/2025    LIPID PANEL  05/15/2026    ANNUAL WELLNESS VISIT  05/16/2026    TDAP/TD VACCINES (2 - Tdap) 04/16/2035    HEPATITIS C SCREENING  Completed    Pneumococcal Vaccine 50+  Completed    ZOSTER VACCINE  Completed    MAMMOGRAM  Discontinued    COLORECTAL CANCER SCREENING  Discontinued                                                                                                                                                CMS Preventative Services Quick Reference  Risk Factors Identified During Encounter  Fall Risk-High or Moderate: Discussed Fall Prevention in the home    The above risks/problems have been discussed with the  patient.  Pertinent information has been shared with the patient in the After Visit Summary.  An After Visit Summary and PPPS were made available to the patient.    Follow Up:   Next Medicare Wellness visit to be scheduled in 1 year.         Additional E&M Note during same encounter follows:  Patient has additional, significant, and separately identifiable condition(s)/problem(s) that require work above and beyond the Medicare Wellness Visit     Chief Complaint  Primary Care Follow-Up, Hypertension, Headache, and Tinnitus    Subjective    HPI  Katie is also being seen today for an annual adult preventative physical exam. , Katie is also being seen today for additional medical problem/s., and right sided headaches , right upper extremity pain       The patient is a 80-year-old female who presents today for her annual wellness exam for Medicare and an age-specific physical. She has postmenopausal osteopenia, palpitations, vitamin D deficiency, hypertension, asthma, history of CVA, glucocorticoid deficiency, ischemic colitis, hyperlipidemia, GERD, unstable angina, body mass index of 24, left bundle branch block, and generalized anxiety disorder.    She reports persistent right-sided headaches for the past 6 to 7 months, which have not subsided. The headaches are described as throbbing, preventing her from relaxing and causing difficulty in removing clothes over her head. She also experiences constant tinnitus. She has consulted with numerous physicians, including a cardiologist who recommended a 24-hour urine test to investigate the cause of her widespread pain. She has an upcoming appointment with a nephrologist next month. She underwent an EEG, which revealed a spot on her brain. She has a habit of taking naps and occasionally experiences swaying. She has a scheduled follow-up with a neurologist on 07/01/2025. She has never undergone neck surgery. She experiences weakness in her right arm, to the extent that she is  unable to sign her name. The pain extends down the side of her neck and into her shoulder, making it difficult for her to remove her clothes. She has been prescribed gabapentin but only takes it when the pain becomes severe.    She was referred to a cancer center by her cardiologist due to abnormal protein electrophoresis and light chain quantification results. She was informed that her symptoms are indicative of arthritis. She has not consulted with a rheumatologist. She was previously seen by a neurosurgeon for her neck pain, who attributed it to her age. An arthritis specialist confirmed the presence of arthritis but did not prescribe any medication. Another physician advised against early surgical intervention for her neck. Her condition has not worsened since her last consultation in 11/2024.    She experiences constant mild headaches and often feels fatigued. She has lost some weight since 2023 and reports a decreased appetite. She experiences shortness of breath and fatigue with minimal activity. She has been followed by her cardiologist and is now wearing a loop recorder. She does not experience shortness of breath when lying down. She describes intermittent substernal chest pains and pain in the upper extremities, along with some numbness and tingling. She has been free of back pain.    She has an InterStim device implanted, which is causing increased discomfort. She has an appointment with her urologist next week for further evaluation. She reports no abdominal pain or heartburn. She has been experiencing urinary and bowel incontinence, which is being evaluated by her urologist. She reports no dysuria, hematuria, or unusual vaginal discharge. She experiences intermittent fever and chills, with a tendency towards feeling cold. Her temperature occasionally rises to near 100 degrees. She does not experience night sweats.    She has discontinued Repatha and cholesterol medication.    FAMILY HISTORY  She  "reports a family history of arthritis.  Review of Systems   Constitutional:  Positive for fatigue.   Musculoskeletal:  Positive for arthralgias, back pain, myalgias, neck pain and neck stiffness.          Objective   Vital Signs:  /78 (BP Location: Right arm, Patient Position: Sitting, Cuff Size: Adult)   Pulse 75   Temp 97.1 °F (36.2 °C) (Infrared)   Ht 152.4 cm (60\")   Wt 57.6 kg (127 lb)   SpO2 94%   BMI 24.80 kg/m²   Physical Exam  Vitals reviewed.   Constitutional:       General: She is not in acute distress.     Appearance: Normal appearance. She is well-developed. She is not ill-appearing or toxic-appearing.   HENT:      Head: Normocephalic and atraumatic.      Nose: Nose normal.   Eyes:      Extraocular Movements: Extraocular movements intact.      Conjunctiva/sclera: Conjunctivae normal.      Pupils: Pupils are equal, round, and reactive to light.   Neck:      Vascular: No carotid bruit.      Comments: 75% normal range of motion with the exception of left lateral flexion which is approximately 50% Spurling's is weakly positive to the left  Cardiovascular:      Rate and Rhythm: Normal rate.   Pulmonary:      Effort: Pulmonary effort is normal.      Comments: Decreased breath sounds bilaterally  Abdominal:      General: There is no distension.      Palpations: There is no mass.      Tenderness: There is no abdominal tenderness.   Musculoskeletal:      Cervical back: Tenderness present.   Lymphadenopathy:      Cervical: No cervical adenopathy.   Neurological:      Mental Status: She is alert and oriented to person, place, and time.   Psychiatric:         Mood and Affect: Mood normal.         Behavior: Behavior normal.           Respiratory: Clear to auscultation, no wheezing, rales or rhonchi  Cardiovascular: Regular rate and rhythm, no murmurs, rubs, or gallops  Gastrointestinal: Soft, no tenderness, no distention, no masses            Results  Labs   - Protein Electrophoresis and Light Chain " Quantification: Abnormal    Imaging   - MRI of the cervical spine: Pinched nerve on the right side of the neck and disk issues           Assessment and Plan        1. Cervical canal stenosis.  - Persistent headaches may be attributed to cervical canal stenosis.  - MRI of the cervical spine will be ordered to further investigate the cause of her headaches.  - Discussed the potential link between cervical canal stenosis and her symptoms.  - MRI of the cervical spine will be ordered to evaluate the condition.    2. Right upper extremity pain and weakness.  - Reports pain and weakness in the right upper extremity, affecting daily activities such as signing her name and removing clothes.  - Physical exam findings indicate pain and weakness in the right upper extremity.  - Discussed the potential relation to cervical canal stenosis.  - MRI of the cervical spine will help determine the cause.    3. Hypertension.  - Blood pressure was elevated during this visit.  - Blood pressure readings will be monitored at home, with 10 readings to be provided over the next 2 weeks.  - Reviewed the importance of monitoring blood pressure regularly.  - Advised to send the readings via mail, phone, or in-person drop-off.    4. Glucocorticoid deficiency.  - Currently under the care of Dr. Stock for glucocorticoid deficiency.  - Medication adjustments may be necessary to improve symptoms.  - Reviewed the management plan with Dr. Stock.  - Follow-up with Dr. Stock for potential medication adjustments.         Follow Up   No follow-ups on file.  Patient was given instructions and counseling regarding her condition or for health maintenance advice. Please see specific information pulled into the AVS if appropriate.  Patient or patient representative verbalized consent for the use of Ambient Listening during the visit with  Nayeli Tony MD for chart documentation. 5/16/2025  17:38 EDT

## 2025-05-15 NOTE — PATIENT INSTRUCTIONS
Health Maintenance Due   Topic Date Due    RSV Vaccine - Adults (1 - 1-dose 75+ series) Never done    COVID-19 Vaccine (4 - 2024-25 season) 09/01/2024    ANNUAL WELLNESS VISIT  04/26/2025    DXA SCAN  07/26/2025    Check blood pressure cuff for accuracy and send 10 blood pressures over 2 weeks.  Watch sodium, alcohol and weight

## 2025-05-15 NOTE — PROGRESS NOTES
HEMATOLOGY ONCOLOGY OUTPATIENT CONSULTATION       Patient name: Katie Reddy  : 1945  MRN: [id number]  Primary Care Physician: Nayeli Tony MD  Referring Physician: Viridiana Alcala APRN  Reason For Consult: Abnormal protein electrophoresis.    History of Present Illness:    5/15/2025: Ms. Reddy is in the office for the first time.  She was noted by her cardiologist to have an abnormal protein electrophoresis and light chain quantification.  She was not sure why this tests were checked.  She admitted to dyspnea and fatigue.  The dyspnea is with even minimal activity.  She has been followed by her cardiologist and is now wearing a loop recorder.  She has experienced some fevers according to her report but when asked her temperature is usually not greater than 98 °F.  She has no nocturnal diaphoresis.  She has lost some weight since , approximately 15 pounds.  Her appetite is not as good although she has no nausea or vomiting.  She has been free of back pain but she does describe substernal chest pains that are intermittent and not clearly related to physical activity.  No abdominal pain, diarrhea or dysuria.  She denies dysesthesia of the lower extremities but she does have pain in the upper extremities as well as dysesthesia.  Social history and family history were reviewed.  On exam she is a well-built woman who does not seem in distress.  She appears a stated age and she is conversant and oriented.  No jaundice.  The lungs are clear bilaterally and the heart regular.  The abdomen is flat and soft.  There is no edema.  Laboratory exams reviewed.  The most recent echocardiogram shows no suggestion of amyloidosis.  To obtain additional laboratory exams and see me with the results.  It would not appear that she has a significant monoclonal gammopathy based on the results that I have available.  Discussed with her.    Past Medical History:   Diagnosis Date     Arthritis     Bladder incontinence     Glucocorticoid deficiency     Hyperlipidemia     Intractable nausea and vomiting 11/11/2020    Osteopenia     Vitamin D deficiency      Past Surgical History:   Procedure Laterality Date    APPENDECTOMY      BLADDER SURGERY      bladder stimulator placement/ REMOVAL    BREAST CYST EXCISION      BREAST LUMPECTOMY Left 1973    NEG    BUNIONECTOMY Right 05/29/2020    Procedure: BUNIONECTOMY DEVONTE;  Surgeon: MARISSA Em DPM;  Location: [facility] MAIN OR;  Service: Podiatry;  Laterality: Right;    BUNIONECTOMY Left 12/30/2022    Procedure: BUNIONECTOMY DEVONTE -MINIMALLY INVASIVE with distal metatarsal osteotomy and internal fixation;  Surgeon: MARISSA Em DPM;  Location: [facility] MAIN OR;  Service: Podiatry;  Laterality: Left;    CARDIAC CATHETERIZATION      CARDIAC CATHETERIZATION N/A 02/08/2022    Procedure: Left Heart Cath, possible pci;  Surgeon: Master Richard MD;  Location: [facility] CATH INVASIVE LOCATION;  Service: Cardiovascular;  Laterality: N/A;    CARDIAC CATHETERIZATION N/A 4/16/2024    Procedure: Left Heart Cath;  Surgeon: Master Richard MD;  Location: [facility] CATH INVASIVE LOCATION;  Service: Cardiovascular;  Laterality: N/A;    CARPAL TUNNEL RELEASE Right 1980    CHOLECYSTECTOMY      COLON SURGERY      hemorrhoid banding    COLONOSCOPY  2017 2017/ 2019 = jessica DESAI 2024   Dr. Mackey    COLONOSCOPY N/A 02/05/2022    NEG= 2022    ENDOSCOPY N/A 02/05/2022 2022EGD= erosive esophagitis, hiatal hernia, Nonerosive gastritis    EYE SURGERY      HERNIA REPAIR      Umbilical removal    HYSTERECTOMY  1970    SUBTOTAL HYSTERECTOMY      UMBILICAL HERNIA REPAIR         Current Outpatient Medications:     amitriptyline (ELAVIL) 25 MG tablet, Take 1 tablet by mouth every night at bedtime., Disp: 90 tablet, Rfl: 3    amLODIPine (NORVASC) 10 MG tablet, Take 0.5 tablets by mouth Daily., Disp: 90 tablet, Rfl: 3    Aspirin Low Dose 81 MG EC tablet, TAKE 1  TABLET BY MOUTH EVERY DAY, Disp: 90 tablet, Rfl: 3    Calcium Carbonate-Vit D-Min (Calcium 1200) 3478-8541 MG-UNIT chewable tablet, Chew 1 tablet Daily., Disp: , Rfl:     colesevelam (WELCHOL) 625 MG tablet, , Disp: , Rfl:     dapagliflozin Propanediol (Farxiga) 10 MG tablet, Take 10 mg by mouth Daily., Disp: 90 tablet, Rfl: 3    Farxiga 5 MG tablet tablet, Take 1 tablet by mouth Daily., Disp: , Rfl:     lisinopril (PRINIVIL,ZESTRIL) 5 MG tablet, TAKE 1 TABLET BY MOUTH EVERY DAY, Disp: 90 tablet, Rfl: 0    meclizine (ANTIVERT) 12.5 MG tablet, Take 1 tablet by mouth 3 (Three) Times a Day As Needed for Dizziness., Disp: 30 tablet, Rfl: 0    metoprolol succinate XL (TOPROL-XL) 100 MG 24 hr tablet, TAKE 1 TABLET BY MOUTH EVERY DAY, Disp: 90 tablet, Rfl: 3    ondansetron ODT (ZOFRAN-ODT) 4 MG disintegrating tablet, Place 1 tablet on the tongue Every 8 (Eight) Hours As Needed for Nausea or Vomiting., Disp: 12 tablet, Rfl: 0    pantoprazole (PROTONIX) 40 MG EC tablet, Take 1 tablet by mouth Every Morning., Disp: , Rfl:     raloxifene (EVISTA) 60 MG tablet, Take 1 tablet by mouth Daily., Disp: 90 tablet, Rfl: 3    sertraline (ZOLOFT) 100 MG tablet, TAKE 1 TABLET BY MOUTH EVERY DAY AT NIGHT, Disp: 90 tablet, Rfl: 1    sucralfate (CARAFATE) 1 g tablet, Take 1 tablet by mouth 4 (Four) Times a Day., Disp: 360 tablet, Rfl: 1    Triamcinolone Acetonide (NASACORT) 55 MCG/ACT nasal inhaler, 2 SPRAY IN EACH NOSTRIL BY INTRANASAL ROUTE ONCE DAILY FOR 30 DAYS IN THE MORNINGS, Disp: , Rfl:     Vibegron (Gemtesa) 75 MG tablet, Take 1 tablet by mouth Daily., Disp: 30 tablet, Rfl: 0    atorvastatin (LIPITOR) 80 MG tablet, Take 1 tablet by mouth Daily. (Patient not taking: Reported on 5/15/2025), Disp: , Rfl:     betamethasone valerate (VALISONE) 0.1 % cream, APPLY TOPICALLY TO THE APPROPRIATE AREA TWICE A DAY AS DIRECTED (Patient not taking: Reported on 5/15/2025), Disp: 45 g, Rfl: 0    Evolocumab (REPATHA) solution prefilled syringe  injection, Inject 1 mL under the skin into the appropriate area as directed Every 14 (Fourteen) Days. (Patient not taking: Reported on 5/15/2025), Disp: 6 mL, Rfl: 1    fluocinonide (LIDEX) 0.05 % cream, , Disp: , Rfl:     gabapentin (NEURONTIN) 300 MG capsule, Take 1 capsule by mouth 2 (Two) Times a Day for 180 days., Disp: 60 capsule, Rfl: 5    nitroglycerin (NITROSTAT) 0.4 MG SL tablet, Place 1 tablet under the tongue Every 5 (Five) Minutes As Needed for Chest Pain. Take no more than 3 doses in 15 minutes. (Patient not taking: Reported on 5/15/2025), Disp: 75 tablet, Rfl: 0    Allergies   Allergen Reactions    Trazodone Irritability    Dayvigo [Lemborexant] Other (See Comments)     Sleep walking    Ibandronate GI Intolerance     GERD     Family History   Problem Relation Age of Onset    Heart disease Mother     Hypertension Mother     Diabetes Father     Heart disease Father     Alcohol abuse Father     Hypertension Father     Dementia Sister     Diabetes Brother     Heart disease Brother     Hypertension Brother     Prostate cancer Brother     Diabetes Brother     Diabetes Maternal Aunt     Heart disease Maternal Grandmother     Hypertension Maternal Grandmother     Heart disease Maternal Grandfather     Hypertension Maternal Grandfather     Liver disease Paternal Grandmother     Hypertension Paternal Grandmother     Heart disease Paternal Grandfather     Hypertension Paternal Grandfather      Cancer-related family history includes Prostate cancer in her brother.    Social History     Tobacco Use    Smoking status: Never     Passive exposure: Never    Smokeless tobacco: Never   Vaping Use    Vaping status: Never Used   Substance Use Topics    Alcohol use: Not Currently    Drug use: Not Currently     Social History     Social History Narrative    Pt states she lives alone in [facility] apartments, [place] a senior community, and has a close neighbor that helps when needed/drives her to [employer]. Denies  insecurities re affording/obtaining food, medication, transportation.     ROS:   Review of Systems   Constitutional:  Positive for fatigue. Negative for activity change, appetite change, chills, diaphoresis, fever and unexpected weight change.   HENT:  Negative for congestion, dental problem, drooling, ear discharge, ear pain, facial swelling, hearing loss, mouth sores, nosebleeds, postnasal drip, rhinorrhea, sinus pressure, sinus pain, sneezing, sore throat, tinnitus, trouble swallowing and voice change.    Eyes:  Negative for photophobia, pain, discharge, redness, itching and visual disturbance.   Respiratory:  Positive for cough and shortness of breath. Negative for apnea, choking, chest tightness, wheezing and stridor.    Cardiovascular:  Positive for chest pain. Negative for palpitations and leg swelling.   Gastrointestinal:  Negative for abdominal distention, abdominal pain, anal bleeding, blood in stool, constipation, diarrhea, nausea, rectal pain and vomiting.   Endocrine: Negative for cold intolerance, heat intolerance, polydipsia and polyuria.   Genitourinary:  Negative for decreased urine volume, difficulty urinating, dysuria, flank pain, frequency, genital sores, hematuria and urgency.   Musculoskeletal:  Negative for arthralgias, back pain, gait problem, joint swelling, myalgias, neck pain and neck stiffness.   Skin:  Negative for color change, pallor and rash.   Neurological:  Negative for dizziness, tremors, seizures, syncope, facial asymmetry, speech difficulty, weakness, light-headedness, numbness and headaches.   Hematological:  Negative for adenopathy. Does not bruise/bleed easily.   Psychiatric/Behavioral:  Negative for agitation, behavioral problems, confusion, decreased concentration, hallucinations, self-injury, sleep disturbance and suicidal ideas. The patient is not nervous/anxious.        Objective:    Vital Signs:  Vitals:    05/15/25 1120   BP: 135/75   Pulse: 79   Resp: 14   Temp: 97.8  "°F (36.6 °C)   SpO2: 95%   Weight: 57.7 kg (127 lb 3.2 oz)   Height: 152.4 cm (60\")   PainSc: 2    PainLoc: Leg  Comment: Right arm/Right leg     Body mass index is 24.84 kg/m².    ECOG Status  (1) Restricted in physically strenuous activity, ambulatory and able to do work of light nature    Physical Exam:   Physical Exam  Constitutional:       General: She is not in acute distress.     Appearance: She is not ill-appearing, toxic-appearing or diaphoretic.   HENT:      Head: Normocephalic and atraumatic.      Right Ear: External ear normal.      Left Ear: External ear normal.      Nose: Nose normal.      Mouth/Throat:      Mouth: Mucous membranes are moist.      Pharynx: Oropharynx is clear. No oropharyngeal exudate or posterior oropharyngeal erythema.   Eyes:      General: No scleral icterus.        Right eye: No discharge.         Left eye: No discharge.      Conjunctiva/sclera: Conjunctivae normal.      Pupils: Pupils are equal, round, and reactive to light.   Cardiovascular:      Rate and Rhythm: Normal rate and regular rhythm.      Pulses: Normal pulses.      Heart sounds: No murmur heard.     No friction rub. No gallop.   Pulmonary:      Effort: No respiratory distress.      Breath sounds: No stridor. No wheezing, rhonchi or rales.   Abdominal:      General: Abdomen is flat. Bowel sounds are normal. There is no distension.      Palpations: Abdomen is soft. There is no mass.      Tenderness: There is no abdominal tenderness. There is no right CVA tenderness, left CVA tenderness, guarding or rebound.      Hernia: No hernia is present.   Musculoskeletal:         General: No swelling, tenderness, deformity or signs of injury.      Cervical back: No rigidity.      Right lower leg: No edema.      Left lower leg: No edema.   Lymphadenopathy:      Cervical: No cervical adenopathy.   Skin:     Coloration: Skin is not jaundiced or pale.      Findings: No bruising, lesion or rash.   Neurological:      General: No focal " deficit present.      Mental Status: She is alert and oriented to person, place, and time.      Cranial Nerves: No cranial nerve deficit.   Psychiatric:         Mood and Affect: Mood normal.         Behavior: Behavior normal.         Thought Content: Thought content normal.         Judgment: Judgment normal.     SHIRLEY Abbasi MD performed a physical exam on 5/15/2025 as documented above.    Lab Results - Last 18 Months   Lab Units 05/15/25  1053 01/21/25  0942 12/20/24  1011   WBC 10*3/mm3 8.07 6.44 5.61   HEMOGLOBIN g/dL 11.6* 12.3 11.0*   HEMATOCRIT % 36.1 37.4 33.9*   PLATELETS 10*3/mm3 173 237 229   MCV fL 83.6 82.6 83.3     Lab Results - Last 18 Months   Lab Units 05/15/25  1213 02/18/25  1231 01/21/25  0942 12/20/24  1011   SODIUM mmol/L 140 138  --  136   POTASSIUM mmol/L 4.4 5.0  --  4.7   CHLORIDE mmol/L 106 105  --  104   CO2 mmol/L 20.9* 17*  --  19.4*   BUN mg/dL 24* 24  --  26*   CREATININE mg/dL 1.05* 1.10* 1.00 1.33*   CALCIUM mg/dL 9.5 10.3  --  9.2   BILIRUBIN mg/dL 0.2 0.3  --  0.2   ALK PHOS U/L 94 114  --  90   ALT (SGPT) U/L 13 15  --  10   AST (SGOT) U/L 23 24  --  22   GLUCOSE mg/dL 100* 94  --  108*       Lab Results   Component Value Date    GLUCOSE 100 (H) 05/15/2025    BUN 24 (H) 05/15/2025    CREATININE 1.05 (H) 05/15/2025    EGFRIFNONA 55 (L) 02/09/2022    BCR 22.9 05/15/2025    K 4.4 05/15/2025    CO2 20.9 (L) 05/15/2025    CALCIUM 9.5 05/15/2025    ALBUMIN 4.4 05/15/2025    AST 23 05/15/2025    ALT 13 05/15/2025     Lab Results - Last 18 Months   Lab Units 04/16/24  1202   INR  0.94     Lab Results   Component Value Date    IRON 48 05/15/2025    TIBC 407 05/15/2025    FERRITIN 58.00 05/15/2025     Lab Results   Component Value Date    [id number] 609 08/08/2024     Lab Results   Component Value Date    SEDRATE 13 11/15/2024     Lab Results   Component Value Date    PTT 23.9 (L) 02/01/2022    INR 0.94 04/16/2024     Lab Results   Component Value Date    CEA 3.62 08/03/2023     Lab  Results   Component Value Date    SEDRATE 13 11/15/2024      Assessment & Plan     1.  Monoclonal gammopathy?:  It would not appear, based on the results available, that she has a significant monoclonal gammopathy.  There is a slight increase in the kappa light chains but the ratio is unchanged.  I will repeat the protein electrophoresis and will obtain some additional laboratory exams.  Discussed with her at length.  2.  I have reviewed all the records including notes from primary care and cardiology.  Reviewed all the laboratory exams available as well as imaging studies and echocardiogram.  Discussed with her.  3.  She is to see me with results in a few weeks.    Arnel Abbasi MD on 5/15/2025 at 1653.

## 2025-05-16 ENCOUNTER — OFFICE VISIT (OUTPATIENT)
Dept: FAMILY MEDICINE CLINIC | Facility: CLINIC | Age: 80
End: 2025-05-16
Payer: MEDICARE

## 2025-05-16 VITALS
DIASTOLIC BLOOD PRESSURE: 78 MMHG | OXYGEN SATURATION: 94 % | HEIGHT: 60 IN | WEIGHT: 127 LBS | SYSTOLIC BLOOD PRESSURE: 153 MMHG | TEMPERATURE: 97.1 F | HEART RATE: 75 BPM | BODY MASS INDEX: 24.94 KG/M2

## 2025-05-16 DIAGNOSIS — K21.00 GASTROESOPHAGEAL REFLUX DISEASE WITH ESOPHAGITIS, UNSPECIFIED WHETHER HEMORRHAGE: ICD-10-CM

## 2025-05-16 DIAGNOSIS — E55.9 VITAMIN D DEFICIENCY: Chronic | ICD-10-CM

## 2025-05-16 DIAGNOSIS — M85.89 OSTEOPENIA OF MULTIPLE SITES: ICD-10-CM

## 2025-05-16 DIAGNOSIS — Z78.0 POST-MENOPAUSAL: ICD-10-CM

## 2025-05-16 DIAGNOSIS — M54.2 NECK PAIN: ICD-10-CM

## 2025-05-16 DIAGNOSIS — I10 ESSENTIAL HYPERTENSION: Chronic | ICD-10-CM

## 2025-05-16 DIAGNOSIS — E27.49 GLUCOCORTICOID DEFICIENCY: ICD-10-CM

## 2025-05-16 DIAGNOSIS — E78.2 MIXED HYPERLIPIDEMIA: ICD-10-CM

## 2025-05-16 DIAGNOSIS — Z00.01 ENCOUNTER FOR ANNUAL GENERAL MEDICAL EXAMINATION WITH ABNORMAL FINDINGS IN ADULT: Primary | ICD-10-CM

## 2025-05-16 DIAGNOSIS — I69.30 HISTORY OF HEMORRHAGIC CEREBROVASCULAR ACCIDENT (CVA) WITH RESIDUAL DEFICIT: Chronic | ICD-10-CM

## 2025-05-16 DIAGNOSIS — J45.20 ASTHMA IN ADULT, MILD INTERMITTENT, UNCOMPLICATED: Chronic | ICD-10-CM

## 2025-05-16 DIAGNOSIS — R00.2 PALPITATIONS: ICD-10-CM

## 2025-05-16 DIAGNOSIS — M48.02 CERVICAL STENOSIS OF SPINAL CANAL: ICD-10-CM

## 2025-05-16 DIAGNOSIS — I44.7 LBBB (LEFT BUNDLE BRANCH BLOCK): ICD-10-CM

## 2025-05-16 DIAGNOSIS — M79.601 PAIN OF RIGHT UPPER EXTREMITY: ICD-10-CM

## 2025-05-16 DIAGNOSIS — I20.0 UNSTABLE ANGINA PECTORIS: Chronic | ICD-10-CM

## 2025-05-16 DIAGNOSIS — F41.1 GENERALIZED ANXIETY DISORDER: Chronic | ICD-10-CM

## 2025-05-16 DIAGNOSIS — K55.9 COLITIS, ISCHEMIC: ICD-10-CM

## 2025-05-16 LAB
25(OH)D3 SERPL-MCNC: 18.7 NG/ML (ref 30–100)
ALBUMIN SERPL ELPH-MCNC: 3.6 G/DL (ref 2.9–4.4)
ALBUMIN/GLOB SERPL: 1.3 {RATIO} (ref 0.7–1.7)
ALPHA1 GLOB SERPL ELPH-MCNC: 0.2 G/DL (ref 0–0.4)
ALPHA2 GLOB SERPL ELPH-MCNC: 0.7 G/DL (ref 0.4–1)
B-GLOBULIN SERPL ELPH-MCNC: 1.1 G/DL (ref 0.7–1.3)
CHOLEST SERPL-MCNC: 254 MG/DL (ref 0–200)
GAMMA GLOB SERPL ELPH-MCNC: 0.8 G/DL (ref 0.4–1.8)
GLOBULIN SER CALC-MCNC: 2.8 G/DL (ref 2.2–3.9)
HDLC SERPL-MCNC: 59 MG/DL (ref 40–60)
KAPPA LC FREE SER-MCNC: 35.7 MG/L (ref 3.3–19.4)
KAPPA LC FREE/LAMBDA FREE SER: 1.58 {RATIO} (ref 0.26–1.65)
LABORATORY COMMENT REPORT: NORMAL
LAMBDA LC FREE SERPL-MCNC: 22.6 MG/L (ref 5.7–26.3)
LDLC SERPL CALC-MCNC: 180 MG/DL (ref 0–100)
LDLC/HDLC SERPL: 3.01 {RATIO}
M PROTEIN SERPL ELPH-MCNC: NORMAL G/DL
MAGNESIUM SERPL-MCNC: 1.8 MG/DL (ref 1.6–2.4)
PROT SERPL-MCNC: 6.4 G/DL (ref 6–8.5)
TRIGL SERPL-MCNC: 87 MG/DL (ref 0–150)
TSH SERPL DL<=0.05 MIU/L-ACNC: 1.52 UIU/ML (ref 0.27–4.2)
VLDLC SERPL-MCNC: 15 MG/DL (ref 5–40)

## 2025-05-17 ENCOUNTER — RESULTS FOLLOW-UP (OUTPATIENT)
Dept: FAMILY MEDICINE CLINIC | Facility: CLINIC | Age: 80
End: 2025-05-17
Payer: MEDICARE

## 2025-05-17 NOTE — PROGRESS NOTES
Please check with Dr. Abbasi about the results of the test that he ordered I did find that your vitamin D was low so consider increasing at 1000 units a couple of days per week.  Total cholesterol is 254 and goal is below 200 bad cholesterol is 180 and goal is below 100 can you do more with diet and exercise or if your liver function remains good we can check with other physicians to see if they agree that it would be okay to start a statin.  Because of your age starting a statin has not been shown to increase the length or quality of your life and I would not advise it but the decision is ultimately years call if you have any other questions or concerns and keep follow-up with the other physicians.  Remember to call me if something is going on and you do not understand it.

## 2025-05-17 NOTE — LETTER
Katie Reddy  [address]  [address]  [place] [address]    May 19, 2025     Dear Ms. Reddy:    Below are the results from your recent visit:    Resulted Orders   Lipid Panel   Result Value Ref Range    Total Cholesterol 254 (H) 0 - 200 mg/dL    Triglycerides 87 0 - 150 mg/dL    HDL Cholesterol 59 40 - 60 mg/dL    LDL Cholesterol  180 (H) 0 - 100 mg/dL    VLDL Cholesterol 15 5 - 40 mg/dL    LDL/HDL Ratio 3.01    Magnesium   Result Value Ref Range    Magnesium 1.8 1.6 - 2.4 mg/dL   TSH Rfx On Abnormal To Free T4   Result Value Ref Range    TSH 1.520 0.270 - 4.200 uIU/mL   Vitamin D,25-Hydroxy   Result Value Ref Range    25 Hydroxy, Vitamin D 18.7 (L) 30.0 - 100.0 ng/ml       Please check with Dr. Abbasi about the results of the test that he ordered I did find that your vitamin D was low so consider increasing at 1000 units a couple of days per week. Total cholesterol is 254 and goal is below 200 bad cholesterol is 180 and goal is below 100 can you do more with diet and exercise or if your liver function remains good we can check with other physicians to see if they agree that it would be okay to start a statin. Because of your age starting a statin has not been shown to increase the length or quality of your life and I would not advise it but the decision is ultimately years call if you have any other questions or concerns and keep follow-up with the other physicians. Remember to call me if something is going on and you do not understand it.           Sincerely,        Nayeli Tony MD

## 2025-05-19 LAB
IGA SERPL-MCNC: 224 MG/DL (ref 64–422)
IGG SERPL-MCNC: 865 MG/DL (ref 586–1602)
IGM SERPL-MCNC: 141 MG/DL (ref 26–217)
PROT PATTERN SERPL IFE-IMP: NORMAL

## 2025-05-19 NOTE — TELEPHONE ENCOUNTER
Sent message through [employer] TO RELAY   Please check with Dr. Abbasi about the results of the test that he ordered I did find that your vitamin D was low so consider increasing at 1000 units a couple of days per week. Total cholesterol is 254 and goal is below 200 bad cholesterol is 180 and goal is below 100 can you do more with diet and exercise or if your liver function remains good we can check with other physicians to see if they agree that it would be okay to start a statin. Because of your age starting a statin has not been shown to increase the length or quality of your life and I would not advise it but the decision is ultimately years call if you have any other questions or concerns and keep follow-up with the other physicians. Remember to call me if something is going on and you do not understand it.

## 2025-05-19 NOTE — TELEPHONE ENCOUNTER
Spo with pt, she asked that I send her a letter with what  said along with her results. A letter was sent to pt.

## 2025-05-22 RX ORDER — LISINOPRIL 5 MG/1
5 TABLET ORAL DAILY
Qty: 90 TABLET | Refills: 0 | Status: SHIPPED | OUTPATIENT
Start: 2025-05-22

## 2025-06-09 RX ORDER — SERTRALINE HYDROCHLORIDE 100 MG/1
100 TABLET, FILM COATED ORAL
Qty: 90 TABLET | Refills: 1 | Status: SHIPPED | OUTPATIENT
Start: 2025-06-09

## 2025-06-09 NOTE — TELEPHONE ENCOUNTER
Caller: Maureen Katie TERRIE    Relationship: Self    Best call back number: [phone]    Requested Prescriptions:   Requested Prescriptions     Pending Prescriptions Disp Refills    sertraline (ZOLOFT) 100 MG tablet 90 tablet 1     Sig: Take 1 tablet by mouth every night at bedtime.        Pharmacy where request should be sent: [id number] - [address] AT [facility] OF [address] [place] [address] - [phone] [place] [phone]      Last office visit with prescribing clinician: 5/16/2025   Last telemedicine visit with prescribing clinician: Visit date not found   Next office visit with prescribing clinician: Visit date not found     Muriel Odom   06/09/25 13:03 EDT

## 2025-06-12 ENCOUNTER — HOSPITAL ENCOUNTER (OUTPATIENT)
Dept: MRI IMAGING | Facility: HOSPITAL | Age: 80
Discharge: HOME | End: 2025-06-12
Admitting: PREVENTIVE MEDICINE
Payer: MEDICARE

## 2025-06-12 DIAGNOSIS — M54.2 NECK PAIN: ICD-10-CM

## 2025-06-12 DIAGNOSIS — M48.02 CERVICAL STENOSIS OF SPINAL CANAL: ICD-10-CM

## 2025-06-12 DIAGNOSIS — M79.601 PAIN OF RIGHT UPPER EXTREMITY: ICD-10-CM

## 2025-06-12 PROCEDURE — 72141 MRI NECK SPINE W/O DYE: CPT

## 2025-06-17 NOTE — PROGRESS NOTES
"                         HEMATOLOGY ONCOLOGY OUTPATIENT FOLLOW UP       Patient name: Katie Reddy  : 1945  MRN: [id number]  Primary Care Physician: Nayeli Tony MD  Referring Physician: No ref. provider found  Reason For Consult: Abnormal protein electrophoresis.    History of Present Illness:    5/15/2025: Ms. Reddy is in the office for the first time.  She was noted by her cardiologist to have an abnormal protein electrophoresis and light chain quantification.  She was not sure why this tests were checked.  She admitted to dyspnea and fatigue.  The dyspnea is with even minimal activity.  She has been followed by her cardiologist and is now wearing a loop recorder.  She has experienced some fevers according to her report but when asked her temperature is usually not greater than 98 °F.  She has no nocturnal diaphoresis.  She has lost some weight since , approximately 15 pounds.  Her appetite is not as good although she has no nausea or vomiting.  She has been free of back pain but she does describe substernal chest pains that are intermittent and not clearly related to physical activity.  No abdominal pain, diarrhea or dysuria.  She denies dysesthesia of the lower extremities but she does have pain in the upper extremities as well as dysesthesia.  Social history and family history were reviewed.  On exam she is a well-built woman who does not seem in distress.  She appears a stated age and she is conversant and oriented.  No jaundice.  The lungs are clear bilaterally and the heart regular.  The abdomen is flat and soft.  There is no edema.  Laboratory exams reviewed.  The most recent echocardiogram shows no suggestion of amyloidosis.  To obtain additional laboratory exams and see me with the results.  It would not appear that she has a significant monoclonal gammopathy based on the results that I have available.  Discussed with her.    2025: Feels about the same. \"I feel like " "I'm about to explode and I'm shaky\". Eats about the same. Sleeps some. No pain. Denies dyspnea. She has not been coughing and denies orthopnea. No abdominal pain. On exam alert and conversant. Oriented and in no distress. No jaundice. No oral lesions and respirations not labored. Lungs clear and heart regular. Abdomen soft. No edema. Reviewed the laboratory exams and discussed with her at length. No evidence of a progressive monoclonal gammopathy. The mild elevation of the kappa light chains is probably related to her renal dysfunction. To continue observation. See me in 4 months.     Past Medical History:   Diagnosis Date    Arthritis     Bladder incontinence     Glucocorticoid deficiency     Hyperlipidemia     Intractable nausea and vomiting 11/11/2020    Osteopenia     Vitamin D deficiency      Past Surgical History:   Procedure Laterality Date    APPENDECTOMY      BLADDER SURGERY      bladder stimulator placement/ REMOVAL    BREAST CYST EXCISION      BREAST LUMPECTOMY Left 1973    NEG    BUNIONECTOMY Right 05/29/2020    Procedure: BUNIONECTOMY DEVONTE;  Surgeon: MARISSA Em DPM;  Location: [facility] MAIN OR;  Service: Podiatry;  Laterality: Right;    BUNIONECTOMY Left 12/30/2022    Procedure: BUNIONECTOMY DEVONTE -MINIMALLY INVASIVE with distal metatarsal osteotomy and internal fixation;  Surgeon: MARISSA Em DPM;  Location: [facility] MAIN OR;  Service: Podiatry;  Laterality: Left;    CARDIAC CATHETERIZATION      CARDIAC CATHETERIZATION N/A 02/08/2022    Procedure: Left Heart Cath, possible pci;  Surgeon: Master Richard MD;  Location: [facility] CATH INVASIVE LOCATION;  Service: Cardiovascular;  Laterality: N/A;    CARDIAC CATHETERIZATION N/A 4/16/2024    Procedure: Left Heart Cath;  Surgeon: Master Richard MD;  Location: [facility] CATH INVASIVE LOCATION;  Service: Cardiovascular;  Laterality: N/A;    CARPAL TUNNEL RELEASE Right 1980    CHOLECYSTECTOMY      COLON SURGERY      hemorrhoid " banding    COLONOSCOPY  2017 2017/ 2019 = TA, rech 2024   Dr. Mackey    COLONOSCOPY N/A 02/05/2022    NEG= 2022    ENDOSCOPY N/A 02/05/2022 2022EGD= erosive esophagitis, hiatal hernia, Nonerosive gastritis    EYE SURGERY      HERNIA REPAIR      Umbilical removal    HYSTERECTOMY  1970    SUBTOTAL HYSTERECTOMY      UMBILICAL HERNIA REPAIR         Current Outpatient Medications:     amitriptyline (ELAVIL) 25 MG tablet, Take 1 tablet by mouth every night at bedtime., Disp: 90 tablet, Rfl: 3    amLODIPine (NORVASC) 10 MG tablet, Take 0.5 tablets by mouth Daily., Disp: 90 tablet, Rfl: 3    Aspirin Low Dose 81 MG EC tablet, TAKE 1 TABLET BY MOUTH EVERY DAY, Disp: 90 tablet, Rfl: 3    Calcium Carbonate-Vit D-Min (Calcium 1200) 3999-8562 MG-UNIT chewable tablet, Chew 1 tablet Daily., Disp: , Rfl:     colesevelam (WELCHOL) 625 MG tablet, , Disp: , Rfl:     dapagliflozin Propanediol (Farxiga) 10 MG tablet, Take 10 mg by mouth Daily., Disp: 90 tablet, Rfl: 3    lisinopril (PRINIVIL,ZESTRIL) 5 MG tablet, TAKE 1 TABLET BY MOUTH EVERY DAY, Disp: 90 tablet, Rfl: 0    meclizine (ANTIVERT) 12.5 MG tablet, Take 1 tablet by mouth 3 (Three) Times a Day As Needed for Dizziness., Disp: 30 tablet, Rfl: 0    metoprolol succinate XL (TOPROL-XL) 100 MG 24 hr tablet, TAKE 1 TABLET BY MOUTH EVERY DAY, Disp: 90 tablet, Rfl: 3    ondansetron ODT (ZOFRAN-ODT) 4 MG disintegrating tablet, Place 1 tablet on the tongue Every 8 (Eight) Hours As Needed for Nausea or Vomiting., Disp: 12 tablet, Rfl: 0    pantoprazole (PROTONIX) 40 MG EC tablet, Take 1 tablet by mouth Every Morning., Disp: , Rfl:     raloxifene (EVISTA) 60 MG tablet, Take 1 tablet by mouth Daily., Disp: 90 tablet, Rfl: 3    sertraline (ZOLOFT) 100 MG tablet, Take 1 tablet by mouth every night at bedtime., Disp: 90 tablet, Rfl: 1    sucralfate (CARAFATE) 1 g tablet, Take 1 tablet by mouth 4 (Four) Times a Day., Disp: 360 tablet, Rfl: 1    Triamcinolone Acetonide (NASACORT) 55 MCG/ACT  nasal inhaler, 2 SPRAY IN EACH NOSTRIL BY INTRANASAL ROUTE ONCE DAILY FOR 30 DAYS IN THE MORNINGS, Disp: , Rfl:     Vibegron (Gemtesa) 75 MG tablet, Take 1 tablet by mouth Daily., Disp: 30 tablet, Rfl: 0    Farxiga 5 MG tablet tablet, Take 1 tablet by mouth Daily. (Patient not taking: Reported on 6/18/2025), Disp: , Rfl:     fluocinonide (LIDEX) 0.05 % cream, , Disp: , Rfl:     gabapentin (NEURONTIN) 300 MG capsule, Take 1 capsule by mouth 2 (Two) Times a Day for 180 days., Disp: 60 capsule, Rfl: 5    nitroglycerin (NITROSTAT) 0.4 MG SL tablet, Place 1 tablet under the tongue Every 5 (Five) Minutes As Needed for Chest Pain. Take no more than 3 doses in 15 minutes. (Patient not taking: Reported on 6/18/2025), Disp: 75 tablet, Rfl: 0    Allergies   Allergen Reactions    Trazodone Irritability    Dayvigo [Lemborexant] Other (See Comments)     Sleep walking    Ibandronate GI Intolerance     GERD     Family History   Problem Relation Age of Onset    Heart disease Mother     Hypertension Mother     Diabetes Father     Heart disease Father     Alcohol abuse Father     Hypertension Father     Dementia Sister     Diabetes Brother     Heart disease Brother     Hypertension Brother     Prostate cancer Brother     Diabetes Brother     Diabetes Maternal Aunt     Heart disease Maternal Grandmother     Hypertension Maternal Grandmother     Heart disease Maternal Grandfather     Hypertension Maternal Grandfather     Liver disease Paternal Grandmother     Hypertension Paternal Grandmother     Heart disease Paternal Grandfather     Hypertension Paternal Grandfather      Cancer-related family history includes Prostate cancer in her brother.    Social History     Tobacco Use    Smoking status: Never     Passive exposure: Never    Smokeless tobacco: Never   Vaping Use    Vaping status: Never Used   Substance Use Topics    Alcohol use: Not Currently    Drug use: Not Currently     Social History     Social History Narrative    Pt states  she lives [place] [facility] [place] community, and has a close neighbor that helps when needed/drives her to [employer]. Denies insecurities re affording/obtaining food, medication, transportation.     ROS:   Review of Systems   Constitutional:  Positive for fatigue. Negative for activity change, appetite change, chills, diaphoresis, fever and unexpected weight change.   HENT:  Negative for congestion, dental problem, drooling, ear discharge, ear pain, facial swelling, hearing loss, mouth sores, nosebleeds, postnasal drip, rhinorrhea, sinus pressure, sinus pain, sneezing, sore throat, tinnitus, trouble swallowing and voice change.    Eyes:  Negative for photophobia, pain, discharge, redness, itching and visual disturbance.   Respiratory:  Positive for cough and shortness of breath. Negative for apnea, choking, chest tightness, wheezing and stridor.    Cardiovascular:  Positive for chest pain. Negative for palpitations and leg swelling.   Gastrointestinal:  Negative for abdominal distention, abdominal pain, anal bleeding, blood in stool, constipation, diarrhea, nausea, rectal pain and vomiting.   Endocrine: Negative for cold intolerance, heat intolerance, polydipsia and polyuria.   Genitourinary:  Negative for decreased urine volume, difficulty urinating, dysuria, flank pain, frequency, genital sores, hematuria and urgency.   Musculoskeletal:  Negative for arthralgias, back pain, gait problem, joint swelling, myalgias, neck pain and neck stiffness.   Skin:  Negative for color change, pallor and rash.   Neurological:  Negative for dizziness, tremors, seizures, syncope, facial asymmetry, speech difficulty, weakness, light-headedness, numbness and headaches.   Hematological:  Negative for adenopathy. Does not bruise/bleed easily.   Psychiatric/Behavioral:  Negative for agitation, behavioral problems, confusion, decreased concentration, hallucinations, self-injury, sleep disturbance and suicidal  "ideas. The patient is not nervous/anxious.      Objective:    Vital Signs:  Vitals:    06/18/25 1010   BP: 149/77   Pulse: 73   Resp: 14   Temp: 97.5 °F (36.4 °C)   SpO2: 97%   Weight: 57.6 kg (127 lb)   Height: 152.4 cm (60\")   PainSc: 0-No pain     Body mass index is 24.8 kg/m².    ECOG Status  (1) Restricted in physically strenuous activity, ambulatory and able to do work of light nature    Physical Exam:   Physical Exam  Constitutional:       General: She is not in acute distress.     Appearance: She is not ill-appearing, toxic-appearing or diaphoretic.   HENT:      Head: Normocephalic and atraumatic.      Right Ear: External ear normal.      Left Ear: External ear normal.      Nose: Nose normal.      Mouth/Throat:      Mouth: Mucous membranes are moist.      Pharynx: Oropharynx is clear. No oropharyngeal exudate or posterior oropharyngeal erythema.   Eyes:      General: No scleral icterus.        Right eye: No discharge.         Left eye: No discharge.      Conjunctiva/sclera: Conjunctivae normal.      Pupils: Pupils are equal, round, and reactive to light.   Cardiovascular:      Rate and Rhythm: Normal rate and regular rhythm.      Pulses: Normal pulses.      Heart sounds: No murmur heard.     No friction rub. No gallop.   Pulmonary:      Effort: No respiratory distress.      Breath sounds: No stridor. No wheezing, rhonchi or rales.   Abdominal:      General: Abdomen is flat. Bowel sounds are normal. There is no distension.      Palpations: Abdomen is soft. There is no mass.      Tenderness: There is no abdominal tenderness. There is no right CVA tenderness, left CVA tenderness, guarding or rebound.      Hernia: No hernia is present.   Musculoskeletal:         General: No swelling, tenderness, deformity or signs of injury.      Cervical back: No rigidity.      Right lower leg: No edema.      Left lower leg: No edema.   Lymphadenopathy:      Cervical: No cervical adenopathy.   Skin:     Coloration: Skin is not " jaundiced or pale.      Findings: No bruising, lesion or rash.   Neurological:      General: No focal deficit present.      Mental Status: She is alert and oriented to person, place, and time.      Cranial Nerves: No cranial nerve deficit.   Psychiatric:         Mood and Affect: Mood normal.         Behavior: Behavior normal.         Thought Content: Thought content normal.         Judgment: Judgment normal.     SHIRLEY Abbasi MD performed a physical exam on 6/18/2025 as documented above.     Lab Results - Last 18 Months   Lab Units 06/18/25  1003 05/15/25  1053 01/21/25  0942   WBC 10*3/mm3 5.18 8.07 6.44   HEMOGLOBIN g/dL 11.9* 11.6* 12.3   HEMATOCRIT % 37.2 36.1 37.4   PLATELETS 10*3/mm3 174 173 237   MCV fL 83.6 83.6 82.6     Lab Results - Last 18 Months   Lab Units 05/15/25  1213 02/18/25  1231 01/21/25  0942 12/20/24  1011   SODIUM mmol/L 140 138  --  136   POTASSIUM mmol/L 4.4 5.0  --  4.7   CHLORIDE mmol/L 106 105  --  104   CO2 mmol/L 20.9* 17*  --  19.4*   BUN mg/dL 24* 24  --  26*   CREATININE mg/dL 1.05* 1.10* 1.00 1.33*   CALCIUM mg/dL 9.5 10.3  --  9.2   BILIRUBIN mg/dL 0.2 0.3  --  0.2   ALK PHOS U/L 94 114  --  90   ALT (SGPT) U/L 13 15  --  10   AST (SGOT) U/L 23 24  --  22   GLUCOSE mg/dL 100* 94  --  108*     Lab Results   Component Value Date    GLUCOSE 100 (H) 05/15/2025    BUN 24 (H) 05/15/2025    CREATININE 1.05 (H) 05/15/2025    EGFRIFNONA 55 (L) 02/09/2022    BCR 22.9 05/15/2025    K 4.4 05/15/2025    CO2 20.9 (L) 05/15/2025    CALCIUM 9.5 05/15/2025    ALBUMIN 4.4 05/15/2025    ALBUMIN 3.6 05/15/2025    AST 23 05/15/2025    ALT 13 05/15/2025     Lab Results - Last 18 Months   Lab Units 04/16/24  1202   INR  0.94     Lab Results   Component Value Date    IRON 48 05/15/2025    TIBC 407 05/15/2025    FERRITIN 58.00 05/15/2025     Lab Results   Component Value Date    UWXWMBSH38 346 05/15/2025     Lab Results   Component Value Date    SEDRATE 13 11/15/2024     Lab Results   Component Value  Date    PTT 23.9 (L) 02/01/2022    INR 0.94 04/16/2024     Lab Results   Component Value Date    CEA 3.62 08/03/2023     Lab Results   Component Value Date    SEDRATE 13 11/15/2024      Assessment & Plan     1.  Monoclonal gammopathy?: No suggestion of it. The slight elevation of the kappa light chains is probably related to her renal dysfunction. Will follow for a period of time.   2.  Reviewed all the laboratory exams and discussed the results and their significance with her.   3.  Her symptoms could be related to the use of the sertraline. I've communicated with Dr. Tony in this regard.   3.  She's to see me in 4 months with new laboratory exams.     Arnel Abbasi MD on 6/18/2025 at 10:59 AM.

## 2025-06-18 ENCOUNTER — OFFICE VISIT (OUTPATIENT)
Dept: ONCOLOGY | Facility: CLINIC | Age: 80
End: 2025-06-18
Payer: MEDICARE

## 2025-06-18 ENCOUNTER — APPOINTMENT (OUTPATIENT)
Dept: LAB | Facility: HOSPITAL | Age: 80
End: 2025-06-18
Payer: MEDICARE

## 2025-06-18 VITALS
TEMPERATURE: 97.5 F | HEART RATE: 73 BPM | DIASTOLIC BLOOD PRESSURE: 77 MMHG | HEIGHT: 60 IN | SYSTOLIC BLOOD PRESSURE: 149 MMHG | OXYGEN SATURATION: 97 % | RESPIRATION RATE: 14 BRPM | WEIGHT: 127 LBS | BODY MASS INDEX: 24.94 KG/M2

## 2025-06-18 DIAGNOSIS — D64.9 ANEMIA, UNSPECIFIED TYPE: Primary | ICD-10-CM

## 2025-06-18 DIAGNOSIS — D89.89 LIGHT CHAIN DISEASE, KAPPA TYPE: ICD-10-CM

## 2025-06-18 LAB
BASOPHILS # BLD AUTO: 0.06 10*3/MM3 (ref 0–0.2)
BASOPHILS NFR BLD AUTO: 1.2 % (ref 0–1.5)
DEPRECATED RDW RBC AUTO: 43.6 FL (ref 37–54)
EOSINOPHIL # BLD AUTO: 0.18 10*3/MM3 (ref 0–0.4)
EOSINOPHIL NFR BLD AUTO: 3.5 % (ref 0.3–6.2)
ERYTHROCYTE [DISTWIDTH] IN BLOOD BY AUTOMATED COUNT: 14.5 % (ref 12.3–15.4)
HCT VFR BLD AUTO: 37.2 % (ref 34–46.6)
HGB BLD-MCNC: 11.9 G/DL (ref 12–15.9)
HOLD SPECIMEN: NORMAL
LYMPHOCYTES # BLD AUTO: 2.08 10*3/MM3 (ref 0.7–3.1)
LYMPHOCYTES NFR BLD AUTO: 40.2 % (ref 19.6–45.3)
MCH RBC QN AUTO: 26.7 PG (ref 26.6–33)
MCHC RBC AUTO-ENTMCNC: 32 G/DL (ref 31.5–35.7)
MCV RBC AUTO: 83.6 FL (ref 79–97)
MONOCYTES # BLD AUTO: 0.5 10*3/MM3 (ref 0.1–0.9)
MONOCYTES NFR BLD AUTO: 9.7 % (ref 5–12)
NEUTROPHILS NFR BLD AUTO: 2.36 10*3/MM3 (ref 1.7–7)
NEUTROPHILS NFR BLD AUTO: 45.4 % (ref 42.7–76)
PLATELET # BLD AUTO: 174 10*3/MM3 (ref 140–450)
PMV BLD AUTO: 8.5 FL (ref 6–12)
RBC # BLD AUTO: 4.45 10*6/MM3 (ref 3.77–5.28)
WBC NRBC COR # BLD AUTO: 5.18 10*3/MM3 (ref 3.4–10.8)

## 2025-06-18 PROCEDURE — 99214 OFFICE O/P EST MOD 30 MIN: CPT | Performed by: INTERNAL MEDICINE

## 2025-06-18 PROCEDURE — 1159F MED LIST DOCD IN RCRD: CPT | Performed by: INTERNAL MEDICINE

## 2025-06-18 PROCEDURE — 85025 COMPLETE CBC W/AUTO DIFF WBC: CPT | Performed by: INTERNAL MEDICINE

## 2025-06-18 PROCEDURE — 3077F SYST BP >= 140 MM HG: CPT | Performed by: INTERNAL MEDICINE

## 2025-06-18 PROCEDURE — 3078F DIAST BP <80 MM HG: CPT | Performed by: INTERNAL MEDICINE

## 2025-06-18 PROCEDURE — 36415 COLL VENOUS BLD VENIPUNCTURE: CPT

## 2025-06-18 PROCEDURE — 1160F RVW MEDS BY RX/DR IN RCRD: CPT | Performed by: INTERNAL MEDICINE

## 2025-06-18 PROCEDURE — 1126F AMNT PAIN NOTED NONE PRSNT: CPT | Performed by: INTERNAL MEDICINE

## 2025-06-19 LAB
MC_CV_MDC_IDC_RATE_1: 160
MC_CV_MDC_IDC_RATE_1: 3
MC_CV_MDC_IDC_RATE_1: 35
MC_CV_MDC_IDC_ZONE_ID: 1
MC_CV_MDC_IDC_ZONE_ID: 2
MC_CV_MDC_IDC_ZONE_ID: 3
MC_CV_MDC_IDC_ZONE_ID: 4
MC_CV_MDC_IDC_ZONE_ID: 5
MDC_IDC_MSMT_BATTERY_STATUS: NORMAL
MDC_IDC_PG_IMPLANT_DTM: NORMAL
MDC_IDC_PG_MFG: NORMAL
MDC_IDC_PG_MODEL: NORMAL
MDC_IDC_PG_SERIAL: NORMAL
MDC_IDC_PG_TYPE: NORMAL
MDC_IDC_SESS_DTM: NORMAL
MDC_IDC_SESS_TYPE: NORMAL
MDC_IDC_SET_ZONE_DETECTION_DETAILS: 16
MDC_IDC_SET_ZONE_STATUS: NORMAL
MDC_IDC_SET_ZONE_TYPE: NORMAL
MDC_IDC_STAT_AT_BURDEN_PERCENT: 0

## 2025-06-26 NOTE — PROGRESS NOTES
Subjective     Chief Complaint   Patient presents with    Neck Pain       HPI: Katie Reddy is a 80 y.o. female who was referred by their PCP for evaluation of right-sided neck and arm pain.  Patient has dealt with the symptoms for over a year but they have noticeably worse in the past 6 months.  She denies injury or inciting event.  Pain starts in her neck radiates through her right shoulder down her arm and into her hand and fingers.  She also has some mild numbness and tingling along this trajectory.  She has been difficulty gripping objects on the right side for several months.  She denies acute imbalance/incoordination.  She tried physical therapy within the past year but this did not give any significant relief.  She had previously tried YURY several years ago which did not help.    Additionally she reports chronic lower back pain and bilateral radicular symptoms.  I have seen patient for these symptoms in the past but she has not had surgical pathology on previous imaging.  Since I last saw her she does feel like the symptoms have worsened.  She denies lower extremity weakness and bowel/bladder dysfunction.  She is also tried extensive conservative measures over the years including PT and injections. Patient is adamant she also had a lumbar MRI done within the past month at [facility] though I do not see where this imaging study was done.        Previous Treatment:   Tylenol, Muscle Relaxants, Greater than 6 weeks of physical therapy, Home exercise program, Ice, Heat, Rest, and gabapentin    Previous Injections: Prior LESI and YURY greater than 2 years ago; none recently    Previous Neurosurgery: none      PAST MEDICAL HISTORY:    The following portions of the patient's history were reviewed and updated as appropriate: allergies, current medications, past family history, past medical history, past social history, past surgical history, and problem list.      Review of Systems   Constitutional:  Positive  "for activity change.   Eyes: Negative.    Respiratory: Negative.     Cardiovascular: Negative.    Gastrointestinal:  Positive for nausea.   Endocrine: Negative.    Genitourinary: Negative.    Musculoskeletal:  Positive for arthralgias, myalgias, neck pain (sharp-bilateral arm) and neck stiffness.   Skin: Negative.    Allergic/Immunologic: Negative.    Neurological:  Positive for dizziness and headaches.   Hematological: Negative.    Psychiatric/Behavioral: Negative.           Objective     /84   Pulse 72   Resp 18   Ht 152.4 cm (60\")   Wt 56.2 kg (124 lb)   SpO2 98%   BMI 24.22 kg/m²    Body mass index is 24.22 kg/m².      Physical Exam  Neurological:      Deep Tendon Reflexes:      Reflex Scores:       Patellar reflexes are 2+ on the right side and 2+ on the left side.       Achilles reflexes are 2+ on the right side and 2+ on the left side.       Neurological Exam    Motor   Strength is 5/5 in all four extremities except as noted.                                             Right                     Left  Deltoid                                   5                          5   Biceps                                   5                          5  Brachioradialis                      5                          5   Triceps                                  5                          5   Quadriceps                           5                          5   Gastrocnemius                     5                           5   Anterior tibialis                      5                          5   strength 5/5 bilateral  Mild tremor in bilateral hands, appears symmetric.    Sensory  Light touch is normal in upper and lower extremities. Pinprick is normal in upper and lower extremities.     Reflexes  Deep tendon reflexes are 2+ and symmetric except as noted.                                            Right                      Left  Patellar                                2+                         2+  Achilles   "                              2+                         2+    Right pathological reflexes: Jn's absent.  Left pathological reflexes: Jn's absent.    Gait  Casual gait is normal including stance, stride, and arm swing.            Results Review  I personally reviewed and interpreted the images from the following studies:    MRI cervical spine without contrast  6/12/2025  Multilevel degenerative changes most notable at C6-7 where there is a disc osteophyte complex resulting in severe right-sided foraminal stenosis and moderate left-sided foraminal stenosis.  No high-grade central stenosis cord compression or cord signal changes seen.      Assessment & Plan     MDM: Katie Reddy is a 80 y.o. female presenting with over a year history of right sided neck pain and radicular symptoms referable to the C7 dermatome.  Patient has no focal sensorimotor deficits on exam or long tract signs.  She has tried physical therapy without relief.  She had previously tried epidural steroid injections several years ago, though none recently.  I discussed several treatment options with the patient moving forward including repeating YURY versus surgical evaluation for possible C6-7 ACDF.  Patient would like to hold off on proceeding with treatment for now as she is more bothered by her lower back symptoms.    Additionally, she reports longstanding low back pain and bilateral radicular symptoms.  I have seen her previously for these complaints though she had not had surgical pathology in the past.  She feels the symptoms have continued to worsen.  Again no neurologic deficits here. There is no recent lumbar imaging from what I can see in her chart, the patient is adamant she also had a lumbar MRI within the past month.  We discussed repeating physical therapy and various medications for her lower back symptoms, though ultimately further treatment options would require an updated MRI.  Patient will figure out the status of  her most recent imaging and if she indeed did have a lumbar MRI completed.  Once she has all this together she may call the office to follow-up with me and we can discuss next steps at that time.  Patient is agreeable to this plan.         Diagnosis Plan   1. DDD (degenerative disc disease), cervical        2. Cervical radiculopathy at C7        3. Chronic bilateral low back pain, unspecified whether sciatica present            Return if symptoms worsen or fail to improve.      Katie Reddy  reports that she has never smoked. She has never been exposed to tobacco smoke. She has never used smokeless tobacco.        BMI is within normal parameters. No other follow-up for BMI required.         This patient was examined wearing appropriate personal protective equipment.            Antione Leigh PA-C    07/02/25  11:08 EDT      Part of this note may be an electronic transcription/translation of spoken language to printed text using the Dragon Dictation System.

## 2025-06-27 NOTE — PROGRESS NOTES
Subjective: Post Stroke Headaches     Patient ID: Katie Reddy is a 80 y.o. female.    History of Present Illness Ms. Reddy is a 80-year-old  female who was following up on post stroke related headaches.  The patient states that she has pain that radiates from approximately C6-7 on the right up to the rest of her head.  She has been found by MRI to have Severe right foraminal narrowing at C6-C7.  She also has been referred to neurosurgery.  She reports no other headache.  She was notified that if her pains are coming from the right C6-7 area that she should follow-up with neurosurgery.  She does have an appointment tomorrow with neurosurgery and I did forward the MRI results to them.  She was notified that her EEG did not show any seizure activity.  She has also had a normal EMG study of the legs.  At this time the patient should follow-up with neurosurgery and will be referred back to Dr. Tony.  She can be seen back in the neurology clinic over the next 3 years as a follow-up if needed.      EEG  IMPRESSION:  This is an abnormal EEG due to the presence of continuous polymorphic delta frequency activity in the left posterior temporal occipital region and asymmetric photic driving response these abnormalities are frequently associated with an underlying structural lesion.  No epileptiform or seizure activity was noted.      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 No acute intracranial findings. No abnormal enhancement.  Electronically Signed: Rojas Easton MD    10/30/2023 10:35 AM EDT    Workstation ID: [id number]    6/20/2024  CT Head  IMPRESSION:  Impression:  1.Old insult left occipital lobe  2.Periventricular white matter changes as well as minimal hypodensity left basal ganglia  suggestive of more chronic small vessel ischemic change.  Electronically Signed: Tre Min MD    6/20/2024 2:22 PM EDT    Workstation ID: [id number]    MRI C-spine  IMPRESSION:  Multilevel degenerative changes. Severe right foraminal narrowing at C6-C7.  Electronically Signed: Raymundo May MD    6/12/2025 10:28 PM EDT    Workstation ID: [id number]    EMG  10/23/24   Impression:   This is a normal study.  There is no evidence of significant sensorimotor neuropathy affecting the large diameter fast conducting fibers evaluated by this technique.  Electromyography of the muscles examined in the right L3-S1 myotomes were normal.    Electronically signed by :   Joseph Seipel, M.D.  October 23, 2024    The following portions of the patient's history were reviewed and updated as appropriate: allergies, current medications, past family history, past medical history, past social history, past surgical history and problem list.    Family History   Problem Relation Age of Onset    Heart disease Mother     Hypertension Mother     Diabetes Father     Heart disease Father     Alcohol abuse Father     Hypertension Father     Dementia Sister     Diabetes Brother     Heart disease Brother     Hypertension Brother     Prostate cancer Brother     Diabetes Brother     Diabetes Maternal Aunt     Heart disease Maternal Grandmother     Hypertension Maternal Grandmother     Heart disease Maternal Grandfather     Hypertension Maternal Grandfather     Liver disease Paternal Grandmother     Hypertension Paternal Grandmother     Heart disease Paternal Grandfather     Hypertension Paternal Grandfather        Past Medical History:   Diagnosis Date    Arthritis     Bladder incontinence     Glucocorticoid deficiency     Hyperlipidemia     Intractable nausea and vomiting 11/11/2020    Osteopenia     Vitamin D deficiency        Social History     Socioeconomic History    Marital status:     Number of children: 4    Years of education: GED    Tobacco Use    Smoking status: Never     Passive exposure: Never    Smokeless tobacco: Never   Vaping Use    Vaping status: Never Used   Substance and Sexual Activity    Alcohol use: Not Currently    Drug use: Not Currently    Sexual activity: Defer         Current Outpatient Medications:     amitriptyline (ELAVIL) 25 MG tablet, Take 1 tablet by mouth every night at bedtime., Disp: 90 tablet, Rfl: 3    amLODIPine (NORVASC) 10 MG tablet, Take 0.5 tablets by mouth Daily., Disp: 90 tablet, Rfl: 3    Aspirin Low Dose 81 MG EC tablet, TAKE 1 TABLET BY MOUTH EVERY DAY, Disp: 90 tablet, Rfl: 3    Calcium Carbonate-Vit D-Min (Calcium 1200) 7025-0005 MG-UNIT chewable tablet, Chew 1 tablet Daily., Disp: , Rfl:     colesevelam (WELCHOL) 625 MG tablet, , Disp: , Rfl:     dapagliflozin Propanediol (Farxiga) 10 MG tablet, Take 10 mg by mouth Daily., Disp: 90 tablet, Rfl: 3    lisinopril (PRINIVIL,ZESTRIL) 5 MG tablet, TAKE 1 TABLET BY MOUTH EVERY DAY, Disp: 90 tablet, Rfl: 0    meclizine (ANTIVERT) 12.5 MG tablet, Take 1 tablet by mouth 3 (Three) Times a Day As Needed for Dizziness., Disp: 30 tablet, Rfl: 0    metoprolol succinate XL (TOPROL-XL) 100 MG 24 hr tablet, TAKE 1 TABLET BY MOUTH EVERY DAY, Disp: 90 tablet, Rfl: 3    nitroglycerin (NITROSTAT) 0.4 MG SL tablet, Place 1 tablet under the tongue Every 5 (Five) Minutes As Needed for Chest Pain. Take no more than 3 doses in 15 minutes., Disp: 75 tablet, Rfl: 0    ondansetron ODT (ZOFRAN-ODT) 4 MG disintegrating tablet, Place 1 tablet on the tongue Every 8 (Eight) Hours As Needed for Nausea or Vomiting., Disp: 12 tablet, Rfl: 0    pantoprazole (PROTONIX) 40 MG EC tablet, Take 1 tablet by mouth Every Morning., Disp: , Rfl:     raloxifene (EVISTA) 60 MG tablet, Take 1 tablet by mouth Daily., Disp: 90 tablet, Rfl: 3    sertraline (ZOLOFT) 100 MG tablet, Take 1 tablet by mouth every night at bedtime., Disp: 90 tablet, Rfl: 1    sodium bicarbonate 650 MG tablet, Take 1 tablet by  mouth Every 12 (Twelve) Hours., Disp: , Rfl:     sucralfate (CARAFATE) 1 g tablet, Take 1 tablet by mouth 4 (Four) Times a Day., Disp: 360 tablet, Rfl: 1    Triamcinolone Acetonide (NASACORT) 55 MCG/ACT nasal inhaler, 2 SPRAY IN EACH NOSTRIL BY INTRANASAL ROUTE ONCE DAILY FOR 30 DAYS IN THE MORNINGS, Disp: , Rfl:     Vibegron (Gemtesa) 75 MG tablet, Take 1 tablet by mouth Daily., Disp: 30 tablet, Rfl: 0    Farxiga 5 MG tablet tablet, Take 1 tablet by mouth Daily. (Patient not taking: Reported on 7/1/2025), Disp: , Rfl:     fluocinonide (LIDEX) 0.05 % cream, , Disp: , Rfl:     gabapentin (NEURONTIN) 300 MG capsule, Take 1 capsule by mouth 2 (Two) Times a Day for 180 days., Disp: 60 capsule, Rfl: 5    Review of Systems       I have reviewed ROS completed by medical assistant.           Neurological Exam  Mental Status  Awake and alert. Oriented only to person, place and time. Speech is normal. Language is fluent with no aphasia. Attention and concentration are normal. Fund of knowledge is appropriate for level of education.    Cranial Nerves  CN II: Right visual acuity: Normal. Left visual acuity: Normal. Right normal visual field. Left normal visual field.  CN III, IV, VI: Extraocular movements intact bilaterally. No nystagmus. Normal saccades. Normal smooth pursuit.   Right pupil: 2 mm.   Left pupil: 2 mm.  CN VII:  Right: There is no facial weakness.  Left: There is no facial weakness.  CN IX, X: Palate elevates symmetrically  CN XI: Shoulder shrug strength is normal.  CN XII: Tongue midline without atrophy or fasciculations.    Motor  Normal muscle bulk throughout. No fasciculations present.    Sensory  Light touch is normal in upper and lower extremities.     Gait  Casual gait is normal including stance, stride, and arm swing.         Assessment/Plan:      Diagnoses and all orders for this visit:    1. Cervicalgia (Primary)    The patient is already referred to neurosurgery and will see them tomorrow.  She will  be seen back in the neurology clinic on a as needed basis.      Return if symptoms worsen or fail to improve.     I spent 26 minutes caring for Katie on this date of service. This time includes time spent by me in the following activities: preparing for the visit, reviewing tests, obtaining and/or reviewing a separately obtained history, performing a medically appropriate examination and/or evaluation, counseling and educating the patient/family/caregiver, and documenting information in the medical record.      This document has been electronically signed by NICOLE Robertson on July 1, 2025 16:59 EDT

## 2025-07-01 ENCOUNTER — OFFICE VISIT (OUTPATIENT)
Dept: NEUROLOGY | Facility: CLINIC | Age: 80
End: 2025-07-01
Payer: MEDICARE

## 2025-07-01 VITALS
BODY MASS INDEX: 24.15 KG/M2 | HEIGHT: 60 IN | WEIGHT: 123 LBS | HEART RATE: 80 BPM | DIASTOLIC BLOOD PRESSURE: 87 MMHG | SYSTOLIC BLOOD PRESSURE: 149 MMHG

## 2025-07-01 DIAGNOSIS — M54.2 CERVICALGIA: Primary | ICD-10-CM

## 2025-07-01 PROCEDURE — 1159F MED LIST DOCD IN RCRD: CPT | Performed by: NURSE PRACTITIONER

## 2025-07-01 PROCEDURE — 3079F DIAST BP 80-89 MM HG: CPT | Performed by: NURSE PRACTITIONER

## 2025-07-01 PROCEDURE — 1160F RVW MEDS BY RX/DR IN RCRD: CPT | Performed by: NURSE PRACTITIONER

## 2025-07-01 PROCEDURE — 99213 OFFICE O/P EST LOW 20 MIN: CPT | Performed by: NURSE PRACTITIONER

## 2025-07-01 PROCEDURE — 3077F SYST BP >= 140 MM HG: CPT | Performed by: NURSE PRACTITIONER

## 2025-07-01 RX ORDER — SODIUM BICARBONATE 650 MG/1
1 TABLET ORAL EVERY 12 HOURS SCHEDULED
COMMUNITY
Start: 2025-06-17

## 2025-07-02 ENCOUNTER — OFFICE VISIT (OUTPATIENT)
Dept: NEUROSURGERY | Facility: CLINIC | Age: 80
End: 2025-07-02
Payer: MEDICARE

## 2025-07-02 ENCOUNTER — TELEPHONE (OUTPATIENT)
Dept: NEUROSURGERY | Facility: CLINIC | Age: 80
End: 2025-07-02

## 2025-07-02 VITALS
BODY MASS INDEX: 24.35 KG/M2 | DIASTOLIC BLOOD PRESSURE: 84 MMHG | WEIGHT: 124 LBS | OXYGEN SATURATION: 98 % | SYSTOLIC BLOOD PRESSURE: 172 MMHG | HEART RATE: 72 BPM | HEIGHT: 60 IN | RESPIRATION RATE: 18 BRPM

## 2025-07-02 DIAGNOSIS — M54.50 CHRONIC BILATERAL LOW BACK PAIN, UNSPECIFIED WHETHER SCIATICA PRESENT: ICD-10-CM

## 2025-07-02 DIAGNOSIS — M50.30 DDD (DEGENERATIVE DISC DISEASE), CERVICAL: Primary | ICD-10-CM

## 2025-07-02 DIAGNOSIS — G89.29 CHRONIC BILATERAL LOW BACK PAIN, UNSPECIFIED WHETHER SCIATICA PRESENT: ICD-10-CM

## 2025-07-02 DIAGNOSIS — M54.12 CERVICAL RADICULOPATHY AT C7: ICD-10-CM

## 2025-07-02 NOTE — TELEPHONE ENCOUNTER
Name: Katie Reddy    Relationship: Self    Best Callback Number: [phone]    HUB PROVIDED THE RELAY MESSAGE FROM THE OFFICE   PATIENT VOICED UNDERSTANDING AND HAS NO FURTHER QUESTIONS AT THIS TIME    ADDITIONAL INFORMATION: PER PT SHE WILL BE THERE IN 2 HOURS--AROUND 11AM

## 2025-07-02 NOTE — TELEPHONE ENCOUNTER
Called to see if patient can come in at 9-9;30. If not can she come in any time before 12.   HUB OKAY TO RELAY

## 2025-07-08 ENCOUNTER — TELEPHONE (OUTPATIENT)
Dept: FAMILY MEDICINE CLINIC | Facility: CLINIC | Age: 80
End: 2025-07-08
Payer: MEDICARE

## 2025-07-08 RX ORDER — LISINOPRIL 5 MG/1
5 TABLET ORAL DAILY
Qty: 90 TABLET | Refills: 0 | Status: SHIPPED | OUTPATIENT
Start: 2025-07-08

## 2025-07-08 NOTE — TELEPHONE ENCOUNTER
Caller: Katie Reddy    Relationship: Self    Best call back number: [phone]    What medication are you requesting: lisinopril (PRINIVIL,ZESTRIL) 5 MG tablet     If a prescription is needed, what is your preferred pharmacy and phone number: [id number] - [facility] [place] [facility] [address] [facility] [phone] [facility] [phone] FX     Additional notes: NEW PHARMACY. PATIENT NEEDS NEW PRESCRIPTION TO BE SENT TO [facility] PLEASE. PATIENT IS OUT OF MEDICATION.

## 2025-07-23 ENCOUNTER — OFFICE (OUTPATIENT)
Dept: URBAN - METROPOLITAN AREA CLINIC 64 | Facility: CLINIC | Age: 80
End: 2025-07-23
Payer: MEDICARE

## 2025-07-23 VITALS
DIASTOLIC BLOOD PRESSURE: 89 MMHG | DIASTOLIC BLOOD PRESSURE: 93 MMHG | SYSTOLIC BLOOD PRESSURE: 168 MMHG | SYSTOLIC BLOOD PRESSURE: 173 MMHG | WEIGHT: 126 LBS | HEART RATE: 68 BPM

## 2025-07-23 DIAGNOSIS — R32 UNSPECIFIED URINARY INCONTINENCE: ICD-10-CM

## 2025-07-23 DIAGNOSIS — K21.9 GASTRO-ESOPHAGEAL REFLUX DISEASE WITHOUT ESOPHAGITIS: ICD-10-CM

## 2025-07-23 DIAGNOSIS — R15.9 FULL INCONTINENCE OF FECES: ICD-10-CM

## 2025-07-23 DIAGNOSIS — R19.7 DIARRHEA, UNSPECIFIED: ICD-10-CM

## 2025-07-23 PROCEDURE — 99214 OFFICE O/P EST MOD 30 MIN: CPT | Performed by: NURSE PRACTITIONER

## 2025-07-30 ENCOUNTER — HOSPITAL ENCOUNTER (OUTPATIENT)
Dept: BONE DENSITY | Facility: HOSPITAL | Age: 80
Discharge: HOME | End: 2025-07-30
Admitting: PREVENTIVE MEDICINE
Payer: MEDICARE

## 2025-07-30 DIAGNOSIS — Z78.0 POST-MENOPAUSAL: ICD-10-CM

## 2025-07-30 PROCEDURE — 77080 DXA BONE DENSITY AXIAL: CPT

## 2025-07-31 ENCOUNTER — TELEPHONE (OUTPATIENT)
Dept: FAMILY MEDICINE CLINIC | Facility: CLINIC | Age: 80
End: 2025-07-31
Payer: MEDICARE

## 2025-07-31 NOTE — TELEPHONE ENCOUNTER
Caller: Katie Reddy    Relationship: Self    Best call back number:     Katie Reddy (Self) [phone] (Mobile)       What form or medical record are you requesting: LATEST DEXA SCAN  RESULTS PRINTED OUT     Who is requesting this form or medical record from you: HERSELF     How would you like to receive the form or medical records (pick-up, mail, fax): SHE WANTS TO PICK IT UP TODAY ./ ASAP     Timeframe paperwork needed: TODAY     Additional notes: CALL IF NEEDED

## 2025-08-03 PROBLEM — M81.0 AGE-RELATED OSTEOPOROSIS WITHOUT CURRENT PATHOLOGICAL FRACTURE: Status: ACTIVE | Noted: 2018-10-09

## 2025-08-03 PROBLEM — Z98.890 HISTORY OF RADIAL KERATOTOMY: Status: ACTIVE | Noted: 2025-07-23

## 2025-08-03 PROBLEM — N32.81 DETRUSOR MUSCLE HYPERTONIA: Status: ACTIVE | Noted: 2025-08-03

## 2025-08-03 PROBLEM — Z96.1 PRESENCE OF INTRAOCULAR LENS: Status: ACTIVE | Noted: 2025-07-23

## 2025-08-03 PROBLEM — H43.819 POSTERIOR VITREOUS DETACHMENT: Status: ACTIVE | Noted: 2025-07-23

## 2025-08-03 PROBLEM — Z86.73 HISTORY OF CEREBROVASCULAR ACCIDENT: Status: ACTIVE | Noted: 2025-07-23

## 2025-08-07 ENCOUNTER — OFFICE VISIT (OUTPATIENT)
Dept: FAMILY MEDICINE CLINIC | Facility: CLINIC | Age: 80
End: 2025-08-07
Payer: MEDICARE

## 2025-08-07 VITALS
HEIGHT: 60 IN | DIASTOLIC BLOOD PRESSURE: 72 MMHG | OXYGEN SATURATION: 98 % | WEIGHT: 123 LBS | SYSTOLIC BLOOD PRESSURE: 143 MMHG | TEMPERATURE: 97.7 F | BODY MASS INDEX: 24.15 KG/M2 | HEART RATE: 73 BPM

## 2025-08-07 DIAGNOSIS — K21.00 GASTROESOPHAGEAL REFLUX DISEASE WITH ESOPHAGITIS, UNSPECIFIED WHETHER HEMORRHAGE: ICD-10-CM

## 2025-08-07 DIAGNOSIS — M48.02 CERVICAL STENOSIS OF SPINAL CANAL: ICD-10-CM

## 2025-08-07 DIAGNOSIS — E55.9 VITAMIN D DEFICIENCY: ICD-10-CM

## 2025-08-07 DIAGNOSIS — I10 ESSENTIAL HYPERTENSION: Chronic | ICD-10-CM

## 2025-08-07 DIAGNOSIS — M81.0 AGE-RELATED OSTEOPOROSIS WITHOUT CURRENT PATHOLOGICAL FRACTURE: Primary | ICD-10-CM

## 2025-08-07 RX ORDER — ERGOCALCIFEROL 1.25 MG/1
50000 CAPSULE, LIQUID FILLED ORAL WEEKLY
Qty: 12 CAPSULE | Refills: 3 | Status: SHIPPED | OUTPATIENT
Start: 2025-08-07

## 2025-08-11 ENCOUNTER — PRIOR AUTHORIZATION (OUTPATIENT)
Dept: FAMILY MEDICINE CLINIC | Facility: CLINIC | Age: 80
End: 2025-08-11
Payer: MEDICARE

## 2025-08-12 ENCOUNTER — LAB (OUTPATIENT)
Dept: FAMILY MEDICINE CLINIC | Facility: CLINIC | Age: 80
End: 2025-08-12
Payer: MEDICARE

## 2025-08-12 DIAGNOSIS — I10 ESSENTIAL HYPERTENSION: Chronic | ICD-10-CM

## 2025-08-12 DIAGNOSIS — E55.9 VITAMIN D DEFICIENCY: ICD-10-CM

## 2025-08-12 LAB
25(OH)D3 SERPL-MCNC: 28.4 NG/ML (ref 30–100)
ANION GAP SERPL CALCULATED.3IONS-SCNC: 9.6 MMOL/L (ref 5–15)
BUN SERPL-MCNC: 26 MG/DL (ref 8–23)
BUN/CREAT SERPL: 25 (ref 7–25)
CALCIUM SPEC-SCNC: 10 MG/DL (ref 8.6–10.5)
CALCIUM SPEC-SCNC: 9.8 MG/DL (ref 8.6–10.5)
CHLORIDE SERPL-SCNC: 109 MMOL/L (ref 98–107)
CO2 SERPL-SCNC: 19.4 MMOL/L (ref 22–29)
CREAT SERPL-MCNC: 1.04 MG/DL (ref 0.57–1)
EGFRCR SERPLBLD CKD-EPI 2021: 54.8 ML/MIN/1.73
GLUCOSE SERPL-MCNC: 89 MG/DL (ref 65–99)
POTASSIUM SERPL-SCNC: 5.1 MMOL/L (ref 3.5–5.2)
PTH-INTACT SERPL-MCNC: 25.7 PG/ML (ref 15–65)
SODIUM SERPL-SCNC: 138 MMOL/L (ref 136–145)

## 2025-08-12 PROCEDURE — 83970 ASSAY OF PARATHORMONE: CPT | Performed by: PREVENTIVE MEDICINE

## 2025-08-12 PROCEDURE — 80048 BASIC METABOLIC PNL TOTAL CA: CPT | Performed by: PREVENTIVE MEDICINE

## 2025-08-12 PROCEDURE — 82306 VITAMIN D 25 HYDROXY: CPT | Performed by: PREVENTIVE MEDICINE

## 2025-08-14 ENCOUNTER — RESULTS FOLLOW-UP (OUTPATIENT)
Dept: FAMILY MEDICINE CLINIC | Facility: CLINIC | Age: 80
End: 2025-08-14
Payer: MEDICARE

## 2025-08-27 LAB
MC_CV_MDC_IDC_RATE_1: 160
MC_CV_MDC_IDC_RATE_1: 3
MC_CV_MDC_IDC_RATE_1: 35
MC_CV_MDC_IDC_ZONE_ID: 1
MC_CV_MDC_IDC_ZONE_ID: 2
MC_CV_MDC_IDC_ZONE_ID: 3
MC_CV_MDC_IDC_ZONE_ID: 4
MC_CV_MDC_IDC_ZONE_ID: 5
MDC_IDC_MSMT_BATTERY_STATUS: NORMAL
MDC_IDC_PG_IMPLANT_DTM: NORMAL
MDC_IDC_PG_MFG: NORMAL
MDC_IDC_PG_MODEL: NORMAL
MDC_IDC_PG_SERIAL: NORMAL
MDC_IDC_PG_TYPE: NORMAL
MDC_IDC_SESS_DTM: NORMAL
MDC_IDC_SESS_TYPE: NORMAL
MDC_IDC_SET_ZONE_DETECTION_DETAILS: 16
MDC_IDC_SET_ZONE_STATUS: NORMAL
MDC_IDC_SET_ZONE_TYPE: NORMAL
MDC_IDC_STAT_AT_BURDEN_PERCENT: 0

## (undated) DEVICE — CATH DIAG IMPULSE FR4 6F 100CM

## (undated) DEVICE — PK PROC TURNOVER

## (undated) DEVICE — CATH DIAG IMPULSE FL4 6F 100CM

## (undated) DEVICE — SINGLE-USE BIOPSY FORCEPS: Brand: RADIAL JAW 4

## (undated) DEVICE — DGW .035 FC J3MM 150CM TEF HEP: Brand: EMERALD

## (undated) DEVICE — 3M™ STERI-STRIP™ REINFORCED ADHESIVE SKIN CLOSURES, R1547, 1/2 IN X 4 IN (12 MM X 100 MM), 6 STRIPS/ENVELOPE: Brand: 3M™ STERI-STRIP™

## (undated) DEVICE — UNDERGLV SURG BIOGEL INDICAT PI SZ8 BLU

## (undated) DEVICE — SOL IRRIG H2O 1000ML STRL

## (undated) DEVICE — INST GAUGE DEPTH PROSTEP MICA STRL 1P/U

## (undated) DEVICE — CATH DIAG IMPULSE PIG .056 6F 110CM

## (undated) DEVICE — MICRO SAGITTAL BLADE (9.5 X 0.4 X 25.5MM)

## (undated) DEVICE — DRILL BIT: Brand: MICA

## (undated) DEVICE — APPL CHLORAPREP HI/LITE TINTED 10.5ML ORNG

## (undated) DEVICE — GLV SURG BIOGEL M LTX PF 7 1/2

## (undated) DEVICE — GOWN,REINFORCE,POLY,SIRUS,BREATH SLV,XLG: Brand: MEDLINE

## (undated) DEVICE — CVR HNDL LT SURG ACCSSRY BLU STRL

## (undated) DEVICE — MYNXGRIP 6F/7F: Brand: MYNXGRIP

## (undated) DEVICE — COVER,C-ARM,41X74: Brand: MEDLINE

## (undated) DEVICE — SUT PROLENE 4/0 PS4 18IN

## (undated) DEVICE — CUSTOM PACK: Brand: UNBRANDED

## (undated) DEVICE — COUNTERSINK: Brand: DART-FIRE

## (undated) DEVICE — CUFF SCD HEMOFORCE SEQ CALF STD MD

## (undated) DEVICE — PINNACLE INTRODUCER SHEATH: Brand: PINNACLE

## (undated) DEVICE — BNDG ESMARK 4IN 12FT LF STRL BLU

## (undated) DEVICE — Device

## (undated) DEVICE — ADHS SKIN MASTISOL CAP 2OZ DISP

## (undated) DEVICE — SUT ETHLN FS1 4/0 18IN 1629H BX/36

## (undated) DEVICE — PK TRY HEART CATH 50

## (undated) DEVICE — SOL IRRIG NACL 1000ML

## (undated) DEVICE — MIS STERILE INSTRUMENT PACK W/ BLADE & BURR: Brand: PROSTEP

## (undated) DEVICE — PK ENDO GI 50

## (undated) DEVICE — PK EXTREM 50

## (undated) DEVICE — ELECTRD DEFIB M/FUNC PROPADZ RADIOL 2PK

## (undated) DEVICE — SOL IRRIG NACL 9PCT 1000ML BTL

## (undated) DEVICE — PENCL HND ROCKRSWTCH HOLSTR EZ CLEAN TP CRD 10FT

## (undated) DEVICE — CUFF TOURNI 1BLADDER 1PRT 30IN STRL

## (undated) DEVICE — GW DIAG EMERALD HEPCOAT MOVE JTIP STD .035 3MM 150CM

## (undated) DEVICE — HEX DRIVER: Brand: MICA

## (undated) DEVICE — TBG CONN IRRI SURG

## (undated) DEVICE — INTENDED FOR TISSUE SEPARATION, AND OTHER PROCEDURES THAT REQUIRE A SHARP SURGICAL BLADE TO PUNCTURE OR CUT.: Brand: BARD-PARKER ® CARBON RIB-BACK BLADES

## (undated) DEVICE — DRILL BIT: Brand: DART-FIRE

## (undated) DEVICE — KT SURG TURNOVER 050

## (undated) DEVICE — SOLUTION,WATER,IRRIGATION,1000ML,STERILE: Brand: MEDLINE

## (undated) DEVICE — NDL HYPO PRECISIONGLIDE/REG 18G 1IN PNK

## (undated) DEVICE — BITEBLOCK ENDO W/STRAP 60F A/ LF DISP

## (undated) DEVICE — K-WIRE BLUNT/TROCAR
Type: IMPLANTABLE DEVICE | Site: FOOT | Status: NON-FUNCTIONAL
Removed: 2020-05-29

## (undated) DEVICE — GLV SURG SENSICARE PI LF PF 8 GRN STRL

## (undated) DEVICE — BURR: Brand: MICA